# Patient Record
Sex: FEMALE | Race: OTHER | HISPANIC OR LATINO | Employment: UNEMPLOYED | URBAN - METROPOLITAN AREA
[De-identification: names, ages, dates, MRNs, and addresses within clinical notes are randomized per-mention and may not be internally consistent; named-entity substitution may affect disease eponyms.]

---

## 2017-10-10 ENCOUNTER — ALLSCRIPTS OFFICE VISIT (OUTPATIENT)
Dept: OTHER | Facility: OTHER | Age: 57
End: 2017-10-10

## 2017-10-10 ENCOUNTER — APPOINTMENT (OUTPATIENT)
Dept: RADIOLOGY | Facility: CLINIC | Age: 57
End: 2017-10-10
Payer: COMMERCIAL

## 2017-10-10 DIAGNOSIS — M25.561 PAIN IN RIGHT KNEE: ICD-10-CM

## 2017-10-10 DIAGNOSIS — M25.562 PAIN IN LEFT KNEE: ICD-10-CM

## 2017-10-10 PROCEDURE — 73562 X-RAY EXAM OF KNEE 3: CPT

## 2017-10-11 NOTE — PROGRESS NOTES
Assessment  1  Localized osteoarthritis of knees, bilateral (785 36) (M17 0)    Plan  Bilateral knee pain    · * XR KNEE 3 VW LEFT NON INJURY; Status:Active - Retrospective By Protocol  Authorization; Requested for:10Oct2017;    · * XR KNEE 3 VW RIGHT NON INJURY; Status:Active - Retrospective By Protocol  Authorization; Requested for:10Oct2017; She has failed conservative measures for this right knee which include injections and physical therapy and ice  She wishes to undergo a right total knee replacement  We did talk about that procedure and I have referred her to Dr Celia Tucker for further evaluation and consideration of knee replacement surgery  Discussion/Summary  The patient, patient's family was counseled regarding diagnostic results,-instructions for management,-prognosis,-patient and family education,-impressions,-risks and benefits of treatment options  Chief Complaint  1  Knee Pain    History of Present Illness  Luis F Chowdhury is a 16-year-old female referred to see me today by her primary care physician for right greater than left knee pain  She is known to have significant arthritis into both knees  She has done physical therapy and received injections for the knees but these interventions have failed to give her relief  She notices that the right knee is a sharp and moderate and constant discomfort that is worse with more walking and somewhat better with rest and tends to radiate medially and posteriorly  She does note decreased range of motion in that knee  Review of Systems    Constitutional: recent weight loss, but-No fever, no chills, feels well, no tiredness, no recent weight gain or loss  Eyes: No complaints of eyesight problems, no red eyes  ENT: no loss of hearing, no nosebleeds, no sore throat  Cardiovascular: No complaints of chest pain, no palpitations, no leg claudication or lower extremity edema  Respiratory: no compliants of shortness of breath, no wheezing, no cough  Gastrointestinal: no complaints of abdominal pain, no constipation, no nausea or diarrhea, no vomiting, no bloody stools  Genitourinary: no complaints of dysuria, no incontinence  Musculoskeletal: joint swelling-and-joint stiffness, but-as noted in HPI  Integumentary: no complaints of skin rash or lesion, no itching or dry skin, no skin wounds  Neurological: no complaints of headache, no confusion, no numbness or tingling, no dizziness  Endocrine: No complaints of muscle weakness, no feelings of weakness, no frequent urination, no excessive thirst    Psychiatric: No suicidal thoughts, no anxiety, no feelings of depression  ROS reviewed  Active Problems  1  Bilateral knee pain (719 46) (M25 561,M25 562)    Past Medical History    The active problems and past medical history were reviewed and updated today  Surgical History   · Denied: History Of Prior Surgery    The surgical history was reviewed and updated today  Family History  Mother    · Family history of arthritis (V17 7) (Z82 61)  Father    · Family history of arthritis (V17 7) (Z82 61)    The family history was reviewed and updated today  Social History   · Smoker (305 1) (F17 200)  The social history was reviewed and updated today  Current Meds   1  Aspirin TABS; Therapy: (Recorded:10Oct2017) to Recorded   2  MetFORMIN HCl TABS; Therapy: (Recorded:10Oct2017) to Recorded   3  Pravastatin Sodium TABS; Therapy: (Recorded:10Oct2017) to Recorded    The medication list was reviewed and updated today  Allergies  1  No Known Drug Allergies    Vitals  Signs   Heart Rate: 62  Systolic: 497  Diastolic: 70  Height: 5 ft 2 in  Weight: 155 lb   BMI Calculated: 28 35  BSA Calculated: 1 72    Physical Exam    Right Knee: Appearance: Normal except an effusion-and-genu varum  Tenderness: None except the medial joint line  ROM: Full except as noted: Flexion: painful restricted AROM 110 degrees   Extension: restricted AROM -5 degrees  Flexion was 5/5  Extension was 5/5  Special Test: no laxity on Valgus Stress-and-no laxity on Varus Stress  Left Knee: Appearance: genu varum  Tenderness: None  ROM: Full  Motor: Normal    Constitutional - General appearance: Abnormal  obese  Musculoskeletal - Gait and station: Normal -Digits and nails: Normal -Muscle strength/tone: Normal -Lower extremity compartments: Normal    Cardiovascular - Pulses: Normal -Examination of extremities for edema and/or varicosities: Normal    Skin - Skin and subcutaneous tissue: Normal    Neurologic - Sensation: Normal -Lower extremity peripheral neuro exam: Normal    Psychiatric - Orientation to person, place, and time: Normal -Mood and affect: Normal    Eyes   Conjunctiva and lids: Normal     Pupils and irises: Normal        Results/Data  I personally reviewed the films/images/results in the office today  My interpretation follows  X-ray Review Bilateral knee x-rays demonstrate moderate to severe arthritis in the right knee particularly in the medial compartment  The left knee has mild arthritic change        Future Appointments    Date/Time Provider Specialty Site   10/20/2017 03:00 PM Allyson Kelley DO Orthopedic Surgery [facility]     Signatures   Electronically signed by : SAIDA Leo ; Oct 10 2017  4:41PM EST                       (Author)

## 2017-10-20 ENCOUNTER — ALLSCRIPTS OFFICE VISIT (OUTPATIENT)
Dept: OTHER | Facility: OTHER | Age: 57
End: 2017-10-20

## 2017-10-21 NOTE — PROGRESS NOTES
Assessment  1  Bilateral knee pain (719 46) (M25 561,M25 562)   2  Localized osteoarthritis of knees, bilateral (715 36) (M17 0)   3  Encounter for preventive health examination (V70 0) (Z00 00)    Discussion/Summary    The patient is a very pleasant 69-year-old female today that presents today with her daughter for evaluation of activity-related bilateral knee pain with the right worse than the left secondary to moderate to severe osteoarthritis on the right and mild to moderate osteoarthritis on the left  She presents as a consultation request from my partner Dr Skye Sheriff for my subspecialty of joint replacement surgery  After thorough history, clinical examination, and x-ray evaluation she is suffering activity-related bilateral knee pain secondary to her underlying osteoarthritic disease that is in a valgus deformity pattern  She has attempted multiple forms of conservative therapy thus far and is not finding long-lasting relief however she did undergo a series of viscosupplementation injections in the bilateral knees previously and this provided approximately 6 months worth of relief  I feel that it is reasonable that we should try this again due to her history of positive pain relief from this in the past  She will need a knee replacement sooner rather than later and I did relate this to her today and she understands this  It is important to maximize the time until knee replacement as the younger she is when she undergoes this to sooner she may need another 1 due to failure from wear  She demonstrates understanding of this  We will attempt another round of viscosupplementation injections with Euflexxa for her bilateral knees  If this fails to provide relief we will have to pursue total knee replacement surgery sooner rather than later  This would likely entail the right knee being done before the left knee  The patient demonstrates understanding this   We will start the approval process and call her when her injections are improve her bilateral knees  The patient may call the office at anytime if any questions or concerns should arise  Chief Complaint  1  Knee Pain  Bilateral knee pain      History of Present Illness  HPI: The patient is a 67-year-old female here with her daughter today for evaluation of her bilateral knee pain  The patient's daughter is here to help translate as the patient is Austrian-speaking only  Patient referred to me by my partner Dr Pepito Phan for my subspecialty of joint replacement surgery  She has had many years of bilateral knee activity-related pain with the right being currently worse than left  The pain is sharp and achy and dull in nature and is exacerbated with activity, walking, weight-bearing, going up and down steps, and getting off of low seated chair such as a toilet  The pain is localized diffusely throughout the knee but is focal over the lateral compartment in the bilateral knees  She also states that her left knee is starting to feel stiff  She does report intermittent swelling in the bilateral knees that is self-limiting  The patient does report crack in the bilateral knees but denies mechanical symptoms such as catching or locking the patient has attempted conservative measures of treatment with anti-inflammatory medications such as Naprosyn, analgesics such as Tylenol, physical therapy, and even Euflexxa injections in the past  Her previous round of Euflexxa injections provided 6 months worth of relief  Review of Systems    Constitutional: feeling tired, but-- no fever,-- not feeling poorly,-- no recent weight gain,-- no chills-- and-- no recent weight loss  Eyes: dry eyes, but-- No complaints of eyesight problems, no red eyes  ENT: no loss of hearing, no nosebleeds, no sore throat  Cardiovascular: No complaints of chest pain, no palpitations, no leg claudication or lower extremity edema     Respiratory: no compliants of shortness of breath, no wheezing, no cough  Gastrointestinal: no complaints of abdominal pain, no constipation, no nausea or diarrhea, no vomiting, no bloody stools  Genitourinary: no complaints of dysuria, no incontinence  Musculoskeletal: arthralgias,-- joint swelling,-- limb pain,-- joint stiffness-- and-- limb swelling, but-- no myalgias  Integumentary: no complaints of skin rash or lesion, no itching or dry skin, no skin wounds  Neurological: no complaints of headache, no confusion, no numbness or tingling, no dizziness  Endocrine: No complaints of muscle weakness, no feelings of weakness, no frequent urination, no excessive thirst    Psychiatric: No suicidal thoughts, no anxiety, no feelings of depression  ROS reviewed  Active Problems  1  Bilateral knee pain (719 46) (M25 561,M25 562)   2  Localized osteoarthritis of knees, bilateral (715 36) (M17 0)    Past Medical History    The active problems and past medical history were reviewed and updated today  Surgical History   · Denied: History Of Prior Surgery    The surgical history was reviewed and updated today  Family History  Mother    · Family history of arthritis (V17 7) (Z82 61)  Father    · Family history of arthritis (V17 7) (Z82 61)    The family history was reviewed and updated today  Social History   · Smoker (305 1) (F17 200)  The social history was reviewed and updated today  Current Meds   1  Aspirin TABS; Therapy: (Recorded:10Oct2017) to Recorded   2  MetFORMIN HCl TABS; Therapy: (Recorded:10Oct2017) to Recorded   3  Pravastatin Sodium TABS; Therapy: (Recorded:10Oct2017) to Recorded    The medication list was reviewed and updated today  Allergies  1  No Known Drug Allergies    Vitals  Signs   Heart Rate: 59  Systolic: 826  Diastolic: 75  Height: 5 ft 2 in  Weight: 158 lb   BMI Calculated: 28 9  BSA Calculated: 1 73    Physical Exam  General:  Awake alert orient x3, no acute distress, BMI of 28 90    Bilateral knee exam :  There is a passively correctable genu valgum deformity bilaterally of 5Â°-7Â° range of motion is from 0-130 degrees there is 1+ laxity to varus stress at 0 and 30Â° of flexion and bilateral knee, there is no erythema or effusion or warmth, there is parapatellar crepitance, the patella stable through active and passive range of motion, there is tenderness palpation over medial lateral patellar facet, the ipsilateral hip range of motion is without referred pain to the bilateral knees  Constitutional - General appearance: Normal    Musculoskeletal - Gait and station: Normal -- Muscle strength/tone: Normal -- Lower extremity compartments: Normal    Cardiovascular - Pulses: Normal -- Examination of extremities for edema and/or varicosities: Normal    Skin - Skin and subcutaneous tissue: Normal    Neurologic - Sensation: Normal -- Lower extremity peripheral neuro exam: Normal    Psychiatric - Orientation to person, place, and time: Normal -- Mood and affect: Normal    Eyes   Conjunctiva and lids: Normal     Pupils and irises: Normal        Results/Data  I personally reviewed the films/images/results in the office today  My interpretation follows  X-ray Review X-rays obtained here in the office today demonstrate moderate to severe valgus osteoarthritis of the right knee with joint space narrowing osteophyte formation and sclerosis with near bone-on-bone contact and mild to moderate valgus osteoarthritis of the left knee with joint space narrowing sclerosis and early osteophyte formation  Signatures   Electronically signed by :  Lelia Mccracken DO; Oct 20 2017  3:51PM EST                       (Author)

## 2017-11-14 ENCOUNTER — GENERIC CONVERSION - ENCOUNTER (OUTPATIENT)
Dept: OTHER | Facility: OTHER | Age: 57
End: 2017-11-14

## 2017-11-21 ENCOUNTER — GENERIC CONVERSION - ENCOUNTER (OUTPATIENT)
Dept: OTHER | Facility: OTHER | Age: 57
End: 2017-11-21

## 2017-11-28 ENCOUNTER — GENERIC CONVERSION - ENCOUNTER (OUTPATIENT)
Dept: OTHER | Facility: OTHER | Age: 57
End: 2017-11-28

## 2018-01-14 VITALS
HEIGHT: 62 IN | HEART RATE: 59 BPM | WEIGHT: 158 LBS | DIASTOLIC BLOOD PRESSURE: 75 MMHG | BODY MASS INDEX: 29.08 KG/M2 | SYSTOLIC BLOOD PRESSURE: 117 MMHG

## 2018-01-14 VITALS
HEIGHT: 62 IN | BODY MASS INDEX: 28.52 KG/M2 | HEART RATE: 62 BPM | DIASTOLIC BLOOD PRESSURE: 70 MMHG | SYSTOLIC BLOOD PRESSURE: 115 MMHG | WEIGHT: 155 LBS

## 2018-01-22 VITALS
SYSTOLIC BLOOD PRESSURE: 134 MMHG | WEIGHT: 158 LBS | BODY MASS INDEX: 29.08 KG/M2 | HEART RATE: 75 BPM | HEIGHT: 62 IN | DIASTOLIC BLOOD PRESSURE: 87 MMHG

## 2018-01-22 VITALS
SYSTOLIC BLOOD PRESSURE: 101 MMHG | BODY MASS INDEX: 29.08 KG/M2 | DIASTOLIC BLOOD PRESSURE: 65 MMHG | HEIGHT: 62 IN | WEIGHT: 158 LBS | HEART RATE: 69 BPM

## 2018-01-22 VITALS
WEIGHT: 158 LBS | SYSTOLIC BLOOD PRESSURE: 120 MMHG | BODY MASS INDEX: 29.08 KG/M2 | HEART RATE: 73 BPM | DIASTOLIC BLOOD PRESSURE: 77 MMHG | HEIGHT: 62 IN

## 2018-02-01 ENCOUNTER — HOSPITAL ENCOUNTER (EMERGENCY)
Facility: HOSPITAL | Age: 58
Discharge: HOME | End: 2018-02-01
Attending: EMERGENCY MEDICINE | Admitting: EMERGENCY MEDICINE
Payer: COMMERCIAL

## 2018-02-01 VITALS
OXYGEN SATURATION: 98 % | RESPIRATION RATE: 18 BRPM | WEIGHT: 155 LBS | TEMPERATURE: 96.4 F | HEART RATE: 60 BPM | DIASTOLIC BLOOD PRESSURE: 75 MMHG | SYSTOLIC BLOOD PRESSURE: 128 MMHG

## 2018-02-01 DIAGNOSIS — L50.9 URTICARIA: Primary | ICD-10-CM

## 2018-02-01 PROCEDURE — 99282 EMERGENCY DEPT VISIT SF MDM: CPT

## 2018-02-01 RX ORDER — ATENOLOL 25 MG/1
25 TABLET ORAL DAILY
COMMUNITY
End: 2020-03-11

## 2018-02-01 RX ORDER — PREDNISONE 20 MG/1
40 TABLET ORAL ONCE
Status: COMPLETED | OUTPATIENT
Start: 2018-02-01 | End: 2018-02-01

## 2018-02-01 RX ORDER — PRAVASTATIN SODIUM 20 MG
20 TABLET ORAL DAILY
COMMUNITY
End: 2020-03-11

## 2018-02-01 RX ORDER — PREDNISONE 10 MG/1
20 TABLET ORAL DAILY
Qty: 10 TABLET | Refills: 0 | Status: SHIPPED | OUTPATIENT
Start: 2018-02-01 | End: 2018-02-06

## 2018-02-01 RX ADMIN — PREDNISONE 40 MG: 20 TABLET ORAL at 16:07

## 2018-02-01 NOTE — ED PROVIDER NOTES
History  Chief Complaint   Patient presents with    Rash     pt c/o of reddned itchy skin across the torso, arms and neck  pt presents to the ed with reddned skin, no broken derma noted      55-year-old female presents with intermittent rash on various areas of her body  Patient states that sometimes she gets on arm sometimes on her back sometimes her chest seems to come and go  When she gets this she states it is itchy and painful  She has had no fevers no chills no other complaints  Patient is awake and alert no neuro deficits no other complaints  History provided by:  Patient and relative   used: No        Prior to Admission Medications   Prescriptions Last Dose Informant Patient Reported? Taking? METFORMIN HCL PO   Yes Yes   Sig: Take by mouth   atenolol (TENORMIN) 25 mg tablet   Yes Yes   Sig: Take 25 mg by mouth daily   pravastatin (PRAVACHOL) 20 mg tablet   Yes Yes   Sig: Take 20 mg by mouth daily      Facility-Administered Medications: None       Past Medical History:   Diagnosis Date    Diabetes mellitus ([facility] [address] )     Hyperlipidemia     Hypertension        History reviewed  No pertinent surgical history  History reviewed  No pertinent family history  I have reviewed and agree with the history as documented  Social History   Substance Use Topics    Smoking status: Current Every Day Smoker     Packs/day: 0 20    Smokeless tobacco: Not on file    Alcohol use No        Review of Systems   Constitutional: Negative for activity change, chills, diaphoresis and fever  HENT: Negative for congestion, ear pain, nosebleeds, sore throat, trouble swallowing and voice change  Eyes: Negative for pain, discharge and redness  Respiratory: Negative for apnea, cough, choking, shortness of breath, wheezing and stridor  Cardiovascular: Negative for chest pain and palpitations     Gastrointestinal: Negative for abdominal distention, abdominal pain, constipation, diarrhea, nausea and vomiting  Endocrine: Negative for polydipsia  Genitourinary: Negative for difficulty urinating, dysuria, flank pain, frequency, hematuria and urgency  Musculoskeletal: Negative for back pain, gait problem, joint swelling, myalgias, neck pain and neck stiffness  Skin: Positive for rash  Negative for pallor  Neurological: Negative for dizziness, tremors, syncope, speech difficulty, weakness, numbness and headaches  Hematological: Negative for adenopathy  Psychiatric/Behavioral: Negative for confusion, hallucinations, self-injury and suicidal ideas  The patient is not nervous/anxious  Physical Exam  ED Triage Vitals [02/01/18 1546]   Temperature Pulse Respirations Blood Pressure SpO2   (!) 96 4 °F (35 8 °C) 60 18 128/75 98 %      Temp Source Heart Rate Source Patient Position - Orthostatic VS BP Location FiO2 (%)   Tympanic -- -- -- --      Pain Score       --           Orthostatic Vital Signs  Vitals:    02/01/18 1546   BP: 128/75   Pulse: 60       Physical Exam   Constitutional: She is oriented to person, place, and time  Vital signs are normal  She appears well-developed and well-nourished  HENT:   Head: Normocephalic and atraumatic  Eyes: Conjunctivae and EOM are normal  Pupils are equal, round, and reactive to light  Neck: Normal range of motion  Neck supple  Cardiovascular: Normal rate, regular rhythm, normal heart sounds and intact distal pulses  Pulmonary/Chest: Effort normal and breath sounds normal    Abdominal: Soft  Bowel sounds are normal    Musculoskeletal: Normal range of motion  Neurological: She is alert and oriented to person, place, and time  Skin: Skin is warm  Rash noted  Psychiatric: She has a normal mood and affect  Nursing note and vitals reviewed        ED Medications  Medications   predniSONE tablet 40 mg (not administered)       Diagnostic Studies  Results Reviewed     None                 No orders to display Procedures  Procedures       Phone Contacts  ED Phone Contact    ED Course  ED Course                                MDM  CritCare Time    Disposition  Final diagnoses:   Urticaria     Time reflects when diagnosis was documented in both MDM as applicable and the Disposition within this note     Time User Action Codes Description Comment    2/1/2018  4:01 PM Bobby Estrella Add [L50 9] Urticaria       ED Disposition     ED Disposition Condition Comment    Discharge  [address] discharge to home/self care  Condition at discharge: Stable        Follow-up Information     Follow up With Specialties Details Why Contact Info Additional Information    [facility] Emergency Department Emergency Medicine  As needed [address] [facility] [address]  [phone] [address], [address]        Patient's Medications   Discharge Prescriptions    PREDNISONE 10 MG TABLET    Take 2 tablets (20 mg total) by mouth daily for 5 days       Start Date: 2/1/2018  End Date: 2/6/2018       Order Dose: 20 mg       Quantity: 10 tablet    Refills: 0     No discharge procedures on file      ED Provider  Electronically Signed by           Pam Izaguirre DO  02/01/18 2367

## 2018-02-01 NOTE — DISCHARGE INSTRUCTIONS
Acute Rash   WHAT YOU NEED TO KNOW:   A rash is irritation, redness, or itchiness in the skin or mucus membranes  Mucus membranes are areas such as the lining of your nose or throat  Acute means the rash starts suddenly, worsens quickly, and lasts a short time  An acute rash may be caused by a disease, such as hepatitis or vasculitis  The rash may be a reaction to something you are allergic to, such as certain foods, or latex  Certain medicines, including antibiotics, NSAIDs, prescription pain medicine, and aspirin can also cause a rash  DISCHARGE INSTRUCTIONS:   Return to the emergency department if:   · You have sudden trouble breathing or chest pain  · You are vomiting, have a headache or muscle aches, and your throat hurts  Contact your healthcare provider if:   · You have a fever  · You get open wounds from scratching your skin, or you have a wound that is red, swollen, or painful  · Your rash lasts longer than 3 months  · You have swelling or pain in your joints  · You have questions or concerns about your condition or care  Medicines:  If your rash does not go away on its own, you may need the following medicines:  · Antihistamines  may be given to help decrease itching  · Steroids  may be given to decrease inflammation  · Antibiotics  help fight or prevent a bacterial infection  · Take your medicine as directed  Contact your healthcare provider if you think your medicine is not helping or if you have side effects  Tell him of her if you are allergic to any medicine  Keep a list of the medicines, vitamins, and herbs you take  Include the amounts, and when and why you take them  Bring the list or the pill bottles to follow-up visits  Carry your medicine list with you in case of an emergency  Prevent a rash or care for your skin when you have a rash:  Dry skin can lead to more problems  Do not scratch your skin if it itches  You may cause a skin infection by scratching   The following may prevent dry skin, and help your skin look better:  · Use thick cream lotions or petroleum jelly to help soothe your rash  These products work well on areas with thick skin, such as your feet  Cool compresses may also be used to soothe your skin  Apply a cool compress or a cool, wet towel, and then cover it with a dry towel  · Use lukewarm water when you bathe  Hot water may damage your skin more  Pat your skin dry  Do not rub your skin with a towel  · Use detergents, soaps, shampoos, and bubble baths made for sensitive skin  Wear clothes that are made of cotton instead of nylon or wool  Cotton is softer, so it will not hurt your skin as much  Follow up with your healthcare provider as directed: You may need to see a dermatologist if healthcare providers do not know what is causing your rash  You may also need to see a dermatologist if your rash does not get better even with treatment  You may need to see a dietitian if you have allergies to foods  Write down your questions so you remember to ask them during your visits  © 2017 [facility] Information is for End User's use only and may not be sold, redistributed or otherwise used for commercial purposes  All illustrations and images included in CareNotes® are the copyrighted property of [employer]  or [employer]  The above information is an  only  It is not intended as medical advice for individual conditions or treatments  Talk to your doctor, nurse or pharmacist before following any medical regimen to see if it is safe and effective for you

## 2018-02-21 ENCOUNTER — OFFICE VISIT (OUTPATIENT)
Dept: OBGYN CLINIC | Facility: CLINIC | Age: 58
End: 2018-02-21
Payer: COMMERCIAL

## 2018-02-21 ENCOUNTER — APPOINTMENT (OUTPATIENT)
Dept: RADIOLOGY | Facility: CLINIC | Age: 58
End: 2018-02-21
Payer: COMMERCIAL

## 2018-02-21 VITALS
HEIGHT: 62 IN | SYSTOLIC BLOOD PRESSURE: 97 MMHG | WEIGHT: 160 LBS | DIASTOLIC BLOOD PRESSURE: 64 MMHG | BODY MASS INDEX: 29.44 KG/M2 | HEART RATE: 80 BPM

## 2018-02-21 DIAGNOSIS — M17.0 LOCALIZED OSTEOARTHRITIS OF KNEES, BILATERAL: ICD-10-CM

## 2018-02-21 DIAGNOSIS — M54.5 ACUTE LEFT-SIDED LOW BACK PAIN, WITH SCIATICA PRESENCE UNSPECIFIED: Primary | ICD-10-CM

## 2018-02-21 DIAGNOSIS — M47.816 LUMBAR SPONDYLOSIS: ICD-10-CM

## 2018-02-21 PROBLEM — M25.561 BILATERAL KNEE PAIN: Status: ACTIVE | Noted: 2017-10-10

## 2018-02-21 PROBLEM — M25.562 BILATERAL KNEE PAIN: Status: ACTIVE | Noted: 2017-10-10

## 2018-02-21 PROCEDURE — 99214 OFFICE O/P EST MOD 30 MIN: CPT | Performed by: ORTHOPAEDIC SURGERY

## 2018-02-21 PROCEDURE — 72110 X-RAY EXAM L-2 SPINE 4/>VWS: CPT

## 2018-02-21 RX ORDER — ATENOLOL 100 MG/1
50 TABLET ORAL DAILY
COMMUNITY
End: 2020-03-11

## 2018-02-21 RX ORDER — PRAVASTATIN SODIUM 80 MG/1
TABLET ORAL
COMMUNITY
End: 2020-03-11

## 2018-02-21 NOTE — PROGRESS NOTES
Assessment/Plan:  1  Acute left-sided low back pain, with sciatica presence unspecified  XR spine lumbar minimum 4 views non injury    Ambulatory referral to Physical Therapy   2  Lumbar spondylosis  Ambulatory referral to Physical Therapy   3  Localized osteoarthritis of knees, bilateral  Ambulatory referral to Physical Therapy     Donnell Mancera is presenting for re-evaluation, but with a new complaint of left lower extremity weakness  We do not feel that this is in any way related to her knee osteoarthritis that was treated with Euflexxa, and that the Euflexxa has most likely has helped with her knee pain  More probable is that her symptoms are originating from her back  Her lumbar spine did show mild spondylosis, but acute pathology or significant degeneration  Therefore, we have provided her with a referral to physical therapy for core and lower extremity strengthening  She can also continue with her NSAIDs as needed  She should return in 2 months for re-evaluation  If she fails conservative measures, we can consider an MRI  Subjective: Bilateral knee pain  Patient ID: Gilda Harris is a 65 y o  female  Donnell Mancera is a pleasant 67-year-old English-speaking female seen today with her daughter who is serving as a   She is following up 3 months after completing the series of Euflexxa injections for her bilateral knee osteoarthritis  She reports that she is unsure if they actually helped, as she has developed weakness and radicular symptoms up and down the left leg  Her right knee has been sore because she has been favoring it due to weakness in her left leg  She denies any history of back injuries or sciatic type symptoms  Her symptoms are worse 1st thing in the morning and with activity  She denies any paresthesias or rest           Review of Systems   Constitutional: Negative  HENT: Negative  Eyes: Negative  Respiratory: Negative  Cardiovascular: Negative  Gastrointestinal: Negative  Endocrine: Negative  Genitourinary: Negative  Musculoskeletal: Negative  Skin: Negative  Allergic/Immunologic: Negative  Neurological: Negative  Hematological: Negative  Psychiatric/Behavioral: Negative  No past medical history on file  No past surgical history on file  No family history on file  Social History     Occupational History    Not on file  Social History Main Topics    Smoking status: Current Every Day Smoker    Smokeless tobacco: Never Used    Alcohol use No    Drug use: No    Sexual activity: Not on file         Current Outpatient Prescriptions:     atenolol (TENORMIN) 100 mg tablet, Take 50 mg by mouth daily, Disp: , Rfl:     metFORMIN (GLUCOPHAGE) 1000 MG tablet, Take by mouth, Disp: , Rfl:     pravastatin (PRAVACHOL) 80 mg tablet, Take by mouth, Disp: , Rfl:     No Known Allergies    Objective:  Vitals:    02/21/18 1405   BP: 97/64   Pulse: 80       Body mass index is 29 26 kg/m²  Right Knee Exam   Right knee exam is normal     Range of Motion   The patient has normal right knee ROM  Muscle Strength     The patient has normal right knee strength  Tests   Mark:  Medial - negative Lateral - negative  Varus: negative  Valgus: negative    Other   Erythema: absent  Scars: absent  Sensation: normal  Pulse: present  Swelling: none  Other tests: no effusion present      Left Knee Exam   Left knee exam is normal     Range of Motion   The patient has normal left knee ROM  Tests   Mark:  Medial - negative Lateral - negative  Varus: negative  Valgus: negative    Other   Erythema: absent  Scars: absent  Sensation: normal  Pulse: present  Swelling: none  Effusion: no effusion present      Back Exam     Comments:  She slightly tender to palpation over the lumbosacral spinous processes, but more significantly of left-sided paraspinal muscles  There is no tenderness on the right paraspinal muscles    She has a negative seated and supine straight leg raise bilaterally  She reports normal sensation to light touch in the L2 through S1 nerve distributions  She ambulates with a slightly antalgic gait on the left  Her bilateral hip exam is benign with full range of motion, no tenderness, and no pain  Observations   Left Knee   Negative for effusion  Right Knee   Negative for effusion  Physical Exam   Constitutional: She is oriented to person, place, and time  She appears well-developed and well-nourished  Body mass index is 29 26 kg/m²  HENT:   Head: Normocephalic and atraumatic  Eyes: EOM are normal    Neck: Normal range of motion  Cardiovascular: Intact distal pulses  Pulmonary/Chest: Effort normal    Musculoskeletal:        Right knee: She exhibits no effusion  Left knee: She exhibits no effusion  See ortho exam   Neurological: She is alert and oriented to person, place, and time  Skin: Skin is warm and dry  Psychiatric: She has a normal mood and affect  Her behavior is normal  Judgment and thought content normal        I have personally reviewed pertinent films in PACS of her lumbar spine which show mild spondylosis but no evidence of fracture, dislocation, or foraminal narrowing  The disc spaces appear to be well maintained    The curvature of the lumbar thoracic spine appear to be normal

## 2018-03-14 ENCOUNTER — APPOINTMENT (EMERGENCY)
Dept: RADIOLOGY | Facility: HOSPITAL | Age: 58
End: 2018-03-14
Payer: COMMERCIAL

## 2018-03-14 ENCOUNTER — HOSPITAL ENCOUNTER (EMERGENCY)
Facility: HOSPITAL | Age: 58
Discharge: HOME | End: 2018-03-15
Attending: EMERGENCY MEDICINE
Payer: COMMERCIAL

## 2018-03-14 DIAGNOSIS — R20.2 PARESTHESIAS: Primary | ICD-10-CM

## 2018-03-14 PROCEDURE — 93005 ELECTROCARDIOGRAM TRACING: CPT

## 2018-03-14 PROCEDURE — 74176 CT ABD & PELVIS W/O CONTRAST: CPT

## 2018-03-14 RX ORDER — NAPROXEN 375 MG/1
250 TABLET ORAL DAILY
COMMUNITY
End: 2021-03-31 | Stop reason: HOSPADM

## 2018-03-14 NOTE — ED NOTES
EKG done per nurse order at 19:45, placed on chart after doctor sign off          Judy Gilman  03/14/18 1953

## 2018-03-15 VITALS
DIASTOLIC BLOOD PRESSURE: 72 MMHG | TEMPERATURE: 97 F | HEART RATE: 72 BPM | SYSTOLIC BLOOD PRESSURE: 137 MMHG | RESPIRATION RATE: 18 BRPM | OXYGEN SATURATION: 99 % | WEIGHT: 156 LBS

## 2018-03-15 PROCEDURE — 99284 EMERGENCY DEPT VISIT MOD MDM: CPT

## 2018-03-15 RX ORDER — PREDNISONE 10 MG/1
TABLET ORAL
Qty: 30 TABLET | Refills: 0 | Status: SHIPPED | OUTPATIENT
Start: 2018-03-15 | End: 2021-03-15

## 2018-03-15 RX ADMIN — PREDNISONE 50 MG: 20 TABLET ORAL at 00:37

## 2018-03-15 NOTE — DISCHARGE INSTRUCTIONS
Paresthesia   WHAT YOU NEED TO KNOW:   Paresthesia is numbness and tingling  It can happen in any part of your body, but usually occurs in your legs, feet, arms, or hands  There are a large number of conditions that can cause paresthesia  It affects the nerves that provide sensation and happens when there are changes in nerves or nerve pathways  These changes can be temporary, such as if you take certain medicines or you are not getting enough vitamin B  Conditions that may cause nerve damage include diabetes, carpel tunnel syndrome, stroke, and multiple sclerosis  The exact cause of your paresthesia may be unknown  DISCHARGE INSTRUCTIONS:   Medicines:  Ask for more information about medicines you may need to treat the condition causing your paresthesias  · NSAIDs  help decrease swelling and pain or fever  This medicine is available with or without a doctor's order  NSAIDs can cause stomach bleeding or kidney problems in certain people  If you take blood thinner medicine, always ask your healthcare provider if NSAIDs are safe for you  Always read the medicine label and follow directions  · Take your medicine as directed  Contact your healthcare provider if you think your medicine is not helping or if you have side effects  Tell him or her if you are allergic to any medicine  Keep a list of the medicines, vitamins, and herbs you take  Include the amounts, and when and why you take them  Bring the list or the pill bottles to follow-up visits  Carry your medicine list with you in case of an emergency  Follow up with your healthcare provider or neurologist as directed:  Write down your questions so you remember to ask them during your visits  Contact your healthcare provider or neurologist if:   · Your symptoms do not improve  · You have symptoms in more than one part of your body  · You have questions about your condition or care    Return to the emergency department if:   · You have any of the following signs of a stroke:      ¨ Numbness or drooping on one side of your face     ¨ Weakness in an arm or leg    ¨ Confusion or difficulty speaking    ¨ Dizziness, a severe headache, or vision loss    · You are not able to control your urine or bowel movements  · You have severe pain along with numbness and tingling  · Your legs suddenly become cold  You have trouble moving your legs, and they ache  · You have increased weakness in a part of your body  · You have uncontrolled movements, or you have a seizure  © 2017 [address] [employer] Information is for End User's use only and may not be sold, redistributed or otherwise used for commercial purposes  All illustrations and images included in CareNotes® are the copyrighted property of [employer] , [employer]  or [employer]  The above information is an  only  It is not intended as medical advice for individual conditions or treatments  Talk to your doctor, nurse or pharmacist before following any medical regimen to see if it is safe and effective for you

## 2018-03-15 NOTE — ED NOTES
Pt reports tingling from L flank through L side of abdomen  Denies pain  No flank pain noted on percussion  No other c/o       Marisela Carroll RN  03/14/18 2002

## 2018-03-15 NOTE — ED PROVIDER NOTES
History  Chief Complaint   Patient presents with    Numbness     states two week history of numbness in back radiating around to abdomen     66 yo female with left back pain and numbness radiating around to left side of abdomen x 2 weeks  No urinary problems  No diarrhea or constipation  No fever  No chest pain  No trauma  Pt  Does not have a doctor  Daughter thought her abdomen looked swollen today  History provided by:  Patient and relative   used: Yes        Prior to Admission Medications   Prescriptions Last Dose Informant Patient Reported? Taking? METFORMIN HCL PO   Yes No   Sig: Take by mouth   atenolol (TENORMIN) 25 mg tablet   Yes No   Sig: Take 25 mg by mouth daily   naproxen (NAPROSYN) 375 mg tablet   Yes Yes   Sig: Take 250 mg by mouth daily   pravastatin (PRAVACHOL) 20 mg tablet   Yes No   Sig: Take 20 mg by mouth daily      Facility-Administered Medications: None       Past Medical History:   Diagnosis Date    Diabetes mellitus ([facility] [address] )     Hyperlipidemia     Hypertension        History reviewed  No pertinent surgical history  History reviewed  No pertinent family history  I have reviewed and agree with the history as documented  Social History   Substance Use Topics    Smoking status: Current Every Day Smoker     Packs/day: 0 20    Smokeless tobacco: Never Used    Alcohol use No        Review of Systems   Constitutional: Negative  Negative for fever  HENT: Negative  Eyes: Negative  Respiratory: Negative  Negative for cough and shortness of breath  Cardiovascular: Negative  Negative for chest pain  Gastrointestinal: Negative  Negative for abdominal pain, diarrhea, nausea and vomiting  Genitourinary: Negative  Negative for dysuria and flank pain  Musculoskeletal: Negative  Negative for back pain and myalgias  Skin: Negative  Negative for rash  Neurological: Positive for numbness  Negative for dizziness and headaches  Hematological: Does not bruise/bleed easily  Psychiatric/Behavioral: Negative  All other systems reviewed and are negative  Physical Exam  ED Triage Vitals [03/14/18 1931]   Temperature Pulse Respirations Blood Pressure SpO2   (!) 97 °F (36 1 °C) 61 20 139/68 97 %      Temp Source Heart Rate Source Patient Position - Orthostatic VS BP Location FiO2 (%)   Tympanic Monitor Sitting Right arm --      Pain Score       No Pain           Orthostatic Vital Signs  Vitals:    03/14/18 1931 03/14/18 2000 03/15/18 0025   BP: 139/68 139/85 137/72   Pulse: 61 60 72   Patient Position - Orthostatic VS: Sitting  Sitting       Physical Exam   Constitutional: She appears well-developed and well-nourished  No distress  HENT:   Head: Normocephalic and atraumatic  Eyes: Conjunctivae are normal  Pupils are equal, round, and reactive to light  Neck: Normal range of motion  Neck supple  Cardiovascular: Normal rate, regular rhythm and normal heart sounds  No murmur heard  Pulmonary/Chest: Effort normal and breath sounds normal  No respiratory distress  Abdominal: Soft  Bowel sounds are normal  She exhibits no distension  There is no tenderness  Musculoskeletal: Normal range of motion  She exhibits no edema or deformity  Back + ttp left parathoracic muscles   Neurological: She is alert  She displays normal reflexes  No cranial nerve deficit  Patellar reflex intact; pt  Subjectively have decreased sensation on left side of abdomen compared to right  Sensation to light touch/scratch intact and subjectively the same on both arms, both sides of chest, both legs  Skin: Skin is warm and dry  No rash noted  She is not diaphoretic  No pallor  Psychiatric: She has a normal mood and affect  Her behavior is normal    Nursing note and vitals reviewed        ED Medications  Medications   predniSONE tablet 50 mg (not administered)       Diagnostic Studies  Results Reviewed     None                 CT renal stone study abdomen pelvis without contrast    (Results Pending)              Procedures  Procedures       Phone Contacts  ED Phone Contact    ED Course  ED Course                                MDM  Number of Diagnoses or Management Options  Paresthesias:   Diagnosis management comments: 0030 - CT scan still not read  Radiology was called multiple times by nurse and then by me  Apparently [facility] is having trouble getting the images  Pt  And her daughter advised  They will call tomorrow to get results and I will try course of prednisoen for now  Advised follow up outpt  CritCare Time    Disposition  Final diagnoses:   Paresthesias     Time reflects when diagnosis was documented in both MDM as applicable and the Disposition within this note     Time User Action Codes Description Comment    0/40/7391 59:29 AM Tamika DURAN Add [H70 3] Paresthesias       ED Disposition     ED Disposition Condition Comment    Discharge  Ranjan Blanca discharge to home/self care  Condition at discharge: Stable        Follow-up Information     Follow up With Specialties Details Why Contact Info    Rod Nunez DO Family Medicine Schedule an appointment as soon as possible for a visit  [address] [place], [place] [id number]          Patient's Medications   Discharge Prescriptions    PREDNISONE 10 MG TABLET    5 po daily x2 days, then 4 po daily x2 days, then 3 po daily x2 days, then 2 po daily x2 days,then 1 po daily x2days       Start Date: 3/15/2018 End Date: --       Order Dose: --       Quantity: 30 tablet    Refills: 0     No discharge procedures on file      ED Provider  Electronically Signed by           Mark eHin MD  58/16/73 8565

## 2018-03-16 LAB
ATRIAL RATE: 61 BPM
P AXIS: 39 DEGREES
PR INTERVAL: 200 MS
QRS AXIS: 38 DEGREES
QRSD INTERVAL: 72 MS
QT INTERVAL: 366 MS
QTC INTERVAL: 368 MS
T WAVE AXIS: 31 DEGREES
VENTRICULAR RATE: 61 BPM

## 2018-03-16 PROCEDURE — 93010 ELECTROCARDIOGRAM REPORT: CPT | Performed by: INTERNAL MEDICINE

## 2018-04-16 ENCOUNTER — TRANSCRIBE ORDERS (OUTPATIENT)
Dept: ADMINISTRATIVE | Facility: HOSPITAL | Age: 58
End: 2018-04-16

## 2018-04-16 DIAGNOSIS — D35.02 BENIGN NEOPLASM OF LEFT ADRENAL GLAND: Primary | ICD-10-CM

## 2018-04-24 ENCOUNTER — OFFICE VISIT (OUTPATIENT)
Dept: OBGYN CLINIC | Facility: CLINIC | Age: 58
End: 2018-04-24
Payer: COMMERCIAL

## 2018-04-24 VITALS
HEIGHT: 62 IN | BODY MASS INDEX: 29.08 KG/M2 | SYSTOLIC BLOOD PRESSURE: 102 MMHG | DIASTOLIC BLOOD PRESSURE: 60 MMHG | WEIGHT: 158 LBS | HEART RATE: 64 BPM

## 2018-04-24 DIAGNOSIS — G89.29 CHRONIC PAIN OF BOTH KNEES: ICD-10-CM

## 2018-04-24 DIAGNOSIS — M54.16 LUMBAR RADICULOPATHY: ICD-10-CM

## 2018-04-24 DIAGNOSIS — M25.561 CHRONIC PAIN OF BOTH KNEES: ICD-10-CM

## 2018-04-24 DIAGNOSIS — M54.16 LUMBAR BACK PAIN WITH RADICULOPATHY AFFECTING LEFT LOWER EXTREMITY: Primary | ICD-10-CM

## 2018-04-24 DIAGNOSIS — M25.562 CHRONIC PAIN OF BOTH KNEES: ICD-10-CM

## 2018-04-24 DIAGNOSIS — M17.0 LOCALIZED OSTEOARTHRITIS OF KNEES, BILATERAL: ICD-10-CM

## 2018-04-24 PROCEDURE — 99213 OFFICE O/P EST LOW 20 MIN: CPT | Performed by: ORTHOPAEDIC SURGERY

## 2018-04-24 NOTE — PROGRESS NOTES
Assessment/Plan:  1  Lumbar back pain with radiculopathy affecting left lower extremity  Ambulatory referral to Physical Therapy   2  Lumbar radiculopathy  Ambulatory referral to Physical Therapy   3  Localized osteoarthritis of knees, bilateral  Ambulatory referral to Physical Therapy   4  Chronic pain of both knees  Ambulatory referral to Physical Therapy     Yvette Aguilar is a pleasant 50-year-old Chinese-speaking female with lumbar spine spondylosis in left lower extremity radiculopathy  She also has bilateral knee osteoarthritis  Her symptoms have remained consistent despite her use of Naprosyn  She was unable to attend physical therapy due to extenuating medical circumstances, but stated that she may receive clearance after seeing her gastroenterologist undergoing a colonoscopy  Therefore, she may start therapy after colonoscopy 5/15 if cleared by GI doctor  We have recommended that she continue physical therapy and anti-inflammatories if appropriate for at least 6 weeks before returning to our office  Therefore we can see her in approximately mid July  If she fails to see significant relief from these conservative modalities, we can refer her for an MRI of her lumbar spine  She would also be eligible to repeat the series of Euflexxa injections for her bilateral knee osteoarthritis that time if needed  All of her questions were answered today  Subjective: Left leg weakness, bilateral knee pain follow up    Patient ID: Lelia Farmer is a 63 y o  female  Yvette Aguilar is a pleasant 50-year-old Chinese-speaking female seen today with the of the telephone   We last saw her 2 months ago for low back pain with radiculopathy into the left lower extremity  We have also seen her previously for bilateral knee osteoarthritis that was treated with Euflexxa in the end of November  She reports that she was unable to attend physical therapy, as she developed a more pressing medical issue    She reports that it is not yet fully resolved and she is scheduled to undergo colonoscopy in the middle of May  She was recommended by her primary care physician also attend physical therapy as her symptoms have remained largely unchanged  She feels that her right knee has been more troublesome because she has been favoring it from her left-sided back pain and radiculopathy into the lower extremity  She denies any paresthesias of either lower extremity  Review of Systems   Constitutional: Negative  HENT: Negative  Eyes: Negative  Respiratory: Negative  Cardiovascular: Negative  Gastrointestinal: Negative  Endocrine: Negative  Genitourinary: Negative  Musculoskeletal: Positive for joint swelling  Skin: Negative  Allergic/Immunologic: Negative  Neurological: Negative  Hematological: Negative  Psychiatric/Behavioral: Negative  Past Medical History:   Diagnosis Date    Diabetes mellitus ([facility] [address] )     Hyperlipidemia     Hypertension        No past surgical history on file  Family History   Problem Relation Age of Onset    Arthritis Mother     Arthritis Father        Social History     Occupational History    Not on file       Social History Main Topics    Smoking status: Current Every Day Smoker     Packs/day: 0 20    Smokeless tobacco: Never Used    Alcohol use No    Drug use: No    Sexual activity: Not on file         Current Outpatient Prescriptions:     atenolol (TENORMIN) 100 mg tablet, Take 50 mg by mouth daily, Disp: , Rfl:     atenolol (TENORMIN) 25 mg tablet, Take 25 mg by mouth daily, Disp: , Rfl:     metFORMIN (GLUCOPHAGE) 1000 MG tablet, Take by mouth, Disp: , Rfl:     METFORMIN HCL PO, Take by mouth, Disp: , Rfl:     naproxen (NAPROSYN) 375 mg tablet, Take 250 mg by mouth daily, Disp: , Rfl:     pravastatin (PRAVACHOL) 20 mg tablet, Take 20 mg by mouth daily, Disp: , Rfl:     pravastatin (PRAVACHOL) 80 mg tablet, Take by mouth, Disp: , Rfl:    predniSONE 10 mg tablet, 5 po daily x2 days, then 4 po daily x2 days, then 3 po daily x2 days, then 2 po daily x2 days,then 1 po daily x2days, Disp: 30 tablet, Rfl: 0    No Known Allergies    Objective:  Vitals:    04/24/18 1454   BP: 102/60   Pulse: 64       Body mass index is 28 9 kg/m²  Ortho Exam   Right Knee Exam   Right knee exam is normal      Range of Motion   The patient has normal right knee ROM     Muscle Strength      The patient has normal right knee strength      Tests   Mark:  Medial - negative Lateral - negative  Varus: negative  Valgus: negative     Other   Erythema: absent  Scars: absent  Sensation: normal  Pulse: present  Swelling: none  Other tests: no effusion present        Left Knee Exam   Left knee exam is normal      Range of Motion   The patient has normal left knee ROM     Tests   Mark:  Medial - negative Lateral - negative  Varus: negative  Valgus: negative     Other   Erythema: absent  Scars: absent  Sensation: normal  Pulse: present  Swelling: none  Effusion: no effusion present        Back Exam      Comments:  She slightly tender to palpation over the lumbosacral spinous processes, but more significantly of left-sided paraspinal muscles  There is no tenderness on the right paraspinal muscles  She has a negative seated and supine straight leg raise bilaterally  She reports normal sensation to light touch in the L2 through S1 nerve distributions  She ambulates with a slightly antalgic gait on the left  Her bilateral hip exam is benign with full range of motion, no tenderness, and no pain  Physical Exam   Constitutional: She is oriented to person, place, and time  She appears well-developed and well-nourished  Body mass index is 28 9 kg/m²  HENT:   Head: Normocephalic and atraumatic  Eyes: EOM are normal    Neck: Normal range of motion  Cardiovascular: Intact distal pulses      Pulmonary/Chest: Effort normal    Musculoskeletal:   See ortho exam   Neurological: She is alert and oriented to person, place, and time  Skin: Skin is warm and dry  Psychiatric: She has a normal mood and affect   Her behavior is normal  Judgment and thought content normal

## 2018-05-16 ENCOUNTER — EVALUATION (OUTPATIENT)
Dept: PHYSICAL THERAPY | Facility: CLINIC | Age: 58
End: 2018-05-16
Payer: COMMERCIAL

## 2018-05-16 DIAGNOSIS — M17.0 PRIMARY OSTEOARTHRITIS OF BOTH KNEES: ICD-10-CM

## 2018-05-16 DIAGNOSIS — M17.0 LOCALIZED OSTEOARTHRITIS OF KNEES, BILATERAL: ICD-10-CM

## 2018-05-16 DIAGNOSIS — M54.17 LUMBOSACRAL RADICULITIS: Primary | ICD-10-CM

## 2018-05-16 PROCEDURE — G8981 BODY POS CURRENT STATUS: HCPCS | Performed by: PHYSICAL THERAPIST

## 2018-05-16 PROCEDURE — G8982 BODY POS GOAL STATUS: HCPCS | Performed by: PHYSICAL THERAPIST

## 2018-05-16 PROCEDURE — 97162 PT EVAL MOD COMPLEX 30 MIN: CPT | Performed by: PHYSICAL THERAPIST

## 2018-05-16 NOTE — PROGRESS NOTES
PT Evaluation     Today's date: 2018  Patient name: Moise Hermosillo  : 1960  MRN: [id number]  Referring provider: Sandra Holloway PA-C  Dx:   Encounter Diagnosis     ICD-10-CM    1  Lumbosacral radiculitis M54 17    2  Primary osteoarthritis of both knees M17 0    3  Localized osteoarthritis of knees, bilateral M17 0        Start Time: 0  Stop Time: 315  Total time in clinic (min): 45 minutes    Assessment  Impairments: abnormal gait, abnormal muscle tone, abnormal or restricted ROM, activity intolerance, impaired physical strength, lacks appropriate home exercise program and pain with function    Assessment details: Kristy Hines is a 63 y o  female who presents to physical therapy with pain, decreased LE range of motion, decreased LE strength, impaired function and poor posture  Patient's clinical presentation is consistent with their referring diagnosis of Lumbosacral radiculitis and Primary osteoarthritis of both knees  The pt presents with functional limitations of ADLs, ambulation and performing household chores  Pt would benefit from physical therapy services to address these limitations and maximize function  Pt was instructed and educated on good sitting posture today and demonstrates understanding  Understanding of Dx/Px/POC: good   Prognosis: good  Prognosis details: Short term goals:  (3 weeks)  1  Patient will have pain level 2/10 or less in lumbar spine with ADL's   2  Patient will report a 50% improvement in symptoms with ADL's  3  Patient will have normal lower extremity ROM  4  Patient will demonstrate correct sitting posture      Long term goals:  (6 weeks)  1  Patient will demonstrate a reduction in tenderness and tension in hypertonic musculature  2  Patient will report 85 % improvement improvement in symptoms with ADL's  3  Patient will have no complaints of pain with walking  4   Patient will be independent in a comprehensive home exercise program Plan  Patient would benefit from: PT eval and skilled PT  Planned modality interventions: thermotherapy: hydrocollator packs  Planned therapy interventions: abdominal trunk stabilization, joint mobilization, massage, patient education, postural training, strengthening, stretching, therapeutic exercise, home exercise program, functional ROM exercises, gait training and neuromuscular re-education  Frequency: 2x week  Duration in visits: 12  Duration in weeks: 6  Treatment plan discussed with: patient        Subjective Evaluation    History of Present Illness  Mechanism of injury: She reports LBP especially Left side  The pain began insidiously in 2018  She also reports stiffness bilateral knees     She saw Dr Philippe Christensen  She received an x-ray and was referred to PT  She reports that her left sided LBP and flank pain increases when she eats  Pain  Current pain ratin  At worst pain rating: 10    Patient Goals  Patient goals for therapy: decreased pain          Objective     Postural Observations  Seated posture: poor        Palpation   Left   Hypertonic in the lumbar paraspinals and quadratus lumborum  Tenderness of the lumbar paraspinals and quadratus lumborum  Right   Hypertonic in the lumbar paraspinals and quadratus lumborum  Tenderness of the lumbar paraspinals and quadratus lumborum  Active Range of Motion     Lumbar   Flexion: Active lumbar flexion: Minimal ROM loss  with pain  Extension: Active lumbar extension: Moderate ROM loss  with pain  Left lateral flexion: Active left lumbar lateral flexion: Minimal ROM loss  with pain  Right lateral flexion: Active right lumbar lateral flexion: Minimal ROM loss  with pain  Left rotation: Active left lumbar rotation: Minimal ROM loss  Right rotation: Active right lumbar rotation: Minimal ROM loss     Left Knee   Flexion: 116 degrees   Extension: 0 degrees     Right Knee   Flexion: 119 degrees   Extension: 0 degrees     Strength/Myotome Testing Left Hip   Planes of Motion   Flexion: 4+    Right Hip   Planes of Motion   Flexion: 4+    Left Knee   Flexion: 4  Extension: 4    Right Knee   Flexion: 4  Extension: 4    Left Ankle/Foot   Dorsiflexion: 4+  Plantar flexion: 4+    Right Ankle/Foot   Dorsiflexion: 4+  Plantar flexion: 4+    Ambulation     Observational Gait   Gait: antalgic       Flowsheet Rows      Most Recent Value   PT/OT G-Codes   Current Score  27   Projected Score  47   FOTO information reviewed  Yes   Assessment Type  Evaluation   G code set  Changing & Maintaining Body Position   Changing and Maintaining Body Position Current Status ()  CL   Changing and Maintaining Body Position Goal Status ()  CK          Precautions: - Diabetes, Hypertension    Specialty Daily Treatment Diary     Manual  5/16/18       STM to Paraspinals        Stretch HS, psoas , quad        PROM bilateral knees                            Exercise Diary         NuStep        Side step        TG squat        Knee ext with df        Heel slides        LTR        Bridges        Clamshells        SLR                                                                                                    Modalities         to L-S                Visit # 1    FOTO

## 2018-05-22 ENCOUNTER — OFFICE VISIT (OUTPATIENT)
Dept: PHYSICAL THERAPY | Facility: CLINIC | Age: 58
End: 2018-05-22
Payer: COMMERCIAL

## 2018-05-22 DIAGNOSIS — M54.17 LUMBOSACRAL RADICULITIS: Primary | ICD-10-CM

## 2018-05-22 DIAGNOSIS — M17.0 PRIMARY OSTEOARTHRITIS OF BOTH KNEES: ICD-10-CM

## 2018-05-22 PROCEDURE — 97140 MANUAL THERAPY 1/> REGIONS: CPT

## 2018-05-22 PROCEDURE — 97110 THERAPEUTIC EXERCISES: CPT

## 2018-05-22 NOTE — PROGRESS NOTES
Daily Note     Today's date: 2018  Patient name: Mariel Hermosillo  : 1960  MRN: [id number]  Referring provider: Jayna Caceres PA-C  Dx:   Encounter Diagnosis     ICD-10-CM    1  Lumbosacral radiculitis M54 17    2  Primary osteoarthritis of both knees M17 0                   Subjective:  Reports 5/10 LBP and weakness in L LE,  Denies LE pain      Objective: See treatment diary below        Specialty Daily Treatment Diary     Manual  18      STM to Paraspinals  8 min      Stretch HS, psoas , quad  4 min      PROM bilateral knees  3 min                          Exercise Diary         NuStep  10      Side step  12 Ft  X      TG squat  Lev 14 x 20      Knee ext with df  20      Heel slides  20      LTR  20      Bridges  10      Clamshells  20 X Red      SLR  10                                                                                                  Modalities        MH to L-S  -------              Visit # 1 2   FOTO             Assessment: Tolerated treatment well  Patient would benefit from continued PT  Shows some LE tightness with R > L  Some TTP  In L TS paraspinals at about T9- 10 level       Plan: Continue per plan of care

## 2018-05-24 ENCOUNTER — OFFICE VISIT (OUTPATIENT)
Dept: PHYSICAL THERAPY | Facility: CLINIC | Age: 58
End: 2018-05-24
Payer: COMMERCIAL

## 2018-05-24 DIAGNOSIS — M54.17 LUMBOSACRAL RADICULITIS: Primary | ICD-10-CM

## 2018-05-24 DIAGNOSIS — M17.0 PRIMARY OSTEOARTHRITIS OF BOTH KNEES: ICD-10-CM

## 2018-05-24 PROCEDURE — 97110 THERAPEUTIC EXERCISES: CPT | Performed by: PHYSICAL THERAPIST

## 2018-05-24 PROCEDURE — 97140 MANUAL THERAPY 1/> REGIONS: CPT | Performed by: PHYSICAL THERAPIST

## 2018-05-24 NOTE — PROGRESS NOTES
Daily Note     Today's date: 2018  Patient name: Sandra Hermosillo  : 1960  MRN: [id number]  Referring provider: Coby Vale PA-C  Dx:   Encounter Diagnosis     ICD-10-CM    1  Lumbosacral radiculitis M54 17    2  Primary osteoarthritis of both knees M17 0                   Subjective:  Reports 5/10 LBP and weakness in L LE,  Denies LE pain      Objective: See treatment diary below        Specialty Daily Treatment Diary     Manual  18     STM to Paraspinals  8 min 8 min     Stretch HS, psoas , quad  4 min 4 min     PROM bilateral knees  3 min 3 min                         Exercise Diary        NuStep  10 10 min     Side step  12 Ft  X 8 12 x 8     TG squat  Lev 14 x 20 Lev 14 X 20     Knee ext with df  20 20     Heel slides  20 20     LTR  20 20     Bridges  10 2 x 10 with ABD ISO     Clamshells  20 X Red 2 X 10     SLR  10 2 x 10                                                                                                 Modalities        to L-S  -------              Visit # 1 2 3  FOTO             Assessment: Tolerated treatment well  Patient would benefit from continued PT  Some TTP  In L TS paraspinals at about T9- 10 level with restriction noted  Valgus of R knee      Plan: Continue per plan of care

## 2018-05-29 ENCOUNTER — APPOINTMENT (OUTPATIENT)
Dept: PHYSICAL THERAPY | Facility: CLINIC | Age: 58
End: 2018-05-29
Payer: COMMERCIAL

## 2018-05-30 ENCOUNTER — OFFICE VISIT (OUTPATIENT)
Dept: PHYSICAL THERAPY | Facility: CLINIC | Age: 58
End: 2018-05-30
Payer: COMMERCIAL

## 2018-05-30 DIAGNOSIS — M17.0 PRIMARY OSTEOARTHRITIS OF BOTH KNEES: ICD-10-CM

## 2018-05-30 DIAGNOSIS — M54.17 LUMBOSACRAL RADICULITIS: Primary | ICD-10-CM

## 2018-05-30 DIAGNOSIS — M17.0 LOCALIZED OSTEOARTHRITIS OF KNEES, BILATERAL: ICD-10-CM

## 2018-05-30 PROCEDURE — 97140 MANUAL THERAPY 1/> REGIONS: CPT

## 2018-05-30 PROCEDURE — 97110 THERAPEUTIC EXERCISES: CPT

## 2018-05-30 NOTE — PROGRESS NOTES
Daily Note     Today's date: 2018  Patient name: Royal Hermosillo  : 1960  MRN: [id number]  Referring provider: Landon Ramos PA-C  Dx:   Encounter Diagnosis     ICD-10-CM    1  Lumbosacral radiculitis M54 17    2  Primary osteoarthritis of both knees M17 0    3  Localized osteoarthritis of knees, bilateral M17 0                   Subjective:  Reports 2-3/10 LBP  Indicating L LB      Objective: See treatment diary below        Specialty Daily Treatment Diary     Manual  18    STM to Paraspinals  8 min 8 min 8 min    Stretch HS, psoas , quad  4 min 4 min 4 min    PROM bilateral knees  3 min 3 min 3 min                        Exercise Diary       NuStep  10 10 min 10 min    Side step  12 Ft  X 8 12 x 8 12 ft x 8    TG squat  Lev 14 x 20 Lev 14 X 20 Lev 16 X 20    Knee ext with df  20 20 20    Heel slides  20 20 30    LTR  20 20 20    Bridges  10 2 x 10 with ABD ISO 2X 10 ABD ISO    Clamshells  20 X Red 2 X 10 20 Gr    SLR  10 2 x 10 2 x 10                                                                                                Modalities      MH to L-S  -------  10 min in S/L            Visit # 1 2 3 4 FOTO             Assessment: Tolerated treatment well  Patient would benefit from continued PT  Decreased TTP  In L TS paraspinals  Valgus of R knee  Some HS tightness otherwise LE flexibility appears WNL      Plan: Continue per plan of care

## 2018-05-31 ENCOUNTER — OFFICE VISIT (OUTPATIENT)
Dept: PHYSICAL THERAPY | Facility: CLINIC | Age: 58
End: 2018-05-31
Payer: COMMERCIAL

## 2018-05-31 DIAGNOSIS — M17.0 PRIMARY OSTEOARTHRITIS OF BOTH KNEES: ICD-10-CM

## 2018-05-31 DIAGNOSIS — M54.17 LUMBOSACRAL RADICULITIS: Primary | ICD-10-CM

## 2018-05-31 DIAGNOSIS — M17.0 LOCALIZED OSTEOARTHRITIS OF KNEES, BILATERAL: ICD-10-CM

## 2018-05-31 PROCEDURE — 97140 MANUAL THERAPY 1/> REGIONS: CPT | Performed by: PHYSICAL THERAPIST

## 2018-05-31 PROCEDURE — 97110 THERAPEUTIC EXERCISES: CPT | Performed by: PHYSICAL THERAPIST

## 2018-05-31 NOTE — PROGRESS NOTES
Daily Note     Today's date: 2018  Patient name: Janelle Hermosillo  : 1960  MRN: [id number]  Referring provider: Henrry Conway PA-C  Dx:   Encounter Diagnosis     ICD-10-CM    1  Lumbosacral radiculitis M54 17    2  Primary osteoarthritis of both knees M17 0    3  Localized osteoarthritis of knees, bilateral M17 0                   Subjective:  Reports 2-3/10 LBP  Indicating L LB      Objective: See treatment diary below        Specialty Daily Treatment Diary     Manual  18   STM to Paraspinals  8 min 8 min 8 min 6 min   Stretch HS, psoas , quad  4 min 4 min 4 min 4 min   PROM bilateral knees  3 min 3 min 3 min 2 min                       Exercise Diary   18   NuStep  10 10 min 10 min 10 min   Side step  12 Ft  X 8 12 x 8 12 ft x 8 12 ft  8x   TG squat  Lev 14 x 20 Lev 14 X 20 Lev 16 X 20 20x    Lev 18   Knee ext with df  20 20 20 20x   Heel slides  20 20 30 20x   LTR  20 20 20 20x   Bridges  10 2 x 10 with ABD ISO 2X 10 ABD ISO 20x   Clamshells  20 X Red 2 X 10 20 Gr 20x   green   SLR  10 2 x 10 2 x 10 20x                                                                                               Modalities      MH to L-S  -------  10 min in S/L 10 min           Visit # 1 2 3 4 5             Assessment: Completed full ther ex  Plan: Continue per plan of care

## 2018-06-05 ENCOUNTER — OFFICE VISIT (OUTPATIENT)
Dept: PHYSICAL THERAPY | Facility: CLINIC | Age: 58
End: 2018-06-05
Payer: COMMERCIAL

## 2018-06-05 DIAGNOSIS — M54.17 LUMBOSACRAL RADICULITIS: Primary | ICD-10-CM

## 2018-06-05 DIAGNOSIS — M17.0 PRIMARY OSTEOARTHRITIS OF BOTH KNEES: ICD-10-CM

## 2018-06-05 DIAGNOSIS — M17.0 LOCALIZED OSTEOARTHRITIS OF KNEES, BILATERAL: ICD-10-CM

## 2018-06-05 PROCEDURE — 97140 MANUAL THERAPY 1/> REGIONS: CPT

## 2018-06-05 PROCEDURE — 97110 THERAPEUTIC EXERCISES: CPT

## 2018-06-05 NOTE — PROGRESS NOTES
Daily Note     Today's date: 2018  Patient name: Ramez Hermosillo  : 1960  MRN: [id number]  Referring provider: Jada Elizalde PA-C  Dx:   Encounter Diagnosis     ICD-10-CM    1  Lumbosacral radiculitis M54 17    2  Primary osteoarthritis of both knees M17 0    3  Localized osteoarthritis of knees, bilateral M17 0                   Subjective:  Reports 4/10 LBP        Objective: See treatment diary below        Specialty Daily Treatment Diary     Manual  18       STM to Paraspinals 8 min       Stretch HS, psoas , quad 4 min       PROM bilateral knees 3 min                           Exercise Diary         NuStep 10 min       Side step 12 Ft x 8       TG squat Lev 20 x 20       Knee ext with df 20       Heel slides 20       LTR 20       Bridges 20       Clamshells 20 x gr       SLR 20                                                                                                   Modalities        MH to L-S 10 min               Visit # 6                 Assessment: Completed full ther ex  Displays Valgus of Bilat knees with R>L which appears to create a leg length discrepancy on R restrictions along TS  Paraspinals at T10 level        Plan: Continue per plan of care

## 2018-06-07 ENCOUNTER — OFFICE VISIT (OUTPATIENT)
Dept: PHYSICAL THERAPY | Facility: CLINIC | Age: 58
End: 2018-06-07
Payer: COMMERCIAL

## 2018-06-07 DIAGNOSIS — M54.17 LUMBOSACRAL RADICULITIS: Primary | ICD-10-CM

## 2018-06-07 DIAGNOSIS — M17.0 LOCALIZED OSTEOARTHRITIS OF KNEES, BILATERAL: ICD-10-CM

## 2018-06-07 DIAGNOSIS — M17.0 PRIMARY OSTEOARTHRITIS OF BOTH KNEES: ICD-10-CM

## 2018-06-07 PROCEDURE — 97140 MANUAL THERAPY 1/> REGIONS: CPT | Performed by: PHYSICAL THERAPIST

## 2018-06-07 PROCEDURE — 97110 THERAPEUTIC EXERCISES: CPT | Performed by: PHYSICAL THERAPIST

## 2018-06-07 NOTE — PROGRESS NOTES
Daily Note     Today's date: 2018  Patient name: Mike Hermosillo  : 1960  MRN: [id number]  Referring provider: Claudette Glad, PA-C  Dx:   Encounter Diagnosis     ICD-10-CM    1  Lumbosacral radiculitis M54 17    2  Primary osteoarthritis of both knees M17 0    3  Localized osteoarthritis of knees, bilateral M17 0                   Subjective:  She has 4/10 pain today  Objective: See treatment diary below        Specialty Daily Treatment Diary     Manual  18      STM to Paraspinals 8 min 6 min      Stretch HS, psoas , quad 4 min 4 min      PROM bilateral knees 3 min 2 min                          Exercise Diary         NuStep 10 min 10 min      Side step 12 Ft x 8 12 ft x 8      TG squat Lev 20 x 20 Lev 20  X  20      Knee ext with df 20 20x      Heel slides 20 20x      LTR 20 20x      Bridges 20 20x      Clamshells 20 x gr 20x   green      SLR 20 20x                                                                                                  Modalities         to L-S 10 min 10 min              Visit # 6 7                Assessment:   Knee ROM normalizing well bilaterally  Plan: Continue per plan of care

## 2018-06-12 ENCOUNTER — OFFICE VISIT (OUTPATIENT)
Dept: PHYSICAL THERAPY | Facility: CLINIC | Age: 58
End: 2018-06-12
Payer: COMMERCIAL

## 2018-06-12 DIAGNOSIS — M54.17 LUMBOSACRAL RADICULITIS: Primary | ICD-10-CM

## 2018-06-12 DIAGNOSIS — M17.0 PRIMARY OSTEOARTHRITIS OF BOTH KNEES: ICD-10-CM

## 2018-06-12 DIAGNOSIS — M17.0 LOCALIZED OSTEOARTHRITIS OF KNEES, BILATERAL: ICD-10-CM

## 2018-06-12 PROCEDURE — 97140 MANUAL THERAPY 1/> REGIONS: CPT

## 2018-06-12 PROCEDURE — 97110 THERAPEUTIC EXERCISES: CPT

## 2018-06-12 NOTE — PROGRESS NOTES
Daily Note     Today's date: 2018  Patient name: Neto Hermosillo  : 1960  MRN: [id number]  Referring provider: Flakita Zambrano PA-C  Dx:   Encounter Diagnosis     ICD-10-CM    1  Lumbosacral radiculitis M54 17    2  Primary osteoarthritis of both knees M17 0    3  Localized osteoarthritis of knees, bilateral M17 0                   Subjective:  She has 4/10 pain today  Objective: See treatment diary below        Specialty Daily Treatment Diary     Manual  18     STM to Paraspinals 8 min 6 min 8 min     Stretch HS, psoas , quad 4 min 4 min 4 min     PROM bilateral knees 3 min 2 min 2 min                         Exercise Diary       NuStep 10 min 10 min 10 min     Side step 12 Ft x 8 12 ft x 8 15 Ft x 8     TG squat Lev 20 x 20 Lev 20  X  20 Lev 20 X 20     Knee ext with df 20 20x 20     Heel slides 20 20x 20     LTR 20 20x 20     Bridges 20 20x 20     Clamshells 20 x gr 20x   green 20 X gr     SLR 20 20x 20      L LE stair taps   20 x 3 inserts     BW Mini squats    10                                                                                 Modalities      MH to L-S 10 min 10 min 10 min              Visit # 6 7 8               Assessment:   Pt stated and demonstrated limited active L hip flexion and knee flexion although PROM is WNL  Displays limited hip flexion with ambulation      Plan: Continue per plan of care

## 2018-06-14 ENCOUNTER — OFFICE VISIT (OUTPATIENT)
Dept: PHYSICAL THERAPY | Facility: CLINIC | Age: 58
End: 2018-06-14
Payer: COMMERCIAL

## 2018-06-14 DIAGNOSIS — M17.0 PRIMARY OSTEOARTHRITIS OF BOTH KNEES: ICD-10-CM

## 2018-06-14 DIAGNOSIS — M17.0 LOCALIZED OSTEOARTHRITIS OF KNEES, BILATERAL: ICD-10-CM

## 2018-06-14 DIAGNOSIS — M54.17 LUMBOSACRAL RADICULITIS: Primary | ICD-10-CM

## 2018-06-14 PROCEDURE — 97140 MANUAL THERAPY 1/> REGIONS: CPT

## 2018-06-14 PROCEDURE — 97110 THERAPEUTIC EXERCISES: CPT

## 2018-06-14 NOTE — PROGRESS NOTES
Daily Note     Today's date: 2018  Patient name: Josefina Hermosillo  : 1960  MRN: [id number]  Referring provider: Joshua Conroy PA-C  Dx:   Encounter Diagnosis     ICD-10-CM    1  Lumbosacral radiculitis M54 17    2  Primary osteoarthritis of both knees M17 0    3  Localized osteoarthritis of knees, bilateral M17 0                   Subjective:  She has 4/10 pain today  Objective: See treatment diary below        Specialty Daily Treatment Diary     Manual  18    STM to Paraspinals 8 min 6 min 8 min 8 min    Stretch HS, psoas , quad 4 min 4 min 4 min 4 min    PROM bilateral knees 3 min 2 min 2 min 2 min                        Exercise Diary      NuStep 10 min 10 min 10 min 10 min    Side step 12 Ft x 8 12 ft x 8 15 Ft x 8 15 ft x 8    TG squat Lev 20 x 20 Lev 20  X  20 Lev 20 X 20 Lev 20 x 30    Knee ext with df 20 20x 20 20    Heel slides 20 20x 20 20    LTR 20 20x 20 20    Bridges 20 20x 20 20    Clamshells 20 x gr 20x   green 20 X gr 20 x blue    SLR 20 20x 20  20    L LE stair taps   20 x 3 inserts 20 X 3 inserts    BW Mini squats    10 20                                                                                Modalities     MH to L-S 10 min 10 min 10 min  ----------------            Visit # 6 7 8 9              Assessment:   Pt stated and demonstrated limited active L hip flexion and knee flexion although PROM is WNL  Displays limited hip flexion with ambulation  Tightness in piriformis with L > R      Plan: Continue per plan of care

## 2018-06-19 ENCOUNTER — OFFICE VISIT (OUTPATIENT)
Dept: PHYSICAL THERAPY | Facility: CLINIC | Age: 58
End: 2018-06-19
Payer: COMMERCIAL

## 2018-06-19 DIAGNOSIS — M17.0 LOCALIZED OSTEOARTHRITIS OF KNEES, BILATERAL: ICD-10-CM

## 2018-06-19 DIAGNOSIS — M54.17 LUMBOSACRAL RADICULITIS: Primary | ICD-10-CM

## 2018-06-19 DIAGNOSIS — M17.0 PRIMARY OSTEOARTHRITIS OF BOTH KNEES: ICD-10-CM

## 2018-06-19 PROCEDURE — 97140 MANUAL THERAPY 1/> REGIONS: CPT | Performed by: PHYSICAL THERAPIST

## 2018-06-19 PROCEDURE — 97110 THERAPEUTIC EXERCISES: CPT | Performed by: PHYSICAL THERAPIST

## 2018-06-19 NOTE — PROGRESS NOTES
Daily Note     Today's date: 2018  Patient name: Fred Hermosillo  : 1960  MRN: [id number]  Referring provider: Nahun Carvajal PA-C  Dx:   Encounter Diagnosis     ICD-10-CM    1  Lumbosacral radiculitis M54 17    2  Primary osteoarthritis of both knees M17 0    3  Localized osteoarthritis of knees, bilateral M17 0                   Subjective:  She has 3/10 pain today  Objective: See treatment diary below        Specialty Daily Treatment Diary     Manual  18   STM to Paraspinals 8 min 6 min 8 min 8 min 5 min   Stretch HS, psoas , quad 4 min 4 min 4 min 4 min 5 min   PROM bilateral knees 3 min 2 min 2 min 2 min 4 min                       Exercise Diary      NuStep 10 min 10 min 10 min 10 min 10 min   Side step 12 Ft x 8 12 ft x 8 15 Ft x 8 15 ft x 8 15 ft  8x   TG squat Lev 20 x 20 Lev 20  X  20 Lev 20 X 20 Lev 20 x 30 Lev  20   30x   Knee ext with df 20 20x 20 20 20x   Heel slides 20 20x 20 20 20x   LTR 20 20x 20 20 20   Bridges 20 20x 20 20 20   Clamshells 20 x gr 20x   green 20 X gr 20 x blue 20 X gr   SLR 20 20x 20  20 20   L LE stair taps   20 x 3 inserts 20 X 3 inserts 20x   BW Mini squats    10 20 20x                                                                               Modalities     MH to L-S 10 min 10 min 10 min  ---------------- deferred           Visit # 6 7 8 9 10             Assessment:   Knee ext ROM normalizing well  L-S paraspinals especially on Left are tight      Plan: Continue per plan of care

## 2018-06-26 ENCOUNTER — OFFICE VISIT (OUTPATIENT)
Dept: PHYSICAL THERAPY | Facility: CLINIC | Age: 58
End: 2018-06-26
Payer: COMMERCIAL

## 2018-06-26 DIAGNOSIS — M17.0 PRIMARY OSTEOARTHRITIS OF BOTH KNEES: ICD-10-CM

## 2018-06-26 DIAGNOSIS — M54.17 LUMBOSACRAL RADICULITIS: Primary | ICD-10-CM

## 2018-06-26 DIAGNOSIS — M17.0 LOCALIZED OSTEOARTHRITIS OF KNEES, BILATERAL: ICD-10-CM

## 2018-06-26 PROCEDURE — 97140 MANUAL THERAPY 1/> REGIONS: CPT

## 2018-06-26 PROCEDURE — 97110 THERAPEUTIC EXERCISES: CPT

## 2018-06-26 NOTE — PROGRESS NOTES
Daily Note     Today's date: 2018  Patient name: Jack Hermosillo  : 1960  MRN: [id number]  Referring provider: Chriss Helton PA-C  Dx:   Encounter Diagnosis     ICD-10-CM    1  Lumbosacral radiculitis M54 17    2  Primary osteoarthritis of both knees M17 0    3  Localized osteoarthritis of knees, bilateral M17 0                   Subjective:  She has 3/10 pain today  Objective: See treatment diary below        Specialty Daily Treatment Diary     Manual  18       STM to Paraspinals 8 min       Stretch HS, psoas , quad 4 min       PROM bilateral knees 3 min                           Exercise Diary         NuStep 10 min       Side step 15 ft X 8       TG squat Lev 20 X 20       Knee ext with df 20       Heel slides 20       LTR 20       Bridges 20       Clamshells 20 x blue       SLR 20       L LE stair taps 20       BW Mini squats  20                                                                                   Modalities        MH to L-S 10 min               Visit # 11                 Assessment:   Knee ext ROM normalizing well  L-S paraspinals especially on Left are tight as is lower TS      Plan: Continue per plan of care

## 2018-07-09 ENCOUNTER — OFFICE VISIT (OUTPATIENT)
Dept: PHYSICAL THERAPY | Facility: CLINIC | Age: 58
End: 2018-07-09
Payer: COMMERCIAL

## 2018-07-09 DIAGNOSIS — M54.17 LUMBOSACRAL RADICULITIS: Primary | ICD-10-CM

## 2018-07-09 DIAGNOSIS — M17.0 PRIMARY OSTEOARTHRITIS OF BOTH KNEES: ICD-10-CM

## 2018-07-09 PROCEDURE — G8982 BODY POS GOAL STATUS: HCPCS | Performed by: PHYSICAL THERAPIST

## 2018-07-09 PROCEDURE — 97140 MANUAL THERAPY 1/> REGIONS: CPT | Performed by: PHYSICAL THERAPIST

## 2018-07-09 PROCEDURE — G8983 BODY POS D/C STATUS: HCPCS | Performed by: PHYSICAL THERAPIST

## 2018-07-09 PROCEDURE — 97110 THERAPEUTIC EXERCISES: CPT | Performed by: PHYSICAL THERAPIST

## 2018-07-09 NOTE — PROGRESS NOTES
Daily Note     Today's date: 2018  Patient name: Jonn Hermosillo  : 1960  MRN: [id number]  Referring provider: Cuco Cardenas PA-C  Dx:   Encounter Diagnosis     ICD-10-CM    1  Lumbosacral radiculitis M54 17    2  Primary osteoarthritis of both knees M17 0                   Subjective:  She has no pain today        Objective: See treatment diary below        Specialty Daily Treatment Diary     Manual  18      STM to Paraspinals 8 min 5 min      Stretch HS, psoas , quad 4 min 5 min      PROM bilateral knees 3 min 2 min                          Exercise Diary         NuStep 10 min 10 min      Side step 15 ft X 8 15 ft   8x      TG squat Lev 20 X 20 Lev 20   30x      Knee ext with df 20 20      Heel slides 20 20      LTR 20 20      Bridges 20 20      Clamshells 20 x blue Blue  20      SLR 20 20      L LE stair taps 20 20      BW Mini squats  20 20                                                                                  Modalities        MH to L-S 10 min deferred              Visit # 11 12                Assessment:   Goals achieved      Plan: D/C at this time

## 2018-07-09 NOTE — PROGRESS NOTES
PT Discharge    Today's date: 2018  Patient name: Noemy Hermosillo  : 1960  MRN: [id number]  Referring provider: Rose Marie Vasquez PA-C  Dx:   Encounter Diagnosis     ICD-10-CM    1  Lumbosacral radiculitis M54 17    2  Primary osteoarthritis of both knees M17 0        Start Time: 0305  Stop Time: 0400  Total time in clinic (min): 55 minutes    Assessment    Assessment details:     Prognosis details: Short term goals:  (3 weeks)  1  Patient will have pain level 2/10 or less in lumbar spine with ADL's - met  2  Patient will report a 50% improvement in symptoms with ADL's - met  3  Patient will have normal lower extremity ROM - met  4  Patient will demonstrate correct sitting posture - met      Long term goals:  (6 weeks)  1  Patient will demonstrate a reduction in tenderness and tension in hypertonic musculature  - met  2  Patient will report 85 % improvement improvement in symptoms with ADL's - met  3  Patient will have no complaints of pain with walking - met  4  Patient will be independent in a comprehensive home exercise program  - met      Plan  Plan details: D/C at this time        Subjective Evaluation    History of Present Illness  Mechanism of injury: She reports improvement overall  Pain  Current pain ratin  At best pain ratin  At worst pain rating: 3          Objective     Postural Observations  Seated posture: fair        Active Range of Motion     Lumbar   Flexion: Active lumbar flexion: Minimal ROM loss  Extension: Active lumbar extension: Minimal ROM loss  Left lateral flexion: WFL  Right lateral flexion: WFL  Left rotation: Active left lumbar rotation: Minimal ROM loss  Right rotation: Active right lumbar rotation: Minimal ROM loss     Left Knee   Flexion: 116 degrees   Extension: 0 degrees     Right Knee   Flexion: 119 degrees   Extension: 0 degrees     Strength/Myotome Testing     Left Hip   Planes of Motion   Flexion: 4+    Right Hip   Planes of Motion   Flexion: 4+    Left Knee   Flexion: 4+  Extension: 4+    Right Knee   Flexion: 4+  Extension: 4+    Left Ankle/Foot   Dorsiflexion: 4+  Plantar flexion: 4+    Right Ankle/Foot   Dorsiflexion: 4+  Plantar flexion: 4+    Ambulation     Observational Gait   Gait: antalgic       Flowsheet Rows      Most Recent Value   PT/OT G-Codes   Current Score  56   Projected Score  47          Precautions: - Diabetes, Hypertension

## 2018-08-31 ENCOUNTER — TRANSCRIBE ORDERS (OUTPATIENT)
Dept: ADMINISTRATIVE | Facility: HOSPITAL | Age: 58
End: 2018-08-31

## 2018-08-31 DIAGNOSIS — M54.5 LEFT LOW BACK PAIN, UNSPECIFIED CHRONICITY, WITH SCIATICA PRESENCE UNSPECIFIED: ICD-10-CM

## 2018-08-31 DIAGNOSIS — R10.9 ABDOMINAL PAIN, UNSPECIFIED ABDOMINAL LOCATION: ICD-10-CM

## 2018-08-31 DIAGNOSIS — Z12.39 SCREENING BREAST EXAMINATION: Primary | ICD-10-CM

## 2018-09-07 ENCOUNTER — TRANSCRIBE ORDERS (OUTPATIENT)
Dept: ADMINISTRATIVE | Facility: HOSPITAL | Age: 58
End: 2018-09-07

## 2018-09-07 ENCOUNTER — HOSPITAL ENCOUNTER (OUTPATIENT)
Dept: RADIOLOGY | Facility: HOSPITAL | Age: 58
Discharge: HOME | End: 2018-09-07
Payer: COMMERCIAL

## 2018-09-07 DIAGNOSIS — E78.5 HYPERLIPIDEMIA, UNSPECIFIED HYPERLIPIDEMIA TYPE: Primary | ICD-10-CM

## 2018-09-07 DIAGNOSIS — E11.9 SEVERE DIABETES MELLITUS (HCC): ICD-10-CM

## 2018-09-07 DIAGNOSIS — I10 ESSENTIAL HYPERTENSION, MALIGNANT: ICD-10-CM

## 2018-09-07 PROCEDURE — 71046 X-RAY EXAM CHEST 2 VIEWS: CPT

## 2018-09-11 ENCOUNTER — HOSPITAL ENCOUNTER (OUTPATIENT)
Dept: RADIOLOGY | Facility: HOSPITAL | Age: 58
Discharge: HOME | End: 2018-09-11
Payer: COMMERCIAL

## 2018-09-11 DIAGNOSIS — Z12.39 SCREENING BREAST EXAMINATION: ICD-10-CM

## 2018-09-11 PROCEDURE — 77063 BREAST TOMOSYNTHESIS BI: CPT

## 2018-09-11 PROCEDURE — 77067 SCR MAMMO BI INCL CAD: CPT

## 2019-03-27 ENCOUNTER — APPOINTMENT (OUTPATIENT)
Dept: RADIOLOGY | Facility: CLINIC | Age: 59
End: 2019-03-27

## 2019-03-27 ENCOUNTER — TRANSCRIBE ORDERS (OUTPATIENT)
Dept: URGENT CARE | Facility: CLINIC | Age: 59
End: 2019-03-27

## 2019-03-27 DIAGNOSIS — R76.11 PPD POSITIVE: ICD-10-CM

## 2019-03-27 DIAGNOSIS — R76.11 PPD POSITIVE: Primary | ICD-10-CM

## 2019-03-27 PROCEDURE — 71045 X-RAY EXAM CHEST 1 VIEW: CPT

## 2020-01-17 ENCOUNTER — OFFICE VISIT (OUTPATIENT)
Dept: FAMILY MEDICINE CLINIC | Facility: CLINIC | Age: 60
End: 2020-01-17
Payer: COMMERCIAL

## 2020-01-17 VITALS
TEMPERATURE: 97.5 F | BODY MASS INDEX: 29.13 KG/M2 | HEIGHT: 63 IN | WEIGHT: 164.4 LBS | HEART RATE: 100 BPM | SYSTOLIC BLOOD PRESSURE: 130 MMHG | OXYGEN SATURATION: 97 % | DIASTOLIC BLOOD PRESSURE: 82 MMHG | RESPIRATION RATE: 16 BRPM

## 2020-01-17 DIAGNOSIS — M17.0 PRIMARY OSTEOARTHRITIS OF BOTH KNEES: ICD-10-CM

## 2020-01-17 DIAGNOSIS — I10 ESSENTIAL HYPERTENSION: ICD-10-CM

## 2020-01-17 DIAGNOSIS — G56.03 CARPAL TUNNEL SYNDROME ON BOTH SIDES: ICD-10-CM

## 2020-01-17 DIAGNOSIS — M79.601 RIGHT ARM PAIN: ICD-10-CM

## 2020-01-17 DIAGNOSIS — E11.9 TYPE 2 DIABETES MELLITUS WITHOUT COMPLICATION, WITHOUT LONG-TERM CURRENT USE OF INSULIN (HCC): ICD-10-CM

## 2020-01-17 DIAGNOSIS — E78.5 HYPERLIPIDEMIA, UNSPECIFIED HYPERLIPIDEMIA TYPE: ICD-10-CM

## 2020-01-17 DIAGNOSIS — M17.10 ARTHRITIS OF KNEE: Primary | ICD-10-CM

## 2020-01-17 LAB — SL AMB POCT HEMOGLOBIN AIC: 7.2 (ref ?–6.5)

## 2020-01-17 PROCEDURE — 3051F HG A1C>EQUAL 7.0%<8.0%: CPT | Performed by: ORTHOPAEDIC SURGERY

## 2020-01-17 PROCEDURE — 3075F SYST BP GE 130 - 139MM HG: CPT | Performed by: FAMILY MEDICINE

## 2020-01-17 PROCEDURE — 3725F SCREEN DEPRESSION PERFORMED: CPT | Performed by: FAMILY MEDICINE

## 2020-01-17 PROCEDURE — 36416 COLLJ CAPILLARY BLOOD SPEC: CPT | Performed by: FAMILY MEDICINE

## 2020-01-17 PROCEDURE — 99214 OFFICE O/P EST MOD 30 MIN: CPT | Performed by: FAMILY MEDICINE

## 2020-01-17 PROCEDURE — 3079F DIAST BP 80-89 MM HG: CPT | Performed by: FAMILY MEDICINE

## 2020-01-17 PROCEDURE — 83036 HEMOGLOBIN GLYCOSYLATED A1C: CPT | Performed by: FAMILY MEDICINE

## 2020-01-17 RX ORDER — ACETAMINOPHEN 500 MG
TABLET ORAL
Qty: 120 TABLET | Refills: 0 | Status: SHIPPED | OUTPATIENT
Start: 2020-01-17 | End: 2020-02-14 | Stop reason: SDUPTHER

## 2020-01-17 RX ORDER — PRAVASTATIN SODIUM 40 MG
TABLET ORAL
COMMUNITY
Start: 2019-12-19 | End: 2020-01-17 | Stop reason: SDUPTHER

## 2020-01-17 RX ORDER — ATENOLOL AND CHLORTHALIDONE TABLET 50; 25 MG/1; MG/1
1 TABLET ORAL DAILY
Qty: 30 TABLET | Refills: 0 | Status: SHIPPED | OUTPATIENT
Start: 2020-01-17 | End: 2020-02-14 | Stop reason: SDUPTHER

## 2020-01-17 RX ORDER — ATENOLOL AND CHLORTHALIDONE TABLET 50; 25 MG/1; MG/1
TABLET ORAL
COMMUNITY
Start: 2019-12-17 | End: 2020-01-17 | Stop reason: SDUPTHER

## 2020-01-17 RX ORDER — PRAVASTATIN SODIUM 40 MG
40 TABLET ORAL DAILY
Qty: 30 TABLET | Refills: 0 | Status: SHIPPED | OUTPATIENT
Start: 2020-01-17 | End: 2020-02-14 | Stop reason: SDUPTHER

## 2020-01-17 NOTE — ASSESSMENT & PLAN NOTE
Patient has long history of bilateral knee osteoarthritis   Failed joint injection and PT     - Referred to orthopedic surgery at [id number] for Tylenol and Voltaren Gel today  - Recommended increased physical ectivity

## 2020-01-17 NOTE — ASSESSMENT & PLAN NOTE
Lab Results   Component Value Date    HGBA1C 7 2 (A) 01/17/2020     POCT hgba1c 7 2 today  States she has been on metformin for many years   Home fingerstick glucose ranges from 120s-140s    - Discussed lifestyle modifications to help lower blood glucose levels (increased exercise, healthy food choices, lowering carbohydrates in diet)  - Discussed medication compliance   - Check CMP to assess renal function  - Check microalbumin/creatinine urine ratio  - Refill Metformin 500mg qd

## 2020-01-17 NOTE — PROGRESS NOTES
Family Medicine Office Visit  Adamaris Smart 63 y o  female   MRN: [id number] : 1960  ENCOUNTER: 2020 5:40 PM    Assessment and Plan   Degenerative arthritis of knee, bilateral  Patient has long history of bilateral knee osteoarthritis  Failed joint injection and PT     - Referred to orthopedic surgery at [address] for Tylenol and Voltaren Gel today  - Recommended increased physical ectivity    Type 2 diabetes mellitus without complication, without long-term current use of insulin ([facility])    Lab Results   Component Value Date    HGBA1C 7 2 (A) 2020     POCT hgba1c 7 2 today  States she has been on metformin for many years  Home fingerstick glucose ranges from 120s-140s    - Discussed lifestyle modifications to help lower blood glucose levels (increased exercise, healthy food choices, lowering carbohydrates in diet)  - Discussed medication compliance   - Check CMP to assess renal function  - Check microalbumin/creatinine urine ratio  - Refill Metformin 500mg qd    Essential hypertension  BP today 130/82    - Refill Tenoretic 50-25mg qd  - Check cmp today    Hyperlipidemia  History of abnormal lipid panel    - Refill Pravastatin 40mg qd   - Check lipid panel    Right arm pain  Upper arm pain on the right  States it feels like "ants crawling"  Neuropathic vs  Arthritic pain  - Discussed stretching maneuvers to help alleviate pain and tingling sensation  - Acetaminophen for pain  - Will continue to assess at next visit    RTC in 1 month      Chief Complaint     Chief Complaint   Patient presents with   [address]       History of Present Illness   Adamaris Smart is a 63y o -year-old female who presents today to establish care  She is Frisian speaking only and required her daughter to translate for her  States [place] just moved to [place] and needed a new PCP  She has a long history of osteoarthritis of both knees and bilateral carpal tunnel syndrome   She was seen by orthopedic surgery at [address] [place] [address] who recommended knee replacement, but the patient declined  She did not have relief from PT or joint injections  She has had minimal relief with naproxen  She has a history of hypertension, diabetes, and hyperlipidemia  Review of Systems   Review of Systems   Constitutional: Negative for activity change, appetite change, fatigue and fever  HENT: Negative for congestion, rhinorrhea, sinus pain, sneezing and sore throat  Eyes: Negative for pain, discharge, redness and itching  Respiratory: Negative for cough, chest tightness, shortness of breath and wheezing  Cardiovascular: Negative for chest pain and palpitations  Gastrointestinal: Negative for abdominal pain, constipation, diarrhea, nausea and vomiting  Genitourinary: Negative for difficulty urinating, dysuria and flank pain  Musculoskeletal: Positive for arthralgias (bilateral knees) and myalgias (right arm)  Skin: Negative for rash  Neurological: Positive for numbness (bilateral hands secondary to carpel tunnel syndrome)  Negative for dizziness, weakness, light-headedness and headaches  Hematological: Negative for adenopathy  Psychiatric/Behavioral: Negative for confusion  Active Problem List     Patient Active Problem List   Diagnosis    Bilateral knee pain    Localized osteoarthritis of knees, bilateral    Degenerative arthritis of knee, bilateral    Type 2 diabetes mellitus without complication, without long-term current use of insulin (HCC)    Essential hypertension    Hyperlipidemia    Carpal tunnel syndrome on both sides    Right arm pain       Past Medical History, Past Surgical History, Family History, and Social History were reviewed and updated today as appropriate      Objective   /82 (BP Location: Right arm, Patient Position: Sitting, Cuff Size: Adult)   Pulse 100   Temp 97 5 °F (36 4 °C) (Tympanic)   Resp 16   Ht 5' 2 5" (1 588 m)   Wt 74 6 kg (164 lb 6 4 oz)   SpO2 97%   BMI 29 59 kg/m²     Physical Exam   Constitutional: She appears well-developed and well-nourished  No distress  HENT:   Head: Normocephalic and atraumatic  Nose: Nose normal    Mouth/Throat: Oropharynx is clear and moist  No oropharyngeal exudate  Eyes: Conjunctivae are normal  Right eye exhibits no discharge  Left eye exhibits no discharge  No scleral icterus  Neck: Normal range of motion  Neck supple  No tracheal deviation present  Cardiovascular: Normal rate, regular rhythm and normal heart sounds  No murmur heard  Pulmonary/Chest: Effort normal and breath sounds normal  No respiratory distress  She has no wheezes  Abdominal: Soft  There is no tenderness  Musculoskeletal: She exhibits tenderness (right shoulder  bilateral knees)  She exhibits no edema  Tinels negative  Phalens positive bilaterally     Lymphadenopathy:     She has no cervical adenopathy  Neurological: She is alert  Skin: Skin is warm and dry  No rash noted  She is not diaphoretic  No erythema     Psychiatric: Her behavior is normal      Diabetic Foot Exam    Pertinent Laboratory/Diagnostic Studies:  No results found for: GLUCOSE, BUN, CREATININE, CALCIUM, NA, K, CO2, CL  No results found for: ALT, AST, GGT, ALKPHOS, BILITOT    No results found for: WBC, HGB, HCT, MCV, PLT    No results found for: TSH    No results found for: CHOL  No results found for: TRIG  No results found for: HDL  No results found for: [facility]  Lab Results   Component Value Date    HGBA1C 7 2 (A) 01/17/2020       Results for orders placed or performed in visit on 01/17/20   POCT hemoglobin A1c   Result Value Ref Range    Hemoglobin A1C 7 2 (A) 6 5       Orders Placed This Encounter   Procedures    Comprehensive metabolic panel    Lipid panel    CBC and differential    Microalbumin / creatinine urine ratio    Magnesium    Ambulatory referral to Orthopedic Surgery    POCT hemoglobin A1c         Current Medications     Current Outpatient Medications   Medication Sig Dispense Refill    atenolol-chlorthalidone (TENORETIC) 50-25 mg per tablet Take 1 tablet by mouth daily 30 tablet 0    metFORMIN (GLUCOPHAGE) 500 mg tablet Take 1 tablet (500 mg total) by mouth daily with breakfast 30 tablet 0    naproxen (NAPROSYN) 375 mg tablet Take 250 mg by mouth daily      pravastatin (PRAVACHOL) 40 mg tablet Take 1 tablet (40 mg total) by mouth daily 30 tablet 0    acetaminophen (TYLENOL) 500 mg tablet Three times a day with meals and at bedtime 120 tablet 0    atenolol (TENORMIN) 100 mg tablet Take 50 mg by mouth daily      atenolol (TENORMIN) 25 mg tablet Take 25 mg by mouth daily      diclofenac sodium (VOLTAREN) 1 % Apply 2 g topically 2 (two) times a day as needed (As needed) 1 Tube 0    metFORMIN (GLUCOPHAGE) 1000 MG tablet Take by mouth      pravastatin (PRAVACHOL) 20 mg tablet Take 20 mg by mouth daily      pravastatin (PRAVACHOL) 80 mg tablet Take by mouth      predniSONE 10 mg tablet 5 po daily x2 days, then 4 po daily x2 days, then 3 po daily x2 days, then 2 po daily x2 days,then 1 po daily x2days (Patient not taking: Reported on 1/17/2020) 30 tablet 0     No current facility-administered medications for this visit          ALLERGIES:  No Known Allergies    Health Maintenance     Health Maintenance   Topic Date Due    Hepatitis C Screening  1960    CRC Screening: Colonoscopy  1960    Pneumococcal Vaccine: Pediatrics (0 to 5 Years) and At-Risk Patients (6 to 59 Years) (1 of 1 - PPSV23) 03/27/1967    Diabetic Foot Exam  03/27/1971    DM Eye Exam  03/27/1971    URINE MICROALBUMIN  03/27/1971    DTaP,Tdap,and Td Vaccines (1 - Tdap) 03/27/1972    HIV Screening  03/27/1976    BMI: Followup Plan  03/27/1979    Annual Physical  03/27/1979    Hepatitis B Vaccine (1 of 3 - Risk 3-dose series) 03/27/1980    Cervical Cancer Screening  03/27/1982    MAMMOGRAM  09/11/2019    Influenza Vaccine  01/17/2021 (Originally 7/1/2019)    HEMOGLOBIN A1C  07/17/2020    Depression Screening PHQ  01/17/2021    BMI: Adult  01/17/2021    Pneumococcal Vaccine: 65+ Years (1 of 2 - PCV13) 03/27/2026    HIB Vaccine  Aged Out    IPV Vaccine  Aged Out    Hepatitis A Vaccine  Aged Out    Meningococcal ACWY Vaccine  Aged Out    HPV Vaccine  Aged Out       There is no immunization history on file for this patient  Enid Burleson MD   [address]  1/17/2020  5:40 PM    Parts of this note were dictated using M*Modal dictation software and may have sounds-like errors due to variation in pronunciation

## 2020-01-17 NOTE — ASSESSMENT & PLAN NOTE
Upper arm pain on the right  States it feels like "ants crawling"  Neuropathic vs  Arthritic pain      - Discussed stretching maneuvers to help alleviate pain and tingling sensation  - Acetaminophen for pain  - Will continue to assess at next visit    RTC in 1 month

## 2020-01-28 ENCOUNTER — APPOINTMENT (OUTPATIENT)
Dept: RADIOLOGY | Facility: AMBULARY SURGERY CENTER | Age: 60
End: 2020-01-28
Payer: COMMERCIAL

## 2020-01-28 ENCOUNTER — OFFICE VISIT (OUTPATIENT)
Dept: OBGYN CLINIC | Facility: CLINIC | Age: 60
End: 2020-01-28
Payer: COMMERCIAL

## 2020-01-28 VITALS
SYSTOLIC BLOOD PRESSURE: 120 MMHG | BODY MASS INDEX: 30.18 KG/M2 | HEIGHT: 62 IN | WEIGHT: 164 LBS | HEART RATE: 77 BPM | DIASTOLIC BLOOD PRESSURE: 80 MMHG

## 2020-01-28 DIAGNOSIS — M25.561 ACUTE PAIN OF BOTH KNEES: ICD-10-CM

## 2020-01-28 DIAGNOSIS — M17.0 LOCALIZED OSTEOARTHRITIS OF KNEES, BILATERAL: ICD-10-CM

## 2020-01-28 DIAGNOSIS — M25.562 ACUTE PAIN OF BOTH KNEES: ICD-10-CM

## 2020-01-28 DIAGNOSIS — M17.0 PRIMARY OSTEOARTHRITIS OF BOTH KNEES: Primary | ICD-10-CM

## 2020-01-28 PROCEDURE — 73562 X-RAY EXAM OF KNEE 3: CPT

## 2020-01-28 PROCEDURE — 99213 OFFICE O/P EST LOW 20 MIN: CPT | Performed by: PHYSICAL MEDICINE & REHABILITATION

## 2020-01-28 NOTE — PROGRESS NOTES
1  Primary osteoarthritis of both knees  MRI knee left  wo contrast   2  Localized osteoarthritis of knees, bilateral  MRI knee left  wo contrast   3  Acute pain of both knees  XR knee 3 vw right non injury    XR knee 3 vw left non injury    MRI knee left  wo contrast     Orders Placed This Encounter   Procedures    XR knee 3 vw right non injury    XR knee 3 vw left non injury    MRI knee left  wo contrast        Imaging Studies (I personally reviewed images in PACS and report):  Bilateral knee x-rays dated 1/28/2020  These images show moderate degenerative changes in the right knee with more collapse of the lateral joint space than the medial   There is osteophytosis as well  The left knee has mild osteoarthritic changes with medial joint space narrowing more than lateral   There is minimal osteophyte formation in this knee  No acute osseous abnormalities  Impression:  Chronic bilateral knee pain likely secondary to bilateral patellofemoral arthralgia and severe osteoarthritis of right knee and mild of the left knee  Her left knee she has instability and at times her knee locks up and she is unable to move it  I am worried about internal derangement of this knee  Of note, she has had the symptoms for many years and they have not improved  For both of her knees, at this point she has tried many different anti-inflammatory medications including Tylenol, ibuprofen and naproxen  She has also had multiple steroid injections into both knees  She has also had a series of hyaluronic acid injections  She has had many weeks of physical therapy as well  She continues to have severe pain in both of her knees that is affecting her activities of daily living  We will obtain an MRI of her left knee  I will see her back after the imaging is obtained  Return for Follow-up after MRI      HPI:  Brett Mitchell is a 63 y o  female  who presents for evaluation of   Chief Complaint   Patient presents with   [facility] Right Knee - Pain    Left Knee - Pain       Onset/Mechanism:  Chronic pain for many years  She had injections few years ago which only helped for a few months  They were repeated and did not help at all  The right knee pain she has most of the time in the left knee pain she only has with bending and twisting activities  Location:  Around the kneecap on the right and inside the knee itself on the left she says  Radiation:  Denies  Quality:  Stabbing  Provocative:  Bending and twisting  Severity:  Severe  Associated Symptoms:  Denies  Treatment so far:  As above  Review of Systems   Constitutional: Positive for activity change  Negative for fever  HENT: Negative for sore throat  Eyes: Negative for visual disturbance  Respiratory: Negative for shortness of breath  Cardiovascular: Negative for chest pain  Gastrointestinal: Negative for abdominal pain  Endocrine: Negative for polydipsia  Genitourinary: Negative for difficulty urinating  Musculoskeletal: Positive for arthralgias  Skin: Negative for rash  Allergic/Immunologic: Negative for immunocompromised state  Neurological: Negative for weakness and numbness  Hematological: Does not bruise/bleed easily  Psychiatric/Behavioral: Negative for confusion  Following history reviewed and updated:  Past Medical History:   Diagnosis Date    Diabetes mellitus ([facility] [address] )     Hyperlipidemia     Hypertension      History reviewed  No pertinent surgical history    Social History   Social History     Substance and Sexual Activity   Alcohol Use Yes    Frequency: Monthly or less    Drinks per session: 1 or 2    Binge frequency: Never     Social History     Substance and Sexual Activity   Drug Use No     Social History     Tobacco Use   Smoking Status Current Every Day Smoker    Packs/day: 0 50    Years: 45 00    Pack years: 22 50    Types: Cigarettes   Smokeless Tobacco Never Used     Family History   Problem Relation Age of Onset    Arthritis Mother     Arthritis Father      No Known Allergies     Constitutional:  /80 (BP Location: Right arm, Patient Position: Sitting, Cuff Size: Standard)   Pulse 77   Ht 5' 2" (1 575 m)   Wt 74 4 kg (164 lb)   BMI 30 00 kg/m²    General: NAD  Eyes: Anicteric sclerae  Neck: Supple  Lungs: Unlabored breathing  Cardiovascular: No lower extremity edema  Skin: Intact without erythema  Neurologic: Sensation intact to light touch  Psychiatric: Mood and affect are appropriate  Right Knee Exam     Tenderness   The patient is experiencing tenderness in the medial retinaculum and medial joint line  Range of Motion   Extension: normal   Flexion: abnormal     Tests   Mark:  Medial - positive Lateral - negative  Varus: negative Valgus: negative  Patellar apprehension: positive    Other   Erythema: absent  Scars: absent  Sensation: normal  Pulse: present  Swelling: none  Effusion: no effusion present      Left Knee Exam     Tenderness   The patient is experiencing no tenderness  Range of Motion   Extension: normal   Flexion: abnormal     Tests   Mark:  Medial - positive Lateral - negative  Varus: negative Valgus: negative  Patellar apprehension: trace    Other   Erythema: absent  Scars: absent  Sensation: normal  Pulse: present  Swelling: none  Effusion: no effusion present             Procedures - none for this visit  This document was recorded using voice recognition software and errors may be noted

## 2020-02-04 ENCOUNTER — APPOINTMENT (OUTPATIENT)
Dept: LAB | Facility: CLINIC | Age: 60
End: 2020-02-04
Payer: COMMERCIAL

## 2020-02-04 ENCOUNTER — TRANSCRIBE ORDERS (OUTPATIENT)
Dept: LAB | Facility: CLINIC | Age: 60
End: 2020-02-04

## 2020-02-04 DIAGNOSIS — E11.9 TYPE 2 DIABETES MELLITUS WITHOUT COMPLICATION, WITHOUT LONG-TERM CURRENT USE OF INSULIN (HCC): ICD-10-CM

## 2020-02-04 DIAGNOSIS — E78.5 HYPERLIPIDEMIA, UNSPECIFIED HYPERLIPIDEMIA TYPE: ICD-10-CM

## 2020-02-04 DIAGNOSIS — I10 ESSENTIAL HYPERTENSION: ICD-10-CM

## 2020-02-04 LAB
ALBUMIN SERPL BCP-MCNC: 4.1 G/DL (ref 3.5–5)
ALP SERPL-CCNC: 68 U/L (ref 46–116)
ALT SERPL W P-5'-P-CCNC: 28 U/L (ref 12–78)
ANION GAP SERPL CALCULATED.3IONS-SCNC: 2 MMOL/L (ref 4–13)
AST SERPL W P-5'-P-CCNC: 14 U/L (ref 5–45)
BASOPHILS # BLD AUTO: 0.03 THOUSANDS/ΜL (ref 0–0.1)
BASOPHILS NFR BLD AUTO: 1 % (ref 0–1)
BILIRUB SERPL-MCNC: 0.42 MG/DL (ref 0.2–1)
BUN SERPL-MCNC: 20 MG/DL (ref 5–25)
CALCIUM SERPL-MCNC: 10 MG/DL (ref 8.3–10.1)
CHLORIDE SERPL-SCNC: 105 MMOL/L (ref 100–108)
CHOLEST SERPL-MCNC: 136 MG/DL (ref 50–200)
CO2 SERPL-SCNC: 32 MMOL/L (ref 21–32)
CREAT SERPL-MCNC: 0.85 MG/DL (ref 0.6–1.3)
CREAT UR-MCNC: 221 MG/DL
EOSINOPHIL # BLD AUTO: 0.08 THOUSAND/ΜL (ref 0–0.61)
EOSINOPHIL NFR BLD AUTO: 2 % (ref 0–6)
ERYTHROCYTE [DISTWIDTH] IN BLOOD BY AUTOMATED COUNT: 12.6 % (ref 11.6–15.1)
GFR SERPL CREATININE-BSD FRML MDRD: 87 ML/MIN/1.73SQ M
GLUCOSE P FAST SERPL-MCNC: 163 MG/DL (ref 65–99)
HCT VFR BLD AUTO: 44.9 % (ref 34.8–46.1)
HDLC SERPL-MCNC: 36 MG/DL
HGB BLD-MCNC: 14.7 G/DL (ref 11.5–15.4)
IMM GRANULOCYTES # BLD AUTO: 0.01 THOUSAND/UL (ref 0–0.2)
IMM GRANULOCYTES NFR BLD AUTO: 0 % (ref 0–2)
LDLC SERPL CALC-MCNC: 71 MG/DL (ref 0–100)
LYMPHOCYTES # BLD AUTO: 2.48 THOUSANDS/ΜL (ref 0.6–4.47)
LYMPHOCYTES NFR BLD AUTO: 58 % (ref 14–44)
MAGNESIUM SERPL-MCNC: 2.3 MG/DL (ref 1.6–2.6)
MCH RBC QN AUTO: 31.3 PG (ref 26.8–34.3)
MCHC RBC AUTO-ENTMCNC: 32.7 G/DL (ref 31.4–37.4)
MCV RBC AUTO: 96 FL (ref 82–98)
MICROALBUMIN UR-MCNC: 13.8 MG/L (ref 0–20)
MICROALBUMIN/CREAT 24H UR: 6 MG/G CREATININE (ref 0–30)
MONOCYTES # BLD AUTO: 0.27 THOUSAND/ΜL (ref 0.17–1.22)
MONOCYTES NFR BLD AUTO: 6 % (ref 4–12)
NEUTROPHILS # BLD AUTO: 1.42 THOUSANDS/ΜL (ref 1.85–7.62)
NEUTS SEG NFR BLD AUTO: 33 % (ref 43–75)
NONHDLC SERPL-MCNC: 100 MG/DL
NRBC BLD AUTO-RTO: 0 /100 WBCS
PLATELET # BLD AUTO: 254 THOUSANDS/UL (ref 149–390)
PMV BLD AUTO: 9.6 FL (ref 8.9–12.7)
POTASSIUM SERPL-SCNC: 3.8 MMOL/L (ref 3.5–5.3)
PROT SERPL-MCNC: 7.6 G/DL (ref 6.4–8.2)
RBC # BLD AUTO: 4.69 MILLION/UL (ref 3.81–5.12)
SODIUM SERPL-SCNC: 139 MMOL/L (ref 136–145)
TRIGL SERPL-MCNC: 147 MG/DL
WBC # BLD AUTO: 4.29 THOUSAND/UL (ref 4.31–10.16)

## 2020-02-04 PROCEDURE — 82043 UR ALBUMIN QUANTITATIVE: CPT | Performed by: FAMILY MEDICINE

## 2020-02-04 PROCEDURE — 36415 COLL VENOUS BLD VENIPUNCTURE: CPT

## 2020-02-04 PROCEDURE — 82570 ASSAY OF URINE CREATININE: CPT | Performed by: FAMILY MEDICINE

## 2020-02-04 PROCEDURE — 80053 COMPREHEN METABOLIC PANEL: CPT

## 2020-02-04 PROCEDURE — 80061 LIPID PANEL: CPT

## 2020-02-04 PROCEDURE — 85025 COMPLETE CBC W/AUTO DIFF WBC: CPT

## 2020-02-04 PROCEDURE — 83735 ASSAY OF MAGNESIUM: CPT

## 2020-02-04 PROCEDURE — 3061F NEG MICROALBUMINURIA REV: CPT | Performed by: ORTHOPAEDIC SURGERY

## 2020-02-10 ENCOUNTER — HOSPITAL ENCOUNTER (OUTPATIENT)
Dept: MRI IMAGING | Facility: HOSPITAL | Age: 60
Discharge: HOME | End: 2020-02-10
Attending: PHYSICAL MEDICINE & REHABILITATION
Payer: COMMERCIAL

## 2020-02-10 DIAGNOSIS — M25.561 ACUTE PAIN OF BOTH KNEES: ICD-10-CM

## 2020-02-10 DIAGNOSIS — M17.0 PRIMARY OSTEOARTHRITIS OF BOTH KNEES: ICD-10-CM

## 2020-02-10 DIAGNOSIS — M25.562 ACUTE PAIN OF BOTH KNEES: ICD-10-CM

## 2020-02-10 DIAGNOSIS — M17.0 LOCALIZED OSTEOARTHRITIS OF KNEES, BILATERAL: ICD-10-CM

## 2020-02-10 PROCEDURE — 73721 MRI JNT OF LWR EXTRE W/O DYE: CPT

## 2020-02-14 DIAGNOSIS — M17.10 ARTHRITIS OF KNEE: ICD-10-CM

## 2020-02-14 DIAGNOSIS — E11.9 TYPE 2 DIABETES MELLITUS WITHOUT COMPLICATION, WITHOUT LONG-TERM CURRENT USE OF INSULIN (HCC): ICD-10-CM

## 2020-02-14 DIAGNOSIS — E78.5 HYPERLIPIDEMIA, UNSPECIFIED HYPERLIPIDEMIA TYPE: ICD-10-CM

## 2020-02-14 DIAGNOSIS — I10 ESSENTIAL HYPERTENSION: ICD-10-CM

## 2020-02-14 RX ORDER — ATENOLOL AND CHLORTHALIDONE TABLET 50; 25 MG/1; MG/1
1 TABLET ORAL DAILY
Qty: 30 TABLET | Refills: 0 | Status: SHIPPED | OUTPATIENT
Start: 2020-02-14 | End: 2020-03-11 | Stop reason: SDUPTHER

## 2020-02-14 RX ORDER — ACETAMINOPHEN 500 MG
TABLET ORAL
Qty: 120 TABLET | Refills: 0 | Status: SHIPPED | OUTPATIENT
Start: 2020-02-14 | End: 2020-04-17 | Stop reason: SDUPTHER

## 2020-02-14 RX ORDER — PRAVASTATIN SODIUM 40 MG
40 TABLET ORAL DAILY
Qty: 30 TABLET | Refills: 0 | Status: SHIPPED | OUTPATIENT
Start: 2020-02-14 | End: 2020-03-11 | Stop reason: SDUPTHER

## 2020-02-14 NOTE — TELEPHONE ENCOUNTER
Patient's daughter called requesting refills on attached medications  Patient will be out of medications on 2/16/2020 and has a follow up on 2/19/2020  Please advise

## 2020-02-17 ENCOUNTER — OFFICE VISIT (OUTPATIENT)
Dept: OBGYN CLINIC | Facility: CLINIC | Age: 60
End: 2020-02-17
Payer: COMMERCIAL

## 2020-02-17 VITALS
BODY MASS INDEX: 30.18 KG/M2 | HEIGHT: 62 IN | HEART RATE: 83 BPM | SYSTOLIC BLOOD PRESSURE: 119 MMHG | DIASTOLIC BLOOD PRESSURE: 74 MMHG | WEIGHT: 164 LBS

## 2020-02-17 DIAGNOSIS — G89.29 CHRONIC PAIN OF BOTH KNEES: ICD-10-CM

## 2020-02-17 DIAGNOSIS — M76.892 TENDINITIS OF LEFT QUADRICEPS TENDON: ICD-10-CM

## 2020-02-17 DIAGNOSIS — M17.0 LOCALIZED OSTEOARTHRITIS OF KNEES, BILATERAL: ICD-10-CM

## 2020-02-17 DIAGNOSIS — M25.561 CHRONIC PAIN OF BOTH KNEES: ICD-10-CM

## 2020-02-17 DIAGNOSIS — M17.0 PRIMARY OSTEOARTHRITIS OF BOTH KNEES: Primary | ICD-10-CM

## 2020-02-17 DIAGNOSIS — M25.562 CHRONIC PAIN OF BOTH KNEES: ICD-10-CM

## 2020-02-17 PROCEDURE — 99213 OFFICE O/P EST LOW 20 MIN: CPT | Performed by: PHYSICAL MEDICINE & REHABILITATION

## 2020-02-17 PROCEDURE — 20610 DRAIN/INJ JOINT/BURSA W/O US: CPT | Performed by: PHYSICAL MEDICINE & REHABILITATION

## 2020-02-17 PROCEDURE — 3051F HG A1C>EQUAL 7.0%<8.0%: CPT | Performed by: PHYSICAL MEDICINE & REHABILITATION

## 2020-02-17 PROCEDURE — 3074F SYST BP LT 130 MM HG: CPT | Performed by: PHYSICAL MEDICINE & REHABILITATION

## 2020-02-17 PROCEDURE — 3078F DIAST BP <80 MM HG: CPT | Performed by: PHYSICAL MEDICINE & REHABILITATION

## 2020-02-17 PROCEDURE — 3008F BODY MASS INDEX DOCD: CPT | Performed by: PHYSICAL MEDICINE & REHABILITATION

## 2020-02-17 PROCEDURE — 4004F PT TOBACCO SCREEN RCVD TLK: CPT | Performed by: PHYSICAL MEDICINE & REHABILITATION

## 2020-02-17 RX ORDER — LIDOCAINE HYDROCHLORIDE 10 MG/ML
5 INJECTION, SOLUTION INFILTRATION; PERINEURAL
Status: COMPLETED | OUTPATIENT
Start: 2020-02-17 | End: 2020-02-17

## 2020-02-17 RX ORDER — TRIAMCINOLONE ACETONIDE 40 MG/ML
40 INJECTION, SUSPENSION INTRA-ARTICULAR; INTRAMUSCULAR
Status: COMPLETED | OUTPATIENT
Start: 2020-02-17 | End: 2020-02-17

## 2020-02-17 RX ADMIN — TRIAMCINOLONE ACETONIDE 40 MG: 40 INJECTION, SUSPENSION INTRA-ARTICULAR; INTRAMUSCULAR at 15:05

## 2020-02-17 RX ADMIN — LIDOCAINE HYDROCHLORIDE 5 ML: 10 INJECTION, SOLUTION INFILTRATION; PERINEURAL at 15:05

## 2020-02-17 NOTE — PROGRESS NOTES
1  Primary osteoarthritis of both knees  Ambulatory referral to Orthopedic Surgery    MRI knee right  wo contrast    Ambulatory referral to Physical Therapy   2  Localized osteoarthritis of knees, bilateral  Ambulatory referral to Orthopedic Surgery    MRI knee right  wo contrast    Ambulatory referral to Physical Therapy   3  Chronic pain of both knees  Ambulatory referral to Orthopedic Surgery    MRI knee right  wo contrast    Ambulatory referral to Physical Therapy   4  Tendinitis of left quadriceps tendon  Ambulatory referral to Physical Therapy     Orders Placed This Encounter   Procedures    Large joint arthrocentesis    MRI knee right  wo contrast    Ambulatory referral to Orthopedic Surgery    Ambulatory referral to Physical Therapy        Imaging Studies (I personally reviewed images in PACS and report):  Bilateral knee x-rays dated 1/28/2020  These images show moderate-severe degenerative changes in the right knee with more collapse of the lateral joint space than the medial   There is osteophytosis as well  The left knee has mild-moderate osteoarthritic changes with medial joint space narrowing more than lateral   There is minimal osteophyte formation in this knee  No acute osseous abnormalities  Left knee MRI dated 2/10/2029  These images show  1  Mild osteoarthritis in the patellofemoral and medial tibiofemoral compartments  2   Small Baker's cyst "     Impression:  Patient is here in follow-up of chronic bilateral knee pain secondary to bilateral patellofemoral arthralgia and osteoarthritis of both knees  At this point, for both knees, she has tried many different anti-inflammatory medications including Tylenol, ibuprofen and naproxen  She has also has had multiple steroid injections into both knees along with viscosupplementation  Previously, she was seeing Dr Braden Eli in [place]    Since she was having severe pain in the right knee, we proceeded with a steroid injection into the knee joint  Please see procedure note below  Since she has tried many different things for her knee pains, we will have her see Dr Nenita Castillo surgery  In the interim, she will restart physical therapy  I will see her back as needed  No follow-ups on file  HPI:  Kane Rubio is a 63 y o  female  who presents in follow up  Here for   Chief Complaint   Patient presents with    Follow-up       Date of injury: Chronic pain for many years  She had injections few years ago which only helped for a few months  They were repeated and did not help at all  The right knee pain she has most of the time in the left knee pain she only has with bending and twisting activities  Trajectory of symptoms:  She has some good days and bad days-see above  Review of Systems   Constitutional: Positive for activity change  Negative for fever  HENT: Negative for sore throat  Eyes: Negative for visual disturbance  Respiratory: Negative for shortness of breath  Cardiovascular: Negative for chest pain  Gastrointestinal: Negative for abdominal pain  Endocrine: Negative for polydipsia  Genitourinary: Negative for difficulty urinating  Musculoskeletal: Positive for arthralgias  Skin: Negative for rash  Allergic/Immunologic: Negative for immunocompromised state  Neurological: Negative for numbness  Hematological: Does not bruise/bleed easily  Psychiatric/Behavioral: Negative for confusion  Following history reviewed and updated:  Past Medical History:   Diagnosis Date    [facility] mellitus ([facility] [address] )     Hyperlipidemia     Hypertension      History reviewed  No pertinent surgical history    Social History   Social History     Substance and Sexual Activity   Alcohol Use Yes    Frequency: Monthly or less    Drinks per session: 1 or 2    Binge frequency: Never     Social History     Substance and Sexual Activity   Drug Use No     Social History     Tobacco Use   Smoking Status Current Every Day Smoker    Packs/day: 0 50    Years: 45 00    Pack years: 22 50    Types: Cigarettes   Smokeless Tobacco Never Used     Family History   Problem Relation Age of Onset    Arthritis Mother     Arthritis Father      No Known Allergies     Constitutional:  /74 (BP Location: Right arm, Patient Position: Sitting, Cuff Size: Standard)   Pulse 83   Ht 5' 2" (1 575 m)   Wt 74 4 kg (164 lb)   BMI 30 00 kg/m²    General: NAD  Eyes: Clear sclerae  ENT: No inflammation, lesion, or mass of lips  No tracheal deviation  Musculoskeletal: As mentioned below  Integumentary: No visible rashes or skin lesions  Pulmonary/Chest: Effort normal  No respiratory distress  Neuro: CN's grossly intact, CARLSON  Psych: Normal affect and judgement  Vascular: WWP  Right Knee Exam     Tenderness   Right knee tenderness location: Distal quadriceps  Range of Motion   Extension: normal   Flexion: abnormal     Tests   Patellar apprehension: positive    Other   Erythema: absent  Scars: absent  Sensation: normal  Pulse: present  Swelling: mild  Effusion: effusion present    Comments:  Injection site is clean, dry and intact  Left Knee Exam     Tenderness   Left knee tenderness location: Distal quadriceps      Range of Motion   Extension: normal   Flexion: abnormal     Tests   Patellar apprehension: positive    Other   Erythema: absent  Scars: absent  Sensation: normal  Pulse: present  Swelling: none  Effusion: no effusion present             Large joint arthrocentesis: R knee  Date/Time: 2/17/2020 3:05 PM  Consent given by: patient  Site marked: site marked  Supporting Documentation  Indications: pain   Procedure Details  Location: knee - R knee  Needle size: 20 G  Ultrasound guidance: no  Approach: anterolateral  Medications administered: 5 mL lidocaine 1 %; 40 mg triamcinolone acetonide 40 mg/mL    Patient tolerance: patient tolerated the procedure well with no immediate complications

## 2020-02-18 ENCOUNTER — EVALUATION (OUTPATIENT)
Dept: PHYSICAL THERAPY | Facility: REHABILITATION | Age: 60
End: 2020-02-18
Payer: COMMERCIAL

## 2020-02-18 DIAGNOSIS — R29.898 LEFT LEG WEAKNESS: ICD-10-CM

## 2020-02-18 DIAGNOSIS — M25.562 CHRONIC PAIN OF BOTH KNEES: ICD-10-CM

## 2020-02-18 DIAGNOSIS — M17.0 PRIMARY OSTEOARTHRITIS OF BOTH KNEES: Primary | ICD-10-CM

## 2020-02-18 DIAGNOSIS — G89.29 CHRONIC PAIN OF BOTH KNEES: ICD-10-CM

## 2020-02-18 DIAGNOSIS — M17.0 LOCALIZED OSTEOARTHRITIS OF KNEES, BILATERAL: ICD-10-CM

## 2020-02-18 DIAGNOSIS — M25.561 CHRONIC PAIN OF BOTH KNEES: ICD-10-CM

## 2020-02-18 PROCEDURE — 97110 THERAPEUTIC EXERCISES: CPT | Performed by: PHYSICAL THERAPIST

## 2020-02-18 PROCEDURE — 97162 PT EVAL MOD COMPLEX 30 MIN: CPT | Performed by: PHYSICAL THERAPIST

## 2020-02-18 NOTE — PROGRESS NOTES
PT Evaluation     Today's date: 2020  Patient name: Cecilia Parada  : 1960  MRN: [id number]  Referring provider: Morris Chu DO  Dx:   Encounter Diagnosis     ICD-10-CM    1  Primary osteoarthritis of both knees M17 0    2  Chronic pain of both knees M25 561     M25 562     G89 29    3  Localized osteoarthritis of knees, bilateral M17 0    4  Left leg weakness R29 898                   Assessment  Assessment details: Patient is a 62 yo female presenting to PT with chronic bilateral knee OE and c/o left leg weakness gradually worsening over the last 2-2 5 years  Evaluation findings reveal significant left leg weakness, poor BLE posture, abnormal gait pattern and LLE fatigue with movement  Discussed with patient following up with family doctor or specialist in regards to progressive idiopathic LLE weakness; patient and daughter verbalized understanding  Educated patient on plan to focus on strengthening BLE  Will plan to further assess balance and gait deviations next visit  Impairments: abnormal gait, impaired balance, impaired physical strength and lacks appropriate home exercise program    Symptom irritability: low  Goals  ST  Patient will improve LLE strength by 1/2 grades in 5 visits  2  Patient will be able to ambulate longer distances with no episodes of left leg instability/buckling in 5 visits  LT  Patient will be independent with HEP upon discharge  2  Patient will be able to ascend/descend stairs with no episodes of knee buckling in 10 visits  Plan  Plan details: Plan to initiate skilled PT to address LLE weakness and gait abnormalities     Patient would benefit from: skilled physical therapy  Planned modality interventions: cryotherapy  Planned therapy interventions: gait training, home exercise program, therapeutic exercise, therapeutic activities, strengthening, patient education, neuromuscular re-education and postural training  Duration in visits: 10  Plan of Care beginning date: 2020  Treatment plan discussed with: patient and family        Subjective Evaluation    History of Present Illness  Mechanism of injury: Patient presents to PT with complaints of left leg weakness of gradual worsening progression over the last two years  Patient's daughter present throughout session providing Persian translation  Patient referred to PT for chronic bilateral knee pain with future plan for TKA of right knee  Patient denies presence of pain in left knee, primary complaints of "a heavy feeling in the left leg and the leg gives out "  Patient reports overall complaints of left leg weakness such as lifting leg when walking and knee buckling on stairs  Patient denies presence of numbness and tingling, occasional shooting pain down anterior thigh with knee flexion  Patient describes unsteadiness as "it feels like my leg is dragging behind me and sometimes gets caught causing me to trip " Denies use of AD  Patient reports ice and heat are painful on the knee and she is unable to feel varying temperatures in bilateral LE  Patient     Prior/Current Treatment: patient received PT last year for LBP and b/l knee pain (however patient denies history of LBP stating it was GI related)  Occupation: none  Recreational Activities: Gardening  Patient goals: return to walking normally, stair climbing (3 JEFF, 13 stairs inside single HR and wall)        Pain  Current pain ratin  At best pain ratin  At worst pain ratin  Relieving factors: ice  Aggravating factors: stair climbing (lifting leg)  Progression: worsening    Social Support  Steps to enter house: yes  Stairs in house: yes       Diagnostic Tests  X-ray: abnormal  Treatments  Current treatment: medication  Current treatment comments: Ibuprofen 2x/day for knee pain       Patient Goals  Patient goals for therapy: increased strength, decreased pain, improved balance and return to sport/leisure activities          Objective Strength/Myotome Testing     Left Knee   Flexion: 3  Extension: 4  Quadriceps contraction: good    Right Knee   Flexion: 5  Extension: 5  Quadriceps contraction: good    Additional Strength Details  Ankle DF 4/5 L 5/5 R  Hip flexion: 2+/5 L, 4/5 R  Hip extension: 3/5 L, 4/5 R  Hip abduction: 3/+5 L, 4/5 R    General Comments:      Knee Comments  Bilateral squat form: significant static and dynamic valgus, support from BUE on legs to stand from squat  SLS: + trendelenburg b/l (<1 sec each side no UE support)     Gait observation: patient with significant bilateral valgus, bilateral trunk lean    Standing observation: bilateral knee hyperextension L>R    LQS:  Dermatomes intact b/l    SLR negative bilaterally           Plan to evaluate reflexes, lumbar screen, balance testing nv       Precautions: DM, HTN, hyperlipidemia, bilateral knee OA       Manual                                                                                   Exercise Diary  2/18            Standing hip abd LLE x10            Standing hip ext LLE x10            SAQ :05x10 BLE            Balance screen             Functional outcome: TUG             Lumbar screen                                                                                                                                                                                                       Modalities

## 2020-02-19 ENCOUNTER — OFFICE VISIT (OUTPATIENT)
Dept: FAMILY MEDICINE CLINIC | Facility: CLINIC | Age: 60
End: 2020-02-19
Payer: COMMERCIAL

## 2020-02-19 VITALS
DIASTOLIC BLOOD PRESSURE: 66 MMHG | BODY MASS INDEX: 30.33 KG/M2 | SYSTOLIC BLOOD PRESSURE: 108 MMHG | HEART RATE: 70 BPM | HEIGHT: 62 IN | WEIGHT: 164.8 LBS | TEMPERATURE: 77.5 F | OXYGEN SATURATION: 99 %

## 2020-02-19 DIAGNOSIS — Z12.31 ENCOUNTER FOR SCREENING MAMMOGRAM FOR BREAST CANCER: ICD-10-CM

## 2020-02-19 DIAGNOSIS — E11.9 TYPE 2 DIABETES MELLITUS WITHOUT COMPLICATION, WITHOUT LONG-TERM CURRENT USE OF INSULIN (HCC): Primary | ICD-10-CM

## 2020-02-19 DIAGNOSIS — M17.0 PRIMARY OSTEOARTHRITIS OF BOTH KNEES: ICD-10-CM

## 2020-02-19 DIAGNOSIS — I10 ESSENTIAL HYPERTENSION: ICD-10-CM

## 2020-02-19 PROCEDURE — 3078F DIAST BP <80 MM HG: CPT | Performed by: FAMILY MEDICINE

## 2020-02-19 PROCEDURE — 4004F PT TOBACCO SCREEN RCVD TLK: CPT | Performed by: FAMILY MEDICINE

## 2020-02-19 PROCEDURE — 3008F BODY MASS INDEX DOCD: CPT | Performed by: FAMILY MEDICINE

## 2020-02-19 PROCEDURE — 3051F HG A1C>EQUAL 7.0%<8.0%: CPT | Performed by: FAMILY MEDICINE

## 2020-02-19 PROCEDURE — 99213 OFFICE O/P EST LOW 20 MIN: CPT | Performed by: FAMILY MEDICINE

## 2020-02-19 PROCEDURE — 3074F SYST BP LT 130 MM HG: CPT | Performed by: FAMILY MEDICINE

## 2020-02-19 NOTE — PROGRESS NOTES
Family Medicine Follow-Up Office Visit  Dorman Heimlich 63 y o  female   MRN: [id number] : 1960  ENCOUNTER: 2020 5:25 PM    Assessment and Plan   Type 2 diabetes mellitus without complication, without long-term current use of insulin (Former[facility])    Lab Results   Component Value Date    HGBA1C 7 2 (A) 2020     Patient has no been compliant with medications  Refills were ordered almost 1 week ago and patient is yet to pick them up     -To  ordered medications  - glucose meter with strips ordered today  - discussed importance of medication compliance    Essential hypertension  BP currently at goal 108/66     -Medications refilled  -discussed medication compliance    Degenerative arthritis of knee, bilateral  Follows with orthopedics  Steroid injection given in right knee by ortho, also recommended left knee PT  May be candidate for right knee replacement    - Continue to follow with ortho  - orthopedic surgery consult in 1 week  - voltaren gel for pain control with acetaminophen  - continue PT for left knee, on session     Encounter for screening mammogram for breast cancer  Patient is due for mammogram    - Referral for mammogram given today    RTC after orthopedic surgery consult      Chief Complaint     Chief Complaint   Patient presents with    Follow-up       History of Present Illness   Dorman Heimlich is a 63y o -year-old female who presents today for a follow up on her diabetes and osteoarthritis  Patient was seen by orthopedics and was given a steroid injection to the right knee and was recommended to for PT of the left knee  She may also be a candidate for right knee replacement surgery and is to follow up with orthopedic surgery in 1 week  No new complaints today    Review of Systems   Review of Systems   Constitutional: Negative for activity change, appetite change, fatigue and fever  HENT: Negative for congestion, rhinorrhea, sneezing and sore throat      Eyes: Negative for pain, discharge, redness and itching  Respiratory: Negative for cough, chest tightness, shortness of breath and wheezing  Cardiovascular: Negative for chest pain and palpitations  Gastrointestinal: Negative for abdominal distention, abdominal pain, constipation, diarrhea, nausea and vomiting  Genitourinary: Negative for difficulty urinating  Musculoskeletal: Positive for arthralgias (bilateral knees)  Negative for myalgias  Skin: Negative for rash  Neurological: Negative for dizziness, weakness, numbness and headaches  Hematological: Negative for adenopathy  Psychiatric/Behavioral: Negative for agitation  Active Problem List     Patient Active Problem List   Diagnosis    Bilateral knee pain    Localized osteoarthritis of knees, bilateral    Degenerative arthritis of knee, bilateral    Type 2 diabetes mellitus without complication, without long-term current use of insulin (HCC)    Essential hypertension    Hyperlipidemia    Carpal tunnel syndrome on both sides    Right arm pain    Encounter for screening mammogram for breast cancer       Past Medical History, Past Surgical History, Family History, and Social History were reviewed and updated today as appropriate  Objective   /66 (BP Location: Right arm, Patient Position: Sitting, Cuff Size: Large)   Pulse 70   Temp (!) 77 5 °F (25 3 °C)   Ht 5' 2" (1 575 m)   Wt 74 8 kg (164 lb 12 8 oz)   SpO2 99%   BMI 30 14 kg/m²     Physical Exam   Constitutional: She appears well-developed and well-nourished  No distress  HENT:   Head: Normocephalic and atraumatic  Nose: Nose normal    Mouth/Throat: No oropharyngeal exudate  Eyes: Conjunctivae are normal  Right eye exhibits no discharge  Left eye exhibits no discharge  No scleral icterus  Cardiovascular: Normal rate, regular rhythm and normal heart sounds  No murmur heard  Pulmonary/Chest: Effort normal and breath sounds normal  No respiratory distress   She has no wheezes  Abdominal: Soft  She exhibits no distension  There is no tenderness  Musculoskeletal: She exhibits tenderness (lateral bilateral knees)  She exhibits no edema or deformity  Lymphadenopathy:     She has no cervical adenopathy  Neurological: She is alert  Skin: Skin is warm and dry  No rash noted  She is not diaphoretic  No erythema     Psychiatric: Her behavior is normal      Diabetic Foot Exam    Pertinent Laboratory/Diagnostic Studies:  Lab Results   Component Value Date    BUN 20 02/04/2020    CREATININE 0 85 02/04/2020    CALCIUM 10 0 02/04/2020    K 3 8 02/04/2020    CO2 32 02/04/2020     02/04/2020     Lab Results   Component Value Date    ALT 28 02/04/2020    AST 14 02/04/2020    ALKPHOS 68 02/04/2020       Lab Results   Component Value Date    WBC 4 29 (L) 02/04/2020    HGB 14 7 02/04/2020    HCT 44 9 02/04/2020    MCV 96 02/04/2020     02/04/2020       No results found for: TSH    No results found for: CHOL  Lab Results   Component Value Date    TRIG 147 02/04/2020     Lab Results   Component Value Date    HDL 36 (L) 02/04/2020     Lab Results   Component Value Date    LDLCALC 71 02/04/2020     Lab Results   Component Value Date    HGBA1C 7 2 (A) 01/17/2020       Results for orders placed or performed in visit on 02/04/20   Comprehensive metabolic panel   Result Value Ref Range    Sodium 139 136 - 145 mmol/L    Potassium 3 8 3 5 - 5 3 mmol/L    Chloride 105 100 - 108 mmol/L    CO2 32 21 - 32 mmol/L    ANION GAP 2 (L) 4 - 13 mmol/L    BUN 20 5 - 25 mg/dL    Creatinine 0 85 0 60 - 1 30 mg/dL    Glucose, Fasting 163 (H) 65 - 99 mg/dL    Calcium 10 0 8 3 - 10 1 mg/dL    AST 14 5 - 45 U/L    ALT 28 12 - 78 U/L    Alkaline Phosphatase 68 46 - 116 U/L    Total Protein 7 6 6 4 - 8 2 g/dL    Albumin 4 1 3 5 - 5 0 g/dL    Total Bilirubin 0 42 0 20 - 1 00 mg/dL    eGFR 87 ml/min/1 73sq m   Lipid panel   Result Value Ref Range    Cholesterol 136 50 - 200 mg/dL    Triglycerides 147 <=150 mg/dL    HDL, Direct 36 (L) >=40 mg/dL    LDL Calculated 71 0 - 100 mg/dL    Non-HDL-Chol (CHOL-HDL) 100 mg/dl   CBC and differential   Result Value Ref Range    WBC 4 29 (L) 4 31 - 10 16 Thousand/uL    RBC 4 69 3 81 - 5 12 Million/uL    Hemoglobin 14 7 11 5 - 15 4 g/dL    Hematocrit 44 9 34 8 - 46 1 %    MCV 96 82 - 98 fL    MCH 31 3 26 8 - 34 3 pg    MCHC 32 7 31 4 - 37 4 g/dL    RDW 12 6 11 6 - 15 1 %    MPV 9 6 8 9 - 12 7 fL    Platelets 520 511 - 450 Thousands/uL    nRBC 0 /100 WBCs    Neutrophils Relative 33 (L) 43 - 75 %    Immat GRANS % 0 0 - 2 %    Lymphocytes Relative 58 (H) 14 - 44 %    Monocytes Relative 6 4 - 12 %    Eosinophils Relative 2 0 - 6 %    Basophils Relative 1 0 - 1 %    Neutrophils Absolute 1 42 (L) 1 85 - 7 62 Thousands/µL    Immature Grans Absolute 0 01 0 00 - 0 20 Thousand/uL    Lymphocytes Absolute 2 48 0 60 - 4 47 Thousands/µL    Monocytes Absolute 0 27 0 17 - 1 22 Thousand/µL    Eosinophils Absolute 0 08 0 00 - 0 61 Thousand/µL    Basophils Absolute 0 03 0 00 - 0 10 Thousands/µL   Magnesium   Result Value Ref Range    Magnesium 2 3 1 6 - 2 6 mg/dL       Orders Placed This Encounter   Procedures    Glucometer    Glucometer test strips    Mammo screening bilateral w cad         Current Medications     Current Outpatient Medications   Medication Sig Dispense Refill    acetaminophen (TYLENOL) 500 mg tablet Three times a day with meals and at bedtime (Patient not taking: Reported on 2/19/2020) 120 tablet 0    atenolol (TENORMIN) 100 mg tablet Take 50 mg by mouth daily      atenolol (TENORMIN) 25 mg tablet Take 25 mg by mouth daily      atenolol-chlorthalidone (TENORETIC) 50-25 mg per tablet Take 1 tablet by mouth daily (Patient not taking: Reported on 2/19/2020) 30 tablet 0    diclofenac sodium (VOLTAREN) 1 % Apply 2 g topically 2 (two) times a day as needed (As needed) (Patient not taking: Reported on 2/19/2020) 1 Tube 0    metFORMIN (GLUCOPHAGE) 1000 MG tablet Take by mouth  metFORMIN (GLUCOPHAGE) 500 mg tablet Take 1 tablet (500 mg total) by mouth daily with breakfast (Patient not taking: Reported on 2/19/2020) 30 tablet 0    naproxen (NAPROSYN) 375 mg tablet Take 250 mg by mouth daily      pravastatin (PRAVACHOL) 20 mg tablet Take 20 mg by mouth daily      pravastatin (PRAVACHOL) 40 mg tablet Take 1 tablet (40 mg total) by mouth daily (Patient not taking: Reported on 2/19/2020) 30 tablet 0    pravastatin (PRAVACHOL) 80 mg tablet Take by mouth      predniSONE 10 mg tablet 5 po daily x2 days, then 4 po daily x2 days, then 3 po daily x2 days, then 2 po daily x2 days,then 1 po daily x2days (Patient not taking: Reported on 1/17/2020) 30 tablet 0     No current facility-administered medications for this visit  ALLERGIES:  No Known Allergies    Health Maintenance     Health Maintenance   Topic Date Due    Hepatitis C Screening  1960    CRC Screening: Colonoscopy  1960    Pneumococcal Vaccine: Pediatrics (0 to 5 Years) and At-Risk Patients (6 to 59 Years) (1 of 1 - PPSV23) 03/27/1967    Diabetic Foot Exam  03/27/1971    DM Eye Exam  03/27/1971    DTaP,Tdap,and Td Vaccines (1 - Tdap) 03/27/1972    HIV Screening  03/27/1976    BMI: Followup Plan  03/27/1979    Annual Physical  03/27/1979    Cervical Cancer Screening  03/27/1982    MAMMOGRAM  09/11/2019    Influenza Vaccine  01/17/2021 (Originally 7/1/2019)    PT PLAN OF CARE  03/19/2020    HEMOGLOBIN A1C  07/17/2020    Depression Screening PHQ  01/17/2021    URINE MICROALBUMIN  02/04/2021    BMI: Adult  02/17/2021    Pneumococcal Vaccine: 65+ Years (1 of 2 - PCV13) 03/27/2026    HIB Vaccine  Aged Out    Hepatitis B Vaccine  Aged Out    IPV Vaccine  Aged Out    Hepatitis A Vaccine  Aged Out    Meningococcal ACWY Vaccine  Aged Out    HPV Vaccine  Aged Out       There is no immunization history on file for this patient        Maurilio Marcelino MD   [address]  2/19/2020 5:25 PM    Parts of this note were dictated using M*Modal dictation software and may have sounds-like errors due to variation in pronunciation

## 2020-02-19 NOTE — ASSESSMENT & PLAN NOTE
BP currently at goal 108/66     -Medications refilled  -discussed medication compliance
Follows with orthopedics  Steroid injection given in right knee by ortho, also recommended left knee PT  May be candidate for right knee replacement    - Continue to follow with ortho  - orthopedic surgery consult in 1 week  - voltaren gel for pain control with acetaminophen     - continue PT for left knee, on session 2/24
Lab Results   Component Value Date    HGBA1C 7 2 (A) 01/17/2020     Patient has no been compliant with medications   Refills were ordered almost 1 week ago and patient is yet to pick them up     -To  ordered medications  - glucose meter with strips ordered today  - discussed importance of medication compliance
Patient is due for mammogram    - Referral for mammogram given today    RTC after orthopedic surgery consult
Adequate: hears normal conversation without difficulty

## 2020-02-20 ENCOUNTER — TELEPHONE (OUTPATIENT)
Dept: FAMILY MEDICINE CLINIC | Facility: CLINIC | Age: 60
End: 2020-02-20

## 2020-02-20 ENCOUNTER — OFFICE VISIT (OUTPATIENT)
Dept: PHYSICAL THERAPY | Facility: REHABILITATION | Age: 60
End: 2020-02-20
Payer: COMMERCIAL

## 2020-02-20 DIAGNOSIS — M25.562 CHRONIC PAIN OF BOTH KNEES: ICD-10-CM

## 2020-02-20 DIAGNOSIS — M17.0 LOCALIZED OSTEOARTHRITIS OF KNEES, BILATERAL: ICD-10-CM

## 2020-02-20 DIAGNOSIS — G89.29 CHRONIC PAIN OF BOTH KNEES: ICD-10-CM

## 2020-02-20 DIAGNOSIS — M25.561 CHRONIC PAIN OF BOTH KNEES: ICD-10-CM

## 2020-02-20 DIAGNOSIS — R29.898 LEFT LEG WEAKNESS: ICD-10-CM

## 2020-02-20 DIAGNOSIS — M17.0 PRIMARY OSTEOARTHRITIS OF BOTH KNEES: Primary | ICD-10-CM

## 2020-02-20 PROCEDURE — 97112 NEUROMUSCULAR REEDUCATION: CPT | Performed by: PHYSICAL THERAPIST

## 2020-02-20 PROCEDURE — 97110 THERAPEUTIC EXERCISES: CPT | Performed by: PHYSICAL THERAPIST

## 2020-02-20 NOTE — TELEPHONE ENCOUNTER
Pharmacy left message stating that the prescriptions for patient's diabetic supplies did not have frequency  Please resend diabetic supplies with frequency  Thank you

## 2020-02-20 NOTE — PROGRESS NOTES
Daily Note     Today's date: 2020  Patient name: Gloria Lai  : 1960  MRN: [id number]  Referring provider: [place], DO  Dx:   Encounter Diagnosis     ICD-10-CM    1  Primary osteoarthritis of both knees M17 0    2  Chronic pain of both knees M25 561     M25 562     G89 29    3  Localized osteoarthritis of knees, bilateral M17 0    4  Left leg weakness R29 898                   Subjective:  Patient with no new complaints  Patient states no left knee pain but continues to feel like the left knee william when walking  In regards to initial onset of symptoms patient states "two years ago I felt like my leg lost strength when I would put pressure on it " Daughter able to assist in translation  Objective: See treatment diary below  Slump test: negative L  Femoral nerve tension: negative bilaterally  Lumbar AROM screen: WNL denies radiating sx  L4/S1 reflexes: 2+   Prone knee flexion strength: 3+/5 L, 4/5 R  Bilateral trendelenburg and intermittent left knee buckling observable throughout gait    Assessment: Tolerated treatment fair  Patient will benefit from continued PT  Patient demonstrates improved tolerance to strengthening exercise today and able to maintain proper form with minimal verbal and tactile cues, however requires frequent rest breaks due to increased muscle fatigue  Patient continues to demonstrate significant decrease in LLE strength compared to RLE  Lumbar screen performed today reveals no significant findings  Plan: Continue per plan of care        Precautions: DM, HTN, hyperlipidemia, bilateral knee OA       Manual                                                                                   Exercise Diary             Standing hip abd LLE x10 x10 b/l            Standing hip ext LLE x10 x10 b/l           SAQ :05x10 BLE np           Supine Clamshell  OTB  3x10           Bridges  2x10           SLR  2x10           Side-stepping             SLS Bike warm up  3' no resistance           LAQ  2x10           Glute sets 4" box  2x10           FTEO  :30x3           Semi-tandem  :20x3                                                                                Modalities

## 2020-02-24 ENCOUNTER — OFFICE VISIT (OUTPATIENT)
Dept: OBGYN CLINIC | Facility: CLINIC | Age: 60
End: 2020-02-24
Payer: COMMERCIAL

## 2020-02-24 VITALS
SYSTOLIC BLOOD PRESSURE: 112 MMHG | HEIGHT: 62 IN | HEART RATE: 58 BPM | DIASTOLIC BLOOD PRESSURE: 76 MMHG | WEIGHT: 159 LBS | BODY MASS INDEX: 29.26 KG/M2

## 2020-02-24 DIAGNOSIS — M22.2X1 PATELLOFEMORAL SYNDROME, BILATERAL: ICD-10-CM

## 2020-02-24 DIAGNOSIS — M25.562 CHRONIC PAIN OF BOTH KNEES: ICD-10-CM

## 2020-02-24 DIAGNOSIS — M25.561 CHRONIC PAIN OF BOTH KNEES: ICD-10-CM

## 2020-02-24 DIAGNOSIS — G89.29 CHRONIC PAIN OF BOTH KNEES: ICD-10-CM

## 2020-02-24 DIAGNOSIS — M17.0 PRIMARY OSTEOARTHRITIS OF BOTH KNEES: ICD-10-CM

## 2020-02-24 DIAGNOSIS — M22.2X2 PATELLOFEMORAL SYNDROME, BILATERAL: ICD-10-CM

## 2020-02-24 PROCEDURE — 4004F PT TOBACCO SCREEN RCVD TLK: CPT | Performed by: ORTHOPAEDIC SURGERY

## 2020-02-24 PROCEDURE — 99213 OFFICE O/P EST LOW 20 MIN: CPT | Performed by: ORTHOPAEDIC SURGERY

## 2020-02-24 PROCEDURE — 3074F SYST BP LT 130 MM HG: CPT | Performed by: ORTHOPAEDIC SURGERY

## 2020-02-24 PROCEDURE — 3008F BODY MASS INDEX DOCD: CPT | Performed by: ORTHOPAEDIC SURGERY

## 2020-02-24 PROCEDURE — 3078F DIAST BP <80 MM HG: CPT | Performed by: ORTHOPAEDIC SURGERY

## 2020-02-24 PROCEDURE — 3051F HG A1C>EQUAL 7.0%<8.0%: CPT | Performed by: ORTHOPAEDIC SURGERY

## 2020-02-24 NOTE — PROGRESS NOTES
Assessment:  1  Primary osteoarthritis of both knees  Ambulatory referral to Orthopedic Surgery   2  Patellofemoral syndrome, bilateral  Ambulatory referral to Orthopedic Surgery   3  Chronic pain of both knees  Ambulatory referral to Orthopedic Surgery     Patient Active Problem List   Diagnosis    Bilateral knee pain    Localized osteoarthritis of knees, bilateral    Degenerative arthritis of knee, bilateral    Type 2 diabetes mellitus without complication, without long-term current use of insulin (HCC)    Essential hypertension    Hyperlipidemia    Carpal tunnel syndrome on both sides    Right arm pain    Encounter for screening mammogram for breast cancer           Plan      Findings consistent with patellofemoral syndrome bilateral knee's worse right than left, degenerative changes in medial and lateral compartments which seem asymptomatic at this time  She will continue with physical therapy, giving instructions for mcconnel taping to do over the next 6 weeks  She can continue to get MRI of right knee  OTC NsAIDS as needed for pain  She should notice decrease in pain, symptoms at next appt  Subjective:     Patient ID:    Chief Complaint:Roxana Edwards 63 y o  female      HPI    Patient comes in today with regards to bilateral knee's, osteoarthritis  Right worse than left  Referred by Dr Desiree Moran   CSI 2/17/20 which offered no relief, she has had prior CSI all with short term relief  She has tried visco by Dr Valerie Muhammad which helped a few months pain primarily anterior both knee's  No injury or trauma  She will wear knee brace on right knee  She has tried various topical creams, oral medications with short term relief  She notes crepitation, cracking in knee  Pain worse right knee with squatting, kneeling, stairs, getting up from low seated positions  Sense of knee giving out at times  She has restarted physical therapy as well, more for left leg weakness     She feels nothing takes the pain away  Denies locking, catching in either knee  The following portions of the patient's history were reviewed and updated as appropriate: allergies, current medications, past family history, past social history, past surgical history and problem list         Social History     Socioeconomic History    Marital status:      Spouse name: Not on file    Number of children: Not on file    Years of education: Not on file    Highest education level: Not on file   Occupational History    Occupation:      Employer: [employer]   Social Needs    Financial resource strain: Not hard at all    Food insecurity:     Worry: Never true     Inability: Never true    Transportation needs:     Medical: No     Non-medical: No   Tobacco Use    Smoking status: Current Every Day Smoker     Packs/day: 0 50     Years: 45 00     Pack years: 22 50     Types: Cigarettes    Smokeless tobacco: Never Used   Substance and Sexual Activity    Alcohol use: Yes     Frequency: Monthly or less     Drinks per session: 1 or 2     Binge frequency: Never    Drug use: No    Sexual activity: Not on file   Lifestyle    Physical activity:     Days per week: 0 days     Minutes per session: 0 min    Stress: Not on file   Relationships    Social connections:     Talks on phone: More than three times a week     Gets together: More than three times a week     Attends [employer] service: Never     Active member of club or organization: No     Attends meetings of clubs or organizations: Never     Relationship status:      Intimate partner violence:     Fear of current or ex partner: No     Emotionally abused: No     Physically abused: No     Forced sexual activity: No   Other Topics Concern    Not on file   Social History Narrative    ** Merged History Encounter **          Past Medical History:   Diagnosis Date    Diabetes mellitus ([facility] [address] )     Hyperlipidemia     Hypertension      No past surgical history on file  No Known Allergies  Current Outpatient Medications on File Prior to Visit   Medication Sig Dispense Refill    acetaminophen (TYLENOL) 500 mg tablet Three times a day with meals and at bedtime (Patient not taking: Reported on 2/19/2020) 120 tablet 0    atenolol (TENORMIN) 100 mg tablet Take 50 mg by mouth daily      atenolol (TENORMIN) 25 mg tablet Take 25 mg by mouth daily      atenolol-chlorthalidone (TENORETIC) 50-25 mg per tablet Take 1 tablet by mouth daily (Patient not taking: Reported on 2/19/2020) 30 tablet 0    diclofenac sodium (VOLTAREN) 1 % Apply 2 g topically 2 (two) times a day as needed (As needed) (Patient not taking: Reported on 2/19/2020) 1 Tube 0    metFORMIN (GLUCOPHAGE) 1000 MG tablet Take by mouth      metFORMIN (GLUCOPHAGE) 500 mg tablet Take 1 tablet (500 mg total) by mouth daily with breakfast (Patient not taking: Reported on 2/19/2020) 30 tablet 0    naproxen (NAPROSYN) 375 mg tablet Take 250 mg by mouth daily      pravastatin (PRAVACHOL) 20 mg tablet Take 20 mg by mouth daily      pravastatin (PRAVACHOL) 40 mg tablet Take 1 tablet (40 mg total) by mouth daily (Patient not taking: Reported on 2/19/2020) 30 tablet 0    pravastatin (PRAVACHOL) 80 mg tablet Take by mouth      predniSONE 10 mg tablet 5 po daily x2 days, then 4 po daily x2 days, then 3 po daily x2 days, then 2 po daily x2 days,then 1 po daily x2days (Patient not taking: Reported on 1/17/2020) 30 tablet 0     No current facility-administered medications on file prior to visit  Objective:    Review of Systems    Right Knee Exam     Muscle Strength   The patient has normal right knee strength      Range of Motion   Extension: 0   Flexion: 130     Tests   Mark:  Medial - negative Lateral - negative  Varus: positive Valgus: negative  Lachman:  Anterior - negative      Drawer:  Anterior - negative        Other   Sensation: normal  Pulse: present    Comments:  No erythema, no ecchymosis, no effusion  Positive patellar grind test   Tender to palpation medial, lateral facet, patellar tendon  Well tracking patella with crepitation         Left Knee Exam     Muscle Strength   The patient has normal left knee strength  Tenderness   The patient is experiencing no tenderness  Range of Motion   Extension: 0   Flexion: 130     Tests   Mark:  Medial - negative Lateral - negative  Varus: positive   Lachman:  Anterior - negative      Drawer:  Anterior - negative         Other   Erythema: absent  Scars: absent  Sensation: normal  Pulse: present  Swelling: none  Effusion: no effusion present    Comments: Well tracking patella with crepitation             Physical Exam   Musculoskeletal:        Left knee: She exhibits no effusion  Procedures  No Procedures performed today    I have personally reviewed pertinent films in PACS  XR bilateral knee's, left no significant degenerative changes, right moderate narrowing with degenerative changes, osteophytes, subchondral sclerosis, patella baja, lateral tilt  MRI left knee mild osteoarthritis, meniscus intact  Scribe Attestation    I,:   Ryan Prajapati am acting as a scribe while in the presence of the attending physician :        I,:   Fish Solitario DO personally performed the services described in this documentation    as scribed in my presence :              Portions of the record may have been created with voice recognition software   Occasional wrong word or "sound a like" substitutions may have occurred due to the inherent limitations of voice recognition software   Read the chart carefully and recognize, using context, where substitutions have occurred

## 2020-02-24 NOTE — PATIENT INSTRUCTIONS
PATELLOFEMORAL SYNDROME-Jacob TAPING TECHNIQUE    Search Leukotape P tape and Cover roll stretch tape on  [employer]  Leukotape P is typically 1 5in x 15 yards, Cover roll stretch tape is typically 2in x 10 yards        How to apply:  1  Place cover roll tape over knee cap  This protects the skin  2  Apply Leukotape over the cover roll tape  Use the Leukotape to pull the knee cap to the middle of the body (medial side of knee) to prevent lateral (outside) tracking of the knee cap  3  Wear the tape for 3 days (72 hrs) straight, then take off one day (24 hrs) off, then repeat  4  Visit youtube  com and search Jacob tape for the knee to watch a video on how to apply tape  a  Video titled Jacob Taping of the knee created by [employer] recommended  What does Jacob taping technique do?  ? Patellofemoral syndrome is when the inside quadriceps muscle, called the VMO muscle, becomes weak due to a number of factors  This causes the Patellofemoral ligament, which is the only ligament holding the patella (knee cap) in place, to become weak as well  When the ligament becomes weak, the knee cap drifts or tracks too far to the lateral side of the knee (outside knee) which causes tension on this ligament  The knee cap hits the lateral area of the femur, resulting in pain or discomfort around the front of the knee  ? Physical Therapy is sometimes used to strengthen the weak muscles, such as the VMO, to correct this problem  When the tape is applied correctly, it helps to realign the knee cap to the center of the knee  This helps correct for the lateral tracking of the knee cap and relieve discomfort  ? You can search online for exercises that can help strengthen the VMO quadriceps muscle or attend physical therapy

## 2020-02-25 ENCOUNTER — OFFICE VISIT (OUTPATIENT)
Dept: PHYSICAL THERAPY | Facility: REHABILITATION | Age: 60
End: 2020-02-25
Payer: COMMERCIAL

## 2020-02-25 ENCOUNTER — TELEPHONE (OUTPATIENT)
Dept: FAMILY MEDICINE CLINIC | Facility: CLINIC | Age: 60
End: 2020-02-25

## 2020-02-25 DIAGNOSIS — M17.0 PRIMARY OSTEOARTHRITIS OF BOTH KNEES: Primary | ICD-10-CM

## 2020-02-25 DIAGNOSIS — R29.898 LEFT LEG WEAKNESS: ICD-10-CM

## 2020-02-25 DIAGNOSIS — M25.562 CHRONIC PAIN OF BOTH KNEES: ICD-10-CM

## 2020-02-25 DIAGNOSIS — M17.0 LOCALIZED OSTEOARTHRITIS OF KNEES, BILATERAL: ICD-10-CM

## 2020-02-25 DIAGNOSIS — G89.29 CHRONIC PAIN OF BOTH KNEES: ICD-10-CM

## 2020-02-25 DIAGNOSIS — M25.561 CHRONIC PAIN OF BOTH KNEES: ICD-10-CM

## 2020-02-25 PROCEDURE — 97530 THERAPEUTIC ACTIVITIES: CPT | Performed by: PHYSICAL THERAPIST

## 2020-02-25 PROCEDURE — 97110 THERAPEUTIC EXERCISES: CPT | Performed by: PHYSICAL THERAPIST

## 2020-02-25 PROCEDURE — 97140 MANUAL THERAPY 1/> REGIONS: CPT | Performed by: PHYSICAL THERAPIST

## 2020-02-25 NOTE — PROGRESS NOTES
Daily Note     Today's date: 2020  Patient name: Danica Wood  : 1960  MRN: [id number]  Referring provider: Kentrell Swan DO  Dx:   Encounter Diagnosis     ICD-10-CM    1  Primary osteoarthritis of both knees M17 0    2  Chronic pain of both knees M25 561     M25 562     G89 29    3  Localized osteoarthritis of knees, bilateral M17 0    4  Left leg weakness R29 898                   Subjective: Patient's daughter present throughout session to assist in translation  Patient reports no new complaints  Patient clarified that she has decreased sensitivity to hot/cold on the right knee but is able to feel temperature normally on the left knee  Objective: See treatment diary below  Strength re-assessment:   Hip flexion 3-/5 L, 5/5 R  Knee flexion 4/5 L, 5/5 R  Knee extension 4/5 L, 5/5 R  DF 4/5 L, 5/5 R  Hip abd 3+/5 L, 5/5 R    Heel raises:   3 consecutive reps, full height  2 slow reps, half height, knee buckling     Clonus - b/l   Left knee hyperextension   Visible atrophy L>R leg       Assessment: Tolerated treatment fair  Patient would benefit from continued PT  Re-assessed overall BLE strength today with patient continuing to demonstrate greater left leg weakness compared to right  Educated patient and daughter on Oneil taping technique for bilateral knees, patient verbalized understanding  Modified hip strengthening therex to patient tolerance as patient demonstrates greater left leg fatigue with increased repetitions  Plan: Continue per plan of care        Precautions: DM, HTN, hyperlipidemia, bilateral knee OA       Manual              Oneil taping b/l   8'                                                                  Exercise Diary            Standing hip abd LLE x10 x10 b/l  nv          Standing hip ext LLE x10 x10 b/l           Supine Clamshell  OTB  3x10 S/L   2x10   Ea (OTB RLE)          Bridges  2x10 3x10          SLR  2x10 3x10 RLE unable to perform >5 LLE          Side-stepping             SLS             Bike warm up  3' no resistance np today          LAQ  2x10 home          Glute sets 4" box  2x10 nv          FTEO  :30x3 nv          Semi-tandem  :20x3 nv          Hip iso flexion   3"x10 LLE          STS   2x10          Anterior step ups   2x10 ea leg          Lateral step ups   x10 ea                           Modalities

## 2020-02-25 NOTE — TELEPHONE ENCOUNTER
This patient received her glucometer but did not get lancets and test strips  I see they were not ordered under the medications section of the chart  Please order test strips and lancets for the One touch verio  They should be sent to the pharmacy and should appear to you as a medication is being order and sent electronically to the pharmacy  When searching you will look under [facility] as one word and use the facility list then database tab  Pt can use the onetouch verio VI strp and the ultrasoft lancets     Thank you

## 2020-02-26 DIAGNOSIS — E11.9 TYPE 2 DIABETES MELLITUS WITHOUT COMPLICATION, WITHOUT LONG-TERM CURRENT USE OF INSULIN (HCC): Primary | ICD-10-CM

## 2020-02-26 RX ORDER — LANCETS
EACH MISCELLANEOUS
Qty: 100 EACH | Refills: 3 | Status: SHIPPED | OUTPATIENT
Start: 2020-02-26 | End: 2020-07-31 | Stop reason: SDUPTHER

## 2020-02-27 ENCOUNTER — OFFICE VISIT (OUTPATIENT)
Dept: PHYSICAL THERAPY | Facility: REHABILITATION | Age: 60
End: 2020-02-27
Payer: COMMERCIAL

## 2020-02-27 DIAGNOSIS — G89.29 CHRONIC PAIN OF BOTH KNEES: ICD-10-CM

## 2020-02-27 DIAGNOSIS — R29.898 LEFT LEG WEAKNESS: ICD-10-CM

## 2020-02-27 DIAGNOSIS — M17.0 PRIMARY OSTEOARTHRITIS OF BOTH KNEES: Primary | ICD-10-CM

## 2020-02-27 DIAGNOSIS — M25.561 CHRONIC PAIN OF BOTH KNEES: ICD-10-CM

## 2020-02-27 DIAGNOSIS — M17.0 LOCALIZED OSTEOARTHRITIS OF KNEES, BILATERAL: ICD-10-CM

## 2020-02-27 DIAGNOSIS — M25.562 CHRONIC PAIN OF BOTH KNEES: ICD-10-CM

## 2020-02-27 PROCEDURE — 97530 THERAPEUTIC ACTIVITIES: CPT | Performed by: PHYSICAL THERAPIST

## 2020-02-27 PROCEDURE — 97110 THERAPEUTIC EXERCISES: CPT | Performed by: PHYSICAL THERAPIST

## 2020-02-27 NOTE — PROGRESS NOTES
Daily Note     Today's date: 2020  Patient name: Drake Berg  : 1960  MRN: [id number]  Referring provider: Yamileth Edward DO  Dx:   Encounter Diagnosis     ICD-10-CM    1  Primary osteoarthritis of both knees M17 0    2  Chronic pain of both knees M25 561     M25 562     G89 29    3  Localized osteoarthritis of knees, bilateral M17 0    4  Left leg weakness R29 898                   Subjective: Patient with no new complaints  Patient has been applying Oneil tape at home  Objective: See treatment diary below      Assessment: Tolerated treatment fair  Progressed all exercise in terms of increased resistance today with patient demonstrating fatigue indicating appropriate dosage  Patient with significant bilateral knee valgus (R>L) with therapeutic activity  Will continue to benefit from maximal BLE strengthening for knee stabilization and decreased pain  Patient would benefit from continued PT  Plan: Continue per plan of care        Precautions: DM, HTN, hyperlipidemia, bilateral knee OA       Manual             Oneil taping b/l   8' np                                                                 Exercise Diary           Standing hip abd LLE x10 x10 b/l  nv np         Standing hip ext LLE x10 x10 b/l  np         Clamshell  OTB  3x10 S/L   2x10   Ea (OTB RLE) OTB   3x10  ea         Bridges  2x10 3x10   2x10 10#         SLR  2x10 3x10 RLE unable to perform >5 LLE 3x10 RLE 2#         Side-stepping             SLS             Bike warm up  3' no resistance np today 5' lvl 1         LAQ  2x10 home 3# RLE  1 5# LLE 3x10 ea         FTEO  :30x3 nv Foam :30x2         Semi-tandem  :20x3 nv :30x3         Hip iso flexion   3"x10 LLE 3x10 3" LLE         STS   2x10 3x10 add ball         Anterior step ups   2x10 ea leg 2x10   ea         Lateral step ups   x10 ea 2x10 ea         TKE    GTB   3x10 LLE             Modalities

## 2020-03-03 ENCOUNTER — OFFICE VISIT (OUTPATIENT)
Dept: PHYSICAL THERAPY | Facility: REHABILITATION | Age: 60
End: 2020-03-03
Payer: COMMERCIAL

## 2020-03-03 DIAGNOSIS — M17.0 LOCALIZED OSTEOARTHRITIS OF KNEES, BILATERAL: ICD-10-CM

## 2020-03-03 DIAGNOSIS — M25.562 CHRONIC PAIN OF BOTH KNEES: ICD-10-CM

## 2020-03-03 DIAGNOSIS — M25.561 CHRONIC PAIN OF BOTH KNEES: ICD-10-CM

## 2020-03-03 DIAGNOSIS — M17.0 PRIMARY OSTEOARTHRITIS OF BOTH KNEES: Primary | ICD-10-CM

## 2020-03-03 DIAGNOSIS — R29.898 LEFT LEG WEAKNESS: ICD-10-CM

## 2020-03-03 DIAGNOSIS — G89.29 CHRONIC PAIN OF BOTH KNEES: ICD-10-CM

## 2020-03-03 PROCEDURE — 97530 THERAPEUTIC ACTIVITIES: CPT | Performed by: PHYSICAL THERAPIST

## 2020-03-03 PROCEDURE — 97110 THERAPEUTIC EXERCISES: CPT | Performed by: PHYSICAL THERAPIST

## 2020-03-03 NOTE — PROGRESS NOTES
Daily Note     Today's date: 3/3/2020  Patient name: Ju James  : 1960  MRN: [id number]  Referring provider: Gideon Mejia DO  Dx:   Encounter Diagnosis     ICD-10-CM    1  Primary osteoarthritis of both knees M17 0    2  Chronic pain of both knees M25 561     M25 562     G89 29    3  Localized osteoarthritis of knees, bilateral M17 0    4  Left leg weakness R29 898                   Subjective: Patient's daughter present to assist in translation  Patient reports some right knee pain and minimal left knee pain  Patient reports overall improvement in left leg weakness  Objective: See treatment diary below      Assessment: Tolerated treatment well  Progressed standing balance activity today with patient demonstrating moderate postural sway and left knee hyperextension compensation  Patient demonstrates improved tolerance to all left leg strengthening today in terms of improved form and increased repetitions prior to fatigue  Patient would benefit from continued PT and progression of bilateral knee and hip strengthening to address remaining impairments  Plan: Continue per plan of care        Precautions: DM, HTN, hyperlipidemia, bilateral knee OA       Manual    2/25 2/27 3/3        Clarice taping b/l   8' np                                                                 Exercise Diary   3/3        Clamshell  OTB  3x10 S/L   2x10   Ea (OTB RLE) OTB   3x10  ea OTB  3x10 ea        Bridges  2x10 3x10   2x10 10# 3x10 10#        SLR  2x10 3x10 RLE unable to perform >5 LLE 3x10 RLE 2# 3x10 BLE        Side-stepping             SLS     :10x4 ea        Bike warm up  3' no resistance np today 5' lvl 1 5' lvl 1        LAQ  2x10 home 3# RLE  1 5# LLE 3x10 ea home        FTEO  :30x3 nv Foam :30x2 Foam :30x2        Semi-tandem  :20x3 nv :30x3 :30x4 foam        Hip iso flexion   3"x10 LLE 3x10 3" LLE np today        STS   2x10 3x10 add ball 3x10 10# add ball        Anterior step ups   2x10 ea leg 2x10   ea 3x10         Lateral step ups   x10 ea 2x10 ea 3x10 L         Standing hip abd     OTB 3x5 LLE        TKE    GTB   3x10 LLE GTB 3x10 BLE            Modalities

## 2020-03-04 NOTE — TELEPHONE ENCOUNTER
Patient called stating the lancets sent to the pharmacy do not fit her machine  Please send in new script  Thank you

## 2020-03-05 ENCOUNTER — OFFICE VISIT (OUTPATIENT)
Dept: PHYSICAL THERAPY | Facility: REHABILITATION | Age: 60
End: 2020-03-05
Payer: COMMERCIAL

## 2020-03-05 DIAGNOSIS — M25.561 CHRONIC PAIN OF BOTH KNEES: ICD-10-CM

## 2020-03-05 DIAGNOSIS — M17.0 LOCALIZED OSTEOARTHRITIS OF KNEES, BILATERAL: ICD-10-CM

## 2020-03-05 DIAGNOSIS — G89.29 CHRONIC PAIN OF BOTH KNEES: ICD-10-CM

## 2020-03-05 DIAGNOSIS — R29.898 LEFT LEG WEAKNESS: ICD-10-CM

## 2020-03-05 DIAGNOSIS — M17.0 PRIMARY OSTEOARTHRITIS OF BOTH KNEES: Primary | ICD-10-CM

## 2020-03-05 DIAGNOSIS — M25.562 CHRONIC PAIN OF BOTH KNEES: ICD-10-CM

## 2020-03-05 PROCEDURE — 97110 THERAPEUTIC EXERCISES: CPT | Performed by: PHYSICAL THERAPIST

## 2020-03-05 PROCEDURE — 97112 NEUROMUSCULAR REEDUCATION: CPT | Performed by: PHYSICAL THERAPIST

## 2020-03-05 PROCEDURE — 97530 THERAPEUTIC ACTIVITIES: CPT | Performed by: PHYSICAL THERAPIST

## 2020-03-05 NOTE — PROGRESS NOTES
Daily Note     Today's date: 3/5/2020  Patient name: Yamileth Leiws  : 1960  MRN: [id number]  Referring provider: Jacki Joy DO  Dx:   Encounter Diagnosis     ICD-10-CM    1  Primary osteoarthritis of both knees M17 0    2  Chronic pain of both knees M25 561     M25 562     G89 29    3  Localized osteoarthritis of knees, bilateral M17 0    4  Left leg weakness R29 898                   Subjective: Patient with no new complaints  Patient reports she does not have bilateral knee tape donned today  Objective: See treatment diary below      Assessment: Tolerated treatment well  Progressed therapeutic exercise today in terms of increased resistance and repetitions with patient tolerating well  Deferred standing hip abduction today as patient is unable to complete without lateral trunk lean and left knee buckling  Will continue to progress toward functional strengthening for improved bilateral knee stability  Patient would benefit from continued PT  Plan: Continue per plan of care        Precautions: DM, HTN, hyperlipidemia, bilateral knee OA       Manual    2/25 2/27 3/3 3/5       Clarice taping b/l   8' np                                                                 Exercise Diary  2/18 2/20 2/25 2/27 3/3 3/5       Clamshell  OTB  3x10 S/L   2x10   Ea (OTB RLE) OTB   3x10  ea OTB  3x10 ea GTB   3x12 ea       Bridges  2x10 3x10   2x10 10# 3x10 10# 3x12   10#       SLR  2x10 3x10 RLE unable to perform >5 LLE 3x10 RLE 2# 3x10 BLE 3x10 2# RLE  3x10 LLE bw       Side-stepping             SLS     :10x4 ea :10x4 ea       Bike warm up  3' no resistance np today 5' lvl 1 5' lvl 1 5' lvl 1       LAQ  2x10 home 3# RLE  1 5# LLE 3x10 ea home home       FTEO  :30x3 nv Foam :30x2 Foam :30x2 nv       Semi-tandem  :20x3 nv :30x3 :30x4 foam nv       STS   2x10 3x10 add ball 3x10 10# add ball 3x10 10# GTB        Anterior step ups   2x10 ea leg 2x10   ea 3x10  2x15 4" ea       Lateral step ups   x10 ea 2x10 ea 3x10 L  2x15 4" ea       Standing hip abd     OTB 3x5 LLE x10  deferred poor form       TKE    GTB   3x10 LLE GTB 3x10 BLE GTB 3X10 LLE            Modalities

## 2020-03-10 ENCOUNTER — OFFICE VISIT (OUTPATIENT)
Dept: PHYSICAL THERAPY | Facility: REHABILITATION | Age: 60
End: 2020-03-10
Payer: COMMERCIAL

## 2020-03-10 DIAGNOSIS — G89.29 CHRONIC PAIN OF BOTH KNEES: ICD-10-CM

## 2020-03-10 DIAGNOSIS — R29.898 LEFT LEG WEAKNESS: ICD-10-CM

## 2020-03-10 DIAGNOSIS — M25.561 CHRONIC PAIN OF BOTH KNEES: ICD-10-CM

## 2020-03-10 DIAGNOSIS — M25.562 CHRONIC PAIN OF BOTH KNEES: ICD-10-CM

## 2020-03-10 DIAGNOSIS — M17.0 LOCALIZED OSTEOARTHRITIS OF KNEES, BILATERAL: ICD-10-CM

## 2020-03-10 DIAGNOSIS — M17.0 PRIMARY OSTEOARTHRITIS OF BOTH KNEES: Primary | ICD-10-CM

## 2020-03-10 PROCEDURE — 97110 THERAPEUTIC EXERCISES: CPT | Performed by: PHYSICAL THERAPIST

## 2020-03-10 PROCEDURE — 97530 THERAPEUTIC ACTIVITIES: CPT | Performed by: PHYSICAL THERAPIST

## 2020-03-10 PROCEDURE — 97112 NEUROMUSCULAR REEDUCATION: CPT | Performed by: PHYSICAL THERAPIST

## 2020-03-10 NOTE — PROGRESS NOTES
Daily Note     Today's date: 3/10/2020  Patient name: Mitzy Knott  : 1960  MRN: [id number]  Referring provider: Tre Clancy DO  Dx:   Encounter Diagnosis     ICD-10-CM    1  Primary osteoarthritis of both knees M17 0    2  Chronic pain of both knees M25 561     M25 562     G89 29    3  Localized osteoarthritis of knees, bilateral M17 0    4  Left leg weakness R29 898                   Subjective: Patient with no new complaints  Patient states "the knees feel better and the left leg is stronger " Patient has been compliant to applying Oneil tape at home  Objective: See treatment diary below      Assessment: Tolerated treatment well  Progressed all therex in terms of increased repetitions with patient demonstrating increased fatigue post-tx  Continues to require minimal visual and tactile cues for correct form  Patient demonstrates improved form with functional strengthening and minimal episodes of left knee buckling  Patient demonstrated fatigue post treatment and would benefit from continued PT  Plan: Continue per plan of care        Precautions: DM, HTN, hyperlipidemia, bilateral knee OA       Manual    2/25 2/27 3/3 3/5 3/10      Oneil taping b/l   8' np                                                                 Exercise Diary  2/18 2/20 2/25 2/27 3/3 3/5 3/10      Clamshell  OTB  3x10 S/L   2x10   Ea (OTB RLE) OTB   3x10  ea OTB  3x10 ea GTB   3x12 ea GTB  3x10 ea      Bridges  2x10 3x10   2x10 10# 3x10 10# 3x12   10# 3x12 12#      SLR  2x10 3x10 RLE unable to perform >5 LLE 3x10 RLE 2# 3x10 BLE 3x10 2# RLE  3x10 LLE bw 3x12 2#R  3x12 L bw      Side-stepping             SLS     :10x4 ea :10x4 ea       Bike warm up  3' no resistance np today 5' lvl 1 5' lvl 1 5' lvl 1 5' lvl 1      LAQ  2x10 home 3# RLE  1 5# LLE 3x10 ea home home home      FTEO  :30x3 nv Foam :30x2 Foam :30x2 nv FTEC foam   :30x2      Semi-tandem  :20x3 nv :30x3 :30x4 foam nv :30x3 ea foam      STS 2x10 3x10 add ball 3x10 10# add ball 3x10 10# GTB  3x12 12# BTB      Anterior step ups   2x10 ea leg 2x10   ea 3x10  2x15 4" ea 2x15 4" ea      Lateral step ups   x10 ea 2x10 ea 3x10 L  2x15 4" ea 2x15 4" ea      TKE    GTB   3x10 LLE GTB 3x10 BLE GTB 3X10 LLE  np          Modalities

## 2020-03-11 ENCOUNTER — OFFICE VISIT (OUTPATIENT)
Dept: FAMILY MEDICINE CLINIC | Facility: CLINIC | Age: 60
End: 2020-03-11
Payer: COMMERCIAL

## 2020-03-11 VITALS
WEIGHT: 162.6 LBS | HEART RATE: 72 BPM | HEIGHT: 62 IN | OXYGEN SATURATION: 99 % | DIASTOLIC BLOOD PRESSURE: 68 MMHG | BODY MASS INDEX: 29.92 KG/M2 | TEMPERATURE: 97.7 F | RESPIRATION RATE: 18 BRPM | SYSTOLIC BLOOD PRESSURE: 110 MMHG

## 2020-03-11 DIAGNOSIS — K59.00 CONSTIPATION, UNSPECIFIED CONSTIPATION TYPE: Primary | ICD-10-CM

## 2020-03-11 DIAGNOSIS — E11.9 TYPE 2 DIABETES MELLITUS WITHOUT COMPLICATION, WITHOUT LONG-TERM CURRENT USE OF INSULIN (HCC): ICD-10-CM

## 2020-03-11 DIAGNOSIS — M25.562 CHRONIC PAIN OF BOTH KNEES: ICD-10-CM

## 2020-03-11 DIAGNOSIS — G89.29 CHRONIC PAIN OF BOTH KNEES: ICD-10-CM

## 2020-03-11 DIAGNOSIS — I10 ESSENTIAL HYPERTENSION: ICD-10-CM

## 2020-03-11 DIAGNOSIS — E78.5 HYPERLIPIDEMIA, UNSPECIFIED HYPERLIPIDEMIA TYPE: ICD-10-CM

## 2020-03-11 DIAGNOSIS — M25.561 CHRONIC PAIN OF BOTH KNEES: ICD-10-CM

## 2020-03-11 PROCEDURE — 99213 OFFICE O/P EST LOW 20 MIN: CPT | Performed by: FAMILY MEDICINE

## 2020-03-11 PROCEDURE — 3078F DIAST BP <80 MM HG: CPT | Performed by: FAMILY MEDICINE

## 2020-03-11 PROCEDURE — 3074F SYST BP LT 130 MM HG: CPT | Performed by: FAMILY MEDICINE

## 2020-03-11 PROCEDURE — 3008F BODY MASS INDEX DOCD: CPT | Performed by: FAMILY MEDICINE

## 2020-03-11 PROCEDURE — 3051F HG A1C>EQUAL 7.0%<8.0%: CPT | Performed by: FAMILY MEDICINE

## 2020-03-11 PROCEDURE — 3008F BODY MASS INDEX DOCD: CPT | Performed by: ORTHOPAEDIC SURGERY

## 2020-03-11 PROCEDURE — 4004F PT TOBACCO SCREEN RCVD TLK: CPT | Performed by: FAMILY MEDICINE

## 2020-03-11 RX ORDER — ATENOLOL AND CHLORTHALIDONE TABLET 50; 25 MG/1; MG/1
1 TABLET ORAL DAILY
Qty: 90 TABLET | Refills: 3 | Status: SHIPPED | OUTPATIENT
Start: 2020-03-11 | End: 2020-10-06 | Stop reason: SDUPTHER

## 2020-03-11 RX ORDER — PRAVASTATIN SODIUM 40 MG
40 TABLET ORAL DAILY
Qty: 90 TABLET | Refills: 3 | Status: SHIPPED | OUTPATIENT
Start: 2020-03-11 | End: 2020-10-06 | Stop reason: SDUPTHER

## 2020-03-11 NOTE — ASSESSMENT & PLAN NOTE
Patient states she has been eating a low fat diet   Her daughter has been doing all of the cooking at home and has been making healthier choices    - Refilled pravastatin 40mg 90 tablets 3 refills

## 2020-03-11 NOTE — ASSESSMENT & PLAN NOTE
Follows with orthopedics  Steroid injection given in right knee by ortho, also recommended left knee PT  May be candidate for right knee replacement    - Continue to follow with ortho  - voltaren gel for pain control with acetaminophen     - continue PT for left knee

## 2020-03-11 NOTE — ASSESSMENT & PLAN NOTE
Chronically constipated with worsening in the past 3 years  Colonoscopy negative and POCT ultrasounds have been negative in the ED     - Discussed increasing water intake  - Discussed using metamucil daily  - Discussed miralax, 2 cap fulls daily   Taper dosage if bowels become liquid

## 2020-03-11 NOTE — ASSESSMENT & PLAN NOTE
Lab Results   Component Value Date    HGBA1C 7 2 (A) 01/17/2020     Patients daughter has been cooking low fat, low carbohydrate meals for patient   She has been working on decreasing sugar intake     - Refill metformin 500mg   - Continue to monitor blood sugar at home with fingerstick

## 2020-03-11 NOTE — PROGRESS NOTES
Family Medicine Follow-Up Office Visit  Bo Cabrera 63 y o  female   MRN: [id number] : 1960  ENCOUNTER: 3/11/2020 3:55 PM    Assessment and Plan   Constipation  Chronically constipated with worsening in the past 3 years  Colonoscopy negative and POCT ultrasounds have been negative in the ED     - Discussed increasing water intake  - Discussed using metamucil daily  - Discussed miralax, 2 cap fulls daily  Taper dosage if bowels become liquid     Essential hypertension  BP at goal (110/68)    - Continue tenoretic 50-25      Bilateral knee pain  Follows with orthopedics  Steroid injection given in right knee by ortho, also recommended left knee PT  May be candidate for right knee replacement    - Continue to follow with ortho  - voltaren gel for pain control with acetaminophen  - continue PT for left knee    Hyperlipidemia  Patient states she has been eating a low fat diet  Her daughter has been doing all of the cooking at home and has been making healthier choices    - Refilled pravastatin 40mg 90 tablets 3 refills    Type 2 diabetes mellitus without complication, without long-term current use of insulin ([facility])    Lab Results   Component Value Date    HGBA1C 7 2 (A) 2020     Patients daughter has been cooking low fat, low carbohydrate meals for patient  She has been working on decreasing sugar intake     - Refill metformin 500mg   - Continue to monitor blood sugar at home with fingerstick      Chief Complaint     Chief Complaint   Patient presents with    Follow-up    Medication Refill     needs refill on one touch verio lancets       History of Present Illness   Bo Cabrera is a 63y o -year-old female who presents today to discuss abdominal pain she has had for the past 3 years  States the pain moves around her abdomen  She has been seen in the ED and by gastroenterology who conducted a colonoscopy which was unremarkable   She has been chronically constipated all her life but worse in the last 3 months  She does have some relief with defecation and passing flatus  Review of Systems   Review of Systems   Constitutional: Negative for activity change, appetite change, chills, fatigue and fever  HENT: Negative for congestion, rhinorrhea, sneezing and sore throat  Eyes: Negative for pain, discharge, redness and itching  Respiratory: Negative for cough, chest tightness, shortness of breath and wheezing  Cardiovascular: Negative for chest pain and palpitations  Gastrointestinal: Positive for abdominal pain and constipation  Negative for abdominal distention, diarrhea, nausea and vomiting  Musculoskeletal: Negative for arthralgias, back pain, joint swelling and myalgias  Skin: Negative for rash  Neurological: Negative for dizziness, weakness, numbness and headaches  Hematological: Negative for adenopathy  Psychiatric/Behavioral: Negative for confusion  Active Problem List     Patient Active Problem List   Diagnosis    Bilateral knee pain    Localized osteoarthritis of knees, bilateral    Degenerative arthritis of knee, bilateral    Type 2 diabetes mellitus without complication, without long-term current use of insulin (HCC)    Essential hypertension    Hyperlipidemia    Carpal tunnel syndrome on both sides    Right arm pain    Encounter for screening mammogram for breast cancer    Constipation       Past Medical History, Past Surgical History, Family History, and Social History were reviewed and updated today as appropriate  Objective   /68 (BP Location: Right arm, Patient Position: Sitting, Cuff Size: Adult)   Pulse 72   Temp 97 7 °F (36 5 °C) (Tympanic)   Resp 18   Ht 5' 2" (1 575 m)   Wt 73 8 kg (162 lb 9 6 oz)   SpO2 99%   BMI 29 74 kg/m²     Physical Exam   Constitutional: She appears well-developed and well-nourished  No distress  HENT:   Head: Normocephalic and atraumatic     Nose: Nose normal    Mouth/Throat: Oropharynx is clear and moist  No oropharyngeal exudate  Eyes: Conjunctivae are normal  Right eye exhibits no discharge  Left eye exhibits no discharge  No scleral icterus  Cardiovascular: Normal rate, regular rhythm and normal heart sounds  No murmur heard  Pulmonary/Chest: Effort normal and breath sounds normal  No respiratory distress  She has no wheezes  Abdominal: Soft  She exhibits no distension  There is tenderness (left upper quadrant)  Musculoskeletal: Normal range of motion  She exhibits no edema or tenderness  Lymphadenopathy:     She has no cervical adenopathy  Neurological: She is alert  Skin: Skin is warm and dry  She is not diaphoretic  No erythema  No pallor  Psychiatric: Her behavior is normal      Patient's shoes and socks removed  Assign Risk Category:  ; ;        Risk: 0      Pertinent Laboratory/Diagnostic Studies:  Lab Results   Component Value Date    BUN 20 02/04/2020    CREATININE 0 85 02/04/2020    CALCIUM 10 0 02/04/2020    K 3 8 02/04/2020    CO2 32 02/04/2020     02/04/2020     Lab Results   Component Value Date    ALT 28 02/04/2020    AST 14 02/04/2020    ALKPHOS 68 02/04/2020       Lab Results   Component Value Date    WBC 4 29 (L) 02/04/2020    HGB 14 7 02/04/2020    HCT 44 9 02/04/2020    MCV 96 02/04/2020     02/04/2020       No results found for: TSH    No results found for: CHOL  Lab Results   Component Value Date    TRIG 147 02/04/2020     Lab Results   Component Value Date    HDL 36 (L) 02/04/2020     Lab Results   Component Value Date    LDLCALC 71 02/04/2020     Lab Results   Component Value Date    HGBA1C 7 2 (A) 01/17/2020       Results for orders placed or performed in visit on 02/04/20   Comprehensive metabolic panel   Result Value Ref Range    Sodium 139 136 - 145 mmol/L    Potassium 3 8 3 5 - 5 3 mmol/L    Chloride 105 100 - 108 mmol/L    CO2 32 21 - 32 mmol/L    ANION GAP 2 (L) 4 - 13 mmol/L    BUN 20 5 - 25 mg/dL    Creatinine 0 85 0 60 - 1 30 mg/dL Glucose, Fasting 163 (H) 65 - 99 mg/dL    Calcium 10 0 8 3 - 10 1 mg/dL    AST 14 5 - 45 U/L    ALT 28 12 - 78 U/L    Alkaline Phosphatase 68 46 - 116 U/L    Total Protein 7 6 6 4 - 8 2 g/dL    Albumin 4 1 3 5 - 5 0 g/dL    Total Bilirubin 0 42 0 20 - 1 00 mg/dL    eGFR 87 ml/min/1 73sq m   Lipid panel   Result Value Ref Range    Cholesterol 136 50 - 200 mg/dL    Triglycerides 147 <=150 mg/dL    HDL, Direct 36 (L) >=40 mg/dL    LDL Calculated 71 0 - 100 mg/dL    Non-HDL-Chol (CHOL-HDL) 100 mg/dl   CBC and differential   Result Value Ref Range    WBC 4 29 (L) 4 31 - 10 16 Thousand/uL    RBC 4 69 3 81 - 5 12 Million/uL    Hemoglobin 14 7 11 5 - 15 4 g/dL    Hematocrit 44 9 34 8 - 46 1 %    MCV 96 82 - 98 fL    MCH 31 3 26 8 - 34 3 pg    MCHC 32 7 31 4 - 37 4 g/dL    RDW 12 6 11 6 - 15 1 %    MPV 9 6 8 9 - 12 7 fL    Platelets 684 790 - 073 Thousands/uL    nRBC 0 /100 WBCs    Neutrophils Relative 33 (L) 43 - 75 %    Immat GRANS % 0 0 - 2 %    Lymphocytes Relative 58 (H) 14 - 44 %    Monocytes Relative 6 4 - 12 %    Eosinophils Relative 2 0 - 6 %    Basophils Relative 1 0 - 1 %    Neutrophils Absolute 1 42 (L) 1 85 - 7 62 Thousands/µL    Immature Grans Absolute 0 01 0 00 - 0 20 Thousand/uL    Lymphocytes Absolute 2 48 0 60 - 4 47 Thousands/µL    Monocytes Absolute 0 27 0 17 - 1 22 Thousand/µL    Eosinophils Absolute 0 08 0 00 - 0 61 Thousand/µL    Basophils Absolute 0 03 0 00 - 0 10 Thousands/µL   Magnesium   Result Value Ref Range    Magnesium 2 3 1 6 - 2 6 mg/dL       No orders of the defined types were placed in this encounter          Current Medications     Current Outpatient Medications   Medication Sig Dispense Refill    glucose blood (OneTouch Verio) test strip Use as instructed 50 each 3    pravastatin (PRAVACHOL) 40 mg tablet Take 1 tablet (40 mg total) by mouth daily 90 tablet 3    acetaminophen (TYLENOL) 500 mg tablet Three times a day with meals and at bedtime (Patient not taking: Reported on 2/19/2020) 120 tablet 0    atenolol-chlorthalidone (TENORETIC) 50-25 mg per tablet Take 1 tablet by mouth daily 90 tablet 3    diclofenac sodium (VOLTAREN) 1 % Apply 2 g topically 2 (two) times a day as needed (As needed) (Patient not taking: Reported on 2/19/2020) 1 Tube 0    Lancets (ONETOUCH ULTRASOFT) lancets Use as instructed (Patient not taking: Reported on 3/11/2020) 100 each 3    metFORMIN (GLUCOPHAGE) 500 mg tablet Take 1 tablet (500 mg total) by mouth daily with breakfast 90 tablet 0    naproxen (NAPROSYN) 375 mg tablet Take 250 mg by mouth daily      predniSONE 10 mg tablet 5 po daily x2 days, then 4 po daily x2 days, then 3 po daily x2 days, then 2 po daily x2 days,then 1 po daily x2days (Patient not taking: Reported on 1/17/2020) 30 tablet 0     No current facility-administered medications for this visit          ALLERGIES:  No Known Allergies    Health Maintenance     Health Maintenance   Topic Date Due    Hepatitis C Screening  1960    CRC Screening: Colonoscopy  1960    Pneumococcal Vaccine: Pediatrics (0 to 5 Years) and At-Risk Patients (6 to 59 Years) (1 of 1 - PPSV23) 03/27/1967    Diabetic Foot Exam  03/27/1971    DM Eye Exam  03/27/1971    DTaP,Tdap,and Td Vaccines (1 - Tdap) 03/27/1972    HIV Screening  03/27/1976    BMI: Followup Plan  03/27/1979    Annual Physical  03/27/1979    Cervical Cancer Screening  03/27/1982    MAMMOGRAM  09/11/2019    PT PLAN OF CARE  03/19/2020    Influenza Vaccine  01/17/2021 (Originally 7/1/2019)    HEMOGLOBIN A1C  07/17/2020    Depression Screening PHQ  01/17/2021    URINE MICROALBUMIN  02/04/2021    BMI: Adult  02/24/2021    Pneumococcal Vaccine: 65+ Years (1 of 2 - PCV13) 03/27/2026    HIB Vaccine  Aged Out    Hepatitis B Vaccine  Aged Out    IPV Vaccine  Aged Out    Hepatitis A Vaccine  Aged Out    Meningococcal ACWY Vaccine  Aged Out    HPV Vaccine  Aged Out       There is no immunization history on file for this patient  Ganesh Roper MD   [address]  3/11/2020  3:55 PM    Parts of this note were dictated using M*Modal dictation software and may have sounds-like errors due to variation in pronunciation

## 2020-03-12 ENCOUNTER — OFFICE VISIT (OUTPATIENT)
Dept: PHYSICAL THERAPY | Facility: REHABILITATION | Age: 60
End: 2020-03-12
Payer: COMMERCIAL

## 2020-03-12 DIAGNOSIS — M25.561 CHRONIC PAIN OF BOTH KNEES: ICD-10-CM

## 2020-03-12 DIAGNOSIS — G89.29 CHRONIC PAIN OF BOTH KNEES: ICD-10-CM

## 2020-03-12 DIAGNOSIS — M17.0 PRIMARY OSTEOARTHRITIS OF BOTH KNEES: Primary | ICD-10-CM

## 2020-03-12 DIAGNOSIS — M17.0 LOCALIZED OSTEOARTHRITIS OF KNEES, BILATERAL: ICD-10-CM

## 2020-03-12 DIAGNOSIS — M25.562 CHRONIC PAIN OF BOTH KNEES: ICD-10-CM

## 2020-03-12 DIAGNOSIS — R29.898 LEFT LEG WEAKNESS: ICD-10-CM

## 2020-03-12 PROCEDURE — 97110 THERAPEUTIC EXERCISES: CPT | Performed by: PHYSICAL THERAPIST

## 2020-03-12 PROCEDURE — 97112 NEUROMUSCULAR REEDUCATION: CPT | Performed by: PHYSICAL THERAPIST

## 2020-03-12 NOTE — PROGRESS NOTES
Daily Note     Today's date: 3/12/2020  Patient name: Greg Weaver  : 1960  MRN: [id number]  Referring provider: John Hudson DO  Dx:   Encounter Diagnosis     ICD-10-CM    1  Primary osteoarthritis of both knees M17 0    2  Chronic pain of both knees M25 561     M25 562     G89 29    3  Localized osteoarthritis of knees, bilateral M17 0    4  Left leg weakness R29 898                   Subjective: Patient states current pain in right knee today but left knee feels ok  Objective: See treatment diary below      Assessment: Tolerated treatment well  Patient with improved tolerance to all therapeutic exercise today demonstrating improved carryover of form with minimal cues  Provided patient with theraband cue during step ups to decreased left knee dynamic valgus with positive response  Patient states overall improved knee pain at end of session and demonstrates moderate muscle fatigue indicating appropriate dosage  Patient demonstrated fatigue post treatment and would benefit from continued PT  Plan: Continue per plan of care        Precautions: DM, HTN, hyperlipidemia, bilateral knee OA       Manual    2/25 2/27 3/3 3/5 3/10 3/12     Clarice taping b/l   8' np                                                                 Exercise Diary  2/18 2/20 2/25 2/27 3/3 3/5 3/10 3/12     Clamshell  OTB  3x10 S/L   2x10   Ea (OTB RLE) OTB   3x10  ea OTB  3x10 ea GTB   3x12 ea GTB  3x10 ea  ea     Bridges  2x10 3x10   2x10 10# 3x10 10# 3x12   10# 3x12 12# 3x12 12#     SLR  2x10 3x10 RLE unable to perform >5 LLE 3x10 RLE 2# 3x10 BLE 3x10 2# RLE  3x10 LLE bw 3x12 2#R  3x12 L bw 3x12 2#R  3x12 L bw     Side-stepping             SLS     :10x4 ea :10x4 ea  :15x4 ea cues to engaged quad     Bike warm up  3' no resistance np today 5' lvl 1 5' lvl 1 5' lvl 1 5' lvl 1 5' lvl 1     LAQ  2x10 home 3# RLE  1 5# LLE 3x10 ea home home home      FTEO  :30x3 nv Foam :30x2 Foam :30x2 nv FTEC foam   :30x2 FTEC foam   :30x4     Semi-tandem  :20x3 nv :30x3 :30x4 foam nv :30x3 ea foam :30x4 ea foam     STS   2x10 3x10 add ball 3x10 10# add ball 3x10 10# GTB  3x12 12# BTB 3x12 12# BTB     Anterior step ups   2x10 ea leg 2x10   ea 3x10  2x15 4" ea 2x15 4" ea 2x15 4" ea     Lateral step ups   x10 ea 2x10 ea 3x10 L  2x15 4" ea 2x15 4" ea 2x15 4" ea     TKE    GTB   3x10 LLE GTB 3x10 BLE GTB 3X10 LLE  np np     Heel raises        3x12         Modalities

## 2020-03-17 ENCOUNTER — APPOINTMENT (OUTPATIENT)
Dept: PHYSICAL THERAPY | Facility: REHABILITATION | Age: 60
End: 2020-03-17
Payer: COMMERCIAL

## 2020-03-19 ENCOUNTER — APPOINTMENT (OUTPATIENT)
Dept: PHYSICAL THERAPY | Facility: REHABILITATION | Age: 60
End: 2020-03-19
Payer: COMMERCIAL

## 2020-03-24 ENCOUNTER — APPOINTMENT (OUTPATIENT)
Dept: PHYSICAL THERAPY | Facility: REHABILITATION | Age: 60
End: 2020-03-24
Payer: COMMERCIAL

## 2020-03-26 ENCOUNTER — APPOINTMENT (OUTPATIENT)
Dept: PHYSICAL THERAPY | Facility: REHABILITATION | Age: 60
End: 2020-03-26
Payer: COMMERCIAL

## 2020-03-31 ENCOUNTER — APPOINTMENT (OUTPATIENT)
Dept: PHYSICAL THERAPY | Facility: REHABILITATION | Age: 60
End: 2020-03-31
Payer: COMMERCIAL

## 2020-04-02 ENCOUNTER — TELEPHONE (OUTPATIENT)
Dept: OBGYN CLINIC | Facility: HOSPITAL | Age: 60
End: 2020-04-02

## 2020-04-06 ENCOUNTER — TELEMEDICINE (OUTPATIENT)
Dept: OBGYN CLINIC | Facility: CLINIC | Age: 60
End: 2020-04-06
Payer: COMMERCIAL

## 2020-04-06 DIAGNOSIS — M22.2X1 PATELLOFEMORAL SYNDROME, BILATERAL: Primary | ICD-10-CM

## 2020-04-06 DIAGNOSIS — M22.2X2 PATELLOFEMORAL SYNDROME, BILATERAL: Primary | ICD-10-CM

## 2020-04-06 DIAGNOSIS — M17.0 PRIMARY OSTEOARTHRITIS OF BOTH KNEES: ICD-10-CM

## 2020-04-06 PROCEDURE — 99213 OFFICE O/P EST LOW 20 MIN: CPT | Performed by: ORTHOPAEDIC SURGERY

## 2020-04-17 DIAGNOSIS — M17.10 ARTHRITIS OF KNEE: ICD-10-CM

## 2020-04-21 RX ORDER — ACETAMINOPHEN 500 MG
TABLET ORAL
Qty: 120 TABLET | Refills: 0 | Status: SHIPPED | OUTPATIENT
Start: 2020-04-21 | End: 2020-10-06 | Stop reason: SDUPTHER

## 2020-04-30 ENCOUNTER — TELEPHONE (OUTPATIENT)
Dept: OBGYN CLINIC | Facility: HOSPITAL | Age: 60
End: 2020-04-30

## 2020-06-04 NOTE — PROGRESS NOTES
PT Discharge    Today's date: 2020  Patient name: Nathanael Noe  : 1960  MRN: [id number]  Referring provider: Thelma Parham DO  Dx:   Encounter Diagnosis     ICD-10-CM    1  Primary osteoarthritis of both knees M17 0    2  Chronic pain of both knees M25 561     M25 562     G89 29    3  Localized osteoarthritis of knees, bilateral M17 0    4  Left leg weakness R29 898        Start Time: 0405  Stop Time: 3428  Total time in clinic (min): 40 minutes    Assessment  Assessment details: Nathanael Noe seen for OP PT for bilateral knee pain for 8 visits with most recent visit on 3/12/2020  Nathanael Noe has not returned since last appointment due to [phone], unable to perform objective measures since then  It is assumed they have returned to prior level of function and do not desire additional physical therapy  At this time, Nathanael Noe will be discharged from physical therapy services  Patient given HEP throughout OP PT and is encouraged to contact PT with any questions or concerns in the future                 Subjective    Objective

## 2020-07-15 ENCOUNTER — PATIENT OUTREACH (OUTPATIENT)
Dept: FAMILY MEDICINE CLINIC | Facility: CLINIC | Age: 60
End: 2020-07-15

## 2020-07-16 ENCOUNTER — TELEPHONE (OUTPATIENT)
Dept: OBGYN CLINIC | Facility: CLINIC | Age: 60
End: 2020-07-16

## 2020-07-16 DIAGNOSIS — M17.10 ARTHRITIS OF KNEE: ICD-10-CM

## 2020-07-16 RX ORDER — ACETAMINOPHEN 500 MG
TABLET ORAL
Qty: 120 TABLET | Refills: 0 | Status: CANCELLED | OUTPATIENT
Start: 2020-07-16

## 2020-07-16 NOTE — TELEPHONE ENCOUNTER
Called patient to set up apt for visco injections and patient declined  She decided that she didn't want them anymore  She stated that she had injections before and they had no affect  I did inform her that if she changed her mind she can call us and set up the apt      [facility]

## 2020-07-20 DIAGNOSIS — E11.9 TYPE 2 DIABETES MELLITUS WITHOUT COMPLICATION, WITHOUT LONG-TERM CURRENT USE OF INSULIN (HCC): ICD-10-CM

## 2020-07-20 NOTE — TELEPHONE ENCOUNTER
Patients daughter called requesting refill on OneTouch Verio lancets and test strips  Please advise  Thank you

## 2020-07-31 RX ORDER — LANCETS
EACH MISCELLANEOUS
Qty: 100 EACH | Refills: 3 | Status: SHIPPED | OUTPATIENT
Start: 2020-07-31 | End: 2020-10-06 | Stop reason: SDUPTHER

## 2020-08-06 DIAGNOSIS — E11.9 TYPE 2 DIABETES MELLITUS WITHOUT COMPLICATION, WITHOUT LONG-TERM CURRENT USE OF INSULIN (HCC): ICD-10-CM

## 2020-09-21 ENCOUNTER — OFFICE VISIT (OUTPATIENT)
Dept: OBGYN CLINIC | Facility: CLINIC | Age: 60
End: 2020-09-21
Payer: COMMERCIAL

## 2020-09-21 ENCOUNTER — TELEPHONE (OUTPATIENT)
Dept: OBGYN CLINIC | Facility: HOSPITAL | Age: 60
End: 2020-09-21

## 2020-09-21 VITALS
HEART RATE: 61 BPM | BODY MASS INDEX: 29.81 KG/M2 | HEIGHT: 62 IN | WEIGHT: 162 LBS | DIASTOLIC BLOOD PRESSURE: 70 MMHG | SYSTOLIC BLOOD PRESSURE: 104 MMHG

## 2020-09-21 DIAGNOSIS — M17.0 PRIMARY OSTEOARTHRITIS OF BOTH KNEES: Primary | ICD-10-CM

## 2020-09-21 PROCEDURE — 99212 OFFICE O/P EST SF 10 MIN: CPT | Performed by: ORTHOPAEDIC SURGERY

## 2020-09-21 NOTE — TELEPHONE ENCOUNTER
Rogena Morning    [phone]    Dr Cecilio Juarez    Patient is calling   She has questions in regards to gel injections and medication that were prescribed for her knee pain

## 2020-09-21 NOTE — PROGRESS NOTES
Assessment:  1  Primary osteoarthritis of both knees       Patient Active Problem List   Diagnosis    Bilateral knee pain    Localized osteoarthritis of knees, bilateral    Degenerative arthritis of knee, bilateral    Type 2 diabetes mellitus without complication, without long-term current use of insulin ([facility] [address] )    Essential hypertension    Hyperlipidemia    Carpal tunnel syndrome on both sides    Right arm pain    Encounter for screening mammogram for breast cancer    Constipation           Plan      · The patient is instructed to attain records from previous physician who supplied past visco-supplement injection  · Patient to stop using tobacco products prior to consideration of surgery  · Patient to discuss with PCP  · Right knee lateral  brace provided   · Follow up once past records are attained            Subjective:     Patient ID:    Chief Complaint:Roxana Edwards 62 y o  female      HPI    Patient presents for follow up of bilateral knees  Today she complains of constant right > left generalized knee pain  She rates her pain at 7/10  Weight bearing aggravates while rest alleviates  She does use ibuprofen with some benefit  She has had steroid injections 2/2020 with no benefit  She has had visco-supplement injections with prior overseeing physician in [address] [place] [address]  She has participated in physical therapy  She does use McConnel tape with benefit  She denies past knee surgery  She does use tobacco products  The following portions of the patient's history were reviewed and updated as appropriate: allergies, current medications, past family history, past social history, past surgical history and problem list         Social History     Socioeconomic History    Marital status:       Spouse name: Not on file    Number of children: Not on file    Years of education: Not on file    Highest education level: Not on file   Occupational History    Occupation:      Employer: [employer]   Social Needs    Financial resource strain: Not hard at all   Gary-Demetrius insecurity     Worry: Never true     Inability: Never true   MetaCDN needs     Medical: No     Non-medical: No   Tobacco Use    Smoking status: Current Every Day Smoker     Packs/day: 0 50     Years: 45 00     Pack years: 22 50     Types: Cigarettes    Smokeless tobacco: Never Used   Substance and Sexual Activity    Alcohol use: Yes     Frequency: Monthly or less     Drinks per session: 1 or 2     Binge frequency: Never    Drug use: No    Sexual activity: Not on file   Lifestyle    Physical activity     Days per week: 0 days     Minutes per session: 0 min    Stress: Not on file   Relationships    Social connections     Talks on phone: More than three times a week     Gets together: More than three times a week     Attends Bahai service: Never     Active member of club or organization: No     Attends meetings of clubs or organizations: Never     Relationship status:     Intimate partner violence     Fear of current or ex partner: No     Emotionally abused: No     Physically abused: No     Forced sexual activity: No   Other Topics Concern    Not on file   Social History Narrative    ** Merged History Encounter **          Past Medical History:   Diagnosis Date    Diabetes mellitus ([facility] [address] )     Hyperlipidemia     Hypertension      No past surgical history on file    No Known Allergies  Current Outpatient Medications on File Prior to Visit   Medication Sig Dispense Refill    acetaminophen (TYLENOL) 500 mg tablet Three times a day with meals and at bedtime 120 tablet 0    atenolol-chlorthalidone (TENORETIC) 50-25 mg per tablet Take 1 tablet by mouth daily 90 tablet 3    diclofenac sodium (VOLTAREN) 1 % Apply 2 g topically 2 (two) times a day as needed (As needed) (Patient not taking: Reported on 2/19/2020) 1 Tube 0    glucose blood (OneTouch Verio) test strip Use as instructed 50 each 0    Lancets (ONETOUCH ULTRASOFT) lancets Use as instructed 100 each 3    metFORMIN (GLUCOPHAGE) 500 mg tablet TAKE 1 TABLET BY MOUTH DAILY WITH BREAKFAST 90 tablet 0    naproxen (NAPROSYN) 375 mg tablet Take 250 mg by mouth daily      pravastatin (PRAVACHOL) 40 mg tablet Take 1 tablet (40 mg total) by mouth daily 90 tablet 3    predniSONE 10 mg tablet 5 po daily x2 days, then 4 po daily x2 days, then 3 po daily x2 days, then 2 po daily x2 days,then 1 po daily x2days (Patient not taking: Reported on 1/17/2020) 30 tablet 0     No current facility-administered medications on file prior to visit  Objective:    Review of Systems   Constitutional: Negative for chills, fever and unexpected weight change  HENT: Negative for hearing loss, nosebleeds and sore throat  Eyes: Negative for pain, redness and visual disturbance  Respiratory: Negative for cough, shortness of breath and wheezing  Cardiovascular: Negative for chest pain, palpitations and leg swelling  Gastrointestinal: Negative for abdominal pain, nausea and vomiting  Genitourinary: Negative for dyspareunia, dysuria and frequency  Skin: Negative for rash and wound  Neurological: Negative for dizziness, numbness and headaches  Psychiatric/Behavioral: Negative for decreased concentration and suicidal ideas  The patient is not nervous/anxious  Right Knee Exam     Tenderness   The patient is experiencing tenderness in the lateral joint line  Range of Motion   The patient has normal right knee ROM  Other   Erythema: absent  Scars: absent  Sensation: normal  Swelling: none  Effusion: no effusion present            Physical Exam  Constitutional:       Appearance: She is well-developed  HENT:      Head: Normocephalic  Eyes:      Conjunctiva/sclera: Conjunctivae normal    Neck:      Musculoskeletal: Normal range of motion  Cardiovascular:      Rate and Rhythm: Normal rate     Pulmonary:      Effort: Pulmonary effort is normal    Musculoskeletal:      Right knee: She exhibits no effusion  Skin:     General: Skin is warm and dry  Neurological:      Mental Status: She is alert and oriented to person, place, and time  Procedures    None performed today    I have personally reviewed pertinent films in PACS  Scribe Attestation    I,:   Kendal Santamaria am acting as a scribe while in the presence of the attending physician :        I,:   Chio Matta, DO personally performed the services described in this documentation    as scribed in my presence :              Portions of the record may have been created with voice recognition software   Occasional wrong word or "sound a like" substitutions may have occurred due to the inherent limitations of voice recognition software   Read the chart carefully and recognize, using context, where substitutions have occurred

## 2020-09-21 NOTE — TELEPHONE ENCOUNTER
Spoke to patient via  services  Patient repeatedly stated she did not know why our office is reaching out to her because she did not call with questions  Advised her to call back if she does have questions  Verbalized understanding

## 2020-10-06 ENCOUNTER — OFFICE VISIT (OUTPATIENT)
Dept: FAMILY MEDICINE CLINIC | Facility: CLINIC | Age: 60
End: 2020-10-06
Payer: COMMERCIAL

## 2020-10-06 VITALS
SYSTOLIC BLOOD PRESSURE: 110 MMHG | TEMPERATURE: 97.3 F | DIASTOLIC BLOOD PRESSURE: 80 MMHG | BODY MASS INDEX: 30.62 KG/M2 | WEIGHT: 166.4 LBS | HEIGHT: 62 IN | OXYGEN SATURATION: 98 % | RESPIRATION RATE: 18 BRPM | HEART RATE: 77 BPM

## 2020-10-06 DIAGNOSIS — M17.10 ARTHRITIS OF KNEE: ICD-10-CM

## 2020-10-06 DIAGNOSIS — R00.2 PALPITATIONS: ICD-10-CM

## 2020-10-06 DIAGNOSIS — E11.9 TYPE 2 DIABETES MELLITUS WITHOUT COMPLICATION, WITHOUT LONG-TERM CURRENT USE OF INSULIN (HCC): Primary | ICD-10-CM

## 2020-10-06 DIAGNOSIS — Z72.0 TOBACCO ABUSE: ICD-10-CM

## 2020-10-06 DIAGNOSIS — I10 ESSENTIAL HYPERTENSION: ICD-10-CM

## 2020-10-06 DIAGNOSIS — E78.5 HYPERLIPIDEMIA, UNSPECIFIED HYPERLIPIDEMIA TYPE: ICD-10-CM

## 2020-10-06 LAB — SL AMB POCT HEMOGLOBIN AIC: 7.1 (ref ?–6.5)

## 2020-10-06 PROCEDURE — 99214 OFFICE O/P EST MOD 30 MIN: CPT | Performed by: FAMILY MEDICINE

## 2020-10-06 PROCEDURE — 3051F HG A1C>EQUAL 7.0%<8.0%: CPT | Performed by: FAMILY MEDICINE

## 2020-10-06 PROCEDURE — 36415 COLL VENOUS BLD VENIPUNCTURE: CPT | Performed by: FAMILY MEDICINE

## 2020-10-06 PROCEDURE — 93000 ELECTROCARDIOGRAM COMPLETE: CPT | Performed by: FAMILY MEDICINE

## 2020-10-06 PROCEDURE — 3725F SCREEN DEPRESSION PERFORMED: CPT | Performed by: FAMILY MEDICINE

## 2020-10-06 PROCEDURE — 84443 ASSAY THYROID STIM HORMONE: CPT | Performed by: FAMILY MEDICINE

## 2020-10-06 PROCEDURE — 83036 HEMOGLOBIN GLYCOSYLATED A1C: CPT | Performed by: FAMILY MEDICINE

## 2020-10-06 RX ORDER — ATENOLOL AND CHLORTHALIDONE TABLET 50; 25 MG/1; MG/1
1 TABLET ORAL DAILY
Qty: 90 TABLET | Refills: 3 | Status: SHIPPED | OUTPATIENT
Start: 2020-10-06 | End: 2021-01-26 | Stop reason: SDUPTHER

## 2020-10-06 RX ORDER — NICOTINE 21 MG/24HR
1 PATCH, TRANSDERMAL 24 HOURS TRANSDERMAL EVERY 24 HOURS
Qty: 28 PATCH | Refills: 0 | Status: SHIPPED | OUTPATIENT
Start: 2020-10-06 | End: 2021-03-15

## 2020-10-06 RX ORDER — PRAVASTATIN SODIUM 40 MG
40 TABLET ORAL DAILY
Qty: 90 TABLET | Refills: 3 | Status: SHIPPED | OUTPATIENT
Start: 2020-10-06 | End: 2021-01-26 | Stop reason: SDUPTHER

## 2020-10-06 RX ORDER — BLOOD SUGAR DIAGNOSTIC
STRIP MISCELLANEOUS
Qty: 50 EACH | Refills: 0 | Status: SHIPPED | OUTPATIENT
Start: 2020-10-06 | End: 2020-12-08

## 2020-10-06 RX ORDER — LANCETS
EACH MISCELLANEOUS
Qty: 100 EACH | Refills: 3 | Status: SHIPPED | OUTPATIENT
Start: 2020-10-06 | End: 2021-01-26 | Stop reason: SDUPTHER

## 2020-10-06 RX ORDER — ACETAMINOPHEN 500 MG
TABLET ORAL
Qty: 120 TABLET | Refills: 0 | Status: SHIPPED | OUTPATIENT
Start: 2020-10-06 | End: 2021-01-26 | Stop reason: SDUPTHER

## 2020-10-07 LAB — TSH SERPL DL<=0.05 MIU/L-ACNC: 0.89 UIU/ML (ref 0.36–3.74)

## 2020-10-23 ENCOUNTER — OFFICE VISIT (OUTPATIENT)
Dept: FAMILY MEDICINE CLINIC | Facility: CLINIC | Age: 60
End: 2020-10-23
Payer: COMMERCIAL

## 2020-10-23 VITALS
DIASTOLIC BLOOD PRESSURE: 80 MMHG | BODY MASS INDEX: 30.66 KG/M2 | OXYGEN SATURATION: 96 % | HEART RATE: 74 BPM | WEIGHT: 166.6 LBS | HEIGHT: 62 IN | SYSTOLIC BLOOD PRESSURE: 120 MMHG

## 2020-10-23 DIAGNOSIS — R00.2 PALPITATIONS: ICD-10-CM

## 2020-10-23 DIAGNOSIS — Z72.0 TOBACCO ABUSE: Primary | ICD-10-CM

## 2020-10-23 PROCEDURE — 3079F DIAST BP 80-89 MM HG: CPT | Performed by: FAMILY MEDICINE

## 2020-10-23 PROCEDURE — 99213 OFFICE O/P EST LOW 20 MIN: CPT | Performed by: FAMILY MEDICINE

## 2020-10-23 PROCEDURE — 4004F PT TOBACCO SCREEN RCVD TLK: CPT | Performed by: FAMILY MEDICINE

## 2020-11-02 ENCOUNTER — OFFICE VISIT (OUTPATIENT)
Dept: OBGYN CLINIC | Facility: CLINIC | Age: 60
End: 2020-11-02
Payer: COMMERCIAL

## 2020-11-02 VITALS
SYSTOLIC BLOOD PRESSURE: 120 MMHG | DIASTOLIC BLOOD PRESSURE: 82 MMHG | HEART RATE: 65 BPM | WEIGHT: 165 LBS | HEIGHT: 62 IN | BODY MASS INDEX: 30.36 KG/M2

## 2020-11-02 DIAGNOSIS — M17.0 PRIMARY OSTEOARTHRITIS OF BOTH KNEES: Primary | ICD-10-CM

## 2020-11-02 DIAGNOSIS — Z72.0 TOBACCO ABUSE: ICD-10-CM

## 2020-11-02 DIAGNOSIS — E11.9 TYPE 2 DIABETES MELLITUS WITHOUT COMPLICATION, WITHOUT LONG-TERM CURRENT USE OF INSULIN (HCC): ICD-10-CM

## 2020-11-02 PROCEDURE — 4004F PT TOBACCO SCREEN RCVD TLK: CPT | Performed by: ORTHOPAEDIC SURGERY

## 2020-11-02 PROCEDURE — 99213 OFFICE O/P EST LOW 20 MIN: CPT | Performed by: ORTHOPAEDIC SURGERY

## 2020-11-02 PROCEDURE — 3079F DIAST BP 80-89 MM HG: CPT | Performed by: ORTHOPAEDIC SURGERY

## 2020-11-02 PROCEDURE — 3074F SYST BP LT 130 MM HG: CPT | Performed by: ORTHOPAEDIC SURGERY

## 2020-11-02 PROCEDURE — 3008F BODY MASS INDEX DOCD: CPT | Performed by: ORTHOPAEDIC SURGERY

## 2020-12-05 DIAGNOSIS — M17.10 ARTHRITIS OF KNEE: ICD-10-CM

## 2020-12-05 DIAGNOSIS — E11.9 TYPE 2 DIABETES MELLITUS WITHOUT COMPLICATION, WITHOUT LONG-TERM CURRENT USE OF INSULIN (HCC): ICD-10-CM

## 2020-12-08 RX ORDER — BLOOD SUGAR DIAGNOSTIC
STRIP MISCELLANEOUS
Qty: 100 EACH | Refills: 1 | Status: SHIPPED | OUTPATIENT
Start: 2020-12-08 | End: 2021-01-26 | Stop reason: SDUPTHER

## 2021-01-26 ENCOUNTER — OFFICE VISIT (OUTPATIENT)
Dept: FAMILY MEDICINE CLINIC | Facility: CLINIC | Age: 61
End: 2021-01-26
Payer: COMMERCIAL

## 2021-01-26 VITALS
DIASTOLIC BLOOD PRESSURE: 78 MMHG | HEIGHT: 62 IN | BODY MASS INDEX: 30.36 KG/M2 | SYSTOLIC BLOOD PRESSURE: 118 MMHG | HEART RATE: 60 BPM | OXYGEN SATURATION: 98 % | WEIGHT: 165 LBS | TEMPERATURE: 97.1 F

## 2021-01-26 DIAGNOSIS — M17.10 ARTHRITIS OF KNEE: ICD-10-CM

## 2021-01-26 DIAGNOSIS — E11.9 TYPE 2 DIABETES MELLITUS WITHOUT COMPLICATION, WITHOUT LONG-TERM CURRENT USE OF INSULIN (HCC): Primary | ICD-10-CM

## 2021-01-26 DIAGNOSIS — E78.5 HYPERLIPIDEMIA, UNSPECIFIED HYPERLIPIDEMIA TYPE: ICD-10-CM

## 2021-01-26 DIAGNOSIS — R10.30 LOWER ABDOMINAL PAIN: ICD-10-CM

## 2021-01-26 DIAGNOSIS — I10 ESSENTIAL HYPERTENSION: ICD-10-CM

## 2021-01-26 DIAGNOSIS — Z00.00 HEALTHCARE MAINTENANCE: ICD-10-CM

## 2021-01-26 LAB — SL AMB POCT HEMOGLOBIN AIC: 7 (ref ?–6.5)

## 2021-01-26 PROCEDURE — 3051F HG A1C>EQUAL 7.0%<8.0%: CPT | Performed by: FAMILY MEDICINE

## 2021-01-26 PROCEDURE — 3074F SYST BP LT 130 MM HG: CPT | Performed by: FAMILY MEDICINE

## 2021-01-26 PROCEDURE — 99213 OFFICE O/P EST LOW 20 MIN: CPT | Performed by: FAMILY MEDICINE

## 2021-01-26 PROCEDURE — 36416 COLLJ CAPILLARY BLOOD SPEC: CPT | Performed by: FAMILY MEDICINE

## 2021-01-26 PROCEDURE — 3008F BODY MASS INDEX DOCD: CPT | Performed by: FAMILY MEDICINE

## 2021-01-26 PROCEDURE — 3078F DIAST BP <80 MM HG: CPT | Performed by: FAMILY MEDICINE

## 2021-01-26 PROCEDURE — 4004F PT TOBACCO SCREEN RCVD TLK: CPT | Performed by: FAMILY MEDICINE

## 2021-01-26 PROCEDURE — 83036 HEMOGLOBIN GLYCOSYLATED A1C: CPT | Performed by: FAMILY MEDICINE

## 2021-01-26 RX ORDER — PRAVASTATIN SODIUM 40 MG
40 TABLET ORAL DAILY
Qty: 90 TABLET | Refills: 3 | Status: SHIPPED | OUTPATIENT
Start: 2021-01-26 | End: 2021-09-03 | Stop reason: SDUPTHER

## 2021-01-26 RX ORDER — BLOOD SUGAR DIAGNOSTIC
STRIP MISCELLANEOUS
Qty: 100 EACH | Refills: 1 | Status: SHIPPED | OUTPATIENT
Start: 2021-01-26 | End: 2021-01-26

## 2021-01-26 RX ORDER — BLOOD SUGAR DIAGNOSTIC
STRIP MISCELLANEOUS
Qty: 100 EACH | Refills: 0 | Status: SHIPPED | OUTPATIENT
Start: 2021-01-26 | End: 2021-07-19 | Stop reason: SDUPTHER

## 2021-01-26 RX ORDER — ACETAMINOPHEN 500 MG
TABLET ORAL
Qty: 120 TABLET | Refills: 0 | Status: SHIPPED | OUTPATIENT
Start: 2021-01-26 | End: 2021-03-16 | Stop reason: SDUPTHER

## 2021-01-26 RX ORDER — ATENOLOL AND CHLORTHALIDONE TABLET 50; 25 MG/1; MG/1
1 TABLET ORAL DAILY
Qty: 90 TABLET | Refills: 3 | Status: SHIPPED | OUTPATIENT
Start: 2021-01-26 | End: 2021-03-16 | Stop reason: SDUPTHER

## 2021-01-26 RX ORDER — LANCETS
EACH MISCELLANEOUS
Qty: 100 EACH | Refills: 3 | Status: SHIPPED | OUTPATIENT
Start: 2021-01-26 | End: 2021-03-24 | Stop reason: SDUPTHER

## 2021-01-26 NOTE — ASSESSMENT & PLAN NOTE
Lab Results   Component Value Date    HGBA1C 7 0 (A) 01/26/2021     POCT hba1c trending down  Currently 7 0 from 7 1   Patient is compliant with metformin     - Continue metformin at current dosing  - Check Blood sugars daily  - Refill metformin and diabetic testing supplies

## 2021-01-26 NOTE — ASSESSMENT & PLAN NOTE
Well controlled with Tenoretic (118/78)    - Refill tenoretic  - Continue med compliance  - Low salt diet

## 2021-01-26 NOTE — ASSESSMENT & PLAN NOTE
Long history of pain with no aggravating or alleviating factors  Incidental adrenal adenoma found on CT 2018  Reports strong family history of ovarian cancer in mother and sisters      - Check US Retroperitoneal and pelvis with transvaginal

## 2021-01-26 NOTE — ASSESSMENT & PLAN NOTE
Continues to have right sided knee pain  Follows with ortho  May undergo knee replacement surgery but may need surgical clearance      - Follow up with ortho 2/3/2021  - Discuss needs for surgical clearance with ortho  - Refill tylenol

## 2021-01-26 NOTE — ASSESSMENT & PLAN NOTE
Due for mammogram  Originally ordered 2/2020 but was never completed  Last pap smear over 5 years ago      - Recommend getting mammogram completed (already ordered)  - Patient to have PAP in 1 month at follow up    RTC in 1 months

## 2021-01-26 NOTE — PROGRESS NOTES
Family Medicine Follow-Up Office Visit  Olena Lennox 62 y o  female   MRN: [id number] : 1960  ENCOUNTER: 2021 3:05 PM    Assessment and Plan   Type 2 diabetes mellitus without complication, without long-term current use of insulin (HCC)    Lab Results   Component Value Date    HGBA1C 7 0 (A) 2021     POCT hba1c trending down  Currently 7 0 from 7 1  Patient is compliant with metformin     - Continue metformin at current dosing  - Check Blood sugars daily  - Refill metformin and diabetic testing supplies    Essential hypertension  Well controlled with Tenoretic (118/78)    - Refill tenoretic  - Continue med compliance  - Low salt diet    Arthritis of knee  Continues to have right sided knee pain  Follows with ortho  May undergo knee replacement surgery but may need surgical clearance  - Follow up with ortho 2/3/2021  - Discuss needs for surgical clearance with ortho  - Refill tylenol    Hyperlipidemia  Compliant with Pravachol 40mg    - Refill pravachol  - Due for next lipid panel 2021    Lower abdominal pain  Long history of pain with no aggravating or alleviating factors  Incidental adrenal adenoma found on CT   Reports strong family history of ovarian cancer in mother and sisters  - Check US Retroperitoneal and pelvis with transvaginal      Healthcare maintenance  Due for mammogram  Originally ordered 2020 but was never completed  Last pap smear over 5 years ago  - Recommend getting mammogram completed (already ordered)  - Patient to have PAP in 1 month at follow up    RTC in 1 months      Chief Complaint     Chief Complaint   Patient presents with    Follow-up       History of Present Illness   Olena Lennox is a 62y o -year-old female who presents today for a follow up  States she is doing well and she may have knee replacement surgery for her right knee  She complains today of some left sided abdominal pain that extends from her left flank   Has had this pain on and off for 4 years  No true aggravating or alleviating factors  Denies any urinary complaints  In 2018 she had a CT scan done that revealed a 2 2cm adrenal adenoma that was most likely benign  Review of Systems   Review of Systems   Constitutional: Negative for activity change, appetite change, chills, fatigue and fever  HENT: Negative for congestion, rhinorrhea, sneezing and sore throat  Eyes: Negative for pain, discharge, redness and itching  Respiratory: Negative for cough, chest tightness, shortness of breath and wheezing  Cardiovascular: Negative for chest pain and palpitations  Gastrointestinal: Positive for abdominal pain (left sided)  Negative for abdominal distention, constipation, diarrhea, nausea and vomiting  Genitourinary: Positive for flank pain (left sided)  Musculoskeletal: Positive for arthralgias (knees) and myalgias  Negative for back pain and joint swelling  Skin: Negative for rash  Neurological: Negative for dizziness, weakness, numbness and headaches  Hematological: Negative for adenopathy  Active Problem List     Patient Active Problem List   Diagnosis    Bilateral knee pain    Localized osteoarthritis of knees, bilateral    Degenerative arthritis of knee, bilateral    Type 2 diabetes mellitus without complication, without long-term current use of insulin (HCC)    Essential hypertension    Hyperlipidemia    Carpal tunnel syndrome on both sides    Right arm pain    Encounter for screening mammogram for breast cancer    Constipation    Palpitations    Arthritis of knee    Tobacco abuse    Lower abdominal pain    Healthcare maintenance       Past Medical History, Past Surgical History, Family History, and Social History were reviewed and updated today as appropriate      Objective   /78   Pulse 60   Temp (!) 97 1 °F (36 2 °C)   Ht 5' 2" (1 575 m)   Wt 74 8 kg (165 lb)   SpO2 98%   BMI 30 18 kg/m²     Physical Exam  Constitutional: General: She is not in acute distress  Appearance: She is well-developed  She is not diaphoretic  HENT:      Head: Normocephalic and atraumatic  Nose: Nose normal       Mouth/Throat:      Pharynx: No oropharyngeal exudate  Eyes:      General: No scleral icterus  Right eye: No discharge  Left eye: No discharge  Conjunctiva/sclera: Conjunctivae normal    Cardiovascular:      Rate and Rhythm: Normal rate and regular rhythm  Heart sounds: Normal heart sounds  No murmur  Pulmonary:      Effort: Pulmonary effort is normal  No respiratory distress  Breath sounds: Normal breath sounds  No wheezing  Abdominal:      General: There is no distension  Palpations: Abdomen is soft  Tenderness: There is no abdominal tenderness  Musculoskeletal: Normal range of motion  General: Tenderness (right knee) present  Lymphadenopathy:      Cervical: No cervical adenopathy  Skin:     General: Skin is warm and dry  Coloration: Skin is not pale  Findings: No erythema  Neurological:      Mental Status: She is alert     Psychiatric:         Behavior: Behavior normal            Pertinent Laboratory/Diagnostic Studies:  Lab Results   Component Value Date    BUN 20 02/04/2020    CREATININE 0 85 02/04/2020    CALCIUM 10 0 02/04/2020    K 3 8 02/04/2020    CO2 32 02/04/2020     02/04/2020     Lab Results   Component Value Date    ALT 28 02/04/2020    AST 14 02/04/2020    ALKPHOS 68 02/04/2020       Lab Results   Component Value Date    WBC 4 29 (L) 02/04/2020    HGB 14 7 02/04/2020    HCT 44 9 02/04/2020    MCV 96 02/04/2020     02/04/2020       No results found for: TSH    No results found for: CHOL  Lab Results   Component Value Date    TRIG 147 02/04/2020     Lab Results   Component Value Date    HDL 36 (L) 02/04/2020     Lab Results   Component Value Date    LDLCALC 71 02/04/2020     Lab Results   Component Value Date    HGBA1C 7 0 (A) 01/26/2021 Results for orders placed or performed in visit on 01/26/21   POCT hemoglobin A1c   Result Value Ref Range    Hemoglobin A1C 7 0 (A) 6 5       Orders Placed This Encounter   Procedures    US retroperitoneal complete    US abdomen and pelvis with transvaginal    POCT hemoglobin A1c         Current Medications     Current Outpatient Medications   Medication Sig Dispense Refill    acetaminophen (TYLENOL) 500 mg tablet Three times a day with meals and at bedtime 120 tablet 0    atenolol-chlorthalidone (TENORETIC) 50-25 mg per tablet Take 1 tablet by mouth daily 90 tablet 3    Diclofenac Sodium (VOLTAREN) 1 % APPLY 2GM TOPICALLY TWICE DAILY AS NEEDED 100 g 0    glucose blood (OneTouch Verio) test strip Check Blood Sugar 2 times daily 100 each 0    Lancets (onetouch ultrasoft) lancets Use as instructed 100 each 3    metFORMIN (GLUCOPHAGE) 500 mg tablet Take 1 tablet (500 mg total) by mouth daily with breakfast 90 tablet 0    naproxen (NAPROSYN) 375 mg tablet Take 250 mg by mouth daily      nicotine (NICODERM CQ) 14 mg/24hr TD 24 hr patch Place 1 patch on the skin every 24 hours 28 patch 0    pravastatin (PRAVACHOL) 40 mg tablet Take 1 tablet (40 mg total) by mouth daily 90 tablet 3    predniSONE 10 mg tablet 5 po daily x2 days, then 4 po daily x2 days, then 3 po daily x2 days, then 2 po daily x2 days,then 1 po daily x2days 30 tablet 0     No current facility-administered medications for this visit          ALLERGIES:  No Known Allergies    Health Maintenance     Health Maintenance   Topic Date Due    Hepatitis C Screening  1960    Pneumococcal Vaccine: Pediatrics (0 to 5 Years) and At-Risk Patients (6 to 59 Years) (1 of 1 - PPSV23) 03/27/1967    DM Eye Exam  03/27/1971    HIV Screening  03/27/1976    BMI: Followup Plan  03/27/1979    Annual Physical  03/27/1979    Cervical Cancer Screening  03/27/1982    Lung Cancer Screening  03/27/2016    MAMMOGRAM  09/11/2019    PT PLAN OF CARE 03/19/2020    Influenza Vaccine (1) 09/01/2020    URINE MICROALBUMIN  02/04/2021    Diabetic Foot Exam  03/11/2021    HEMOGLOBIN A1C  04/06/2021    DTaP,Tdap,and Td Vaccines (1 - Tdap) 10/06/2021 (Originally 3/27/1982)    Depression Screening PHQ  10/06/2021    BMI: Adult  01/26/2022    Colonoscopy Surveillance  05/29/2023    Colorectal Cancer Screening  05/29/2028    HIB Vaccine  Aged Out    Hepatitis B Vaccine  Aged Out    IPV Vaccine  Aged Out    Hepatitis A Vaccine  Aged Out    Meningococcal ACWY Vaccine  Aged Out    HPV Vaccine  Aged Out       There is no immunization history on file for this patient  Robyn Roper MD   [address]  1/26/2021  3:05 PM    Parts of this note were dictated using M*Modal dictation software and may have sounds-like errors due to variation in pronunciation

## 2021-02-03 ENCOUNTER — TELEPHONE (OUTPATIENT)
Dept: OBGYN CLINIC | Facility: CLINIC | Age: 61
End: 2021-02-03

## 2021-02-03 ENCOUNTER — APPOINTMENT (OUTPATIENT)
Dept: RADIOLOGY | Facility: AMBULARY SURGERY CENTER | Age: 61
End: 2021-02-03
Attending: ORTHOPAEDIC SURGERY
Payer: COMMERCIAL

## 2021-02-03 ENCOUNTER — OFFICE VISIT (OUTPATIENT)
Dept: OBGYN CLINIC | Facility: CLINIC | Age: 61
End: 2021-02-03
Payer: COMMERCIAL

## 2021-02-03 VITALS
SYSTOLIC BLOOD PRESSURE: 117 MMHG | WEIGHT: 165 LBS | BODY MASS INDEX: 30.36 KG/M2 | DIASTOLIC BLOOD PRESSURE: 74 MMHG | HEIGHT: 62 IN | HEART RATE: 52 BPM

## 2021-02-03 DIAGNOSIS — M17.11 PRIMARY OSTEOARTHRITIS OF RIGHT KNEE: Primary | ICD-10-CM

## 2021-02-03 DIAGNOSIS — M17.11 PRIMARY OSTEOARTHRITIS OF RIGHT KNEE: ICD-10-CM

## 2021-02-03 DIAGNOSIS — R26.2 AMBULATORY DYSFUNCTION: ICD-10-CM

## 2021-02-03 DIAGNOSIS — Z72.0 TOBACCO ABUSE: ICD-10-CM

## 2021-02-03 PROCEDURE — 73562 X-RAY EXAM OF KNEE 3: CPT

## 2021-02-03 PROCEDURE — 99213 OFFICE O/P EST LOW 20 MIN: CPT | Performed by: ORTHOPAEDIC SURGERY

## 2021-02-03 RX ORDER — LANOLIN ALCOHOL/MO/W.PET/CERES
400 CREAM (GRAM) TOPICAL DAILY
Qty: 30 TABLET | Refills: 0 | Status: SHIPPED | OUTPATIENT
Start: 2021-02-03 | End: 2021-06-15

## 2021-02-03 RX ORDER — CHLORHEXIDINE GLUCONATE 4 G/100ML
SOLUTION TOPICAL DAILY PRN
Status: CANCELLED | OUTPATIENT
Start: 2021-02-03

## 2021-02-03 RX ORDER — ASCORBIC ACID 500 MG
500 TABLET ORAL DAILY
Qty: 30 TABLET | Refills: 0 | Status: SHIPPED | OUTPATIENT
Start: 2021-02-03 | End: 2022-01-12 | Stop reason: ALTCHOICE

## 2021-02-03 RX ORDER — MULTIVIT-MIN/IRON FUM/FOLIC AC 7.5 MG-4
1 TABLET ORAL DAILY
Qty: 30 TABLET | Refills: 1 | Status: SHIPPED | OUTPATIENT
Start: 2021-02-03

## 2021-02-03 RX ORDER — FERROUS SULFATE TAB EC 324 MG (65 MG FE EQUIVALENT) 324 (65 FE) MG
324 TABLET DELAYED RESPONSE ORAL
Qty: 60 TABLET | Refills: 0 | Status: SHIPPED | OUTPATIENT
Start: 2021-02-03 | End: 2021-06-15

## 2021-02-03 RX ORDER — CHLORHEXIDINE GLUCONATE 0.12 MG/ML
15 RINSE ORAL ONCE
Status: CANCELLED | OUTPATIENT
Start: 2021-02-03 | End: 2021-02-03

## 2021-02-03 NOTE — PROGRESS NOTES
Assessment/Plan:  1  Primary osteoarthritis of right knee  Walker    Commode chair    XR knee 3 vw right non injury    Case request operating room: ARTHROPLASTY KNEE TOTAL    Comprehensive metabolic panel    Hemoglobin A1C W/EAG Estimation    CBC and differential    C-reactive protein    Protime-INR    APTT    Type and screen    Ambulatory referral to Family Practice    Ambulatory referral to Physical Therapy    EKG 12 lead    Iron Panel (Includes Iron Saturation, Iron, and TIBC)    PAT Covid Screening    Case request operating room: ARTHROPLASTY KNEE TOTAL    enoxaparin (LOVENOX) 40 mg/0 4 mL    ascorbic acid (VITAMIN C) 514 mg tablet    folic acid (FOLVITE) 507 mcg tablet    ferrous sulfate 324 (65 Fe) mg    Multiple Vitamins-Minerals (multivitamin with minerals) tablet   2  Tobacco abuse  NICOTINE AND METABOLITE,UR QN   3  Ambulatory dysfunction  Walker    Commode chair     Patient Active Problem List   Diagnosis    Bilateral knee pain    Localized osteoarthritis of knees, bilateral    Degenerative arthritis of knee, bilateral    Type 2 diabetes mellitus without complication, without long-term current use of insulin (HCC)    Essential hypertension    Hyperlipidemia    Carpal tunnel syndrome on both sides    Right arm pain    Encounter for screening mammogram for breast cancer    Constipation    Palpitations    Arthritis of knee    Tobacco abuse    Lower abdominal pain    Healthcare maintenance    Primary osteoarthritis of right knee       Discussion/Summary:    62 y o  female with right knee osteoarthritis causing severe pain and symptoms recalcitrant to the below mentioned measures  Risks and benefits of total knee arthroplasty were explained in detail, questions were answered to her and her daughter's satisfaction and she gave her informed consent to proceed with surgery  She will require family medicine clearance, routine labs and screening tests   She tells us she has quit smoking and all nicotine but we will test her cotinine levels as well as nicotine before surgery  Her Hgb A1C is at 7 0 this has improved from 7 1 last year and will proceed with surgery at this level  Conservative treatments attempted:  Conservative treatments: NSAIDs, Acetaminophen, Diet and lifestyle modifications, Bracing, Physical Therapy, Intra-articular hyaluronic acid injections and Intra-articular corticosteroid injections      Subjective:   Patient ID: Walt Krause is a 62 y o  female   HPI    Patient presents to the office for follow up of  Right knee osteoarthritis  Since the last visit, the patient reports  Continues to have severe right knee pain  Pain is in the lateral knee anterior knee stiff in aching pain worse with weight-bearing activities better  Patient has tried  bracing viscosupplementation cortisone injections therapy  She has improved her hemoglobin A1c to 7 0  She has also quit smoking and nicotine patches  She would like to proceed with total knee arthroplasty  The following portions of the patient's history were reviewed and updated as appropriate: allergies, current medications, past family history, past social history, past surgical history and problem list     Social History     Socioeconomic History    Marital status:      Spouse name: Not on file    Number of children: Not on file    Years of education: Not on file    Highest education level: Not on file   Occupational History    Occupation:      Employer: [employer]   Social Needs    Financial resource strain: Not hard at all    Food insecurity     Worry: Never true     Inability: Never true    Transportation needs     Medical: No     Non-medical: No   Tobacco Use    Smoking status: Current Every Day Smoker     Packs/day: 0 50     Years: 45 00     Pack years: 22 50     Types: Cigarettes    Smokeless tobacco: Never Used   Substance and Sexual Activity    Alcohol use:  Yes Frequency: Monthly or less     Drinks per session: 1 or 2     Binge frequency: Never    Drug use: No    Sexual activity: Not on file   Lifestyle    Physical activity     Days per week: 0 days     Minutes per session: 0 min    Stress: Not on file   Relationships    Social connections     Talks on phone: More than three times a week     Gets together: More than three times a week     Attends Restoration service: Never     Active member of club or organization: No     Attends meetings of clubs or organizations: Never     Relationship status:     Intimate partner violence     Fear of current or ex partner: No     Emotionally abused: No     Physically abused: No     Forced sexual activity: No   Other Topics Concern    Not on file   Social History Narrative    ** Merged History Encounter **          Past Medical History:   Diagnosis Date    Diabetes mellitus ([facility] [address] )     Hyperlipidemia     Hypertension      History reviewed  No pertinent surgical history    No Known Allergies  Current Outpatient Medications on File Prior to Visit   Medication Sig Dispense Refill    acetaminophen (TYLENOL) 500 mg tablet Three times a day with meals and at bedtime 120 tablet 0    atenolol-chlorthalidone (TENORETIC) 50-25 mg per tablet Take 1 tablet by mouth daily 90 tablet 3    Diclofenac Sodium (VOLTAREN) 1 % APPLY 2GM TOPICALLY TWICE DAILY AS NEEDED 100 g 0    glucose blood (OneTouch Verio) test strip Check Blood Sugar 2 times daily 100 each 0    Lancets (onetouch ultrasoft) lancets Use as instructed 100 each 3    metFORMIN (GLUCOPHAGE) 500 mg tablet Take 1 tablet (500 mg total) by mouth daily with breakfast 90 tablet 0    naproxen (NAPROSYN) 375 mg tablet Take 250 mg by mouth daily      nicotine (NICODERM CQ) 14 mg/24hr TD 24 hr patch Place 1 patch on the skin every 24 hours 28 patch 0    pravastatin (PRAVACHOL) 40 mg tablet Take 1 tablet (40 mg total) by mouth daily 90 tablet 3    predniSONE 10 mg tablet 5 po daily x2 days, then 4 po daily x2 days, then 3 po daily x2 days, then 2 po daily x2 days,then 1 po daily x2days 30 tablet 0     No current facility-administered medications on file prior to visit  Review of Systems  Negative except as noted in hpi    Objective:    Vitals:    02/03/21 1512   BP: 117/74   Pulse: (!) 52       Physical Exam  Constitutional:       Appearance: She is well-developed  HENT:      Head: Normocephalic and atraumatic  Eyes:      General: No scleral icterus  Conjunctiva/sclera: Conjunctivae normal    Neck:      Musculoskeletal: Neck supple  Cardiovascular:      Comments: No discernible arrhthymias  Pulmonary:      Effort: Pulmonary effort is normal  No respiratory distress  Breath sounds: No stridor  Abdominal:      General: There is no distension  Palpations: Abdomen is soft  Musculoskeletal:      Right knee: She exhibits no effusion  Skin:     General: Skin is warm and dry  Findings: No erythema  Neurological:      Mental Status: She is alert and oriented to person, place, and time  Psychiatric:         Behavior: Behavior normal          Right Knee Exam     Muscle Strength   The patient has normal right knee strength  Tenderness   The patient is experiencing tenderness in the patella and lateral joint line  Range of Motion   Extension: normal   Flexion: abnormal     Tests   Varus: positive Valgus: negative  Patellar apprehension: positive    Other   Erythema: absent  Scars: absent  Sensation: normal  Right knee pulse absent: skin warm and well perfused  Swelling: none  Effusion: no effusion present    Comments:  No ecchymosis,  valgus deformity approximately 10-15 degrees  Patient has full extension              I have personally reviewed pertinent films in PACS and my interpretation is Valgus aligned  knee arthritis right knee      Procedures  No Procedures performed today    Portions of the record may have been created with voice recognition software  Occasional wrong word or "sound a like" substitutions may have occurred due to the inherent limitations of voice recognition software  Read the chart carefully and recognize, using context, where substitutions have occurred

## 2021-02-04 ENCOUNTER — TRANSCRIBE ORDERS (OUTPATIENT)
Dept: ADMINISTRATIVE | Facility: HOSPITAL | Age: 61
End: 2021-02-04

## 2021-02-04 DIAGNOSIS — Z12.31 ENCOUNTER FOR SCREENING MAMMOGRAM FOR MALIGNANT NEOPLASM OF BREAST: ICD-10-CM

## 2021-02-04 DIAGNOSIS — M17.11 OSTEOARTHRITIS OF RIGHT KNEE, UNSPECIFIED OSTEOARTHRITIS TYPE: Primary | ICD-10-CM

## 2021-02-06 ENCOUNTER — HOSPITAL ENCOUNTER (OUTPATIENT)
Dept: RADIOLOGY | Age: 61
Discharge: HOME | End: 2021-02-06
Payer: COMMERCIAL

## 2021-02-06 VITALS — BODY MASS INDEX: 30.36 KG/M2 | WEIGHT: 165 LBS | HEIGHT: 62 IN

## 2021-02-06 DIAGNOSIS — Z12.31 ENCOUNTER FOR SCREENING MAMMOGRAM FOR MALIGNANT NEOPLASM OF BREAST: ICD-10-CM

## 2021-02-06 PROCEDURE — 77067 SCR MAMMO BI INCL CAD: CPT

## 2021-02-06 PROCEDURE — 77063 BREAST TOMOSYNTHESIS BI: CPT

## 2021-02-08 ENCOUNTER — TELEPHONE (OUTPATIENT)
Dept: OBGYN CLINIC | Facility: HOSPITAL | Age: 61
End: 2021-02-08

## 2021-02-08 DIAGNOSIS — M17.11 PRIMARY OSTEOARTHRITIS OF RIGHT KNEE: Primary | ICD-10-CM

## 2021-02-08 NOTE — TELEPHONE ENCOUNTER
Patient sees Dr Mona De Oliveira from [facility] called in regards to patient's script for a commode chair  The script was signed by the PA, but due to the patient's medicaid insurance the script needs to be signed by an MD or a DO  Once the script is corrected it can be refaxed to;     Fax # [phone]  ATTN: Anita Guerra

## 2021-02-11 ENCOUNTER — HOSPITAL ENCOUNTER (OUTPATIENT)
Dept: ULTRASOUND IMAGING | Facility: HOSPITAL | Age: 61
Discharge: HOME | End: 2021-02-11
Payer: COMMERCIAL

## 2021-02-11 DIAGNOSIS — R10.30 LOWER ABDOMINAL PAIN: ICD-10-CM

## 2021-02-11 PROCEDURE — 76770 US EXAM ABDO BACK WALL COMP: CPT

## 2021-02-11 PROCEDURE — 76700 US EXAM ABDOM COMPLETE: CPT

## 2021-02-11 PROCEDURE — 76830 TRANSVAGINAL US NON-OB: CPT

## 2021-02-11 PROCEDURE — 76856 US EXAM PELVIC COMPLETE: CPT

## 2021-02-17 ENCOUNTER — TELEPHONE (OUTPATIENT)
Dept: FAMILY MEDICINE CLINIC | Facility: CLINIC | Age: 61
End: 2021-02-17

## 2021-02-17 NOTE — TELEPHONE ENCOUNTER
Braden Reyes from radiology called to inform you that there was significant findings in the patient's pelvic ultrasound  Please advise  Thank you

## 2021-02-20 ENCOUNTER — LAB (OUTPATIENT)
Dept: LAB | Facility: CLINIC | Age: 61
End: 2021-02-20
Payer: COMMERCIAL

## 2021-02-20 DIAGNOSIS — M17.11 OSTEOARTHRITIS OF RIGHT KNEE, UNSPECIFIED OSTEOARTHRITIS TYPE: ICD-10-CM

## 2021-02-20 DIAGNOSIS — M17.11 PRIMARY OSTEOARTHRITIS OF RIGHT KNEE: ICD-10-CM

## 2021-02-20 LAB
ABO GROUP BLD: NORMAL
ALBUMIN SERPL BCP-MCNC: 4.2 G/DL (ref 3.5–5)
ALP SERPL-CCNC: 55 U/L (ref 46–116)
ALT SERPL W P-5'-P-CCNC: 33 U/L (ref 12–78)
ANION GAP SERPL CALCULATED.3IONS-SCNC: 8 MMOL/L (ref 4–13)
APTT PPP: 33 SECONDS (ref 23–37)
AST SERPL W P-5'-P-CCNC: 25 U/L (ref 5–45)
BASOPHILS # BLD AUTO: 0.01 THOUSANDS/ΜL (ref 0–0.1)
BASOPHILS NFR BLD AUTO: 0 % (ref 0–1)
BILIRUB SERPL-MCNC: 0.49 MG/DL (ref 0.2–1)
BLD GP AB SCN SERPL QL: NEGATIVE
BUN SERPL-MCNC: 18 MG/DL (ref 5–25)
CALCIUM SERPL-MCNC: 9.6 MG/DL (ref 8.3–10.1)
CHLORIDE SERPL-SCNC: 104 MMOL/L (ref 100–108)
CO2 SERPL-SCNC: 30 MMOL/L (ref 21–32)
CREAT SERPL-MCNC: 0.84 MG/DL (ref 0.6–1.3)
CRP SERPL QL: <3 MG/L
EOSINOPHIL # BLD AUTO: 0.08 THOUSAND/ΜL (ref 0–0.61)
EOSINOPHIL NFR BLD AUTO: 2 % (ref 0–6)
ERYTHROCYTE [DISTWIDTH] IN BLOOD BY AUTOMATED COUNT: 12.2 % (ref 11.6–15.1)
FERRITIN SERPL-MCNC: 104 NG/ML (ref 8–388)
GFR SERPL CREATININE-BSD FRML MDRD: 88 ML/MIN/1.73SQ M
GLUCOSE P FAST SERPL-MCNC: 156 MG/DL (ref 65–99)
HCT VFR BLD AUTO: 46.1 % (ref 34.8–46.1)
HGB BLD-MCNC: 14.7 G/DL (ref 11.5–15.4)
IMM GRANULOCYTES # BLD AUTO: 0.01 THOUSAND/UL (ref 0–0.2)
IMM GRANULOCYTES NFR BLD AUTO: 0 % (ref 0–2)
INR PPP: 0.95 (ref 0.84–1.19)
IRON SATN MFR SERPL: 40 %
IRON SERPL-MCNC: 142 UG/DL (ref 50–170)
LYMPHOCYTES # BLD AUTO: 2.23 THOUSANDS/ΜL (ref 0.6–4.47)
LYMPHOCYTES NFR BLD AUTO: 55 % (ref 14–44)
MCH RBC QN AUTO: 30.3 PG (ref 26.8–34.3)
MCHC RBC AUTO-ENTMCNC: 31.9 G/DL (ref 31.4–37.4)
MCV RBC AUTO: 95 FL (ref 82–98)
MONOCYTES # BLD AUTO: 0.35 THOUSAND/ΜL (ref 0.17–1.22)
MONOCYTES NFR BLD AUTO: 9 % (ref 4–12)
NEUTROPHILS # BLD AUTO: 1.35 THOUSANDS/ΜL (ref 1.85–7.62)
NEUTS SEG NFR BLD AUTO: 34 % (ref 43–75)
NRBC BLD AUTO-RTO: 0 /100 WBCS
PLATELET # BLD AUTO: 123 THOUSANDS/UL (ref 149–390)
PMV BLD AUTO: 11.5 FL (ref 8.9–12.7)
POTASSIUM SERPL-SCNC: 3.9 MMOL/L (ref 3.5–5.3)
PROT SERPL-MCNC: 7.5 G/DL (ref 6.4–8.2)
PROTHROMBIN TIME: 12.8 SECONDS (ref 11.6–14.5)
RBC # BLD AUTO: 4.85 MILLION/UL (ref 3.81–5.12)
RH BLD: POSITIVE
SODIUM SERPL-SCNC: 142 MMOL/L (ref 136–145)
SPECIMEN EXPIRATION DATE: NORMAL
TIBC SERPL-MCNC: 358 UG/DL (ref 250–450)
WBC # BLD AUTO: 4.03 THOUSAND/UL (ref 4.31–10.16)

## 2021-02-20 PROCEDURE — 83540 ASSAY OF IRON: CPT

## 2021-02-20 PROCEDURE — 86901 BLOOD TYPING SEROLOGIC RH(D): CPT

## 2021-02-20 PROCEDURE — 80053 COMPREHEN METABOLIC PANEL: CPT

## 2021-02-20 PROCEDURE — 85610 PROTHROMBIN TIME: CPT

## 2021-02-20 PROCEDURE — 86900 BLOOD TYPING SEROLOGIC ABO: CPT

## 2021-02-20 PROCEDURE — 93005 ELECTROCARDIOGRAM TRACING: CPT

## 2021-02-20 PROCEDURE — 85730 THROMBOPLASTIN TIME PARTIAL: CPT

## 2021-02-20 PROCEDURE — 86140 C-REACTIVE PROTEIN: CPT

## 2021-02-20 PROCEDURE — 86850 RBC ANTIBODY SCREEN: CPT

## 2021-02-20 PROCEDURE — 83550 IRON BINDING TEST: CPT

## 2021-02-20 PROCEDURE — 80307 DRUG TEST PRSMV CHEM ANLYZR: CPT

## 2021-02-20 PROCEDURE — 36415 COLL VENOUS BLD VENIPUNCTURE: CPT

## 2021-02-20 PROCEDURE — 82728 ASSAY OF FERRITIN: CPT

## 2021-02-20 PROCEDURE — 85025 COMPLETE CBC W/AUTO DIFF WBC: CPT

## 2021-02-23 ENCOUNTER — OFFICE VISIT (OUTPATIENT)
Dept: FAMILY MEDICINE CLINIC | Facility: CLINIC | Age: 61
End: 2021-02-23
Payer: COMMERCIAL

## 2021-02-23 VITALS
WEIGHT: 166.13 LBS | SYSTOLIC BLOOD PRESSURE: 120 MMHG | RESPIRATION RATE: 18 BRPM | DIASTOLIC BLOOD PRESSURE: 76 MMHG | TEMPERATURE: 96 F | HEIGHT: 62 IN | HEART RATE: 66 BPM | OXYGEN SATURATION: 98 % | BODY MASS INDEX: 30.57 KG/M2

## 2021-02-23 DIAGNOSIS — M17.11 PRIMARY OSTEOARTHRITIS OF RIGHT KNEE: ICD-10-CM

## 2021-02-23 DIAGNOSIS — R93.5 ABNORMAL ULTRASOUND OF UTERUS: Primary | ICD-10-CM

## 2021-02-23 DIAGNOSIS — Z72.0 TOBACCO ABUSE: ICD-10-CM

## 2021-02-23 LAB
COTININE, URINE: NEGATIVE NG/ML
Lab: NORMAL

## 2021-02-23 PROCEDURE — 99213 OFFICE O/P EST LOW 20 MIN: CPT | Performed by: FAMILY MEDICINE

## 2021-02-23 RX ORDER — DESOXIMETASONE 2.5 MG/G
CREAM TOPICAL AS NEEDED
COMMUNITY
Start: 2020-12-07 | End: 2022-01-12 | Stop reason: ALTCHOICE

## 2021-02-23 NOTE — ASSESSMENT & PLAN NOTE
Patient has been following orthopedics  She is scheduled for right knee replacement 3/30/21   Requires surgical clearance    - Will complete surgical clearance within 30 days of surgery

## 2021-02-23 NOTE — ASSESSMENT & PLAN NOTE
Patient has been complaining of vague abdominal pain  Ultrasound revealed Retroverted uterus with a mildly thickened but markedly heterogeneous endometrium measuring 6 mm  Small fibroid, and 2 small right sided ovarian cysts      - Recommend follow up with OBGYN for workup (possible endometrial biopsy)

## 2021-02-23 NOTE — ASSESSMENT & PLAN NOTE
Long history of cigarette use   Patient has successfully abstained from smoking, recent negative cotinine testing     - Encourage continued nicotine abstinence   - Commended patient on achievement  - Continue to follow up    RTC in 2 weeks

## 2021-02-24 LAB
ATRIAL RATE: 50 BPM
P AXIS: 27 DEGREES
PR INTERVAL: 184 MS
QRS AXIS: 33 DEGREES
QRSD INTERVAL: 72 MS
QT INTERVAL: 460 MS
QTC INTERVAL: 419 MS
T WAVE AXIS: 46 DEGREES
VENTRICULAR RATE: 50 BPM

## 2021-02-25 ENCOUNTER — OFFICE VISIT (OUTPATIENT)
Dept: OBGYN CLINIC | Facility: CLINIC | Age: 61
End: 2021-02-25

## 2021-02-25 VITALS
HEIGHT: 62 IN | SYSTOLIC BLOOD PRESSURE: 123 MMHG | BODY MASS INDEX: 30.36 KG/M2 | HEART RATE: 57 BPM | WEIGHT: 165 LBS | DIASTOLIC BLOOD PRESSURE: 76 MMHG

## 2021-02-25 DIAGNOSIS — R93.89 THICKENED ENDOMETRIUM: Primary | ICD-10-CM

## 2021-02-25 PROCEDURE — 88305 TISSUE EXAM BY PATHOLOGIST: CPT | Performed by: PATHOLOGY

## 2021-02-25 PROCEDURE — 58100 BIOPSY OF UTERUS LINING: CPT | Performed by: OBSTETRICS & GYNECOLOGY

## 2021-02-25 PROCEDURE — 88175 CYTOPATH C/V AUTO FLUID REDO: CPT | Performed by: OBSTETRICS & GYNECOLOGY

## 2021-02-25 NOTE — PROGRESS NOTES
Subjective:     Martha Mcintosh is a 62 y o  [id number] female who presents for an endometrial biopsy  Patient was recently sent for a TVUS for abdominal pain  Thickened endometrium was identified (6mm)  The patient denies any postmenopausal bleeding  The patient underwent menarche at age 16 and underwent menopause about 10 years ago  She has no history of long term estrogen use  She does have a family history significant for breast, vaginal and prostate cancer  Objective:    Vitals: Blood pressure 123/76, pulse 57, height 5' 2" (1 575 m), weight 74 8 kg (165 lb)  Body mass index is 30 18 kg/m²  Physical Exam  Constitutional:       General: She is not in acute distress  Appearance: She is obese  She is not ill-appearing, toxic-appearing or diaphoretic  HENT:      Head: Normocephalic and atraumatic  Genitourinary:     Cervix: Dilated  No cervical motion tenderness, discharge, friability, lesion, erythema or eversion  Comments: Large multiparous cervix   Neurological:      Mental Status: She is alert  Assessment/Plan:    Martha Mcintosh is a 63 yo female who presents for endometrial biopsy           Mey Bustos MD  2/25/2021  6:15 PM

## 2021-02-25 NOTE — PROGRESS NOTES
Endometrial biopsy    Date/Time: 2/25/2021 6:22 PM  Performed by: La Bansal MD  Authorized by: La Bansal MD   Universal Protocol:  Procedure performed by:  Consent: Verbal consent obtained  Written consent obtained  Risks and benefits: risks, benefits and alternatives were discussed  Consent given by: patient  Patient understanding: patient states understanding of the procedure being performed  Patient consent: the patient's understanding of the procedure matches consent given  Procedure consent: procedure consent matches procedure scheduled  Relevant documents: relevant documents present and verified  Test results: test results not available  Site marked: the operative site was not marked  Radiology Images displayed and confirmed  If images not available, report reviewed: imaging studies not available  Patient identity confirmed: verbally with patient      Indication:     Indications: Other disorder of menstruation and other abnormal bleeding from female genital tract    Procedure:     Procedure: endometrial biopsy with Pipelle      A bivalve speculum was placed in the vagina: yes      Cervix cleaned and prepped: yes      A paracervical block was performed: no      An intracervical block was performed: no      The cervix was dilated: no      Uterus sounded: yes      Uterus sound depth (cm):  8    Specimen collected: specimen collected and sent to pathology      Patient tolerated procedure well with no complications: yes    Findings:     Uterus size:  Non-gravid  Comments:     Procedure comments: The patient was placed in dorsal lithotomy position, and a sterile speculum was inserted  The cervix was visualized and prepped with betadine  A tenaculum was applied to the anterior lip of the cervix  A sound was placed through the cervical os in sterile fashion, and the uterus sounded to 8 cm  The endometrial biopsy pipelle passed through the internal os easily   Tissue sample was collected in the usual fashion using two passes of the pipelle  The patient tolerated the procedure well with no complications  Dr Luis Weber was present and sueprvised the procedure

## 2021-03-02 LAB
LAB AP GYN PRIMARY INTERPRETATION: NORMAL
Lab: NORMAL

## 2021-03-09 ENCOUNTER — TELEPHONE (OUTPATIENT)
Dept: OBGYN CLINIC | Facility: CLINIC | Age: 61
End: 2021-03-09

## 2021-03-10 ENCOUNTER — TELEPHONE (OUTPATIENT)
Dept: OBGYN CLINIC | Facility: CLINIC | Age: 61
End: 2021-03-10

## 2021-03-10 NOTE — TELEPHONE ENCOUNTER
----- Message from Josselin Ramires MD sent at 3/8/2021  2:47 PM EST -----  Please let the patient know that her endometrial biopsy results were normal, without hyperplasia nor malignancy    Thanks   ----- Message -----  From: Lab, Background User  Sent: 3/1/2021   8:51 PM EST  To: Josselin Ramires MD

## 2021-03-12 ENCOUNTER — TELEPHONE (OUTPATIENT)
Dept: OBGYN CLINIC | Facility: HOSPITAL | Age: 61
End: 2021-03-12

## 2021-03-15 ENCOUNTER — EVALUATION (OUTPATIENT)
Dept: PHYSICAL THERAPY | Facility: REHABILITATION | Age: 61
End: 2021-03-15
Payer: COMMERCIAL

## 2021-03-15 DIAGNOSIS — M17.11 PRIMARY OSTEOARTHRITIS OF RIGHT KNEE: ICD-10-CM

## 2021-03-15 DIAGNOSIS — M25.561 CHRONIC PAIN OF RIGHT KNEE: Primary | ICD-10-CM

## 2021-03-15 DIAGNOSIS — G89.29 CHRONIC PAIN OF RIGHT KNEE: Primary | ICD-10-CM

## 2021-03-15 PROCEDURE — 97161 PT EVAL LOW COMPLEX 20 MIN: CPT | Performed by: PHYSICAL THERAPIST

## 2021-03-15 PROCEDURE — 97110 THERAPEUTIC EXERCISES: CPT | Performed by: PHYSICAL THERAPIST

## 2021-03-15 NOTE — PROGRESS NOTES
PT Evaluation     Today's date: 3/15/2021  Patient name: Alisa Mercado  : 1960  MRN: [id number]  Referring provider: Silvia Matos  Dx:   Encounter Diagnosis     ICD-10-CM    1  Chronic pain of right knee  M25 561     G89 29    2  Primary osteoarthritis of right knee  M17 11 Ambulatory referral to Physical Therapy       Start Time: 1410  Stop Time: 1445  Total time in clinic (min): 35 minutes    Assessment  Assessment details: Alisa Mercado is a 62y o  year old female who presents to [facility] with:   Chronic pain of right knee  (primary encounter diagnosis)  Primary osteoarthritis of right knee  Evaluation findings today reveal impairments in right lower extremity strength, right knee stability and gait and balance deficits limiting functional activities and quality of life  Initiated comprehensive HEP for patient to complete pre-operatively to maintain range of motion and strength for improved post-operative outcomes  Discussed plan for post-operative evaluation and treatment with verbalized understanding from patient and daughter  Jaiden Guaman presents with the impairments as listed above and would benefit from Physical Therapy to address these impairments and to maximize function  Impairments: abnormal gait, abnormal or restricted ROM, impaired physical strength, lacks appropriate home exercise program and pain with function  Barriers to therapy: Language barrier  Understanding of Dx/Px/POC: good   Prognosis: good    Goals  ST  Patient will be independent with HEP upon discharge  2  Additional functional goals will be set post-operatively  Plan  Plan details: Thank you for opportunity to participate in the care of Alisa Mercado      Patient would benefit from: skilled physical therapy  Planned modality interventions: cryotherapy, unattended electrical stimulation and TENS  Planned therapy interventions: home exercise program, therapeutic exercise, therapeutic activities, stretching, strengthening, patient education, neuromuscular re-education and manual therapy  Frequency: 2x week  Duration in visits: 16  Plan of Care beginning date: 3/15/2021  Treatment plan discussed with: patient and family        Subjective Evaluation    History of Present Illness  Mechanism of injury: Drake Berg is a 62 y o  female  presenting with primary osteoarthritis right knee and pre TKA to be performed by Dr Lissette Blanc on 3/30/2021  Currently having difficulty with right knee pain, right knee buckling multiple times per day, walking, stairs and driving  Patient's daughter Mike Mtz present throughout evaluation for translation  Home set-up and functional level:  Steps to enter home 4 steps  RHR  First floor set up No  Second floor set up Full bathroom and bedroom  Number of steps to second floor Greater than 10 steps BHR   Previous falls: None   Rolling Walker  Commode  Assist at home Daughter  Grandvictor hugo  PT goals: "get back to normal" Get back to work as a , negotiate stairs, walk normally and independently    Quality of life: good    Pain  Current pain ratin  At best pain ratin  At worst pain rating: 10  Quality: dull ache  Relieving factors: rest, ice, relaxation and support  Aggravating factors: sitting, stair climbing, standing, walking and lifting  Progression: worsening    Social Support  Steps to enter house: yes  Stairs in house: yes   Lives in: multiple-level home  Lives with: adult children    Employment status: not working  Treatments  Previous treatment: physical therapy, medication and injection treatment  Current treatment: physical therapy  Patient Goals  Patient goals for therapy: decreased pain, decreased edema, increased strength, return to work, increased motion, improved balance and independence with ADLs/IADLs          Objective     Tenderness     Right Knee   No tenderness in the lateral joint line and medial joint line       Active Range of Motion   Left Knee   Flexion: 130 degrees   Extension: 0 degrees     Right Knee   Flexion: 125 degrees   Extension: 0 degrees     Passive Range of Motion   Left Knee   Extension: 5 degrees     Right Knee   Flexion: 140 degrees   Extension: 5 degrees     Mobility   Patellar Mobility:     Right Knee   WFL: superior and inferior  Hypermobile: medial   Hypomobile: lateral     Strength/Myotome Testing     Left Hip   Planes of Motion   Flexion: 4  Extension: 4  Abduction: 4    Right Hip   Planes of Motion   Flexion: 4  Extension: 4  Abduction: 3+    Left Knee   Flexion: 5  Extension: 5  Quadriceps contraction: fair    Right Knee   Flexion: 4  Extension: 5  Quadriceps contraction: fair    General Comments:      Knee Comments  Gait observation: ambulating reciprocally with no AD or loss of balance, decreased right stance time                 Precautions: see protocol   * Indicates part of HEP     Daily Treatment Diary     Manuals 3/15            PROM right knee                          Neuro Re-ed             Quad sets* :05x20                         Heel slides w strap* :05x10            Seated heel slides w overpressure             Gastroc stretch strap             Hamstring stretch                          Tandem stance (progress to foam)             SLS (progress to foam)             SLS abduction                          Therapeutic Exercise             Bike                          SLR flexion w/ quad set* 10            SLR hip abduction              Bridging*             Clamshells             Heel raises                          LAQ* 10                         Therapeutic Activity             Leg press              Step ups             Lateral step ups             STS                                                    Modalities             CP PRN

## 2021-03-15 NOTE — PRE-PROCEDURE INSTRUCTIONS
Pre-Surgery Instructions:   Medication Instructions    acetaminophen (TYLENOL) 500 mg tablet Instructed patient per Anesthesia Guidelines   ascorbic acid (VITAMIN C) 500 mg tablet Patient was instructed by Physician and understands   atenolol-chlorthalidone (TENORETIC) 50-25 mg per tablet Instructed to take per normal schedule except DOS    desoximetasone (TOPICORT) 0 25 % cream Instructed to take per normal schedule except DOS    ferrous sulfate 324 (65 Fe) mg Patient was instructed by Physician and understands   folic acid (FOLVITE) 961 mcg tablet Patient was instructed by Physician and understands   metFORMIN (GLUCOPHAGE) 500 mg tablet Instructed to take per normal schedule except DOS    Multiple Vitamins-Minerals (multivitamin with minerals) tablet Patient was instructed by Physician and understands   naproxen (NAPROSYN) 375 mg tablet Patient was instructed by Physician and understands   pravastatin (PRAVACHOL) 40 mg tablet Instructed to take per normal schedule including DOS with sips water   [facility] Patient Education reviewed with all questions answered  Pre op instructions per [facility] protocol,medications per surgeon/anesthesia guidelines and showering instructions per [facility] protocol reviewed-Patient has hibiclens soap and wipes  As of  2/28/21 patient taking Folic Acid,Vitamin C,Iron and a Multivitamin and will continue until 3/29  Pt  Verbalized understanding of current visitor restrictions due to Covid and will clarify with nurse DOS  Instructed to avoid all ASA and OTC Vit/Supp 1 week prior to surgery and to avoid NSAIDs 3 days prior to surgery  Tylenol ok to take prn  No Alcohol 24 hours prior to surgery  Patient aware Lovenox or other Blood Thinner prescribed is for POST OP ONLY  Pt  Verbalized an understanding of all instructions reviewed and offers no concerns at this time

## 2021-03-16 ENCOUNTER — OFFICE VISIT (OUTPATIENT)
Dept: FAMILY MEDICINE CLINIC | Facility: CLINIC | Age: 61
End: 2021-03-16
Payer: COMMERCIAL

## 2021-03-16 VITALS
HEIGHT: 62 IN | TEMPERATURE: 98.4 F | BODY MASS INDEX: 30.77 KG/M2 | SYSTOLIC BLOOD PRESSURE: 92 MMHG | OXYGEN SATURATION: 96 % | WEIGHT: 167.2 LBS | RESPIRATION RATE: 18 BRPM | HEART RATE: 64 BPM | DIASTOLIC BLOOD PRESSURE: 64 MMHG

## 2021-03-16 DIAGNOSIS — I10 ESSENTIAL HYPERTENSION: ICD-10-CM

## 2021-03-16 DIAGNOSIS — M17.10 ARTHRITIS OF KNEE: ICD-10-CM

## 2021-03-16 DIAGNOSIS — Z72.0 TOBACCO ABUSE: ICD-10-CM

## 2021-03-16 DIAGNOSIS — E11.9 TYPE 2 DIABETES MELLITUS WITHOUT COMPLICATION, WITHOUT LONG-TERM CURRENT USE OF INSULIN (HCC): Primary | ICD-10-CM

## 2021-03-16 PROBLEM — Z01.818 PRE-OPERATIVE CLEARANCE: Status: ACTIVE | Noted: 2021-03-16

## 2021-03-16 PROCEDURE — 99213 OFFICE O/P EST LOW 20 MIN: CPT | Performed by: FAMILY MEDICINE

## 2021-03-16 RX ORDER — ATENOLOL AND CHLORTHALIDONE TABLET 50; 25 MG/1; MG/1
1 TABLET ORAL DAILY
Qty: 90 TABLET | Refills: 3 | Status: SHIPPED | OUTPATIENT
Start: 2021-03-16 | End: 2021-09-03 | Stop reason: SDUPTHER

## 2021-03-16 RX ORDER — ACETAMINOPHEN 500 MG
TABLET ORAL
Qty: 120 TABLET | Refills: 0 | Status: SHIPPED | OUTPATIENT
Start: 2021-03-16 | End: 2021-04-12 | Stop reason: SDUPTHER

## 2021-03-16 NOTE — H&P (VIEW-ONLY)
Surgical Clearance Office Visit  Marie Royal 62 y o  female   MRN: [id number] : 1960  ENCOUNTER: 3/16/2021 4:51 PM    Assessment and Plan   Pre-operative clearance  Patient with history of right knee arthritis with recommendations from orthopedics for Total knee arthroplasty  Patient has consented  HBA1C 7 0 with eGFR 88  No history of Kidney disease  Compliant with medications  At this moment, benefits outweigh risks for surgical intervention and patients diabetes and kidney function are stable  - Surgical clearance forms filled out and faxed to orthopedics office    RTC 1 month post procedure    Essential hypertension  BP well controlled  (92/64) Currently on Tenoretic  Most recent eGFR 88 and Creatinine 0 84    Type 2 diabetes mellitus without complication, without long-term current use of insulin ([facility])    Lab Results   Component Value Date    HGBA1C 7 0 (A) 2021     Hba1c 7 0 2021  Well controlled with current regimen of Metformin 500mg daily  Patient is compliant with medication and is currently asymptomatic    Tobacco abuse  Patient has successfully quit cigarettes for the procedure  Urine cotinine testing 2021 negative  Patient plans on abstaining from nicotine products post procedure      Chief Complaint     Chief Complaint   Patient presents with    Pre-op Exam    Medication Refill       History of Present Illness   Marie Royal is a 62y o -year-old female who presents today for surgical clearance  She states that she continues to have pain in her right knee  She has a right knee arthroplasty scheduled for 3/30/2021 with orthopedics  Overall she is doing well and is somewhat anxious about the procedure  She has forms that need to be filled out today  Review of Systems   Review of Systems   Constitutional: Negative for activity change, appetite change, chills, fatigue and fever  HENT: Negative for congestion, rhinorrhea, sneezing and sore throat  Respiratory: Negative for cough, chest tightness, shortness of breath and wheezing  Cardiovascular: Negative for chest pain and palpitations  Gastrointestinal: Negative for abdominal distention, abdominal pain, constipation, diarrhea, nausea and vomiting  Musculoskeletal: Positive for arthralgias (right knee) and myalgias  Negative for back pain and joint swelling  Skin: Negative for rash  Neurological: Negative for dizziness, weakness, numbness and headaches  All other systems reviewed and are negative  Active Problem List     Patient Active Problem List   Diagnosis    Bilateral knee pain    Localized osteoarthritis of knees, bilateral    Degenerative arthritis of knee, bilateral    Type 2 diabetes mellitus without complication, without long-term current use of insulin (HCC)    Essential hypertension    Hyperlipidemia    Carpal tunnel syndrome on both sides    Right arm pain    Encounter for screening mammogram for breast cancer    Constipation    Palpitations    Arthritis of knee    Tobacco abuse    Lower abdominal pain    Healthcare maintenance    Primary osteoarthritis of right knee    Abnormal ultrasound of uterus    Pre-operative clearance       Past Medical History, Past Surgical History, Family History, and Social History were reviewed and updated today as appropriate  Objective   BP 92/64 (BP Location: Right arm, Patient Position: Sitting, Cuff Size: Adult)   Pulse 64   Temp 98 4 °F (36 9 °C) (Tympanic)   Resp 18   Ht 5' 2" (1 575 m)   Wt 75 8 kg (167 lb 3 2 oz)   SpO2 96%   BMI 30 58 kg/m²     Physical Exam  Vitals signs reviewed  Constitutional:       General: She is not in acute distress  Appearance: She is well-developed  She is not diaphoretic  HENT:      Head: Normocephalic and atraumatic  Nose: Nose normal       Mouth/Throat:      Pharynx: No oropharyngeal exudate  Eyes:      General: No scleral icterus  Right eye: No discharge  Left eye: No discharge  Conjunctiva/sclera: Conjunctivae normal    Cardiovascular:      Rate and Rhythm: Normal rate and regular rhythm  Heart sounds: Normal heart sounds  No murmur  Pulmonary:      Effort: Pulmonary effort is normal  No respiratory distress  Breath sounds: Normal breath sounds  No wheezing  Abdominal:      General: There is no distension  Palpations: Abdomen is soft  Tenderness: There is no abdominal tenderness  Musculoskeletal: Normal range of motion  General: Tenderness (right knee) present  Skin:     General: Skin is warm and dry  Coloration: Skin is not pale  Findings: No erythema  Neurological:      Mental Status: She is alert  Psychiatric:         Behavior: Behavior normal          Patient's shoes and socks removed  Right Foot/Ankle   Right Foot Inspection  Skin Exam: skin normal and skin intact no dry skin, no warmth, no callus, no erythema, no maceration, no abnormal color, no pre-ulcer, no ulcer and no callus     Left Foot/Ankle  Left Foot Inspection  Skin Exam: skin normal and skin intactno dry skin, no warmth, no erythema, no maceration, normal color, no pre-ulcer, no ulcer and no callus    Assign Risk Category:  No deformity present; ;        Risk: 0        Pertinent Laboratory/Diagnostic Studies:  Lab Results   Component Value Date    BUN 18 02/20/2021    CREATININE 0 84 02/20/2021    CALCIUM 9 6 02/20/2021    K 3 9 02/20/2021    CO2 30 02/20/2021     02/20/2021     Lab Results   Component Value Date    ALT 33 02/20/2021    AST 25 02/20/2021    ALKPHOS 55 02/20/2021       Lab Results   Component Value Date    WBC 4 03 (L) 02/20/2021    HGB 14 7 02/20/2021    HCT 46 1 02/20/2021    MCV 95 02/20/2021     (L) 02/20/2021       No results found for: TSH    No results found for: CHOL  Lab Results   Component Value Date    TRIG 147 02/04/2020     Lab Results   Component Value Date    HDL 36 (L) 02/04/2020     Lab Results Component Value Date    LDLCALC 71 02/04/2020     Lab Results   Component Value Date    HGBA1C 7 0 (A) 01/26/2021       Results for orders placed or performed in visit on 02/25/21   Tissue Exam   Result Value Ref Range    Case Report       Surgical Pathology Report                         Case: [id number]                                   Authorizing Provider:  Robbin Sanchez MD         Collected:           02/25/2021 1710              Ordering Location:     [place] [address]      Received:            02/25/2021 [address] [facility]                                                         Pathologist:           Jayashree Rubio MD                                                          Specimen:    Endometrium, EMB                                                                           Final Diagnosis       A  Endometrium, EMB:       - Scant inactive endometrial glands  - No hyperplasia or malignancy is identified  Note:  The specimen is extremely scanty and may not adequately represent the process or lesion of interest       Additional Information       All reported additional testing was performed with appropriately reactive controls  These tests were developed and their performance characteristics determined by [facility] or appropriate performing facility, though some tests may be performed on tissues which have not been validated for performance characteristics (such as staining performed on alcohol exposed cell blocks and decalcified tissues)  Results should be interpreted with caution and in the context of the patients clinical condition  These tests may not be cleared or approved by the [place]  Food and Drug Administration, though the FDA has determined that such clearance or approval is not necessary  These tests are used for clinical purposes and they should not be regarded as investigational or for research   This laboratory has been approved by [place] [address], designated as a high-complexity laboratory and is qualified to perform these tests  Twinmyriam Mckeonin Description          A  The specimen is received in formalin, labeled with the patient's name and hospital number, and is designated "endometrial biopsy  The specimen consists of an aggregate of tan-pink, focally hemorrhagic, friable soft tissue fragments measuring 1 5 x 0 7 x 0 1 cm  The specimen is entirely submitted in an embedding bag in 1 cassette and may not entirely survive processing due to the size and consistency  Note: The estimated total formalin fixation time based upon information provided by the submitting clinician and the standard processing schedule is under 72 hours  Skyler         Liquid-based pap, diagnostic   Result Value Ref Range    Case Report       Gynecologic Cytology Report                       Case: [id number]                                  Authorizing Provider:  Tyshawn Walton MD         Collected:           02/25/2021 1710              Ordering Location:     [place] [address]      Received:            02/25/2021 1711                                     [facility]                                                        First Screen:          Lurdes Sinha, CT                                                    Specimen:    LIQUID-BASED PAP, DIAGNOSTIC, Cervix                                                       Primary Interpretation       An accurate cytologic evaluation cannot be rendered    Specimen Adequacy       Unsatisfactory for evaluation due to insufficent cellular material     Additional Information       [employer] FDA approved ,  and ThinPrep Imaging Duo System are utilized with strict adherence to the 's instruction manual to prepare gynecologic and non-gynecologic cytology specimens for the production of ThinPrep slides as well as for gynecologic ThinPrep imaging   These processes have been validated by our laboratory and/or by the   The Pap test is not a diagnostic procedure and should not be used as the sole means to detect cervical cancer  It is only a screening procedure to aid in the detection of cervical cancer and its precursors  Both false-negative and false-positive results have been experienced  Your patient's test result should be interpreted in this context together with the history and clinical findings  Clinical Information       Patient had small EMB sample, so please assess cytology for possible endometrial hyperplasia or malignancy  No orders of the defined types were placed in this encounter          Current Medications     Current Outpatient Medications   Medication Sig Dispense Refill    acetaminophen (TYLENOL) 500 mg tablet Three times a day with meals and at bedtime 120 tablet 0    ascorbic acid (VITAMIN C) 500 mg tablet Take 1 tablet (500 mg total) by mouth daily 30 tablet 0    atenolol-chlorthalidone (TENORETIC) 50-25 mg per tablet Take 1 tablet by mouth daily 90 tablet 3    desoximetasone (TOPICORT) 0 25 % cream as needed       enoxaparin (LOVENOX) 40 mg/0 4 mL Inject 0 4 mL (40 mg total) under the skin daily for 28 days Starting after surgery 28 Syringe 0    ferrous sulfate 324 (65 Fe) mg Take 1 tablet (324 mg total) by mouth 2 (two) times a day before meals 60 tablet 0    folic acid (FOLVITE) 602 mcg tablet Take 1 tablet (400 mcg total) by mouth daily 30 tablet 0    glucose blood (OneTouch Verio) test strip Check Blood Sugar 2 times daily 100 each 0    Lancets (onetouch ultrasoft) lancets Use as instructed 100 each 3    metFORMIN (GLUCOPHAGE) 500 mg tablet Take 1 tablet (500 mg total) by mouth daily with breakfast 90 tablet 0    Multiple Vitamins-Minerals (multivitamin with minerals) tablet Take 1 tablet by mouth daily 30 tablet 1    naproxen (NAPROSYN) 375 mg tablet Take 250 mg by mouth daily      pravastatin (PRAVACHOL) 40 mg tablet Take 1 tablet (40 mg total) by mouth daily (Patient taking differently: Take 40 mg by mouth daily in the early morning ) 90 tablet 3     No current facility-administered medications for this visit  ALLERGIES:  No Known Allergies    Health Maintenance     Health Maintenance   Topic Date Due    Hepatitis C Screening  1960    Pneumococcal Vaccine: Pediatrics (0 to 5 Years) and At-Risk Patients (6 to 59 Years) (1 of 1 - PPSV23) 03/27/1967    DM Eye Exam  03/27/1971    HIV Screening  03/27/1976    BMI: Followup Plan  03/27/1979    Annual Physical  03/27/1979    Lung Cancer Screening  03/27/2016    URINE MICROALBUMIN  02/04/2021    Diabetic Foot Exam  03/11/2021    Influenza Vaccine (1) 06/30/2021 (Originally 9/1/2020)    DTaP,Tdap,and Td Vaccines (1 - Tdap) 10/06/2021 (Originally 3/27/1982)    PT PLAN OF CARE  04/14/2021    HEMOGLOBIN A1C  07/26/2021    Depression Screening PHQ  10/06/2021    MAMMOGRAM  02/06/2022    BMI: Adult  03/16/2022    Colonoscopy Surveillance  05/29/2023    Cervical Cancer Screening  02/25/2026    Colorectal Cancer Screening  05/29/2028    HIB Vaccine  Aged Out    Hepatitis B Vaccine  Aged Out    IPV Vaccine  Aged Out    Hepatitis A Vaccine  Aged Out    Meningococcal ACWY Vaccine  Aged Out    HPV Vaccine  Aged Out       There is no immunization history on file for this patient  Deirdre Thompson MD   [address]  3/16/2021  4:51 PM    Parts of this note were dictated using [employer] dictation software and may have sounds-like errors due to variation in pronunciation

## 2021-03-16 NOTE — ASSESSMENT & PLAN NOTE
Lab Results   Component Value Date    HGBA1C 7 0 (A) 01/26/2021     Hba1c 7 0 1/2021  Well controlled with current regimen of Metformin 500mg daily   Patient is compliant with medication and is currently asymptomatic

## 2021-03-16 NOTE — ASSESSMENT & PLAN NOTE
Patient with history of right knee arthritis with recommendations from orthopedics for Total knee arthroplasty  Patient has consented  HBA1C 7 0 with eGFR 88  No history of Kidney disease  Compliant with medications  At this moment, benefits outweigh risks for surgical intervention and patients diabetes and kidney function are stable       - Surgical clearance forms filled out and faxed to orthopedics office    RTC 1 month post procedure

## 2021-03-16 NOTE — ASSESSMENT & PLAN NOTE
Patient has successfully quit cigarettes for the procedure  Urine cotinine testing 2/2021 negative   Patient plans on abstaining from nicotine products post procedure

## 2021-03-16 NOTE — PROGRESS NOTES
Surgical Clearance Office Visit  Greg Weaver 62 y o  female   MRN: [id number] : 1960  ENCOUNTER: 3/16/2021 4:51 PM    Assessment and Plan   Pre-operative clearance  Patient with history of right knee arthritis with recommendations from orthopedics for Total knee arthroplasty  Patient has consented  HBA1C 7 0 with eGFR 88  No history of Kidney disease  Compliant with medications  At this moment, benefits outweigh risks for surgical intervention and patients diabetes and kidney function are stable  - Surgical clearance forms filled out and faxed to orthopedics office    RTC 1 month post procedure    Essential hypertension  BP well controlled  (92/64) Currently on Tenoretic  Most recent eGFR 88 and Creatinine 0 84    Type 2 diabetes mellitus without complication, without long-term current use of insulin (formerly [facility])    Lab Results   Component Value Date    HGBA1C 7 0 (A) 2021     Hba1c 7 0 2021  Well controlled with current regimen of Metformin 500mg daily  Patient is compliant with medication and is currently asymptomatic    Tobacco abuse  Patient has successfully quit cigarettes for the procedure  Urine cotinine testing 2021 negative  Patient plans on abstaining from nicotine products post procedure      Chief Complaint     Chief Complaint   Patient presents with    Pre-op Exam    Medication Refill       History of Present Illness   Greg Weaver is a 62y o -year-old female who presents today for surgical clearance  She states that she continues to have pain in her right knee  She has a right knee arthroplasty scheduled for 3/30/2021 with orthopedics  Overall she is doing well and is somewhat anxious about the procedure  She has forms that need to be filled out today  Review of Systems   Review of Systems   Constitutional: Negative for activity change, appetite change, chills, fatigue and fever  HENT: Negative for congestion, rhinorrhea, sneezing and sore throat  Respiratory: Negative for cough, chest tightness, shortness of breath and wheezing  Cardiovascular: Negative for chest pain and palpitations  Gastrointestinal: Negative for abdominal distention, abdominal pain, constipation, diarrhea, nausea and vomiting  Musculoskeletal: Positive for arthralgias (right knee) and myalgias  Negative for back pain and joint swelling  Skin: Negative for rash  Neurological: Negative for dizziness, weakness, numbness and headaches  All other systems reviewed and are negative  Active Problem List     Patient Active Problem List   Diagnosis    Bilateral knee pain    Localized osteoarthritis of knees, bilateral    Degenerative arthritis of knee, bilateral    Type 2 diabetes mellitus without complication, without long-term current use of insulin (HCC)    Essential hypertension    Hyperlipidemia    Carpal tunnel syndrome on both sides    Right arm pain    Encounter for screening mammogram for breast cancer    Constipation    Palpitations    Arthritis of knee    Tobacco abuse    Lower abdominal pain    Healthcare maintenance    Primary osteoarthritis of right knee    Abnormal ultrasound of uterus    Pre-operative clearance       Past Medical History, Past Surgical History, Family History, and Social History were reviewed and updated today as appropriate  Objective   BP 92/64 (BP Location: Right arm, Patient Position: Sitting, Cuff Size: Adult)   Pulse 64   Temp 98 4 °F (36 9 °C) (Tympanic)   Resp 18   Ht 5' 2" (1 575 m)   Wt 75 8 kg (167 lb 3 2 oz)   SpO2 96%   BMI 30 58 kg/m²     Physical Exam  Vitals signs reviewed  Constitutional:       General: She is not in acute distress  Appearance: She is well-developed  She is not diaphoretic  HENT:      Head: Normocephalic and atraumatic  Nose: Nose normal       Mouth/Throat:      Pharynx: No oropharyngeal exudate  Eyes:      General: No scleral icterus  Right eye: No discharge  Left eye: No discharge  Conjunctiva/sclera: Conjunctivae normal    Cardiovascular:      Rate and Rhythm: Normal rate and regular rhythm  Heart sounds: Normal heart sounds  No murmur  Pulmonary:      Effort: Pulmonary effort is normal  No respiratory distress  Breath sounds: Normal breath sounds  No wheezing  Abdominal:      General: There is no distension  Palpations: Abdomen is soft  Tenderness: There is no abdominal tenderness  Musculoskeletal: Normal range of motion  General: Tenderness (right knee) present  Skin:     General: Skin is warm and dry  Coloration: Skin is not pale  Findings: No erythema  Neurological:      Mental Status: She is alert  Psychiatric:         Behavior: Behavior normal          Patient's shoes and socks removed  Right Foot/Ankle   Right Foot Inspection  Skin Exam: skin normal and skin intact no dry skin, no warmth, no callus, no erythema, no maceration, no abnormal color, no pre-ulcer, no ulcer and no callus     Left Foot/Ankle  Left Foot Inspection  Skin Exam: skin normal and skin intactno dry skin, no warmth, no erythema, no maceration, normal color, no pre-ulcer, no ulcer and no callus    Assign Risk Category:  No deformity present; ;        Risk: 0        Pertinent Laboratory/Diagnostic Studies:  Lab Results   Component Value Date    BUN 18 02/20/2021    CREATININE 0 84 02/20/2021    CALCIUM 9 6 02/20/2021    K 3 9 02/20/2021    CO2 30 02/20/2021     02/20/2021     Lab Results   Component Value Date    ALT 33 02/20/2021    AST 25 02/20/2021    ALKPHOS 55 02/20/2021       Lab Results   Component Value Date    WBC 4 03 (L) 02/20/2021    HGB 14 7 02/20/2021    HCT 46 1 02/20/2021    MCV 95 02/20/2021     (L) 02/20/2021       No results found for: TSH    No results found for: CHOL  Lab Results   Component Value Date    TRIG 147 02/04/2020     Lab Results   Component Value Date    HDL 36 (L) 02/04/2020     Lab Results Component Value Date    LDLCALC 71 02/04/2020     Lab Results   Component Value Date    HGBA1C 7 0 (A) 01/26/2021       Results for orders placed or performed in visit on 02/25/21   Tissue Exam   Result Value Ref Range    Case Report       Surgical Pathology Report                         Case: [id number]                                   Authorizing Provider:  Daniel Weber MD         Collected:           02/25/2021 1710              Ordering Location:     [place] [address]      Received:            02/25/2021 [address] [facility]                                                         Pathologist:           Holly Esteves MD                                                          Specimen:    Endometrium, EMB                                                                           Final Diagnosis       A  Endometrium, EMB:       - Scant inactive endometrial glands  - No hyperplasia or malignancy is identified  Note:  The specimen is extremely scanty and may not adequately represent the process or lesion of interest       Additional Information       All reported additional testing was performed with appropriately reactive controls  These tests were developed and their performance characteristics determined by [address] [place] [address] [facility]ty Laboratory or appropriate performing facility, though some tests may be performed on tissues which have not been validated for performance characteristics (such as staining performed on alcohol exposed cell blocks and decalcified tissues)  Results should be interpreted with caution and in the context of the patients clinical condition  These tests may not be cleared or approved by the [place]  Food and Drug Administration, though the FDA has determined that such clearance or approval is not necessary  These tests are used for clinical purposes and they should not be regarded as investigational or for research   This laboratory has been approved by [place] [address], designated as a high-complexity laboratory and is qualified to perform these tests  Rya Tanner Description          A  The specimen is received in formalin, labeled with the patient's name and hospital number, and is designated "endometrial biopsy  The specimen consists of an aggregate of tan-pink, focally hemorrhagic, friable soft tissue fragments measuring 1 5 x 0 7 x 0 1 cm  The specimen is entirely submitted in an embedding bag in 1 cassette and may not entirely survive processing due to the size and consistency  Note: The estimated total formalin fixation time based upon information provided by the submitting clinician and the standard processing schedule is under 72 hours  Skyler         Liquid-based pap, diagnostic   Result Value Ref Range    Case Report       Gynecologic Cytology Report                       Case: [id number]                                  Authorizing Provider:  Moreno Lanier MD         Collected:           02/25/2021 1710              Ordering Location:     [place] [address]      Received:            02/25/2021 1711                                     [facility]                                                        First Screen:          FREDERIC Russo                                                    Specimen:    LIQUID-BASED PAP, DIAGNOSTIC, Cervix                                                       Primary Interpretation       An accurate cytologic evaluation cannot be rendered    Specimen Adequacy       Unsatisfactory for evaluation due to insufficent cellular material     Additional Information       [employer] FDA approved ,  and ThinPrep Imaging Duo System are utilized with strict adherence to the 's instruction manual to prepare gynecologic and non-gynecologic cytology specimens for the production of ThinPrep slides as well as for gynecologic ThinPrep imaging   These processes have been validated by our laboratory and/or by the   The Pap test is not a diagnostic procedure and should not be used as the sole means to detect cervical cancer  It is only a screening procedure to aid in the detection of cervical cancer and its precursors  Both false-negative and false-positive results have been experienced  Your patient's test result should be interpreted in this context together with the history and clinical findings  Clinical Information       Patient had small EMB sample, so please assess cytology for possible endometrial hyperplasia or malignancy  No orders of the defined types were placed in this encounter          Current Medications     Current Outpatient Medications   Medication Sig Dispense Refill    acetaminophen (TYLENOL) 500 mg tablet Three times a day with meals and at bedtime 120 tablet 0    ascorbic acid (VITAMIN C) 500 mg tablet Take 1 tablet (500 mg total) by mouth daily 30 tablet 0    atenolol-chlorthalidone (TENORETIC) 50-25 mg per tablet Take 1 tablet by mouth daily 90 tablet 3    desoximetasone (TOPICORT) 0 25 % cream as needed       enoxaparin (LOVENOX) 40 mg/0 4 mL Inject 0 4 mL (40 mg total) under the skin daily for 28 days Starting after surgery 28 Syringe 0    ferrous sulfate 324 (65 Fe) mg Take 1 tablet (324 mg total) by mouth 2 (two) times a day before meals 60 tablet 0    folic acid (FOLVITE) 997 mcg tablet Take 1 tablet (400 mcg total) by mouth daily 30 tablet 0    glucose blood (OneTouch Verio) test strip Check Blood Sugar 2 times daily 100 each 0    Lancets (onetouch ultrasoft) lancets Use as instructed 100 each 3    metFORMIN (GLUCOPHAGE) 500 mg tablet Take 1 tablet (500 mg total) by mouth daily with breakfast 90 tablet 0    Multiple Vitamins-Minerals (multivitamin with minerals) tablet Take 1 tablet by mouth daily 30 tablet 1    naproxen (NAPROSYN) 375 mg tablet Take 250 mg by mouth daily      pravastatin (PRAVACHOL) 40 mg tablet Take 1 tablet (40 mg total) by mouth daily (Patient taking differently: Take 40 mg by mouth daily in the early morning ) 90 tablet 3     No current facility-administered medications for this visit  ALLERGIES:  No Known Allergies    Health Maintenance     Health Maintenance   Topic Date Due    Hepatitis C Screening  1960    Pneumococcal Vaccine: Pediatrics (0 to 5 Years) and At-Risk Patients (6 to 59 Years) (1 of 1 - PPSV23) 03/27/1967    DM Eye Exam  03/27/1971    HIV Screening  03/27/1976    BMI: Followup Plan  03/27/1979    Annual Physical  03/27/1979    Lung Cancer Screening  03/27/2016    URINE MICROALBUMIN  02/04/2021    Diabetic Foot Exam  03/11/2021    Influenza Vaccine (1) 06/30/2021 (Originally 9/1/2020)    DTaP,Tdap,and Td Vaccines (1 - Tdap) 10/06/2021 (Originally 3/27/1982)    PT PLAN OF CARE  04/14/2021    HEMOGLOBIN A1C  07/26/2021    Depression Screening PHQ  10/06/2021    MAMMOGRAM  02/06/2022    BMI: Adult  03/16/2022    Colonoscopy Surveillance  05/29/2023    Cervical Cancer Screening  02/25/2026    Colorectal Cancer Screening  05/29/2028    HIB Vaccine  Aged Out    Hepatitis B Vaccine  Aged Out    IPV Vaccine  Aged Out    Hepatitis A Vaccine  Aged Out    Meningococcal ACWY Vaccine  Aged Out    HPV Vaccine  Aged Out       There is no immunization history on file for this patient  John Farah MD   [address]  3/16/2021  4:51 PM    Parts of this note were dictated using M[employer]Wow[employer] dictation software and may have sounds-like errors due to variation in pronunciation

## 2021-03-22 NOTE — TELEPHONE ENCOUNTER
Patient was notified of injections  Patient did not want to get them done  Injections  3/21/2021   Discarded

## 2021-03-22 NOTE — TELEPHONE ENCOUNTER
Preoperative Elective Admission Assessment- Assigned to care team  Spoke with pt and daughter, pt is Persian speaking but [facility] was kind enough to translate and they decline  services  Living Situation: Pt lives with daughter, Corina Cao, in multiple level home  Antwan, other daughter, is a few blocks away  Multiple level home with Bedrooms on 2nd floor  Steps: to enter, #4 steps  To bedroom level, #14 steps to 2nd level  First Floor Setup: Yes for 1st few weeks  They bought pt lift recliner chair that 1st floor and BSC to accommodate 1st floor setup  DME: Pt has crutches, 2 wheeled RW, BSC  No canes  Patient's Current Level of Function: independent with ambulation and ADLs    Post-op Caregiver:  Daughter, Corina Cao, will be care person in evening and at night time  Laurie plans to be present in pt's home during daytime  Daughters are caregivers and transport  Post-op Transport:   Daughters  Outpatient Physical Therapy Site: [facility]    Medication Management: self                    Preferred Pharmacy for Post-op Medications: [facility], for surgery meds  After DC, send to [facility]  Blood Management Vitamin Regimen: pt confirms  Post-op anticoagulant: lovenox syringes at home ready for post-op use only  Discharge Plan: Pt plans for DC to home and plans to attend OP PT    Barriers to DC identified preoperatively: None    BMI: 30 18    [employer]: text [phone] any time  Sarah@[employer]  pt enrolled  Patient Education:  Pt educated on post-op pain, early mobilization (POD0), indication for/use of incentive spirometer (10x/hour while awake) and indication for/use of foot/leg pumps (18 hours/day)  educated that our goal, if at all possible, is to appropriately discharge patient based off their post-op function while striving to maintain maximal independence   If possible, the goal is to discharge patient to home and for them to attend outpatient physical therapy

## 2021-03-23 DIAGNOSIS — M17.11 PRIMARY OSTEOARTHRITIS OF RIGHT KNEE: ICD-10-CM

## 2021-03-23 DIAGNOSIS — H04.123 DRY EYES: Primary | ICD-10-CM

## 2021-03-23 DIAGNOSIS — E11.9 TYPE 2 DIABETES MELLITUS WITHOUT COMPLICATION, WITHOUT LONG-TERM CURRENT USE OF INSULIN (HCC): ICD-10-CM

## 2021-03-23 PROCEDURE — U0003 INFECTIOUS AGENT DETECTION BY NUCLEIC ACID (DNA OR RNA); SEVERE ACUTE RESPIRATORY SYNDROME CORONAVIRUS 2 (SARS-COV-2) (CORONAVIRUS DISEASE [COVID-19]), AMPLIFIED PROBE TECHNIQUE, MAKING USE OF HIGH THROUGHPUT TECHNOLOGIES AS DESCRIBED BY CMS-2020-01-R: HCPCS

## 2021-03-23 PROCEDURE — U0005 INFEC AGEN DETEC AMPLI PROBE: HCPCS

## 2021-03-23 RX ORDER — LANCETS 30 GAUGE
1 EACH MISCELLANEOUS 2 TIMES DAILY
Qty: 200 EACH | Refills: 3 | Status: SHIPPED | OUTPATIENT
Start: 2021-03-23 | End: 2021-07-19 | Stop reason: SDUPTHER

## 2021-03-23 RX ORDER — LANCETS 30 GAUGE
1 EACH MISCELLANEOUS 2 TIMES DAILY
COMMUNITY
End: 2021-03-23 | Stop reason: SDUPTHER

## 2021-03-23 RX ORDER — PROPYLENE GLYCOL 0.06 MG/ML
SOLUTION/ DROPS OPHTHALMIC
Qty: 15 ML | Refills: 2 | Status: SHIPPED | OUTPATIENT
Start: 2021-03-23 | End: 2021-09-03 | Stop reason: SDUPTHER

## 2021-03-23 NOTE — TELEPHONE ENCOUNTER
Patient is out of her lancets for 2 weeks  Please refill attached script  Thank you  Patient went to eye doctor and they informed patient that she has dry eye  Patients daughter stated she is unable to locate the medication over the counter   Pharmacist informed patient  to get a script for Systane complete optimal dry eye relief

## 2021-03-24 LAB — SARS-COV-2 RNA RESP QL NAA+PROBE: NEGATIVE

## 2021-03-24 RX ORDER — LANCETS
EACH MISCELLANEOUS
Qty: 100 EACH | Refills: 3 | Status: SHIPPED | OUTPATIENT
Start: 2021-03-24 | End: 2021-09-03 | Stop reason: SDUPTHER

## 2021-03-29 ENCOUNTER — ANESTHESIA EVENT (OUTPATIENT)
Dept: PERIOP | Facility: HOSPITAL | Age: 61
End: 2021-03-29
Payer: COMMERCIAL

## 2021-03-29 NOTE — ANESTHESIA PREPROCEDURE EVALUATION
Procedure:  KNEE TOTAL ARTHROPLASTY (Right Knee)    Relevant Problems   ANESTHESIA (within normal limits)   (-) History of anesthesia complications      CARDIO   (+) Essential hypertension   (+) Hyperlipidemia   (-) Chest pain   (-) MELVIN (dyspnea on exertion)      ENDO   (+) Type 2 diabetes mellitus without complication, without long-term current use of insulin (HCC)      GI/HEPATIC   (-) Gastroesophageal reflux disease      /RENAL (within normal limits)      HEMATOLOGY (within normal limits)   (-) Coagulation disorder (HCC)   (-) Thrombocytopenia (HCC)      MUSCULOSKELETAL   (+) Localized osteoarthritis of knees, bilateral      NEURO/PSYCH   (-) CVA (cerebral vascular accident) ([facility] [address] )   (-) Seizures ([facility] [address] )      PULMONARY   (-) Shortness of breath   (-) Smoking   (-) URI (upper respiratory infection)        Physical Exam    Airway    Mallampati score: II  TM Distance: >3 FB  Neck ROM: full     Dental   upper dentures,     Cardiovascular      Pulmonary      Other Findings        Anesthesia Plan  ASA Score- 2     Anesthesia Type- spinal with ASA Monitors  There is patient reason for not using neuraxial anesthesia or peripheral nerve block    Additional Monitors:   Airway Plan:           Plan Factors-Exercise tolerance (METS): >4 METS  Chart reviewed  EKG reviewed  Existing labs reviewed  Patient is not a current smoker  Induction- intravenous  Postoperative Plan-     Informed Consent- Anesthetic plan and risks discussed with patient  I personally reviewed this patient with the CRNA  Discussed and agreed on the Anesthesia Plan with the CRNA  Zachariah Haynes

## 2021-03-30 ENCOUNTER — ANESTHESIA (OUTPATIENT)
Dept: PERIOP | Facility: HOSPITAL | Age: 61
End: 2021-03-30
Payer: COMMERCIAL

## 2021-03-30 ENCOUNTER — HOSPITAL ENCOUNTER (OUTPATIENT)
Facility: HOSPITAL | Age: 61
Setting detail: OUTPATIENT SURGERY
Discharge: HOME | End: 2021-03-31
Attending: ORTHOPAEDIC SURGERY | Admitting: ORTHOPAEDIC SURGERY
Payer: COMMERCIAL

## 2021-03-30 DIAGNOSIS — E78.5 HYPERLIPIDEMIA, UNSPECIFIED HYPERLIPIDEMIA TYPE: ICD-10-CM

## 2021-03-30 DIAGNOSIS — I10 ESSENTIAL HYPERTENSION: ICD-10-CM

## 2021-03-30 DIAGNOSIS — M17.11 PRIMARY OSTEOARTHRITIS OF RIGHT KNEE: Primary | ICD-10-CM

## 2021-03-30 DIAGNOSIS — R00.2 PALPITATIONS: ICD-10-CM

## 2021-03-30 DIAGNOSIS — E11.9 TYPE 2 DIABETES MELLITUS WITHOUT COMPLICATION, WITHOUT LONG-TERM CURRENT USE OF INSULIN (HCC): ICD-10-CM

## 2021-03-30 LAB
ABO GROUP BLD: NORMAL
BLD GP AB SCN SERPL QL: NEGATIVE
ERYTHROCYTE [DISTWIDTH] IN BLOOD BY AUTOMATED COUNT: 12.7 % (ref 11.6–15.1)
GLUCOSE SERPL-MCNC: 178 MG/DL (ref 65–140)
GLUCOSE SERPL-MCNC: 261 MG/DL (ref 65–140)
GLUCOSE SERPL-MCNC: 326 MG/DL (ref 65–140)
HCT VFR BLD AUTO: 45.3 % (ref 34.8–46.1)
HGB BLD-MCNC: 14.5 G/DL (ref 11.5–15.4)
HIV 1+2 AB+HIV1 P24 AG SERPL QL IA: NORMAL
HIV1 P24 AG SER QL: NORMAL
MCH RBC QN AUTO: 30.3 PG (ref 26.8–34.3)
MCHC RBC AUTO-ENTMCNC: 32 G/DL (ref 31.4–37.4)
MCV RBC AUTO: 95 FL (ref 82–98)
PLATELET # BLD AUTO: 237 THOUSANDS/UL (ref 149–390)
PMV BLD AUTO: 9.6 FL (ref 8.9–12.7)
RBC # BLD AUTO: 4.79 MILLION/UL (ref 3.81–5.12)
RH BLD: POSITIVE
SPECIMEN EXPIRATION DATE: NORMAL
WBC # BLD AUTO: 5.18 THOUSAND/UL (ref 4.31–10.16)

## 2021-03-30 PROCEDURE — 97116 GAIT TRAINING THERAPY: CPT

## 2021-03-30 PROCEDURE — 87340 HEPATITIS B SURFACE AG IA: CPT | Performed by: ORTHOPAEDIC SURGERY

## 2021-03-30 PROCEDURE — 99242 OFF/OP CONSLTJ NEW/EST SF 20: CPT | Performed by: FAMILY MEDICINE

## 2021-03-30 PROCEDURE — 86900 BLOOD TYPING SEROLOGIC ABO: CPT | Performed by: ORTHOPAEDIC SURGERY

## 2021-03-30 PROCEDURE — 82948 REAGENT STRIP/BLOOD GLUCOSE: CPT

## 2021-03-30 PROCEDURE — C1776 JOINT DEVICE (IMPLANTABLE): HCPCS | Performed by: ORTHOPAEDIC SURGERY

## 2021-03-30 PROCEDURE — 86850 RBC ANTIBODY SCREEN: CPT | Performed by: ORTHOPAEDIC SURGERY

## 2021-03-30 PROCEDURE — 86901 BLOOD TYPING SEROLOGIC RH(D): CPT | Performed by: ORTHOPAEDIC SURGERY

## 2021-03-30 PROCEDURE — C1713 ANCHOR/SCREW BN/BN,TIS/BN: HCPCS | Performed by: ORTHOPAEDIC SURGERY

## 2021-03-30 PROCEDURE — 86803 HEPATITIS C AB TEST: CPT | Performed by: ORTHOPAEDIC SURGERY

## 2021-03-30 PROCEDURE — NC001 PR NO CHARGE: Performed by: PHYSICIAN ASSISTANT

## 2021-03-30 PROCEDURE — 97163 PT EVAL HIGH COMPLEX 45 MIN: CPT

## 2021-03-30 PROCEDURE — 87806 HIV AG W/HIV1&2 ANTB W/OPTIC: CPT | Performed by: ORTHOPAEDIC SURGERY

## 2021-03-30 PROCEDURE — 27447 TOTAL KNEE ARTHROPLASTY: CPT | Performed by: ORTHOPAEDIC SURGERY

## 2021-03-30 PROCEDURE — 85027 COMPLETE CBC AUTOMATED: CPT | Performed by: ANESTHESIOLOGY

## 2021-03-30 PROCEDURE — 97110 THERAPEUTIC EXERCISES: CPT

## 2021-03-30 DEVICE — ATTUNE KNEE SYSTEM TIBIAL INSERT ROTATING PLATFORM POSTERIOR STABILIZED 4 5MM AOX
Type: IMPLANTABLE DEVICE | Site: KNEE | Status: FUNCTIONAL
Brand: ATTUNE

## 2021-03-30 DEVICE — SMARTSET HIGH PERFORMANCE MV MEDIUM VISCOSITY BONE CEMENT 40G
Type: IMPLANTABLE DEVICE | Site: KNEE | Status: FUNCTIONAL
Brand: SMARTSET

## 2021-03-30 DEVICE — ATTUNE KNEE SYSTEM TIBIAL BASE ROTATING PLATFORM SIZE 6 CEMENTED
Type: IMPLANTABLE DEVICE | Site: KNEE | Status: FUNCTIONAL
Brand: ATTUNE

## 2021-03-30 DEVICE — ATTUNE PATELLA MEDIALIZED DOME 32MM CEMENTED AOX
Type: IMPLANTABLE DEVICE | Site: KNEE | Status: FUNCTIONAL
Brand: ATTUNE

## 2021-03-30 DEVICE — ATTUNE KNEE SYSTEM FEMORAL POSTERIOR STABILIZED NARROW SIZE 4N RIGHT CEMENTED
Type: IMPLANTABLE DEVICE | Site: KNEE | Status: FUNCTIONAL
Brand: ATTUNE

## 2021-03-30 RX ORDER — ONDANSETRON 2 MG/ML
4 INJECTION INTRAMUSCULAR; INTRAVENOUS ONCE AS NEEDED
Status: DISCONTINUED | OUTPATIENT
Start: 2021-03-30 | End: 2021-03-30 | Stop reason: HOSPADM

## 2021-03-30 RX ORDER — HYDROMORPHONE HCL/PF 1 MG/ML
0.5 SYRINGE (ML) INJECTION EVERY 2 HOUR PRN
Status: DISCONTINUED | OUTPATIENT
Start: 2021-03-30 | End: 2021-03-31 | Stop reason: HOSPADM

## 2021-03-30 RX ORDER — MAGNESIUM HYDROXIDE 1200 MG/15ML
LIQUID ORAL AS NEEDED
Status: DISCONTINUED | OUTPATIENT
Start: 2021-03-30 | End: 2021-03-30 | Stop reason: HOSPADM

## 2021-03-30 RX ORDER — METHOCARBAMOL 500 MG/1
500 TABLET, FILM COATED ORAL EVERY 6 HOURS SCHEDULED
Status: DISCONTINUED | OUTPATIENT
Start: 2021-03-30 | End: 2021-03-31 | Stop reason: HOSPADM

## 2021-03-30 RX ORDER — OXYCODONE HYDROCHLORIDE 10 MG/1
10 TABLET ORAL
Status: DISCONTINUED | OUTPATIENT
Start: 2021-03-30 | End: 2021-03-31 | Stop reason: HOSPADM

## 2021-03-30 RX ORDER — FENTANYL CITRATE/PF 50 MCG/ML
50 SYRINGE (ML) INJECTION
Status: DISCONTINUED | OUTPATIENT
Start: 2021-03-30 | End: 2021-03-30 | Stop reason: HOSPADM

## 2021-03-30 RX ORDER — CHLORHEXIDINE GLUCONATE 0.12 MG/ML
15 RINSE ORAL ONCE
Status: COMPLETED | OUTPATIENT
Start: 2021-03-30 | End: 2021-03-30

## 2021-03-30 RX ORDER — LIDOCAINE HYDROCHLORIDE 10 MG/ML
0.5 INJECTION, SOLUTION EPIDURAL; INFILTRATION; INTRACAUDAL; PERINEURAL ONCE AS NEEDED
Status: DISCONTINUED | OUTPATIENT
Start: 2021-03-30 | End: 2021-03-30

## 2021-03-30 RX ORDER — ACETAMINOPHEN 325 MG/1
975 TABLET ORAL ONCE
Status: DISCONTINUED | OUTPATIENT
Start: 2021-03-30 | End: 2021-03-30

## 2021-03-30 RX ORDER — MULTIVIT WITH MINERALS/LUTEIN
500 TABLET ORAL DAILY
Status: DISCONTINUED | OUTPATIENT
Start: 2021-03-30 | End: 2021-03-31 | Stop reason: HOSPADM

## 2021-03-30 RX ORDER — DEXAMETHASONE SODIUM PHOSPHATE 10 MG/ML
INJECTION, SOLUTION INTRAMUSCULAR; INTRAVENOUS AS NEEDED
Status: DISCONTINUED | OUTPATIENT
Start: 2021-03-30 | End: 2021-03-30

## 2021-03-30 RX ORDER — OXYCODONE HYDROCHLORIDE 5 MG/1
5 TABLET ORAL
Status: DISCONTINUED | OUTPATIENT
Start: 2021-03-30 | End: 2021-03-31 | Stop reason: HOSPADM

## 2021-03-30 RX ORDER — ACETAMINOPHEN 325 MG/1
975 TABLET ORAL EVERY 8 HOURS
Status: DISCONTINUED | OUTPATIENT
Start: 2021-03-30 | End: 2021-03-31 | Stop reason: HOSPADM

## 2021-03-30 RX ORDER — HYDROMORPHONE HCL/PF 1 MG/ML
0.5 SYRINGE (ML) INJECTION
Status: DISCONTINUED | OUTPATIENT
Start: 2021-03-30 | End: 2021-03-30 | Stop reason: HOSPADM

## 2021-03-30 RX ORDER — SENNOSIDES 8.6 MG
1 TABLET ORAL DAILY
Status: DISCONTINUED | OUTPATIENT
Start: 2021-03-30 | End: 2021-03-31 | Stop reason: HOSPADM

## 2021-03-30 RX ORDER — METOCLOPRAMIDE HYDROCHLORIDE 5 MG/ML
INJECTION INTRAMUSCULAR; INTRAVENOUS AS NEEDED
Status: DISCONTINUED | OUTPATIENT
Start: 2021-03-30 | End: 2021-03-30

## 2021-03-30 RX ORDER — SODIUM CHLORIDE, SODIUM LACTATE, POTASSIUM CHLORIDE, CALCIUM CHLORIDE 600; 310; 30; 20 MG/100ML; MG/100ML; MG/100ML; MG/100ML
1.5 INJECTION, SOLUTION INTRAVENOUS CONTINUOUS
Status: DISCONTINUED | OUTPATIENT
Start: 2021-03-30 | End: 2021-03-31 | Stop reason: HOSPADM

## 2021-03-30 RX ORDER — MIDAZOLAM HYDROCHLORIDE 2 MG/2ML
INJECTION, SOLUTION INTRAMUSCULAR; INTRAVENOUS AS NEEDED
Status: DISCONTINUED | OUTPATIENT
Start: 2021-03-30 | End: 2021-03-30

## 2021-03-30 RX ORDER — PRAVASTATIN SODIUM 40 MG
40 TABLET ORAL EVERY EVENING
Status: DISCONTINUED | OUTPATIENT
Start: 2021-03-31 | End: 2021-03-31 | Stop reason: HOSPADM

## 2021-03-30 RX ORDER — CEFAZOLIN SODIUM 1 G/50ML
1000 SOLUTION INTRAVENOUS EVERY 8 HOURS
Status: COMPLETED | OUTPATIENT
Start: 2021-03-30 | End: 2021-03-31

## 2021-03-30 RX ORDER — SODIUM CHLORIDE, SODIUM LACTATE, POTASSIUM CHLORIDE, CALCIUM CHLORIDE 600; 310; 30; 20 MG/100ML; MG/100ML; MG/100ML; MG/100ML
125 INJECTION, SOLUTION INTRAVENOUS CONTINUOUS
Status: DISCONTINUED | OUTPATIENT
Start: 2021-03-30 | End: 2021-03-30

## 2021-03-30 RX ORDER — DOCUSATE SODIUM 100 MG/1
100 CAPSULE, LIQUID FILLED ORAL 2 TIMES DAILY
Status: DISCONTINUED | OUTPATIENT
Start: 2021-03-30 | End: 2021-03-31 | Stop reason: HOSPADM

## 2021-03-30 RX ORDER — CHLORHEXIDINE GLUCONATE 4 G/100ML
SOLUTION TOPICAL DAILY PRN
Status: DISCONTINUED | OUTPATIENT
Start: 2021-03-30 | End: 2021-03-30

## 2021-03-30 RX ORDER — ACETAMINOPHEN 325 MG/1
650 TABLET ORAL EVERY 6 HOURS PRN
Status: DISCONTINUED | OUTPATIENT
Start: 2021-03-30 | End: 2021-03-30

## 2021-03-30 RX ORDER — OXYCODONE HYDROCHLORIDE 5 MG/1
5 TABLET ORAL EVERY 4 HOURS PRN
Qty: 20 TABLET | Refills: 0 | Status: SHIPPED | OUTPATIENT
Start: 2021-03-30 | End: 2021-04-07 | Stop reason: SDUPTHER

## 2021-03-30 RX ORDER — CEFAZOLIN SODIUM 2 G/50ML
2000 SOLUTION INTRAVENOUS ONCE
Status: COMPLETED | OUTPATIENT
Start: 2021-03-30 | End: 2021-03-30

## 2021-03-30 RX ORDER — SODIUM CHLORIDE, SODIUM LACTATE, POTASSIUM CHLORIDE, CALCIUM CHLORIDE 600; 310; 30; 20 MG/100ML; MG/100ML; MG/100ML; MG/100ML
100 INJECTION, SOLUTION INTRAVENOUS CONTINUOUS
Status: DISCONTINUED | OUTPATIENT
Start: 2021-03-30 | End: 2021-03-30

## 2021-03-30 RX ORDER — CALCIUM CARBONATE 200(500)MG
1000 TABLET,CHEWABLE ORAL DAILY PRN
Status: DISCONTINUED | OUTPATIENT
Start: 2021-03-30 | End: 2021-03-31 | Stop reason: HOSPADM

## 2021-03-30 RX ORDER — GLYCOPYRROLATE 0.2 MG/ML
INJECTION INTRAMUSCULAR; INTRAVENOUS AS NEEDED
Status: DISCONTINUED | OUTPATIENT
Start: 2021-03-30 | End: 2021-03-30

## 2021-03-30 RX ORDER — GABAPENTIN 300 MG/1
300 CAPSULE ORAL ONCE
Status: DISCONTINUED | OUTPATIENT
Start: 2021-03-30 | End: 2021-03-30

## 2021-03-30 RX ORDER — BUPIVACAINE HYDROCHLORIDE 7.5 MG/ML
INJECTION, SOLUTION INTRASPINAL AS NEEDED
Status: DISCONTINUED | OUTPATIENT
Start: 2021-03-30 | End: 2021-03-30

## 2021-03-30 RX ORDER — MINERAL OIL AND PETROLATUM 150; 830 MG/G; MG/G
OINTMENT OPHTHALMIC 4 TIMES DAILY PRN
Status: DISCONTINUED | OUTPATIENT
Start: 2021-03-30 | End: 2021-03-31 | Stop reason: HOSPADM

## 2021-03-30 RX ORDER — ONDANSETRON 2 MG/ML
INJECTION INTRAMUSCULAR; INTRAVENOUS AS NEEDED
Status: DISCONTINUED | OUTPATIENT
Start: 2021-03-30 | End: 2021-03-30

## 2021-03-30 RX ORDER — LANOLIN ALCOHOL/MO/W.PET/CERES
400 CREAM (GRAM) TOPICAL DAILY
Status: DISCONTINUED | OUTPATIENT
Start: 2021-03-30 | End: 2021-03-31 | Stop reason: HOSPADM

## 2021-03-30 RX ORDER — PROPOFOL 10 MG/ML
INJECTION, EMULSION INTRAVENOUS CONTINUOUS PRN
Status: DISCONTINUED | OUTPATIENT
Start: 2021-03-30 | End: 2021-03-30

## 2021-03-30 RX ADMIN — CEFAZOLIN SODIUM 1000 MG: 2 SOLUTION INTRAVENOUS at 07:37

## 2021-03-30 RX ADMIN — METHOCARBAMOL TABLETS 500 MG: 750 TABLET, COATED ORAL at 23:39

## 2021-03-30 RX ADMIN — PHENYLEPHRINE HYDROCHLORIDE 30 MCG/MIN: 10 INJECTION INTRAVENOUS at 07:59

## 2021-03-30 RX ADMIN — METOCLOPRAMIDE HYDROCHLORIDE 10 MG: 5 INJECTION INTRAMUSCULAR; INTRAVENOUS at 08:58

## 2021-03-30 RX ADMIN — CEFAZOLIN SODIUM 1000 MG: 1 SOLUTION INTRAVENOUS at 23:40

## 2021-03-30 RX ADMIN — METHOCARBAMOL TABLETS 500 MG: 750 TABLET, COATED ORAL at 18:24

## 2021-03-30 RX ADMIN — PHENYLEPHRINE HYDROCHLORIDE 100 MCG: 10 INJECTION INTRAVENOUS at 08:06

## 2021-03-30 RX ADMIN — DEXAMETHASONE SODIUM PHOSPHATE 6 MG: 10 INJECTION, SOLUTION INTRAMUSCULAR; INTRAVENOUS at 08:45

## 2021-03-30 RX ADMIN — OXYCODONE HYDROCHLORIDE 10 MG: 10 TABLET ORAL at 21:38

## 2021-03-30 RX ADMIN — TRANEXAMIC ACID 1000 MG: 1 INJECTION, SOLUTION INTRAVENOUS at 07:55

## 2021-03-30 RX ADMIN — ACETAMINOPHEN 975 MG: 325 TABLET, FILM COATED ORAL at 21:37

## 2021-03-30 RX ADMIN — PROPOFOL 50 MCG/KG/MIN: 10 INJECTION, EMULSION INTRAVENOUS at 07:55

## 2021-03-30 RX ADMIN — ENOXAPARIN SODIUM 40 MG: 40 INJECTION SUBCUTANEOUS at 21:38

## 2021-03-30 RX ADMIN — SODIUM CHLORIDE, SODIUM LACTATE, POTASSIUM CHLORIDE, AND CALCIUM CHLORIDE 1.5 ML/KG/HR: .6; .31; .03; .02 INJECTION, SOLUTION INTRAVENOUS at 14:11

## 2021-03-30 RX ADMIN — SODIUM CHLORIDE, SODIUM LACTATE, POTASSIUM CHLORIDE, AND CALCIUM CHLORIDE: .6; .31; .03; .02 INJECTION, SOLUTION INTRAVENOUS at 07:39

## 2021-03-30 RX ADMIN — FAMOTIDINE 20 MG: 10 INJECTION INTRAVENOUS at 09:01

## 2021-03-30 RX ADMIN — ACETAMINOPHEN 975 MG: 325 TABLET, FILM COATED ORAL at 16:39

## 2021-03-30 RX ADMIN — ROPIVACAINE HYDROCHLORIDE: 5 INJECTION, SOLUTION EPIDURAL; INFILTRATION; PERINEURAL at 08:12

## 2021-03-30 RX ADMIN — CEFAZOLIN SODIUM 1000 MG: 1 SOLUTION INTRAVENOUS at 16:40

## 2021-03-30 RX ADMIN — CHLORHEXIDINE GLUCONATE 0.12% ORAL RINSE 15 ML: 1.2 LIQUID ORAL at 07:13

## 2021-03-30 RX ADMIN — ONDANSETRON 4 MG: 2 INJECTION INTRAMUSCULAR; INTRAVENOUS at 08:01

## 2021-03-30 RX ADMIN — BUPIVACAINE HYDROCHLORIDE IN DEXTROSE 1.7 ML: 7.5 INJECTION, SOLUTION SUBARACHNOID at 07:53

## 2021-03-30 RX ADMIN — GLYCOPYRROLATE 0.1 MG: 0.2 INJECTION, SOLUTION INTRAMUSCULAR; INTRAVENOUS at 08:03

## 2021-03-30 RX ADMIN — DOCUSATE SODIUM 100 MG: 100 CAPSULE, LIQUID FILLED ORAL at 18:24

## 2021-03-30 RX ADMIN — MIDAZOLAM HYDROCHLORIDE 2 MG: 1 INJECTION, SOLUTION INTRAMUSCULAR; INTRAVENOUS at 07:41

## 2021-03-30 RX ADMIN — PHENYLEPHRINE HYDROCHLORIDE 100 MCG: 10 INJECTION INTRAVENOUS at 08:03

## 2021-03-30 RX ADMIN — OXYCODONE HYDROCHLORIDE 10 MG: 10 TABLET ORAL at 14:24

## 2021-03-30 NOTE — PHYSICAL THERAPY NOTE
PHYSICAL THERAPY EVALUATION  NAME:  Demond Siddiqi  DATE: 03/30/21    AGE:   62 y o  Mrn:   [id number]  ADMIT DX:  Primary osteoarthritis of right knee [M17 11]  Problem List:   Patient Active Problem List   Diagnosis    Bilateral knee pain    Localized osteoarthritis of knees, bilateral    Degenerative arthritis of knee, bilateral    Type 2 diabetes mellitus without complication, without long-term current use of insulin ([address] )    Essential hypertension    Hyperlipidemia    Carpal tunnel syndrome on both sides    Right arm pain    Encounter for screening mammogram for breast cancer    Constipation    Palpitations    Arthritis of knee    Tobacco abuse    Lower abdominal pain    Healthcare maintenance    Primary osteoarthritis of right knee    Abnormal ultrasound of uterus    Pre-operative clearance         Length Of Stay: 0  Performed at least 2 patient identifiers during session: Name and Birthday  PHYSICAL THERAPY EVALUATION :    03/30/21 1415   PT Last Visit   PT Visit Date 03/30/21   Note Type   Note type Evaluation   Pain Assessment   Pain Assessment Tool 0-10   Pain Score 6   Pain Location/Orientation Orientation: Right;Location: Knee   Effect of Pain on Daily Activities limits speed and indep of mobility, limits rom   Patient's Stated Pain Goal No pain   Hospital Pain Intervention(s) Repositioned; Ambulation/increased activity; Emotional support;Cold applied;Medication (See MAR)  (electrocool, RN medicated pt at start of session)   Home Living   Type of [address] [place] [address] Two level; Able to live on main level with bedroom/bathroom  (4STE w/ R handrail, >10 steps to second floor )   [address] [facility]  (none prior to admit, has rolling walker )   Additional Comments nurse navigator also reports pt has crutches   Prior Function   Level of [place] Independent with ADLs and functional mobility   Lives With Family  (adult daughter)   Tigre Help From Family  (daughter Antwan here and translating)   ADL Assistance Independent   IADLs Independent   Falls in the last 6 months 0   Vocational   (was working in past as )   Comments Per nurse navigator charting:DaughterStevan, will be care person in evening and at night time  Laurie plans to be present in pt's home during daytime  Daughters are caregivers and transport   Restrictions/Precautions   [place] Bearing Precautions Per Order Yes   RLE Weight Bearing Per Order WBAT   Other Precautions Fall Risk;Pain;Multiple lines  (guerrero)   General   Additional Pertinent History Pt declined translation services and opted for victor m Prince to translate during session  R TKA 3/30 by Dr Michelle Baig   Family/Caregiver Present   (victor m Prince)   Cognition   Overall Cognitive Status WFL   Arousal/Participation Alert   Memory Within functional limits   Following Commands Follows one step commands with increased time or repetition  (w/ translation)   RUE Strength   RUE Overall Strength Within Functional Limits - strength 5/5  (except limite @ R wrist atteributes to IV placement)   LUE Strength   LUE Overall Strength Within Functional Limits - strength 5/5   RLE Overall AROM   R Knee Flexion limited<45   R Knee Extension limited from neutral   RLE Overall PROM   R Knee Extension </=  -5   Strength RLE   R Hip Flexion   (able to SLR)   R Knee Extension 3-/5   R Ankle Dorsiflexion 4/5   Strength LLE   L Hip Flexion 4+/5   L Knee Extension 4+/5   L Ankle Dorsiflexion 4+/5   Coordination   Movements are Fluid and Coordinated 1   Sensation   (vision and hearing)   Light Touch   RLE Light Touch Grossly intact   LLE Light Touch Grossly intact   Bed Mobility   Supine to Sit 4  Minimal assistance   Additional items Assist x 1;LE management;Verbal cues; Increased time required; Bedrails   Transfers   Sit to Stand 4  Minimal assistance   Additional items Assist x 1; Increased time required;Verbal cues  (including for hand placement)   Stand to Sit 4  Minimal assistance   Additional items Assist x 1; Increased time required;Verbal cues;Armrests  (including for hand placement/LE management)   Ambulation/Elevation   Gait pattern Antalgic;Decreased R stance; Excessively slow; Step to;Short stride;Poor UE support  (limited WB into RUE attriobutes to IV placement)   Gait Assistance 4  Minimal assist   Additional items Assist x 1;Verbal cues   Assistive Device Rolling walker   Distance 3'   Stair Management Assistance Not tested   Balance   Static Sitting Good   Static Standing Fair -   Ambulatory Fair -   Endurance Deficit   Endurance Deficit Yes   Endurance Deficit Description limited amb distance, needs therapeutic rests between mobility trials   Activity Tolerance   Activity Tolerance Patient limited by pain; Patient limited by fatigue   Medical Staff Made Aware care coordination w/ case management, ortho, and OT   Nurse Made Aware spoke to RNs Tati Wheatley and Edson Fry   Assessment   Prognosis Good   Problem List Decreased strength;Decreased range of motion;Decreased endurance; Impaired balance;Decreased mobility;Obesity;Orthopedic restrictions;Pain  (gait deviations)   Barriers to Discharge Inaccessible home environment   Goals   Patient Goals to get home   STG Expiration Date 04/04/21   PT Treatment Day 1   Plan   Treatment/Interventions Functional transfer training;LE strengthening/ROM; Elevations; Therapeutic exercise; Endurance training;Patient/family training;Equipment eval/education; Bed mobility;Gait training;Spoke to nursing;Spoke to case management;Spoke to advanced practitioner;OT   PT Frequency Weekend; Twice a day   Recommendation   PT Discharge Recommendation Home with skilled therapy  (OPPT)   Equipment Recommended [address] [facility] Recommended Wheeled walker  (has one at home)   Zia 8 in Bed Without Bedrails 3   Lying on Back to Sitting on Edge of Flat Bed 3   Moving Bed to Chair 3   Standing Up From Chair 3   Walk in Room 3   Climb 3-5 Stairs 1   Basic Mobility Inpatient Raw Score 16   Basic Mobility Standardized Score 38 32   (Please find full objective findings from PT assessment regarding body systems outlined above)  Assessment: Pt is a 62 y o  female seen for PT evaluation s/p admit to [facility] on 3/30/2021 w/ Primary osteoarthritis of right knee  Had above procedure, is WBAT on RLE  Order placed for PT  Upon evaluation: Pt requires min assistance for bed mobility and transfers and min assistance for ambulation with rolling walker but with difficulty WB into RUE with IV placement  Pt's clinical presentation is currently unstable/unpredictable given the functional mobility deficits above, especially (but not limited to) weakness, decreased ROM, gait deviations and pain, coupled with fall risks including impaired balance, and combined with medical complications of abnormal blood sugars  Pt to benefit from continued skilled PT tx while in hospital and upon DC to address deficits as defined above and maximize level of functional mobility  Recommend trial with walker with platform for RUE to minimize R UE pain w/ WB next 1-2 sessions, ther ex next 1-2 sessions and case management consult  Goals: Pt will: Perform bed mobility tasks with modified I to prepare for transfers and reposition in bed  Perform transfers with modified I to improve ease of transfers  Perform ambulation with LRAD (preferably walker w/o platform)  for >50' with  Supervision  to increase Indep in home environment and promote proper use of assistive device  Perform at least 4 stairs w/ +/- crutches and railing and w/Supervision to return to home with JEFF  Perform at least 2 HEP w/o physical A to demonstrate compliance w/therex and improve ease of transfers and improve ease of bed mobility  Perform TUG w/ DME within 30 seconds to promote DC to home as opposed to post acute rehab       The patient's AM-PAC Basic Mobility Inpatient Short Form Raw Score is 16, Standardized Score is 38 32  A standardized score less than 42 9 suggests the patient may benefit from discharge to post-acute rehabilitation services, however please refer to therapist recommendation for discharge planning to home w/family given other factors that may influence destination including substantial family support, probable one floor set up upon DC     Comorbidities affecting pt's physical performance at time of assessment include: obesity, Diabetes mellitus, and Hypertension  Personal factors affecting pt at time of IE include: steps to enter environment, multi-level environment, inability to perform current job functions, inability to perform IADLs and preferred language not [place] (language barrier)  PHYSICAL THERAPY TREATMENT NOTE  Time In: 1837   Time Out:1504  Total Time: 30 min  S:  Pt agreeable to additional mobility training  O:  Transfers S with verbal instruction for hand and leg placement  R Platform attachment fitted to pt's [employer] walker here--Pt then amb 18' with walker and intermittent min A for steadying support and walker management only  Exercises    Side/Reps/sets   Heelslides RLE 10 reps AAROM in supine   Knee AROM Short Arc Quad RLE 10 reps AAROM in supine   Ankle Pumps RLE 10 reps AAROM in supine   Education Education to promote knee extension>flexion; ROM > strength   A:  Pt requires 10% physical assistance to perform exercises and intermittent A for transfers/gait  Pt requires intermittent verbal instruction or tactile feedback to perform exercises with proper form and technique     P:  Recommend addition of therapeutic exercises to current functional mobility program, continued trials w/platform rolling walker --> rolling walker once IV changed    Destinee Whitman, PT, DPT       Destinee Whitman, PT, DPT

## 2021-03-30 NOTE — ANESTHESIA POSTPROCEDURE EVALUATION
Post-Op Assessment Note    CV Status:  Stable  Pain Score: 0    Pain management: adequate     Mental Status:  Alert and awake   Hydration Status:  Euvolemic   PONV Controlled:  Controlled   Airway Patency:  Patent      Post Op Vitals Reviewed: Yes      Staff: CRNA   Comments: vss, sv        No complications documented      BP   117/57   Temp  97 0   Pulse  57   Resp   16   SpO2   100%

## 2021-03-30 NOTE — INTERVAL H&P NOTE
H&P reviewed  After examining the patient I find no changes in the patients condition since the H&P had been written      Vitals:    03/30/21 0704   BP: 147/70   Pulse: 82   Resp: 18   Temp: (!) 97 2 °F (36 2 °C)   SpO2: 100%

## 2021-03-30 NOTE — DISCHARGE INSTRUCTIONS
Discharge Instructions:    Weight Bearing Status:                                           Weight bearing as tolerated lower extremity    DVT prophylaxis:  Complete course of Lovenox as directed  One injection into abdomen daily for 30 days postoperatively  Wear thigh high compression GILBERTO stockings on both legs for 1 months  On right lower extremity for 6 months from date of surgery  Start one tablet of 81mg of Aspirin twice daily up to 6 months postoperatively  Start the Aspirin after you have finished the 30 days of Lovenox  Pain:  You have been prescribed a narcotic  Take this sparingly and only as needed for pain  May take one tab every 4-6hrs as needed for pain  Dressing Instructions:   Daily dressing changes until dry  May shower 5 days after surgery as long as there is no drainage from the incision, but please clean incision with alcohol after showers until postoperative appointment  NO SOAKING the incision  If there is drainage from the incision do not shower until drainage stops then may shower  Do not remove the clear mesh bandage on the skin we will remove this for you    PT/OT:  Continue PT/OT on outpatient basis as directed    Appt Instructions: If you do not have your appointment, please call the clinic at [phone] to follow up with Dr Elizabeth Darling in 7-10 days from surgery date  If you develop significant pain in your calf, calf swelling/discoloration, these may be signs of a blood clot  Call the office or go to the emergency department

## 2021-03-30 NOTE — H&P
H&P Exam - Orthopedics   Moses Pitts 62 y o  female MRN: [id number]  Unit/Bed#: APU 1    Assessment/Plan   Assessment:  Right knee osteoarthritis   Plan: To OR today for right total knee arthroplasty    History of Present Illness   HPI:  Moses Pitts is a 62 y o  female who presents with right knee pain  She was seen in the office diagnosed with right knee osteoarthritis  She had tried and failed conservative treatments and gave her consent in the office after being explained the risks and benefits of the procedure for total knee arthroplasty  Since last being seen in the office she reports no changes in her symptoms  She has anterior knee pain stiff and aching worse with weight-bearing activities slightly better with rest   The pain has been limiting her quality of life and ability to perform daily functions      Historical Information  Review Of Systems:   · Skin: Normal  · Neuro: See HPI  · Musculoskeletal: See HPI  · 14 point review of systems negative except as stated above     Past Medical History:   Past Medical History:   Diagnosis Date    Diabetes mellitus ([address] )     Hyperlipidemia     Hypertension        Past Surgical History:   Past Surgical History:   Procedure Laterality Date    NO PAST SURGERIES         Family History:  Family history reviewed and non-contributory  Family History   Problem Relation Age of Onset    Arthritis Mother     Arthritis Father     No Known Problems Sister 43    No Known Problems Daughter     No Known Problems Sister     No Known Problems Sister     No Known Problems Sister     No Known Problems Sister     No Known Problems Sister     No Known Problems Daughter     No Known Problems Daughter     No Known Problems Maternal Aunt     No Known Problems Maternal Aunt     No Known Problems Maternal Aunt     No Known Problems Maternal Aunt     No Known Problems Maternal Aunt     Uterine cancer Maternal Aunt     No Known Problems Paternal Aunt     No Known Problems Paternal Aunt     No Known Problems Paternal Aunt     No Known Problems Paternal Aunt        Social History:  Social History     Socioeconomic History    Marital status:      Spouse name: None    Number of children: None    Years of education: None    Highest education level: None   Occupational History    Occupation:      Employer: [employer]   Social Needs    Financial resource strain: Not hard at all    Food insecurity     Worry: Never true     Inability: Never true    Transportation needs     Medical: No     Non-medical: No   Tobacco Use    Smoking status: Former Smoker     Packs/day: 0 50     Years: 45 00     Pack years: 22 50     Types: Cigarettes     Quit date: 2020     Years since quittin 3    Smokeless tobacco: Never Used   Substance and Sexual Activity    Alcohol use: Yes     Frequency: 2-4 times a month     Drinks per session: 1 or 2     Binge frequency: Never     Comment: Socially    Drug use: No    Sexual activity: None   Lifestyle    Physical activity     Days per week: 0 days     Minutes per session: 0 min    Stress: None   Relationships    Social connections     Talks on phone: More than three times a week     Gets together: More than three times a week     Attends Nondenominational service: Never     Active member of club or organization: No     Attends meetings of clubs or organizations: Never     Relationship status:      Intimate partner violence     Fear of current or ex partner: No     Emotionally abused: No     Physically abused: No     Forced sexual activity: No   Other Topics Concern    None   Social History Narrative    ** Merged History Encounter **            Allergies:   No Known Allergies        Labs:  0   Lab Value Date/Time    HCT 45 3 2021 0711    HCT 46 1 2021 0954    HCT 44 9 2020 0932    HGB 14 5 2021 0711    HGB 14 7 2021 0954    HGB 14 7 2020 0932    INR 0 95 02/20/2021 0954    WBC 5 18 03/30/2021 0711    WBC 4 03 (L) 02/20/2021 0954    WBC 4 29 (L) 02/04/2020 0932    CRP <3 0 02/20/2021 0954       Meds:    Current Facility-Administered Medications:     ceFAZolin (ANCEF) IVPB (premix in dextrose) 2,000 mg 50 mL, 2,000 mg, Intravenous, Once, Syed Call PA-C    chlorhexidine (HIBICLENS) 4 % topical liquid, , Topical, Daily PRN, Syed Call PA-C    lactated ringers infusion, 125 mL/hr, Intravenous, Continuous, Brittnee Curiel MD    lidocaine (PF) (XYLOCAINE-MPF) 1 % injection 0 5 mL, 0 5 mL, Infiltration, Once PRN, Brittnee Curiel MD    tranexamic Acid 1,000 mg in sodium chloride 0 9 % 100 mL IVPB, 1,000 mg, Intravenous, Once, Syed Call PA-C    Blood Culture:   No results found for: BLOODCX    Wound Culture:   No results found for: WOUNDCULT    Ins and Outs:  No intake/output data recorded  Physical Exam  /70   Pulse 82   Temp (!) 97 2 °F (36 2 °C) (Temporal)   Resp 18   Ht 5' 2" (1 575 m)   Wt 74 8 kg (165 lb)   SpO2 100%   BMI 30 18 kg/m²   /70   Pulse 82   Temp (!) 97 2 °F (36 2 °C) (Temporal)   Resp 18   Ht 5' 2" (1 575 m)   Wt 74 8 kg (165 lb)   SpO2 100%   BMI 30 18 kg/m²   Gen: Alert and oriented to person, place, time  HEENT: EOMI, eyes clear, moist mucus membranes, hearing intact  Respiratory: Bilateral chest rise  No audible wheezing found  Cardiovascular: Regular Rate and Rhythm  Abdomen: soft nontender/nondistended  Ortho Exam:  Tenderness palpation on the anterior lateral joint line  This valgus deformity about 10-15 degrees  Full extension of the knee  No effusion  Neuro Exam:  Sensation is light touch L3 through S1    Extremities warm well perfused motor is intact throughout the right lower extremity    Lab Results: Reviewed  Imaging: Reviewed

## 2021-03-30 NOTE — OP NOTE
OPERATIVE REPORT  PATIENT NAME: John Anglin    :  1960  MRN: [id number]  Pt Location: AN OR ROOM 01    SURGERY DATE: 3/30/2021    Surgeon(s) and Role:     * Bill Car, DO - Primary  [facility] utilized during the case for assistance with positioning draping retraction closure and dressing application  Alda Love  PGY 4 utilized in the case for assistance with retraction and closure and dressing application    Preop Diagnosis:  Primary osteoarthritis of right knee [M17 11]    Post-Op Diagnosis Codes:     * Primary osteoarthritis of right knee [M17 11]    Procedure(s) (LRB):  KNEE TOTAL ARTHROPLASTY (Right)    Specimen(s):  * No specimens in log *    Estimated Blood Loss:   Minimal    Drains:  * No LDAs found *    Anesthesia Type:   General    Operative Indications:  Primary osteoarthritis of right knee [M17 11]      Operative Findings:  Hypotrophic femoral condyle and sclerotic lateral tibial plateau      Complications:   None    Procedure and Technique:  Patient was seen and examined in the preoperative area  The right lower extremity was marked, the consent an H&P had been reviewed  The patient was brought back to the operative suite  The patient was intubated sedated  Tourniquet was applied to right thigh  The right lower extremity was prepped and draped in a sterile fashion  After proper timeout commenced and identified the right lower extremity as the operative site  Esmarch was utilized to exsanguinate the extremity, the tourniquet was turned up to 275mmhg  We started with injection of a mixture into the subdermal tissue  An incision was made midline over the patella  We dissected down to the fascia  We identified the patella made a parapatellar approach using 10 blade  We made sure to have 5 mm of remnant tissue on the patella  We then moved on the prepatellar fat pad resected most of the prepatellar fat pad    We moved medially freeing the capsule from the proximal tibia making sure not to extend distally more than 9 mm  We then moved onto meniscus and ACL PCL resected as much as possible  We then hyperflexed the knee and used our opening canal Reamer 1 cm anterior to the PCL insertion opened the canal   Using our interna medullary jig we placed our distal femoral cutting jig  We performed our distal femoral cut  We then moved onto our tibia using our external jig we excised 2 mm off the deficient deficient side or 8-10 off the good side or less deficient side  We then used the extension block found good balance in medial out stress  We moved back to the femur performed our anterior posterior and chamfer cuts after sizing the femur  We found the size to be f4  We then moved back to the tibia  We finished the tibia and sized this to be 6 we used the Sizer guide and placed 2 screws to hold in position then placed the tower using the opening Reamer and then the broach  We then moved to our patella we excised 9 and half mm of bone  We sized the patella be 23 mm thick before excising  We then used our lollipop sizing guide and measured the patella to be a size 32 we then used the lollipop guide to drill 3 holes into the patella  Implanted our size 32 patella trial   Implanted our size 4 femur trial and our size 6 narrow tibia trial   We took the knee through range of motion found good balance in flexion and extension  We explanted all implants irrigate the wound copiously injected our mixture in the posterior capsule  We mixed the cement we placed a piece of bone into the femoral canal to block excessive bleeding  We placed cement into the tibia impacted our tibial tray excised any excess cement  Placed cement on the posterior flange of the implant of the femur  We placed cement on the femoral condyles implanted the femoral implant  We impacted an removed excess cement  We then placed cement on the patella and the back of the patellar implant   Placed the implant on the patella held that in place with clamp removed excess cement  We waited for the cement to dry  Once the cement had hardened we then irrigated the wound again ran the knee through range of motion and found good patellar tracking  We closed the deep fascia with 3,  0 Vicryl at the proximal middle and distal end of the capsular incision as well as using strata fix to close the rest of the capsule  We then closed the subcutaneous fascia with 2 O strata fix and Prineo on skin  4x4s ABDs Webril Hydrocool and Ace wrap were applied to the operative leg  A stocking net Derrick stocking was applied to the right lower extremity  Patient left the OR with the guerrero intact  Anesthesia was reversed and patient was taken back to PACU in stable condition  I was present for the entire procedure, I was present for all critical portions of the procedure      Patient Disposition:  PACU     SIGNATURE: Herminia Moore DO  DATE: March 30, 2021  TIME: 7:33 AM

## 2021-03-30 NOTE — CONSULTS
[address]A [address] 1960, 62 y o  female MRN: [id number]  Unit/Bed#: S -01 Encounter: [id number]  Primary Care Provider: Naty Gallegos MD   Date and time admitted to hospital: 3/30/2021  6:48 AM    Inpatient consult to Internal Medicine  Consult performed by: Debra Ortiz PA-C  Consult ordered by: Glenny Maxwell PA-C          * Primary osteoarthritis of right knee  Assessment & Plan  · POD #0 S/P R knee total arthroplasty  · PT/OT-- home vs  OOPT  · Pain control   · DVT ppx     Palpitations  Assessment & Plan  · No complaints at this time  · Consider telemetry of patient has complaints of palpitations    Hyperlipidemia  Assessment & Plan  · Continue statin    Essential hypertension  Assessment & Plan  · BP acceptable  · Continue home atenolol/chlorthalidone     Type 2 diabetes mellitus without complication, without long-term current use of insulin ([facility] [address] )  Assessment & Plan    Lab Results   Component Value Date    HGBA1C 7 0 (A) 01/26/2021   · known hx; hgb a1c nearly at goal; metformin at home for medications  · Transition to [facility] for correction in hospital  · QID accuchecks        VTE Prophylaxis: Sequential compression device (Venodyne)  and Enoxaparin (Lovenox)    Recommendations for Discharge:  · Resume Metformin    Counseling / Coordination of Care Time: 30 minutes  Greater than 50% of total time spent on patient counseling and coordination of care  Collaboration of Care: Were Recommendations Directly Discussed with Primary Treatment Team? - Yes     History of Present Illness:    Marychuy Polanco is a 62 y o  female who is originally admitted to the orthopedics service due to R knee OA requiring total knee arthroplasty  We are consulted for medical management  Patient overall w/ no complaints   Per daughter at bedside, she was concerned about her mother's BG because it was very high this afternoon-- this is abnormal for the patient  She does check her BG at home and it is usually well controlled  Patient otherwise has some R knee pain post-operatively, but is otherwise doing well  Review of Systems:    Review of Systems   Constitutional: Negative  HENT: Negative  Eyes: Negative  Respiratory: Negative  Cardiovascular: Negative  Gastrointestinal: Negative  Genitourinary: Negative  Musculoskeletal: Positive for arthralgias  Skin: Negative  Neurological: Negative  Hematological: Negative  Psychiatric/Behavioral: Negative  Past Medical and Surgical History:     Past Medical History:   Diagnosis Date    Diabetes mellitus ([facility] [address] )     Hyperlipidemia     Hypertension        Past Surgical History:   Procedure Laterality Date    NO PAST SURGERIES         Meds/Allergies:    all medications and allergies reviewed    Allergies: No Known Allergies    Social History:     Marital Status:     Substance Use History:   Social History     Substance and Sexual Activity   Alcohol Use Yes    Frequency: 2-4 times a month    Drinks per session: 1 or 2    Binge frequency: Never    Comment: Socially     Social History     Tobacco Use   Smoking Status Former Smoker    Packs/day: 0 50    Years: 45 00    Pack years: 22 50    Types: Cigarettes    Quit date: 2020    Years since quittin 3   Smokeless Tobacco Never Used     Social History     Substance and Sexual Activity   Drug Use No       Family History:    non-contributory    Physical Exam:     Vitals:   Blood Pressure: 127/66 (21 1500)  Pulse: 77 (21 1500)  Temperature: 97 9 °F (36 6 °C) (21 1500)  Temp Source: Oral (21 1500)  Respirations: 18 (21 1500)  Height: 5' 2" (157 5 cm) (21 1355)  Weight - Scale: 78 3 kg (172 lb 9 9 oz) (21 1355)  SpO2: 96 % (21 1500)    Physical Exam  Constitutional:       Appearance: Normal appearance  HENT:      Head: Normocephalic and atraumatic        Mouth/Throat: Mouth: Mucous membranes are moist    Eyes:      Pupils: Pupils are equal, round, and reactive to light  Cardiovascular:      Rate and Rhythm: Normal rate and regular rhythm  Heart sounds: No murmur  No friction rub  No gallop  Pulmonary:      Effort: Pulmonary effort is normal       Breath sounds: Normal breath sounds  No wheezing  Abdominal:      General: Abdomen is flat  Palpations: Abdomen is soft  Tenderness: There is no abdominal tenderness  Musculoskeletal:         General: Swelling (R knee, post operative) present  Skin:     General: Skin is warm and dry  Neurological:      General: No focal deficit present  Mental Status: She is alert and oriented to person, place, and time  Mental status is at baseline  Psychiatric:         Mood and Affect: Mood normal          Additional Data:     Lab Results: I have personally reviewed pertinent reports  Results from last 7 days   Lab Units 03/30/21  0711   WBC Thousand/uL 5 18   HEMOGLOBIN g/dL 14 5   HEMATOCRIT % 45 3   PLATELETS Thousands/uL 237                 Lab Results   Component Value Date/Time    HGBA1C 7 0 (A) 01/26/2021 01:00 PM    HGBA1C 7 1 (A) 10/06/2020 03:46 PM    HGBA1C 7 2 (A) 01/17/2020 04:13 PM     Results from last 7 days   Lab Units 03/30/21  1402 03/30/21  0713   POC GLUCOSE mg/dl 326* 178*           Imaging: I have personally reviewed pertinent reports  No orders to display       EKG, Pathology, and Other Studies Reviewed on Admission:   · EKG: not available for review    ** Please Note: This note has been constructed using a voice recognition system   **

## 2021-03-30 NOTE — ANESTHESIA PROCEDURE NOTES
Spinal Block    Patient location during procedure: OR  Start time: 3/30/2021 7:53 AM  Reason for block: procedure for pain and at surgeon's request  Staffing  Anesthesiologist: Gael Felix MD  Resident/CRNA: Denver Romance, CRNA  Performed: resident/CRNA and anesthesiologist   Preanesthetic Checklist  Completed: patient identified, site marked, surgical consent, pre-op evaluation, timeout performed, IV checked, risks and benefits discussed and monitors and equipment checked  Spinal Block  Patient position: sitting  Prep: ChloraPrep  Patient monitoring: cardiac monitor and frequent blood pressure checks  Approach: midline  Location: L3-4  Injection technique: single-shot  Needle  Needle type: pencil-tip   Needle gauge: 25 G  Needle length: 10 cm  Assessment  Sensory level: T4  Injection Assessment:  negative aspiration for heme, no paresthesia on injection and positive aspiration for clear CSF    Post-procedure:  adhesive bandage applied, pressure dressing applied, secured with tape, site cleaned and sterile dressing applied

## 2021-03-30 NOTE — ASSESSMENT & PLAN NOTE
Lab Results   Component Value Date    HGBA1C 7 0 (A) 01/26/2021   · known hx; hgb a1c nearly at goal; metformin at home for medications  · Transition to [facility] for correction in hospital  · QID accuchecks

## 2021-03-30 NOTE — PLAN OF CARE
Problem: PHYSICAL THERAPY ADULT  Goal: Performs mobility at highest level of function for planned discharge setting  See evaluation for individualized goals  Description: Treatment/Interventions: Functional transfer training, LE strengthening/ROM, Elevations, Therapeutic exercise, Endurance training, Patient/family training, Equipment eval/education, Bed mobility, Gait training, Spoke to nursing, Spoke to case management, Spoke to advanced practitioner, OT  Equipment Recommended: Patricia Kendrick       See flowsheet documentation for full assessment, interventions and recommendations  Note: Prognosis: Good  Problem List: Decreased strength, Decreased range of motion, Decreased endurance, Impaired balance, Decreased mobility, Obesity, Orthopedic restrictions, Pain(gait deviations)  Assessment: Pt is a 62 y o  female seen for PT evaluation s/p admit to [facility] on 3/30/2021 w/ Primary osteoarthritis of right knee  Had above procedure, is WBAT on RLE  Order placed for PT  Upon evaluation: Pt requires min assistance for bed mobility and transfers and min assistance for ambulation with rolling walker but with difficulty WB into RUE with IV placement  Pt's clinical presentation is currently unstable/unpredictable given the functional mobility deficits above, especially (but not limited to) weakness, decreased ROM, gait deviations and pain, coupled with fall risks including impaired balance, and combined with medical complications of abnormal blood sugars  Pt to benefit from continued skilled PT tx while in hospital and upon DC to address deficits as defined above and maximize level of functional mobility  Recommend trial with walker with platform for RUE to minimize R UE pain w/ WB next 1-2 sessions, ther ex next 1-2 sessions and case management consult     Barriers to Discharge: Inaccessible home environment     PT Discharge Recommendation: Home with skilled therapy(OPPT)          See flowsheet documentation for full assessment

## 2021-03-31 ENCOUNTER — TELEPHONE (OUTPATIENT)
Dept: OTHER | Facility: OTHER | Age: 61
End: 2021-03-31

## 2021-03-31 VITALS
WEIGHT: 172.62 LBS | TEMPERATURE: 98.2 F | OXYGEN SATURATION: 97 % | DIASTOLIC BLOOD PRESSURE: 72 MMHG | HEART RATE: 72 BPM | RESPIRATION RATE: 18 BRPM | HEIGHT: 62 IN | BODY MASS INDEX: 31.77 KG/M2 | SYSTOLIC BLOOD PRESSURE: 139 MMHG

## 2021-03-31 PROBLEM — D62 ACUTE POSTOPERATIVE ANEMIA DUE TO EXPECTED BLOOD LOSS: Status: ACTIVE | Noted: 2021-03-31

## 2021-03-31 LAB
ANION GAP SERPL CALCULATED.3IONS-SCNC: 8 MMOL/L (ref 4–13)
BUN SERPL-MCNC: 14 MG/DL (ref 5–25)
CALCIUM SERPL-MCNC: 9.4 MG/DL (ref 8.3–10.1)
CHLORIDE SERPL-SCNC: 106 MMOL/L (ref 100–108)
CO2 SERPL-SCNC: 28 MMOL/L (ref 21–32)
CREAT SERPL-MCNC: 0.96 MG/DL (ref 0.6–1.3)
ERYTHROCYTE [DISTWIDTH] IN BLOOD BY AUTOMATED COUNT: 12.8 % (ref 11.6–15.1)
GFR SERPL CREATININE-BSD FRML MDRD: 64 ML/MIN/1.73SQ M
GLUCOSE P FAST SERPL-MCNC: 153 MG/DL (ref 65–99)
GLUCOSE SERPL-MCNC: 138 MG/DL (ref 65–140)
GLUCOSE SERPL-MCNC: 153 MG/DL (ref 65–140)
GLUCOSE SERPL-MCNC: 153 MG/DL (ref 65–140)
HBV SURFACE AG SER QL: NORMAL
HCT VFR BLD AUTO: 38.6 % (ref 34.8–46.1)
HCV AB SER QL: NORMAL
HGB BLD-MCNC: 12.3 G/DL (ref 11.5–15.4)
MCH RBC QN AUTO: 30.3 PG (ref 26.8–34.3)
MCHC RBC AUTO-ENTMCNC: 31.9 G/DL (ref 31.4–37.4)
MCV RBC AUTO: 95 FL (ref 82–98)
PLATELET # BLD AUTO: 237 THOUSANDS/UL (ref 149–390)
PMV BLD AUTO: 10 FL (ref 8.9–12.7)
POTASSIUM SERPL-SCNC: 3.9 MMOL/L (ref 3.5–5.3)
RBC # BLD AUTO: 4.06 MILLION/UL (ref 3.81–5.12)
SODIUM SERPL-SCNC: 142 MMOL/L (ref 136–145)
WBC # BLD AUTO: 11.27 THOUSAND/UL (ref 4.31–10.16)

## 2021-03-31 PROCEDURE — 97110 THERAPEUTIC EXERCISES: CPT

## 2021-03-31 PROCEDURE — 99024 POSTOP FOLLOW-UP VISIT: CPT | Performed by: ORTHOPAEDIC SURGERY

## 2021-03-31 PROCEDURE — 82948 REAGENT STRIP/BLOOD GLUCOSE: CPT

## 2021-03-31 PROCEDURE — 99232 SBSQ HOSP IP/OBS MODERATE 35: CPT | Performed by: FAMILY MEDICINE

## 2021-03-31 PROCEDURE — 80048 BASIC METABOLIC PNL TOTAL CA: CPT | Performed by: PHYSICIAN ASSISTANT

## 2021-03-31 PROCEDURE — 97530 THERAPEUTIC ACTIVITIES: CPT

## 2021-03-31 PROCEDURE — 97116 GAIT TRAINING THERAPY: CPT

## 2021-03-31 PROCEDURE — 97166 OT EVAL MOD COMPLEX 45 MIN: CPT

## 2021-03-31 PROCEDURE — 85027 COMPLETE CBC AUTOMATED: CPT | Performed by: PHYSICIAN ASSISTANT

## 2021-03-31 RX ADMIN — INSULIN LISPRO 1 UNITS: 100 INJECTION, SOLUTION INTRAVENOUS; SUBCUTANEOUS at 08:17

## 2021-03-31 RX ADMIN — ENOXAPARIN SODIUM 40 MG: 40 INJECTION SUBCUTANEOUS at 08:15

## 2021-03-31 RX ADMIN — ACETAMINOPHEN 975 MG: 325 TABLET, FILM COATED ORAL at 08:16

## 2021-03-31 RX ADMIN — DOCUSATE SODIUM 100 MG: 100 CAPSULE, LIQUID FILLED ORAL at 08:16

## 2021-03-31 RX ADMIN — Medication 500 MG: at 08:16

## 2021-03-31 RX ADMIN — SENNOSIDES 8.6 MG: 8.6 TABLET, FILM COATED ORAL at 08:16

## 2021-03-31 RX ADMIN — METHOCARBAMOL TABLETS 500 MG: 750 TABLET, COATED ORAL at 11:52

## 2021-03-31 RX ADMIN — METHOCARBAMOL TABLETS 500 MG: 750 TABLET, COATED ORAL at 05:56

## 2021-03-31 RX ADMIN — Medication 1 TABLET: at 08:16

## 2021-03-31 RX ADMIN — IRON SUCROSE 300 MG: 20 INJECTION, SOLUTION INTRAVENOUS at 08:18

## 2021-03-31 NOTE — PLAN OF CARE
Problem: PHYSICAL THERAPY ADULT  Goal: Performs mobility at highest level of function for planned discharge setting  See evaluation for individualized goals  Description: Treatment/Interventions: Functional transfer training, LE strengthening/ROM, Elevations, Therapeutic exercise, Endurance training, Patient/family training, Equipment eval/education, Bed mobility, Gait training, Spoke to nursing, Spoke to case management, Spoke to advanced practitioner, OT  Equipment Recommended: Gianni Chong       See flowsheet documentation for full assessment, interventions and recommendations  Outcome: Progressing  Note: Prognosis: Good  Problem List: Decreased strength, Decreased range of motion, Decreased endurance, Impaired balance, Decreased mobility, Obesity, Orthopedic restrictions, Pain  Assessment: Patient agreeable and motivated to participate in therapy session  Patient demonstrates progression eith functional mobility trials  Multiple sit<>stand transfers with supervision and occasional verbal instruction for technique and R LE positioning  Patient able to ambulate increased gait distance with roller walker (removed platform roller walker with decreased discomfort through IV site)  Improvement throughout trials for step length and heel strike of R LE  Provided instruction for walker management and stepping sequence for directional changes and turns with good understanding and follow through  Pt able to ascend and descend 4 steps with unilateral handrail with inital CVA progressing to close supervision and instruction for sequencing and foot placement on step  Participated in and reviewed provided HEP with AAROM/AROM and good understanding  Continue to focus on OOB mobility with progression of ambulation and stair training as appropriate  Barriers to Discharge: Inaccessible home environment     PT Discharge Recommendation: Home with skilled therapy(OPPT)          See flowsheet documentation for full assessment

## 2021-03-31 NOTE — PLAN OF CARE
Problem: PAIN - ADULT  Goal: Verbalizes/displays adequate comfort level or baseline comfort level  Description: Interventions:  - Encourage patient to monitor pain and request assistance  - Assess pain using appropriate pain scale  - Administer analgesics based on type and severity of pain and evaluate response  - Implement non-pharmacological measures as appropriate and evaluate response  - Consider cultural and social influences on pain and pain management  - Notify physician/advanced practitioner if interventions unsuccessful or patient reports new pain  Outcome: Progressing     Problem: INFECTION - ADULT  Goal: Absence or prevention of progression during hospitalization  Description: INTERVENTIONS:  - Assess and monitor for signs and symptoms of infection  - Monitor lab/diagnostic results  - Monitor all insertion sites, i e  indwelling lines, tubes, and drains  - Monitor endotracheal if appropriate and nasal secretions for changes in amount and color  - [place] appropriate cooling/warming therapies per order  - Administer medications as ordered  - Instruct and encourage patient and family to use good hand hygiene technique  - Identify and instruct in appropriate isolation precautions for identified infection/condition  Outcome: Progressing  Goal: Absence of fever/infection during neutropenic period  Description: INTERVENTIONS:  - Monitor WBC    Outcome: Progressing     Problem: SAFETY ADULT  Goal: Patient will remain free of falls  Description: INTERVENTIONS:  - Assess patient frequently for physical needs  -  Identify cognitive and physical deficits and behaviors that affect risk of falls    -  [place] fall precautions as indicated by assessment   - Educate patient/family on patient safety including physical limitations  - Instruct patient to call for assistance with activity based on assessment  - Modify environment to reduce risk of injury  - Consider OT/PT consult to assist with strengthening/mobility  Outcome: Progressing  Goal: Maintain or return to baseline ADL function  Description: INTERVENTIONS:  -  Assess patient's ability to carry out ADLs; assess patient's baseline for ADL function and identify physical deficits which impact ability to perform ADLs (bathing, care of mouth/teeth, toileting, grooming, dressing, etc )  - Assess/evaluate cause of self-care deficits   - Assess range of motion  - Assess patient's mobility; develop plan if impaired  - Assess patient's need for assistive devices and provide as appropriate  - Encourage maximum independence but intervene and supervise when necessary  - Involve family in performance of ADLs  - Assess for home care needs following discharge   - Consider OT consult to assist with ADL evaluation and planning for discharge  - Provide patient education as appropriate  Outcome: Progressing  Goal: Maintain or return mobility status to optimal level  Description: INTERVENTIONS:  - Assess patient's baseline mobility status (ambulation, transfers, stairs, etc )    - Identify cognitive and physical deficits and behaviors that affect mobility  - Identify mobility aids required to assist with transfers and/or ambulation (gait belt, sit-to-stand, lift, walker, cane, etc )  - [place] fall precautions as indicated by assessment  - Record patient progress and toleration of activity level on Mobility SBAR; progress patient to next Phase/Stage  - Instruct patient to call for assistance with activity based on assessment  - Consider rehabilitation consult to assist with strengthening/weightbearing, etc   Outcome: Progressing     Problem: DISCHARGE PLANNING  Goal: Discharge to home or other facility with appropriate resources  Description: INTERVENTIONS:  - Identify barriers to discharge w/patient and caregiver  - Arrange for needed discharge resources and transportation as appropriate  - Identify discharge learning needs (meds, wound care, etc )  - Arrange for interpretive services to assist at discharge as needed  - Refer to Case Management Department for coordinating discharge planning if the patient needs post-hospital services based on physician/advanced practitioner order or complex needs related to functional status, cognitive ability, or social support system  Outcome: Progressing     Problem: Knowledge Deficit  Goal: Patient/family/caregiver demonstrates understanding of disease process, treatment plan, medications, and discharge instructions  Description: Complete learning assessment and assess knowledge base    Interventions:  - Provide teaching at level of understanding  - Provide teaching via preferred learning methods  Outcome: Progressing

## 2021-03-31 NOTE — ASSESSMENT & PLAN NOTE
Patient had about a 2 g blood loss  Patient is getting IV Venofer  Can check a CBC as an outpatient    Continue with iron supplements

## 2021-03-31 NOTE — PLAN OF CARE
Problem: OCCUPATIONAL THERAPY ADULT  Goal: Performs self-care activities at highest level of function for planned discharge setting  See evaluation for individualized goals  Description: Treatment Interventions: ADL retraining, Functional transfer training, Patient/family training, Equipment evaluation/education, Activityengagement, Energy conservation  Equipment Recommended: Bedside commode, Shower/Tub chair with back ($)       See flowsheet documentation for full assessment, interventions and recommendations  Note: Limitation: Decreased ADL status, Decreased endurance, Decreased high-level ADLs, Decreased self-care trans     Assessment: Pt is a 63 yo female [facility] 3/30[facility]  Pt presents w/ primary osteoarthritis of R knee and significant PMH impacting her occupational performance including DM, tobacco use, B carpal tunnel syndrome, B knee pain  Pt presents s/p R TKA on 3/30/2021 and is WBAT R LE  Per nurse navigator, pt lives w/ daughter in multiple level home  Bedroom is on 2nd floor and 4 JEFF  Pt has access to recliner lift chair, commode, crutches, RW  Upon eval, pt alert and able to communicate wants / needs and follow gestures  Primarily Yoruba speaking but able to participate w/ out   Pt required mod I to complete UBD and S to complete LBD w/ + time after set up and cues for tech  Pt required mod I sit to stand and S stand to sit  Pt engaged in functional mobility w/ S using RW  Pt required min A to complete bed mobility sit to supine  Pt presents w/ decreased R LE ROM / strength, decreased standing tolerance, increased pain, orthopedic restrictions impacting her I w/ dressing, bathing, oral hygiene, functional mobility, functional transfers, activity engagement, clothing mgmt  Pt completing ADL below baseline level of I and would benefit from OT while in acute care to address deficits   Recommend discharge home to PLOF w/ social support when medically stable for discharge from acute care  Will continue to follow pt in acute care 2-3 X / week       OT Discharge Recommendation: Return to previous environment with social support

## 2021-03-31 NOTE — PLAN OF CARE
Problem: PAIN - ADULT  Goal: Verbalizes/displays adequate comfort level or baseline comfort level  Description: Interventions:  - Encourage patient to monitor pain and request assistance  - Assess pain using appropriate pain scale  - Administer analgesics based on type and severity of pain and evaluate response  - Implement non-pharmacological measures as appropriate and evaluate response  - Consider cultural and social influences on pain and pain management  - Notify physician/advanced practitioner if interventions unsuccessful or patient reports new pain  Outcome: Progressing     Problem: INFECTION - ADULT  Goal: Absence or prevention of progression during hospitalization  Description: INTERVENTIONS:  - Assess and monitor for signs and symptoms of infection  - Monitor lab/diagnostic results  - Monitor all insertion sites, i e  indwelling lines, tubes, and drains  - Monitor endotracheal if appropriate and nasal secretions for changes in amount and color  - [place] appropriate cooling/warming therapies per order  - Administer medications as ordered  - Instruct and encourage patient and family to use good hand hygiene technique  - Identify and instruct in appropriate isolation precautions for identified infection/condition  Outcome: Progressing  Goal: Absence of fever/infection during neutropenic period  Description: INTERVENTIONS:  - Monitor WBC    Outcome: Progressing     Problem: SAFETY ADULT  Goal: Patient will remain free of falls  Description: INTERVENTIONS:  - Assess patient frequently for physical needs  -  Identify cognitive and physical deficits and behaviors that affect risk of falls    -  [place] fall precautions as indicated by assessment   - Educate patient/family on patient safety including physical limitations  - Instruct patient to call for assistance with activity based on assessment  - Modify environment to reduce risk of injury  - Consider OT/PT consult to assist with strengthening/mobility  Outcome: Progressing  Goal: Maintain or return to baseline ADL function  Description: INTERVENTIONS:  -  Assess patient's ability to carry out ADLs; assess patient's baseline for ADL function and identify physical deficits which impact ability to perform ADLs (bathing, care of mouth/teeth, toileting, grooming, dressing, etc )  - Assess/evaluate cause of self-care deficits   - Assess range of motion  - Assess patient's mobility; develop plan if impaired  - Assess patient's need for assistive devices and provide as appropriate  - Encourage maximum independence but intervene and supervise when necessary  - Involve family in performance of ADLs  - Assess for home care needs following discharge   - Consider OT consult to assist with ADL evaluation and planning for discharge  - Provide patient education as appropriate  Outcome: Progressing  Goal: Maintain or return mobility status to optimal level  Description: INTERVENTIONS:  - Assess patient's baseline mobility status (ambulation, transfers, stairs, etc )    - Identify cognitive and physical deficits and behaviors that affect mobility  - Identify mobility aids required to assist with transfers and/or ambulation (gait belt, sit-to-stand, lift, walker, cane, etc )  - [place] fall precautions as indicated by assessment  - Record patient progress and toleration of activity level on Mobility SBAR; progress patient to next Phase/Stage  - Instruct patient to call for assistance with activity based on assessment  - Consider rehabilitation consult to assist with strengthening/weightbearing, etc   Outcome: Progressing

## 2021-03-31 NOTE — PROGRESS NOTES
Orthopedics   [place] 62 y o  female MRN: [id number]  Unit/Bed#: S -01      Subjective:  62 y  o female post operative day 1 right total knee arthroplasty  Pt doing well  No pain currently  Had some pain at IV site last night the IV was switched  Denies any numbness or tingling  No fevers or chills      Labs:  0   Lab Value Date/Time    HCT 38 6 03/31/2021 0642    HCT 45 3 03/30/2021 0711    HCT 46 1 02/20/2021 0954    HGB 12 3 03/31/2021 0642    HGB 14 5 03/30/2021 0711    HGB 14 7 02/20/2021 0954    INR 0 95 02/20/2021 0954    WBC 11 27 (H) 03/31/2021 0642    WBC 5 18 03/30/2021 0711    WBC 4 03 (L) 02/20/2021 0954    CRP <3 0 02/20/2021 0954       Meds:    Current Facility-Administered Medications:     acetaminophen (TYLENOL) tablet 975 mg, 975 mg, Oral, Q8H, PADMINI Munson-C, 975 mg at 03/30/21 2137    artificial tear (LUBRIFRESH P M ) ophthalmic ointment, , Both Eyes, 4x Daily PRN, José Miguel Cormier PA-C    ascorbic acid (VITAMIN C) tablet 500 mg, 500 mg, Oral, Daily, PADMINI Munson-C    atenolol-chlorthalidone (TENORETIC 50/25) combination, , Oral, Daily, PADMINI Munson-C    calcium carbonate (TUMS) chewable tablet 1,000 mg, 1,000 mg, Oral, Daily PRN, José Miguel Cormier PA-C    docusate sodium (COLACE) capsule 100 mg, 100 mg, Oral, BID, PADMINI Munson-C, 100 mg at 03/30/21 1824    enoxaparin (LOVENOX) subcutaneous injection 40 mg, 40 mg, Subcutaneous, Daily, PADMINI Munson-C, 40 mg at 03/30/21 2138    fluocinonide (LIDEX) 0 05 % cream, , Topical, Daily PRN, PADMINI Munson-C    folic acid (FOLVITE) tablet 400 mcg, 400 mcg, Oral, Daily, José Miguel Cormier PA-C    HYDROmorphone (DILAUDID) injection 0 5 mg, 0 5 mg, Intravenous, Q2H PRN, José Miguel Cormier PA-C    insulin lispro (HumaLOG) 100 units/mL subcutaneous injection 1-6 Units, 1-6 Units, Subcutaneous, TID AC **AND** Fingerstick Glucose (POCT), , , TID AC, Annemarie Gaspar PA-C   insulin lispro (HumaLOG) 100 units/mL subcutaneous injection 2-12 Units, 2-12 Units, Subcutaneous, HS, Annemarie Gaspar PA-C    iron sucrose (VENOFER) 300 mg in sodium chloride 0 9 % 250 mL IVPB, 300 mg, Intravenous, Daily, Chela Desai PA-C    lactated ringers infusion, 1 5 mL/kg/hr, Intravenous, Continuous, Chela Desai PA-C, Stopped at 03/31/21 6818    methocarbamol (ROBAXIN) tablet 500 mg, 500 mg, Oral, Q6H [facility], Chela Desai PA-C, 500 mg at 03/31/21 0556    multivitamin-minerals (CENTRUM) tablet 1 tablet, 1 tablet, Oral, Daily, Chela Desai PA-C    oxyCODONE (ROXICODONE) IR tablet 10 mg, 10 mg, Oral, Q3H PRN, Chela Desai PA-C, 10 mg at 03/30/21 2138    oxyCODONE (ROXICODONE) IR tablet 5 mg, 5 mg, Oral, Q3H PRN, Chela Desai PA-C    pravastatin (PRAVACHOL) tablet 40 mg, 40 mg, Oral, QPM, Chela Desai PA-C    ropivacaine (NAROPIN) 0 5 % 400 mg, morphine (PF) 4 mg/mL 8 mg, ketorolac (TORADOL) 30 mg, EPINEPHrine PF (ADRENALIN) 0 5 mL, sodium chloride (PF) 0 9 % 16 5 mL injection, , Injection, Once, Chela Desai PA-C    senna (SENOKOT) tablet 8 6 mg, 1 tablet, Oral, Daily, Chela Desai PA-C    Blood Culture:   No results found for: BLOODCX    Wound Culture:   No results found for: WOUNDCULT    Ins and Outs:  I/O last 24 hours: In: 3319 [P O :240; I V :800]  Out: 1100 [Urine:1000; Blood:100]          Physical:  Vitals:    03/31/21 0700   BP: 139/72   Pulse: 72   Resp: 18   Temp: 98 2 °F (36 8 °C)   SpO2: 97%     right lower extremity:  · Dressings C/D/I  · Leg compartments soft and compressible  · SILT L3-S1  · Motor intact knee flexion and extension, 5/5 strength ankle DF/PF and EHL  · Palpable DP pulse    _*_*_*_*_*_*_*_*_*_*_*_*_*_*_*_*_*_*_*_*_*_*_*_*_*_*_*_*_*_*_*_*_*_*_*_*_*_*_*_*_*    Assessment: 62 y  o female post operative day 1 right total knee arthroplasty   Doing well    Plan:  · Weight Bearing as tolerated RLE  · Up and out of bed  · DVT prophylaxis - SCDs and lovenox  · Analgesics  · PT/OT  · Will continue to assess for acute blood loss anemia, Hb 12 3 this AM  Mild leukocytosis of 11 27 likely stress response from surgery, no concern for active infection  · Discharge planning likely home today pending therapy progress    Chela Desai PA-C

## 2021-03-31 NOTE — CASE MANAGEMENT
Cm received consult for discharge planning  Per PT, pt is doing well has been able to manage steps  At this time there are no CM needs  Pt reports she will be doing OP PT upon DC

## 2021-03-31 NOTE — ASSESSMENT & PLAN NOTE
Lab Results   Component Value Date    HGBA1C 7 0 (A) 01/26/2021   · known hx; hgb a1c nearly at goal; metformin at home for medications  · Can transition back to her metformin as an outpatient

## 2021-03-31 NOTE — ASSESSMENT & PLAN NOTE
· POD #1 S/P R knee total arthroplasty  · PT/OT-- PT OT recommendations home with skilled PT    Disposition planning per primary team   [place] from the medical standpoint  · Pain control   · DVT ppx

## 2021-03-31 NOTE — OCCUPATIONAL THERAPY NOTE
Occupational Therapy Evaluation     Patient Name: Claudia Ann  Today's Date: 3/31/2021  Problem List  Principal Problem:    Primary osteoarthritis of right knee  Active Problems:    Type 2 diabetes mellitus without complication, without long-term current use of insulin ([facility] [address] )    Essential hypertension    Hyperlipidemia    Palpitations    Past Medical History  Past Medical History:   Diagnosis Date    Diabetes mellitus ([facility] [address] )     Hyperlipidemia     Hypertension      Past Surgical History  Past Surgical History:   Procedure Laterality Date    NO PAST SURGERIES      CO TOTAL KNEE ARTHROPLASTY Right 3/30/2021    Procedure: KNEE TOTAL ARTHROPLASTY;  Surgeon: Danitza Osorio DO;  Location: [facility];  Service: Orthopedics         03/31/21 1058   OT Last Visit   OT Visit Date 03/31/21  (Wednesday)   Note Type   Note type Evaluation   Restrictions/Precautions   Weight Bearing Precautions Per Order Yes   RLE Weight Bearing Per Order WBAT   Braces or Orthoses   (ace bandage R LE)   Other Precautions WBS; Fall Risk;Pain   Pain Assessment   Pain Assessment Tool FLACC   Pain Location/Orientation Orientation: Right;Location: Knee   Effect of Pain on Daily Activities limits pace and I w/ ADL performance   Patient's Stated Pain Goal No pain   Hospital Pain Intervention(s) Repositioned;Cold applied; Ambulation/increased activity; Emotional support   Pain Rating: FLACC (Rest) - Face 0   Pain Rating: FLACC (Rest) - Legs 0   Pain Rating: FLACC (Rest) - Activity 0   Pain Rating: FLACC (Rest) - Cry 0   Pain Rating: FLACC (Rest) - Consolability 0   Score: FLACC (Rest) 0   Pain Rating: FLACC (Activity) - Face 0   Pain Rating: FLACC (Activity) - Legs 0   Pain Rating: FLACC (Activity) - Activity 0   Pain Rating: FLACC (Activity) - Cry 1   Pain Rating: FLACC (Activity) - Consolability 1   Score: FLACC (Activity) 2   Home Living   Type of Home House   Home Layout Two level; Able to live on main level with bedroom/bathroom  (4 JEFF w/ R hand rail)   [address]; Other (Comment);Crutches  (not using PTA)   Additional Comments Pt lives w/ adult daughter   Prior Function   Level of [place] Independent with ADLs and functional mobility   Lives With [employer] Help From Family   ADL Assistance Independent   IADLs Independent   Falls in the last 6 months 0   Vocational   (worked as )   Comments Pt completed ADL w/ out assistance PTA  Per nurse navigator pt's daughters will assist as needed upon DC   Lifestyle   Autonomy Pt completed ADL w/ out assistance at baseline   Reciprocal Relationships Supportive daughters   Service to Others Pt worked as [employer]    Intrinsic Gratification Will continue to assess   Subjective   Subjective per PCA, Pt would like to get back to bed after OT    ADL   Where Assessed Edge of bed  (vs seated OOB in chair upon arrival)   Eating Assistance 7  Independent   Grooming Assistance 6  Modified Independent   Grooming Deficit Setup; Increased time to complete;Verbal cueing;Supervision/safety;Standing with assistive device   UB Bathing Assistance Unable to assess   LB Bathing Assistance Unable to assess   UB Dressing Assistance 6  Modified independent   UB Dressing Deficit Setup   LB Dressing Assistance 5  Supervision/Setup  (don pants seated at EOB)   LB Dressing Deficit Setup;Verbal cueing; Requires assistive device for steadying;Supervision/safety; Increased time to complete   Toileting Assistance  Unable to assess  (per PTA pt required S and cues for tech)   Bed Mobility   Supine to Sit Unable to assess   Sit to Supine 4  Minimal assistance   Additional items Assist x 1;LE management; Increased time required; Bedrails;HOB elevated   Additional Comments Pt seated OOB in chair upon arrival and supine HOB elevated post eval w/ needs met, call bell in reach and bed alarm activated   Transfers   Sit to Stand 6  Modified independent   Additional items Verbal cues   Stand to Sit 5 Supervision   Additional items Verbal cues   Functional Mobility   Functional Mobility 5  Supervision   Additional items Rolling walker   Balance   Static Sitting Good   Dynamic Sitting Fair   Static Standing Fair   Ambulatory Fair   Activity Tolerance   Activity Tolerance Patient limited by fatigue;Patient limited by pain   Medical Staff Made Aware spoke to Fernando WADSWORTH   Nurse Made Aware per Sierra AGUILLON / Maisha Bah appropriate to see pt   RUE Assessment   RUE Assessment WFL   RUE Strength   RUE Overall Strength Within Functional Limits - able to perform ADL tasks with strength   LUE Assessment   LUE Assessment WFL   LUE Strength   LUE Overall Strength Within Functional Limits - able to perform ADL tasks with strength   Hand Function   Gross Motor Coordination Functional   Fine Motor Coordination Functional   Sensation   Light Touch No apparent deficits   Sharp/Dull Not tested   Cognition   Overall Cognitive Status [facility]   Arousal/Participation Alert; Cooperative   Attention Attends with cues to redirect  (due to language barrier)   Orientation Level Oriented X4   Memory Within functional limits   Following Commands Follows one step commands with increased time or repetition  (language barrier)   Comments Identified pt by full name and wristband  Fernando WADSWORTH assisted w/ translation / education on OT prior to therapist arrival  Pt primarily [address] speaking but able communicate basic wants / needs in [facility] to follow simple commands and gestures  Assessment   Limitation Decreased ADL status; Decreased endurance;Decreased high-level ADLs; Decreased self-care trans   Assessment Pt is a 61 yo female [facility]  Pt presents w/ primary osteoarthritis of R knee and significant PMH impacting her occupational performance including DM, tobacco use, B carpal tunnel syndrome, B knee pain  Pt presents s/p R TKA on 3/30/2021 and is WBAT R LE  Per nurse navigator, pt lives w/ daughter in multiple level home  Bedroom is on 2nd floor and 4 JEFF  Pt has access to recliner lift chair, commode, crutches, RW  Upon eval, pt alert and able to communicate wants / needs and follow gestures  Primarily Green[place]ic speaking but able to participate w/ out   Pt required mod I to complete UBD and S to complete LBD w/ + time after set up and cues for tech  Pt required mod I sit to stand and S stand to sit  Pt engaged in functional mobility w/ S using RW  Pt required min A to complete bed mobility sit to supine  Pt presents w/ decreased R LE ROM / strength, decreased standing tolerance, increased pain, orthopedic restrictions impacting her I w/ dressing, bathing, oral hygiene, functional mobility, functional transfers, activity engagement, clothing mgmt  Pt completing ADL below baseline level of I and would benefit from OT while in acute care to address deficits  Recommend discharge home to OF w/ social support when medically stable for discharge from acute care  Will continue to follow pt in acute care 2-3 X / week  Goals   Patient Goals Pt would like to go home   Plan   Treatment Interventions ADL retraining;Functional transfer training;Patient/family training;Equipment evaluation/education; Activityengagement; Energy conservation   Goal Expiration Date 04/07/21   OT Frequency 2-3x/wk   Recommendation   OT Discharge Recommendation Return to previous environment with social support   Equipment Recommended Bedside commode; Shower/Tub chair with back ($)   Commode Type Standard   AM-PAC Daily Activity Inpatient   Lower Body Dressing 3   Bathing 3   Toileting 3   Upper Body Dressing 4   Grooming 4   Eating 4   Daily Activity Raw Score 21   Daily Activity Standardized Score (Calc for Raw Score >=11) 44 27   AM-PAC Applied Cognition Inpatient   Following a Speech/Presentation 4   Understanding Ordinary Conversation 4   Taking Medications 4   Remembering Where Things Are Placed or Put Away 4   Remembering List of 4-5 Errands 4   Taking Care of Complicated Tasks 4   Applied Cognition Raw Score 24   Applied Cognition Standardized Score 62 21   Barthel Index   Feeding 10   Bathing 0   Grooming Score 5   Dressing Score 5   Bladder Score 10   Bowels Score 10   Toilet Use Score 5   Transfers (Bed/Chair) Score 10   Mobility (Level Surface) Score 10   Stairs Score 5   Barthel Index Score 70     The patient's raw score on the AM-PAC Daily Activity inpatient short form is 21, standardized score is 44 27, greater than 39 4  Patients at this level are likely to benefit from discharge to home  Please refer to the recommendation of the Occupational Therapist for safe discharge planning    Pt goals to be met by 4/7/2021:  -Pt will complete functional transfers to all surfaces w/ mod I using AD, DME as needed to max I w/ ADL performance to return home    -Pt will consistently engage in functional mobility using AD, DME as needed w/ mod I household distances to max I w/ ADL performance    -Pt will complete LBD w/ mod I after set - up using LHAE as needed to max I w/ ADL performance    -Pt will demonstrate increased functional standing tolerance for at least 15 minutes using AD as needed w/at least fair + balance while engaged in ADL tasks standing at sink to max I and improve activity engagement to return home    -Pt will complete bed mobility supine <> sit w/ S to max I     [facility], OTR/L

## 2021-03-31 NOTE — PROGRESS NOTES
[facility]  Progress Note - Marychuy Polanco 1960, 62 y o  female MRN: [id number]  Unit/Bed#: S -23 Encounter: [id number]  Primary Care Provider: Naty Gallegos MD   Date and time admitted to hospital: 3/30/2021  6:48 AM    Acute postoperative anemia due to expected blood loss  Assessment & Plan  Patient had about a 2 g blood loss  Patient is getting IV Venofer  Can check a CBC as an outpatient  Continue with iron supplements    Palpitations  Assessment & Plan  · No complaints at this time  · Consider telemetry of patient has complaints of palpitations    Hyperlipidemia  Assessment & Plan  · Continue statin    Essential hypertension  Assessment & Plan  · BP acceptable  · Continue home atenolol/chlorthalidone     Type 2 diabetes mellitus without complication, without long-term current use of insulin (HCC)  Assessment & Plan    Lab Results   Component Value Date    HGBA1C 7 0 (A) 01/26/2021   · known hx; hgb a1c nearly at goal; metformin at home for medications  · Can transition back to her metformin as an outpatient    * Primary osteoarthritis of right knee  Assessment & Plan  · POD #1 S/P R knee total arthroplasty  · PT/OT-- PT OT recommendations home with skilled PT  Disposition planning per primary team   Vas[place] Hardy from the medical standpoint  · Pain control   · DVT ppx       VTE Pharmacologic Prophylaxis:   Pharmacologic: Enoxaparin (Lovenox)  Mechanical VTE Prophylaxis in Place: No    Patient Centered Rounds: I have performed bedside rounds with nursing staff today  Time Spent for Care: 20 minutes  More than 50% of total time spent on counseling and coordination of care as described above      Current Length of Stay: 0 day(s)    Current Patient Status: Outpatient Surgery       Discharge Plan:  Disposition per primary team but from the medical standpoint patient appears stable    Code Status: No Order      Subjective:   General Appearance:    Alert, cooperative, no distress, appears stated age                               Lungs:     Clear to auscultation bilaterally, respirations unlabored       Heart:    Regular rate and rhythm, S1 and S2 normal, no murmur, rub    or gallop   Abdomen:     Soft, non-tender, bowel sounds active all four quadrants,     no masses, no organomegaly           Extremities:   Extremities normal, atraumatic, no cyanosis or edema                         Objective:     Vitals:   Temp (24hrs), Av 8 °F (36 6 °C), Min:97 4 °F (36 3 °C), Max:98 2 °F (36 8 °C)    Temp:  [97 4 °F (36 3 °C)-98 2 °F (36 8 °C)] 98 2 °F (36 8 °C)  HR:  [65-78] 72  Resp:  [18] 18  BP: (103-139)/(58-86) 139/72  SpO2:  [95 %-100 %] 97 %  Body mass index is 31 57 kg/m²  Input and Output Summary (last 24 hours):        Intake/Output Summary (Last 24 hours) at 3/31/2021 1141  Last data filed at 3/31/2021 9241  Gross per 24 hour   Intake 420 ml   Output 1200 ml   Net -780 ml       Physical Exam:     Physical Exam  (   General Appearance:    Alert, cooperative, no distress, appears stated age                               Lungs:     Clear to auscultation bilaterally, respirations unlabored       Heart:    Regular rate and rhythm, S1 and S2 normal, no murmur, rub    or gallop   Abdomen:     Soft, non-tender, bowel sounds active all four quadrants,     no masses, no organomegaly           Extremities:   Extremities normal, atraumatic, no cyanosis or edema       Additional Data:     Labs:    Results from last 7 days   Lab Units 21  0642   WBC Thousand/uL 11 27*   HEMOGLOBIN g/dL 12 3   HEMATOCRIT % 38 6   PLATELETS Thousands/uL 237     Results from last 7 days   Lab Units 21  0642   SODIUM mmol/L 142   POTASSIUM mmol/L 3 9   CHLORIDE mmol/L 106   CO2 mmol/L 28   BUN mg/dL 14   CREATININE mg/dL 0 96   ANION GAP mmol/L 8   CALCIUM mg/dL 9 4   GLUCOSE RANDOM mg/dL 153*         Results from last 7 days   Lab Units 21  1125 21  0709 21  2059 21  1402 03/30/21  0713   POC GLUCOSE mg/dl 138 153* 261* 326* 178*                   * I Have Reviewed All Lab Data Listed Above  * Additional Pertinent Lab Tests Reviewed:  Joe [address] Admission Reviewed        Recent Cultures (last 7 days):           Last 24 Hours Medication List:   Current Facility-Administered Medications   Medication Dose Route Frequency Provider Last Rate    acetaminophen  975 mg Oral Q8H Bea Goode PA-C      artificial tear   Both Eyes 4x Daily PRN Bea Goode PA-C      ascorbic acid  500 mg Oral Daily Bea Goode PA-C      atenolol-chlorthalidone (TENORETIC 50/25) combination   Oral Daily Bea Goode PA-C      calcium carbonate  1,000 mg Oral Daily PRN Bea Goode PA-C      docusate sodium  100 mg Oral BID Bea Goode PA-C      enoxaparin  40 mg Subcutaneous Daily Bea Goode PA-C      fluocinonide   Topical Daily PRN Bea Goode PA-C      folic acid  338 mcg Oral Daily Bea Goode PA-C      HYDROmorphone  0 5 mg Intravenous Q2H PRN Bea Goode PA-C      insulin lispro  1-6 Units Subcutaneous TID AC Annemarie Gaspar PA-C      insulin lispro  2-12 Units Subcutaneous HS Annemarie Gaspar PA-C      iron sucrose  300 mg Intravenous Daily Bea Goode PA-C      lactated ringers  1 5 mL/kg/hr Intravenous Continuous Bea Goode PA-C Stopped (03/31/21 0734)    methocarbamol  500 mg Oral Q6H [facility] Bea Goode PA-C      multivitamin-minerals  1 tablet Oral Daily Bea Goode PA-C      oxyCODONE  10 mg Oral Q3H PRN Bea Goode PA-C      oxyCODONE  5 mg Oral Q3H PRN Bea Goode PA-C      pravastatin  40 mg Oral QPM [address]BI[address] Injection   Injection Once Bea Goode PA-C      senna  1 tablet Oral Daily Bea Goode PA-C          Today, Patient Was Seen By: Derrick Reyes MD    ** Please Note: Dictation voice to text software may have been used in the creation of this document   **

## 2021-03-31 NOTE — UTILIZATION REVIEW
Initial Clinical Review    Elective outpatient  surgical procedure    Age/Sex: 62 y o  female     Surgery Date: 3/30/2021     Procedure: KNEE TOTAL ARTHROPLASTY (Right)    Anesthesia:  General     Operative Findings: Hypotrophic femoral condyle and sclerotic lateral tibial plateau    POD#1 Progress Note: 3/31/2021 Patient is post operative day one right total knee arthroplasty   Pain is controlled  On exam RLE:  Dressing C/D/I  Motor and sensation intact    To continue to assess ABLA and pain control     Admission Orders: Date/Time/Statement:    Start   Ordered   03/30/21 0740  Outpatient No Charge Bed (Outpatient No Charge Bed/Extended Recovery) Once      03/30/21 0742         Vital Signs: /72 (BP Location: Left arm)   Pulse 72   Temp 98 2 °F (36 8 °C) (Oral)   Resp 18   Ht 5' 2" (1 575 m)   Wt 78 3 kg (172 lb 9 9 oz)   SpO2 97%   BMI 31 57 kg/m²      03/31/21 0700  98 2 °F (36 8 °C)  72  18  139/72  --  97 %  None (Room air)  --  Lying   03/31/21 0257  98 °F (36 7 °C)  70  18  122/86  99  96 %  None (Room air)  --  Lying   03/30/21 2238  97 5 °F (36 4 °C)  78  18  115/61  --  95 %  None (Room air)  --  Lying   03/30/21 1900  97 5 °F (36 4 °C)  65  18  103/58  --  97 %  None (Room air)  --  Lying   03/30/21 1500  97 9 °F (36 6 °C)  77  18  127/66  --  96 %  None (Room air)             Results from last 7 days   Lab Units 03/31/21  0642 03/30/21  0711   WBC Thousand/uL 11 27* 5 18   HEMOGLOBIN g/dL 12 3 14 5   HEMATOCRIT % 38 6 45 3   PLATELETS Thousands/uL 237 237         Results from last 7 days   Lab Units 03/31/21  0642   SODIUM mmol/L 142   POTASSIUM mmol/L 3 9   CHLORIDE mmol/L 106   CO2 mmol/L 28   ANION GAP mmol/L 8   BUN mg/dL 14   CREATININE mg/dL 0 96   EGFR ml/min/1 73sq m 64   CALCIUM mg/dL 9 4     Results from last 7 days   Lab Units 03/31/21  0709 03/30/21  2059 03/30/21  1402 03/30/21  0713   POC GLUCOSE mg/dl 153* 261* 326* 178*     Results from last 7 days   Lab Units 03/31/21  6996 GLUCOSE RANDOM mg/dL 153*       Diet: Regular     Mobility:  Ambulate with walker     DVT Prophylaxis: Lovenox  Bilateral SCDs  Medications/Pain Control:   Scheduled Medications:  acetaminophen, 975 mg, Oral, Q8H  ascorbic acid, 500 mg, Oral, Daily  atenolol-chlorthalidone (TENORETIC 50/25) combination, , Oral, Daily  docusate sodium, 100 mg, Oral, BID  enoxaparin, 40 mg, Subcutaneous, Daily  folic acid, 665 mcg, Oral, Daily  insulin lispro, 1-6 Units, Subcutaneous, TID AC  insulin lispro, 2-12 Units, Subcutaneous, HS  iron sucrose, 300 mg, Intravenous, Daily  methocarbamol, 500 mg, Oral, Q6H [facility]  multivitamin-minerals, 1 tablet, Oral, Daily  pravastatin, 40 mg, Oral, QPM  [facility] Injection, , Injection, Once  senna, 1 tablet, Oral, Daily      Continuous IV Infusions:  lactated ringers, 1 5 mL/kg/hr, Intravenous, Continuous       PRN Meds:  artificial tear, , Both Eyes, 4x Daily PRN  calcium carbonate, 1,000 mg, Oral, Daily PRN  fluocinonide, , Topical, Daily PRN  HYDROmorphone, 0 5 mg, Intravenous, Q2H PRN  oxyCODONE, 10 mg, Oral, Q3H PRN - used x 2   oxyCODONE, 5 mg, Oral, Q3H PRN    urinary cathter - dc post op day one     Network Utilization Review Department  ATTENTION: Please call with any questions or concerns to [phone] and carefully listen to the prompts so that you are directed to the right person  All voicemails are confidential   Kelly Hunter all requests for admission clinical reviews, approved or denied determinations and any other requests to dedicated fax number below belonging to the campus where the patient is receiving treatment   List of dedicated fax numbers for the Facilities:  [address] DENIALS (Administrative/Medical Necessity) [phone]   [address] (Maternity/NICU/Pediatrics) 261 [facility],[address]  [address] [place] [address] [address]) [address] [place] [address] [address] [phone]   [place] [address] [facility] [phone]

## 2021-03-31 NOTE — PHYSICAL THERAPY NOTE
PHYSICAL THERAPY NOTE      Patient Name: Marychuy Polanco  Today's Date: 3/31/2021        03/31/21 1883   PT Last Visit   PT Visit Date 21   Note Type   Note Type BID visit/treatment   Pain Assessment   Pain Assessment Tool 0-10   Pain Score 5   Pain Location/Orientation Orientation: Right;Location: Knee   Restrictions/Precautions   Weight Bearing Precautions Per Order Yes   RLE Weight Bearing Per Order WBAT   Other Precautions Fall Risk;Pain   General   Family/Caregiver Present No   Subjective   Subjective Patient supine in bed and is agreeable to participate in therapy session  Patient identifers obtained from name &   Bed Mobility   Supine to Sit 4  Minimal assistance   Additional items Assist x 1;HOB elevated; Bedrails; Increased time required;Verbal cues;LE management   Sit to Supine Unable to assess   Additional Comments Patient seated OOB in recliner post session with call bell and belongings in reach  Transfers   Sit to Stand 5  Supervision   Additional items Assist x 1; Armrests; Increased time required;Verbal cues   Stand to Sit 5  Supervision   Additional items Assist x 1; Armrests; Increased time required;Verbal cues   Toilet transfer 5  Supervision   Additional items Assist x 1; Increased time required;Verbal cues; Commode;Standard toilet  (commode over toilet)   Ambulation/Elevation   Gait pattern Antalgic;Decreased R stance; Excessively slow; Step to;Short stride   Gait Assistance 5  Supervision   Additional items Assist x 1;Verbal cues   Assistive Device Rolling walker   Distance 220' x1   Stair Management Assistance 5  Supervision  (CGA initally progressing to close supervision)   Additional items Assist x 1;Verbal cues; Increased time required   Stair Management Technique One rail L;Step to pattern; Foreward   Number of Stairs 4   Balance   Static Sitting Good   Static Standing Fair   Ambulatory Fair   Activity Tolerance   Activity Tolerance Patient limited by fatigue;Patient limited by pain   Medical Staff Made Aware Spoke to [facility], PT   Nurse Made Aware Spoke to Samantha Zazueta RN    Exercises   Quad Sets Sitting;15 reps;AROM; Right  (in recliner)   Heelslides Sitting;5 reps;AAROM; Right  (in recliner)   Ankle Pumps Sitting;20 reps;AROM; Bilateral  (in recliner)   Assessment   Prognosis Good   Problem List Decreased strength;Decreased range of motion;Decreased endurance; Impaired balance;Decreased mobility;Obesity;Orthopedic restrictions;Pain   Assessment Patient agreeable and motivated to participate in therapy session  Patient demonstrates progression eith functional mobility trials  Multiple sit<>stand transfers with supervision and occasional verbal instruction for technique and R LE positioning  Patient able to ambulate increased gait distance with roller walker (removed platform roller walker with decreased discomfort through IV site)  Improvement throughout trials for step length and heel strike of R LE  Provided instruction for walker management and stepping sequence for directional changes and turns with good understanding and follow through  Pt able to ascend and descend 4 steps with unilateral handrail with inital CVA progressing to close supervision and instruction for sequencing and foot placement on step  Participated in and reviewed provided HEP with AAROM/AROM and good understanding  Continue to focus on OOB mobility with progression of ambulation and stair training as appropriate  Goals   Patient Goals to go home   STG Expiration Date 04/04/21   PT Treatment Day 2   Plan   Treatment/Interventions Functional transfer training;LE strengthening/ROM; Elevations; Therapeutic exercise; Endurance training;Patient/family training;Equipment eval/education; Bed mobility;Gait training;Spoke to nursing;Spoke to case management;OT   PT Frequency Weekend; Twice a day   Recommendation   PT Discharge Recommendation Home with skilled therapy  (OPPT)   Equipment Recommended [facility] Recommended Wheeled walker  (has one at home)   Zia 8 in Bed Without Bedrails 3   Lying on Back to Sitting on Edge of Flat Bed 3   Moving Bed to Chair 3   Standing Up From Chair 4   Walk in Room 3   Climb 3-5 Stairs 3   Basic Mobility Inpatient Raw Score 19   Basic Mobility Standardized Score 42 48       Sapphire Rushing, PTA

## 2021-04-01 ENCOUNTER — TELEPHONE (OUTPATIENT)
Dept: OBGYN CLINIC | Facility: HOSPITAL | Age: 61
End: 2021-04-01

## 2021-04-01 NOTE — TELEPHONE ENCOUNTER
[phone] Yumi Pt  Aurelia Osler : 1960 The incision from the surgery performed on the right knee is bleeding

## 2021-04-01 NOTE — TELEPHONE ENCOUNTER
Please let them know nothing else needs to be done at this time, keep a dry clean dressing over it and change daily or as needed until we see her next week   She can call us with any changes before then

## 2021-04-01 NOTE — TELEPHONE ENCOUNTER
Dr Lino Victoria    Patient daughter called, she said the patient was bleeding from the incision yesterday  She changed the dressing and the bleeding has stopped  She wanted Dr Lino Victoria to know  Is there anything else she should be doing?      # [phone]

## 2021-04-01 NOTE — TELEPHONE ENCOUNTER
Spoke to patient's daughter Evelyne Spurling  Advised above  Verbalized understanding  Will call if any other concerns arise

## 2021-04-02 NOTE — TELEPHONE ENCOUNTER
Spoke to Daughter, Antwan, at request of Barby Stubbs  She is changing dressing twice daily due small amount of serosanguineous drainage  Denies redness along incision  Antwan reports a "pinpoint"  Size area from bottom of incision that is draining  No pus, yellow drainage, fevers  They will continue to monitor and call back with increasing drainage, fever or additional concerns  They plan to keep currently scheduled Surgeon F/u for 4/7  They are Icing, not elevating frequently  Pt complains of pain in her "surgery knee and heel"  Daughter reports heel pain is "probably where it has been resting when she has been laying", denies foot swelling or discoloration  Denies calf redness or pain  Swelling moderate  Advised to elevate multiple times a day above heart level to decrease swelling  Goal for PT next week, encouraged them, educated them on importance of OP PT  Educated on importance of ambulation frequently with RW, they deny any falls  AVS, AVS med list and F/Us reviewed with pt   they report pt is taking Oxycodone 1 tablet every 4 hours  After an hour or two, two tylenol used, (1000mg) if pt still uncomfortable  Educated on MDD 3G   Lovenox injection, daughter is administering each day, no questions  LBM day before surgery  Pt drank senna tea this AM, awaiting BM  Passing gas, no nausea or vomiting  Pt was advised on a BM plan: While taking oxycodone, take Colace 100mg BID  Increase oral fluids to 6-8 glasses of water/day  Increase ambulation frequency  Eat small, frequent, meals high in protein and fiber  Eat Prunes or Drink prune juice  Call back for further assistance or consult PCP        Daughter is pleased with call  She denies any questions or concerns at this time  Given [phone] pt service center ph# to call night/weekends  They denies additional questions or concerns at this time, they will continue to monitor and call back with concerns

## 2021-04-06 ENCOUNTER — OFFICE VISIT (OUTPATIENT)
Dept: PHYSICAL THERAPY | Facility: REHABILITATION | Age: 61
End: 2021-04-06
Payer: COMMERCIAL

## 2021-04-06 DIAGNOSIS — G89.29 CHRONIC PAIN OF RIGHT KNEE: ICD-10-CM

## 2021-04-06 DIAGNOSIS — M25.561 CHRONIC PAIN OF RIGHT KNEE: ICD-10-CM

## 2021-04-06 DIAGNOSIS — M17.11 PRIMARY OSTEOARTHRITIS OF RIGHT KNEE: Primary | ICD-10-CM

## 2021-04-06 DIAGNOSIS — Z96.651 STATUS POST TOTAL RIGHT KNEE REPLACEMENT: ICD-10-CM

## 2021-04-06 PROCEDURE — 97110 THERAPEUTIC EXERCISES: CPT | Performed by: PHYSICAL THERAPIST

## 2021-04-06 PROCEDURE — 97112 NEUROMUSCULAR REEDUCATION: CPT | Performed by: PHYSICAL THERAPIST

## 2021-04-06 PROCEDURE — 97164 PT RE-EVAL EST PLAN CARE: CPT | Performed by: PHYSICAL THERAPIST

## 2021-04-06 NOTE — PROGRESS NOTES
PT Evaluation     Today's date: 2021  Patient name: Demond Siddiqi  : 1960  MRN: [id number]  Referring provider: Joshua Raphael  Dx:   Encounter Diagnosis     ICD-10-CM    1  Primary osteoarthritis of right knee  M17 11    2  Chronic pain of right knee  M25 561     G89 29    3  Status post total right knee replacement  Z96 651        Start Time: 0805  Stop Time: 0850  Total time in clinic (min): 45 minutes    Assessment  Assessment details: Demond Siddiqi is a 62 y o  female patient presenting with right knee pain s/p right TKA performed by Dr Jacquie Grier on 3/30/2021  Patient is currently having difficulty with pain, ambulation, stair negotiation, transfers, strength, range of motion and swelling  Pain is relieved or reduced with pain medication, ice and rest   Pt would like to return to being able to walk, climb stairs, and return to work as a   Next follow up with the physician is 2021  Maximal education provided this session regarding importance of continued mobility, ambulation and right knee range of motion for optimal recovery  Educated patient and her daughter on bending her knee with ambulation and sitting, avoiding supporting leg with pillow under the knee and compliance to HEP/PT  Reviewed symptoms of DVT and infection with patient with verbalized understanding and compliance  Impairments: abnormal gait, abnormal or restricted ROM, impaired physical strength, lacks appropriate home exercise program and pain with function  Barriers to therapy: Language barrier  Understanding of Dx/Px/POC: good   Prognosis: good    Goals  ST  Patient will have decreased pain by about 50% for improved mobility and compliance  2  Patient will improve right knee range of motion by at least 25% for improved ambulation  LT  Patient will be independent with HEP at discharge    2  Patient will demonstrate at least 120* of right knee range of motion for reciprocal stair negotiation without limitation from right knee  3  Patient will demonstrate improved in RLE strength by at least 2-3 grades for improved ambulation and return to work  4  Patient will ambulate with normal reciprocal pattern and no AD for return to prior level of function  Plan  Plan details: Thank you for opportunity to participate in the care of Ellen Jeter  Patient would benefit from: skilled physical therapy  Planned modality interventions: cryotherapy, unattended electrical stimulation and TENS  Planned therapy interventions: home exercise program, therapeutic exercise, therapeutic activities, stretching, strengthening, patient education, neuromuscular re-education and manual therapy  Frequency: 3x week  Duration in visits: 16  Plan of Care beginning date: 4/6/2021  Treatment plan discussed with: patient and family        Subjective Evaluation    History of Present Illness  Date of surgery: 3/30/2021  Mechanism of injury: Post-op eval 4/6/2021:  Patient reports to PT today post-operative Right TKA performed on 3/30/2021 by Dr Mitchell Wong  Patient's daughter David Hurley present throughout evaluation to assist in translation and answering subjective questions  Patient denies any infections or complications following surgery  Mild bleeding to distal incision over the last few days that has subsided and MD was notified  Patient's daughter changed dressing with dry, clean dressing  Patient reports swelling the first couple of days but has decreased with icing and elevation  Next follow up with MD on 4/7/2021  Patient reports she put weight on it POD 1 and since has not been able to put weight on it without pain  She is having difficulty ambulating even short distances from living room to kitchen  Was able to complete 4 stairs out of house today with difficulty and pain  She points to medial and lateral joint lines and feels that it is tight   She has been regularly taking pain medication  Pre-op eval 3/15/2021:  Dolly Garcia is a 62 y o  female  presenting with primary osteoarthritis right knee and pre TKA to be performed by Dr Jessica Rinaldi on 3/30/2021  Currently having difficulty with right knee pain, right knee buckling multiple times per day, walking, stairs and driving  Patient's daughter [facility] present throughout evaluation for translation  Home set-up and functional level:  Steps to enter home 4 steps  RHR  First floor set up No  Second floor set up Full bathroom and bedroom  Number of steps to second floor Greater than 10 steps BHR   Previous falls: None   Rolling Walker  Commode  Assist at home Daughter  Grandson  PT goals: "get back to normal" Get back to work as a , negotiate stairs, walk normally and independently     Quality of life: good    Pain  Current pain ratin  At best pain ratin  At worst pain rating: 10  Quality: dull ache  Relieving factors: rest, ice, relaxation and support  Aggravating factors: sitting, stair climbing, standing, walking and lifting  Progression: worsening    Social Support  Steps to enter house: yes  Stairs in house: yes   Lives in: multiple-level home  Lives with: adult children    Employment status: not working  Treatments  Previous treatment: physical therapy, medication and injection treatment  Current treatment: physical therapy  Patient Goals  Patient goals for therapy: decreased pain, decreased edema, increased strength, return to work, increased motion, improved balance and independence with ADLs/IADLs          Objective     Observations     Right Knee   Positive for edema and incision  Additional Observation Details  Normal lazaro-incisional warmth and redness   Incision intact and dry with dried blood to inferior aspect   Covered with clean, dry dressing    Tenderness     Right Knee   Tenderness in the lateral joint line and medial joint line       Active Range of Motion     Right Knee   Flexion: 25 degrees with pain  Extension: -15 degrees with pain    Passive Range of Motion     Right Knee   Flexion: 25 degrees with pain  Extension: -15 degrees with pain    Strength/Myotome Testing     Left Knee   Quadriceps contraction: fair    Right Knee   Flexion: 2  Extension: 2  Quadriceps contraction: poor    General Comments:      Knee Comments  No active quad contraction noted with compensation from glutes and calf    Post-op TUG: with RW CS 1'    Gait observation with RW: Very slowed speed lacking active right knee flexion with patient locking knee into extension, no heel strike or toe off and mild hip circumduction noted, significant reliance on BUE on RW    Sit <> stand: BUE support with right knee extended and weight shift to LLE  Supine <> sit: slowed speed with max A to support and move RLE with minimal active assist from patient                Precautions: see protocol   * Indicates part of HEP     Daily Treatment Diary     Manuals 3/15 3/30           PROM right knee                          Neuro Re-ed             Quad sets* :05x20 8 poor activation                        Heel slides w strap* :05x10 5 PT assist           Seated heel slides w overpressure             Gastroc stretch strap  :10x10           Hamstring stretch                          Tandem stance (progress to foam)             SLS (progress to foam)             SLS abduction                          Therapeutic Exercise             Bike             Patient education  10'           SLR flexion w/ quad set* 10            SLR hip abduction              Bridging*             Clamshells             Heel raises                          LAQ* 10                         Therapeutic Activity             Leg press              Step ups             Lateral step ups             STS                                                    Modalities             CP PRN

## 2021-04-07 ENCOUNTER — OFFICE VISIT (OUTPATIENT)
Dept: OBGYN CLINIC | Facility: CLINIC | Age: 61
End: 2021-04-07

## 2021-04-07 VITALS — BODY MASS INDEX: 31.65 KG/M2 | HEIGHT: 62 IN | WEIGHT: 172 LBS

## 2021-04-07 DIAGNOSIS — M17.11 PRIMARY OSTEOARTHRITIS OF RIGHT KNEE: ICD-10-CM

## 2021-04-07 DIAGNOSIS — Z96.651 STATUS POST TOTAL KNEE REPLACEMENT, RIGHT: Primary | ICD-10-CM

## 2021-04-07 PROCEDURE — 99024 POSTOP FOLLOW-UP VISIT: CPT | Performed by: ORTHOPAEDIC SURGERY

## 2021-04-07 RX ORDER — METHOCARBAMOL 750 MG/1
750 TABLET, FILM COATED ORAL EVERY 6 HOURS PRN
Qty: 60 TABLET | Refills: 0 | Status: SHIPPED | OUTPATIENT
Start: 2021-04-07 | End: 2021-06-15

## 2021-04-07 RX ORDER — GABAPENTIN 100 MG/1
100 CAPSULE ORAL 3 TIMES DAILY
Qty: 42 CAPSULE | Refills: 0 | Status: SHIPPED | OUTPATIENT
Start: 2021-04-07 | End: 2021-06-15

## 2021-04-07 RX ORDER — OXYCODONE HYDROCHLORIDE 5 MG/1
5 TABLET ORAL EVERY 4 HOURS PRN
Qty: 20 TABLET | Refills: 0 | Status: SHIPPED | OUTPATIENT
Start: 2021-04-07 | End: 2021-04-14

## 2021-04-07 NOTE — PROGRESS NOTES
Assessment:    status post right total knee arthroplasty on 03/30/2021    Plan:    weight-bearing as tolerated   activities as tolerated   PT   prescription for thigh-high Derrick stockings and handout was given, was encouraged that she wear the thigh-high Derrick stockings and this was reinforced with her as she reports that she has not been wearing them secondary to the pair that she had being too tight   oxycodone prescription refilled, gabapentin and Robaxin were provided as well   patient may take occasional naproxen as needed for pain   continue Lovenox    Follow Up:   10-14 days    To Do Next Visit:   x-ray of the right knee two view and removal of Prineo dressing as long as appropriate      CHIEF COMPLAINT:  No chief complaint on file  SUBJECTIVE:  Allison Pendleton is a 62y o  year old female who presents for follow up after   Right total knee arthroplasty  Today patient has  Pain that is moderate to severe in the anterior knee  She is here with her daughter who wait and transition  she denied numbness or tingling      She denies fevers or chills  She has run out of oxycodone yesterday  She has been having some pain of physical therapy  Had some drainage has stopped on Sunday night from the inferior aspect the dressing  PHYSICAL EXAMINATION:    MUSCULOSKELETAL EXAMINATION:  General: Alert and oriented x 3, WNWD, NAD  Incision:  incision is clean dry and intact overlying dressing is dry without any bloody drainage  There is some evidence of mild oozing at the inferior aspect of the incision overall no active drainage currently  Prineo   Dressing is intact  There is mild swelling and trace effusion of the knee  No erythema    Range of Motion: Limited due to pain  Neurovascular status: Neuro intact and   Extremities warm and well perfused        STUDIES REVIEWED:   none      PROCEDURES PERFORMED:  Procedures  No Procedures performed today

## 2021-04-08 ENCOUNTER — OFFICE VISIT (OUTPATIENT)
Dept: PHYSICAL THERAPY | Facility: REHABILITATION | Age: 61
End: 2021-04-08
Payer: COMMERCIAL

## 2021-04-08 DIAGNOSIS — G89.29 CHRONIC PAIN OF RIGHT KNEE: ICD-10-CM

## 2021-04-08 DIAGNOSIS — M25.561 CHRONIC PAIN OF RIGHT KNEE: ICD-10-CM

## 2021-04-08 DIAGNOSIS — Z96.651 STATUS POST TOTAL RIGHT KNEE REPLACEMENT: ICD-10-CM

## 2021-04-08 DIAGNOSIS — M17.11 PRIMARY OSTEOARTHRITIS OF RIGHT KNEE: Primary | ICD-10-CM

## 2021-04-08 DIAGNOSIS — Z01.818 PRE-OPERATIVE CLEARANCE: ICD-10-CM

## 2021-04-08 PROCEDURE — 97112 NEUROMUSCULAR REEDUCATION: CPT | Performed by: PHYSICAL THERAPIST

## 2021-04-08 PROCEDURE — 97140 MANUAL THERAPY 1/> REGIONS: CPT | Performed by: PHYSICAL THERAPIST

## 2021-04-08 PROCEDURE — 97110 THERAPEUTIC EXERCISES: CPT | Performed by: PHYSICAL THERAPIST

## 2021-04-08 NOTE — PROGRESS NOTES
Daily Note     Today's date: 2021  Patient name: Precious Meigs  : 1960  MRN: [id number]  Referring provider: Rosalia Houser  Dx:   Encounter Diagnosis     ICD-10-CM    1  Primary osteoarthritis of right knee  M17 11    2  Chronic pain of right knee  M25 561     G89 29    3  Status post total right knee replacement  Z96 651        Start Time: 1015  Stop Time: 1100  Total time in clinic (min): 45 minutes    Subjective: Patient's daughter reports       Objective: See treatment diary below      Assessment: Tolerated treatment well  Limited with completion of TE and manual therapy this visit due to very poor pain tolerance and frequent rest breaks required  Very poor tolerance to manual therapy EOB and supine, however able to achieve ~30* knee flexion  Maximal education provided to patient and daughter regarding importance of bending and straightening the knee at home, frequent ambulation and gait training  Verbal and tactile cues provided to facilitate knee flexion/extension with ambulation with patient taking very slow steps, significant BUE weightbearing, right hip circumduction to advance leg and lack of TKE/heel strike  Minimal quad activation noted with quad sets, significant glute compensation  Some improvement with tactile cues including tapping and simultaneous contralateral contraction for neuromuscular re-education  Patient demonstrated fatigue post treatment, exhibited good technique with therapeutic exercises and would benefit from continued PT  Plan: Continue per plan of care        Precautions: see protocol   * Indicates part of HEP     Daily Treatment Diary     Manuals 3/15 3/30 4/8          PROM right knee   15'                       Neuro Re-ed             Quad sets* :05x20 8 poor activation 2x10 TCs                       Heel slides w strap* :05x10 5 PT assist           Seated heel slides w overpressure             Gastroc stretch strap  :10x10 :10x10 Hamstring stretch                          Tandem stance (progress to foam)             SLS (progress to foam)             SLS abduction                          Therapeutic Exercise             Bike             Patient education  10' 10'          SLR flexion w/ quad set* 10            SLR hip abduction              Bridging*             Clamshells             Heel raises                          LAQ* 10                         Therapeutic Activity             Leg press              Step ups             Lateral step ups             STS                          Gait training w RW   5'                        Modalities             CP PRN

## 2021-04-12 ENCOUNTER — OFFICE VISIT (OUTPATIENT)
Dept: PHYSICAL THERAPY | Facility: REHABILITATION | Age: 61
End: 2021-04-12
Payer: COMMERCIAL

## 2021-04-12 DIAGNOSIS — Z96.651 STATUS POST TOTAL RIGHT KNEE REPLACEMENT: ICD-10-CM

## 2021-04-12 DIAGNOSIS — M17.10 ARTHRITIS OF KNEE: ICD-10-CM

## 2021-04-12 DIAGNOSIS — M25.561 CHRONIC PAIN OF RIGHT KNEE: ICD-10-CM

## 2021-04-12 DIAGNOSIS — Z01.818 PRE-OPERATIVE CLEARANCE: ICD-10-CM

## 2021-04-12 DIAGNOSIS — G89.29 CHRONIC PAIN OF RIGHT KNEE: ICD-10-CM

## 2021-04-12 DIAGNOSIS — M17.11 PRIMARY OSTEOARTHRITIS OF RIGHT KNEE: Primary | ICD-10-CM

## 2021-04-12 PROCEDURE — 97140 MANUAL THERAPY 1/> REGIONS: CPT | Performed by: PHYSICAL THERAPIST

## 2021-04-12 PROCEDURE — 97110 THERAPEUTIC EXERCISES: CPT | Performed by: PHYSICAL THERAPIST

## 2021-04-12 PROCEDURE — 97112 NEUROMUSCULAR REEDUCATION: CPT | Performed by: PHYSICAL THERAPIST

## 2021-04-12 PROCEDURE — 97530 THERAPEUTIC ACTIVITIES: CPT | Performed by: PHYSICAL THERAPIST

## 2021-04-12 NOTE — PROGRESS NOTES
Daily Note     Today's date: 2021  Patient name: Magdalene Olivier  : 1960  MRN: [id number]  Referring provider: Tierra Booth  Dx:   Encounter Diagnosis     ICD-10-CM    1  Primary osteoarthritis of right knee  M17 11    2  Chronic pain of right knee  M25 561     G89 29    3  Status post total right knee replacement  Z96 651    4  Pre-operative clearance  Z01 818        Start Time:   Stop Time: 184  Total time in clinic (min): 52 minutes    Subjective: Patient's daughter reports she is doing her stretches at home and walking around  Objective: See treatment diary below  Right knee flexion A/PROM 30*/45*    Assessment: Tolerated treatment well  Maximal time spent educating patient and patient's daughter regarding importance of re-gaining flexion/extension motion for return to functional activities and improved quality of life, minimal verbalized understanding  Patient unable to tolerate >45* knee flexion with maximal education and cues for relaxation reporting 8/10 pain  Very minimal active quadriceps contraction noted, however improved from previous visits  Increased focus on pre-gait and gait training with good carryover but significantly lacking functional flexion/TKE  Patient demonstrated fatigue post treatment and would benefit from continued PT  Plan: Continue per plan of care        Precautions: see protocol   * Indicates part of HEP     Daily Treatment Diary     Manuals 3/15 3/30 4/8 4/12         PROM right knee   15' 15'         Supine extension overpressure    Done         Neuro Re-ed             Quad sets* :05x20 8 poor activation 2x10 TCs 2x10                      Heel slides w strap* :05x10 5 PT assist  7 PT assist         Seated heel slides w overpressure             Gastroc stretch strap  :10x10 :10x10  :10x10         Hamstring stretch                          Tandem stance (progress to foam)             SLS (progress to foam)             SLS abduction Therapeutic Exercise             Bike             Patient education  8' 10' 8'         SLR flexion w/ quad set* 10            SLR hip abduction              Bridging*             Clamshells             Heel raises                          LAQ* 10                         Therapeutic Activity             Leg press              Step ups             Lateral step ups             STS                          Gait training w RW   5'  5' TCs         Standing march to TKE    5' TCs         Modalities             CP PRN

## 2021-04-13 RX ORDER — ACETAMINOPHEN 500 MG
TABLET ORAL
Qty: 120 TABLET | Refills: 0 | Status: SHIPPED | OUTPATIENT
Start: 2021-04-13 | End: 2021-11-22 | Stop reason: SDUPTHER

## 2021-04-14 ENCOUNTER — OFFICE VISIT (OUTPATIENT)
Dept: PHYSICAL THERAPY | Facility: REHABILITATION | Age: 61
End: 2021-04-14
Payer: COMMERCIAL

## 2021-04-14 DIAGNOSIS — M17.11 PRIMARY OSTEOARTHRITIS OF RIGHT KNEE: Primary | ICD-10-CM

## 2021-04-14 DIAGNOSIS — G89.29 CHRONIC PAIN OF RIGHT KNEE: ICD-10-CM

## 2021-04-14 DIAGNOSIS — Z96.651 STATUS POST TOTAL RIGHT KNEE REPLACEMENT: ICD-10-CM

## 2021-04-14 DIAGNOSIS — Z01.818 PRE-OPERATIVE CLEARANCE: ICD-10-CM

## 2021-04-14 DIAGNOSIS — M25.561 CHRONIC PAIN OF RIGHT KNEE: ICD-10-CM

## 2021-04-14 PROCEDURE — 97110 THERAPEUTIC EXERCISES: CPT | Performed by: PHYSICAL THERAPIST

## 2021-04-14 PROCEDURE — 97140 MANUAL THERAPY 1/> REGIONS: CPT | Performed by: PHYSICAL THERAPIST

## 2021-04-14 PROCEDURE — 97112 NEUROMUSCULAR REEDUCATION: CPT | Performed by: PHYSICAL THERAPIST

## 2021-04-14 NOTE — PROGRESS NOTES
Daily Note     Today's date: 2021  Patient name: Flores Blankenship  : 1960  MRN: [id number]  Referring provider: Dipika Gtz  Dx:   Encounter Diagnosis     ICD-10-CM    1  Primary osteoarthritis of right knee  M17 11    2  Pre-operative clearance  Z01 818    3  Chronic pain of right knee  M25 561     G89 29    4  Status post total right knee replacement  Z96 651        Start Time: 845  Stop Time: 945  Total time in clinic (min): 60 minutes    Subjective: Patient's daughter reports they worked on bending the knee twice yesterday getting to about 30*  She reports not much pain at start of session after taking Oxycodone  Objective: See treatment diary below  Pre-tx PROM: 20* flex, -8* ext  Post-tx PROM: 50* flex assisted, -3* ext      Assessment: Tolerated treatment fair  Somewhat improved tolerance to passive extension stretching with improved ROM measures pre and post treatment  Patient very resistant to passive flexion stretching, however improved motion and tolerance to heel slides with some overpressure  Continue to emphasize importance of compliance to HEP and allow functional knee bending with ambulation, sitting, standing, etc  Patient would benefit from continued PT  Plan: Continue per plan of care        Precautions: see protocol   * Indicates part of HEP     Daily Treatment Diary     Manuals 3/15 3/30 4/8 4/12 4/14        PROM right knee   15' 15' Seated  &  Supine        Supine extension overpressure    Done Done 25' total        Neuro Re-ed             Quad sets* :05x20 8 poor activation 2x10 TCs 2x10                      Heel slides w strap* :05x10 5 PT assist  7 PT assist 10        Seated heel slides w overpressure             Gastroc stretch strap  :10x10 :10x10  :10x10         Hamstring stretch     :10x10                     Tandem stance (progress to foam)             SLS (progress to foam)             SLS abduction                          Therapeutic Exercise             Bike             Patient education  8' 10' 8' 8'        SLR flexion w/ quad set* 10            SLR hip abduction              Bridging*             Clamshells             Heel raises                          LAQ* 10                         Therapeutic Activity             Leg press              Step ups             Lateral step ups             STS                          Gait training w RW   5'  5' TCs         Standing march to TKE    5' TCs         Modalities             CP PRN

## 2021-04-16 ENCOUNTER — OFFICE VISIT (OUTPATIENT)
Dept: PHYSICAL THERAPY | Facility: REHABILITATION | Age: 61
End: 2021-04-16
Payer: COMMERCIAL

## 2021-04-16 DIAGNOSIS — M17.11 PRIMARY OSTEOARTHRITIS OF RIGHT KNEE: Primary | ICD-10-CM

## 2021-04-16 DIAGNOSIS — Z96.651 STATUS POST TOTAL RIGHT KNEE REPLACEMENT: ICD-10-CM

## 2021-04-16 DIAGNOSIS — M25.561 CHRONIC PAIN OF RIGHT KNEE: ICD-10-CM

## 2021-04-16 DIAGNOSIS — G89.29 CHRONIC PAIN OF RIGHT KNEE: ICD-10-CM

## 2021-04-16 PROCEDURE — 97110 THERAPEUTIC EXERCISES: CPT

## 2021-04-16 PROCEDURE — 97112 NEUROMUSCULAR REEDUCATION: CPT

## 2021-04-16 PROCEDURE — 97140 MANUAL THERAPY 1/> REGIONS: CPT

## 2021-04-16 NOTE — PROGRESS NOTES
Daily Note     Today's date: 2021  Patient name: Khalida Baker  : 1960  MRN: [id number]  Referring provider: Zaina Puente  Dx:   Encounter Diagnosis     ICD-10-CM    1  Primary osteoarthritis of right knee  M17 11    2  Chronic pain of right knee  M25 561     G89 29    3  Status post total right knee replacement  Z96 651        Start Time: 1020  Stop Time: 1108  Total time in clinic (min): 48 minutes    Subjective: Patient's daughter reports patient able to achieve approx 50-55 degrees knee flexion at home  She reports pain has been "manageable " No complaints after previous session  Objective: See treatment diary below      Assessment: Tolerated treatment fair  Patient demonstrated fatigue post treatment and would benefit from continued PT Patient continues to demonstrate significant guarding with manuals, especially flexion  Improved tolerance to to AAROM heel sides first and then added over pressure from therapist   Patient able to achieve approx 60 degreees knee flexion with overpressure from therapist  Consistent tactile and verbal cues for patient to avoid hip hiking during stretching, little carry over  Both patient and patien'ts daughter educated again on importance of HEP and focus on increased knee flexion, bother reporting understanding  Plan: Continue per plan of care  Progress treatment as tolerated         Precautions: see protocol   * Indicates part of HEP     Daily Treatment Diary     Manuals 3/15 3/30 4/8 4/12 4/14 4/16       PROM right knee   15' 15' Seated  &  Supine Seated & supine       Supine extension overpressure    Done Done 25' total Done 25' total       Neuro Re-ed             Quad sets* :05x20 8 poor activation 2x10 TCs 2x10  2x10                    Heel slides w strap* :05x10 5 PT assist  7 PT assist 10 seated 20   Supine 10        Seated heel slides w overpressure             Gastroc stretch strap  :10x10 :10x10  :10x10  :10x10 Hamstring stretch     :10x10 :10x10                    Tandem stance (progress to foam)             SLS (progress to foam)             SLS abduction                          Therapeutic Exercise             Bike             Patient education  8' 10' 8' 8' 8'       SLR flexion w/ quad set* 10            SLR hip abduction              Bridging*             Clamshells             Heel raises                          LAQ* 10            Standing marching      2x10        Therapeutic Activity             Leg press              Step ups             Lateral step ups             STS                          Gait training w RW   5'  5' TCs         Standing march to TKE    5' TCs         Modalities             CP PRN

## 2021-04-19 ENCOUNTER — OFFICE VISIT (OUTPATIENT)
Dept: PHYSICAL THERAPY | Facility: REHABILITATION | Age: 61
End: 2021-04-19
Payer: COMMERCIAL

## 2021-04-19 DIAGNOSIS — Z96.651 STATUS POST TOTAL RIGHT KNEE REPLACEMENT: ICD-10-CM

## 2021-04-19 DIAGNOSIS — M25.561 CHRONIC PAIN OF RIGHT KNEE: ICD-10-CM

## 2021-04-19 DIAGNOSIS — G89.29 CHRONIC PAIN OF RIGHT KNEE: ICD-10-CM

## 2021-04-19 DIAGNOSIS — M17.11 PRIMARY OSTEOARTHRITIS OF RIGHT KNEE: Primary | ICD-10-CM

## 2021-04-19 PROCEDURE — 97112 NEUROMUSCULAR REEDUCATION: CPT | Performed by: PHYSICAL THERAPIST

## 2021-04-19 PROCEDURE — 97110 THERAPEUTIC EXERCISES: CPT | Performed by: PHYSICAL THERAPIST

## 2021-04-19 PROCEDURE — 97140 MANUAL THERAPY 1/> REGIONS: CPT | Performed by: PHYSICAL THERAPIST

## 2021-04-21 ENCOUNTER — OFFICE VISIT (OUTPATIENT)
Dept: PHYSICAL THERAPY | Facility: REHABILITATION | Age: 61
End: 2021-04-21
Payer: COMMERCIAL

## 2021-04-21 ENCOUNTER — APPOINTMENT (OUTPATIENT)
Dept: RADIOLOGY | Facility: AMBULARY SURGERY CENTER | Age: 61
End: 2021-04-21
Attending: ORTHOPAEDIC SURGERY
Payer: COMMERCIAL

## 2021-04-21 ENCOUNTER — OFFICE VISIT (OUTPATIENT)
Dept: OBGYN CLINIC | Facility: CLINIC | Age: 61
End: 2021-04-21

## 2021-04-21 VITALS — HEIGHT: 62 IN | BODY MASS INDEX: 31.65 KG/M2 | WEIGHT: 172 LBS

## 2021-04-21 DIAGNOSIS — Z96.651 STATUS POST TOTAL KNEE REPLACEMENT, RIGHT: ICD-10-CM

## 2021-04-21 DIAGNOSIS — Z96.651 STATUS POST TOTAL RIGHT KNEE REPLACEMENT: ICD-10-CM

## 2021-04-21 DIAGNOSIS — M17.11 PRIMARY OSTEOARTHRITIS OF RIGHT KNEE: Primary | ICD-10-CM

## 2021-04-21 DIAGNOSIS — Z96.651 STATUS POST TOTAL KNEE REPLACEMENT, RIGHT: Primary | ICD-10-CM

## 2021-04-21 DIAGNOSIS — M25.561 CHRONIC PAIN OF RIGHT KNEE: ICD-10-CM

## 2021-04-21 DIAGNOSIS — G89.29 CHRONIC PAIN OF RIGHT KNEE: ICD-10-CM

## 2021-04-21 PROCEDURE — 73560 X-RAY EXAM OF KNEE 1 OR 2: CPT

## 2021-04-21 PROCEDURE — 99024 POSTOP FOLLOW-UP VISIT: CPT | Performed by: ORTHOPAEDIC SURGERY

## 2021-04-21 PROCEDURE — 97140 MANUAL THERAPY 1/> REGIONS: CPT | Performed by: PHYSICAL THERAPIST

## 2021-04-21 PROCEDURE — 97112 NEUROMUSCULAR REEDUCATION: CPT | Performed by: PHYSICAL THERAPIST

## 2021-04-21 PROCEDURE — 97110 THERAPEUTIC EXERCISES: CPT | Performed by: PHYSICAL THERAPIST

## 2021-04-21 NOTE — PROGRESS NOTES
Assessment:  1  Status post total knee replacement, right  XR knee 1 or 2 vw right     Patient Active Problem List   Diagnosis    Bilateral knee pain    Localized osteoarthritis of knees, bilateral    Degenerative arthritis of knee, bilateral    Type 2 diabetes mellitus without complication, without long-term current use of insulin (HCC)    Essential hypertension    Hyperlipidemia    Carpal tunnel syndrome on both sides    Right arm pain    Encounter for screening mammogram for breast cancer    Constipation    Palpitations    Arthritis of knee    Tobacco abuse    Lower abdominal pain    Healthcare maintenance    Primary osteoarthritis of right knee    Abnormal ultrasound of uterus    Pre-operative clearance    Acute postoperative anemia due to expected blood loss    Status post total knee replacement, right           Plan       continue with physical therapy follow-up in 6-8 weeks which will be 3 months from the surgery            Subjective:     Patient ID:    Chief Complaint:Roxana Edwards 62 y o  female      HPI     patient comes in today with regards to her right knee she is now little over 3 weeks from her total knee replacement  She is doing rather well she was with physical therapy she is able to get her knee fully extended and to about 70° of flexion pain is controlled her edema is controlled she is not currently wearing her stockings  Social History     Socioeconomic History    Marital status:       Spouse name: Not on file    Number of children: Not on file    Years of education: Not on file    Highest education level: Not on file   Occupational History    Occupation:      Employer: [employer]   Social Needs    Financial resource strain: Not hard at all    Food insecurity     Worry: Never true     Inability: Never true    Transportation needs     Medical: No     Non-medical: No   Tobacco Use    Smoking status: Former Smoker Packs/day: 0 50     Years: 45 00     Pack years: 22 50     Types: Cigarettes     Quit date: 2020     Years since quittin 3    Smokeless tobacco: Never Used   Substance and Sexual Activity    Alcohol use: Yes     Frequency: 2-4 times a month     Drinks per session: 1 or 2     Binge frequency: Never     Comment: Socially    Drug use: No    Sexual activity: Not on file   Lifestyle    Physical activity     Days per week: 0 days     Minutes per session: 0 min    Stress: Not on file   Relationships    Social connections     Talks on phone: More than three times a week     Gets together: More than three times a week     Attends Latter-day service: Never     Active member of club or organization: No     Attends meetings of clubs or organizations: Never     Relationship status:      Intimate partner violence     Fear of current or ex partner: No     Emotionally abused: No     Physically abused: No     Forced sexual activity: No   Other Topics Concern    Not on file   Social History Narrative    ** Merged History Encounter **          Past Medical History:   Diagnosis Date    Diabetes mellitus ([address] )     Hyperlipidemia     Hypertension      Past Surgical History:   Procedure Laterality Date    NO PAST SURGERIES      TX TOTAL KNEE ARTHROPLASTY Right 3/30/2021    Procedure: KNEE TOTAL ARTHROPLASTY;  Surgeon: Michael Fields DO;  Location: [facility];  Service: Orthopedics     No Known Allergies  Current Outpatient Medications on File Prior to Visit   Medication Sig Dispense Refill    acetaminophen (TYLENOL) 500 mg tablet Three times a day with meals and at bedtime 120 tablet 0    ascorbic acid (VITAMIN C) 500 mg tablet Take 1 tablet (500 mg total) by mouth daily 30 tablet 0    atenolol-chlorthalidone (TENORETIC) 50-25 mg per tablet Take 1 tablet by mouth daily 90 tablet 3    desoximetasone (TOPICORT) 0 25 % cream as needed       enoxaparin (LOVENOX) 40 mg/0 4 mL Inject 0 4 mL (40 mg total) under the skin daily for 28 days Starting after surgery 28 Syringe 0    ferrous sulfate 324 (65 Fe) mg Take 1 tablet (324 mg total) by mouth 2 (two) times a day before meals 60 tablet 0    folic acid (FOLVITE) 303 mcg tablet Take 1 tablet (400 mcg total) by mouth daily 30 tablet 0    gabapentin (NEURONTIN) 100 mg capsule Take 1 capsule (100 mg total) by mouth 3 (three) times a day 42 capsule 0    glucose blood (OneTouch Verio) test strip Check Blood Sugar 2 times daily 100 each 0    Lancets (onetouch ultrasoft) lancets Use as instructed 100 each 3    metFORMIN (GLUCOPHAGE) 500 mg tablet Take 1 tablet (500 mg total) by mouth daily with breakfast 90 tablet 0    methocarbamol (ROBAXIN) 750 mg tablet Take 1 tablet (750 mg total) by mouth every 6 (six) hours as needed for muscle spasms 60 tablet 0    Multiple Vitamins-Minerals (multivitamin with minerals) tablet Take 1 tablet by mouth daily 30 tablet 1    OneTouch Delica Lancets 55P MISC Use 1 each 2 (two) times a day 200 each 3    pravastatin (PRAVACHOL) 40 mg tablet Take 1 tablet (40 mg total) by mouth daily (Patient taking differently: Take 40 mg by mouth daily in the early morning ) 90 tablet 3    Propylene Glycol (Systane Complete) 0 6 % SOLN Place 1 drop in both eyes up to 4 times a day for dry eyes 15 mL 2     No current facility-administered medications on file prior to visit  Objective:        Ortho Exam    Incisions healing nicely we removed the proneo  Today  We cleanse the incision with little rubbing alcohol placed a Band-Aid on the midportion of the incision where there was very small amount of bleeding  We recommended that she clean at daily until dries up    Range of motion shows full extension and approximately 70 80° of flexion

## 2021-04-21 NOTE — PROGRESS NOTES
Daily Note     Today's date: 2021  Patient name: Dolly Garcia  : 1960  MRN: [id number]  Referring provider: Raghavendra Guo  Dx:   Encounter Diagnosis     ICD-10-CM    1  Primary osteoarthritis of right knee  M17 11    2  Chronic pain of right knee  M25 561     G89 29    3  Status post total right knee replacement  Z96 651        Start Time: 08  Stop Time: 263  Total time in clinic (min): 60 minutes    Subjective: Benedict Helm reports that her knee is "okay" today  Daughter reports she has been stretching her twice a day for an hour or so at a time  They had been able to get to about 70* bending at home  Objective: See treatment diary below      Assessment: Tolerated treatment fair  Patient demonstrated fatigue post treatment, exhibited good technique with therapeutic exercises and would benefit from continued PT  Flexion overpressure EOB 70* 21 extension 0* with overpressure  Significant guarding and compensation to manual flexion stretching  Explained to daughter importance of us continuing to make progress       Plan: Continue per plan of care           Precautions: see protocol   * Indicates part of HEP     Daily Treatment Diary     Manuals 3/15 3/30 4/8 4/12 4/14 4/16 4/19 4/21     PROM right knee   15' 15' Seated  &  Supine Seated & supine Seatedand supine seated EOB     Supine extension overpressure    Done Done 25' total Done 25' total Done 20' 25' total     Neuro Re-ed             Quad sets* :05x20 8 poor activation 2x10 TCs 2x10  2x10       SAQ       Assist 1/2 range 10x3" 2x10     Heel slides w strap* :05x10 5 PT assist  7 PT assist 10 seated 20   Supine 10  Seated 10 20x     Seated heel slides w overpressure             Gastroc stretch strap  :10x10 :10x10  :10x10  :10x10 :10x10 5x30"     Hamstring stretch     :10x10 :10x10 :10x10 np                  Tandem stance (progress to foam)             SLS (progress to foam)             SLS abduction Therapeutic Exercise             Bike             Patient education  8' 10' 8' 8' 8'       SLR flexion w/ quad set* 10            SLR hip abduction              Bridging*             Clamshells             Heel raises       nv nv                  LAQ* 10            Standing marching      2x10  3x10 form np     Therapeutic Activity             Leg press              Step ups             Lateral step ups             STS                          Gait training w RW   5'  5' TCs         Standing march to TKE    5' TCs         Modalities             NMES        lvl 26 15'     CP PRN         8'

## 2021-04-23 ENCOUNTER — OFFICE VISIT (OUTPATIENT)
Dept: PHYSICAL THERAPY | Facility: REHABILITATION | Age: 61
End: 2021-04-23
Payer: COMMERCIAL

## 2021-04-23 DIAGNOSIS — M17.11 PRIMARY OSTEOARTHRITIS OF RIGHT KNEE: Primary | ICD-10-CM

## 2021-04-23 DIAGNOSIS — M25.561 CHRONIC PAIN OF RIGHT KNEE: ICD-10-CM

## 2021-04-23 DIAGNOSIS — G89.29 CHRONIC PAIN OF RIGHT KNEE: ICD-10-CM

## 2021-04-23 DIAGNOSIS — Z96.651 STATUS POST TOTAL RIGHT KNEE REPLACEMENT: ICD-10-CM

## 2021-04-23 PROCEDURE — 97140 MANUAL THERAPY 1/> REGIONS: CPT | Performed by: PHYSICAL THERAPIST

## 2021-04-23 PROCEDURE — 97110 THERAPEUTIC EXERCISES: CPT | Performed by: PHYSICAL THERAPIST

## 2021-04-23 PROCEDURE — 97112 NEUROMUSCULAR REEDUCATION: CPT | Performed by: PHYSICAL THERAPIST

## 2021-04-23 NOTE — PROGRESS NOTES
Daily Note     Today's date: 2021  Patient name: Alexandra Solis  : 1960  MRN: [id number]  Referring provider: Lidia Prado  Dx:   Encounter Diagnosis     ICD-10-CM    1  Primary osteoarthritis of right knee  M17 11    2  Chronic pain of right knee  M25 561     G89 29    3  Status post total right knee replacement  Z96 651        Start Time: 8004  Stop Time: 0900  Total time in clinic (min): 63 minutes    Subjective: Patient's daughter states the had a follow up appointment with Dr Elías Sneed and had the tape removed from the incision  She also states "she has been having muscle spasms so she is in more achy pain "      Objective: See treatment diary below      Assessment: Tolerated treatment fair  Pre-treatment: 50* AAROM knee flexion, -10* knee extension; Post-treatment: 68* knee flexion, -3* knee extension  No active quadriceps contraction initially with SAQ, improved with PT assist and max cues to "kick" and "squeeze " With cues patient only achieving minimal quad contraction for 2-3" holds  Continues to demonstrate significant muscle guarding with all passive stretching, especially flexion with overpressure, however improved with tactile cues to decrease right hip hiking  Patient demonstrated fatigue post treatment and would benefit from continued PT  Plan: Continue per plan of care        Precautions: see protocol   * Indicates part of HEP     Daily Treatment Diary     Manuals 3/15 3/30 4/8 4/12 4/14 4/16 4/19 4/21 4/23    PROM right knee   15' 15' Seated  &  Supine Seated & supine Seatedand supine seated EOB Seated EOB    Supine extension overpressure    Done Done 25' total Done 25' total Done 20' 25' total 20' total    Neuro Re-ed             Quad sets* :05x20 8 poor activation 2x10 TCs 2x10  2x10       SAQ       Assist 1/2 range 10x3" 2x10 2x10 PT assist    Heel slides w strap* :05x10 5 PT assist  7 PT assist 10 seated 20   Supine 10  Seated 10 20x Seated 10  10 PT assist Gastroc stretch strap  :10x10 :10x10  :10x10  :10x10 :10x10 5x30" 5x30"    Hamstring stretch     :10x10 :10x10 :10x10 np                               Therapeutic Exercise             Bike             Patient education  8' 10' 8' 8' 8'       SLR flexion w/ quad set* 10            SLR hip abduction              Bridging*             Clamshells             Heel raises       nv nv nv    TKE         nv    LAQ* 10            Standing marching      2x10  3x10 form np     Therapeutic Activity             Leg press              Step ups             Lateral step ups             STS                          Gait training w RW   5'  5' TCs         Standing march to TKE    5' TCs         Modalities             NMES        lvl 26 15' lv 26-28  15'    CP PRN         8' 13'

## 2021-04-26 ENCOUNTER — OFFICE VISIT (OUTPATIENT)
Dept: PHYSICAL THERAPY | Facility: REHABILITATION | Age: 61
End: 2021-04-26
Payer: COMMERCIAL

## 2021-04-26 DIAGNOSIS — Z96.651 STATUS POST TOTAL RIGHT KNEE REPLACEMENT: ICD-10-CM

## 2021-04-26 DIAGNOSIS — M25.561 CHRONIC PAIN OF RIGHT KNEE: ICD-10-CM

## 2021-04-26 DIAGNOSIS — G89.29 CHRONIC PAIN OF RIGHT KNEE: ICD-10-CM

## 2021-04-26 DIAGNOSIS — M17.11 PRIMARY OSTEOARTHRITIS OF RIGHT KNEE: Primary | ICD-10-CM

## 2021-04-26 PROCEDURE — 97112 NEUROMUSCULAR REEDUCATION: CPT | Performed by: PHYSICAL THERAPIST

## 2021-04-26 PROCEDURE — 97110 THERAPEUTIC EXERCISES: CPT | Performed by: PHYSICAL THERAPIST

## 2021-04-26 PROCEDURE — 97140 MANUAL THERAPY 1/> REGIONS: CPT | Performed by: PHYSICAL THERAPIST

## 2021-04-26 NOTE — PROGRESS NOTES
Daily Note     Today's date: 2021  Patient name: Austin Obrien  : 1960  MRN: [id number]  Referring provider: Adolph Solano  Dx:   Encounter Diagnosis     ICD-10-CM    1  Primary osteoarthritis of right knee  M17 11    2  Chronic pain of right knee  M25 561     G89 29    3  Status post total right knee replacement  Z96 651        Start Time: 1710  Stop Time: 1810   Total time in clinic (min): 60 minutes    Subjective: Bo Torrez reports that her knee is sore today  Her daughter reports working with her at home and MD was happy with her extension however wants her to improve flexion  Objective: See treatment diary below      Assessment: Tolerated treatment well  Patient demonstrated fatigue post treatment, exhibited good technique with therapeutic exercises and would benefit from continued PT  Significant guarding throughout manuals this session, flexion bent only till 60* vs prior 70-75*  Plan: Continue per plan of care           Precautions: see protocol   * Indicates part of HEP     Daily Treatment Diary     Manuals 3/15 3/30 4/8 4/12 4/14 4/16 4/19 4/21 4/23 4/26   PROM right knee   15' 15' Seated  &  Supine Seated & supine Seatedand supine seated EOB Seated EOB done   Supine extension overpressure    Done Done 25' total Done 25' total Done 20' 25' total 20' total 20' total   Neuro Re-ed             Quad sets* :05x20 8 poor activation 2x10 TCs 2x10  2x10       SAQ       Assist 1/2 range 10x3" 2x10 2x10 PT assist np   Heel slides w strap* :05x10 5 PT assist  7 PT assist 10 seated 20   Supine 10  Seated 10 20x Seated 10  10 PT assist 10x10"   Gastroc stretch strap  :10x10 :10x10  :10x10  :10x10 :10x10 5x30" 5x30" 5x30"   Hamstring stretch     :10x10 :10x10 :10x10 np                               Therapeutic Exercise             Bike             Patient education  8' 10' 8' 8' 8'       SLR flexion w/ quad set* 10            SLR hip abduction              Bridging* Clamshells             Heel raises       nv nv nv 3x8   TKE         nv 2x10x5 OTB   LAQ* 10            Standing marching      2x10  3x10 form np     Therapeutic Activity             Leg press              Step ups             Lateral step ups             STS                          Gait training w RW   5'  5' TCs      5'   Standing march to TKE    5' TCs         Modalities             NMES        lvl 26 15' lv 26-28  15' Lvl 33 15'   CP PRN         8' 13'

## 2021-04-28 ENCOUNTER — OFFICE VISIT (OUTPATIENT)
Dept: PHYSICAL THERAPY | Facility: REHABILITATION | Age: 61
End: 2021-04-28
Payer: COMMERCIAL

## 2021-04-28 DIAGNOSIS — G89.29 CHRONIC PAIN OF RIGHT KNEE: ICD-10-CM

## 2021-04-28 DIAGNOSIS — M25.561 CHRONIC PAIN OF RIGHT KNEE: ICD-10-CM

## 2021-04-28 DIAGNOSIS — Z96.651 STATUS POST TOTAL RIGHT KNEE REPLACEMENT: ICD-10-CM

## 2021-04-28 DIAGNOSIS — M17.11 PRIMARY OSTEOARTHRITIS OF RIGHT KNEE: Primary | ICD-10-CM

## 2021-04-28 PROCEDURE — 97140 MANUAL THERAPY 1/> REGIONS: CPT | Performed by: PHYSICAL THERAPIST

## 2021-04-28 PROCEDURE — 97110 THERAPEUTIC EXERCISES: CPT | Performed by: PHYSICAL THERAPIST

## 2021-04-28 PROCEDURE — 97112 NEUROMUSCULAR REEDUCATION: CPT | Performed by: PHYSICAL THERAPIST

## 2021-04-28 NOTE — PROGRESS NOTES
Daily Note     Today's date: 2021  Patient name: Arely Sanders  : 1960  MRN: [id number]  Referring provider: Carole Diaz  Dx:   Encounter Diagnosis     ICD-10-CM    1  Primary osteoarthritis of right knee  M17 11    2  Chronic pain of right knee  M25 561     G89 29    3  Status post total right knee replacement  Z96 651        Start Time: 0800  Stop Time: 913  Total time in clinic (min): 73 minutes    Subjective: Patient's daughter states the mother feels like the incision being tight and dry is limiting her motion  Objective: See treatment diary below      Assessment: Tolerated treatment fair  Patient achieving approximately 50* pre-treatment achieiving 60* AAROM post-treatment  Patient is able to achieve approximately 72* with overpressure from daughter and therapist stabilizing right hip to prevent hip hiking  Very poor tolerance to maximal overpressure provided today  Patient does demonstrate improved active quadriceps engagement with quad sets compared to previous sessions  Patient demonstrated fatigue post treatment and would benefit from continued PT  Plan: Continue per plan of care        Precautions: see protocol   * Indicates part of HEP     Daily Treatment Diary     Manuals    PROM right knee Done 20' total  15' 15' Seated  &  Supine Seated & supine Seatedand supine seated EOB Seated EOB done   Supine extension overpressure Done   Done Done 25' total Done 25' total Done 20' 25' total 20' total 20' total   Neuro Re-ed             Quad sets* 2x10x5"  2x10 TCs 2x10  2x10       SAQ       Assist 1/2 range 10x3" 2x10 2x10 PT assist np   Heel slides w strap* 10x10" es (seated/supine)   7 PT assist 10 seated 20   Supine 10  Seated 10 20x Seated 10  10 PT assist 10x10"   Gastroc stretch strap   :10x10  :10x10  :10x10 :10x10 5x30" 5x30" 5x30"   Hamstring stretch     :10x10 :10x10 :10x10 np Therapeutic Exercise             Bike nv            Patient education   10' 8' 8' 8'       SLR flexion w/ quad set*             SLR hip abduction              Bridging*             Clamshells             Heel raises 3x8      nv nv nv 3x8   TKE OTB 2x10x5"        nv 2x10x5 OTB   LAQ*             Standing marching      2x10  3x10 form np     Therapeutic Activity             Leg press              Step ups             Lateral step ups             STS                          Gait training w RW   5'  5' TCs      5'   Standing march to TKE    5' TCs         Modalities             NMES lv 33 15'        lvl 26 15' lv 26-28  15' Lvl 33 15'   CP PRN  15'       8' 13'

## 2021-04-30 ENCOUNTER — TELEPHONE (OUTPATIENT)
Dept: OBGYN CLINIC | Facility: HOSPITAL | Age: 61
End: 2021-04-30

## 2021-04-30 ENCOUNTER — OFFICE VISIT (OUTPATIENT)
Dept: PHYSICAL THERAPY | Facility: REHABILITATION | Age: 61
End: 2021-04-30
Payer: COMMERCIAL

## 2021-04-30 ENCOUNTER — NURSE TRIAGE (OUTPATIENT)
Dept: OTHER | Facility: OTHER | Age: 61
End: 2021-04-30

## 2021-04-30 DIAGNOSIS — Z96.651 STATUS POST TOTAL RIGHT KNEE REPLACEMENT: ICD-10-CM

## 2021-04-30 DIAGNOSIS — M17.10 ARTHRITIS OF KNEE: ICD-10-CM

## 2021-04-30 DIAGNOSIS — Z96.651 STATUS POST TOTAL KNEE REPLACEMENT, RIGHT: Primary | ICD-10-CM

## 2021-04-30 DIAGNOSIS — M17.10 ARTHRITIS OF KNEE: Primary | ICD-10-CM

## 2021-04-30 DIAGNOSIS — M25.561 CHRONIC PAIN OF RIGHT KNEE: ICD-10-CM

## 2021-04-30 DIAGNOSIS — G89.29 CHRONIC PAIN OF RIGHT KNEE: ICD-10-CM

## 2021-04-30 DIAGNOSIS — M17.11 PRIMARY OSTEOARTHRITIS OF RIGHT KNEE: Primary | ICD-10-CM

## 2021-04-30 PROCEDURE — 97140 MANUAL THERAPY 1/> REGIONS: CPT | Performed by: PHYSICAL THERAPIST

## 2021-04-30 PROCEDURE — 97112 NEUROMUSCULAR REEDUCATION: CPT | Performed by: PHYSICAL THERAPIST

## 2021-04-30 PROCEDURE — 97110 THERAPEUTIC EXERCISES: CPT | Performed by: PHYSICAL THERAPIST

## 2021-04-30 RX ORDER — ACETAMINOPHEN 500 MG
500 TABLET ORAL
Status: SHIPPED | OUTPATIENT
Start: 2021-04-30 | End: 2021-06-09

## 2021-04-30 RX ORDER — ACETAMINOPHEN 500 MG
TABLET ORAL
Qty: 120 TABLET | Refills: 0 | Status: CANCELLED | OUTPATIENT
Start: 2021-04-30

## 2021-04-30 RX ORDER — OXYCODONE HYDROCHLORIDE 5 MG/1
5 TABLET ORAL 3 TIMES DAILY PRN
Qty: 15 TABLET | Refills: 0 | Status: SHIPPED | OUTPATIENT
Start: 2021-04-30 | End: 2021-06-15

## 2021-04-30 NOTE — TELEPHONE ENCOUNTER
Daughter calling because they requested this morning for her Tylenol to be refilled and sent over to [facility] in [facility]  Order is still pending and it is set up to go to the wrong pharmacy  Office staff has it set up to go to [facility] at [facility]  That is where they got her meds upon discharge but not now

## 2021-04-30 NOTE — TELEPHONE ENCOUNTER
TC to Dr Yodit Prescott:  [phone] RN -Olvin Burns MDNVP,-FESVA Knee replacement done one month ago  Daughter called office this morning for refill of Tylenol 500mgs  TID with meals and HS  It is still pending to Dr Chace Salinas  It is set up to go to the wrong pharmacy too  I was wondering if I could put it through to the correct pharmacy now? There also was a call to the Ortho office to get her Oxycodone refilled which that was done by the surgeon  Daughter states though that they use that for before her PT and don't want her to take to much of that  They prefer tpo give her the Tylenol while at home   6:22PM      TC to Dr Yodit Prescott:  [phone]-[facility] HENNA -Olvin Edwards,1960-Total Knee replacement done one month ago  Daughter called office this morning for refill of Tylenol 500mgs  TID with meals and HS  It is still pending to Dr Chace Salinas  It is set up to go to the wrong pharmacy too  I was wondering if I could put it through to the correct pharmacy now? There also was a call to the Ortho office to get her Oxycodone refilled which that was done by the surgeon  Daughter states though that they use that for before her PT and don't want her to take to much of that  They prefer tpo give her the Tylenol while at home    6:09 PM

## 2021-04-30 NOTE — TELEPHONE ENCOUNTER
Regarding: medication request   ----- Message from Janet David sent at 4/30/2021  5:27 PM EDT -----  " I asked the office if they can send in ibuprofen for my mother and nothing was sent in "

## 2021-04-30 NOTE — TELEPHONE ENCOUNTER
TC from Dr Rajni Beauchamp:  Yes  We can refill the Tylenol  Do I need to fix anything? 6:30 PM    TC to Dr Gutierrez Night:  No I will do it for you if that is OK with you?  6:30 PM    TC from Dr Gutierrez Night: That will be fine  Tylenol 500 mg TID and HS  Thank you very much   6:31 PM    TC to Dr Gutierrez Night:  Kevin Nunez a good evening!   6:32 PM

## 2021-04-30 NOTE — PROGRESS NOTES
Daily Note     Today's date: 2021  Patient name: Kelton Cedillo  : 1960  MRN: [id number]  Referring provider: Wendy Sullivan  Dx:   Encounter Diagnosis     ICD-10-CM    1  Primary osteoarthritis of right knee  M17 11    2  Chronic pain of right knee  M25 561     G89 29    3  Status post total right knee replacement  Z96 651        Start Time: 0800  Stop Time: 0910  Total time in clinic (min): 70 minutes    Subjective: Patient reports feeling "fine" at start of session  Her daughter reports a near fall last night when the patient was standing up from the couch to use the bathroom  She states the muscles started twitching and the knee bent almost all the way back causing her to almost fall  Objective: See treatment diary below      Assessment: Tolerated treatment fair  Patient achieving approximately 60* knee flexion seated EOB following AAROM with passive overpressure  Patient resists flexion past 60* seated with therapist stabilizing hip to prevent hip hiking  Trialed bike rocks this visit with fair tolerance and patient reporting the knee feels looser at end of session  Continue to educate patient on importance of compliance to HEP for knee bending  Patient demonstrated fatigue post treatment and would benefit from continued PT  Plan: Continue per plan of care        Precautions: see protocol   * Indicates part of HEP     Daily Treatment Diary     Manuals    PROM right knee Done 20' total Done 12'    Seated  &  Supine Seated & supine Seatedand supine seated EOB Seated EOB done   Supine extension overpressure Done    Done 25' total Done 25' total Done 20' 25' total 20' total 20' total   Neuro Re-ed             Quad sets* 2x10x5"     2x10       SAQ       Assist 1/2 range 10x3" 2x10 2x10 PT assist np   Heel slides w strap* 10x10" es (seated/supine) 10x10"   10 seated 20   Supine 10  Seated 10 20x Seated 10  10 PT assist 10x10"   Gastroc stretch strap      :10x10 :10x10 5x30" 5x30" 5x30"   Hamstring stretch  10x10"   :10x10 :10x10 :10x10 np                               Therapeutic Exercise             Bike nv [place] 5'           Patient education     8' 8'       SLR flexion w/ quad set*             SLR hip abduction              Bridging*             Clamshells             Heel raises 3x8 2x10     nv nv nv 3x8   TKE OTB 2x10x5" OTB 2x10x5"       nv 2x10x5 OTB   LAQ*             Standing marching      2x10  3x10 form np     Therapeutic Activity             Leg press              Step ups             Lateral step ups             STS                          Gait training w RW          5'   Standing march to [facility]             Modalities             NMES lv 33 15'  lv 33 15'      lvl 26 15' lv 26-28  15' Lvl 33 15'   CP PRN  15' 10'      8' 13'

## 2021-04-30 NOTE — TELEPHONE ENCOUNTER
I called the daughter back and left her a VM that her mother's Tylenol 500 mgs  [place] TID with meals and HS was sent to the [facility]  Told her to call back with any questions  I then verbally called in Acetaminophen 500mgs  1 tab three times per day with meals and HS, 120 tablets, no refills  To [facility]/[phone]

## 2021-04-30 NOTE — TELEPHONE ENCOUNTER
Mey Davis, patient's daughter is asking for a refill of oxycodone 5mg  Patient has 2 left at this time  Patient is taking as directed  Please send to [facility]  Call with any questions at [phone]

## 2021-04-30 NOTE — TELEPHONE ENCOUNTER
Patient's daughter requesting a new prescription for tylenol  Stated she is out since having her surgery  Please advise  Thank you

## 2021-04-30 NOTE — TELEPHONE ENCOUNTER
Reason for Disposition   [1] Prescription not at pharmacy AND [2] was prescribed by PCP recently    Answer Assessment - Initial Assessment Questions  1  NAME of MEDICATION: "What medicine are you calling about?"      Tylenol 500 mgs  TID with meals and HS  2    QUESTION: "What is your question?"      It was not sent over to the pharmacy after she called the office this morning  3  PRESCRIBING HCP: "Who prescribed it?" Reason: if prescribed by specialist, call should be referred to that group  Dr Dixon Given  4  SYMPTOMS: "Do you have any symptoms?"      Arthritis in her knees- Total Knee replacement one month ago  5  SEVERITY: If symptoms are present, ask "Are they mild, moderate or severe?"      Moderate  6    PREGNANCY:  "Is there any chance that you are pregnant?" "When was your last menstrual period?"      NA    Protocols used: MEDICATION QUESTION CALL-ADULT-

## 2021-05-03 ENCOUNTER — EVALUATION (OUTPATIENT)
Dept: PHYSICAL THERAPY | Facility: REHABILITATION | Age: 61
End: 2021-05-03
Payer: COMMERCIAL

## 2021-05-03 DIAGNOSIS — M17.11 PRIMARY OSTEOARTHRITIS OF RIGHT KNEE: Primary | ICD-10-CM

## 2021-05-03 DIAGNOSIS — G89.29 CHRONIC PAIN OF RIGHT KNEE: ICD-10-CM

## 2021-05-03 DIAGNOSIS — Z96.651 STATUS POST TOTAL RIGHT KNEE REPLACEMENT: ICD-10-CM

## 2021-05-03 DIAGNOSIS — M25.561 CHRONIC PAIN OF RIGHT KNEE: ICD-10-CM

## 2021-05-03 PROCEDURE — 97140 MANUAL THERAPY 1/> REGIONS: CPT | Performed by: PHYSICAL THERAPIST

## 2021-05-03 PROCEDURE — 97110 THERAPEUTIC EXERCISES: CPT | Performed by: PHYSICAL THERAPIST

## 2021-05-03 PROCEDURE — 97112 NEUROMUSCULAR REEDUCATION: CPT | Performed by: PHYSICAL THERAPIST

## 2021-05-03 NOTE — PROGRESS NOTES
PT Re-Evaluation     Today's date: 5/3/2021  Patient name: Maryln Runner  : 1960  MRN: [id number]  Referring provider: Tammy Jara  Dx:   Encounter Diagnosis     ICD-10-CM    1  Primary osteoarthritis of right knee  M17 11    2  Chronic pain of right knee  M25 561     G89 29    3  Status post total right knee replacement  Z96 651        Start Time: 1530  Stop Time: 1630  Total time in clinic (min): 60 minutes    Assessment  Assessment details:  Ryan Borrego has made the following improvements since beginning PT:  decreased pain, decreased swelling, increased ROM and increased tolerance to activities  Ryan Borrego has made mild improvement in knee extension range of motion, quadriceps engagement and decreased swelling  She continues to present with significantly limited knee flexion, RLE weakness, abnormal gait pattern and pain  Ryan Borrego continues to present with the impairments as listed above  Patient would continue to benefit from skilled PT to address these deficits and to maximize function  Impairments: abnormal gait, abnormal or restricted ROM, impaired physical strength, lacks appropriate home exercise program and pain with function  Barriers to therapy: Language barrier  Understanding of Dx/Px/POC: good   Prognosis: good    Goals  ST  Patient will have decreased pain by about 50% for improved mobility and compliance  (progressing)  2  Patient will improve right knee range of motion by at least 25% for improved ambulation  (progressing)    LT  Patient will be independent with HEP at discharge  (progressing)  2  Patient will demonstrate at least 120* of right knee range of motion for reciprocal stair negotiation without limitation from right knee  (progressing)  3  Patient will demonstrate improved in RLE strength by at least 2-3 grades for improved ambulation and return to work  (progressing)  4   Patient will ambulate with normal reciprocal pattern and no AD for return to prior level of function  (progressing)      Plan  Plan details: Thank you for opportunity to participate in the care of Ellen Jeter  Patient would benefit from: skilled physical therapy  Planned modality interventions: cryotherapy, unattended electrical stimulation and TENS  Planned therapy interventions: home exercise program, therapeutic exercise, therapeutic activities, stretching, strengthening, patient education, neuromuscular re-education and manual therapy  Frequency: 3x week  Duration in visits: 16  Plan of Care beginning date: 5/3/2021  Treatment plan discussed with: patient and family        Subjective Evaluation    History of Present Illness  Date of surgery: 3/30/2021  Mechanism of injury: Re-evaluation 5/3/2021:  Mary Rocha has been seen for a total of 13 visits for OP PT for right knee pain s/p right TKA performed by Dr Ngoc Hernandez on 3/30/2021  Patient rates overall improvement since beginning PT 50%  Patient's global rating of change is " Somewhat better (3) " Patient reports some improvements with pain  Patient reports most difficulty with bending it, swelling, walking, doing stairs and muscle twitching in the quad and hamstring causing discomfort  Patient's daughter states "she is afraid to bend the knee because she thinks the leg is going to break " She needs assistance with negotiating stairs into/out of the house  Home set-up and functional level:  Steps to enter home 4 steps  RHR  First floor set up No  Second floor set up Full bathroom and bedroom  Number of steps to second floor Greater than 10 steps BHR   Previous falls: None   Rolling Walker  Commode  Assist at home Daughter  Grandson      PT goals: "get back to normal" Get back to work as a , negotiate stairs, walk normally and independently     Quality of life: good    Pain  Current pain ratin  At best pain ratin  At worst pain ratin (muscle twitching)  Quality: dull ache  Relieving factors: rest, ice, relaxation and support  Aggravating factors: sitting, stair climbing, standing, walking and lifting  Progression: worsening    Social Support  Steps to enter house: yes  Stairs in house: yes   Lives in: multiple-level home  Lives with: adult children    Employment status: not working  Treatments  Previous treatment: physical therapy, medication and injection treatment  Current treatment: physical therapy  Patient Goals  Patient goals for therapy: decreased pain, decreased edema, increased strength, return to work, increased motion, improved balance and independence with ADLs/IADLs          Objective     Observations     Right Knee   Positive for edema and incision  Additional Observation Details  Incision intact and dry       Tenderness     Right Knee   Tenderness in the lateral joint line and medial joint line       Active Range of Motion     Right Knee   Flexion: 55 degrees with pain  Extension: -15 degrees with pain    Passive Range of Motion     Right Knee   Flexion: 60 degrees with pain  Extension: -15 degrees with pain    Strength/Myotome Testing     Right Hip   Planes of Motion   Flexion: 2+    Left Knee   Quadriceps contraction: fair    Right Knee   Flexion: 4  Extension: 2  Quadriceps contraction: poor    General Comments:      Knee Comments  RE 5/3/2021:  Minimal right quad activation noted  LAQ: trace contraction    Gait observation with RW: Very slowed speed lacking active right knee flexion with patient locking knee into extension, no heel strike or toe off, continued significant reliance on BUE     Sit <> stand: BUE support with right knee extended and weight shift to LLE  Supine <> sit: slowed speed with min A to support and move RLE with patient passively moving it with LLE hooking        IE 4/6/2021:  Post-op TUG: with RW CS 1'    Gait observation with RW: Very slowed speed lacking active right knee flexion with patient locking knee into extension, no heel strike or toe off and mild hip circumduction noted, significant reliance on BUE on RW    Sit <> stand: BUE support with right knee extended and weight shift to LLE  Supine <> sit: slowed speed with max A to support and move RLE with minimal active assist from patient                Precautions: see protocol   * Indicates part of HEP     Daily Treatment Diary     Manuals 4/28 4/30 5/3  4/14 4/16 4/19 4/21 4/23 4/26   PROM right knee Done 20' total Done 12'    Seated  &  Supine Seated & supine Seatedand supine seated EOB Seated EOB done   Supine extension overpressure Done  Done 8' total  Done 25' total Done 25' total Done 20' 25' total 20' total 20' total   Trial prone PROM    nv          Neuro Re-ed             Quad sets* 2x10x5"     2x10       SAQ   nv    Assist 1/2 range 10x3" 2x10 2x10 PT assist np   Heel slides w strap* 10x10" es (seated/supine) 10x10" 10x10" seated  10 seated 20   Supine 10  Seated 10 20x Seated 10  10 PT assist 10x10"   Gastroc stretch strap      :10x10 :10x10 5x30" 5x30" 5x30"   Hamstring stretch  10x10"   :10x10 :10x10 :10x10 np                               Therapeutic Exercise             Bike nv Rocks 5' Rocks 8'          Patient education     8' 8'       SLR flexion w/ quad set*             SLR hip abduction    nv          Bridging*             Clamshells             Heel raises 3x8 2x10 nv    nv nv nv 3x8   TKE OTB 2x10x5" OTB 2x10x5" nv      nv 2x10x5 OTB   LAQ*             Standing marching      2x10  3x10 form np     Therapeutic Activity             Leg press              Step ups             Lateral step ups             STS                          Gait training w RW          5'   Standing march to [facility]             Modalities             NMES lv 33 15'  lv 33 15' lv 33 10'     lvl 26 15' lv 26-28  15' Lvl 33 15'   CP PRN  15' 10' 8'     8' 13'

## 2021-05-05 ENCOUNTER — OFFICE VISIT (OUTPATIENT)
Dept: PHYSICAL THERAPY | Facility: REHABILITATION | Age: 61
End: 2021-05-05
Payer: COMMERCIAL

## 2021-05-05 DIAGNOSIS — G89.29 CHRONIC PAIN OF RIGHT KNEE: ICD-10-CM

## 2021-05-05 DIAGNOSIS — Z96.651 STATUS POST TOTAL RIGHT KNEE REPLACEMENT: ICD-10-CM

## 2021-05-05 DIAGNOSIS — M25.561 CHRONIC PAIN OF RIGHT KNEE: ICD-10-CM

## 2021-05-05 DIAGNOSIS — M17.11 PRIMARY OSTEOARTHRITIS OF RIGHT KNEE: Primary | ICD-10-CM

## 2021-05-05 PROCEDURE — 97112 NEUROMUSCULAR REEDUCATION: CPT

## 2021-05-05 PROCEDURE — 97140 MANUAL THERAPY 1/> REGIONS: CPT

## 2021-05-05 PROCEDURE — 97110 THERAPEUTIC EXERCISES: CPT

## 2021-05-05 NOTE — PROGRESS NOTES
Daily Note     Today's date: 2021  Patient name: Arely Sanders  : 1960  MRN: [id number]  Referring provider: Carole Diaz  Dx:   Encounter Diagnosis     ICD-10-CM    1  Primary osteoarthritis of right knee  M17 11    2  Chronic pain of right knee  M25 561     G89 29    3  Status post total right knee replacement  Z96 651        Start Time: 1100  Stop Time: 1210  Total time in clinic (min): 70 minutes    Subjective: Patient reporting knee as "fine" at start of session  Patient's daughter states "she says she gets to a point with her exercises where she just has so much pain and can't go any farther "       Objective: See treatment diary below      Assessment: Tolerated treatment fair  Patient demonstrated fatigue post treatment and would benefit from continued PT  Patient continues to be able to achieve approx 60* knee flexion seated EOB with overpressure from therapist with significant tactile cues continued to be required for excessive hip hiking, no carry over  Trialed prone PROM with fair tolerance, however significant guarding noted with all manuals  Patient educated again on importance of flexion HEP with patient reporting understanding  Plan: Continue per plan of care  Progress treatment as tolerated         Precautions: see protocol   * Indicates part of HEP     Daily Treatment Diary     Manuals 4/28 4/30 5/3 5/5   4/19 4/21 4/23 4/26   PROM right knee Done 20' total Done 12'   15' total   Seatedand supine seated EOB Seated EOB done   Supine extension overpressure Done  Done 8' total done   Done 20' 25' total 20' total 20' total   Trial prone PROM    nv done         Neuro Re-ed             Quad sets* 2x10x5"   10x5'' BLE         SAQ   nv 10x5'' PT assist   Assist 1/2 range 10x3" 2x10 2x10 PT assist np   Heel slides w strap* 10x10" es (seated/supine) 10x10" 10x10" seated 10x10'' seated   Seated 10 20x Seated 10  10 PT assist 10x10"   Gastroc stretch strap       :10x10 5x30" 5x30" 5x30"   Hamstring stretch  10x10"     :10x10 np                               Therapeutic Exercise             Bike nv Rocks 5' Rocks 8' Rocks 5'         Patient education             SLR flexion w/ quad set*             SLR hip abduction    nv standing 2x10         Bridging*             Clamshells             Heel raises 3x8 2x10 nv 2x10   nv nv nv 3x8   TKE OTB 2x10x5" OTB 2x10x5" nv OTB 2x10x5''     nv 2x10x5 OTB   LAQ*             Standing marching       3x10 form np     Therapeutic Activity             Leg press              Step ups             Lateral step ups             STS                          Gait training w RW          5'   Standing march to [facility]             Modalities             NMES lv 33 15'  lv 33 15' lv 33 10' L33 10'    lvl 26 15' lv 26-28  15' Lvl 33 15'   CP PRN  15' 10' 8' 10'    8' 13'

## 2021-05-07 ENCOUNTER — OFFICE VISIT (OUTPATIENT)
Dept: PHYSICAL THERAPY | Facility: REHABILITATION | Age: 61
End: 2021-05-07
Payer: COMMERCIAL

## 2021-05-07 DIAGNOSIS — G89.29 CHRONIC PAIN OF RIGHT KNEE: ICD-10-CM

## 2021-05-07 DIAGNOSIS — M17.11 PRIMARY OSTEOARTHRITIS OF RIGHT KNEE: Primary | ICD-10-CM

## 2021-05-07 DIAGNOSIS — Z96.651 STATUS POST TOTAL RIGHT KNEE REPLACEMENT: ICD-10-CM

## 2021-05-07 DIAGNOSIS — M25.561 CHRONIC PAIN OF RIGHT KNEE: ICD-10-CM

## 2021-05-07 PROCEDURE — 97140 MANUAL THERAPY 1/> REGIONS: CPT

## 2021-05-07 PROCEDURE — 97112 NEUROMUSCULAR REEDUCATION: CPT

## 2021-05-07 PROCEDURE — 97110 THERAPEUTIC EXERCISES: CPT

## 2021-05-07 NOTE — PROGRESS NOTES
Daily Note     Today's date: 2021  Patient name: Maryln Runner  : 1960  MRN: [id number]  Referring provider: Tammy Jara  Dx:   Encounter Diagnosis     ICD-10-CM    1  Primary osteoarthritis of right knee  M17 11    2  Chronic pain of right knee  M25 561     G89 29    3  Status post total right knee replacement  Z96 651        Start Time: 935  Stop Time: 1030  Total time in clinic (min): 55 minutes    Subjective: Patient continues to report increased muscle spasms and twitching  Objective: See treatment diary below      Assessment: Tolerated treatment fair  Patient demonstrated fatigue post treatment, exhibited good technique with therapeutic exercises and would benefit from continued PT Trialed prone manuals again this session with less tolerance noted this session compared to previous, significant guarding noted with patient unable to let therapist apply even slightest pressure  Trialed having patient supine with wedge under BLE for increased knee flexion, tolerating well, trial addition of pillows next session for increased knee flexion if possible  Patient again educated on importance of completion of HEP to improve knee flexion  Plan: Continue per plan of care  Progress treatment as tolerated         Precautions: see protocol   * Indicates part of HEP     Daily Treatment Diary     Manuals 4/28 4/30 5/3 5/5 5/7  4/19 4/21 4/23 4/26   PROM right knee Done 20' total Done 12'   15' total 15' total  Seatedand supine seated EOB Seated EOB done   Supine extension overpressure Done  Done 8' total done done  Done 20' 25' total 20' total 20' total   Trial prone PROM    nv done done        Neuro Re-ed             Quad sets* 2x10x5"   10x5'' BLE 10x5'' BLE        SAQ   nv 10x5'' PT assist   Assist 1/2 range 10x3" 2x10 2x10 PT assist np   Heel slides w strap* 10x10" es (seated/supine) 10x10" 10x10" seated 10x10'' seated 10x10''  Seated 10 20x Seated 10  10 PT assist 10x10" Gastroc stretch strap       :10x10 5x30" 5x30" 5x30"   Hamstring stretch  10x10"     :10x10 np     Wedge stretch    3 min legs elevated on wedge 3 min legs elevated on wedge                     Therapeutic Exercise             Bike nv Rocks 5' Rocks 8' Rocks 5' Rocks 5'        Patient education             SLR flexion w/ quad set*             SLR hip abduction    nv standing 2x10 Standing 2x10        Bridging*             Clamshells             Heel raises 3x8 2x10 nv 2x10 2x10   nv nv nv 3x8   TKE OTB 2x10x5" OTB 2x10x5" nv OTB 2x10x5'' nv    nv 2x10x5 OTB   LAQ*             Standing marching       3x10 form np     Therapeutic Activity             Leg press              Step ups             Lateral step ups             STS                          Gait training w RW          5'   Standing march to [facility]             Modalities             NMES lv 33 15'  lv 33 15' lv 33 10' L33 10' 10' L34   lvl 26 15' lv 26-28  15' Lvl 33 15'   CP PRN  15' 10' 8' 10' 10'   8' 13'

## 2021-05-10 ENCOUNTER — OFFICE VISIT (OUTPATIENT)
Dept: PHYSICAL THERAPY | Facility: REHABILITATION | Age: 61
End: 2021-05-10
Payer: COMMERCIAL

## 2021-05-10 DIAGNOSIS — M25.561 CHRONIC PAIN OF RIGHT KNEE: ICD-10-CM

## 2021-05-10 DIAGNOSIS — M17.11 PRIMARY OSTEOARTHRITIS OF RIGHT KNEE: Primary | ICD-10-CM

## 2021-05-10 DIAGNOSIS — G89.29 CHRONIC PAIN OF RIGHT KNEE: ICD-10-CM

## 2021-05-10 DIAGNOSIS — Z96.651 STATUS POST TOTAL RIGHT KNEE REPLACEMENT: ICD-10-CM

## 2021-05-10 PROCEDURE — 97110 THERAPEUTIC EXERCISES: CPT | Performed by: PHYSICAL THERAPIST

## 2021-05-10 PROCEDURE — 97112 NEUROMUSCULAR REEDUCATION: CPT

## 2021-05-10 PROCEDURE — 97140 MANUAL THERAPY 1/> REGIONS: CPT | Performed by: PHYSICAL THERAPIST

## 2021-05-10 NOTE — PROGRESS NOTES
Daily Note     Today's date: 5/10/2021  Patient name: Monica Machado  : 1960  MRN: [id number]  Referring provider: Jose Juan Vasquez  Dx:   Encounter Diagnosis     ICD-10-CM    1  Primary osteoarthritis of right knee  M17 11    2  Chronic pain of right knee  M25 561     G89 29    3  Status post total right knee replacement  Z96 651        Start Time: 0800  Stop Time: 0855  Total time in clinic (min): 55 minutes    Subjective: Patient states "it's fine" at start of session  Patient's daughter states "she was in a lot of pain yesterday "       Objective: See treatment diary below      Assessment: Tolerated treatment fair  Continued poor tolerance to passive knee flexion stretching with significant guarding and compensation from hip hiking despite maximal verbal and tactile cues from PT and daughter  Practiced lateral weight shifting with fair tolerance and re-emphasized equal weight bearing during heel raises and mini squats with some carryover  Patient compensates with trunk flexion to avoid knee flexion past 45* with mini squats  Patient demonstrated fatigue post treatment and would benefit from continued PT  Plan: Continue per plan of care        Precautions: see protocol   * Indicates part of HEP     Daily Treatment Diary     Manuals 4/28 4/30 5/3 5/5 5/7 5/10   4/23 4/26   PROM right knee Done 20' total Done 12'   15' total 15' total Done EOT   Seated EOB done   Supine extension overpressure Done  Done 8' total done done    20' total 20' total   Trial prone PROM    nv done done        Neuro Re-ed             Quad sets* 2x10x5"   10x5'' BLE 10x5'' BLE        SAQ   nv 10x5'' PT assist     2x10 PT assist np   Heel slides w strap* 10x10" es (seated/supine) 10x10" 10x10" seated 10x10'' seated 10x10'' 5 slow speed   Seated 10  10 PT assist 10x10"   Gastroc stretch strap         5x30" 5x30"   Hamstring stretch  10x10"           Wedge stretch     3 min legs elevated on wedge        Lateral weight shifting      2x10       Therapeutic Exercise             Bike nv Rocks 5' Rocks 8' Rocks 5' Rocks 5' Rocks 5'        Patient education             SLR flexion w/ quad set*             SLR hip abduction    nv standing 2x10 Standing 2x10 Standing 2x10       Bridging*             Clamshells             Heel raises 3x8 2x10 nv 2x10 2x10  2x10   nv 3x8   TKE OTB 2x10x5" OTB 2x10x5" nv OTB 2x10x5'' nv OTB 2x10   nv 2x10x5 OTB   Mini squats      BUE 10       LAQ             Standing marching             Therapeutic Activity             Leg press              Step ups             Lateral step ups             STS                          Gait training w RW          5'   Standing march to [facility]             Modalities             NMES lv 33 15'  lv 33 15' lv 33 10' L33 10' 10' L34 10' L34   lv 26-28  15' Lvl 33 15'   CP PRN  15' 10' 8' 10' 10' 10'   13'        Patient instructed in TE by ERMELINDA Gallegos from [phone]

## 2021-05-12 ENCOUNTER — OFFICE VISIT (OUTPATIENT)
Dept: PHYSICAL THERAPY | Facility: REHABILITATION | Age: 61
End: 2021-05-12
Payer: COMMERCIAL

## 2021-05-12 DIAGNOSIS — G89.29 CHRONIC PAIN OF RIGHT KNEE: ICD-10-CM

## 2021-05-12 DIAGNOSIS — M17.11 PRIMARY OSTEOARTHRITIS OF RIGHT KNEE: Primary | ICD-10-CM

## 2021-05-12 DIAGNOSIS — M25.561 CHRONIC PAIN OF RIGHT KNEE: ICD-10-CM

## 2021-05-12 DIAGNOSIS — Z96.651 STATUS POST TOTAL RIGHT KNEE REPLACEMENT: ICD-10-CM

## 2021-05-12 PROCEDURE — 97530 THERAPEUTIC ACTIVITIES: CPT | Performed by: PHYSICAL THERAPIST

## 2021-05-12 PROCEDURE — 97112 NEUROMUSCULAR REEDUCATION: CPT | Performed by: PHYSICAL THERAPIST

## 2021-05-12 PROCEDURE — 97110 THERAPEUTIC EXERCISES: CPT | Performed by: PHYSICAL THERAPIST

## 2021-05-12 NOTE — PROGRESS NOTES
Daily Note     Today's date: 2021  Patient name: Monica Machado  : 1960  MRN: [id number]  Referring provider: Jose Juan Vasquez  Dx:   Encounter Diagnosis     ICD-10-CM    1  Primary osteoarthritis of right knee  M17 11    2  Status post total right knee replacement  Z96 651    3  Chronic pain of right knee  M25 561     G89 29        Start Time: 1100  Stop Time: 1203  Total time in clinic (min): 63 minutes    Subjective: Patient states "it's fine" at start of session  Objective: See treatment diary below      Assessment: Tolerated treatment well  Emphasis on weight shifts and equal weightbearing with all standing TE this visit with some carryover  Cues to "sit back" with mini squats and tactile cues to facilitate knee flexion with patient achieving approximately 50*  Initiated sit to stands to high-low table with the purpose of facilitating functional knee flexion with patient achieving approximately 65*  Educated patient on focusing on this form to promote functional knee flexion with all sit to stand transfers and avoid locking into knee extension  Patient able to recall this education at end of session  Patient demonstrated fatigue post treatment, exhibited good technique with therapeutic exercises and would benefit from continued PT  Plan: Continue per plan of care        Precautions: see protocol   * Indicates part of HEP     Daily Treatment Diary     Manuals 4/28 4/30 5/3 5/5 5/7 5/10 5/12  4/23 4/26   PROM right knee Done 20' total Done 12'   15' total 15' total Done EOT   Seated EOB done   Supine extension overpressure Done  Done 8' total done done    20' total 20' total   Trial prone PROM    nv done done        Neuro Re-ed             Quad sets* 2x10x5"   10x5'' BLE 10x5'' BLE        SAQ   nv 10x5'' PT assist     2x10 PT assist np   Heel slides w strap* 10x10" es (seated/supine) 10x10" 10x10" seated 10x10'' seated 10x10'' 5 slow speed   Seated 10  10 PT assist 10x10" Gastroc stretch strap         5x30" 5x30"   Hamstring stretch  10x10"           Wedge stretch     3 min legs elevated on wedge        Lateral weight shifting      2x10 2x10      Therapeutic Exercise             Bike nv Rocks 5' Rocks 8' Rocks 5' Rocks 5' Rocks 5'  Rocks 5'      Patient education             SLR flexion w/ quad set*             SLR hip abduction    nv standing 2x10 Standing 2x10 Standing 2x10 Stand 3x10      Bridging*             Clamshells             Heel raises 3x8 2x10 nv 2x10 2x10  2x10 2x10  nv 3x8   TKE OTB 2x10x5" OTB 2x10x5" nv OTB 2x10x5'' nv OTB 2x10 OTB 2x10  nv 2x10x5 OTB   Mini squats      BUE 10 2x10 PT assist      LAQ             Standing marching             Therapeutic Activity             Leg press              Step ups             Lateral step ups             Sit to stand to table (knee flexion)       20" x5                   Gait training w RW          5'   Standing march to [facility]             Modalities             NMES lv 33 15'  lv 33 15' lv 33 10' L33 10' 10' L34 10' L34 10' lv 35  lv 26-28  15' Lvl 33 15'   CP PRN  15' 10' 8' 10' 10' 10' 10'  13'

## 2021-05-14 ENCOUNTER — OFFICE VISIT (OUTPATIENT)
Dept: PHYSICAL THERAPY | Facility: REHABILITATION | Age: 61
End: 2021-05-14
Payer: COMMERCIAL

## 2021-05-14 DIAGNOSIS — G89.29 CHRONIC PAIN OF RIGHT KNEE: ICD-10-CM

## 2021-05-14 DIAGNOSIS — Z96.651 STATUS POST TOTAL RIGHT KNEE REPLACEMENT: ICD-10-CM

## 2021-05-14 DIAGNOSIS — M25.561 CHRONIC PAIN OF RIGHT KNEE: ICD-10-CM

## 2021-05-14 DIAGNOSIS — M17.11 PRIMARY OSTEOARTHRITIS OF RIGHT KNEE: Primary | ICD-10-CM

## 2021-05-14 PROCEDURE — 97112 NEUROMUSCULAR REEDUCATION: CPT | Performed by: PHYSICAL THERAPIST

## 2021-05-14 PROCEDURE — 97530 THERAPEUTIC ACTIVITIES: CPT | Performed by: PHYSICAL THERAPIST

## 2021-05-14 PROCEDURE — 97110 THERAPEUTIC EXERCISES: CPT | Performed by: PHYSICAL THERAPIST

## 2021-05-14 NOTE — PROGRESS NOTES
Daily Note     Today's date: 2021  Patient name: Allison Pendleton  : 1960  MRN: [id number]  Referring provider: Patrick Victor  Dx:   Encounter Diagnosis     ICD-10-CM    1  Primary osteoarthritis of right knee  M17 11    2  Status post total right knee replacement  Z96 651    3  Chronic pain of right knee  M25 561     G89 29                   Subjective: Patient noted that the right knee was doing "fine" this morning  Objective: See treatment diary below      Assessment: Patient is still very hesitant on her ability to flex her knee past 65 degrees  Additional cueing and moderate assistance with exercises was required for proper form and technique  Patient continues to compensate by hip hiking and weight shifting towards the uninvolved side likely due to hesitancy to flex her knee  Tolerated treatment fair  Patient demonstrated fatigue post treatment and would benefit from continued PT      Plan: Continue per plan of care  Progress treatment as tolerated         Precautions: see protocol   * Indicates part of HEP     Daily Treatment Diary     Manuals 4/28 4/30 5/3 5/5 5/7 5/10 5/12 5/14 4/23 4/26   PROM right knee Done 20' total Done 12'   15' total 15' total Done EOT   Seated EOB done   Supine extension overpressure Done  Done 8' total done done    20' total 20' total   Trial prone PROM    nv done done        Neuro Re-ed             Quad sets* 2x10x5"   10x5'' BLE 10x5'' BLE        SAQ   nv 10x5'' PT assist     2x10 PT assist np   Heel slides w strap* 10x10" es (seated/supine) 10x10" 10x10" seated 10x10'' seated 10x10'' 5 slow speed  Seated 5x10" Seated 10  10 PT assist 10x10"   Gastroc stretch strap         5x30" 5x30"   Hamstring stretch  10x10"           Wedge stretch     3 min legs elevated on wedge        Lateral weight shifting      2x10 2x10      Therapeutic Exercise             [place]ike [place] 8' [place] 5' [place] 5' Rocks 5'  [place]ocks 5[place]ocks 8'     Patient education SLR flexion w/ quad set*             SLR hip abduction    nv standing 2x10 Standing 2x10 Standing 2x10 Stand 3x10 Standing x10     Bridging*             Clamshells             Heel raises 3x8 2x10 nv 2x10 2x10  2x10 2x10 2x10 nv 3x8   TKE OTB 2x10x5" OTB 2x10x5" nv OTB 2x10x5'' nv OTB 2x10 OTB 2x10  nv 2x10x5 OTB   Mini squats      BUE 10 2x10 PT assist x10 PT assist     LAQ             Standing marching             Therapeutic Activity             Leg press              Step ups             Lateral step ups             Sit to stand to table (knee flexion)       20" x5 x5                  Gait training w RW          5'   Standing march to [facility]             Modalities             NMES lv 33 15'  lv 33 15' lv 33 10' L33 10' 10' L34 10' L34 10' lv 28 10' lvl 50 lv 26-28  15' Lvl 33 15'   CP PRN  15' 10' 8' 10' 10' 10' 10' 10' 13'

## 2021-05-17 ENCOUNTER — OFFICE VISIT (OUTPATIENT)
Dept: PHYSICAL THERAPY | Facility: REHABILITATION | Age: 61
End: 2021-05-17
Payer: COMMERCIAL

## 2021-05-17 DIAGNOSIS — Z96.651 STATUS POST TOTAL RIGHT KNEE REPLACEMENT: ICD-10-CM

## 2021-05-17 DIAGNOSIS — M25.561 CHRONIC PAIN OF RIGHT KNEE: ICD-10-CM

## 2021-05-17 DIAGNOSIS — G89.29 CHRONIC PAIN OF RIGHT KNEE: ICD-10-CM

## 2021-05-17 DIAGNOSIS — M17.11 PRIMARY OSTEOARTHRITIS OF RIGHT KNEE: Primary | ICD-10-CM

## 2021-05-17 PROCEDURE — 97530 THERAPEUTIC ACTIVITIES: CPT | Performed by: PHYSICAL THERAPIST

## 2021-05-17 PROCEDURE — 97112 NEUROMUSCULAR REEDUCATION: CPT | Performed by: PHYSICAL THERAPIST

## 2021-05-17 PROCEDURE — 97140 MANUAL THERAPY 1/> REGIONS: CPT | Performed by: PHYSICAL THERAPIST

## 2021-05-17 PROCEDURE — 97110 THERAPEUTIC EXERCISES: CPT | Performed by: PHYSICAL THERAPIST

## 2021-05-17 NOTE — PROGRESS NOTES
Daily Note     Today's date: 2021  Patient name: Moses Pitts  : 1960  MRN: [id number]  Referring provider: Vianey Jeong  Dx:   Encounter Diagnosis     ICD-10-CM    1  Primary osteoarthritis of right knee  M17 11    2  Status post total right knee replacement  Z96 651    3  Chronic pain of right knee  M25 561     G89 29                   Subjective: Patient states that she is doing "fine" this morning  Patients daughter reported that her knee was more swollen yesterday  Objective: See treatment diary below      Assessment: Patient is 7 weeks out of surgery and achieving approximately 60 degrees of knee flexion  Noted improved contraction and ROM with SAQs  She was able to get 60 degrees of knee flexion with mini squats but is still compensating with hip hiking and weight shifting towards the uninvolved side  Patient education was provided on how to properly perform exercises without the use compensatory movement patterns but the patient was not able to reduce the use of compensations possibly due to pain and discomfort  Tolerated treatment fair  Patient demonstrated fatigue post treatment, exhibited good technique with therapeutic exercises and would benefit from continued PT      Plan: Continue per plan of care  Progress treatment as tolerated         Precautions: see protocol   * Indicates part of HEP     Daily Treatment Diary     Manuals 4/28 4/30 5/3 5/5 5/7 5/10 5/12 5/14 5/17    PROM right knee Done 20' total Done 12'   15' total 15' total Done EOT       Supine extension overpressure Done  Done 8' total done done        Trial prone PROM    nv done done        Patella mobs         Done 8' PT AD, SPT VZ    Neuro Re-ed             Quad sets* 2x10x5"   10x5'' BLE 10x5'' BLE        SAQ   nv 10x5'' PT assist     2x10    Heel slides w strap* 10x10" es (seated/supine) 10x10" 10x10" seated 10x10'' seated 10x10'' 5 slow speed  Seated 5x10"     Gastroc stretch strap Hamstring stretch  10x10"           Wedge stretch     3 min legs elevated on wedge        Lateral weight shifting      2x10 2x10  2x10    Therapeutic Exercise             Bike nv [place] 8' [place] 5' [place] 5' Rocks 5'  Rocks 5' Rocks 8' Rocks 10'    Patient education             SLR flexion w/ quad set*             SLR hip abduction    nv standing 2x10 Standing 2x10 Standing 2x10 Stand 3x10 Standing x10     Bridging*             Clamshells             Heel raises 3x8 2x10 nv 2x10 2x10  2x10 2x10 2x10     TKE OTB 2x10x5" OTB 2x10x5" nv OTB 2x10x5'' nv OTB 2x10 OTB 2x10  OTB 2x10    Mini squats      BUE 10 2x10 PT assist x10 PT assist x15    LAQ             Standing marching             Therapeutic Activity             Leg press              Step ups             Lateral step ups             Sit to stand to table (knee flexion)       20" x5 x5 x10                 Gait training w RW             Standing march to [facility]             Modalities             NMES lv 33 15'  lv 33 15' lv 33 10' L33 10' 10' L34 10' L34 10' lv 28 10' lvl 50 10' lvl 52    CP PRN  15' 10' 8' 10' 10' 10' 10' 10' 10'

## 2021-05-19 ENCOUNTER — OFFICE VISIT (OUTPATIENT)
Dept: PHYSICAL THERAPY | Facility: REHABILITATION | Age: 61
End: 2021-05-19
Payer: COMMERCIAL

## 2021-05-19 DIAGNOSIS — M25.561 CHRONIC PAIN OF RIGHT KNEE: ICD-10-CM

## 2021-05-19 DIAGNOSIS — M17.11 PRIMARY OSTEOARTHRITIS OF RIGHT KNEE: Primary | ICD-10-CM

## 2021-05-19 DIAGNOSIS — Z96.651 STATUS POST TOTAL RIGHT KNEE REPLACEMENT: ICD-10-CM

## 2021-05-19 DIAGNOSIS — G89.29 CHRONIC PAIN OF RIGHT KNEE: ICD-10-CM

## 2021-05-19 PROCEDURE — 97530 THERAPEUTIC ACTIVITIES: CPT

## 2021-05-19 PROCEDURE — 97110 THERAPEUTIC EXERCISES: CPT

## 2021-05-19 PROCEDURE — 97112 NEUROMUSCULAR REEDUCATION: CPT

## 2021-05-19 NOTE — PROGRESS NOTES
Daily Note     Today's date: 2021  Patient name: Aryan Mcclain  : 1960  MRN: [id number]  Referring provider: Petra Diaz  Dx:   Encounter Diagnosis     ICD-10-CM    1  Primary osteoarthritis of right knee  M17 11    2  Status post total right knee replacement  Z96 651    3  Chronic pain of right knee  M25 561     G89 29        Start Time: 1100  Stop Time: 1152  Total time in clinic (min): 52 minutes    Subjective: Patient reporting knee as "fine" at start of session  Patient arrives to session with use of RW, with patient continuing to demonstrate near full near extension with ambulation  Objective: See treatment diary below      Assessment: Tolerated treatment fair  Patient demonstrated fatigue post treatment and would benefit from continued PT Significant hip compensations continued to be noted with almost all TE even upon consistent verbal and tactile cues, no carry over  Patient continues to be able to achieve approx 60* knee flexion throughout session today  Patient educated again on the importance of attempting to achieve knee flexion with not only ambulation but all activities of daily living (getting in and out of a chair, in and out of bed), patient reporting understanding  Plan: Continue per plan of care  Progress treatment as tolerated         Precautions: see protocol   * Indicates part of HEP     Daily Treatment Diary     Manuals 4/30 5/3 5/5 5/7 5/10 5/12 5/14 5/17 5/19   PROM right knee Done 12'   15' total 15' total Done EOT       Supine extension overpressure  Done 8' total done done        Trial prone PROM   nv done done        Patella mobs        Done 8' PT AD, SPT VZ    Neuro Re-ed            Quad sets*   10x5'' BLE 10x5'' BLE        SAQ  nv 10x5'' PT assist     2x10 2x10    Heel slides w strap* 10x10" 10x10" seated 10x10'' seated 10x10'' 5 slow speed  Seated 5x10"     Gastroc stretch strap            Hamstring stretch 10x10"           Wedge stretch    3 min legs elevated on wedge        Lateral weight shifting     2x10 2x10  2x10 2x10    Therapeutic Exercise            [place]ike [place] 5' [place] 8' Rocks 5' Rocks 5' Rocks 5'  [place]ocks 5' Rocks 8' Rocks 10' Rocks 10' overpressure from therapist   Patient education            SLR flexion w/ quad set*            SLR hip abduction   nv standing 2x10 Standing 2x10 Standing 2x10 Stand 3x10 Standing x10     Bridging*            Clamshells            Heel raises 2x10 nv 2x10 2x10  2x10 2x10 2x10     TKE OTB 2x10x5" nv OTB 2x10x5'' nv OTB 2x10 OTB 2x10  OTB 2x10 OTB 2x10   Mini squats     BUE 10 2x10 PT assist x10 PT assist x15 2x10    LAQ            Standing marching            Therapeutic Activity            Leg press             Step ups            Lateral step ups            Sit to stand to table (knee flexion)      20" x5 x5 x10 2x10                Gait training w RW            Standing march to [facility]            Modalities            NMES lv 33 15' lv 33 10' L33 10' 10' L34 10' L34 10' lv 28 10' lvl 50 10' lvl 52 10' lvl 50 SAQ   CP PRN  10' 8' 10' 10' 10' 10' 10' 10' 10'

## 2021-05-21 ENCOUNTER — OFFICE VISIT (OUTPATIENT)
Dept: PHYSICAL THERAPY | Facility: REHABILITATION | Age: 61
End: 2021-05-21
Payer: COMMERCIAL

## 2021-05-21 ENCOUNTER — TELEPHONE (OUTPATIENT)
Dept: OBGYN CLINIC | Facility: CLINIC | Age: 61
End: 2021-05-21

## 2021-05-21 DIAGNOSIS — M17.11 PRIMARY OSTEOARTHRITIS OF RIGHT KNEE: Primary | ICD-10-CM

## 2021-05-21 DIAGNOSIS — M25.561 CHRONIC PAIN OF RIGHT KNEE: ICD-10-CM

## 2021-05-21 DIAGNOSIS — G89.29 CHRONIC PAIN OF RIGHT KNEE: ICD-10-CM

## 2021-05-21 DIAGNOSIS — Z96.651 STATUS POST TOTAL RIGHT KNEE REPLACEMENT: ICD-10-CM

## 2021-05-21 PROBLEM — M24.662 ARTHROFIBROSIS OF KNEE JOINT, LEFT: Status: ACTIVE | Noted: 2021-05-21

## 2021-05-21 PROCEDURE — 97110 THERAPEUTIC EXERCISES: CPT

## 2021-05-21 PROCEDURE — 97530 THERAPEUTIC ACTIVITIES: CPT

## 2021-05-21 PROCEDURE — 97112 NEUROMUSCULAR REEDUCATION: CPT

## 2021-05-21 NOTE — PROGRESS NOTES
Daily Note     Today's date: 2021  Patient name: Nishi Hollis  : 1960  MRN: [id number]  Referring provider: David Hollis  Dx:   Encounter Diagnosis     ICD-10-CM    1  Primary osteoarthritis of right knee  M17 11    2  Status post total right knee replacement  Z96 651    3  Chronic pain of right knee  M25 561     G89 29        Start Time: 0805  Stop Time: 0900  Total time in clinic (min): 55 minutes    Subjective: Patient reporting muscle tightness and soreness in RLE  Objective: See treatment diary below      Assessment: Tolerated treatment fair  Patient demonstrated fatigue post treatment and would benefit from continued PT Trialed forward lunges onto step this session with hip compensation continued to be noted  Significant overpressure from therapist required to avoid and for increased knee flexion, however significant guarding noted  Cues continued to be required for increased knee flexion during ambulation and with sit to stands, patient continuing to demonstrate near full extension  Patient educated again on importance of knee flexion based activities to improve ROM  Plan: Continue per plan of care  Progress treatment as tolerated         Precautions: see protocol   * Indicates part of HEP     Daily Treatment Diary     Manuals 5/21   5/7 5/10 5/12 5/14 5/17 5/19   PROM right knee    15' total Done EOT       Supine extension overpressure    done        Trial prone PROM     done        Patella mobs        Done 8' PT AD, SPT VZ    Neuro Re-ed            Quad sets*    10x5'' BLE        SAQ W/ stim       2x10 2x10    Heel slides w strap*    10x10'' 5 slow speed  Seated 5x10"     Gastroc stretch strap            Hamstring stretch            Wedge stretch    3 min legs elevated on wedge        Lateral weight shifting 2x10     2x10 2x10  2x10 2x10    Therapeutic Exercise            [place] 10' overpressure from therapist   [place] 5[place]ocks 5'  [place]ocks 5' Rocks 8' [place] 10' Rocks 10' overpressure from therapist   Patient education            SLR flexion w/ quad set*            SLR hip abduction     Standing 2x10 Standing 2x10 Stand 3x10 Standing x10     Bridging*            Clamshells            Heel raises    2x10  2x10 2x10 2x10     TKE    nv OTB 2x10 OTB 2x10  OTB 2x10 OTB 2x10   Mini squats 2x10    BUE 10 2x10 PT assist x10 PT assist x15 2x10    LAQ            Standing marching            Therapeutic Activity            Leg press             Step ups            Lateral step ups            Sit to stand to table (knee flexion) 2x10      20" x5 x5 x10 2x10    Lunges onto step w/ overpressure from therapist 2x10            Gait training w RW            Standing march to [facility]            Modalities            NMES 10'  SAQ   10' L34 10' L34 10' lv 35 10' lvl 50 10' lvl 52 10' lvl 50 SAQ   CP PRN     10' 10' 10' 10' 10' 10'

## 2021-05-21 NOTE — TELEPHONE ENCOUNTER
----- Message from Abdirahman Tse DO sent at 5/21/2021  1:45 PM EDT -----  Regarding: FW: Post-op PT progress update    ----- Message -----  From: Abdirahman Tse DO  Sent: 5/21/2021   1:40 PM EDT  To: Eric Caballero PT  Subject: RE: Post-op PT progress update                   I can order a ERMI static stretching device if you this it will help and if she does inmprove in the next month I can take her and manipulate her  You can also tell her that that will be necessary  if she does work on this which hurts  PLEEASE also show her the pictures in the media file in epic that shows her how far she should be bending (knee flexion march 30th)  ----- Message -----  From: Eric Caballero PT  Sent: 5/21/2021  10:44 AM EDT  To: Abdirahman Tse DO, Adrienne Gallego PA-C  Subject: Post-op PT progress update                       Hi Dr Helder Brown and team,     I have been treating our mutual post-op TKA patient Ellen Jeter 3x/week  She has been compliant with PT sessions approaching 8 weeks post-op, however we have been unable to achieve greater than 65# knee flexion at this point  I have tried multiple different things to attempt to gain more flexion but she is very self-limiting even with maximal education that is being reinforced by her daughters at home  She continues to have full knee extension and somewhat improved quadriceps strength but we are not making any progress in terms of her flexion  I wanted to give you an update on her progress thus far if there is anything you can advise or recommend       Thank you,   Eric Caballero PT, DPT

## 2021-05-24 ENCOUNTER — APPOINTMENT (OUTPATIENT)
Dept: PHYSICAL THERAPY | Facility: REHABILITATION | Age: 61
End: 2021-05-24
Payer: COMMERCIAL

## 2021-05-24 ENCOUNTER — OFFICE VISIT (OUTPATIENT)
Dept: PHYSICAL THERAPY | Facility: REHABILITATION | Age: 61
End: 2021-05-24
Payer: COMMERCIAL

## 2021-05-24 DIAGNOSIS — M25.561 CHRONIC PAIN OF RIGHT KNEE: ICD-10-CM

## 2021-05-24 DIAGNOSIS — M17.11 PRIMARY OSTEOARTHRITIS OF RIGHT KNEE: Primary | ICD-10-CM

## 2021-05-24 DIAGNOSIS — Z96.651 STATUS POST TOTAL RIGHT KNEE REPLACEMENT: ICD-10-CM

## 2021-05-24 DIAGNOSIS — G89.29 CHRONIC PAIN OF RIGHT KNEE: ICD-10-CM

## 2021-05-24 PROCEDURE — 97530 THERAPEUTIC ACTIVITIES: CPT

## 2021-05-24 PROCEDURE — 97110 THERAPEUTIC EXERCISES: CPT

## 2021-05-24 PROCEDURE — 97112 NEUROMUSCULAR REEDUCATION: CPT

## 2021-05-24 NOTE — PROGRESS NOTES
Daily Note     Today's date: 2021  Patient name: Claudia Ann  : 1960  MRN: [id number]  Referring provider: Chalo Doran  Dx:   Encounter Diagnosis     ICD-10-CM    1  Primary osteoarthritis of right knee  M17 11    2  Status post total right knee replacement  Z96 651    3  Chronic pain of right knee  M25 561     G89 29        Start Time: 1530  Stop Time: 1630  Total time in clinic (min): 60 minutes    Subjective: Patient reporting knee and hip soreness at start of session  Objective: See treatment diary below      Assessment: Tolerated treatment fair  Patient demonstrated fatigue post treatment and would benefit from continued PT  Patient continues to compensate with almost all TE, even with significant tactile cues, significant guarding noted  Continues to be able to achieve approx 65 degrees knee flexion with mini squats/sit to stands  Patient and patient's daughter educated again on increased knee flexion with ambulation to use the ROM that the patient has achieved and to continue to focus on flexion based exercises at home without compensation, reporting understanding  Plan: Continue per plan of care  Progress treatment as tolerated         Precautions: see protocol   * Indicates part of HEP     Daily Treatment Diary     Manuals 5/21 5/24  5/7 5/10 5/12 5/14 5/17 5/19   PROM right knee    15' total Done EOT       Supine extension overpressure    done        Trial prone PROM     done        Patella mobs        Done 8' PT AD, SPT VZ    Neuro Re-ed            Quad sets*    10x5'' BLE        SAQ W/ stim W/ stim      2x10 2x10    Heel slides w strap*    10x10'' 5 slow speed  Seated 5x10"     Gastroc stretch strap            Hamstring stretch            Wedge stretch    3 min legs elevated on wedge        Lateral weight shifting 2x10  2x10   2x10 2x10  2x10 2x10    Therapeutic Exercise            [place] 10' overpressure from therapist [place] 10' overpressure from therapist  Rocks 5' Rocks 5'  Rocks 5' Rocks 8' Rocks 10' Rocks 10' overpressure from therapist   Patient education            SLR flexion w/ quad set*            SLR hip abduction     Standing 2x10 Standing 2x10 Stand 3x10 Standing x10     Bridging*            Clamshells            Heel raises    2x10  2x10 2x10 2x10     TKE  OTB 2x10  nv OTB 2x10 OTB 2x10  OTB 2x10 OTB 2x10   Mini squats 2x10 2x10   BUE 10 2x10 PT assist x10 PT assist x15 2x10    LAQ            Standing marching            Therapeutic Activity            Leg press             Step ups  4'' 5x           Lateral step ups            Sit to stand to table (knee flexion) 2x10  2x10     20" x5 x5 x10 2x10    Lunges onto step w/ overpressure from therapist 2x10  2x19          Gait training w RW            Standing march to [facility]            Modalities            NMES 10'  SAQ 10' SAQ  10' L34 10' L34 10' lv 35 10' lvl 50 10' lvl 52 10' lvl 50 SAQ   CP PRN     10' 10' 10' 10' 10' 10'

## 2021-05-26 ENCOUNTER — OFFICE VISIT (OUTPATIENT)
Dept: PHYSICAL THERAPY | Facility: REHABILITATION | Age: 61
End: 2021-05-26
Payer: COMMERCIAL

## 2021-05-26 DIAGNOSIS — Z96.651 STATUS POST TOTAL RIGHT KNEE REPLACEMENT: ICD-10-CM

## 2021-05-26 DIAGNOSIS — M25.561 CHRONIC PAIN OF RIGHT KNEE: ICD-10-CM

## 2021-05-26 DIAGNOSIS — G89.29 CHRONIC PAIN OF RIGHT KNEE: ICD-10-CM

## 2021-05-26 DIAGNOSIS — M17.11 PRIMARY OSTEOARTHRITIS OF RIGHT KNEE: Primary | ICD-10-CM

## 2021-05-26 PROCEDURE — 97110 THERAPEUTIC EXERCISES: CPT | Performed by: PHYSICAL THERAPIST

## 2021-05-26 PROCEDURE — 97530 THERAPEUTIC ACTIVITIES: CPT | Performed by: PHYSICAL THERAPIST

## 2021-05-26 PROCEDURE — 97140 MANUAL THERAPY 1/> REGIONS: CPT | Performed by: PHYSICAL THERAPIST

## 2021-05-26 NOTE — PROGRESS NOTES
Daily Note     Today's date: 2021  Patient name: Demond Siddiqi  : 1960  MRN: [id number]  Referring provider: Joshua Raphael  Dx:   Encounter Diagnosis     ICD-10-CM    1  Primary osteoarthritis of right knee  M17 11    2  Status post total right knee replacement  Z96 651    3  Chronic pain of right knee  M25 561     G89 29        Start Time: 1105  Stop Time: 1225  Total time in clinic (min): 80 minutes    Subjective: Reports pain in the       Objective: See treatment diary below      Assessment: Tolerated treatment fair  Demonstrates significant muscle guarding with PROM and muscle spasms with supine <> prone transitions  Reported improvement in anterior thigh pain with side lying hip flexor stretch however, pain returned to baseline with muscle spasm when transitioning back to supine  Introduced bilateral hip abduction and adduction for improved proximal muscle engagement with fair tolerance  Patient demonstrated fatigue post treatment, exhibited good technique with therapeutic exercises and would benefit from continued PT  Plan: Continue per plan of care  Progress treatment as tolerated         Precautions: see protocol   * Indicates part of HEP     Daily Treatment Diary     Manuals 5/21 5/24 5/26  5/10 5/12 5/14 5/17 5/19   PROM right knee   Flexion in sitting  Done EOT       Supine extension overpressure   done         Trial prone PROM             Patella mobs        Done 8' PT AD, SPT VZ    Scar massage   done         Hip flexor stretch   Done in sidelying         Hamstring stretch   done         Hip adductor stretch   done         Neuro Re-ed            Quad sets*            SAQ W/ stim W/ stim      2x10 2x10    Heel slides w strap*     5 slow speed  Seated 5x10"     Gastroc stretch strap            Hamstring stretch            Wedge stretch            Lateral weight shifting 2x10  2x10   2x10 2x10  2x10 2x10    Therapeutic Exercise            [place] 10' overpressure from therapist [place] 10' overpressure from therapist ricky 10' w/ overpressure from PT  [facility] 5'  Rocks 5' [place]ocks 8' [place]ocks 10' [place] 10' overpressure from therapist   Patient education            SLR flexion w/ quad set*            SLR hip abduction      Standing 2x10 Stand 3x10 Standing x10     Bridging*            Clamshells            Heel raises     2x10 2x10 2x10     TKE  OTB 2x10 OTB 2x10  OTB 2x10 OTB 2x10  OTB 2x10 OTB 2x10   Mini squats 2x10 2x10   BUE 10 2x10 PT assist x10 PT assist x15 2x10    Bilateral hip abuction   OTB 2x10         Bilateral hip adduction   Ball squeeze 2x10         LAQ            Standing marching            Therapeutic Activity            Leg press             Step ups  4'' 5x           Lateral step ups            Sit to stand to table (knee flexion) 2x10  2x10  2x10   20" x5 x5 x10 2x10    Lunges onto step w/ overpressure from therapist 2x10  2x19          Gait training w RW            Standing march to [facility]            Modalities            NMES 10'  SAQ 10' SAQ   10' L34 10' lv 35 10' lvl 50 10' lvl 52 10' lvl 50 SAQ   CP PRN      10' 10' 10' 10' 10'

## 2021-05-28 ENCOUNTER — OFFICE VISIT (OUTPATIENT)
Dept: PHYSICAL THERAPY | Facility: REHABILITATION | Age: 61
End: 2021-05-28
Payer: COMMERCIAL

## 2021-05-28 DIAGNOSIS — M25.561 CHRONIC PAIN OF RIGHT KNEE: ICD-10-CM

## 2021-05-28 DIAGNOSIS — M17.11 PRIMARY OSTEOARTHRITIS OF RIGHT KNEE: Primary | ICD-10-CM

## 2021-05-28 DIAGNOSIS — Z96.651 STATUS POST TOTAL RIGHT KNEE REPLACEMENT: ICD-10-CM

## 2021-05-28 DIAGNOSIS — G89.29 CHRONIC PAIN OF RIGHT KNEE: ICD-10-CM

## 2021-05-28 PROCEDURE — 97110 THERAPEUTIC EXERCISES: CPT | Performed by: PHYSICAL THERAPIST

## 2021-05-28 PROCEDURE — 97140 MANUAL THERAPY 1/> REGIONS: CPT | Performed by: PHYSICAL THERAPIST

## 2021-05-28 PROCEDURE — 97530 THERAPEUTIC ACTIVITIES: CPT | Performed by: PHYSICAL THERAPIST

## 2021-05-28 NOTE — PROGRESS NOTES
PT Re-Evaluation     Today's date: 2021  Patient name: Dolly Garcia  : 1960  MRN: [id number]  Referring provider: Raghavendra Guo  Dx:   Encounter Diagnosis     ICD-10-CM    1  Primary osteoarthritis of right knee  M17 11    2  Status post total right knee replacement  Z96 651    3  Chronic pain of right knee  M25 561     G89 29        Start Time: 0810  Stop Time: 0900  Total time in clinic (min): 50 minutes    Assessment  Assessment details:  Benedict Helm has made the following improvements since beginning PT: decreased pain, decreased swelling, increased ROM, increased strength and increased tolerance to activities  Benedict Helm demonstrates mild improvements in right quadriceps strength/activation compared to previous visits  In terms of motion, Benedict Helm continues to demonstrate near normal knee extension but has made minimal improvements in knee flexion over the last 8 weeks  She is consistently achieving 65-70* knee flexion which is not where I would expect her to be 8 weeks post-op and is limiting functional mobility including transfers, ambulation and stair negotiation  Patient has also been significantly limited by right lower extremity muscle spasms likely due to atrophy and limitations in flexibility  Benedict Helm continues to present with the impairments as listed above  Patient would continue to benefit from skilled PT to address these deficits and to maximize function  Impairments: abnormal gait, abnormal or restricted ROM, impaired physical strength, lacks appropriate home exercise program and pain with function  Barriers to therapy: Language barrier  Understanding of Dx/Px/POC: good   Prognosis: good    Goals  ST  Patient will have decreased pain by about 50% for improved mobility and compliance  (progressing)  2  Patient will improve right knee range of motion by at least 25% for improved ambulation  (met)    LT  Patient will be independent with HEP at discharge  (progressing)  2  Patient will demonstrate at least 120* of right knee range of motion for reciprocal stair negotiation without limitation from right knee  (progressing)  3  Patient will demonstrate improved in RLE strength by at least 2-3 grades for improved ambulation and return to work  (progressing)  4  Patient will ambulate with normal reciprocal pattern and no AD for return to prior level of function  (progressing)      Plan  Plan details: Thank you for opportunity to participate in the care of Demond Siddiqi  Patient would benefit from: skilled physical therapy  Planned modality interventions: cryotherapy, unattended electrical stimulation and TENS  Planned therapy interventions: home exercise program, therapeutic exercise, therapeutic activities, stretching, strengthening, patient education, neuromuscular re-education and manual therapy  Frequency: 3x week  Duration in visits: 16  Plan of Care beginning date: 5/28/2021  Treatment plan discussed with: patient and family        Subjective Evaluation    History of Present Illness  Date of surgery: 3/30/2021  Mechanism of injury: Re-evaluation 5/28/2021:  No Yanes has been seen for total of 24 visits for OP PT s/p right TKA performed by Dr Jacquie Grier on 3/30/2021  Patient reports "a lot of improvements" since beginning PT  She notes improved movement, strength and has been practicing going up and down stairs  She reports continued difficulty with frequency muscle spasms, pain with bending the knee and difficulty "tightening the muscles " Average pain level 7/10  Next follow up with Dr Jacquie Grier on 6/2/2021  Re-evaluation 5/3/2021:  No Yanes has been seen for a total of 13 visits for OP PT for right knee pain s/p right TKA performed by Dr Jacquie Grier on 3/30/2021  Patient rates overall improvement since beginning PT 50%  Patient's global rating of change is " Somewhat better (3) " Patient reports some improvements with pain   Patient reports most difficulty with bending it, swelling, walking, doing stairs and muscle twitching in the quad and hamstring causing discomfort  Patient's daughter states "she is afraid to bend the knee because she thinks the leg is going to break " She needs assistance with negotiating stairs into/out of the house  Home set-up and functional level:  Steps to enter home 4 steps  RHR  First floor set up No  Second floor set up Full bathroom and bedroom  Number of steps to second floor Greater than 10 steps BHR   Previous falls: None   Rolling Walker  Commode  Assist at home Daughter  Grandson  PT goals: "get back to normal" Get back to work as a , negotiate stairs, walk normally and independently     Quality of life: good    Pain  Current pain ratin  At best pain ratin  At worst pain ratin (muscle twitching)  Quality: dull ache  Relieving factors: rest, ice, relaxation and support  Aggravating factors: sitting, stair climbing, standing, walking and lifting  Progression: worsening    Social Support  Steps to enter house: yes  Stairs in house: yes   Lives in: multiple-level home  Lives with: adult children    Employment status: not working  Treatments  Previous treatment: physical therapy, medication and injection treatment  Current treatment: physical therapy  Patient Goals  Patient goals for therapy: decreased pain, decreased edema, increased strength, return to work, increased motion, improved balance and independence with ADLs/IADLs          Objective     Observations     Right Knee   Positive for edema and incision  Additional Observation Details  Incision intact and dry       Tenderness     Right Knee   Tenderness in the lateral joint line and medial joint line       Active Range of Motion     Right Knee   Flexion: 65 degrees with pain  Extension: -3 degrees     Passive Range of Motion     Right Knee   Flexion: 70 degrees with pain  Extension: 0 degrees     Strength/Myotome Testing     Right Hip   Planes of Motion   Flexion: 2+    Left Knee   Quadriceps contraction: fair    Right Knee   Flexion: 4  Extension: 3+  Quadriceps contraction: fair    General Comments:      Knee Comments  RE 5/28/2021:   Patient able to achieve close to full motion with LAQ, significant improvement in left quadriceps contraction  Gait observation: Still ambulating with RW with minimal active knee flexion, evident muscle spasms noted with transitions    RE 5/3/2021:  Minimal right quad activation noted  LAQ: trace contraction     Gait observation with RW: Very slowed speed lacking active right knee flexion with patient locking knee into extension, no heel strike or toe off, continued significant reliance on BUE     Sit <> stand: BUE support with right knee extended and weight shift to LLE  Supine <> sit: slowed speed with min A to support and move RLE with patient passively moving it with LLE hooking        IE 4/6/2021:  Post-op TUG: with RW CS 1'    Gait observation with RW: Very slowed speed lacking active right knee flexion with patient locking knee into extension, no heel strike or toe off and mild hip circumduction noted, significant reliance on BUE on RW    Sit <> stand: BUE support with right knee extended and weight shift to LLE  Supine <> sit: slowed speed with max A to support and move RLE with minimal active assist from patient                Precautions: see protocol   * Indicates part of HEP     Daily Treatment Diary     Manuals 5/21 5/24 5/26 5/28 5/12 5/14 5/17 5/19   PROM right knee   Flexion in sitting         Supine extension overpressure   done         Trial prone PROM             Patella mobs        Done 8' PT AD, SPT VZ    Scar massage   done         Hip flexor stretch   Done in sidelying Done        Hamstring stretch   done Done        Hip adductor stretch   done Done 10' total        Neuro Re-ed            Quad sets*            SAQ W/ stim W/ stim      2x10 2x10    Heel slides w strap*       Seated 5x10"     Gastroc stretch strap            Hamstring stretch            Wedge stretch            Lateral weight shifting 2x10  2x10    2x10  2x10 2x10    Therapeutic Exercise            Bike Rocks 10' overpressure from therapist Rocks 10' overpressure from therapist rocks 10' w/ overpressure from PT Rocks 8' w overpressure  Rocks 5' Rocks 8' Rocks 10' Rocks 10' overpressure from therapist   Patient education            SLR flexion w/ quad set*            SLR hip abduction       Stand 3x10 Standing x10     Bridging*            Clamshells            Heel raises      2x10 2x10     TKE  OTB 2x10 OTB 2x10 nv  OTB 2x10  OTB 2x10 OTB 2x10   Mini squats 2x10 2x10    2x10 PT assist x10 PT assist x15 2x10    Bilateral hip abduction   OTB 2x10 OTB 2x10        Bilateral hip adduction   Ball squeeze 2x10 2x10        LAQ            Standing marching            Tests and measures    8'        Therapeutic Activity            Leg press             Step ups  4'' 5x           Lateral step ups            Sit to stand to table (knee flexion) 2x10  2x10  2x10 2x10  20" x5 x5 x10 2x10    Lunges onto step w/ overpressure from therapist 2x10  2x19          Gait training w RW            Standing march to [facility]            Modalities            NMES 10'  SAQ 10' SAQ    10' lv 35 10' lvl 50 10' lvl 52 10' lvl 50 SAQ   CP PRN       10' 10' 10' 10'

## 2021-06-01 ENCOUNTER — OFFICE VISIT (OUTPATIENT)
Dept: PHYSICAL THERAPY | Facility: REHABILITATION | Age: 61
End: 2021-06-01
Payer: COMMERCIAL

## 2021-06-01 DIAGNOSIS — M17.11 PRIMARY OSTEOARTHRITIS OF RIGHT KNEE: Primary | ICD-10-CM

## 2021-06-01 DIAGNOSIS — G89.29 CHRONIC PAIN OF RIGHT KNEE: ICD-10-CM

## 2021-06-01 DIAGNOSIS — Z96.651 STATUS POST TOTAL RIGHT KNEE REPLACEMENT: ICD-10-CM

## 2021-06-01 DIAGNOSIS — M25.561 CHRONIC PAIN OF RIGHT KNEE: ICD-10-CM

## 2021-06-01 DIAGNOSIS — Z96.651 STATUS POST TOTAL KNEE REPLACEMENT, RIGHT: Primary | ICD-10-CM

## 2021-06-01 DIAGNOSIS — M17.11 PRIMARY OSTEOARTHRITIS OF RIGHT KNEE: ICD-10-CM

## 2021-06-01 PROCEDURE — 97140 MANUAL THERAPY 1/> REGIONS: CPT

## 2021-06-01 PROCEDURE — 97110 THERAPEUTIC EXERCISES: CPT

## 2021-06-01 PROCEDURE — 97530 THERAPEUTIC ACTIVITIES: CPT

## 2021-06-01 RX ORDER — TRAMADOL HYDROCHLORIDE 50 MG/1
TABLET ORAL
Qty: 12 TABLET | Refills: 0 | Status: SHIPPED | OUTPATIENT
Start: 2021-06-01 | End: 2021-06-30 | Stop reason: SDUPTHER

## 2021-06-01 NOTE — TELEPHONE ENCOUNTER
Please call the patient and let them know that since it has been over 2 months since her surgery we will transition her to tramadol rather than oxycodone    A refill was provided of this medication

## 2021-06-01 NOTE — TELEPHONE ENCOUNTER
Patients daughter called in requesting a refill    Patient has therapy today and an appointment with you tomorrow     Medication Name:    OxyCodone    Dosage of Med:    5mg    Frequency of Med:    Every time she has Therapy    Remaining Medication:    Zero    Pharmacy and Location:    [address]  Preferred Callback Phone Number:    [phone] , Can leave a message    Thank you

## 2021-06-01 NOTE — PROGRESS NOTES
Daily Note     Today's date: 2021  Patient name: Gabriella John  : 1960  MRN: [id number]  Referring provider: Lulu Hernandez  Dx:   Encounter Diagnosis     ICD-10-CM    1  Primary osteoarthritis of right knee  M17 11    2  Status post total right knee replacement  Z96 651    3  Chronic pain of right knee  M25 561     G89 29        Start Time: 1700  Stop Time: 1750  Total time in clinic (min): 50 minutes    Subjective: Patient reporting knee as "okay" at start of session  Patient continues to ambulate into clinic with near full knee extension with RW  Patient is to follow up with MD tomorrow  Objective: See treatment diary below      Assessment: Tolerated treatment fair  Patient demonstrated fatigue post treatment and would benefit from continued PT Significant hip compensation continued to be noted throughout entire session today even with consistent tactile cues from therapist  Patient educated again on importance of initiating and using knee flexion achieved for daily activities such as walking, getting in and out of bed, and standing  Plan: Continue per plan of care  Progress treatment as tolerated         Precautions: see protocol   * Indicates part of HEP     Daily Treatment Diary     Manuals    PROM right knee   Flexion in sitting         Supine extension overpressure   done         Trial prone PROM             Patella mobs        Done 8' PT AD, SPT VZ    Scar massage   done         Hip flexor stretch   Done in sidelying Done Done        Hamstring stretch   done Done done       Hip adductor stretch   done Done 10' total Done 10' total       Neuro Re-ed            Quad sets*            SAQ W/ stim W/ stim   W/ stim   2x10 2x10    Heel slides w strap*       Seated 5x10"     Gastroc stretch strap            Hamstring stretch            Wedge stretch            Lateral weight shifting 2x10  2x10    2x10  2x10 2x10    Therapeutic Exercise            Bike Rocks 10' overpressure from therapist Rocks 10' overpressure from therapist rocks 10' w/ overpressure from PT Rocks 8' w overpressure Rocks 8' w/ overpressure Rocks 5' Rocks 8' Rocks 10' Rocks 10' overpressure from therapist   Patient education            SLR flexion w/ quad set*            SLR hip abduction       Stand 3x10 Standing x10     Bridging*            Clamshells            Heel raises      2x10 2x10     TKE  OTB 2x10 OTB 2x10 nv OTB 2x10 OTB 2x10  OTB 2x10 OTB 2x10   Mini squats 2x10 2x10    2x10 PT assist x10 PT assist x15 2x10    Bilateral hip abduction   OTB 2x10 OTB 2x10 OTB 3x10       Bilateral hip adduction   Ball squeeze 2x10 2x10 2x10        LAQ            Standing marching            Tests and measures    8'        Therapeutic Activity            Leg press             Step ups  4'' 5x           Lateral step ups            Sit to stand to table (knee flexion) 2x10  2x10  2x10 2x10 2x10 20" x5 x5 x10 2x10    Lunges onto step w/ overpressure from therapist 2x10  2x19          Gait training w RW            Standing march to [facility]            Modalities            NMES 10'  SAQ 10' SAQ   10' SAQ 10' lv 35 10' lvl 50 10' lvl 52 10' lvl 50 SAQ   CP PRN       10' 10' 10' 10'

## 2021-06-02 ENCOUNTER — APPOINTMENT (OUTPATIENT)
Dept: LAB | Facility: CLINIC | Age: 61
End: 2021-06-02
Payer: COMMERCIAL

## 2021-06-02 ENCOUNTER — TRANSCRIBE ORDERS (OUTPATIENT)
Dept: LAB | Facility: CLINIC | Age: 61
End: 2021-06-02

## 2021-06-02 ENCOUNTER — OFFICE VISIT (OUTPATIENT)
Dept: OBGYN CLINIC | Facility: CLINIC | Age: 61
End: 2021-06-02

## 2021-06-02 VITALS — HEIGHT: 62 IN | BODY MASS INDEX: 31.65 KG/M2 | WEIGHT: 172 LBS

## 2021-06-02 DIAGNOSIS — M24.661 ARTHROFIBROSIS OF KNEE JOINT, RIGHT: Primary | ICD-10-CM

## 2021-06-02 DIAGNOSIS — M24.661 ARTHROFIBROSIS OF KNEE JOINT, RIGHT: ICD-10-CM

## 2021-06-02 LAB
ANION GAP SERPL CALCULATED.3IONS-SCNC: 9 MMOL/L (ref 4–13)
BUN SERPL-MCNC: 17 MG/DL (ref 5–25)
CALCIUM SERPL-MCNC: 10 MG/DL (ref 8.3–10.1)
CHLORIDE SERPL-SCNC: 104 MMOL/L (ref 100–108)
CO2 SERPL-SCNC: 27 MMOL/L (ref 21–32)
CREAT SERPL-MCNC: 0.88 MG/DL (ref 0.6–1.3)
ERYTHROCYTE [DISTWIDTH] IN BLOOD BY AUTOMATED COUNT: 13.2 % (ref 11.6–15.1)
GFR SERPL CREATININE-BSD FRML MDRD: 72 ML/MIN/1.73SQ M
GLUCOSE SERPL-MCNC: 190 MG/DL (ref 65–140)
HCT VFR BLD AUTO: 45 % (ref 34.8–46.1)
HGB BLD-MCNC: 14.5 G/DL (ref 11.5–15.4)
MCH RBC QN AUTO: 30.6 PG (ref 26.8–34.3)
MCHC RBC AUTO-ENTMCNC: 32.2 G/DL (ref 31.4–37.4)
MCV RBC AUTO: 95 FL (ref 82–98)
PLATELET # BLD AUTO: 255 THOUSANDS/UL (ref 149–390)
PMV BLD AUTO: 9.9 FL (ref 8.9–12.7)
POTASSIUM SERPL-SCNC: 4.3 MMOL/L (ref 3.5–5.3)
RBC # BLD AUTO: 4.74 MILLION/UL (ref 3.81–5.12)
SODIUM SERPL-SCNC: 140 MMOL/L (ref 136–145)
WBC # BLD AUTO: 3.79 THOUSAND/UL (ref 4.31–10.16)

## 2021-06-02 PROCEDURE — 85027 COMPLETE CBC AUTOMATED: CPT

## 2021-06-02 PROCEDURE — 80048 BASIC METABOLIC PNL TOTAL CA: CPT

## 2021-06-02 PROCEDURE — 99024 POSTOP FOLLOW-UP VISIT: CPT | Performed by: ORTHOPAEDIC SURGERY

## 2021-06-02 PROCEDURE — 36415 COLL VENOUS BLD VENIPUNCTURE: CPT

## 2021-06-02 RX ORDER — CHLORHEXIDINE GLUCONATE 0.12 MG/ML
15 RINSE ORAL ONCE
Status: CANCELLED | OUTPATIENT
Start: 2021-06-02 | End: 2021-06-02

## 2021-06-02 NOTE — PROGRESS NOTES
Assessment:  1  Arthrofibrosis of knee joint, right       Patient Active Problem List   Diagnosis    Bilateral knee pain    Localized osteoarthritis of knees, bilateral    Degenerative arthritis of knee, bilateral    Type 2 diabetes mellitus without complication, without long-term current use of insulin (HCC)    Essential hypertension    Hyperlipidemia    Carpal tunnel syndrome on both sides    Right arm pain    Encounter for screening mammogram for breast cancer    Constipation    Palpitations    Arthritis of knee    Tobacco abuse    Lower abdominal pain    Healthcare maintenance    Primary osteoarthritis of right knee    Abnormal ultrasound of uterus    Pre-operative clearance    Acute postoperative anemia due to expected blood loss    Status post total knee replacement, right    Arthrofibrosis of knee joint, left           Plan       manipulation under anesthesia  Patient has been properly consented daughter was present during the visit as well  Patient is agreeable to proceed with manipulation under anesthesia understanding the risks and benefits  Subjective:     Patient ID:    Chief Complaint:Roxana Bachsegundo Yi Edwards 62 y o  female      HPI      Patient comes in today now 3 months out after having a total knee arthroplasty  Patient was not tolerating therapy as much as was necessary  She has done very well with her extension but still is limited to about 60-70 degrees of flexion  At this point I feel that she is not going to improved much without a manipulation under anesthesia  There are Media pictures showing what range of motion she had postoperatively  This was easily a 110 or more degrees        The following portions of the patient's history were reviewed and updated as appropriate: allergies, current medications, past family history, past social history, past surgical history and problem list     All organ systems normal    Social History     Socioeconomic History    Marital status:      Spouse name: Not on file    Number of children: Not on file    Years of education: Not on file    Highest education level: Not on file   Occupational History    Occupation:      Employer: [address] Needs    Financial resource strain: Not hard at all    Food insecurity     Worry: Never true     Inability: Never true    Transportation needs     Medical: No     Non-medical: No   Tobacco Use    Smoking status: Former Smoker     Packs/day: 0 50     Years: 45 00     Pack years: 22 50     Types: Cigarettes     Quit date: 2020     Years since quittin 5    Smokeless tobacco: Never Used   Substance and Sexual Activity    Alcohol use: Yes     Frequency: 2-4 times a month     Drinks per session: 1 or 2     Binge frequency: Never     Comment: Socially    Drug use: No    Sexual activity: Not on file   Lifestyle    Physical activity     Days per week: 0 days     Minutes per session: 0 min    Stress: Not on file   Relationships    Social connections     Talks on phone: More than three times a week     Gets together: More than three times a week     Attends [employer] service: Never     Active member of club or organization: No     Attends meetings of clubs or organizations: Never     Relationship status:      Intimate partner violence     Fear of current or ex partner: No     Emotionally abused: No     Physically abused: No     Forced sexual activity: No   Other Topics Concern    Not on file   Social History Narrative    ** Merged History Encounter **          Past Medical History:   Diagnosis Date    Diabetes mellitus ([facility] [address] )     Hyperlipidemia     Hypertension      Past Surgical History:   Procedure Laterality Date    NO PAST SURGERIES      ME TOTAL KNEE ARTHROPLASTY Right 3/30/2021    Procedure: KNEE TOTAL ARTHROPLASTY;  Surgeon: Michael Fields DO;  Location: [facility];  Service: Orthopedics     No Known Allergies  Current Outpatient Medications on File Prior to Visit   Medication Sig Dispense Refill    acetaminophen (TYLENOL) 500 mg tablet Three times a day with meals and at bedtime 120 tablet 0    ascorbic acid (VITAMIN C) 500 mg tablet Take 1 tablet (500 mg total) by mouth daily 30 tablet 0    atenolol-chlorthalidone (TENORETIC) 50-25 mg per tablet Take 1 tablet by mouth daily 90 tablet 3    desoximetasone (TOPICORT) 0 25 % cream as needed       enoxaparin (LOVENOX) 40 mg/0 4 mL Inject 0 4 mL (40 mg total) under the skin daily for 28 days Starting after surgery 28 Syringe 0    ferrous sulfate 324 (65 Fe) mg Take 1 tablet (324 mg total) by mouth 2 (two) times a day before meals 60 tablet 0    folic acid (FOLVITE) 022 mcg tablet Take 1 tablet (400 mcg total) by mouth daily 30 tablet 0    gabapentin (NEURONTIN) 100 mg capsule Take 1 capsule (100 mg total) by mouth 3 (three) times a day 42 capsule 0    glucose blood (OneTouch Verio) test strip Check Blood Sugar 2 times daily 100 each 0    Lancets (onetouch ultrasoft) lancets Use as instructed 100 each 3    metFORMIN (GLUCOPHAGE) 500 mg tablet Take 1 tablet (500 mg total) by mouth daily with breakfast 90 tablet 0    methocarbamol (ROBAXIN) 750 mg tablet Take 1 tablet (750 mg total) by mouth every 6 (six) hours as needed for muscle spasms 60 tablet 0    Multiple Vitamins-Minerals (multivitamin with minerals) tablet Take 1 tablet by mouth daily 30 tablet 1    OneTouch Delica Lancets 24J MISC Use 1 each 2 (two) times a day 200 each 3    oxyCODONE (ROXICODONE) 5 mg immediate release tablet Take 1 tablet (5 mg total) by mouth 3 (three) times a day as needed for moderate painMax Daily Amount: 15 mg 15 tablet 0    pravastatin (PRAVACHOL) 40 mg tablet Take 1 tablet (40 mg total) by mouth daily (Patient taking differently: Take 40 mg by mouth daily in the early morning ) 90 tablet 3    Propylene Glycol (Systane Complete) 0 6 % SOLN Place 1 drop in both eyes up to 4 times a day for dry eyes 15 mL 2    traMADol (ULTRAM) 50 mg tablet Take 1 tablet by mouth 1/2 hour prior to physical therapy up to 3 times per week 12 tablet 0     Current Facility-Administered Medications on File Prior to Visit   Medication Dose Route Frequency Provider Last Rate Last Admin    acetaminophen (TYLENOL) tablet 500 mg  500 mg Oral TID With Meals Kamari Amin DO                  Objective:        Ortho Exam    Extension is normal incision is healed there is not a lot of mobility of the kneecap this may indicate the scarring that has occurred  Portions of the record may have been created with voice recognition software   Occasional wrong word or "sound a like" substitutions may have occurred due to the inherent limitations of voice recognition software   Read the chart carefully and recognize, using context, where substitutions have occurred

## 2021-06-02 NOTE — H&P (VIEW-ONLY)
Assessment:  1  Arthrofibrosis of knee joint, right       Patient Active Problem List   Diagnosis    Bilateral knee pain    Localized osteoarthritis of knees, bilateral    Degenerative arthritis of knee, bilateral    Type 2 diabetes mellitus without complication, without long-term current use of insulin (HCC)    Essential hypertension    Hyperlipidemia    Carpal tunnel syndrome on both sides    Right arm pain    Encounter for screening mammogram for breast cancer    Constipation    Palpitations    Arthritis of knee    Tobacco abuse    Lower abdominal pain    Healthcare maintenance    Primary osteoarthritis of right knee    Abnormal ultrasound of uterus    Pre-operative clearance    Acute postoperative anemia due to expected blood loss    Status post total knee replacement, right    Arthrofibrosis of knee joint, left           Plan       manipulation under anesthesia  Patient has been properly consented daughter was present during the visit as well  Patient is agreeable to proceed with manipulation under anesthesia understanding the risks and benefits  Subjective:     Patient ID:    Chief Complaint:Roxana Edwards 62 y o  female      HPI      Patient comes in today now 3 months out after having a total knee arthroplasty  Patient was not tolerating therapy as much as was necessary  She has done very well with her extension but still is limited to about 60-70 degrees of flexion  At this point I feel that she is not going to improved much without a manipulation under anesthesia  There are Media pictures showing what range of motion she had postoperatively  This was easily a 110 or more degrees        The following portions of the patient's history were reviewed and updated as appropriate: allergies, current medications, past family history, past social history, past surgical history and problem list     All organ systems normal    Social History     Socioeconomic History    Marital status:      Spouse name: Not on file    Number of children: Not on file    Years of education: Not on file    Highest education level: Not on file   Occupational History    Occupation:      Employer: [address] Needs    Financial resource strain: Not hard at all    Food insecurity     Worry: Never true     Inability: Never true    Transportation needs     Medical: No     Non-medical: No   Tobacco Use    Smoking status: Former Smoker     Packs/day: 0 50     Years: 45 00     Pack years: 22 50     Types: Cigarettes     Quit date: 2020     Years since quittin 5    Smokeless tobacco: Never Used   Substance and Sexual Activity    Alcohol use: Yes     Frequency: 2-4 times a month     Drinks per session: 1 or 2     Binge frequency: Never     Comment: Socially    Drug use: No    Sexual activity: Not on file   Lifestyle    Physical activity     Days per week: 0 days     Minutes per session: 0 min    Stress: Not on file   Relationships    Social connections     Talks on phone: More than three times a week     Gets together: More than three times a week     Attends Bahai service: Never     Active member of club or organization: No     Attends meetings of clubs or organizations: Never     Relationship status:      Intimate partner violence     Fear of current or ex partner: No     Emotionally abused: No     Physically abused: No     Forced sexual activity: No   Other Topics Concern    Not on file   Social History Narrative    ** Merged History Encounter **          Past Medical History:   Diagnosis Date    Diabetes mellitus ([facility] [address] )     Hyperlipidemia     Hypertension      Past Surgical History:   Procedure Laterality Date    NO PAST SURGERIES      [place] TOTAL KNEE ARTHROPLASTY Right 3/30/2021    Procedure: KNEE TOTAL ARTHROPLASTY;  Surgeon: Anita Mcneill DO;  Location: [facility];  Service: Orthopedics     No Known Allergies  Current Outpatient Medications on File Prior to Visit   Medication Sig Dispense Refill    acetaminophen (TYLENOL) 500 mg tablet Three times a day with meals and at bedtime 120 tablet 0    ascorbic acid (VITAMIN C) 500 mg tablet Take 1 tablet (500 mg total) by mouth daily 30 tablet 0    atenolol-chlorthalidone (TENORETIC) 50-25 mg per tablet Take 1 tablet by mouth daily 90 tablet 3    desoximetasone (TOPICORT) 0 25 % cream as needed       enoxaparin (LOVENOX) 40 mg/0 4 mL Inject 0 4 mL (40 mg total) under the skin daily for 28 days Starting after surgery 28 Syringe 0    ferrous sulfate 324 (65 Fe) mg Take 1 tablet (324 mg total) by mouth 2 (two) times a day before meals 60 tablet 0    folic acid (FOLVITE) 523 mcg tablet Take 1 tablet (400 mcg total) by mouth daily 30 tablet 0    gabapentin (NEURONTIN) 100 mg capsule Take 1 capsule (100 mg total) by mouth 3 (three) times a day 42 capsule 0    glucose blood (OneTouch Verio) test strip Check Blood Sugar 2 times daily 100 each 0    Lancets (onetouch ultrasoft) lancets Use as instructed 100 each 3    metFORMIN (GLUCOPHAGE) 500 mg tablet Take 1 tablet (500 mg total) by mouth daily with breakfast 90 tablet 0    methocarbamol (ROBAXIN) 750 mg tablet Take 1 tablet (750 mg total) by mouth every 6 (six) hours as needed for muscle spasms 60 tablet 0    Multiple Vitamins-Minerals (multivitamin with minerals) tablet Take 1 tablet by mouth daily 30 tablet 1    OneTouch Delica Lancets 83N MISC Use 1 each 2 (two) times a day 200 each 3    oxyCODONE (ROXICODONE) 5 mg immediate release tablet Take 1 tablet (5 mg total) by mouth 3 (three) times a day as needed for moderate painMax Daily Amount: 15 mg 15 tablet 0    pravastatin (PRAVACHOL) 40 mg tablet Take 1 tablet (40 mg total) by mouth daily (Patient taking differently: Take 40 mg by mouth daily in the early morning ) 90 tablet 3    Propylene Glycol (Systane Complete) 0 6 % SOLN Place 1 drop in both eyes up to 4 times a day for dry eyes 15 mL 2    traMADol (ULTRAM) 50 mg tablet Take 1 tablet by mouth 1/2 hour prior to physical therapy up to 3 times per week 12 tablet 0     Current Facility-Administered Medications on File Prior to Visit   Medication Dose Route Frequency Provider Last Rate Last Admin    acetaminophen (TYLENOL) tablet 500 mg  500 mg Oral TID With Meals Kyra Sames, DO                  Objective:        Ortho Exam    Extension is normal incision is healed there is not a lot of mobility of the kneecap this may indicate the scarring that has occurred  Portions of the record may have been created with voice recognition software   Occasional wrong word or "sound a like" substitutions may have occurred due to the inherent limitations of voice recognition software   Read the chart carefully and recognize, using context, where substitutions have occurred

## 2021-06-03 ENCOUNTER — OFFICE VISIT (OUTPATIENT)
Dept: PHYSICAL THERAPY | Facility: REHABILITATION | Age: 61
End: 2021-06-03
Payer: COMMERCIAL

## 2021-06-03 DIAGNOSIS — M25.561 CHRONIC PAIN OF RIGHT KNEE: ICD-10-CM

## 2021-06-03 DIAGNOSIS — G89.29 CHRONIC PAIN OF RIGHT KNEE: ICD-10-CM

## 2021-06-03 DIAGNOSIS — Z96.651 STATUS POST TOTAL RIGHT KNEE REPLACEMENT: ICD-10-CM

## 2021-06-03 DIAGNOSIS — M17.11 PRIMARY OSTEOARTHRITIS OF RIGHT KNEE: Primary | ICD-10-CM

## 2021-06-03 PROCEDURE — 97140 MANUAL THERAPY 1/> REGIONS: CPT | Performed by: PHYSICAL THERAPIST

## 2021-06-03 PROCEDURE — 97112 NEUROMUSCULAR REEDUCATION: CPT | Performed by: PHYSICAL THERAPIST

## 2021-06-03 PROCEDURE — 97110 THERAPEUTIC EXERCISES: CPT | Performed by: PHYSICAL THERAPIST

## 2021-06-03 NOTE — PROGRESS NOTES
Daily Note     Today's date: 6/3/2021  Patient name: Khalida Baker  : 1960  MRN: [id number]  Referring provider: Zaina Puente  Dx:   Encounter Diagnosis     ICD-10-CM    1  Primary osteoarthritis of right knee  M17 11    2  Status post total right knee replacement  Z96 651    3  Chronic pain of right knee  M25 561     G89 29        Start Time: 08  Stop Time: 09  Total time in clinic (min): 55 minutes    Subjective: Patient had follow up appointment with Dr Martine Daniel yesterday  She is scheduled for a MELANIE on   Objective: See treatment diary below      Assessment: Tolerated treatment well  Will continue to focus on hip and quadriceps strength as able at this time  Patient is consistently achieving ~70* knee flexion with bike rocks, however continues to have limited knee flexion functionally locking into extension with ambulation, transfers and transitions  Improved flexibility and tolerance noted with manual hip stretching this visit  Provided patient with updated HEP for optimal compliance prior to MELANIE  Patient demonstrated fatigue post treatment, exhibited good technique with therapeutic exercises and would benefit from continued PT  Plan: Continue per plan of care        Precautions: see protocol   * Indicates part of HEP     Daily Treatment Diary     Manuals 5/21 5/24 5/26 5/28 6/1 6/3  5/17 5/19   PROM right knee   Flexion in sitting         Supine extension overpressure   done         Trial prone PROM             Patella mobs        Done 8' PT AD, SPT VZ    Scar massage   done         Hip flexor stretch   Done in sidelying Done Done  Done 8'      Hamstring stretch   done Done done Done      Hip adductor stretch   done Done 10' total Done 10' total       Neuro Re-ed            Quad sets*            SAQ W/ stim W/ stim   W/ stim W/ stim  2x10 2x10    Heel slides w strap*            Gastroc stretch strap            Hamstring stretch            Wedge stretch Lateral weight shifting 2x10  2x10      2x10 2x10    Therapeutic Exercise            Bike Rocks 10' overpressure from therapist Rocks 10' overpressure from therapist rocks 10' w/ overpressure from PT Rocks 8' w overpressure Rocks 8' w/ overpressure Rocks 8'  Rocks 10' Rocks 10' overpressure from therapist   Patient education            SLR flexion w/ quad set*            SLR hip abduction             Bridging*            Clamshells            Heel raises            TKE  OTB 2x10 OTB 2x10 nv OTB 2x10 OTB 2x10  OTB 2x10 OTB 2x10   Mini squats 2x10 2x10      x15 2x10    Bilateral hip abduction   OTB 2x10 OTB 2x10 OTB 3x10 OTB 3x10      Bilateral hip adduction   Ball squeeze 2x10 2x10 2x10  3x10      LAQ            Standing marching            Tests and measures    8'        Therapeutic Activity            Leg press             Step ups  4'' 5x           Lateral step ups            Sit to stand to table (knee flexion) 2x10  2x10  2x10 2x10 2x10   x10 2x10    Lunges onto step w/ overpressure from therapist 2x10  2x19          Gait training w RW            Standing march to [facility]            Modalities            NMES 10'  SAQ 10' SAQ   10' SAQ 10' SAQ lv 40  10' lvl 52 10' lvl 50 SAQ   CP PRN         10' 10'

## 2021-06-07 ENCOUNTER — APPOINTMENT (OUTPATIENT)
Dept: PHYSICAL THERAPY | Facility: REHABILITATION | Age: 61
End: 2021-06-07
Payer: COMMERCIAL

## 2021-06-09 ENCOUNTER — APPOINTMENT (OUTPATIENT)
Dept: PHYSICAL THERAPY | Facility: REHABILITATION | Age: 61
End: 2021-06-09
Payer: COMMERCIAL

## 2021-06-09 DIAGNOSIS — E11.9 TYPE 2 DIABETES MELLITUS WITHOUT COMPLICATION, WITHOUT LONG-TERM CURRENT USE OF INSULIN (HCC): ICD-10-CM

## 2021-06-11 ENCOUNTER — OFFICE VISIT (OUTPATIENT)
Dept: PHYSICAL THERAPY | Facility: REHABILITATION | Age: 61
End: 2021-06-11
Payer: COMMERCIAL

## 2021-06-11 DIAGNOSIS — M17.11 PRIMARY OSTEOARTHRITIS OF RIGHT KNEE: Primary | ICD-10-CM

## 2021-06-11 DIAGNOSIS — M25.561 CHRONIC PAIN OF RIGHT KNEE: ICD-10-CM

## 2021-06-11 DIAGNOSIS — G89.29 CHRONIC PAIN OF RIGHT KNEE: ICD-10-CM

## 2021-06-11 DIAGNOSIS — Z96.651 STATUS POST TOTAL RIGHT KNEE REPLACEMENT: ICD-10-CM

## 2021-06-11 PROCEDURE — 97112 NEUROMUSCULAR REEDUCATION: CPT

## 2021-06-11 PROCEDURE — 97140 MANUAL THERAPY 1/> REGIONS: CPT

## 2021-06-11 PROCEDURE — 97110 THERAPEUTIC EXERCISES: CPT

## 2021-06-11 NOTE — PROGRESS NOTES
Daily Note     Today's date: 2021  Patient name: Maryln Runner  : 1960  MRN: [id number]  Referring provider: Tammy Jara  Dx:   Encounter Diagnosis     ICD-10-CM    1  Primary osteoarthritis of right knee  M17 11    2  Status post total right knee replacement  Z96 651    3  Chronic pain of right knee  M25 561     G89 29        Start Time: 0800  Stop Time: 0850  Total time in clinic (min): 50 minutes    Subjective: Patient's daughter reporting patient has been compliant with HEP the last few days  She reports she is doing stairs and walking more  Objective: See treatment diary below      Assessment: Tolerated treatment well  Patient demonstrated fatigue post treatment and would benefit from continued PT Patient continues to demonstrate near full extension with all ambulation and transfer, even with cues  Less guarding noted noted with manuals today and slight decrease in hip compensation noted with sit to stands  Patient educated again on importance of completion of HEP and use of knee flexion achieved thus far, patient reporting understanding  Plan: Continue per plan of care  Progress treatment as tolerated         Precautions: see protocol   * Indicates part of HEP     Daily Treatment Diary     Manuals 5/21 5/24 5/26 5/28 6/1 6/3 6/11 5/17 5/19   PROM right knee   Flexion in sitting         Supine extension overpressure   done         Trial prone PROM             Patella mobs        Done 8' PT AD, SPT VZ    Scar massage   done         Hip flexor stretch   Done in sidelying Done Done  Done 8' Done 8'     Hamstring stretch   done Done done Done done     Hip adductor stretch   done Done 10' total Done 10' total       Neuro Re-ed            Quad sets*            SAQ W/ stim W/ stim   W/ stim W/ stim W/ stim 2x10 2x10    Heel slides w strap*            Gastroc stretch strap            Hamstring stretch            Wedge stretch            Lateral weight shifting 2x10  2x10 2x10 2x10    Therapeutic Exercise            Bike Rocks 10' overpressure from therapist Rocks 10' overpressure from therapist rocks 10' w/ overpressure from PT Rocks 8' w overpressure Rocks 8' w/ overpressure Rocks 8' Rocks 8' Rocks 10' Rocks 10' overpressure from therapist   Patient education            SLR flexion w/ quad set*            SLR hip abduction             Bridging*            Clamshells            Heel raises            TKE  OTB 2x10 OTB 2x10 nv OTB 2x10 OTB 2x10 OTB 3x10 OTB 2x10 OTB 2x10   Mini squats 2x10 2x10      x15 2x10    Bilateral hip abduction   OTB 2x10 OTB 2x10 OTB 3x10 OTB 3x10 OTB 3x10     Bilateral hip adduction   Ball squeeze 2x10 2x10 2x10  3x10 3x10     LAQ            Standing marching            Tests and measures    8'        Therapeutic Activity            Leg press             Step ups  4'' 5x           Lateral step ups            Sit to stand to table (knee flexion) 2x10  2x10  2x10 2x10 2x10  2x10  x10 2x10    Lunges onto step w/ overpressure from therapist 2x10  2x19          Gait training w RW            Standing march to [facility]            Modalities            NMES 10'  SAQ 10' SAQ   10' SAQ 10' SAQ lv 40 10' SAQ L40 10' lvl 52 10' lvl 50 SAQ   CP PRN         10' 10'

## 2021-06-15 ENCOUNTER — OFFICE VISIT (OUTPATIENT)
Dept: PHYSICAL THERAPY | Facility: REHABILITATION | Age: 61
End: 2021-06-15
Payer: COMMERCIAL

## 2021-06-15 DIAGNOSIS — G89.29 CHRONIC PAIN OF RIGHT KNEE: ICD-10-CM

## 2021-06-15 DIAGNOSIS — M25.561 CHRONIC PAIN OF RIGHT KNEE: ICD-10-CM

## 2021-06-15 DIAGNOSIS — Z96.651 STATUS POST TOTAL RIGHT KNEE REPLACEMENT: ICD-10-CM

## 2021-06-15 DIAGNOSIS — M17.11 PRIMARY OSTEOARTHRITIS OF RIGHT KNEE: Primary | ICD-10-CM

## 2021-06-15 PROCEDURE — 97110 THERAPEUTIC EXERCISES: CPT | Performed by: PHYSICAL THERAPIST

## 2021-06-15 PROCEDURE — 97140 MANUAL THERAPY 1/> REGIONS: CPT | Performed by: PHYSICAL THERAPIST

## 2021-06-15 NOTE — PRE-PROCEDURE INSTRUCTIONS
Pre-Surgery Instructions:   Medication Instructions    acetaminophen (TYLENOL) 500 mg tablet Instructed patient per Anesthesia Guidelines   ascorbic acid (VITAMIN C) 500 mg tablet Instructed patient per Anesthesia Guidelines   atenolol-chlorthalidone (TENORETIC) 50-25 mg per tablet Instructed patient per Anesthesia Guidelines   desoximetasone (TOPICORT) 0 25 % cream Instructed patient per Anesthesia Guidelines   metFORMIN (GLUCOPHAGE) 500 mg tablet Instructed patient per Anesthesia Guidelines   Multiple Vitamins-Minerals (multivitamin with minerals) tablet Instructed patient per Anesthesia Guidelines   pravastatin (PRAVACHOL) 40 mg tablet Instructed patient per Anesthesia Guidelines   Propylene Glycol (Systane Complete) 0 6 % SOLN Instructed patient per Anesthesia Guidelines   traMADol (ULTRAM) 50 mg tablet Instructed patient per Anesthesia Guidelines  Spoke with patients daughter Antwan  Medication list reviewed  Pt till stop all nsaids and vitamins now  Npo after midnight  Pt will not take metformin or any medications DOS  Shower instructions understood  1 adult visitor policy and the need for a ride home after surgery  Denies covid symptoms  [facility] [place] serafin call 6/16 with arrival time and directions   Daughter understood all instructions and will be present to translate for patient

## 2021-06-17 ENCOUNTER — HOSPITAL ENCOUNTER (OUTPATIENT)
Facility: HOSPITAL | Age: 61
Setting detail: OUTPATIENT SURGERY
Discharge: HOME | End: 2021-06-17
Attending: ORTHOPAEDIC SURGERY | Admitting: ORTHOPAEDIC SURGERY
Payer: COMMERCIAL

## 2021-06-17 ENCOUNTER — ANESTHESIA (OUTPATIENT)
Dept: PERIOP | Facility: HOSPITAL | Age: 61
End: 2021-06-17
Payer: COMMERCIAL

## 2021-06-17 ENCOUNTER — EVALUATION (OUTPATIENT)
Dept: PHYSICAL THERAPY | Facility: REHABILITATION | Age: 61
End: 2021-06-17
Payer: COMMERCIAL

## 2021-06-17 ENCOUNTER — ANESTHESIA EVENT (OUTPATIENT)
Dept: PERIOP | Facility: HOSPITAL | Age: 61
End: 2021-06-17
Payer: COMMERCIAL

## 2021-06-17 VITALS
RESPIRATION RATE: 20 BRPM | TEMPERATURE: 97.2 F | DIASTOLIC BLOOD PRESSURE: 81 MMHG | HEIGHT: 62 IN | OXYGEN SATURATION: 100 % | SYSTOLIC BLOOD PRESSURE: 164 MMHG | BODY MASS INDEX: 31.65 KG/M2 | HEART RATE: 68 BPM | WEIGHT: 172 LBS

## 2021-06-17 DIAGNOSIS — M24.661 ARTHROFIBROSIS OF KNEE JOINT, RIGHT: Primary | ICD-10-CM

## 2021-06-17 DIAGNOSIS — Z96.651 STATUS POST TOTAL RIGHT KNEE REPLACEMENT: ICD-10-CM

## 2021-06-17 DIAGNOSIS — M25.561 CHRONIC PAIN OF RIGHT KNEE: ICD-10-CM

## 2021-06-17 DIAGNOSIS — G89.29 CHRONIC PAIN OF RIGHT KNEE: ICD-10-CM

## 2021-06-17 DIAGNOSIS — M17.11 PRIMARY OSTEOARTHRITIS OF RIGHT KNEE: Primary | ICD-10-CM

## 2021-06-17 LAB — GLUCOSE SERPL-MCNC: 185 MG/DL (ref 65–140)

## 2021-06-17 PROCEDURE — 97140 MANUAL THERAPY 1/> REGIONS: CPT | Performed by: PHYSICAL THERAPIST

## 2021-06-17 PROCEDURE — 97110 THERAPEUTIC EXERCISES: CPT | Performed by: PHYSICAL THERAPIST

## 2021-06-17 PROCEDURE — 27570 FIXATION OF KNEE JOINT: CPT | Performed by: ORTHOPAEDIC SURGERY

## 2021-06-17 PROCEDURE — 97164 PT RE-EVAL EST PLAN CARE: CPT | Performed by: PHYSICAL THERAPIST

## 2021-06-17 PROCEDURE — 82948 REAGENT STRIP/BLOOD GLUCOSE: CPT

## 2021-06-17 RX ORDER — LIDOCAINE HYDROCHLORIDE 10 MG/ML
INJECTION, SOLUTION EPIDURAL; INFILTRATION; INTRACAUDAL; PERINEURAL AS NEEDED
Status: DISCONTINUED | OUTPATIENT
Start: 2021-06-17 | End: 2021-06-17

## 2021-06-17 RX ORDER — FENTANYL CITRATE 50 UG/ML
INJECTION, SOLUTION INTRAMUSCULAR; INTRAVENOUS AS NEEDED
Status: DISCONTINUED | OUTPATIENT
Start: 2021-06-17 | End: 2021-06-17

## 2021-06-17 RX ORDER — ONDANSETRON 2 MG/ML
4 INJECTION INTRAMUSCULAR; INTRAVENOUS EVERY 6 HOURS PRN
Status: DISCONTINUED | OUTPATIENT
Start: 2021-06-17 | End: 2021-06-17 | Stop reason: HOSPADM

## 2021-06-17 RX ORDER — GABAPENTIN 100 MG/1
100 CAPSULE ORAL 3 TIMES DAILY
Qty: 42 CAPSULE | Refills: 0 | Status: SHIPPED | OUTPATIENT
Start: 2021-06-17 | End: 2021-06-30 | Stop reason: SDUPTHER

## 2021-06-17 RX ORDER — MIDAZOLAM HYDROCHLORIDE 2 MG/2ML
INJECTION, SOLUTION INTRAMUSCULAR; INTRAVENOUS AS NEEDED
Status: DISCONTINUED | OUTPATIENT
Start: 2021-06-17 | End: 2021-06-17

## 2021-06-17 RX ORDER — ACETAMINOPHEN 325 MG/1
650 TABLET ORAL EVERY 6 HOURS PRN
Status: DISCONTINUED | OUTPATIENT
Start: 2021-06-17 | End: 2021-06-17 | Stop reason: HOSPADM

## 2021-06-17 RX ORDER — OXYCODONE HYDROCHLORIDE 5 MG/1
5 TABLET ORAL EVERY 4 HOURS PRN
Qty: 20 TABLET | Refills: 0 | Status: SHIPPED | OUTPATIENT
Start: 2021-06-17 | End: 2021-06-24

## 2021-06-17 RX ORDER — PROPOFOL 10 MG/ML
INJECTION, EMULSION INTRAVENOUS AS NEEDED
Status: DISCONTINUED | OUTPATIENT
Start: 2021-06-17 | End: 2021-06-17

## 2021-06-17 RX ORDER — CHLORHEXIDINE GLUCONATE 0.12 MG/ML
15 RINSE ORAL ONCE
Status: DISCONTINUED | OUTPATIENT
Start: 2021-06-17 | End: 2021-06-17 | Stop reason: HOSPADM

## 2021-06-17 RX ORDER — METHOCARBAMOL 750 MG/1
750 TABLET, FILM COATED ORAL EVERY 6 HOURS SCHEDULED
Qty: 56 TABLET | Refills: 0 | Status: SHIPPED | OUTPATIENT
Start: 2021-06-17 | End: 2021-06-30 | Stop reason: SDUPTHER

## 2021-06-17 RX ORDER — FENTANYL CITRATE/PF 50 MCG/ML
25 SYRINGE (ML) INJECTION
Status: DISCONTINUED | OUTPATIENT
Start: 2021-06-17 | End: 2021-06-17 | Stop reason: HOSPADM

## 2021-06-17 RX ORDER — TRAMADOL HYDROCHLORIDE 50 MG/1
50 TABLET ORAL EVERY 6 HOURS SCHEDULED
Status: DISCONTINUED | OUTPATIENT
Start: 2021-06-17 | End: 2021-06-17 | Stop reason: HOSPADM

## 2021-06-17 RX ORDER — CEFAZOLIN SODIUM 1 G/50ML
1000 SOLUTION INTRAVENOUS ONCE
Status: DISCONTINUED | OUTPATIENT
Start: 2021-06-17 | End: 2021-06-17 | Stop reason: HOSPADM

## 2021-06-17 RX ORDER — ONDANSETRON 2 MG/ML
4 INJECTION INTRAMUSCULAR; INTRAVENOUS ONCE AS NEEDED
Status: DISCONTINUED | OUTPATIENT
Start: 2021-06-17 | End: 2021-06-17 | Stop reason: HOSPADM

## 2021-06-17 RX ORDER — SODIUM CHLORIDE, SODIUM LACTATE, POTASSIUM CHLORIDE, CALCIUM CHLORIDE 600; 310; 30; 20 MG/100ML; MG/100ML; MG/100ML; MG/100ML
INJECTION, SOLUTION INTRAVENOUS CONTINUOUS PRN
Status: DISCONTINUED | OUTPATIENT
Start: 2021-06-17 | End: 2021-06-17

## 2021-06-17 RX ORDER — PROPOFOL 10 MG/ML
INJECTION, EMULSION INTRAVENOUS CONTINUOUS PRN
Status: DISCONTINUED | OUTPATIENT
Start: 2021-06-17 | End: 2021-06-17

## 2021-06-17 RX ADMIN — FENTANYL CITRATE 25 MCG: 50 INJECTION, SOLUTION INTRAMUSCULAR; INTRAVENOUS at 08:49

## 2021-06-17 RX ADMIN — PROPOFOL 50 MG: 10 INJECTION, EMULSION INTRAVENOUS at 08:10

## 2021-06-17 RX ADMIN — PROPOFOL 80 MCG/KG/MIN: 10 INJECTION, EMULSION INTRAVENOUS at 08:10

## 2021-06-17 RX ADMIN — LIDOCAINE HYDROCHLORIDE 50 MG: 10 INJECTION, SOLUTION EPIDURAL; INFILTRATION; INTRACAUDAL at 08:10

## 2021-06-17 RX ADMIN — SODIUM CHLORIDE, SODIUM LACTATE, POTASSIUM CHLORIDE, AND CALCIUM CHLORIDE: .6; .31; .03; .02 INJECTION, SOLUTION INTRAVENOUS at 08:05

## 2021-06-17 RX ADMIN — FENTANYL CITRATE 25 MCG: 50 INJECTION, SOLUTION INTRAMUSCULAR; INTRAVENOUS at 08:43

## 2021-06-17 RX ADMIN — FENTANYL CITRATE 25 MCG: 50 INJECTION, SOLUTION INTRAMUSCULAR; INTRAVENOUS at 08:38

## 2021-06-17 RX ADMIN — MIDAZOLAM HYDROCHLORIDE 2 MG: 1 INJECTION, SOLUTION INTRAMUSCULAR; INTRAVENOUS at 08:05

## 2021-06-17 RX ADMIN — FENTANYL CITRATE 25 MCG: 50 INJECTION, SOLUTION INTRAMUSCULAR; INTRAVENOUS at 08:16

## 2021-06-17 RX ADMIN — TRAMADOL HYDROCHLORIDE 50 MG: 50 TABLET, FILM COATED ORAL at 09:36

## 2021-06-17 NOTE — INTERVAL H&P NOTE
H&P reviewed  After examining the patient I find no changes in the patients condition since the H&P had been written  Vitals:    06/17/21 0658   BP: (!) 178/85   Pulse: 73   Resp: 18   SpO2: 100%   No audible wheeze no shortness of breath patient alert oriented x3 patient reluctant for range of motion as soon as I try to bend her knee am only able to get her to about 40° yet in the office we easily seen her to about 60 or 70°  Extension is full  Incision is clean dry intact no erythema    Pulses are palpable regular rate rhythm

## 2021-06-17 NOTE — ANESTHESIA POSTPROCEDURE EVALUATION
Post-Op Assessment Note    CV Status:  Stable  Pain Score: 0    Pain management: adequate     Mental Status:  Alert and awake   Hydration Status:  Euvolemic   PONV Controlled:  Controlled   Airway Patency:  Patent      Post Op Vitals Reviewed: Yes      Staff: CRNA         No complications documented      /61   Temp   98 2   Pulse  87   Resp   16   SpO2   99%

## 2021-06-17 NOTE — ANESTHESIA PREPROCEDURE EVALUATION
Procedure:  MANIPULATION JOINT KNEE (Right Knee)    Relevant Problems   CARDIO   (+) Essential hypertension   (+) Hyperlipidemia      ENDO   (+) Type 2 diabetes mellitus without complication, without long-term current use of insulin (HCC)      HEMATOLOGY   (+) Acute postoperative anemia due to expected blood loss      MUSCULOSKELETAL   (+) Arthritis of knee   (+) Degenerative arthritis of knee, bilateral   (+) Localized osteoarthritis of knees, bilateral   (+) Primary osteoarthritis of right knee        Physical Exam    Airway    Mallampati score: II  TM Distance: >3 FB  Neck ROM: full     Dental   upper dentures,     Cardiovascular  Cardiovascular exam normal    Pulmonary  Pulmonary exam normal     Other Findings        Anesthesia Plan  ASA Score- 2     Anesthesia Type- IV sedation with anesthesia with ASA Monitors  Additional Monitors:   Airway Plan:           Plan Factors-Exercise tolerance (METS): <4 METS  Chart reviewed  Patient summary reviewed  Patient is not a current smoker  Patient did not smoke on day of surgery  Induction- intravenous  Postoperative Plan- Plan for postoperative opioid use  Informed Consent- Anesthetic plan and risks discussed with patient and daughter  I personally reviewed this patient with the CRNA  Discussed and agreed on the Anesthesia Plan with the DAVID Young

## 2021-06-17 NOTE — PROGRESS NOTES
PT Re-Evaluation     Today's date: 2021  Patient name: Marychuy Polanco  : 1960  MRN: [id number]  Referring provider: Ana Paula Julian  Dx:   Encounter Diagnosis     ICD-10-CM    1  Primary osteoarthritis of right knee  M17 11    2  Status post total right knee replacement  Z96 651    3  Chronic pain of right knee  M25 561     G89 29        Start Time: 1145  Stop Time: 3028  Total time in clinic (min): 60 minutes    Assessment  Assessment details: Raúl Ledbetter is a 62year old female presenting to PT immediately following MELANIE performed by Dr Angi Goel this morning  Maximal time spent this session on manual therapy for passive/AAROM knee flexion seated and supine  Patient able to achieve approximately 85* knee flexion seated with passive overpressure and approximately 75* knee flexion supine with passive overpressure and hip stabilization from two therapists  Patient continues to compensate with hip hiking and lifting herself off the table despite maximal verbal and tactile cues from PT and patient's daughter  Maximal education provided regarding importance of consistent and frequent knee flexion stretching at home with demonstration of proper set up and overpressure provided by patient's daughter  Also discussed importance of utilizing available knee flexion for all transfers, transitions and ambulation to prevent knee extension contracture with some verbalized understanding  Impairments: abnormal gait, abnormal or restricted ROM, impaired physical strength, lacks appropriate home exercise program and pain with function  Barriers to therapy: Language barrier  Understanding of Dx/Px/POC: good   Prognosis: good    Goals  ST  Patient will have decreased pain by about 50% for improved mobility and compliance  (progressing)  2  Patient will improve right knee range of motion by at least 25% for improved ambulation  (met)    LT  Patient will be independent with HEP at discharge  (progressing)  2  Patient will demonstrate at least 120* of right knee range of motion for reciprocal stair negotiation without limitation from right knee  (progressing)  3  Patient will demonstrate improved in RLE strength by at least 2-3 grades for improved ambulation and return to work  (progressing)  4  Patient will ambulate with normal reciprocal pattern and no AD for return to prior level of function  (progressing)      Plan  Plan details: Thank you for opportunity to participate in the care of Ellen Jeter  Patient would benefit from: skilled physical therapy  Planned modality interventions: cryotherapy, unattended electrical stimulation and TENS  Planned therapy interventions: home exercise program, therapeutic exercise, therapeutic activities, stretching, strengthening, patient education, neuromuscular re-education and manual therapy  Frequency: 3x week  Duration in visits: 16  Plan of Care beginning date: 2021  Treatment plan discussed with: patient and family        Subjective Evaluation    History of Present Illness  Date of surgery: 3/30/2021  Mechanism of injury: Re-evaluation 2021:  Corine Berry presents to PT following MELANIE performed this morning 2021 by Dr Clarisa Rea  She is currently ambulating with rolling walker and demonstrates right hip circumduction and continued lack of functional right knee flexion during ambulation into clinic  Patient reports minimal pain but notes tightness in the knee  Home set-up and functional level:  Steps to enter home 4 steps  RHR  First floor set up No  Second floor set up Full bathroom and bedroom  Number of steps to second floor Greater than 10 steps BHR   Previous falls: None   Rolling Walker  Commode  Assist at home Daughter  Grandson      PT goals: "get back to normal" Get back to work as a , negotiate stairs, walk normally and independently     Quality of life: good    Pain  Current pain ratin  At best pain ratin  At worst pain ratin (muscle twitching)  Quality: dull ache  Relieving factors: rest, ice, relaxation and support  Aggravating factors: sitting, stair climbing, standing, walking and lifting  Progression: worsening    Social Support  Steps to enter house: yes  Stairs in house: yes   Lives in: multiple-level home  Lives with: adult children    Employment status: not working  Treatments  Previous treatment: physical therapy, medication and injection treatment  Current treatment: physical therapy  Patient Goals  Patient goals for therapy: decreased pain, decreased edema, increased strength, return to work, increased motion, improved balance and independence with ADLs/IADLs          Objective     Observations     Right Knee   Positive for edema       Active Range of Motion     Right Knee   Flexion: 70 degrees with pain  Extension: -5 degrees with pain    Additional Active Range of Motion Details  Actively achieving ~70* supine and 80* seated EOT with slide board         Passive Range of Motion     Right Knee   Flexion: 80 degrees with pain    Additional Passive Range of Motion Details  Passively achieving approximately 80* with one PT providing overpressure and additional therapist stabilizing right hip in supine, approximately 85* seated EOT with slide board and PT overpressure    Mobility   Patellar Mobility:     Right Knee   Hypomobile: medial, lateral, superior and inferior     General Comments:      Knee Comments  Gait observation: Ambulating w RW with moderate BUE support to advance RLE and continues to demonstrate compensation from hip circumduction despite maximal cues for active knee flexion             Precautions: see protocol   * Indicates part of HEP     Daily Treatment Diary     Manuals 5/21 5/24 5/26 5/28 6/1 6/3 6/11 6/15 6/17   PROM right knee   Flexion in sitting      EOT & supine 45'   Supine extension overpressure   done         Trial prone PROM             Patella mobs        Done Done inferior Scar massage   done         Hip flexor stretch   Done in sidelying Done Done  Done 8' Done 8' Done 15' total    Hamstring stretch   done Done done Done done Done    Hip adductor stretch   done Done 10' total Done 10' total       Neuro Re-ed            Quad sets*            SAQ W/ stim W/ stim   W/ stim W/ stim W/ stim No stim 3x10                Heel slides w strap*         Seated and supine 5 ea PT assist   Gastroc stretch strap            Hamstring stretch            Wedge stretch            Lateral weight shifting 2x10  2x10          Therapeutic Exercise            Bike Rocks 10' overpressure from therapist Rocks 10' overpressure from therapist rocks 10' w/ overpressure from PT Rocks 8' w overpressure Rocks 8' w/ overpressure Rocks 8' Rocks 8' Rocks 10' assisted    Patient education            SLR flexion w/ quad set*            SLR hip abduction             Bridging*            Clamshells            Heel raises            TKE  OTB 2x10 OTB 2x10 nv OTB 2x10 OTB 2x10 OTB 3x10     Mini squats 2x10 2x10          Bilateral hip abduction   OTB 2x10 OTB 2x10 OTB 3x10 OTB 3x10 OTB 3x10 OTB 3x10    Bilateral hip adduction   Ball squeeze 2x10 2x10 2x10  3x10 3x10 3x10    LAQ        2x10    Standing marching            Tests and measures    8'        Therapeutic Activity            Leg press             Step ups  4'' 5x           Lateral step ups            Sit to stand to table (knee flexion) 2x10  2x10  2x10 2x10 2x10  2x10      Lunges onto step w/ overpressure from therapist 2x10  2x19          Gait training w RW            Standing march to [facility]            Modalities            NMES 10'  SAQ 10' SAQ   10' SAQ 10' SAQ lv 40 10' SAQ L40     CP PRN

## 2021-06-17 NOTE — DISCHARGE INSTRUCTIONS
You must participate in therapy please start taking her medications as directed  Use ice 20 minutes 3 times a day as needed for the 1st 2-3 days the knee may switch to heat as needed

## 2021-06-17 NOTE — OP NOTE
OPERATIVE REPORT  PATIENT NAME: Latha Sanders    :  1960  MRN: [id number]  Pt Location: [facility] OR ROOM 01    SURGERY DATE: 2021    Surgeon(s) and Role:     Ellis Chandler DO - Primary    Preop Diagnosis:  Arthrofibrosis of knee joint, right [M24 661]    Post-Op Diagnosis Codes:     * Arthrofibrosis of knee joint, right [M24 661]    Procedure(s) (LRB):  MANIPULATION JOINT KNEE (Right)    Specimen(s):  * No specimens in log *    Estimated Blood Loss:   Minimal    Drains:  Urethral Catheter Latex; Double-lumen 16 Fr  (Active)   Number of days: 79       Anesthesia Type:   IV Sedation with Anesthesia    Operative Indications:  Arthrofibrosis of knee joint, right [M24 661]      Operative Findings:  Arthrofibrosis    Complications:   None    Procedure and Technique:  Patient was identified in the preoperative area the right lower extremities marked  The consent H and P had been reviewed  I spoke with the daughter about getting her into physical therapy today  They both agreed to get therapy today  Patient was then brought back to the operative suite where she was sedated by Anesthesia  A proper time-out commenced identified the right lower extremity as the operative site  The knee was taken through its available range of motion pre manipulation pictures were taken of the knee in these positions  She was near full extension and approximately 30-45 degrees of flexion  After ranging her and doing soft tissue mobilization as well as stretching of the soft tissue manipulation was complete and patient was able to get approximately 105-110 degrees of flexion and full extension  Photos were taken of this as well  Patient returned to PACU postanesthesia  Patient is to report to physical therapy today     I was present for the entire procedure and A qualified resident physician was not available    Patient Disposition:  PACU     SIGNATURE: Anne Carlton DO  DATE: 2021  TIME: 7:07 AM

## 2021-06-18 ENCOUNTER — OFFICE VISIT (OUTPATIENT)
Dept: PHYSICAL THERAPY | Facility: REHABILITATION | Age: 61
End: 2021-06-18
Payer: COMMERCIAL

## 2021-06-18 DIAGNOSIS — M17.11 PRIMARY OSTEOARTHRITIS OF RIGHT KNEE: Primary | ICD-10-CM

## 2021-06-18 DIAGNOSIS — M25.561 CHRONIC PAIN OF RIGHT KNEE: ICD-10-CM

## 2021-06-18 DIAGNOSIS — Z96.651 STATUS POST TOTAL RIGHT KNEE REPLACEMENT: ICD-10-CM

## 2021-06-18 DIAGNOSIS — G89.29 CHRONIC PAIN OF RIGHT KNEE: ICD-10-CM

## 2021-06-18 PROCEDURE — 97140 MANUAL THERAPY 1/> REGIONS: CPT | Performed by: PHYSICAL THERAPIST

## 2021-06-18 PROCEDURE — 97110 THERAPEUTIC EXERCISES: CPT | Performed by: PHYSICAL THERAPIST

## 2021-06-18 NOTE — PROGRESS NOTES
Daily Note     Today's date: 2021  Patient name: Flores Blankenship  : 1960  MRN: [id number]  Referring provider: Dipika Gtz  Dx:   Encounter Diagnosis     ICD-10-CM    1  Primary osteoarthritis of right knee  M17 11    2  Status post total right knee replacement  Z96 651    3  Chronic pain of right knee  M25 561     G89 29        Start Time: 0800  Stop Time: 0910  Total time in clinic (min): 70 minutes    Subjective: Patient reports pain following yesterday's session and increased pain at start of today's session  Patient's daughter reported she worked on [employer] yesterday but no bending exercises despite education provided yesterday and reinforced today to perform heel slides with assistance from family member 4-5x/day  Objective: See treatment diary below      Assessment: Tolerated treatment well  Trialed passive range of motion in all positions this visit with extremely poor tolerance to prone and sidelying with patient resisting any movement of the knee  Most motion achieved in supine with overpressure achieving approximately 70* at most but consistently achieving about 60-70* throughout  Maximal cues for muscle relaxation throughout and trialed STM to distal quadriceps with palpable release of muscle tension but was not maintained throughout remainder of session  Trialed bike with patient barely achieving 70* motion and is unable to move further through motion despite maximal verbal/tactile cues, when attempting to move further patient immediately compensates with lifting hips  Re-emphasized the importance of practicing heel slides with overpressure at least 4-5x/day this weekend with verbalized understanding from patient and daughter  Patient demonstrated fatigue post treatment, exhibited good technique with therapeutic exercises and would benefit from continued PT  Plan: Continue per plan of care        Precautions: see protocol   * Indicates part of HEP     Daily Treatment Diary     Manuals 6/18 5/26 5/28 6/1 6/3 6/11 6/15 6/17   PROM right knee Supine 25'  Flexion in sitting      EOT & supine 45'   Supine extension overpressure Done  done         Prone PROM Trialed           Distal quadriceps release STM 10'           Patella mobs        Done Done inferior   Scar massage   done         Hip flexor stretch   Done in sidelying Done Done  Done 8' Done 8' Done 15' total    Hamstring stretch   done Done done Done done Done    Hip adductor stretch   done Done 10' total Done 10' total       Neuro Re-ed            Quad sets*            SAQ     W/ stim W/ stim W/ stim No stim 3x10                Heel slides w strap*         Seated and supine 5 ea PT assist   Gastroc stretch strap            Hamstring stretch            Wedge stretch            Lateral weight shifting            Therapeutic Exercise            Bike 10' PT assist/daughter assist  rocks 10' w/ overpressure from PT Rocks 8' w overpressure Rocks 8' w/ overpressure Rocks 8' Rocks 8' Rocks 10' assisted    Patient education 10'           SLR flexion w/ quad set*            SLR hip abduction             Bridging*            Clamshells            Heel raises            TKE   OTB 2x10 nv OTB 2x10 OTB 2x10 OTB 3x10     Mini squats            Bilateral hip abduction   OTB 2x10 OTB 2x10 OTB 3x10 OTB 3x10 OTB 3x10 OTB 3x10    Bilateral hip adduction   Ball squeeze 2x10 2x10 2x10  3x10 3x10 3x10    LAQ        2x10    Standing marching            Tests and measures    8'        Therapeutic Activity            Leg press             Step ups            Lateral step ups            Sit to stand to table (knee flexion)   2x10 2x10 2x10  2x10      Lunges onto step w/ overpressure from therapist            Gait training w RW            Standing march to [facility]            Modalities            NMES     10' SAQ 10' SAQ lv 40 10' SAQ L40     CP PRN

## 2021-06-21 ENCOUNTER — OFFICE VISIT (OUTPATIENT)
Dept: PHYSICAL THERAPY | Facility: REHABILITATION | Age: 61
End: 2021-06-21
Payer: COMMERCIAL

## 2021-06-21 DIAGNOSIS — M25.561 CHRONIC PAIN OF RIGHT KNEE: ICD-10-CM

## 2021-06-21 DIAGNOSIS — Z96.651 STATUS POST TOTAL RIGHT KNEE REPLACEMENT: ICD-10-CM

## 2021-06-21 DIAGNOSIS — G89.29 CHRONIC PAIN OF RIGHT KNEE: ICD-10-CM

## 2021-06-21 DIAGNOSIS — M17.11 PRIMARY OSTEOARTHRITIS OF RIGHT KNEE: Primary | ICD-10-CM

## 2021-06-21 PROCEDURE — 97140 MANUAL THERAPY 1/> REGIONS: CPT | Performed by: PHYSICAL THERAPIST

## 2021-06-21 PROCEDURE — 97112 NEUROMUSCULAR REEDUCATION: CPT | Performed by: PHYSICAL THERAPIST

## 2021-06-21 PROCEDURE — 97110 THERAPEUTIC EXERCISES: CPT | Performed by: PHYSICAL THERAPIST

## 2021-06-21 NOTE — PROGRESS NOTES
Daily Note     Today's date: 2021  Patient name: Tina Khan  : 1960  MRN: [id number]  Referring provider: Shania Hernandez  Dx:   Encounter Diagnosis     ICD-10-CM    1  Primary osteoarthritis of right knee  M17 11    2  Status post total right knee replacement  Z96 651    3  Chronic pain of right knee  M25 561     G89 29        Start Time: 0800  Stop Time: 0855  Total time in clinic (min): 55 minutes    Subjective: Patient reports a lot of pain yesterday  She reports using a band to bend her knee over the weekend  However, patient's granddaughter states "nobody was home to make sure she did her exercises "       Objective: See treatment diary below      Assessment: Tolerated treatment poor  Patient continues to resist all attempts at bending her knee reporting significant anterior knee pain and "jumping" off the bike when trying to bend past 20* despite telling patient that she has been able to bend the knee to at least 70* consistently  Poor tolerance to passive knee flexion EOT, however able to passively achieve 70* with significant pain and second person stabilizing the hip  Patient resists/guards mostly with knee extension and attempts to remove PT's hand with all passive stretching due to poor tolerance even with cues for breathing and relaxation  Achieved ~75* with supine heel slides but continued hip compensation noted  Continue to educate patient and her family on the importance of heel slides and knee flexion range of motion at home with some verbalized understanding  Patient demonstrated fatigue post treatment and would benefit from continued PT  Plan: Continue per plan of care        Precautions: see protocol   * Indicates part of HEP     Daily Treatment Diary     Manuals 6/18 6/21    6/3 6/11 6/15 6/17   PROM right knee Supine 25' EOT 20'        EOT & supine 45'   Supine extension overpressure Done           Prone PROM Trialed           Distal quadriceps release STM 10' STM/IASTM 10'          Patella mobs        Done Done inferior   Scar massage            Hip flexor stretch      Done 8' Done 8' Done 15' total    Hamstring stretch      Done done Done    Hip adductor stretch            Neuro Re-ed            Quad sets*            SAQ      W/ stim W/ stim No stim 3x10                Heel slides w strap*  Supine 10x5"       Seated and supine 5 ea PT assist   Gastroc stretch strap            Hamstring stretch            Wedge stretch            Lateral weight shifting            Therapeutic Exercise            Bike 10' PT assist/daughter assist 15' 2 person assist     Rocks 8' Rocks 8' Rocks 10' assisted    Patient education 10'           SLR flexion w/ quad set*            SLR hip abduction             Bridging*            Clamshells            Heel raises            TKE      OTB 2x10 OTB 3x10     Mini squats            Bilateral hip abduction      OTB 3x10 OTB 3x10 OTB 3x10    Bilateral hip adduction      3x10 3x10 3x10    LAQ        2x10    Standing marching            Tests and measures            Therapeutic Activity            Leg press             Step ups            Lateral step ups            Sit to stand to table (knee flexion)       2x10      Lunges onto step w/ overpressure from therapist            Gait training w RW            Standing march to TKE            Modalities            NMES      10' SAQ lv 40 10' SAQ L40     CP PRN

## 2021-06-23 ENCOUNTER — OFFICE VISIT (OUTPATIENT)
Dept: PHYSICAL THERAPY | Facility: REHABILITATION | Age: 61
End: 2021-06-23
Payer: COMMERCIAL

## 2021-06-23 DIAGNOSIS — Z96.651 STATUS POST TOTAL RIGHT KNEE REPLACEMENT: ICD-10-CM

## 2021-06-23 DIAGNOSIS — G89.29 CHRONIC PAIN OF RIGHT KNEE: ICD-10-CM

## 2021-06-23 DIAGNOSIS — M25.561 CHRONIC PAIN OF RIGHT KNEE: ICD-10-CM

## 2021-06-23 DIAGNOSIS — M17.11 PRIMARY OSTEOARTHRITIS OF RIGHT KNEE: Primary | ICD-10-CM

## 2021-06-23 PROCEDURE — 97140 MANUAL THERAPY 1/> REGIONS: CPT | Performed by: PHYSICAL THERAPIST

## 2021-06-23 PROCEDURE — 97112 NEUROMUSCULAR REEDUCATION: CPT | Performed by: PHYSICAL THERAPIST

## 2021-06-23 PROCEDURE — 97110 THERAPEUTIC EXERCISES: CPT | Performed by: PHYSICAL THERAPIST

## 2021-06-23 NOTE — PROGRESS NOTES
Daily Note     Today's date: 2021  Patient name: Alex Andrade  : 1960  MRN: [id number]  Referring provider: Aj Morrow  Dx:   Encounter Diagnosis     ICD-10-CM    1  Primary osteoarthritis of right knee  M17 11    2  Status post total right knee replacement  Z96 651    3  Chronic pain of right knee  M25 561     G89 29        Start Time: 1108  Stop Time: 1155  Total time in clinic (min): 47 minutes    Subjective: Patient reports right knee swelling upon waking up this morning  She reports completing knee bending 3x yesterday  5/10 pain at start of session  Objective: See treatment diary below  Unable to complete SLR with immediate loss of quadriceps engagement at attempt    Assessment: Tolerated treatment fair  Held manual stretching/passive overpressure this visit due to significant guarding and patient more resistant to bending the knee last session  Patient achieving approximately 72* with bike rocks and 70-75* with supine heel slides  Instructed patient in self patellar mobilizations to add to home program for continued mobility with verbalized understanding  Educated patient on icing/elevating the leg 2-3x today to address increased swelling in addition to compliance to HEP  Significantly slowed speed noted with ambulation, transitions and completion of all exercises  Patient demonstrated fatigue post treatment and would benefit from continued PT  Plan: Continue per plan of care        Precautions: see protocol   * Indicates part of HEP     Daily Treatment Diary     Manuals 6/18 6/21 6/23   6/3 6/11 6/15 6/17   PROM right knee Supine 25' EOT 20'        EOT & supine 45'   Supine extension overpressure Done           Prone PROM Trialed           Distal quadriceps release STM 10' STM/IASTM 10'          Patella mobs   Done     Done Done inferior   Scar massage            Hip flexor stretch      Done 8' Done 8' Done 15' total    Hamstring stretch      Done done Done Hip adductor stretch            Neuro Re-ed            Quad sets*   2x10x5"         SAQ      W/ stim W/ stim No stim 3x10                Heel slides w strap*  Supine 10x5" Supine 10x5"      Seated and supine 5 ea PT assist   Gastroc stretch strap            Hamstring stretch            Wedge stretch            Lateral weight shifting            Therapeutic Exercise            Bike 10' PT assist/daughter assist 15' 2 person assist  10' rocks 60-65*   Rocks 8' Rocks 8' Rocks 10' assisted    Patient education 10'  10'         SLR flexion w/ quad set*   unable         SLR hip abduction             Bridging*            Clamshells            Heel raises            TKE      OTB 2x10 OTB 3x10     Mini squats            Bilateral hip abduction      OTB 3x10 OTB 3x10 OTB 3x10    Bilateral hip adduction      3x10 3x10 3x10    LAQ        2x10    Standing marching            Tests and measures            Therapeutic Activity            Leg press             Step ups            Lateral step ups            Sit to stand to table (knee flexion)       2x10      Lunges onto step w/ overpressure from therapist            Gait training w RW            Standing march to TKE            Modalities            NMES      10' SAQ lv 40 10' SAQ L40     CP PRN

## 2021-06-25 ENCOUNTER — OFFICE VISIT (OUTPATIENT)
Dept: PHYSICAL THERAPY | Facility: REHABILITATION | Age: 61
End: 2021-06-25
Payer: COMMERCIAL

## 2021-06-25 DIAGNOSIS — M25.561 CHRONIC PAIN OF RIGHT KNEE: ICD-10-CM

## 2021-06-25 DIAGNOSIS — G89.29 CHRONIC PAIN OF RIGHT KNEE: ICD-10-CM

## 2021-06-25 DIAGNOSIS — Z96.651 STATUS POST TOTAL RIGHT KNEE REPLACEMENT: ICD-10-CM

## 2021-06-25 DIAGNOSIS — M17.11 PRIMARY OSTEOARTHRITIS OF RIGHT KNEE: Primary | ICD-10-CM

## 2021-06-25 PROCEDURE — 97140 MANUAL THERAPY 1/> REGIONS: CPT | Performed by: PHYSICAL THERAPIST

## 2021-06-25 PROCEDURE — 97110 THERAPEUTIC EXERCISES: CPT | Performed by: PHYSICAL THERAPIST

## 2021-06-25 NOTE — PROGRESS NOTES
Daily Note     Today's date: 2021  Patient name: Austin Obrien  : 1960  MRN: [id number]  Referring provider: Adolph Solano  Dx:   Encounter Diagnosis     ICD-10-CM    1  Primary osteoarthritis of right knee  M17 11    2  Status post total right knee replacement  Z96 651    3  Chronic pain of right knee  M25 561     G89 29        Start Time: 0800  Stop Time: 0850  Total time in clinic (min): 50 minutes    Subjective: Patient with no new reports regarding right knee  Decreased right knee swelling noted  Objective: See treatment diary below      Assessment: Tolerated treatment poor  Today's session focused solely on knee flexion range of motion with patient unable to achieve greater than 70* knee flexion with maximal assistance/cues  Trialed passive stretching seated EOT with patient immediately resisting and removing PT's hand with attempt at slight bending  Trialed supine positioning AAROM with overpressure from therapist with patient lifting herself off the table and hiking her hip at any attempt to bend the knee past 65*  Patient had same reaction to seated heel slides with overpressure as well  She demonstrates significant fear and pain with all knee flexion limiting further progress at this time  May benefit from increased focus on gait training and strengthening as able to improved functional mobility despite lack of range of motion  Patient would benefit from continued PT  Plan: Continue per plan of care        Precautions: see protocol   * Indicates part of HEP     Daily Treatment Diary     Manuals 6/18 6/21 6/23 6/25  6/3 6/11 6/15 6/17   PROM right knee Supine 25' EOT 20'   EOT, supine 20'     EOT & supine 45'   Supine extension overpressure Done           Prone PROM Trialed           Distal quadriceps release STM 10' STM/IASTM 10'          Patella mobs   Done     Done Done inferior   Scar massage            Hip flexor stretch      Done 8' Done 8' Done 15' total Hamstring stretch      Done done Done    Hip adductor stretch            Neuro Re-ed            Quad sets*   2x10x5"         SAQ      W/ stim W/ stim No stim 3x10                Heel slides w strap*  Supine 10x5" Supine 10x5" Supine/seated w overpressure 40'     Seated and supine 5 ea PT assist   Gastroc stretch strap            Hamstring stretch    :10x5        Wedge stretch            Lateral weight shifting            Therapeutic Exercise            Bike 10' PT assist/daughter assist 15' 2 person assist  10' rocks 60-65*   Rocks 8' Rocks 8' Rocks 10' assisted    Patient education 10'  10'         SLR flexion w/ quad set*   unable         SLR hip abduction             Bridging*            Clamshells            Heel raises            TKE      OTB 2x10 OTB 3x10     Mini squats            Bilateral hip abduction      OTB 3x10 OTB 3x10 OTB 3x10    Bilateral hip adduction      3x10 3x10 3x10    LAQ        2x10    Standing marching            Tests and measures            Therapeutic Activity            Leg press             Step ups            Lateral step ups            Sit to stand to table (knee flexion)       2x10      Lunges onto step w/ overpressure from therapist            Gait training w RW            Standing march to TKE            Modalities            NMES      10' SAQ lv 40 10' SAQ L40     CP PRN

## 2021-06-28 ENCOUNTER — EVALUATION (OUTPATIENT)
Dept: PHYSICAL THERAPY | Facility: REHABILITATION | Age: 61
End: 2021-06-28
Payer: COMMERCIAL

## 2021-06-28 DIAGNOSIS — M17.11 PRIMARY OSTEOARTHRITIS OF RIGHT KNEE: Primary | ICD-10-CM

## 2021-06-28 DIAGNOSIS — Z96.651 STATUS POST TOTAL RIGHT KNEE REPLACEMENT: ICD-10-CM

## 2021-06-28 DIAGNOSIS — G89.29 CHRONIC PAIN OF RIGHT KNEE: ICD-10-CM

## 2021-06-28 DIAGNOSIS — M25.561 CHRONIC PAIN OF RIGHT KNEE: ICD-10-CM

## 2021-06-28 PROCEDURE — 97140 MANUAL THERAPY 1/> REGIONS: CPT | Performed by: PHYSICAL THERAPIST

## 2021-06-28 PROCEDURE — 97112 NEUROMUSCULAR REEDUCATION: CPT | Performed by: PHYSICAL THERAPIST

## 2021-06-28 PROCEDURE — 97110 THERAPEUTIC EXERCISES: CPT | Performed by: PHYSICAL THERAPIST

## 2021-06-28 NOTE — PROGRESS NOTES
PT Re-Evaluation     Today's date: 2021  Patient name: Carlyn Acosta  : 1960  MRN: [id number]  Referring provider: Shona Alves  Dx:   Encounter Diagnosis     ICD-10-CM    1  Primary osteoarthritis of right knee  M17 11    2  Chronic pain of right knee  M25 561     G89 29    3  Status post total right knee replacement  Z96 651        Start Time: 804  Stop Time: 848  Total time in clinic (min): 44 minutes    Assessment  Assessment details: 2021:  Nicolas Victoria continues to present with the impairments as listed above  She is currently 11 days s/p MELANIE performed on 2021  Patient was initially able to tolerate about 75-80* knee flexion immediately following MELANIE  However, at each subsequent visit patient has only tolerated about 60-70* knee flexion in all positions with assistance  Patient continues to compensate with hip hiking and physically resisting therapist despite maximal cues for relaxation and proper form from PT and daughter  Educated patient on importance of maintaining current knee flexion range of motion to prevent loss of motion in the future  Patient is to follow up with MD on 2021 and may benefit from additional pain management strategies to obtain greater knee flexion as this is significantly limiting function and mobility  Patient is to continue using RW for safety due to lack of motion, poor balance and poor knee stability due to strength deficits  Patient will continue to benefit from skilled PT to address these deficits and maximize function/quality of life  2021:  Nicolas Victoria is a 62year old female presenting to PT immediately following MELANIE performed by Dr Mitchell Wong this morning  Maximal time spent this session on manual therapy for passive/AAROM knee flexion seated and supine   Patient able to achieve approximately 85* knee flexion seated with passive overpressure and approximately 75* knee flexion supine with passive overpressure and hip stabilization from two therapists  Patient continues to compensate with hip hiking and lifting herself off the table despite maximal verbal and tactile cues from PT and patient's daughter  Maximal education provided regarding importance of consistent and frequent knee flexion stretching at home with demonstration of proper set up and overpressure provided by patient's daughter  Also discussed importance of utilizing available knee flexion for all transfers, transitions and ambulation to prevent knee extension contracture with some verbalized understanding  Impairments: abnormal gait, abnormal or restricted ROM, impaired physical strength, lacks appropriate home exercise program and pain with function  Barriers to therapy: Language barrier  Understanding of Dx/Px/POC: good   Prognosis: good    Goals  ST  Patient will have decreased pain by about 50% for improved mobility and compliance  (progressing)  2  Patient will improve right knee range of motion by at least 25% for improved ambulation  (met)    LT  Patient will be independent with HEP at discharge  (progressing)  2  Patient will demonstrate at least 120* of right knee range of motion for reciprocal stair negotiation without limitation from right knee  (progressing)  3  Patient will demonstrate improved in RLE strength by at least 2-3 grades for improved ambulation and return to work  (progressing)  4  Patient will ambulate with normal reciprocal pattern and no AD for return to prior level of function  (progressing)      Plan  Plan details: Thank you for opportunity to participate in the care of Ellen Quintana4      Patient would benefit from: skilled physical therapy  Planned modality interventions: cryotherapy, unattended electrical stimulation and TENS  Planned therapy interventions: home exercise program, therapeutic exercise, therapeutic activities, stretching, strengthening, patient education, neuromuscular re-education and manual therapy  Frequency: 3x week  Duration in visits: 16  Plan of Care beginning date: 2021  Treatment plan discussed with: patient and family        Subjective Evaluation    History of Present Illness  Date of surgery: 3/30/2021  Mechanism of injury: RE 2021:  Lisa Parker has been seen for a total of 33 visits for OP PT s/p R TKA and MELANIE  Patient reports no improvement in the knee since her manipulation performed on   Patient reports most difficulty with "my nerves" limiting her from bending the knee  Patient goals include "I just want to bend my knee "       Re-evaluation 2021:  Lisa Parker presents to PT following MELANIE performed this morning 2021 by Dr Cecilio Juarez  She is currently ambulating with rolling walker and demonstrates right hip circumduction and continued lack of functional right knee flexion during ambulation into clinic  Patient reports minimal pain but notes tightness in the knee  Home set-up and functional level:  Steps to enter home 4 steps  RHR  First floor set up No  Second floor set up Full bathroom and bedroom  Number of steps to second floor Greater than 10 steps BHR   Previous falls: None   Rolling Walker  Commode  Assist at home Daughter  Grandson      PT goals: "get back to normal" Get back to work as a , negotiate stairs, walk normally and independently     Quality of life: good    Pain  Current pain ratin  At best pain ratin  At worst pain ratin (muscle twitching)  Quality: dull ache  Relieving factors: rest, ice, relaxation and support  Aggravating factors: sitting, stair climbing, standing, walking and lifting  Progression: worsening    Social Support  Steps to enter house: yes  Stairs in house: yes   Lives in: multiple-level home  Lives with: adult children    Employment status: not working  Treatments  Previous treatment: physical therapy, medication and injection treatment  Current treatment: physical therapy  Patient Goals  Patient goals for therapy: decreased pain, decreased edema, increased strength, return to work, increased motion, improved balance and independence with ADLs/IADLs          Objective     Observations     Right Knee   Positive for edema  Active Range of Motion     Right Knee   Flexion: 60 degrees with pain  Extension: -5 degrees with pain    Additional Active Range of Motion Details  6/28/2021: Actively achieving ~60* seated w slideboard and 65* with passive overpressure    6/17/2021:   Actively achieving ~70* supine and 80* seated EOT with slide board         Passive Range of Motion     Right Knee   Flexion: 65 degrees with pain    Additional Passive Range of Motion Details  6/28/2021:  Very poor tolerance to passive overpressure, achieving 65* this session with seated overpressure and slideboard    6/17/2021:  Passively achieving approximately 80* with one PT providing overpressure and additional therapist stabilizing right hip in supine, approximately 85* seated EOT with slide board and PT overpressure    Mobility   Patellar Mobility:     Right Knee   Hypomobile: medial, lateral, superior and inferior     General Comments:      Knee Comments  Gait observation: Ambulating w RW with moderate BUE support to advance RLE and continues to demonstrate compensation from hip circumduction despite maximal cues for active knee flexion             Precautions: see protocol   * Indicates part of HEP     Daily Treatment Diary     Manuals 6/18 6/21 6/23 6/25 6/28   6/15 6/17   PROM right knee Supine 25' EOT 20'   EOT, supine 20' Done seated slideboard 8'    EOT & supine 45'   Supine extension overpressure Done           Prone PROM Trialed           Distal quadriceps release STM 10' STM/IASTM 10'          Patella mobs   Done     Done Done inferior   Scar massage            Hip flexor stretch        Done 15' total    Hamstring stretch        Done    Hip adductor stretch            Neuro Re-ed            Quad sets*   2x10x5"         SAQ        No stim 3x10                Heel slides w strap*  Supine 10x5" Supine 10x5" Supine/seated w overpressure 40' Seated 10x10"    Seated and supine 5 ea PT assist   Gastroc stretch strap            Hamstring stretch    :10x5 :10x10       Wedge stretch            Lateral weight shifting            Therapeutic Exercise            Bike 10' PT assist/daughter assist 15' 2 person assist  10' rocks 60-65*  10 rocks    Rocks 10' assisted    Patient education 10'  10'         SLR flexion w/ quad set*   unable         SLR hip abduction             Bridging*            Clamshells     Seated GTB 2x10       Heel raises            TKE            Mini squats            Bilateral hip abduction        OTB 3x10    Bilateral hip adduction     2x10x5"   3x10    LAQ        2x10    Standing marching            Tests and measures            Therapeutic Activity            Leg press             Step ups            Lateral step ups            Sit to stand to table (knee flexion)            Lunges onto step w/ overpressure from therapist            Gait training w RW            Standing march to TKE            Modalities            NMES            CP PRN

## 2021-06-29 ENCOUNTER — OFFICE VISIT (OUTPATIENT)
Dept: PHYSICAL THERAPY | Facility: REHABILITATION | Age: 61
End: 2021-06-29
Payer: COMMERCIAL

## 2021-06-29 DIAGNOSIS — M25.561 CHRONIC PAIN OF RIGHT KNEE: ICD-10-CM

## 2021-06-29 DIAGNOSIS — Z96.651 STATUS POST TOTAL RIGHT KNEE REPLACEMENT: ICD-10-CM

## 2021-06-29 DIAGNOSIS — M17.11 PRIMARY OSTEOARTHRITIS OF RIGHT KNEE: Primary | ICD-10-CM

## 2021-06-29 DIAGNOSIS — G89.29 CHRONIC PAIN OF RIGHT KNEE: ICD-10-CM

## 2021-06-29 PROCEDURE — 97140 MANUAL THERAPY 1/> REGIONS: CPT | Performed by: PHYSICAL THERAPIST

## 2021-06-29 PROCEDURE — 97110 THERAPEUTIC EXERCISES: CPT | Performed by: PHYSICAL THERAPIST

## 2021-06-29 PROCEDURE — 97112 NEUROMUSCULAR REEDUCATION: CPT | Performed by: PHYSICAL THERAPIST

## 2021-06-29 NOTE — PROGRESS NOTES
Daily Note     Today's date: 2021  Patient name: Latha Sanders  : 1960  MRN: [id number]  Referring provider: Karyna Desai  Dx:   Encounter Diagnosis     ICD-10-CM    1  Primary osteoarthritis of right knee  M17 11    2  Status post total right knee replacement  Z96 651    3  Chronic pain of right knee  M25 561     G89 29        Start Time: 0805  Stop Time: 6580  Total time in clinic (min): 45 minutes    Subjective: Patient states "it's better" at start of session  Patient has follow up appointment with Dr Caden Jiménez tomorrow 2021  Objective: See treatment diary below      Assessment: Tolerated treatment well  Continued poor tolerance to passive/AAROM knee flexion achieving ~60-65* throughout session  Improved quadriceps activation noted with quad sets this visit  Re-educated patient on importance of compliance to HEP to maintain range of motion  Patient follows up with Dr Caden Jiménez tomorrow  Patient demonstrated fatigue post treatment, exhibited good technique with therapeutic exercises and would benefit from continued PT  Plan: Continue per plan of care        Precautions: see protocol   * Indicates part of HEP     Daily Treatment Diary     Manuals 6/18 6/21 6/23 6/25 6/28 6/29  6/15 6/17   PROM right knee Supine 25' EOT 20'   EOT, supine 20' Done seated slideboard 8' Done seated slideboard OP 8'   EOT & supine 45'   Supine extension overpressure Done           Prone PROM Trialed           Distal quadriceps release STM 10' STM/IASTM 10'          Patella mobs   Done     Done Done inferior   Scar massage            Hip flexor stretch        Done 15' total    Hamstring stretch        Done    Hip adductor stretch            Neuro Re-ed            Quad sets*   2x10x5"   Seated 2x10x5"      SAQ        No stim 3x10                Heel slides w strap*  Supine 10x5" Supine 10x5" Supine/seated w overpressure 40' Seated 10x10" Seated  w OP 10x10"   Seated and supine 5 ea PT assist Gastroc stretch strap            Hamstring stretch    :10x5 :10x10 :10x10      Wedge stretch            Lateral weight shifting            Therapeutic Exercise            Bike 10' PT assist/daughter assist 15' 2 person assist  10' rocks 60-65*  10 rocks  8' rocks Rocks 10' assisted    Patient education 10'  10'         SLR flexion w/ quad set*   unable         SLR hip abduction             Bridging*            Clamshells     Seated GTB 2x10 Seated OTB 2x10      Heel raises            TKE            Mini squats            Bilateral hip abduction        OTB 3x10    Bilateral hip adduction     2x10x5" 2x10x5"  3x10    LAQ        2x10    Standing marching            Tests and measures            Therapeutic Activity            Leg press             Step ups            Lateral step ups            Sit to stand to table (knee flexion)            Lunges onto step w/ overpressure from therapist            Gait training w RW            Standing march to TKE            Modalities            NMES            CP PRN

## 2021-06-30 ENCOUNTER — OFFICE VISIT (OUTPATIENT)
Dept: OBGYN CLINIC | Facility: CLINIC | Age: 61
End: 2021-06-30
Payer: COMMERCIAL

## 2021-06-30 ENCOUNTER — APPOINTMENT (OUTPATIENT)
Dept: RADIOLOGY | Facility: AMBULARY SURGERY CENTER | Age: 61
End: 2021-06-30
Attending: ORTHOPAEDIC SURGERY
Payer: COMMERCIAL

## 2021-06-30 ENCOUNTER — TELEPHONE (OUTPATIENT)
Dept: OBGYN CLINIC | Facility: CLINIC | Age: 61
End: 2021-06-30

## 2021-06-30 VITALS — BODY MASS INDEX: 31.65 KG/M2 | WEIGHT: 172 LBS | HEIGHT: 62 IN

## 2021-06-30 DIAGNOSIS — Z96.651 STATUS POST TOTAL KNEE REPLACEMENT, RIGHT: ICD-10-CM

## 2021-06-30 DIAGNOSIS — M24.661 ARTHROFIBROSIS OF KNEE JOINT, RIGHT: ICD-10-CM

## 2021-06-30 DIAGNOSIS — M25.561 RIGHT KNEE PAIN, UNSPECIFIED CHRONICITY: ICD-10-CM

## 2021-06-30 DIAGNOSIS — M17.11 PRIMARY OSTEOARTHRITIS OF RIGHT KNEE: ICD-10-CM

## 2021-06-30 DIAGNOSIS — F41.9 ANXIETY: Primary | ICD-10-CM

## 2021-06-30 DIAGNOSIS — F41.9 ANXIETY: ICD-10-CM

## 2021-06-30 PROBLEM — M24.662 ARTHROFIBROSIS OF KNEE JOINT, LEFT: Status: RESOLVED | Noted: 2021-05-21 | Resolved: 2021-06-30

## 2021-06-30 PROCEDURE — 1036F TOBACCO NON-USER: CPT | Performed by: ORTHOPAEDIC SURGERY

## 2021-06-30 PROCEDURE — 3008F BODY MASS INDEX DOCD: CPT | Performed by: ORTHOPAEDIC SURGERY

## 2021-06-30 PROCEDURE — 73562 X-RAY EXAM OF KNEE 3: CPT

## 2021-06-30 PROCEDURE — 99213 OFFICE O/P EST LOW 20 MIN: CPT | Performed by: ORTHOPAEDIC SURGERY

## 2021-06-30 RX ORDER — GABAPENTIN 100 MG/1
100 CAPSULE ORAL 3 TIMES DAILY
Qty: 42 CAPSULE | Refills: 0 | Status: SHIPPED | OUTPATIENT
Start: 2021-06-30 | End: 2022-01-12 | Stop reason: ALTCHOICE

## 2021-06-30 RX ORDER — TRAMADOL HYDROCHLORIDE 50 MG/1
TABLET ORAL
Qty: 12 TABLET | Refills: 0 | Status: SHIPPED | OUTPATIENT
Start: 2021-06-30 | End: 2022-01-12 | Stop reason: ALTCHOICE

## 2021-06-30 RX ORDER — METHOCARBAMOL 750 MG/1
750 TABLET, FILM COATED ORAL EVERY 6 HOURS SCHEDULED
Qty: 56 TABLET | Refills: 0 | Status: SHIPPED | OUTPATIENT
Start: 2021-06-30 | End: 2021-07-14

## 2021-06-30 RX ORDER — HYDROXYZINE HYDROCHLORIDE 25 MG/1
25 TABLET, FILM COATED ORAL DAILY PRN
Qty: 30 TABLET | Refills: 0 | Status: SHIPPED | OUTPATIENT
Start: 2021-06-30 | End: 2021-06-30 | Stop reason: SDUPTHER

## 2021-06-30 RX ORDER — HYDROXYZINE HYDROCHLORIDE 25 MG/1
25 TABLET, FILM COATED ORAL DAILY PRN
Qty: 30 TABLET | Refills: 0 | Status: SHIPPED | OUTPATIENT
Start: 2021-06-30 | End: 2021-09-03 | Stop reason: SDUPTHER

## 2021-06-30 NOTE — TELEPHONE ENCOUNTER
Please let them know the refills were provided and all of the medications were sent to the pharmacy that they requested including the 1 from today

## 2021-06-30 NOTE — PROGRESS NOTES
Assessment:   Status Post  Manipulation Joint Knee - Right on 6/17/2021 , status post right total knee arthroplasty on 03/30/2021 with resulting arthrofibrosis    Plan:   WBAT right Lower extremity    Continue PT, will attempt to obtain knee flexion device from [facility], if we cannot obtain this will look into alternative devices such as [facility]    Will trial hydroxyzine for therapy sessions if there is a component of anxiety that is limiting the patient's participation in therapy will see if this helps on as-needed basis  She will continue other medications as prescribed for pain    Discussed with the patient and her daughter that if she fails to obtain adequate motion after this manipulation our only alternative would be arthroscopic lysis of adhesions to try to improve her knee flexion; reviewed the post MELANIE photos which showed 0 extension and 110 flexion, therefore demonstrating no mechanical block to her extension or flexion, this is purely from patient's pain and soft tissue and scar tissue    ASA 81 mg BID for 5 months post op    Follow Up:  4  week(s)      CHIEF COMPLAINT:  Chief Complaint   Patient presents with    Right Knee - Post-op         SUBJECTIVE:  Jaren Coles is a 62y o  year old female who presents for follow up after Manipulation Joint Knee - Right  Today patient has pain stiffness  She does have pain with physical therapy  Patient's daughter is here with her and helps her provide history as she speaks Telugu only  They report that she feels that the knee cannot physically flex any deeper than the therapist has been able to push it that there is something stopping her knee from flexing she can feel that in the distal thigh around the quadriceps  She takes oxycodone only before therapy  She continues take Neurontin and Robaxin  Melvi Pereira Pt denies any fevers or chills  Denies any numbness or tingling        PHYSICAL EXAMINATION:    MUSCULOSKELETAL EXAMINATION:  Right knee    General: Alert and oriented x 3, WNWD, NAD  Incision:  Well healed  There is no effusion there is some generalized soft tissue swelling in the anterior knee and distal thigh there is palpable thick bands of scar tissue along the quadriceps tendon along the incision area incision overall is well-healed  Range of Motion:   Neurovascular status: Neuro intact    Motor intact   Passive range of motion is limited to 35-40 degrees of flexion this is limited by patient guarding more so than stiffness, cannot passively extend the knee past-5 degrees today        STUDIES REVIEWED:  I have personally reviewed pertinent films in PACS and my interpretation is X-ray of the right knee performed today demonstrates stable intact alignment of hardware status post total knee arthroplasty no evidence of loosening or other complication        PROCEDURES PERFORMED:  Procedures  No Procedures performed today

## 2021-06-30 NOTE — TELEPHONE ENCOUNTER
Pt was here to see Dr Nichole Dietz today  Hydroxyzine was sent to [facility] but pt would like it to be send to [facility] on [address]  Also asked about a refill for Tramadol  50mg, gabapentin 100mg and methocarbamol 750mg  Says she has a week left of medication, next post op isn't until 8/4/21

## 2021-07-02 ENCOUNTER — OFFICE VISIT (OUTPATIENT)
Dept: PHYSICAL THERAPY | Facility: REHABILITATION | Age: 61
End: 2021-07-02
Payer: COMMERCIAL

## 2021-07-02 DIAGNOSIS — M25.561 CHRONIC PAIN OF RIGHT KNEE: ICD-10-CM

## 2021-07-02 DIAGNOSIS — M17.11 PRIMARY OSTEOARTHRITIS OF RIGHT KNEE: Primary | ICD-10-CM

## 2021-07-02 DIAGNOSIS — G89.29 CHRONIC PAIN OF RIGHT KNEE: ICD-10-CM

## 2021-07-02 DIAGNOSIS — Z96.651 STATUS POST TOTAL RIGHT KNEE REPLACEMENT: ICD-10-CM

## 2021-07-02 PROCEDURE — 97140 MANUAL THERAPY 1/> REGIONS: CPT | Performed by: PHYSICAL THERAPIST

## 2021-07-02 PROCEDURE — 97110 THERAPEUTIC EXERCISES: CPT | Performed by: PHYSICAL THERAPIST

## 2021-07-02 NOTE — PROGRESS NOTES
Daily Note     Today's date: 2021  Patient name: Louie Alicia  : 1960  MRN: [id number]  Referring provider: Marlyn Jack  Dx:   Encounter Diagnosis     ICD-10-CM    1  Primary osteoarthritis of right knee  M17 11    2  Status post total right knee replacement  Z96 651    3  Chronic pain of right knee  M25 561     G89 29        Start Time: 1018  Stop Time: 1110  Total time in clinic (min): 52 minutes     Subjective: Patient had follow up appointment with Dr Mee Duque 2021  Spoke with daughter Ryan Delgadillo on the phone yesterday reporting Dr Mee Duque prescribed anti-anxiety medication for patient to take prior to therapy  She reports x-rays were unremarkable stating "everything is how it should be " Patient reports when she bends her knee she feels like "two bones hit together" and she cannot go further, noting this as 10/10 pain  Objective: See treatment diary below      Assessment: Tolerated treatment well  Initially trialed seated heel slides with overpressure with patient achiving ~60* prior to 1010 pain with patient reporting tightness/discomfort on lateral thigh  Patient with significant ITB/quadriceps tightness, trialed STM to this area with good relief and palpable muscle relaxation  Also trialed additional passive muscular stretching but patient continues to experience intermittent muscle spasms causing discomfort  No improvement with tolerance to knee flexion after manual therapy  Patient demonstrated fatigue post treatment, exhibited good technique with therapeutic exercises and would benefit from continued PT  Plan: Continue per plan of care        Precautions: see protocol   * Indicates part of HEP     Daily Treatment Diary     Manuals  7     PROM right knee Supine 25' EOT 20'   EOT, supine 20' Done seated slideboard 8' Done seated slideboard OP 8' Seated slideboard         Supine extension overpressure Done      Done     Prone PROM Trialed           Distal quadriceps release STM 10' STM/IASTM 10'     Done lateral quad/ITB     Patella mobs   Done    Done     ITB stretch       Done     Hip flexor stretch            Hamstring stretch       Done     Hip adductor stretch            Neuro Re-ed            Quad sets*   2x10x5"   Seated 2x10x5" 2x10x5"     SAQ                        Heel slides w strap*  Supine 10x5" Supine 10x5" Supine/seated w overpressure 40' Seated 10x10" Seated  w OP 10x10"      Gastroc stretch strap            Hamstring stretch    :10x5 :10x10 :10x10      Wedge stretch            Lateral weight shifting            Therapeutic Exercise            Bike 10' PT assist/daughter assist 15' 2 person assist  10' rocks 60-65*  10 rocks  8' rocks 8' rocks     Patient education 10'  10'         SLR flexion w/ quad set*   unable         SLR hip abduction             Bridging*            Clamshells     Seated GTB 2x10 Seated OTB 2x10      Heel raises            TKE            Mini squats            Bilateral hip abduction            Bilateral hip adduction     2x10x5" 2x10x5"      LAQ            Standing marching            Tests and measures            Therapeutic Activity            Leg press             Step ups            Lateral step ups            Sit to stand to table (knee flexion)            Lunges onto step w/ overpressure from therapist            Gait training w RW            Standing march to TKE            Modalities            NMES            CP PRN

## 2021-07-07 ENCOUNTER — TELEPHONE (OUTPATIENT)
Dept: OBGYN CLINIC | Facility: HOSPITAL | Age: 61
End: 2021-07-07

## 2021-07-07 NOTE — TELEPHONE ENCOUNTER
Patient sees Toney Wilson @ [facility] called, she is looking to have the form filled out with the Dr's NPI number with address on the form faxed over to her today if possible so the patient can be fitted for her new brace      Phone: [phone]  Fax: [phone]

## 2021-07-08 ENCOUNTER — OFFICE VISIT (OUTPATIENT)
Dept: PHYSICAL THERAPY | Facility: REHABILITATION | Age: 61
End: 2021-07-08
Payer: COMMERCIAL

## 2021-07-08 DIAGNOSIS — Z96.651 STATUS POST TOTAL RIGHT KNEE REPLACEMENT: ICD-10-CM

## 2021-07-08 DIAGNOSIS — M25.561 CHRONIC PAIN OF RIGHT KNEE: ICD-10-CM

## 2021-07-08 DIAGNOSIS — G89.29 CHRONIC PAIN OF RIGHT KNEE: ICD-10-CM

## 2021-07-08 DIAGNOSIS — M17.11 PRIMARY OSTEOARTHRITIS OF RIGHT KNEE: Primary | ICD-10-CM

## 2021-07-08 PROCEDURE — 97112 NEUROMUSCULAR REEDUCATION: CPT | Performed by: PHYSICAL THERAPIST

## 2021-07-08 PROCEDURE — 97110 THERAPEUTIC EXERCISES: CPT | Performed by: PHYSICAL THERAPIST

## 2021-07-08 PROCEDURE — 97140 MANUAL THERAPY 1/> REGIONS: CPT | Performed by: PHYSICAL THERAPIST

## 2021-07-08 NOTE — PROGRESS NOTES
Daily Note     Today's date: 2021  Patient name: Glenroy Alvarez  : 1960  MRN: [id number]  Referring provider: Ana María Ceballos  Dx:   Encounter Diagnosis     ICD-10-CM    1  Primary osteoarthritis of right knee  M17 11    2  Status post total right knee replacement  Z96 651    3  Chronic pain of right knee  M25 561     G89 29        Start Time: 1637  Stop Time: 1728  Total time in clinic (min): 51 minutes    Subjective: Patient reports "it's a little better " She thinks that the knee feels a little looser and she has been moving it more  Objective: See treatment diary below      Assessment: Tolerated treatment well  Patient achieving approximately 75-80* knee flexion passively seated with overpressure  Patient achieving about 70* AROM, 80* AAROM and 75* PROM supine  Mild improvements noted with muscle guarding and compensation with passive stretching this visit  Continues to experience spontaneous muscle spasms throughout RLE causing significant pain  Trialed SLR with patient demonstrating immediate quad lag  Will continue to progress quadriceps and hip strengthening as tolerated  Patient demonstrated fatigue post treatment, exhibited good technique with therapeutic exercises and would benefit from continued PT  Plan: Continue per plan of care        Precautions: see protocol   * Indicates part of HEP     Daily Treatment Diary     Manuals     PROM right knee Supine 25' EOT 20'   EOT, supine 20' Done seated slideboard 8' Done seated slideboard OP 8' Seated slideboard     Slideboard 12'    Supine extension overpressure Done      Done Done    Prone PROM Trialed           Distal quadriceps release STM 10' STM/IASTM 10'     Done lateral quad/ITB     Patella mobs   Done    Done Done    ITB stretch       Done     Hip flexor stretch            Hamstring stretch       Done     Hip adductor stretch            Neuro Re-ed            Quad sets*   2x10x5" Seated 2x10x5" 2x10x5" 2x10x5"    SAQ        3x5                Heel slides w strap*  Supine 10x5" Supine 10x5" Supine/seated w overpressure 40' Seated 10x10" Seated  w OP 10x10"  Supine 10 to 80*    Gastroc stretch strap            Hamstring stretch    :10x5 :10x10 :10x10      Wedge stretch            Lateral weight shifting            Therapeutic Exercise            Bike 10' PT assist/daughter assist 15' 2 person assist  10' rocks 60-65*  10 rocks  8' rocks 8' rocks 5' rocks     Patient education 10'  10'         SLR flexion w/ quad set*   unable     10 lag    SLR hip abduction         2x10    Bridging*            Clamshells     Seated GTB 2x10 Seated OTB 2x10      Heel raises            TKE            Mini squats            Bilateral hip abduction            Bilateral hip adduction     2x10x5" 2x10x5"      LAQ            Standing marching            Tests and measures            Therapeutic Activity            Leg press         nv    Step ups            Lateral step ups            Sit to stand            Lunges onto step w/ overpressure from therapist            Gait training w RW            Modalities            NMES            CP PRN

## 2021-07-12 ENCOUNTER — OFFICE VISIT (OUTPATIENT)
Dept: PHYSICAL THERAPY | Facility: REHABILITATION | Age: 61
End: 2021-07-12
Payer: COMMERCIAL

## 2021-07-12 DIAGNOSIS — G89.29 CHRONIC PAIN OF RIGHT KNEE: ICD-10-CM

## 2021-07-12 DIAGNOSIS — M17.11 PRIMARY OSTEOARTHRITIS OF RIGHT KNEE: Primary | ICD-10-CM

## 2021-07-12 DIAGNOSIS — Z96.651 STATUS POST TOTAL RIGHT KNEE REPLACEMENT: ICD-10-CM

## 2021-07-12 DIAGNOSIS — M25.561 CHRONIC PAIN OF RIGHT KNEE: ICD-10-CM

## 2021-07-12 PROCEDURE — 97112 NEUROMUSCULAR REEDUCATION: CPT | Performed by: PHYSICAL THERAPIST

## 2021-07-12 PROCEDURE — 97110 THERAPEUTIC EXERCISES: CPT | Performed by: PHYSICAL THERAPIST

## 2021-07-12 PROCEDURE — 97140 MANUAL THERAPY 1/> REGIONS: CPT | Performed by: PHYSICAL THERAPIST

## 2021-07-12 NOTE — PROGRESS NOTES
Daily Note     Today's date: 2021  Patient name: Lincoln Holliday  : 1960  MRN: [id number]  Referring provider: Dmitri Baker  Dx:   Encounter Diagnosis     ICD-10-CM    1  Primary osteoarthritis of right knee  M17 11    2  Status post total right knee replacement  Z96 651    3  Chronic pain of right knee  M25 561     G89 29        Start Time: 0800  Stop Time: 09  Total time in clinic (min): 68 minutes    Subjective: Patient reports the knee is ok at start of session  Objective: See treatment diary below      Assessment: Tolerated treatment well  Patient achieved approximately 85-90* knee flexion seated with overpressure from therapist  Good quadriceps contraction noted with quad sets this visit, however difficulty initiating SLR flexion this visit  Patient demonstrated fatigue post treatment and would benefit from continued PT  Plan: Continue per plan of care        Precautions: see protocol   * Indicates part of HEP     Daily Treatment Diary     Manuals    PROM right knee Supine 25' EOT 20'   EOT, supine 20' Done seated slideboard 8' Done seated slideboard OP 8' Seated slideboard     Slideboard 12' Slideboard 10'   Supine extension overpressure Done      Done Done Done   Prone PROM Trialed           Distal quadriceps release STM 10' STM/IASTM 10'     Done lateral quad/ITB     Patella mobs   Done    Done Done Done   ITB stretch       Done     Hip flexor stretch            Hamstring stretch       Done     Hip adductor stretch            Neuro Re-ed            Quad sets*   2x10x5"   Seated 2x10x5" 2x10x5" 2x10x5" 2x10x5"   SAQ        3x5 3x10   Heel slides w strap*  Supine 10x5" Supine 10x5" Supine/seated w overpressure 40' Seated 10x10" Seated  w OP 10x10"  Supine 10 to 80* Supine 15 to 80*   Gastroc stretch strap            Hamstring stretch    :10x5 :10x10 :10x10      Lateral weight shifting            Therapeutic Exercise Bike 10' PT assist/daughter assist 15' 2 person assist  10' rocks 60-65*  10 rocks  8' rocks 8' rocks 5' rocks  5' rocks    Patient education 10'  10'         SLR flexion w/ quad set*   unable     10 lag    SLR hip abduction         2x10 3x10   Bridging*            Clamshells     Seated GTB 2x10 Seated OTB 2x10      Heel raises            TKE            Mini squats            Bilateral hip abduction            Bilateral hip adduction     2x10x5" 2x10x5"      LAQ            Standing marching            Tests and measures            Therapeutic Activity            Leg press         nv    Step ups            Lateral step ups            Sit to stand         2x10   Lunges onto step w/ overpressure from therapist            Gait training w RW            Modalities            NMES            CP PRN

## 2021-07-14 ENCOUNTER — OFFICE VISIT (OUTPATIENT)
Dept: PHYSICAL THERAPY | Facility: REHABILITATION | Age: 61
End: 2021-07-14
Payer: COMMERCIAL

## 2021-07-14 DIAGNOSIS — Z96.651 STATUS POST TOTAL RIGHT KNEE REPLACEMENT: ICD-10-CM

## 2021-07-14 DIAGNOSIS — M17.11 PRIMARY OSTEOARTHRITIS OF RIGHT KNEE: Primary | ICD-10-CM

## 2021-07-14 DIAGNOSIS — M25.561 CHRONIC PAIN OF RIGHT KNEE: ICD-10-CM

## 2021-07-14 DIAGNOSIS — G89.29 CHRONIC PAIN OF RIGHT KNEE: ICD-10-CM

## 2021-07-14 PROCEDURE — 97112 NEUROMUSCULAR REEDUCATION: CPT

## 2021-07-14 PROCEDURE — 97140 MANUAL THERAPY 1/> REGIONS: CPT

## 2021-07-14 PROCEDURE — 97110 THERAPEUTIC EXERCISES: CPT

## 2021-07-14 NOTE — PROGRESS NOTES
Daily Note     Today's date: 2021  Patient name: Parker Perry  : 1960  MRN: [id number]  Referring provider: Al Velasquez  Dx:   Encounter Diagnosis     ICD-10-CM    1  Primary osteoarthritis of right knee  M17 11    2  Status post total right knee replacement  Z96 651    3  Chronic pain of right knee  M25 561     G89 29        Start Time: 1405  Stop Time: 1453  Total time in clinic (min): 48 minutes    Subjective: Patient reporting knee as "okay" at start of session  Objective: See treatment diary below      Assessment: Tolerated treatment well  Patient demonstrated fatigue post treatment, exhibited good technique with therapeutic exercises and would benefit from continued PT Patient able to achieve approx 85* degrees knee flexion with overpressure from therapist  Initial PT assist required for first two reps of SLR flexion, however patient then able to complete with improved quad contraction however lag present  Plan: Continue per plan of care  Progress treatment as tolerated         Precautions: see protocol   * Indicates part of HEP     Daily Treatment Diary     Manuals    PROM right knee Slideboard 10' EOT 20'   EOT, supine 20' Done seated slideboard 8' Done seated slideboard OP 8' Seated slideboard     Slideboard 12' Slideboard 10'   Supine extension overpressure done      Done Done Done   Prone PROM            Distal quadriceps release  STM/IASTM 10'     Done lateral quad/ITB     Patella mobs Done ROM  Done    Done Done Done   ITB stretch       Done     Hip flexor stretch            Hamstring stretch       Done     Hip adductor stretch            Neuro Re-ed            Quad sets* 2x10x5''  2x10x5"   Seated 2x10x5" 2x10x5" 2x10x5" 2x10x5"   SAQ 3x10       3x5 3x10   Heel slides w strap* Supine 10 to 80* Supine 10x5" Supine 10x5" Supine/seated w overpressure 40' Seated 10x10" Seated  w OP 10x10"  Supine 10 to 80* Supine 15 to 80*   Gastroc stretch strap            Hamstring stretch    :10x5 :10x10 :10x10      Lateral weight shifting            Therapeutic Exercise            Bike 5' rocks 12' 2 person assist  10' rocks 60-65*  10 rocks  8' rocks 8' rocks 5' rocks  5' rocks    Patient education   10'         SLR flexion w/ quad set* 2x10 lag  unable     10 lag    SLR hip abduction  3x10       2x10 3x10   Bridging*            Clamshells     Seated GTB 2x10 Seated OTB 2x10      Heel raises            TKE            Mini squats            Bilateral hip abduction            Bilateral hip adduction     2x10x5" 2x10x5"      LAQ            Standing marching            Tests and measures            Therapeutic Activity            Leg press         nv    Step ups            Lateral step ups            Sit to stand 2x10         2x10   Lunges onto step w/ overpressure from therapist            Gait training w RW            Modalities            NMES            CP PRN

## 2021-07-19 ENCOUNTER — OFFICE VISIT (OUTPATIENT)
Dept: PHYSICAL THERAPY | Facility: REHABILITATION | Age: 61
End: 2021-07-19
Payer: COMMERCIAL

## 2021-07-19 DIAGNOSIS — E11.9 TYPE 2 DIABETES MELLITUS WITHOUT COMPLICATION, WITHOUT LONG-TERM CURRENT USE OF INSULIN (HCC): ICD-10-CM

## 2021-07-19 DIAGNOSIS — M17.11 PRIMARY OSTEOARTHRITIS OF RIGHT KNEE: Primary | ICD-10-CM

## 2021-07-19 DIAGNOSIS — G89.29 CHRONIC PAIN OF RIGHT KNEE: ICD-10-CM

## 2021-07-19 DIAGNOSIS — Z96.651 STATUS POST TOTAL RIGHT KNEE REPLACEMENT: ICD-10-CM

## 2021-07-19 DIAGNOSIS — M25.561 CHRONIC PAIN OF RIGHT KNEE: ICD-10-CM

## 2021-07-19 PROCEDURE — 97110 THERAPEUTIC EXERCISES: CPT | Performed by: PHYSICAL THERAPIST

## 2021-07-19 PROCEDURE — 97112 NEUROMUSCULAR REEDUCATION: CPT | Performed by: PHYSICAL THERAPIST

## 2021-07-19 PROCEDURE — 97140 MANUAL THERAPY 1/> REGIONS: CPT | Performed by: PHYSICAL THERAPIST

## 2021-07-19 RX ORDER — BLOOD SUGAR DIAGNOSTIC
STRIP MISCELLANEOUS
Qty: 100 EACH | Refills: 0 | Status: SHIPPED | OUTPATIENT
Start: 2021-07-19 | End: 2021-09-03 | Stop reason: SDUPTHER

## 2021-07-19 RX ORDER — LANCETS 30 GAUGE
1 EACH MISCELLANEOUS 2 TIMES DAILY
Qty: 200 EACH | Refills: 3 | Status: SHIPPED | OUTPATIENT
Start: 2021-07-19 | End: 2021-09-17 | Stop reason: SDUPTHER

## 2021-07-19 NOTE — TELEPHONE ENCOUNTER
Patient's daughter called requesting refills on the attached medications as soon as possible because the patient will be traveling out of the [place]

## 2021-07-19 NOTE — PROGRESS NOTES
Daily Note     Today's date: 2021  Patient name: Ariane Lipscomb  : 1960  MRN: [id number]  Referring provider: Elia Rodriguez  Dx:   Encounter Diagnosis     ICD-10-CM    1  Primary osteoarthritis of right knee  M17 11    2  Status post total right knee replacement  Z96 651    3  Chronic pain of right knee  M25 561     G89 29        Start Time: 0802          Subjective: Patient reports knee as "better " Her daughter reports they had trouble with one of the straps on the dynasplint so she did not wear it for two days but it is now fixed  Objective: See treatment diary below      Assessment: Tolerated treatment well  Patient achieving ~70-80* seated knee flexion with overpressure  Attempts to complete sit to stands with increased WB through RLE but able to correct with verbal cues, however some left trunk lean noted  Continues to demonstrate difficulty with initiating SLR flexion and increased quad lag noted with fatigue  Patient is taking 2 week break from PT as she is going on vacation  Patient demonstrated fatigue post treatment, exhibited good technique with therapeutic exercises and would benefit from continued PT  Plan: Continue per plan of care        Precautions: see protocol   * Indicates part of HEP     Daily Treatment Diary     Manuals    PROM right knee Slideboard 10' Slideboard 12' total  EOT, supine 20' Done seated slideboard 8' Done seated slideboard OP 8' Seated slideboard     Slideboard 12' Slideboard 10'   Supine extension overpressure done      Done Done Done   Prone PROM            Distal quadriceps release       Done lateral quad/ITB     Patella mobs Done ROM Done     Done Done Done   ITB stretch       Done     Hip flexor stretch            Hamstring stretch       Done     Hip adductor stretch            Neuro Re-ed            Quad sets* 2x10x5'' Seated 2x10x5"    Seated 2x10x5" 2x10x5" 2x10x5" 2x10x5"   SAQ 3x10 3x10 3x5 3x10   Heel slides w strap* Supine 10 to 80* Supine 10 to 80*  Supine/seated w overpressure 40' Seated 10x10" Seated  w OP 10x10"  Supine 10 to 80* Supine 15 to 80*   Gastroc stretch strap            Hamstring stretch    :10x5 :10x10 :10x10      Lateral weight shifting            Therapeutic Exercise            Bike 5' rocks 5' rocks   10 rocks  8' rocks 8' rocks 5' rocks  5' rocks    Patient education            SLR flexion w/ quad set* 2x10 lag 2x10 lag      10 lag    SLR hip abduction  3x10 2x10      2x10 3x10   Bridging*            Clamshells     Seated GTB 2x10 Seated OTB 2x10      Heel raises            TKE            Mini squats            Bilateral hip abduction            Bilateral hip adduction     2x10x5" 2x10x5"      LAQ            Standing marching            Tests and measures            Therapeutic Activity            Leg press         nv    Step ups            Lateral step ups            Sit to stand 2x10  2x10       2x10   Gait training w RW            Modalities            NMES            CP PRN

## 2021-07-21 ENCOUNTER — APPOINTMENT (OUTPATIENT)
Dept: PHYSICAL THERAPY | Facility: REHABILITATION | Age: 61
End: 2021-07-21
Payer: COMMERCIAL

## 2021-07-26 ENCOUNTER — APPOINTMENT (OUTPATIENT)
Dept: PHYSICAL THERAPY | Facility: REHABILITATION | Age: 61
End: 2021-07-26
Payer: COMMERCIAL

## 2021-07-28 ENCOUNTER — APPOINTMENT (OUTPATIENT)
Dept: PHYSICAL THERAPY | Facility: REHABILITATION | Age: 61
End: 2021-07-28
Payer: COMMERCIAL

## 2021-08-19 ENCOUNTER — OFFICE VISIT (OUTPATIENT)
Dept: OBGYN CLINIC | Facility: CLINIC | Age: 61
End: 2021-08-19
Payer: COMMERCIAL

## 2021-08-19 ENCOUNTER — APPOINTMENT (OUTPATIENT)
Dept: RADIOLOGY | Facility: AMBULARY SURGERY CENTER | Age: 61
End: 2021-08-19
Attending: ORTHOPAEDIC SURGERY
Payer: COMMERCIAL

## 2021-08-19 VITALS
SYSTOLIC BLOOD PRESSURE: 123 MMHG | HEART RATE: 60 BPM | HEIGHT: 62 IN | WEIGHT: 172 LBS | BODY MASS INDEX: 31.65 KG/M2 | DIASTOLIC BLOOD PRESSURE: 77 MMHG

## 2021-08-19 DIAGNOSIS — Z96.651 STATUS POST TOTAL KNEE REPLACEMENT, RIGHT: ICD-10-CM

## 2021-08-19 DIAGNOSIS — Z96.651 STATUS POST TOTAL KNEE REPLACEMENT, RIGHT: Primary | ICD-10-CM

## 2021-08-19 PROCEDURE — 73560 X-RAY EXAM OF KNEE 1 OR 2: CPT

## 2021-08-19 PROCEDURE — 99211 OFF/OP EST MAY X REQ PHY/QHP: CPT | Performed by: ORTHOPAEDIC SURGERY

## 2021-08-19 NOTE — PROGRESS NOTES
Assessment:  1  Status post total knee replacement, right  XR knee 1 or 2 vw right     Patient Active Problem List   Diagnosis    Bilateral knee pain    Localized osteoarthritis of knees, bilateral    Degenerative arthritis of knee, bilateral    Type 2 diabetes mellitus without complication, without long-term current use of insulin (HCC)    Essential hypertension    Hyperlipidemia    Carpal tunnel syndrome on both sides    Right arm pain    Encounter for screening mammogram for breast cancer    Constipation    Palpitations    Arthritis of knee    Tobacco abuse    Lower abdominal pain    Healthcare maintenance    Primary osteoarthritis of right knee    Abnormal ultrasound of uterus    Pre-operative clearance    Acute postoperative anemia due to expected blood loss    Status post total knee replacement, right    Arthrofibrosis of knee joint, right    Anxiety           Plan       patient is now 2 months after manipulation of her knee  She is doing better today than she had been in the past   She has 60° of flexion prior to manipulation and now she is at 90°  Subjective:     Patient ID:    Chief Complaint:Roxana Edwards 62 y o  female      HPI      Patient comes in today for routine follow-up for min status post manipulation done 8 weeks ago  She reports that she is doing better she feels happy are with her knee  Social History     Socioeconomic History    Marital status:       Spouse name: Not on file    Number of children: Not on file    Years of education: Not on file    Highest education level: Not on file   Occupational History    Occupation:      Employer: [employer]   Tobacco Use    Smoking status: Former Smoker     Packs/day: 0 50     Years: 45 00     Pack years: 22 50     Types: Cigarettes     Quit date: 2020     Years since quittin 7    Smokeless tobacco: Never Used   Vaping Use    Vaping Use: Never used Substance and Sexual Activity    Alcohol use: Not Currently     Comment: Socially    Drug use: No    Sexual activity: Not on file   Other Topics Concern    Not on file   Social History Narrative    ** Merged History Encounter **          Social Determinants of Health     Financial Resource Strain: Low Risk     Difficulty of Paying Living Expenses: Not hard at all   Food Insecurity: No Food Insecurity    Worried About Running Out of Food in the Last Year: Never true    [place] of Food in the Last Year: Never true   Transportation Needs: No Transportation Needs    Lack of Transportation (Medical): No    Lack of Transportation (Non-Medical):  No   Physical Activity:     Days of Exercise per Week:     Minutes of Exercise per Session:    Stress:     Feeling of Stress :    Social Connections:     Frequency of Communication with Friends and Family:     Frequency of Social Gatherings with Friends and Family:     Attends Religion Services:     Active Member of Clubs or Organizations:     Attends Club or Organization Meetings:     Marital Status:    Intimate Partner Violence:     Fear of Current or Ex-Partner:     Emotionally Abused:     Physically Abused:     Sexually Abused:      Past Medical History:   Diagnosis Date    Diabetes mellitus ([facility] [address] )     Hyperlipidemia     Hypertension      Past Surgical History:   Procedure Laterality Date    NO PAST SURGERIES      OK MANIPULATN KNEE JT+ANESTHESIA Right 6/17/2021    Procedure: MANIPULATION JOINT KNEE;  Surgeon: Pricila Chapman DO;  [facility];  Service: Orthopedics    OK TOTAL KNEE ARTHROPLASTY Right 3/30/2021    Procedure: KNEE TOTAL ARTHROPLASTY;  Surgeon: Pricila Chapman DO;  [facility];  Service: Orthopedics     No Known Allergies  Current Outpatient Medications on File Prior to Visit   Medication Sig Dispense Refill    acetaminophen (TYLENOL) 500 mg tablet Three times a day with meals and at bedtime 120 tablet 0    ascorbic acid (VITAMIN C) 500 mg tablet Take 1 tablet (500 mg total) by mouth daily 30 tablet 0    atenolol-chlorthalidone (TENORETIC) 50-25 mg per tablet Take 1 tablet by mouth daily 90 tablet 3    desoximetasone (TOPICORT) 0 25 % cream as needed       gabapentin (NEURONTIN) 100 mg capsule Take 1 capsule (100 mg total) by mouth 3 (three) times a day 42 capsule 0    glucose blood (OneTouch Verio) test strip Check Blood Sugar 2 times daily 100 each 0    hydrOXYzine HCL (ATARAX) 25 mg tablet Take 1 tablet (25 mg total) by mouth daily as needed for anxiety 30 tablet 0    Lancets (onetouch ultrasoft) lancets Use as instructed 100 each 3    metFORMIN (GLUCOPHAGE) 500 mg tablet Take 1 tablet (500 mg total) by mouth daily with breakfast 90 tablet 3    methocarbamol (ROBAXIN) 750 mg tablet Take 1 tablet (750 mg total) by mouth every 6 (six) hours for 14 days 56 tablet 0    Multiple Vitamins-Minerals (multivitamin with minerals) tablet Take 1 tablet by mouth daily 30 tablet 1    OneTouch Delica Lancets 04W MISC Use 1 each 2 (two) times a day 200 each 3    pravastatin (PRAVACHOL) 40 mg tablet Take 1 tablet (40 mg total) by mouth daily (Patient taking differently: Take 40 mg by mouth daily in the early morning ) 90 tablet 3    Propylene Glycol (Systane Complete) 0 6 % SOLN Place 1 drop in both eyes up to 4 times a day for dry eyes 15 mL 2    traMADol (ULTRAM) 50 mg tablet Take 1 tablet by mouth 1/2 hour prior to physical therapy up to 3 times per week 12 tablet 0     No current facility-administered medications on file prior to visit                Objective:        Ortho Exam      Extension is full range of motion for flexion is 90° she still has some edema I feel am able to get her more than 90°

## 2021-08-25 ENCOUNTER — EVALUATION (OUTPATIENT)
Dept: PHYSICAL THERAPY | Facility: REHABILITATION | Age: 61
End: 2021-08-25
Payer: COMMERCIAL

## 2021-08-25 DIAGNOSIS — G89.29 CHRONIC PAIN OF RIGHT KNEE: ICD-10-CM

## 2021-08-25 DIAGNOSIS — Z96.651 STATUS POST TOTAL KNEE REPLACEMENT, RIGHT: Primary | ICD-10-CM

## 2021-08-25 DIAGNOSIS — M25.561 CHRONIC PAIN OF RIGHT KNEE: ICD-10-CM

## 2021-08-25 PROCEDURE — 97110 THERAPEUTIC EXERCISES: CPT | Performed by: PHYSICAL THERAPIST

## 2021-08-25 PROCEDURE — 97164 PT RE-EVAL EST PLAN CARE: CPT | Performed by: PHYSICAL THERAPIST

## 2021-08-25 NOTE — PROGRESS NOTES
PT Re-Evaluation     Today's date: 2021  Patient name: Laura Orellana  : 1960  MRN: [id number]  Referring provider: Jordy Boogie DO  Dx:   Encounter Diagnosis     ICD-10-CM    1  Status post total knee replacement, right  Z96 651 Ambulatory referral to Physical Therapy   2  Chronic pain of right knee  M25 561     G89 29        Start Time: 1720  Stop Time: 1800  Total time in clinic (min): 40 minutes    Assessment  Assessment details: Patient presents to PT following one month break from PT due to personal reasons  Caroline Maria has made the following improvements since beginning PT: decreased pain, decreased swelling, increased ROM, increased strength, increased tolerance to activities and increased normalized gait  Based on evaluation findings this visit Caroline Maria demonstrates significant improvements in right knee active and passive range of motion but continues to lack flexion needed for functional activities such as stair climbing and squatting  She demonstrates improved quadriceps and lateral hip abduction engagement and will benefit from increased focus on strengthening these muscles now that she has the available range of motion to do so  Caroline Maria continues to present with the impairments as listed above  Patient would continue to benefit from skilled PT to address these deficits and to maximize function  Roxana's goal would be to return to working as a   2021:  Caroline Maria is a 62year old female presenting to PT immediately following MELANIE performed by Dr Theresa Sarmiento this morning  Maximal time spent this session on manual therapy for passive/AAROM knee flexion seated and supine  Patient able to achieve approximately 85* knee flexion seated with passive overpressure and approximately 75* knee flexion supine with passive overpressure and hip stabilization from two therapists   Patient continues to compensate with hip hiking and lifting herself off the table despite maximal verbal and tactile cues from PT and patient's daughter  Maximal education provided regarding importance of consistent and frequent knee flexion stretching at home with demonstration of proper set up and overpressure provided by patient's daughter  Also discussed importance of utilizing available knee flexion for all transfers, transitions and ambulation to prevent knee extension contracture with some verbalized understanding  Impairments: abnormal gait, abnormal or restricted ROM, impaired physical strength, lacks appropriate home exercise program and pain with function  Barriers to therapy: Language barrier  Understanding of Dx/Px/POC: good   Prognosis: good    Goals  ST  Patient will have decreased pain by about 50% for improved mobility and compliance  (met)  2  Patient will improve right knee range of motion by at least 25% for improved ambulation  (met)    LT  Patient will be independent with HEP at discharge  (progressing)  2  Patient will demonstrate at least 120* of right knee range of motion for reciprocal stair negotiation without limitation from right knee  (progressing)  3  Patient will demonstrate improved in RLE strength by at least 2-3 grades for improved ambulation and return to work  (progressing)  4  Patient will ambulate with normal reciprocal pattern and no AD for return to prior level of function  (progressing; SPC)      Plan  Plan details: Thank you for opportunity to participate in the care of Ellen Jeter      Patient would benefit from: skilled physical therapy  Planned modality interventions: cryotherapy, unattended electrical stimulation and TENS  Planned therapy interventions: home exercise program, therapeutic exercise, therapeutic activities, stretching, strengthening, patient education, neuromuscular re-education and manual therapy  Frequency: 2x week  Duration in visits: 16  Plan of Care beginning date: 2021  Treatment plan discussed with: patient and family        Subjective Evaluation    History of Present Illness  Date of surgery: 3/30/2021  Mechanism of injury: RE performed on 21  Binh Wilson has been seen for a total of 41 visits for OP PT following right knee TKA on 3/30/2021 and MELANIE on 2021 performed by Dr Adela White  Patient rates overall improvement since beginning PT 50%  Patient's global rating of change is " Moderately better (4) " Patient reports improvements with right knee bending and can ambulate faster  Patient transitioned from RW to cane about 2 weeks ago and felt the knee started to loosen up  She no longer has to take pain medication  Patient reports hesitancy with ascending stairs  She feels it would take time prior to returning to work due to slowed speed with activities  RE 2021:  Binh Wilson has been seen for a total of 33 visits for OP PT s/p R TKA and MELANIE  Patient reports no improvement in the knee since her manipulation performed on   Patient reports most difficulty with "my nerves" limiting her from bending the knee  Patient goals include "I just want to bend my knee "       Quality of life: good    Pain  Current pain ratin  At best pain ratin  At worst pain ratin (muscle twitching)  Quality: dull ache  Relieving factors: rest, ice, relaxation and support  Aggravating factors: sitting, stair climbing, standing, walking and lifting  Progression: worsening    Social Support  Steps to enter house: yes  Stairs in house: yes   Lives in: multiple-level home  Lives with: adult children    Employment status: not working  Treatments  Previous treatment: physical therapy, medication and injection treatment  Current treatment: physical therapy  Patient Goals  Patient goals for therapy: decreased pain, decreased edema, increased strength, return to work, increased motion, improved balance and independence with ADLs/IADLs          Objective     Observations     Right Knee   Negative for edema       Active Range of Motion Right Knee   Flexion: 105 degrees   Extension: 0 degrees     Additional Active Range of Motion Details  RE performed on 08/25/21  Actively achieving 105* supine and 100* seated; no overpressure required and pain free range    6/28/2021:   Actively achieving ~60* seated w slideboard and 65* with passive overpressure      Mobility   Patellar Mobility:     Right Knee   Hypomobile: medial, lateral, superior and inferior     Strength/Myotome Testing     Right Hip   Planes of Motion   Flexion: 4  Abduction: 3+    Right Knee   Flexion: 4+  Extension: 4+  Quadriceps contraction: good    General Comments:      Knee Comments  RE performed on 08/25/21  Gait observation: ambulating with SPC on right side with step through gait, however does demonstrate mild right trunk lean, shortened right stance time and decrease right knee flexion    SLR: improved quad engagement with no lag; able to perform 20    RE 6/28/2021  Gait observation: Ambulating w RW with moderate BUE support to advance RLE and continues to demonstrate compensation from hip circumduction despite maximal cues for active knee flexion             Precautions: see protocol   * Indicates part of HEP     Daily Treatment Diary     Manuals 7/14 7/19 8/25 6/29 7/2 7/8 7/12   PROM right knee Slideboard 10' Slideboard 12' total nv   Done seated slideboard OP 8' Seated slideboard     Slideboard 12' Slideboard 10'   Prone PROM            Distal quadriceps release       Done lateral quad/ITB     Patella mobs Done ROM Done     Done Done Done   ITB stretch       Done     Hip flexor stretch            Hamstring stretch       Done     Hip adductor stretch            Neuro Re-ed            Quad sets* 2x10x5'' Seated 2x10x5"    Seated 2x10x5" 2x10x5" 2x10x5" 2x10x5"   SAQ 3x10 3x10      3x5 3x10   Heel slides w strap* Supine 10 to 80* Supine 10 to 80* Supine 10x5"   Seated  w OP 10x10"  Supine 10 to 80* Supine 15 to 80*   Gastroc stretch strap            Hamstring stretch :10x10      Lateral weight shifting            Therapeutic Exercise            Bike 5' rocks 5' rocks nv   8' rocks 8' rocks 5' rocks  5' rocks    Patient education            SLR flexion w/ quad set* 2x10 lag 2x10 lag 2x10 no lag     10 lag    SLR hip abduction  3x10 2x10 nv     2x10 3x10   Bridging*   2x10         Clamshells   nv   Seated OTB 2x10      Heel raises   nv         TKE            Mini squats            LAQ   nv         Tests and measures            Therapeutic Activity            Leg press    nv     nv    Step ups   nv         Lateral step ups            Sit to stand 2x10  2x10 nv      2x10   Gait training w SPC   nv         Modalities            NMES            CP PRN

## 2021-08-26 ENCOUNTER — OFFICE VISIT (OUTPATIENT)
Dept: PHYSICAL THERAPY | Facility: REHABILITATION | Age: 61
End: 2021-08-26
Payer: COMMERCIAL

## 2021-08-26 DIAGNOSIS — M25.561 CHRONIC PAIN OF RIGHT KNEE: ICD-10-CM

## 2021-08-26 DIAGNOSIS — Z96.651 STATUS POST TOTAL KNEE REPLACEMENT, RIGHT: Primary | ICD-10-CM

## 2021-08-26 DIAGNOSIS — G89.29 CHRONIC PAIN OF RIGHT KNEE: ICD-10-CM

## 2021-08-26 PROCEDURE — 97112 NEUROMUSCULAR REEDUCATION: CPT

## 2021-08-26 PROCEDURE — 97110 THERAPEUTIC EXERCISES: CPT

## 2021-08-26 PROCEDURE — 97140 MANUAL THERAPY 1/> REGIONS: CPT

## 2021-08-26 PROCEDURE — 97530 THERAPEUTIC ACTIVITIES: CPT

## 2021-08-26 NOTE — PROGRESS NOTES
Daily Note     Today's date: 2021  Patient name: Maik Abarca  : 1960  MRN: [id number]  Referring provider: Tory Valencia DO  Dx:   Encounter Diagnosis     ICD-10-CM    1  Status post total knee replacement, right  Z96 651    2  Chronic pain of right knee  M25 561     G89 29        Start Time: 1445  Stop Time: 1540  Total time in clinic (min): 55 minutes    Subjective: Patient reports no pain at start of session, arrived to session with [facility]  She reports no complaints after previous session  Objective: See treatment diary below      Assessment: Tolerated treatment well  Patient demonstrated fatigue post treatment, exhibited good technique with therapeutic exercises and would benefit from continued PT Patient able to achieve 105 degrees knee flexion  Improved tolerance to all TE performed today, no knee pain reported  Initial hip compensation noted with completion of step ups, with cues required to avoid hip circumduction, improvements noted with increased reps  Cues required for increased knee flexion with ambulation with SPC to avoid locking knee  Plan: Continue per plan of care  Progress treatment as tolerated         Precautions: see protocol   * Indicates part of HEP     Daily Treatment Diary     Manuals    PROM right knee Slideboard 10' Slideboard 12' total nv Done   Done seated slideboard OP 8' Seated slideboard     Slideboard 12' Slideboard 10'   Prone PROM            Distal quadriceps release       Done lateral quad/ITB     Patella mobs Done ROM Done     Done Done Done   ITB stretch       Done     Hip flexor stretch            Hamstring stretch       Done     Hip adductor stretch            Neuro Re-ed            Quad sets* 2x10x5'' Seated 2x10x5"    Seated 2x10x5" 2x10x5" 2x10x5" 2x10x5"   SAQ 3x10 3x10      3x5 3x10   Heel slides w strap* Supine 10 to 80* Supine 10 to 80* Supine 10x5" supine 10x5''  Seated  w OP 10x10"  Supine 10 to 80* Supine 15 to 80*   Gastroc stretch strap            Hamstring stretch      :10x10      Lateral weight shifting            Therapeutic Exercise            Bike 5' rocks 5' rocks nv 5' rocks  8' rocks 8' rocks 5' rocks  5' rocks    Patient education            SLR flexion w/ quad set* 2x10 lag 2x10 lag 2x10 no lag 2x10 no lag    10 lag    SLR hip abduction  3x10 2x10 nv 2x10     2x10 3x10   Bridging*   2x10 2x10x5''        Clamshells   nv PTB 2x10  Seated OTB 2x10      Heel raises   nv 2x10         TKE            Mini squats            LAQ   nv 2x10         Tests and measures            Therapeutic Activity            Leg press    nv 55# 2x10    nv    Step ups   nv 4'' 2x10        Lateral step ups            Sit to stand 2x10  2x10 nv nv     2x10   Gait training w SPC   nv Done        Modalities            NMES            CP PRN

## 2021-08-30 ENCOUNTER — APPOINTMENT (OUTPATIENT)
Dept: PHYSICAL THERAPY | Facility: REHABILITATION | Age: 61
End: 2021-08-30
Payer: COMMERCIAL

## 2021-08-31 ENCOUNTER — OFFICE VISIT (OUTPATIENT)
Dept: PHYSICAL THERAPY | Facility: REHABILITATION | Age: 61
End: 2021-08-31
Payer: COMMERCIAL

## 2021-08-31 DIAGNOSIS — M25.561 CHRONIC PAIN OF RIGHT KNEE: ICD-10-CM

## 2021-08-31 DIAGNOSIS — Z96.651 STATUS POST TOTAL KNEE REPLACEMENT, RIGHT: Primary | ICD-10-CM

## 2021-08-31 DIAGNOSIS — G89.29 CHRONIC PAIN OF RIGHT KNEE: ICD-10-CM

## 2021-08-31 PROCEDURE — 97110 THERAPEUTIC EXERCISES: CPT | Performed by: PHYSICAL THERAPIST

## 2021-08-31 PROCEDURE — 97140 MANUAL THERAPY 1/> REGIONS: CPT | Performed by: PHYSICAL THERAPIST

## 2021-08-31 PROCEDURE — 97530 THERAPEUTIC ACTIVITIES: CPT | Performed by: PHYSICAL THERAPIST

## 2021-08-31 NOTE — PROGRESS NOTES
Daily Note     Today's date: 2021  Patient name: Joshua Mota  : 1960  MRN: [id number]  Referring provider: Minoo Aguirre DO  Dx:   Encounter Diagnosis     ICD-10-CM    1  Status post total knee replacement, right  Z96 651    2  Chronic pain of right knee  M25 561     G89 29        Start Time: 1400  Stop Time: 1500  Total time in clinic (min): 60 minutes    Subjective: No complaints at start of session  Arrived to session with [facility] but ambulated into clinic with no AD  Objective: See treatment diary below      Assessment: Tolerated treatment well  One episode of minor buckling during ambulation without SPC, patient able to self recover but used [facility] for remainder of session due to increased muscle fatigue  Patient with good tolerance to manual therapy with minimal guarding noted, some discomfort noted at end range of passive flexion due to patient being stretched into new range of motion  Educated patient that is how far she needs to be bending her knee when doing stretches at home  Progressed repetitions this visit with good tolerance  Patient demonstrated fatigue post treatment, exhibited good technique with therapeutic exercises and would benefit from continued PT  Plan: Continue per plan of care        Precautions: see protocol   * Indicates part of HEP     Daily Treatment Diary     Manuals    PROM right knee Slideboard 10' Slideboard 12' total nv Done  Done - supine flex   Slideboard 12' Slideboard 10'   Prone PROM            Distal quadriceps release            Patella mobs Done ROM Done   Done   Done Done   ITB stretch            Hip flexor stretch            Hamstring stretch            Hip adductor stretch            Neuro Re-ed            Heel slides w strap* Supine 10 to 80* Supine 10 to 80* Supine 10x5" supine 10x5'' supine 10x5"   Supine 10 to 80* Supine 15 to 80*   Gastroc stretch strap            Hamstring stretch            Lateral weight shifting            Therapeutic Exercise            Bike 5' rocks 5' rocks nv 5' rocks 5' rocks    5' rocks  5' rocks    Patient education            SLR flexion w/ quad set* 2x10 lag 2x10 lag 2x10 no lag 2x10 no lag 3x8   10 lag    SLR hip abduction  3x10 2x10 nv 2x10  3x8   2x10 3x10   Bridging*   2x10 2x10x5'' 2x10       Clamshells   nv PTB 2x10 PTB 3x10       Heel raises   nv 2x10  3x10       TKE            Mini squats            LAQ   nv 2x10  1 5# 3x10       Tests and measures            Therapeutic Activity            Leg press    nv 55# 2x10 55# 3x10   nv    Step ups   nv 4'' 2x10 4" 2x10       Lateral step ups            Sit to stand 2x10  2x10 nv nv 2x10 foam     2x10   Gait training w SPC   nv Done        Modalities            NMES            CP PRN

## 2021-09-02 ENCOUNTER — OFFICE VISIT (OUTPATIENT)
Dept: PHYSICAL THERAPY | Facility: REHABILITATION | Age: 61
End: 2021-09-02
Payer: COMMERCIAL

## 2021-09-02 ENCOUNTER — TELEPHONE (OUTPATIENT)
Dept: OBGYN CLINIC | Facility: HOSPITAL | Age: 61
End: 2021-09-02

## 2021-09-02 DIAGNOSIS — Z96.651 STATUS POST TOTAL KNEE REPLACEMENT, RIGHT: Primary | ICD-10-CM

## 2021-09-02 DIAGNOSIS — G89.29 CHRONIC PAIN OF RIGHT KNEE: ICD-10-CM

## 2021-09-02 DIAGNOSIS — M25.561 CHRONIC PAIN OF RIGHT KNEE: ICD-10-CM

## 2021-09-02 PROCEDURE — 97140 MANUAL THERAPY 1/> REGIONS: CPT

## 2021-09-02 PROCEDURE — 97530 THERAPEUTIC ACTIVITIES: CPT

## 2021-09-02 PROCEDURE — 97110 THERAPEUTIC EXERCISES: CPT

## 2021-09-02 NOTE — PROGRESS NOTES
Daily Note     Today's date: 2021  Patient name: Josh Calhoun  : 1960  MRN: [id number]  Referring provider: Jose Francisco Saenz DO  Dx:   Encounter Diagnosis     ICD-10-CM    1  Status post total knee replacement, right  Z96 651    2  Chronic pain of right knee  M25 561     G89 29        Start Time: 1530  Stop Time: 1630  Total time in clinic (min): 60 minutes    Subjective: Patient reporting knee as "fine" at start of session  Patient's daughter present stating she only uses [facility] to come to PT session  Objective: See treatment diary below      Assessment: Tolerated treatment well  Patient demonstrated fatigue post treatment, exhibited good technique with therapeutic exercises and would benefit from continued PT Patient able to achieve 110* knee flexion with over pressure from therapist        Plan: Continue per plan of care  Progress treatment as tolerated         Precautions: see protocol   * Indicates part of HEP     Daily Treatment Diary     Manuals    PROM right knee Slideboard 10' Slideboard 12' total nv Done  Done - supine flex Done- supine flex  Slideboard 12' Slideboard 10'   Prone PROM            Distal quadriceps release            Patella mobs Done ROM Done   Done Done ROM  Done Done   ITB stretch            Hip flexor stretch            Hamstring stretch            Hip adductor stretch            Neuro Re-ed            Heel slides w strap* Supine 10 to 80* Supine 10 to 80* Supine 10x5" supine 10x5'' supine 10x5" Supine 20x5''  Supine 10 to 80* Supine 15 to 80*   Gastroc stretch strap            Hamstring stretch            Lateral weight shifting            Therapeutic Exercise            Bike 5' rocks 5' rocks nv 5' rocks 5' rocks  5' rocks to full  5' rocks  5' rocks    Patient education            SLR flexion w/ quad set* 2x10 lag 2x10 lag 2x10 no lag 2x10 no lag 3x8 3x10   10 lag    SLR hip abduction  3x10 2x10 nv 2x10  3x8 3x10   2x10 3x10 Bridging*   2x10 2x10x5'' 2x10 2x10      Clamshells   nv PTB 2x10 PTB 3x10 OTB 3x10      Heel raises   nv 2x10  3x10 3x10      TKE            Mini squats            LAQ   nv 2x10  1 5# 3x10 1 5# 3x12      Tests and measures            Therapeutic Activity            Leg press    nv 55# 2x10 55# 3x10 nv  nv    Step ups   nv 4'' 2x10 4" 2x10 4'' 3x10      Lateral step ups            Sit to stand 2x10  2x10 nv nv 2x10 foam  3x10 foam   2x10   Gait training w SPC   nv Done        Modalities            NMES            CP PRN

## 2021-09-03 ENCOUNTER — OFFICE VISIT (OUTPATIENT)
Dept: FAMILY MEDICINE CLINIC | Facility: CLINIC | Age: 61
End: 2021-09-03
Payer: COMMERCIAL

## 2021-09-03 VITALS
WEIGHT: 175 LBS | DIASTOLIC BLOOD PRESSURE: 80 MMHG | OXYGEN SATURATION: 97 % | TEMPERATURE: 97.2 F | HEIGHT: 62 IN | HEART RATE: 62 BPM | SYSTOLIC BLOOD PRESSURE: 120 MMHG | BODY MASS INDEX: 32.2 KG/M2

## 2021-09-03 DIAGNOSIS — E11.9 TYPE 2 DIABETES MELLITUS WITHOUT COMPLICATION, WITHOUT LONG-TERM CURRENT USE OF INSULIN (HCC): Primary | ICD-10-CM

## 2021-09-03 DIAGNOSIS — F41.9 ANXIETY: ICD-10-CM

## 2021-09-03 DIAGNOSIS — H04.123 DRY EYES: ICD-10-CM

## 2021-09-03 DIAGNOSIS — Z96.651 STATUS POST TOTAL KNEE REPLACEMENT, RIGHT: ICD-10-CM

## 2021-09-03 DIAGNOSIS — I10 ESSENTIAL HYPERTENSION: ICD-10-CM

## 2021-09-03 DIAGNOSIS — E78.5 HYPERLIPIDEMIA, UNSPECIFIED HYPERLIPIDEMIA TYPE: ICD-10-CM

## 2021-09-03 PROCEDURE — 99213 OFFICE O/P EST LOW 20 MIN: CPT | Performed by: CHIROPRACTOR

## 2021-09-03 RX ORDER — PROPYLENE GLYCOL 0.06 MG/ML
SOLUTION/ DROPS OPHTHALMIC
Qty: 15 ML | Refills: 2 | Status: SHIPPED | OUTPATIENT
Start: 2021-09-03 | End: 2022-01-12 | Stop reason: ALTCHOICE

## 2021-09-03 RX ORDER — PRAVASTATIN SODIUM 40 MG
40 TABLET ORAL
Qty: 30 TABLET | Refills: 0 | Status: SHIPPED | OUTPATIENT
Start: 2021-09-03 | End: 2021-11-22 | Stop reason: SDUPTHER

## 2021-09-03 RX ORDER — LANCETS
EACH MISCELLANEOUS
Qty: 100 EACH | Refills: 3 | Status: SHIPPED | OUTPATIENT
Start: 2021-09-03 | End: 2021-11-22

## 2021-09-03 RX ORDER — HYDROXYZINE HYDROCHLORIDE 25 MG/1
25 TABLET, FILM COATED ORAL DAILY PRN
Qty: 30 TABLET | Refills: 0 | Status: SHIPPED | OUTPATIENT
Start: 2021-09-03 | End: 2022-01-12 | Stop reason: ALTCHOICE

## 2021-09-03 RX ORDER — BLOOD SUGAR DIAGNOSTIC
STRIP MISCELLANEOUS
Qty: 100 EACH | Refills: 0 | Status: SHIPPED | OUTPATIENT
Start: 2021-09-03 | End: 2021-11-22 | Stop reason: SDUPTHER

## 2021-09-03 RX ORDER — ATENOLOL AND CHLORTHALIDONE TABLET 50; 25 MG/1; MG/1
1 TABLET ORAL DAILY
Qty: 90 TABLET | Refills: 3 | Status: SHIPPED | OUTPATIENT
Start: 2021-09-03 | End: 2021-11-22 | Stop reason: SDUPTHER

## 2021-09-03 NOTE — PROGRESS NOTES
Family Medicine Follow-Up Office Visit  Mata Jordan 62 y o  female   MRN: [id number] : 1960  ENCOUNTER: 9/3/2021 12:16 PM    Assessment and Plan   Type 2 diabetes mellitus without complication, without long-term current use of insulin ([facility] [address] )    Lab Results   Component Value Date    HGBA1C 7 0 (A) 2021   The patient admits non-compliance with medication and diet over last 2-3 mos  This was verified by daughter who is also present  The potential ramifications of this were discussed in detail with the patient and her daughter  Plan: Medications were renewed and dietary compliance was also discussed  Patient will f/u in 1 month or PRN  Status post total knee replacement, right  The patient is making progress with 110 degrees of flexion now obtained  She still has mild edema, but gait is improving now with far less pain  Again, the impact of her lifestyle was discussed in detail  Plan: f/u in 1 month or PRN  Chief Complaint     Chief Complaint   Patient presents with   • Follow-up       History of Present Illness   Mata Jordan is a 62y o -year-old female who presents today for a follow up office visit  The patient is status post TKR 21 on the right and is currently active in PT  According to PT she has attained up to 110 degrees of right knee flexion to date  She states she is managing her diabetes well with no questions about DM management  Her daughter states she has been non-compliant with her diet especially for the 6-8 weeks surrounding her surgery  She was also non-compliant with with her metformin during this time  Daughter now oversees her meds  We reviewed the need for both diet and medication compliance  Review of Systems   Review of Systems   Constitutional: Negative for chills, fatigue and fever  Respiratory: Negative for shortness of breath  Cardiovascular: Negative for chest pain     Gastrointestinal: Negative for diarrhea, nausea and vomiting  Active Problem List     Patient Active Problem List   Diagnosis   • Bilateral knee pain   • Localized osteoarthritis of knees, bilateral   • Degenerative arthritis of knee, bilateral   • Type 2 diabetes mellitus without complication, without long-term current use of insulin ([address] )   • Essential hypertension   • Hyperlipidemia   • Carpal tunnel syndrome on both sides   • Right arm pain   • Encounter for screening mammogram for breast cancer   • Constipation   • Palpitations   • Arthritis of knee   • Tobacco abuse   • Lower abdominal pain   • Healthcare maintenance   • Primary osteoarthritis of right knee   • Abnormal ultrasound of uterus   • Pre-operative clearance   • Acute postoperative anemia due to expected blood loss   • Status post total knee replacement, right   • Arthrofibrosis of knee joint, right   • Anxiety       Past Medical History, Past Surgical History, Family History, and Social History were reviewed and updated today as appropriate  Objective   /80   Pulse 62   Temp (!) 97 2 °F (36 2 °C)   Ht 5' 2" (1 575 m)   Wt 79 4 kg (175 lb)   SpO2 97%   BMI 32 01 kg/m²     Physical Exam  Constitutional:       General: She is not in acute distress  HENT:      Head: Normocephalic  Eyes:      Extraocular Movements: Extraocular movements intact  Cardiovascular:      Rate and Rhythm: Normal rate and regular rhythm  Heart sounds: Normal heart sounds  No murmur heard  Pulmonary:      Effort: Pulmonary effort is normal       Breath sounds: No wheezing or rales  Abdominal:      General: There is no distension  Palpations: Abdomen is soft  Tenderness: There is no abdominal tenderness  Musculoskeletal:         General: Swelling (mild R knee) present  Skin:     General: Skin is warm and dry  Neurological:      General: No focal deficit present     Psychiatric:         Mood and Affect: Mood normal          Pertinent Laboratory/Diagnostic Studies:  Lab Results Component Value Date    BUN 17 06/02/2021    CREATININE 0 88 06/02/2021    CALCIUM 10 0 06/02/2021    K 4 3 06/02/2021    CO2 27 06/02/2021     06/02/2021     Lab Results   Component Value Date    ALT 33 02/20/2021    AST 25 02/20/2021    ALKPHOS 55 02/20/2021       Lab Results   Component Value Date    WBC 3 79 (L) 06/02/2021    HGB 14 5 06/02/2021    HCT 45 0 06/02/2021    MCV 95 06/02/2021     06/02/2021       No results found for: TSH    No results found for: CHOL  Lab Results   Component Value Date    TRIG 147 02/04/2020     Lab Results   Component Value Date    HDL 36 (L) 02/04/2020     Lab Results   Component Value Date    LDLCALC 71 02/04/2020     Lab Results   Component Value Date    HGBA1C 7 0 (A) 01/26/2021       Results for orders placed or performed during the hospital encounter of 06/17/21   Fingerstick Glucose (POCT)   Result Value Ref Range    POC Glucose 185 (H) 65 - 140 mg/dl       Orders Placed This Encounter   Procedures   • POCT hemoglobin A1c         Current Medications     Current Outpatient Medications   Medication Sig Dispense Refill   • acetaminophen (TYLENOL) 500 mg tablet Three times a day with meals and at bedtime 120 tablet 0   • ascorbic acid (VITAMIN C) 500 mg tablet Take 1 tablet (500 mg total) by mouth daily 30 tablet 0   • atenolol-chlorthalidone (TENORETIC) 50-25 mg per tablet Take 1 tablet by mouth daily 90 tablet 3   • desoximetasone (TOPICORT) 0 25 % cream as needed      • gabapentin (NEURONTIN) 100 mg capsule Take 1 capsule (100 mg total) by mouth 3 (three) times a day 42 capsule 0   • glucose blood (OneTouch Verio) test strip Check Blood Sugar 2 times daily 100 each 0   • hydrOXYzine HCL (ATARAX) 25 mg tablet Take 1 tablet (25 mg total) by mouth daily as needed for anxiety 30 tablet 0   • Lancets (onetouch ultrasoft) lancets Use as instructed 100 each 3   • metFORMIN (GLUCOPHAGE) 500 mg tablet Take 1 tablet (500 mg total) by mouth daily with breakfast 90 tablet 3   • methocarbamol (ROBAXIN) 750 mg tablet Take 1 tablet (750 mg total) by mouth every 6 (six) hours for 14 days 56 tablet 0   • Multiple Vitamins-Minerals (multivitamin with minerals) tablet Take 1 tablet by mouth daily 30 tablet 1   • OneTouch Delica Lancets 66F MISC Use 1 each 2 (two) times a day 200 each 3   • pravastatin (PRAVACHOL) 40 mg tablet Take 1 tablet (40 mg total) by mouth daily in the early morning 30 tablet 0   • Propylene Glycol (Systane Complete) 0 6 % SOLN Place 1 drop in both eyes up to 4 times a day for dry eyes 15 mL 2   • traMADol (ULTRAM) 50 mg tablet Take 1 tablet by mouth 1/2 hour prior to physical therapy up to 3 times per week 12 tablet 0     No current facility-administered medications for this visit  ALLERGIES:  No Known Allergies    Health Maintenance     Health Maintenance   Topic Date Due   • Pneumococcal Vaccine: Pediatrics (0 to 5 Years) and At-Risk Patients (6 to 59 Years) (1 of 2 - PPSV23) Never done   • DM Eye Exam  Never done   • COVID-19 Vaccine (1) Never done   • BMI: Followup Plan  Never done   • Annual Physical  Never done   • Lung Cancer Screening  Never done   • URINE MICROALBUMIN  02/04/2021   • HEMOGLOBIN A1C  07/26/2021   • Influenza Vaccine (1) 09/01/2021   • Depression Screening PHQ  10/06/2021   • DTaP,Tdap,and Td Vaccines (1 - Tdap) 10/06/2021 (Originally 3/27/1982)   • PT PLAN OF CARE  09/24/2021   • Breast Cancer Screening: Mammogram  02/06/2022   • Diabetic Foot Exam  03/16/2022   • BMI: Adult  08/19/2022   • Colorectal Cancer Screening  05/29/2023   • Cervical Cancer Screening  02/25/2026   • HIV Screening  Completed   • Hepatitis C Screening  Completed   • HIB Vaccine  Aged Out   • Hepatitis B Vaccine  Aged Out   • IPV Vaccine  Aged Out   • Hepatitis A Vaccine  Aged Out   • Meningococcal ACWY Vaccine  Aged Out   • HPV Vaccine  Aged Out       There is no immunization history on file for this patient        MD Jeane [address] [place]  9/3/2021  12:16 PM    Parts of this note were dictated using [employer]oc[employer] dictation software and may have sounds-like errors due to variation in pronunciation

## 2021-09-03 NOTE — ASSESSMENT & PLAN NOTE
The patient is making progress with 110 degrees of flexion now obtained  She still has mild edema, but gait is improving now with far less pain  Again, the impact of her lifestyle was discussed in detail  Plan: f/u in 1 month or PRN

## 2021-09-03 NOTE — ASSESSMENT & PLAN NOTE
Lab Results   Component Value Date    HGBA1C 7 0 (A) 01/26/2021   The patient admits non-compliance with medication and diet over last 2-3 mos  This was verified by daughter who is also present  The potential ramifications of this were discussed in detail with the patient and her daughter  Plan: Medications were renewed and dietary compliance was also discussed  Patient will f/u in 1 month or PRN

## 2021-09-08 ENCOUNTER — OFFICE VISIT (OUTPATIENT)
Dept: PHYSICAL THERAPY | Facility: REHABILITATION | Age: 61
End: 2021-09-08
Payer: COMMERCIAL

## 2021-09-08 DIAGNOSIS — Z96.651 STATUS POST TOTAL KNEE REPLACEMENT, RIGHT: Primary | ICD-10-CM

## 2021-09-08 DIAGNOSIS — M25.561 CHRONIC PAIN OF RIGHT KNEE: ICD-10-CM

## 2021-09-08 DIAGNOSIS — G89.29 CHRONIC PAIN OF RIGHT KNEE: ICD-10-CM

## 2021-09-08 PROCEDURE — 97530 THERAPEUTIC ACTIVITIES: CPT

## 2021-09-08 PROCEDURE — 97140 MANUAL THERAPY 1/> REGIONS: CPT

## 2021-09-08 PROCEDURE — 97110 THERAPEUTIC EXERCISES: CPT

## 2021-09-08 NOTE — PROGRESS NOTES
Daily Note     Today's date: 2021  Patient name: Patricia Saini  : 1960  MRN: [id number]  Referring provider: Bartolome Mccarty DO  Dx:   Encounter Diagnosis     ICD-10-CM    1  Status post total knee replacement, right  Z96 651    2  Chronic pain of right knee  M25 561     G89 29        Start Time: 1450  Stop Time: 1550  Total time in clinic (min): 60 minutes    Subjective: Patient reporting knee as "fine" at start of session  Objective: See treatment diary below      Assessment: Tolerated treatment well  Patient demonstrated fatigue post treatment, exhibited good technique with therapeutic exercises and would benefit from continued PT Patient able to achieve 112* knee flexion with overpressure from therapist this session  Trialed additional TE this session with patient tolerating well, no knee pain reported  Plan: Continue per plan of care  Progress treatment as tolerated         Precautions: see protocol   * Indicates part of HEP     Daily Treatment Diary     Manuals    PROM right knee Slideboard 10' Slideboard 12' total nv Done  Done - supine flex Done- supine flex Done supine flex  Slideboard 10'   Prone PROM            Distal quadriceps release            Patella mobs Done ROM Done   Done Done ROM Done ROM   Done   ITB stretch            Hip flexor stretch            Hamstring stretch            Hip adductor stretch            Neuro Re-ed            Heel slides w strap* Supine 10 to 80* Supine 10 to 80* Supine 10x5" supine 10x5'' supine 10x5" Supine 20x5'' Supine 10x5''  Supine 15 to 80*   Gastroc stretch strap            Hamstring stretch            Lateral weight shifting            Therapeutic Exercise            Bike 5' rocks 5' rocks nv 5' rocks 5' rocks  5' rocks to full 5' rocks to full  5' rocks    Patient education            SLR flexion w/ quad set* 2x10 lag 2x10 lag 2x10 no lag 2x10 no lag 3x8 3x10  3x10      SLR hip abduction  3x10 2x10 nv 2x10  3x8 3x10  3x10   3x10   Bridging*   2x10 2x10x5'' 2x10 2x10 3x10      Clamshells   nv PTB 2x10 PTB 3x10 OTB 3x10 OTB 3x10     Heel raises   nv 2x10  3x10 3x10      TKE            Mini squats            LAQ   nv 2x10  1 5# 3x10 1 5# 3x12 2# 3x10     Tests and measures            Therapeutic Activity            Leg press    nv 55# 2x10 55# 3x10 nv 85# 3x10   55# SL 2x10     Step ups   nv 4'' 2x10 4" 2x10 4'' 3x10 6'' 2x10     Lateral step ups       4'' 2x10     Sit to stand 2x10  2x10 nv nv 2x10 foam  3x10 foam 3x10 foam  2x10   Gait training w SPC   nv Done        Modalities            NMES            CP PRN

## 2021-09-10 ENCOUNTER — OFFICE VISIT (OUTPATIENT)
Dept: PHYSICAL THERAPY | Facility: REHABILITATION | Age: 61
End: 2021-09-10
Payer: COMMERCIAL

## 2021-09-10 DIAGNOSIS — M25.561 CHRONIC PAIN OF RIGHT KNEE: ICD-10-CM

## 2021-09-10 DIAGNOSIS — Z96.651 STATUS POST TOTAL KNEE REPLACEMENT, RIGHT: Primary | ICD-10-CM

## 2021-09-10 DIAGNOSIS — G89.29 CHRONIC PAIN OF RIGHT KNEE: ICD-10-CM

## 2021-09-10 PROCEDURE — 97530 THERAPEUTIC ACTIVITIES: CPT

## 2021-09-10 PROCEDURE — 97140 MANUAL THERAPY 1/> REGIONS: CPT

## 2021-09-10 PROCEDURE — 97110 THERAPEUTIC EXERCISES: CPT

## 2021-09-10 NOTE — PROGRESS NOTES
Daily Note     Today's date: 9/10/2021  Patient name: Parker Perry  : 1960  MRN: [id number]  Referring provider: Renato Burnett DO  Dx:   Encounter Diagnosis     ICD-10-CM    1  Status post total knee replacement, right  Z96 651    2  Chronic pain of right knee  M25 561     G89 29        Start Time: 1015  Stop Time: 1110  Total time in clinic (min): 55 minutes    Subjective: Patient reporting knee as "fine" at start of session, pointing to lateral aspect of knee/ITB reporting tightness  Objective: See treatment diary below      Assessment: Tolerated treatment well  Patient demonstrated fatigue post treatment, exhibited good technique with therapeutic exercises and would benefit from continued PT Patient able to achieve 113* knee flexion with over pressure from therapist, cues required to avoid hip hiking initially  Trialed forward step overs with patient tolerating well  Plan: Continue per plan of care  Progress treatment as tolerated         Precautions: see protocol   * Indicates part of HEP     Daily Treatment Diary     Manuals 7/14 7/19 8/25 8/26 8/31 9/2 9/8 9/10    PROM right knee Slideboard 10' Slideboard 12' total nv Done  Done - supine flex Done- supine flex Done supine flex Done supine flex    Prone PROM            Distal quadriceps release            Patella mobs Done ROM Done   Done Done ROM Done ROM  Done ROM    ITB stretch        ITB STM/TFM    Hip flexor stretch            Hamstring stretch            Hip adductor stretch            Neuro Re-ed            Heel slides w strap* Supine 10 to 80* Supine 10 to 80* Supine 10x5" supine 10x5'' supine 10x5" Supine 20x5'' Supine 10x5'' Supine 10x5''    Gastroc stretch strap            Hamstring stretch            Lateral weight shifting            Therapeutic Exercise            Bike 5' rocks 5' rocks nv 5' rocks 5' rocks  5' rocks to full 5' rocks to full 5' rocks to full    Patient education            SLR flexion w/ quad set* 2x10 lag 2x10 lag 2x10 no lag 2x10 no lag 3x8 3x10  3x10  3x12    SLR hip abduction  3x10 2x10 nv 2x10  3x8 3x10  3x10  3x12    Bridging*   2x10 2x10x5'' 2x10 2x10 3x10  3x12    Clamshells   nv PTB 2x10 PTB 3x10 OTB 3x10 OTB 3x10 OTB 3x12    Heel raises   nv 2x10  3x10 3x10      TKE            Mini squats            LAQ   nv 2x10  1 5# 3x10 1 5# 3x12 2# 3x10 nv    Tests and measures            Therapeutic Activity            Leg press    nv 55# 2x10 55# 3x10 nv 85# 3x10   55# SL 2x10 55# SL 3x12   85# BL 3x12    Step ups   nv 4'' 2x10 4" 2x10 4'' 3x10 6'' 2x10 6'' 3x10    Lateral step ups       4'' 2x10 4'' 3x10    Forward step overs        4'' x10     Sit to stand 2x10  2x10 nv nv 2x10 foam  3x10 foam 3x10 foam 3x12 foam    Gait training w SPC   nv Done        Modalities            NMES            CP PRN

## 2021-09-15 ENCOUNTER — OFFICE VISIT (OUTPATIENT)
Dept: PHYSICAL THERAPY | Facility: REHABILITATION | Age: 61
End: 2021-09-15
Payer: COMMERCIAL

## 2021-09-15 DIAGNOSIS — G89.29 CHRONIC PAIN OF RIGHT KNEE: ICD-10-CM

## 2021-09-15 DIAGNOSIS — Z96.651 STATUS POST TOTAL KNEE REPLACEMENT, RIGHT: Primary | ICD-10-CM

## 2021-09-15 DIAGNOSIS — M25.561 CHRONIC PAIN OF RIGHT KNEE: ICD-10-CM

## 2021-09-15 PROCEDURE — 97140 MANUAL THERAPY 1/> REGIONS: CPT

## 2021-09-15 PROCEDURE — 97530 THERAPEUTIC ACTIVITIES: CPT

## 2021-09-15 PROCEDURE — 97110 THERAPEUTIC EXERCISES: CPT

## 2021-09-15 NOTE — PROGRESS NOTES
Daily Note     Today's date: 9/15/2021  Patient name: Zaida Carrel  : 1960  MRN: [id number]  Referring provider: Cristofer Molina DO  Dx:   Encounter Diagnosis     ICD-10-CM    1  Status post total knee replacement, right  Z96 651    2  Chronic pain of right knee  M25 561     G89 29        Start Time: 1455  Stop Time: 1552  Total time in clinic (min): 57 minutes    Subjective: Patient reporting knee as "fine" at start of session  Arrived to session with use of SPC, continues to walk with knee in full extension  Objective: See treatment diary below      Assessment: Tolerated treatment well  Patient demonstrated fatigue post treatment, exhibited good technique with therapeutic exercises and would benefit from continued PT Trialed prone quad stretch with patient tolerating well  Patient able to achieve approx 113* knee flexion with over pressure from therapist         Plan: Continue per plan of care  Progress treatment as tolerated         Precautions: see protocol   * Indicates part of HEP     Daily Treatment Diary     Manuals 8/25 8/26 8/31 9/2 9/8 9/10 9/15   PROM right knee nv Done  Done - supine flex Done- supine flex Done supine flex Done supine flex Done supine flex   Prone PROM          Distal quadriceps release          Patella mobs   Done Done ROM Done ROM  Done ROM Done ROM   ITB stretch      ITB STM/TFM ITB STM/TFM   Hip flexor stretch          Hamstring stretch          Hip adductor stretch          Neuro Re-ed          Heel slides w strap* Supine 10x5" supine 10x5'' supine 10x5" Supine 20x5'' Supine 10x5'' Supine 10x5'' Supine 10x5''   Gastroc stretch strap          Hamstring stretch          Lateral weight shifting          Therapeutic Exercise          Bike nv 5' rocks 5' rocks  5' rocks to full 5' rocks to full 5' rocks to full 5' rocks to full   Patient education          SLR flexion w/ quad set* 2x10 no lag 2x10 no lag 3x8 3x10  3x10  3x12 3x12 trial wt nv   SLR hip abduction  nv 2x10  3x8 3x10  3x10  3x12 3x12 trial wt nv   Bridging* 2x10 2x10x5'' 2x10 2x10 3x10  3x12 3x12   Clamshells nv PTB 2x10 PTB 3x10 OTB 3x10 OTB 3x10 OTB 3x12 nv   Heel raises nv 2x10  3x10 3x10      TKE          Mini squats          Prone quad stretch       :30x3    LAQ nv 2x10  1 5# 3x10 1 5# 3x12 2# 3x10 nv    Tests and measures          Therapeutic Activity          Leg press  nv 55# 2x10 55# 3x10 nv 85# 3x10   55# SL 2x10 55# SL 3x12   85# BL 3x12 55# 3x15   Step ups nv 4'' 2x10 4" 2x10 4'' 3x10 6'' 2x10 6'' 3x10 6'' 3x10   Lateral step ups     4'' 2x10 4'' 3x10 4'' 3x10   Forward step overs      4'' x10  4'' 3x10   Sit to stand nv nv 2x10 foam  3x10 foam 3x10 foam 3x12 foam 3x12 foam   Gait training w SPC nv Done        Modalities          NMES          CP PRN

## 2021-09-17 ENCOUNTER — OFFICE VISIT (OUTPATIENT)
Dept: PHYSICAL THERAPY | Facility: REHABILITATION | Age: 61
End: 2021-09-17
Payer: COMMERCIAL

## 2021-09-17 DIAGNOSIS — E11.9 TYPE 2 DIABETES MELLITUS WITHOUT COMPLICATION, WITHOUT LONG-TERM CURRENT USE OF INSULIN (HCC): ICD-10-CM

## 2021-09-17 DIAGNOSIS — Z96.651 STATUS POST TOTAL KNEE REPLACEMENT, RIGHT: Primary | ICD-10-CM

## 2021-09-17 DIAGNOSIS — M25.561 CHRONIC PAIN OF RIGHT KNEE: ICD-10-CM

## 2021-09-17 DIAGNOSIS — G89.29 CHRONIC PAIN OF RIGHT KNEE: ICD-10-CM

## 2021-09-17 PROCEDURE — 97530 THERAPEUTIC ACTIVITIES: CPT | Performed by: PHYSICAL THERAPIST

## 2021-09-17 PROCEDURE — 97140 MANUAL THERAPY 1/> REGIONS: CPT | Performed by: PHYSICAL THERAPIST

## 2021-09-17 PROCEDURE — 97110 THERAPEUTIC EXERCISES: CPT | Performed by: PHYSICAL THERAPIST

## 2021-09-17 RX ORDER — LANCETS 30 GAUGE
1 EACH MISCELLANEOUS 2 TIMES DAILY
Qty: 200 EACH | Refills: 3 | Status: SHIPPED | OUTPATIENT
Start: 2021-09-17 | End: 2021-11-22 | Stop reason: SDUPTHER

## 2021-09-17 NOTE — PROGRESS NOTES
Daily Note     Today's date: 2021  Patient name: Maritza Cancer  : 1960  MRN: [id number]  Referring provider: Chato Haskins DO  Dx:   Encounter Diagnosis     ICD-10-CM    1  Status post total knee replacement, right  Z96 651    2  Chronic pain of right knee  M25 561     G89 29        Start Time: 1016  Stop Time: 1115  Total time in clinic (min): 59 minutes    Subjective: Patient with no complaints at start of session in regards to right knee stating "it's good "      Objective: See treatment diary below      Assessment: Tolerated treatment well  Good tolerance to manual therapy this visit with addition of prone knee flexion stretching  Patient able to achieve 110* knee flexion pre-stretching and 114* post-stretching  Decreased step height for step overs as patient was unable to control descent with proper right knee eccentric control without compensation  Improved form noted with 2" step  Antalgic stepping noted with forward step ups, evident [place] due to continued BLE weakness  Patient demonstrated fatigue post treatment, exhibited good technique with therapeutic exercises and would benefit from continued PT  Plan: Continue per plan of care        Precautions: see protocol   * Indicates part of HEP     Daily Treatment Diary     Manuals 9/17  8/31 9/2 9/8 9/10 9/15   PROM right knee Done flex/ext supine  Done - supine flex Done- supine flex Done supine flex Done supine flex Done supine flex   Prone PROM Done         Patella mobs Done  Done Done ROM Done ROM  Done ROM Done ROM   Neuro Re-ed          Heel slides w strap* Supine 10x5"   supine 10x5" Supine 20x5'' Supine 10x5'' Supine 10x5'' Supine 10x5''   Hamstring stretch          Therapeutic Exercise          Bike 5' rocks to full  5' rocks  5' rocks to full 5' rocks to full 5' rocks to full 5' rocks to full   Patient education          SLR flexion w/ quad set* 1# 3x10  3x8 3x10  3x10  3x12 3x12 trial wt nv   SLR hip abduction  1# 3x10   3x8 3x10  3x10  3x12 3x12 trial wt nv   Bridging* 3x12  2x10 2x10 3x10  3x12 3x12   Clamshells   PTB 3x10 OTB 3x10 OTB 3x10 OTB 3x12 nv   Heel raises   3x10 3x10      Prone quad stretch Manual Done       :30x3    Tests and measures          Therapeutic Activity          Leg press  65# 3x12  55# 3x10 nv 85# 3x10   55# SL 2x10 55# SL 3x12   85# BL 3x12 55# 3x15   Step ups 6" 3x10  4" 2x10 4'' 3x10 6'' 2x10 6'' 3x10 6'' 3x10   Lateral step ups 4" 3x10    4'' 2x10 4'' 3x10 4'' 3x10   Forward step overs 2" 3x10     4'' x10  4'' 3x10   Sit to stand 3x12 foam  2x10 foam  3x10 foam 3x10 foam 3x12 foam 3x12 foam   Gait training w SPC          Modalities          NMES          CP PRN

## 2021-09-22 ENCOUNTER — OFFICE VISIT (OUTPATIENT)
Dept: PHYSICAL THERAPY | Facility: REHABILITATION | Age: 61
End: 2021-09-22
Payer: COMMERCIAL

## 2021-09-22 DIAGNOSIS — Z96.651 STATUS POST TOTAL KNEE REPLACEMENT, RIGHT: Primary | ICD-10-CM

## 2021-09-22 DIAGNOSIS — G89.29 CHRONIC PAIN OF RIGHT KNEE: ICD-10-CM

## 2021-09-22 DIAGNOSIS — M25.561 CHRONIC PAIN OF RIGHT KNEE: ICD-10-CM

## 2021-09-22 PROCEDURE — 97140 MANUAL THERAPY 1/> REGIONS: CPT

## 2021-09-22 PROCEDURE — 97110 THERAPEUTIC EXERCISES: CPT

## 2021-09-22 PROCEDURE — 97530 THERAPEUTIC ACTIVITIES: CPT

## 2021-09-22 NOTE — PROGRESS NOTES
Daily Note     Today's date: 2021  Patient name: Luis Hudson  : 1960  MRN: [id number]  Referring provider: Barrett Orosco DO  Dx:   Encounter Diagnosis     ICD-10-CM    1  Status post total knee replacement, right  Z96 651    2  Chronic pain of right knee  M25 561     G89 29        Start Time: 1447  Stop Time: 1548  Total time in clinic (min): 61 minutes    Subjective:  Patient reporting knee as "okay today" at start of session  Objective: See treatment diary below      Assessment: Tolerated treatment well  Patient demonstrated fatigue post treatment, exhibited good technique with therapeutic exercises and would benefit from continued PT Patient able to achieve 115* of knee flexion after manuals today  Cues continued to be required for increased knee flexion upon ambulation and with completion of step ups  Plan: Continue per plan of care  Progress treatment as tolerated         Precautions: see protocol   * Indicates part of HEP     Daily Treatment Diary     Manuals 9/17 9/22  9/2 9/8 9/10 9/15   PROM right knee Done flex/ext supine Done flex/ext supine  Done- supine flex Done supine flex Done supine flex Done supine flex   Prone PROM Done Done         Patella mobs Done Done ROM  Done ROM Done ROM  Done ROM Done ROM   Neuro Re-ed          Heel slides w strap* Supine 10x5"  Supine 10x5''  Supine 20x5'' Supine 10x5'' Supine 10x5'' Supine 10x5''   Hamstring stretch          Therapeutic Exercise          Bike 5' rocks to full 5' rocks to full  5' rocks to full 5' rocks to full 5' rocks to full 5' rocks to full   Patient education          SLR flexion w/ quad set* 1# 3x10 1# 3x10  3x10  3x10  3x12 3x12 trial wt nv   SLR hip abduction  1# 3x10  1# 3x10  3x10  3x10  3x12 3x12 trial wt nv   Bridging* 3x12 3x15  2x10 3x10  3x12 3x12   Clamshells    OTB 3x10 OTB 3x10 OTB 3x12 nv   Heel raises    3x10      Prone quad stretch Manual Done  manual done     :30x3    Tests and measures Therapeutic Activity          Leg press  65# 3x12 65# 3x15 SL  nv 85# 3x10   55# SL 2x10 55# SL 3x12   85# BL 3x12 55# 3x15   Step ups 6" 3x10 6'' 3x10  4'' 3x10 6'' 2x10 6'' 3x10 6'' 3x10   Lateral step ups 4" 3x10 4'' 3x10   4'' 2x10 4'' 3x10 4'' 3x10   Forward step overs 2" 3x10 2'' 3x10    4'' x10  4'' 3x10   Sit to stand 3x12 foam 3x15 foam  3x10 foam 3x10 foam 3x12 foam 3x12 foam   Gait training w SPC          Modalities          NMES          CP PRN

## 2021-09-24 ENCOUNTER — OFFICE VISIT (OUTPATIENT)
Dept: PHYSICAL THERAPY | Facility: REHABILITATION | Age: 61
End: 2021-09-24
Payer: COMMERCIAL

## 2021-09-24 DIAGNOSIS — G89.29 CHRONIC PAIN OF RIGHT KNEE: ICD-10-CM

## 2021-09-24 DIAGNOSIS — Z96.651 STATUS POST TOTAL KNEE REPLACEMENT, RIGHT: Primary | ICD-10-CM

## 2021-09-24 DIAGNOSIS — M25.561 CHRONIC PAIN OF RIGHT KNEE: ICD-10-CM

## 2021-09-24 PROCEDURE — 97110 THERAPEUTIC EXERCISES: CPT

## 2021-09-24 PROCEDURE — 97530 THERAPEUTIC ACTIVITIES: CPT

## 2021-09-24 PROCEDURE — 97140 MANUAL THERAPY 1/> REGIONS: CPT

## 2021-09-24 NOTE — PROGRESS NOTES
Daily Note     Today's date: 2021  Patient name: Kendal Fagan  : 1960  MRN: [id number]  Referring provider: Aylin Haines DO  Dx:   Encounter Diagnosis     ICD-10-CM    1  Status post total knee replacement, right  Z96 651    2  Chronic pain of right knee  M25 561     G89 29        Start Time: 1015  Stop Time: 1108  Total time in clinic (min): 53 minutes    Subjective: Patient reporting knee as "fine" at start of session today  Objective: See treatment diary below      Assessment: Tolerated treatment well  Patient demonstrated fatigue post treatment, exhibited good technique with therapeutic exercises and would benefit from continued PT Tolerated increased reps/weight with certain TE well, no knee pain reported, only fatigue  Trialed sit to stands without foam boost this session, initial cues required for proper form, improvements noted once cues given  Plan: Continue per plan of care  Progress treatment as tolerated         Precautions: see protocol   * Indicates part of HEP     Daily Treatment Diary     Manuals 9/17 9/22 9/24  9/8 9/10 9/15   PROM right knee Done flex/ext supine Done flex/ext supine Done flex/ext supine  Done supine flex Done supine flex Done supine flex   Prone PROM Done Done  Done       Patella mobs Done Done ROM Done ROM  Done ROM  Done ROM Done ROM   Neuro Re-ed          Heel slides w strap* Supine 10x5"  Supine 10x5'' Supine 10x5''  Supine 10x5'' Supine 10x5'' Supine 10x5''   Hamstring stretch          Therapeutic Exercise          Bike 5' rocks to full 5' rocks to full 5' rocks to full   5' rocks to full 5' rocks to full 5' rocks to full   Patient education          SLR flexion w/ quad set* 1# 3x10 1# 3x10 1 5# 3x10  3x10  3x12 3x12 trial wt nv   SLR hip abduction  1# 3x10  1# 3x10 1 5# 3x10  3x10  3x12 3x12 trial wt nv   Bridging* 3x12 3x15 10# 3x10  3x10  3x12 3x12   Clamshells     OTB 3x10 OTB 3x12 nv   Heel raises          Prone quad stretch Manual Done  manual done manual done    :30x3    Tests and measures          Therapeutic Activity          Leg press  65# 3x12 65# 3x15 SL 69# 3x12 SL  85# 3x10   55# SL 2x10 55# SL 3x12   85# BL 3x12 55# 3x15   Step ups 6" 3x10 6'' 3x10 6'' 3x12  6'' 2x10 6'' 3x10 6'' 3x10   Lateral step ups 4" 3x10 4'' 3x10 4'' 3x12  4'' 2x10 4'' 3x10 4'' 3x10   Forward step overs 2" 3x10 2'' 3x10 2'' 3x12   4'' x10  4'' 3x10   Sit to stand 3x12 foam 3x15 foam 3x10 no foam  3x10 foam 3x12 foam 3x12 foam   Gait training w SPC          Modalities          NMES          CP PRN

## 2021-09-29 ENCOUNTER — OFFICE VISIT (OUTPATIENT)
Dept: PHYSICAL THERAPY | Facility: REHABILITATION | Age: 61
End: 2021-09-29
Payer: COMMERCIAL

## 2021-09-29 DIAGNOSIS — Z96.651 STATUS POST TOTAL KNEE REPLACEMENT, RIGHT: Primary | ICD-10-CM

## 2021-09-29 DIAGNOSIS — G89.29 CHRONIC PAIN OF RIGHT KNEE: ICD-10-CM

## 2021-09-29 DIAGNOSIS — M25.561 CHRONIC PAIN OF RIGHT KNEE: ICD-10-CM

## 2021-09-29 PROCEDURE — 97530 THERAPEUTIC ACTIVITIES: CPT

## 2021-09-29 PROCEDURE — 97110 THERAPEUTIC EXERCISES: CPT

## 2021-09-29 PROCEDURE — 97140 MANUAL THERAPY 1/> REGIONS: CPT

## 2021-09-29 NOTE — PROGRESS NOTES
Daily Note     Today's date: 2021  Patient name: Mercedes Rodarte  : 1960  MRN: [id number]  Referring provider: Lissette Chowdhury DO  Dx:   Encounter Diagnosis     ICD-10-CM    1  Status post total knee replacement, right  Z96 651    2  Chronic pain of right knee  M25 561     G89 29        Start Time: 1500  Stop Time: 1600  Total time in clinic (min): 60 minutes    Subjective: Patient reporting knee as "fine" at start of session  Patient arrived to session 15 minutes late  Objective: See treatment diary below      Assessment: Tolerated treatment well  Patient demonstrated fatigue post treatment, exhibited good technique with therapeutic exercises and would benefit from continued PT Cues required initially for less UE support on rail with completion of forward step overs, improved tolerance and form noted with patient able to control descent better compared to previous sessions  Trialed lateral walking with some unsteadiness noted, with challenge present patient reporting fatigue  Plan: Continue per plan of care  Progress treatment as tolerated         Precautions: see protocol   * Indicates part of HEP     Daily Treatment Diary     Manuals 9/17 9/22 9/24 9/29       9/15   PROM right knee Done flex/ext supine Done flex/ext supine Done flex/ext supine Done    Done supine flex   Prone PROM Done Done  Done Done       Patella mobs Done Done ROM Done ROM Done ROM   Done ROM   Neuro Re-ed          Heel slides w strap* Supine 10x5"  Supine 10x5'' Supine 10x5'' Supine 10x5''   Supine 10x5''   Hamstring stretch          Therapeutic Exercise          Bike 5' rocks to full 5' rocks to full 5' rocks to full  5'    5' rocks to full   Patient education          SLR flexion w/ quad set* 1# 3x10 1# 3x10 1 5# 3x10 1 5# 3x12   3x12 trial wt nv   SLR hip abduction  1# 3x10  1# 3x10 1 5# 3x10 1 5# 3x12   3x12 trial wt nv   Bridging* 3x12 3x15 10# 3x10 10# 3x12   3x12   Clamshells       nv   Heel raises Prone quad stretch Manual Done  manual done manual done manual done   :30x3    Tests and measures          Therapeutic Activity          Leg press  65# 3x12 65# 3x15 SL 69# 3x12 SL 69# 3x15 SL   55# 3x15   Step ups 6" 3x10 6'' 3x10 6'' 3x12 6'' 3x12   6'' 3x10   Lateral step ups 4" 3x10 4'' 3x10 4'' 3x12 4'' 3x15   4'' 3x10   Forward step overs 2" 3x10 2'' 3x10 2'' 3x12 2'' 3x15   4'' 3x10   Sit to stand 3x12 foam 3x15 foam 3x10 no foam 3x15 no foam   3x12 foam   Lateral walking    2 laps       Gait training w SPC          Modalities          NMES          CP PRN

## 2021-10-01 ENCOUNTER — OFFICE VISIT (OUTPATIENT)
Dept: PHYSICAL THERAPY | Facility: REHABILITATION | Age: 61
End: 2021-10-01
Payer: COMMERCIAL

## 2021-10-01 DIAGNOSIS — Z96.651 STATUS POST TOTAL KNEE REPLACEMENT, RIGHT: Primary | ICD-10-CM

## 2021-10-01 DIAGNOSIS — M25.561 CHRONIC PAIN OF RIGHT KNEE: ICD-10-CM

## 2021-10-01 DIAGNOSIS — G89.29 CHRONIC PAIN OF RIGHT KNEE: ICD-10-CM

## 2021-10-01 PROCEDURE — 97530 THERAPEUTIC ACTIVITIES: CPT | Performed by: PHYSICAL THERAPIST

## 2021-10-01 PROCEDURE — 97112 NEUROMUSCULAR REEDUCATION: CPT | Performed by: PHYSICAL THERAPIST

## 2021-10-01 PROCEDURE — 97110 THERAPEUTIC EXERCISES: CPT | Performed by: PHYSICAL THERAPIST

## 2021-10-01 PROCEDURE — 97140 MANUAL THERAPY 1/> REGIONS: CPT | Performed by: PHYSICAL THERAPIST

## 2021-10-06 ENCOUNTER — APPOINTMENT (OUTPATIENT)
Dept: RADIOLOGY | Facility: AMBULARY SURGERY CENTER | Age: 61
End: 2021-10-06
Attending: ORTHOPAEDIC SURGERY
Payer: COMMERCIAL

## 2021-10-06 ENCOUNTER — OFFICE VISIT (OUTPATIENT)
Dept: OBGYN CLINIC | Facility: CLINIC | Age: 61
End: 2021-10-06
Payer: COMMERCIAL

## 2021-10-06 ENCOUNTER — OFFICE VISIT (OUTPATIENT)
Dept: PHYSICAL THERAPY | Facility: REHABILITATION | Age: 61
End: 2021-10-06
Payer: COMMERCIAL

## 2021-10-06 VITALS — HEIGHT: 62 IN | BODY MASS INDEX: 32.2 KG/M2 | WEIGHT: 175 LBS

## 2021-10-06 DIAGNOSIS — Z96.651 STATUS POST TOTAL KNEE REPLACEMENT, RIGHT: Primary | ICD-10-CM

## 2021-10-06 DIAGNOSIS — Z96.651 STATUS POST TOTAL KNEE REPLACEMENT, RIGHT: ICD-10-CM

## 2021-10-06 DIAGNOSIS — M25.561 CHRONIC PAIN OF RIGHT KNEE: ICD-10-CM

## 2021-10-06 DIAGNOSIS — G89.29 CHRONIC PAIN OF RIGHT KNEE: ICD-10-CM

## 2021-10-06 PROCEDURE — 97530 THERAPEUTIC ACTIVITIES: CPT

## 2021-10-06 PROCEDURE — 97140 MANUAL THERAPY 1/> REGIONS: CPT

## 2021-10-06 PROCEDURE — 99213 OFFICE O/P EST LOW 20 MIN: CPT | Performed by: ORTHOPAEDIC SURGERY

## 2021-10-06 PROCEDURE — 97110 THERAPEUTIC EXERCISES: CPT

## 2021-10-06 PROCEDURE — 73562 X-RAY EXAM OF KNEE 3: CPT

## 2021-10-06 PROCEDURE — 97112 NEUROMUSCULAR REEDUCATION: CPT

## 2021-10-08 ENCOUNTER — EVALUATION (OUTPATIENT)
Dept: PHYSICAL THERAPY | Facility: REHABILITATION | Age: 61
End: 2021-10-08
Payer: COMMERCIAL

## 2021-10-08 DIAGNOSIS — Z96.651 STATUS POST TOTAL KNEE REPLACEMENT, RIGHT: Primary | ICD-10-CM

## 2021-10-08 DIAGNOSIS — G89.29 CHRONIC PAIN OF RIGHT KNEE: ICD-10-CM

## 2021-10-08 DIAGNOSIS — M25.561 CHRONIC PAIN OF RIGHT KNEE: ICD-10-CM

## 2021-10-08 PROCEDURE — 97110 THERAPEUTIC EXERCISES: CPT | Performed by: PHYSICAL THERAPIST

## 2021-10-08 PROCEDURE — 97530 THERAPEUTIC ACTIVITIES: CPT | Performed by: PHYSICAL THERAPIST

## 2021-10-08 PROCEDURE — 97112 NEUROMUSCULAR REEDUCATION: CPT | Performed by: PHYSICAL THERAPIST

## 2021-10-12 ENCOUNTER — OFFICE VISIT (OUTPATIENT)
Dept: PHYSICAL THERAPY | Facility: REHABILITATION | Age: 61
End: 2021-10-12
Payer: COMMERCIAL

## 2021-10-12 DIAGNOSIS — G89.29 CHRONIC PAIN OF RIGHT KNEE: ICD-10-CM

## 2021-10-12 DIAGNOSIS — M25.561 CHRONIC PAIN OF RIGHT KNEE: ICD-10-CM

## 2021-10-12 DIAGNOSIS — Z96.651 STATUS POST TOTAL KNEE REPLACEMENT, RIGHT: Primary | ICD-10-CM

## 2021-10-12 PROCEDURE — 97530 THERAPEUTIC ACTIVITIES: CPT | Performed by: PHYSICAL THERAPIST

## 2021-10-12 PROCEDURE — 97110 THERAPEUTIC EXERCISES: CPT | Performed by: PHYSICAL THERAPIST

## 2021-11-22 ENCOUNTER — OFFICE VISIT (OUTPATIENT)
Dept: FAMILY MEDICINE CLINIC | Facility: CLINIC | Age: 61
End: 2021-11-22
Payer: COMMERCIAL

## 2021-11-22 VITALS
DIASTOLIC BLOOD PRESSURE: 80 MMHG | TEMPERATURE: 97.1 F | BODY MASS INDEX: 32.01 KG/M2 | HEART RATE: 71 BPM | SYSTOLIC BLOOD PRESSURE: 120 MMHG | WEIGHT: 175 LBS | OXYGEN SATURATION: 99 %

## 2021-11-22 DIAGNOSIS — E11.9 TYPE 2 DIABETES MELLITUS WITHOUT COMPLICATION, WITHOUT LONG-TERM CURRENT USE OF INSULIN (HCC): ICD-10-CM

## 2021-11-22 DIAGNOSIS — Z96.651 STATUS POST TOTAL KNEE REPLACEMENT, RIGHT: Primary | ICD-10-CM

## 2021-11-22 DIAGNOSIS — M17.10 ARTHRITIS OF KNEE: ICD-10-CM

## 2021-11-22 DIAGNOSIS — E78.5 HYPERLIPIDEMIA, UNSPECIFIED HYPERLIPIDEMIA TYPE: ICD-10-CM

## 2021-11-22 DIAGNOSIS — I10 ESSENTIAL HYPERTENSION: ICD-10-CM

## 2021-11-22 PROCEDURE — 99213 OFFICE O/P EST LOW 20 MIN: CPT | Performed by: FAMILY MEDICINE

## 2021-11-22 PROCEDURE — 3074F SYST BP LT 130 MM HG: CPT | Performed by: FAMILY MEDICINE

## 2021-11-22 PROCEDURE — 3079F DIAST BP 80-89 MM HG: CPT | Performed by: FAMILY MEDICINE

## 2021-11-22 RX ORDER — PRAVASTATIN SODIUM 40 MG
40 TABLET ORAL
Qty: 30 TABLET | Refills: 3 | Status: SHIPPED | OUTPATIENT
Start: 2021-11-22 | End: 2022-03-28 | Stop reason: SDUPTHER

## 2021-11-22 RX ORDER — LANCETS 30 GAUGE
1 EACH MISCELLANEOUS 2 TIMES DAILY
Qty: 100 EACH | Refills: 3 | Status: SHIPPED | OUTPATIENT
Start: 2021-11-22 | End: 2022-01-19 | Stop reason: SDUPTHER

## 2021-11-22 RX ORDER — LANCETS 30 GAUGE
1 EACH MISCELLANEOUS 2 TIMES DAILY
Qty: 100 EACH | Refills: 3 | Status: SHIPPED | OUTPATIENT
Start: 2021-11-22 | End: 2021-11-22

## 2021-11-22 RX ORDER — ACETAMINOPHEN 500 MG
TABLET ORAL
Qty: 120 TABLET | Refills: 0 | Status: SHIPPED | OUTPATIENT
Start: 2021-11-22

## 2021-11-22 RX ORDER — ATENOLOL AND CHLORTHALIDONE TABLET 50; 25 MG/1; MG/1
1 TABLET ORAL DAILY
Qty: 90 TABLET | Refills: 3 | Status: SHIPPED | OUTPATIENT
Start: 2021-11-22 | End: 2022-03-29 | Stop reason: SDUPTHER

## 2021-11-22 RX ORDER — BLOOD SUGAR DIAGNOSTIC
STRIP MISCELLANEOUS
Qty: 100 EACH | Refills: 0 | Status: SHIPPED | OUTPATIENT
Start: 2021-11-22 | End: 2022-01-19 | Stop reason: SDUPTHER

## 2021-12-17 ENCOUNTER — APPOINTMENT (OUTPATIENT)
Dept: LAB | Facility: CLINIC | Age: 61
End: 2021-12-17
Payer: COMMERCIAL

## 2021-12-17 DIAGNOSIS — E78.5 HYPERLIPIDEMIA, UNSPECIFIED HYPERLIPIDEMIA TYPE: ICD-10-CM

## 2021-12-17 LAB
CHOLEST SERPL-MCNC: 130 MG/DL
HDLC SERPL-MCNC: 38 MG/DL
LDLC SERPL CALC-MCNC: 71 MG/DL (ref 0–100)
TRIGL SERPL-MCNC: 106 MG/DL

## 2021-12-17 PROCEDURE — 80061 LIPID PANEL: CPT

## 2021-12-17 PROCEDURE — 36415 COLL VENOUS BLD VENIPUNCTURE: CPT

## 2021-12-30 ENCOUNTER — OFFICE VISIT (OUTPATIENT)
Dept: OBGYN CLINIC | Facility: CLINIC | Age: 61
End: 2021-12-30
Payer: COMMERCIAL

## 2021-12-30 VITALS — WEIGHT: 175 LBS | HEIGHT: 62 IN | BODY MASS INDEX: 32.2 KG/M2

## 2021-12-30 DIAGNOSIS — M17.11 PRIMARY OSTEOARTHRITIS OF RIGHT KNEE: ICD-10-CM

## 2021-12-30 DIAGNOSIS — M17.0 LOCALIZED OSTEOARTHRITIS OF KNEES, BILATERAL: ICD-10-CM

## 2021-12-30 DIAGNOSIS — Z96.651 STATUS POST TOTAL KNEE REPLACEMENT, RIGHT: Primary | ICD-10-CM

## 2021-12-30 PROCEDURE — 99213 OFFICE O/P EST LOW 20 MIN: CPT | Performed by: ORTHOPAEDIC SURGERY

## 2022-01-06 ENCOUNTER — OFFICE VISIT (OUTPATIENT)
Dept: FAMILY MEDICINE CLINIC | Facility: CLINIC | Age: 62
End: 2022-01-06
Payer: COMMERCIAL

## 2022-01-06 VITALS
WEIGHT: 171.6 LBS | OXYGEN SATURATION: 99 % | TEMPERATURE: 95.4 F | DIASTOLIC BLOOD PRESSURE: 64 MMHG | SYSTOLIC BLOOD PRESSURE: 104 MMHG | HEART RATE: 82 BPM | RESPIRATION RATE: 20 BRPM | BODY MASS INDEX: 31.39 KG/M2

## 2022-01-06 DIAGNOSIS — E11.9 TYPE 2 DIABETES MELLITUS WITHOUT COMPLICATION, WITHOUT LONG-TERM CURRENT USE OF INSULIN (HCC): Primary | ICD-10-CM

## 2022-01-06 LAB — SL AMB POCT HEMOGLOBIN AIC: 8.9 (ref ?–6.5)

## 2022-01-06 PROCEDURE — 3074F SYST BP LT 130 MM HG: CPT | Performed by: FAMILY MEDICINE

## 2022-01-06 PROCEDURE — 83036 HEMOGLOBIN GLYCOSYLATED A1C: CPT | Performed by: FAMILY MEDICINE

## 2022-01-06 PROCEDURE — 3078F DIAST BP <80 MM HG: CPT | Performed by: FAMILY MEDICINE

## 2022-01-06 PROCEDURE — 3052F HG A1C>EQUAL 8.0%<EQUAL 9.0%: CPT | Performed by: FAMILY MEDICINE

## 2022-01-06 PROCEDURE — 99213 OFFICE O/P EST LOW 20 MIN: CPT | Performed by: FAMILY MEDICINE

## 2022-01-12 NOTE — ASSESSMENT & PLAN NOTE
Lab Results   Component Value Date    HGBA1C 8 9 (A) 01/06/2022     POCT hba1c 8 9 today  Increase from previous 7 0  Reports compliance with medications, however she had difficulty with maintaining diabetic diet due to recent holidays  Fasting blood sugars 120-130s at home  States that her glucometer may not be working correctly, as it reports higher numbers then a new one that her daughter purchased  - Increase Metformin to 500mg BID  - Diabetic education referral given  - Recently sent new glucometer, patient has not picked up device yet   Recommend picking up new device  - Check urine microalbumin/creatinine ratio    RTC in 3 months for follow up

## 2022-01-12 NOTE — PROGRESS NOTES
Assessment/Plan:    Type 2 diabetes mellitus without complication, without long-term current use of insulin (Formerly [facility])    Lab Results   Component Value Date    HGBA1C 8 9 (A) 01/06/2022     POCT hba1c 8 9 today  Increase from previous 7 0  Reports compliance with medications, however she had difficulty with maintaining diabetic diet due to recent holidays  Fasting blood sugars 120-130s at home  States that her glucometer may not be working correctly, as it reports higher numbers then a new one that her daughter purchased  - Increase Metformin to 500mg BID  - Diabetic education referral given  - Recently sent new glucometer, patient has not picked up device yet  Recommend picking up new device  - Check urine microalbumin/creatinine ratio    RTC in 3 months for follow up       Diagnoses and all orders for this visit:    Type 2 diabetes mellitus without complication, without long-term current use of insulin ([facility])  -     Microalbumin / creatinine urine ratio  -     POCT hemoglobin A1c  -     Ambulatory Referral to Diabetic Education; Future  -     metFORMIN (GLUCOPHAGE) 500 mg tablet; Take 1 tablet (500 mg total) by mouth 2 (two) times a day with meals          Subjective:      Patient ID: Alex Andrade is a 62 y o  female  She presents today for a follow up on diabetes  States that her sugars at home have been between 120-130  She has had some difficulty with diabetic diet recently due to the holidays  States that she feels well overall  Denies any new complaints today  The following portions of the patient's history were reviewed and updated as appropriate: current medications, past medical history, past surgical history and problem list     Review of Systems   Constitutional: Negative for fatigue and fever  HENT: Negative for congestion, rhinorrhea, sneezing and sore throat  Respiratory: Negative for cough, chest tightness, shortness of breath and wheezing      Cardiovascular: Negative for chest pain and palpitations  Gastrointestinal: Negative for abdominal distention, abdominal pain, constipation, diarrhea, nausea and vomiting  Musculoskeletal: Negative for arthralgias, back pain, joint swelling and myalgias  Skin: Negative for rash  Neurological: Negative for dizziness, weakness, numbness and headaches  Psychiatric/Behavioral: Negative for confusion  Objective:      /64 (BP Location: Right arm, Patient Position: Sitting, Cuff Size: Standard)   Pulse 82   Temp (!) 95 4 °F (35 2 °C)   Resp 20   Wt 77 8 kg (171 lb 9 6 oz)   SpO2 99%   BMI 31 39 kg/m²          Physical Exam  Vitals reviewed  Constitutional:       General: She is not in acute distress  Appearance: Normal appearance  She is well-developed  She is not toxic-appearing or diaphoretic  HENT:      Head: Normocephalic and atraumatic  Eyes:      General: No scleral icterus  Right eye: No discharge  Left eye: No discharge  Conjunctiva/sclera: Conjunctivae normal    Cardiovascular:      Rate and Rhythm: Normal rate and regular rhythm  Heart sounds: Normal heart sounds  No murmur heard  Pulmonary:      Effort: Pulmonary effort is normal  No respiratory distress  Breath sounds: Normal breath sounds  No wheezing  Abdominal:      General: There is no distension  Palpations: Abdomen is soft  Tenderness: There is no abdominal tenderness  Musculoskeletal:         General: No tenderness  Normal range of motion  Skin:     General: Skin is warm and dry  Coloration: Skin is not pale  Findings: No erythema  Neurological:      Mental Status: She is alert     Psychiatric:         Behavior: Behavior normal

## 2022-01-19 ENCOUNTER — TELEPHONE (OUTPATIENT)
Dept: FAMILY MEDICINE CLINIC | Facility: CLINIC | Age: 62
End: 2022-01-19

## 2022-01-19 DIAGNOSIS — E11.9 TYPE 2 DIABETES MELLITUS WITHOUT COMPLICATION, WITHOUT LONG-TERM CURRENT USE OF INSULIN (HCC): ICD-10-CM

## 2022-01-19 RX ORDER — BLOOD SUGAR DIAGNOSTIC
STRIP MISCELLANEOUS
Qty: 100 EACH | Refills: 10 | Status: SHIPPED | OUTPATIENT
Start: 2022-01-19

## 2022-01-19 RX ORDER — LANCETS 30 GAUGE
1 EACH MISCELLANEOUS 2 TIMES DAILY
Qty: 100 EACH | Refills: 10 | Status: SHIPPED | OUTPATIENT
Start: 2022-01-19

## 2022-01-19 NOTE — TELEPHONE ENCOUNTER
Yumi called stating patient has not had a bowel movement since Friday 1/14/2022  She is asking if there is anything that can be prescribed to help her? Please advise   Thank you

## 2022-01-19 NOTE — TELEPHONE ENCOUNTER
Patient's daughter requesting a script for one touch verio flex lancets and test strips  Please advise  Thank you

## 2022-02-12 ENCOUNTER — HOSPITAL ENCOUNTER (OUTPATIENT)
Dept: RADIOLOGY | Age: 62
Discharge: HOME | End: 2022-02-12
Payer: COMMERCIAL

## 2022-02-12 VITALS — HEIGHT: 62 IN | BODY MASS INDEX: 31.28 KG/M2 | WEIGHT: 170 LBS

## 2022-02-12 DIAGNOSIS — Z12.31 ENCOUNTER FOR SCREENING MAMMOGRAM FOR MALIGNANT NEOPLASM OF BREAST: ICD-10-CM

## 2022-02-12 PROCEDURE — 77063 BREAST TOMOSYNTHESIS BI: CPT

## 2022-02-12 PROCEDURE — 77067 SCR MAMMO BI INCL CAD: CPT

## 2022-03-28 DIAGNOSIS — E78.5 HYPERLIPIDEMIA, UNSPECIFIED HYPERLIPIDEMIA TYPE: ICD-10-CM

## 2022-03-28 RX ORDER — PRAVASTATIN SODIUM 40 MG
40 TABLET ORAL
Qty: 30 TABLET | Refills: 3 | Status: SHIPPED | OUTPATIENT
Start: 2022-03-28 | End: 2022-04-07 | Stop reason: SDUPTHER

## 2022-03-29 DIAGNOSIS — I10 ESSENTIAL HYPERTENSION: ICD-10-CM

## 2022-03-29 RX ORDER — ATENOLOL AND CHLORTHALIDONE TABLET 50; 25 MG/1; MG/1
1 TABLET ORAL DAILY
Qty: 90 TABLET | Refills: 3 | Status: SHIPPED | OUTPATIENT
Start: 2022-03-29 | End: 2022-04-07 | Stop reason: SDUPTHER

## 2022-03-30 ENCOUNTER — OFFICE VISIT (OUTPATIENT)
Dept: OBGYN CLINIC | Facility: CLINIC | Age: 62
End: 2022-03-30
Payer: COMMERCIAL

## 2022-03-30 ENCOUNTER — APPOINTMENT (OUTPATIENT)
Dept: RADIOLOGY | Facility: AMBULARY SURGERY CENTER | Age: 62
End: 2022-03-30
Attending: ORTHOPAEDIC SURGERY
Payer: COMMERCIAL

## 2022-03-30 VITALS — HEIGHT: 62 IN | WEIGHT: 170 LBS | BODY MASS INDEX: 31.28 KG/M2

## 2022-03-30 DIAGNOSIS — M25.562 LEFT KNEE PAIN, UNSPECIFIED CHRONICITY: ICD-10-CM

## 2022-03-30 DIAGNOSIS — M17.12 ARTHRITIS OF LEFT KNEE: Primary | ICD-10-CM

## 2022-03-30 DIAGNOSIS — Z96.651 STATUS POST TOTAL KNEE REPLACEMENT, RIGHT: ICD-10-CM

## 2022-03-30 PROCEDURE — 99214 OFFICE O/P EST MOD 30 MIN: CPT | Performed by: ORTHOPAEDIC SURGERY

## 2022-03-30 PROCEDURE — 73562 X-RAY EXAM OF KNEE 3: CPT

## 2022-03-30 RX ORDER — DOCUSATE SODIUM 100 MG/1
100 CAPSULE, LIQUID FILLED ORAL 2 TIMES DAILY
OUTPATIENT
Start: 2022-03-30

## 2022-03-30 RX ORDER — FERROUS SULFATE TAB EC 324 MG (65 MG FE EQUIVALENT) 324 (65 FE) MG
324 TABLET DELAYED RESPONSE ORAL
Qty: 60 TABLET | Refills: 0 | Status: SHIPPED | OUTPATIENT
Start: 2022-03-30

## 2022-03-30 RX ORDER — SENNOSIDES 8.6 MG
1 TABLET ORAL DAILY
OUTPATIENT
Start: 2022-03-30

## 2022-03-30 RX ORDER — DOCUSATE SODIUM 100 MG/1
100 CAPSULE, LIQUID FILLED ORAL 2 TIMES DAILY
Qty: 10 CAPSULE | Refills: 0 | Status: SHIPPED | OUTPATIENT
Start: 2022-03-30

## 2022-03-30 RX ORDER — ONDANSETRON 2 MG/ML
4 INJECTION INTRAMUSCULAR; INTRAVENOUS ONCE
OUTPATIENT
Start: 2022-03-30 | End: 2022-03-30

## 2022-03-30 RX ORDER — ACETAMINOPHEN 325 MG/1
975 TABLET ORAL ONCE
OUTPATIENT
Start: 2022-03-30 | End: 2022-03-30

## 2022-03-30 RX ORDER — CHLORHEXIDINE GLUCONATE 0.12 MG/ML
15 RINSE ORAL ONCE
OUTPATIENT
Start: 2022-03-30 | End: 2022-03-30

## 2022-03-30 RX ORDER — UREA 10 %
400 LOTION (ML) TOPICAL DAILY
Qty: 30 TABLET | Refills: 0 | Status: SHIPPED | OUTPATIENT
Start: 2022-03-30

## 2022-03-30 RX ORDER — ASCORBIC ACID 500 MG
500 TABLET ORAL 2 TIMES DAILY
Qty: 60 TABLET | Refills: 0 | Status: SHIPPED | OUTPATIENT
Start: 2022-03-30

## 2022-03-30 NOTE — PROGRESS NOTES
Assessment:  1  Status post total knee replacement, right  XR knee 3 vw right non injury   2  Left knee pain, unspecified chronicity  XR knee 3 vw left non injury     Patient Active Problem List   Diagnosis    Bilateral knee pain    Localized osteoarthritis of knees, bilateral    Degenerative arthritis of knee, bilateral    Type 2 diabetes mellitus without complication, without long-term current use of insulin (HCC)    Essential hypertension    Hyperlipidemia    Carpal tunnel syndrome on both sides    Right arm pain    Encounter for screening mammogram for breast cancer    Constipation    Palpitations    Arthritis of knee    Tobacco abuse    Lower abdominal pain    Healthcare maintenance    Primary osteoarthritis of right knee    Abnormal ultrasound of uterus    Pre-operative clearance    Acute postoperative anemia due to expected blood loss    Status post total knee replacement, right    Arthrofibrosis of knee joint, right    Anxiety    Dry eyes           Plan  - Physical exam she demonstrates full extension and flexion or right knee 1 year post op  - X-rays taken and reviewed show R knee TKA in good alignment  Left knee osteoarthritis and medial joint line narrowing    - Left knee discussed  Diagnostics reviewed and physical exam performed  Diagnosis, treatment options and associated risks were discussed with the patient including no treatment, nonsurgical treatment and potential for surgical intervention  The patient was given the opportunity to ask questions regarding each  Subjective:     Patient ID:    Chief Complaint:Roxana Ling [facility] 65 y o  female for follow up 1 year post op R TKA  Patient indicates some pain at the lateral joint line with flexion  Over all she is happy with her right TKA and interested in surgical intervention for her L knee  Patient reports right distal hamstring pain, along with some sciatic nerve pain            HPI    Patient comes in today with regards to left knee pain  The patient reports that the pain is in the joint lines and anterior aspect and has been going on for over a year  The pain is rated at 4 at its best and 8 at its worst   The pain is described as ache  It is worsened with activity, stairs, walking, and is made better with rest   The patient has taken aleve for treatment  She also reports mechanical symptoms when walking and going up stairs  The following portions of the patient's history were reviewed and updated as appropriate: allergies, current medications, past family history, past social history, past surgical history and problem list         Social History     Socioeconomic History    Marital status:       Spouse name: Not on file    Number of children: Not on file    Years of education: Not on file    Highest education level: Not on file   Occupational History    Occupation:      Employer: [employer]   Tobacco Use    Smoking status: Former Smoker     Packs/day: 0 50     Years: 45 00     Pack years: 22 50     Types: Cigarettes     Quit date: 2020     Years since quittin 3    Smokeless tobacco: Never Used   Vaping Use    Vaping Use: Never used   Substance and Sexual Activity    Alcohol use: Not Currently     Comment: Socially    Drug use: No    Sexual activity: Not on file   Other Topics Concern    Not on file   Social History Narrative    ** Merged History Encounter **          Social Determinants of Health     Financial Resource Strain: Not on file   Food Insecurity: Not on file   Transportation Needs: Not on file   Physical Activity: Not on file   Stress: Not on file   Social Connections: Not on file   Intimate Partner Violence: Not on file   Housing Stability: Not on file     Past Medical History:   Diagnosis Date    Diabetes mellitus ([facility] [address] )     Hyperlipidemia     Hypertension      Past Surgical History:   Procedure Laterality Date    NO PAST SURGERIES  OK MANIPULATN KNEE JT+ANESTHESIA Right 6/17/2021    Procedure: MANIPULATION JOINT KNEE;  Surgeon: Michael Fields DO;  [facility];  Service: Orthopedics    OK TOTAL KNEE ARTHROPLASTY Right 3/30/2021    Procedure: KNEE TOTAL ARTHROPLASTY;  Surgeon: Michael Fields DO;  [facility];  Service: Orthopedics     No Known Allergies  Current Outpatient Medications on File Prior to Visit   Medication Sig Dispense Refill    acetaminophen (TYLENOL) 500 mg tablet Three times a day with meals and at bedtime 120 tablet 0    atenolol-chlorthalidone (TENORETIC) 50-25 mg per tablet Take 1 tablet by mouth daily 90 tablet 3    glucose blood (OneTouch Verio) test strip Check Blood Sugar 2 times daily 100 each 10    Lancets (OneTouch Delica Plus [id number]) MISC Use 1 each 2 (two) times a day 100 each 10    metFORMIN (GLUCOPHAGE) 500 mg tablet Take 1 tablet (500 mg total) by mouth 2 (two) times a day with meals 90 tablet 3    methocarbamol (ROBAXIN) 750 mg tablet Take 1 tablet (750 mg total) by mouth every 6 (six) hours for 14 days (Patient not taking: Reported on 1/6/2022 ) 56 tablet 0    Multiple Vitamins-Minerals (multivitamin with minerals) tablet Take 1 tablet by mouth daily (Patient not taking: Reported on 11/22/2021 ) 30 tablet 1    pravastatin (PRAVACHOL) 40 mg tablet Take 1 tablet (40 mg total) by mouth daily in the early morning 30 tablet 3     No current facility-administered medications on file prior to visit  Objective:    Review of Systems   Constitutional: Negative for chills and fever  HENT: Negative for ear pain and sore throat  Eyes: Negative for pain and visual disturbance  Respiratory: Negative for cough and shortness of breath  Cardiovascular: Negative for chest pain and palpitations  Gastrointestinal: Negative for abdominal pain and vomiting  Genitourinary: Negative for dysuria and hematuria  Musculoskeletal: Negative for arthralgias and back pain     Skin: Negative for color change and rash  Neurological: Negative for seizures and syncope  All other systems reviewed and are negative  Right Knee Exam     Range of Motion   Extension: 0   Flexion: 130     Tests   Varus: negative Valgus: negative    Other   Erythema: absent  Sensation: normal  Swelling: none  Effusion: no effusion present    Comments:  Scar noted        Left Knee Exam     Tenderness   The patient is experiencing tenderness in the lateral joint line, patella and medial joint line  Range of Motion   Extension:  0 abnormal   Flexion:  130 abnormal     Other   Erythema: absent  Sensation: normal  Swelling: none    Comments:  Slight varus deformity   5/5 strength               Physical Exam  Eyes:      Pupils: Pupils are equal, round, and reactive to light  Cardiovascular:      Pulses: Normal pulses  Musculoskeletal:      Right knee: No effusion  Neurological:      General: No focal deficit present  Mental Status: She is alert  Psychiatric:         Behavior: Behavior normal          Procedures  No Procedures performed today    I have personally reviewed pertinent films in PACS  and I have personally reviewed pertinent films in PACS and my interpretation is Osteoarthritis most severe in the medial joint space  Right total knee looks stable         Portions of the record may have been created with voice recognition software   Occasional wrong word or "sound a like" substitutions may have occurred due to the inherent limitations of voice recognition software   Read the chart carefully and recognize, using context, where substitutions have occurred

## 2022-04-07 ENCOUNTER — OFFICE VISIT (OUTPATIENT)
Dept: FAMILY MEDICINE CLINIC | Facility: CLINIC | Age: 62
End: 2022-04-07
Payer: COMMERCIAL

## 2022-04-07 VITALS
TEMPERATURE: 98 F | SYSTOLIC BLOOD PRESSURE: 128 MMHG | RESPIRATION RATE: 22 BRPM | BODY MASS INDEX: 31.31 KG/M2 | OXYGEN SATURATION: 98 % | DIASTOLIC BLOOD PRESSURE: 80 MMHG | WEIGHT: 170.13 LBS | HEIGHT: 62 IN | HEART RATE: 86 BPM

## 2022-04-07 DIAGNOSIS — I10 ESSENTIAL HYPERTENSION: ICD-10-CM

## 2022-04-07 DIAGNOSIS — E11.9 TYPE 2 DIABETES MELLITUS WITHOUT COMPLICATION, WITHOUT LONG-TERM CURRENT USE OF INSULIN (HCC): Primary | ICD-10-CM

## 2022-04-07 DIAGNOSIS — E78.5 HYPERLIPIDEMIA, UNSPECIFIED HYPERLIPIDEMIA TYPE: ICD-10-CM

## 2022-04-07 LAB — SL AMB POCT HEMOGLOBIN AIC: 8.6 (ref ?–6.5)

## 2022-04-07 PROCEDURE — 3052F HG A1C>EQUAL 8.0%<EQUAL 9.0%: CPT | Performed by: FAMILY MEDICINE

## 2022-04-07 PROCEDURE — 83036 HEMOGLOBIN GLYCOSYLATED A1C: CPT | Performed by: FAMILY MEDICINE

## 2022-04-07 PROCEDURE — 3074F SYST BP LT 130 MM HG: CPT | Performed by: FAMILY MEDICINE

## 2022-04-07 PROCEDURE — 3079F DIAST BP 80-89 MM HG: CPT | Performed by: FAMILY MEDICINE

## 2022-04-07 PROCEDURE — 99213 OFFICE O/P EST LOW 20 MIN: CPT | Performed by: FAMILY MEDICINE

## 2022-04-07 RX ORDER — PRAVASTATIN SODIUM 40 MG
40 TABLET ORAL
Qty: 45 TABLET | Refills: 3 | Status: SHIPPED | OUTPATIENT
Start: 2022-04-07 | End: 2022-05-07

## 2022-04-07 RX ORDER — ATENOLOL AND CHLORTHALIDONE TABLET 50; 25 MG/1; MG/1
1 TABLET ORAL DAILY
Qty: 90 TABLET | Refills: 3 | Status: SHIPPED | OUTPATIENT
Start: 2022-04-07

## 2022-04-07 NOTE — PROGRESS NOTES
Assessment/Plan:    Type 2 diabetes mellitus without complication, without long-term current use of insulin ([facility])    Lab Results   Component Value Date    HGBA1C 8 6 (A) 04/07/2022     a1c 8 6 POCT  Patient only taking metformin 500mg daily instead of prescribed BID  Reports eating a healthy diet  Denies any complaints today  - Discussed BID dosing for metformin  - Order urine microalbumin creatinine   - Diabetic foot exam completed  - Patients last diabetic eye exam 1 year ago, will schedule follow up   - Message sent to orthopedics (Dr Sophie Ham) in regards to patients elevated hba1c, due for another hba1c prior to surgery as per orthopedics orders    Essential hypertension  Stable on tenoretic (128/80)    - Continue current regimen  - Refill tenoretic 50-25       Diagnoses and all orders for this visit:    Type 2 diabetes mellitus without complication, without long-term current use of insulin ([facility] [address] )  -     Cancel: Microalbumin / creatinine urine ratio  -     POCT hemoglobin A1c  -     metFORMIN (GLUCOPHAGE) 500 mg tablet; Take 1 tablet (500 mg total) by mouth 2 (two) times a day with meals  -     Microalbumin / creatinine urine ratio    Essential hypertension  -     atenolol-chlorthalidone (TENORETIC) 50-25 mg per tablet; Take 1 tablet by mouth daily    Hyperlipidemia, unspecified hyperlipidemia type  -     pravastatin (PRAVACHOL) 40 mg tablet; Take 1 tablet (40 mg total) by mouth daily in the early morning          Subjective:      Patient ID: Olena Lennox is a 65 y o  female  She presents today for a follow up on DM  States that she has been following a low carb diet and has increased her intake of healthy foods  Has reduced her sugar intake as well  She admits to taking metformin 500mg only once per day, instead of the prescribed twice per day  She has a left knee replacement scheduled for 5/31/2022  Denies any new complaints today        The following portions of the patient's history were reviewed and updated as appropriate: allergies, current medications, past family history, past medical history, past social history, past surgical history and problem list     Review of Systems   Constitutional: Negative for activity change, appetite change, chills, fatigue and fever  HENT: Negative for congestion, rhinorrhea, sneezing and sore throat  Eyes: Negative for itching  Respiratory: Negative for cough, chest tightness, shortness of breath and wheezing  Cardiovascular: Negative for chest pain and palpitations  Gastrointestinal: Negative for abdominal distention, abdominal pain, constipation, diarrhea, nausea and vomiting  Genitourinary: Negative for dysuria  Musculoskeletal: Positive for arthralgias  Negative for back pain, joint swelling and myalgias  Skin: Negative for rash  Neurological: Negative for dizziness, weakness, numbness and headaches  Objective:      /80 (BP Location: Right arm, Patient Position: Sitting, Cuff Size: Standard)   Pulse 86   Temp 98 °F (36 7 °C) (Temporal)   Resp 22   Ht 5' 2" (1 575 m)   Wt 77 2 kg (170 lb 2 oz)   SpO2 98%   BMI 31 12 kg/m²          Physical Exam  Vitals reviewed  Constitutional:       General: She is not in acute distress  Appearance: Normal appearance  She is well-developed  She is not toxic-appearing or diaphoretic  HENT:      Head: Normocephalic and atraumatic  Nose: Nose normal       Mouth/Throat:      Pharynx: No oropharyngeal exudate  Eyes:      General: No scleral icterus  Right eye: No discharge  Left eye: No discharge  Conjunctiva/sclera: Conjunctivae normal    Cardiovascular:      Rate and Rhythm: Normal rate and regular rhythm  Pulses: Normal pulses  Heart sounds: Normal heart sounds  No murmur heard  Pulmonary:      Effort: Pulmonary effort is normal  No respiratory distress  Breath sounds: Normal breath sounds  No wheezing     Abdominal:      General: There is no distension  Palpations: Abdomen is soft  Tenderness: There is no abdominal tenderness  Musculoskeletal:         General: Tenderness (Left Knee) present  Normal range of motion  Skin:     General: Skin is warm and dry  Coloration: Skin is not pale  Findings: No erythema  Neurological:      Mental Status: She is alert     Psychiatric:         Behavior: Behavior normal

## 2022-04-08 NOTE — ASSESSMENT & PLAN NOTE
Lab Results   Component Value Date    HGBA1C 8 6 (A) 04/07/2022     a1c 8 6 POCT  Patient only taking metformin 500mg daily instead of prescribed BID  Reports eating a healthy diet  Denies any complaints today      - Discussed BID dosing for metformin  - Order urine microalbumin creatinine   - Diabetic foot exam completed  - Patients last diabetic eye exam 1 year ago, will schedule follow up   - Message sent to orthopedics (Dr Radha Lucia) in regards to patients elevated hba1c, due for another hba1c prior to surgery as per orthopedics orders

## 2022-08-15 ENCOUNTER — TELEPHONE (OUTPATIENT)
Dept: OBGYN CLINIC | Facility: HOSPITAL | Age: 62
End: 2022-08-15

## 2022-08-15 NOTE — TELEPHONE ENCOUNTER
Below assessment from 2021 TKA- have to confirm and update with patient  Preoperative Elective Admission Assessment- Assigned to care team  Spoke with pt and daughter, pt is Italian speaking but Chuy Ramirez was kind enough to translate and they decline  services       Living Situation: Pt lives with daughter, Monalisa Montenegro, in multiple level home  Chuy Ramirez, other daughter, is a few blocks away  Multiple level home with Bedrooms on 2nd floor        Steps: to enter, #4 steps  To bedroom level, #14 steps to 2nd level      First Floor Setup: Yes for 1st few weeks  They bought pt lift recliner chair that 1st floor and BSC to accommodate 1st floor setup      DME: Pt has crutches, 2 wheeled RW, BSC  No canes      Patient's Current Level of Function: independent with ambulation and ADLs     Post-op Caregiver:  Daughter, Monalisa Montenegro, will be care person in evening and at night time  Laurie plans to be present in pt's home during daytime  Daughters are caregivers and transport      Post-op Transport:   Daughters      Outpatient Physical Therapy Site: [facility]     Medication Management: self                     Preferred Pharmacy for Post-op Medications: [facility], for surgery meds  After DC, send to [facility]                      Blood Management Vitamin Regimen: pt confirms                      Post-op anticoagulant: lovenox syringes at home ready for post-op use only      Discharge Plan: Pt plans for DC to home and plans to attend OP PT     Barriers to DC identified preoperatively: None     BMI: 30 18     [employer]: text [phone] any time  Ray@[employer]  pt enrolled      Patient Education:  Pt educated on post-op pain, early mobilization (POD0), indication for/use of incentive spirometer (10x/hour while awake) and indication for/use of foot/leg pumps (18 hours/day)    educated that our goal, if at all possible, is to appropriately discharge patient based off their post-op function while striving to maintain maximal independence   If possible, the goal is to discharge patient to home and for them to attend outpatient physical therapy

## 2022-08-16 ENCOUNTER — APPOINTMENT (OUTPATIENT)
Dept: PREADMISSION TESTING | Facility: HOSPITAL | Age: 62
End: 2022-08-16

## 2022-08-24 ENCOUNTER — OFFICE VISIT (OUTPATIENT)
Dept: FAMILY MEDICINE CLINIC | Facility: CLINIC | Age: 62
End: 2022-08-24
Payer: COMMERCIAL

## 2022-08-24 VITALS
SYSTOLIC BLOOD PRESSURE: 124 MMHG | DIASTOLIC BLOOD PRESSURE: 82 MMHG | BODY MASS INDEX: 30.75 KG/M2 | HEART RATE: 77 BPM | TEMPERATURE: 98.7 F | OXYGEN SATURATION: 97 % | WEIGHT: 168.13 LBS

## 2022-08-24 DIAGNOSIS — Z01.818 PRE-OPERATIVE CLEARANCE: ICD-10-CM

## 2022-08-24 DIAGNOSIS — E11.9 TYPE 2 DIABETES MELLITUS WITHOUT COMPLICATION, WITHOUT LONG-TERM CURRENT USE OF INSULIN (HCC): Primary | ICD-10-CM

## 2022-08-24 DIAGNOSIS — I10 ESSENTIAL HYPERTENSION: ICD-10-CM

## 2022-08-24 LAB — SL AMB POCT HEMOGLOBIN AIC: 7.7 (ref ?–6.5)

## 2022-08-24 PROCEDURE — 83036 HEMOGLOBIN GLYCOSYLATED A1C: CPT

## 2022-08-24 PROCEDURE — 99214 OFFICE O/P EST MOD 30 MIN: CPT

## 2022-08-24 PROCEDURE — 3074F SYST BP LT 130 MM HG: CPT

## 2022-08-24 PROCEDURE — 3079F DIAST BP 80-89 MM HG: CPT

## 2022-08-24 PROCEDURE — 3051F HG A1C>EQUAL 7.0%<8.0%: CPT

## 2022-08-24 NOTE — ASSESSMENT & PLAN NOTE
Previous A1C 8 6 in 4/2022  Upped Metformin to 500mg BID (from 500mg QD) at that time    Lab Results   Component Value Date    HGBA1C 7 7 (A) 08/24/2022   -continue current diabetes management

## 2022-08-24 NOTE — PROGRESS NOTES
Preoperative visit    Assessment/Plan:     Pre-operative clearance  Diabetes is controlled on lifestyle and new regimen Metformin 500mg BID  Blood pressure is well controlled  Chronic medical conditions are stable  -A1C today 7 7  -Printed previously-ordered labwork to remind pt to have it done    Type 2 diabetes mellitus without complication, without long-term current use of insulin ([address] )  Previous A1C 8 6 in 4/2022  Upped Metformin to 500mg BID (from 500mg QD) at that time  Lab Results   Component Value Date    HGBA1C 7 7 (A) 08/24/2022   -continue current diabetes management    Essential hypertension  124/82 today  -continue tenoretic 50-25mg tablet QD      Subjective:      Patient ID: Melia Rosa is a 65 y o  female  HPI    Presents for a preop visit for Left TKA s p  Right TKA 6/17/2021  Reports she is feeling well without acute complaint  The right knee has healed well and pt can ambulate most of the day without use of a cane  She denies pain in her left knee, but reports some instability on the left originating from a feeling of weakness in her left hip  Pt says this is similar to how her right knee felt prior to the right TKA and that problem has now resolved  Pt is smoking 2 cigarettes a day  Quit completely before R TKA  Advised quitting completely before L TKA  Last HBA1C in 4/2022 was 8 6 at which point pt's dose of metformin was changed 500mg QD to 500mg BID  She has been taking the 500mg BID, and will have new A1C taken today  Sugars run 130-160 at home  She cooks for herself and enjoys using veggies and legumes in cooking; she enjoys light daily physical activity  Medication list reviewed and pt is taking as directed  Denies any abnormal bleeding or nonhealing cuts  Pt reports some lower thoracic back pain associated with poor posture  It was relieved with muscle relaxers taken post-Right TKA  Advised stretching, movement, and tylenol use        Tobacco and Toxic Substance Assessment and Intervention:     Tobacco use screening performed    Tobacco cessation counseling provided         The following portions of the patient's history were reviewed and updated as appropriate: allergies, current medications, past family history, past medical history, past social history, past surgical history and problem list     Review of Systems   Constitutional: Negative for chills and fever  HENT: Negative for ear pain and sore throat  Eyes: Negative for pain and visual disturbance  Respiratory: Negative for cough and shortness of breath  Cardiovascular: Negative for chest pain and palpitations  Gastrointestinal: Negative for abdominal pain and vomiting  Genitourinary: Negative for dysuria and hematuria  Musculoskeletal: Negative for arthralgias and back pain  Skin: Negative for color change and rash  Neurological: Negative for seizures and syncope  Hematological: Does not bruise/bleed easily  All other systems reviewed and are negative  Objective:      /82 (BP Location: Right arm, Patient Position: Sitting, Cuff Size: Standard)   Pulse 77   Temp 98 7 °F (37 1 °C) (Temporal)   Wt 76 3 kg (168 lb 2 oz)   SpO2 97%   BMI 30 75 kg/m²          Physical Exam  Constitutional:       General: She is not in acute distress  Appearance: She is not ill-appearing  HENT:      Head: Normocephalic and atraumatic  Mouth/Throat:      Mouth: Mucous membranes are moist       Pharynx: Oropharynx is clear  Eyes:      Conjunctiva/sclera: Conjunctivae normal    Cardiovascular:      Rate and Rhythm: Normal rate and regular rhythm  Pulses: Normal pulses  Heart sounds: Normal heart sounds  Pulmonary:      Effort: Pulmonary effort is normal  No respiratory distress  Breath sounds: Normal breath sounds  Abdominal:      General: Bowel sounds are normal       Palpations: Abdomen is soft     Musculoskeletal:         General: No swelling, deformity or signs of injury  Normal range of motion  Cervical back: Normal range of motion and neck supple  Skin:     General: Skin is warm and dry  Neurological:      Mental Status: She is alert and oriented to person, place, and time  Sensory: No sensory deficit  Motor: Weakness (4/5 strength L hip extension, otherwise 5/5 extremities) present     Psychiatric:         Mood and Affect: Mood normal            Zach Mota MD  08/24/22

## 2022-08-24 NOTE — ASSESSMENT & PLAN NOTE
Diabetes is controlled on lifestyle and new regimen Metformin 500mg BID  Blood pressure is well controlled  Chronic medical conditions are stable    -A1C today 7 7  -Printed previously-ordered labwork to remind pt to have it done

## 2022-08-31 RX ORDER — ASPIRIN 81 MG/1
81 TABLET ORAL DAILY
COMMUNITY

## 2022-08-31 NOTE — PRE-PROCEDURE INSTRUCTIONS
Pre-Surgery Instructions:   Medication Instructions   • ascorbic acid (VITAMIN C) 500 mg tablet Hold day of surgery     • aspirin (ECOTRIN LOW STRENGTH) 81 mg EC tablet ot has been instructed to hold 7 days before surgery   • atenolol-chlorthalidone (TENORETIC) 50-25 mg per tablet Hold day of surgery     • docusate sodium (COLACE) 100 mg capsule Hold day of surgery     • ferrous sulfate 324 (65 Fe) mg Hold day of surgery     • folic acid (FOLVITE) 426 MCG tablet Hold day of surgery     • metFORMIN (GLUCOPHAGE) 500 mg tablet Hold day of surgery     • Multiple Vitamins-Minerals (multivitamin with minerals) tablet Hold day of surgery     • pravastatin (PRAVACHOL) 40 mg tablet Take this medication day of surgery if normally taken in the morning  My Surgical Experience    The following information was developed to assist you to prepare for your operation  What do I need to do before coming to the hospital?  • Arrange for a responsible person to drive you to and from the hospital   • Arrange care for your children at home  Children are not allowed in the recovery areas of the hospital  • Plan to wear clothing that is easy to put on and take off  If you are having shoulder surgery, wear a shirt that buttons or zippers in the front  Bathing  o Shower the evening before and the morning of your surgery with an antibacterial soap  Please refer to the Pre Op Showering Instructions for Surgery Patients Sheet   o Remove nail polish and all body piercing jewelry  o Do not shave any body part for at least 24 hours before surgery-this includes face, arms, legs and upper body  Food  o Nothing to eat or drink after midnight the night before your surgery   This includes candy and chewing gum  o Exception: If your surgery is after 12:00pm (noon), you may have clear liquids such as 7-Up®, ginger ale, apple or cranberry juice, Jell-O®, water, or clear broth until 8:00 am  o Do not drink milk or juice with pulp on the morning before surgery  o Do not drink alcohol 24 hours before surgery  Medicine  o Follow instructions you received from your surgeon about which medicines you may take on the day of surgery  o If instructed to take medicine on the morning of surgery, take pills with just a small sip of water  Call your prescribing doctor for specific infroamtion on what to do if you take insulin    What should I bring to the hospital?    Bring:  • Crutches or a walker, if you have them, for foot or knee surgery  • A list of the daily medicines, vitamins, minerals, herbals and nutritional supplements you take  Include the dosages of medicines and the time you take them each day  • Glasses, dentures or hearing aids  • Minimal clothing; you will be wearing hospital sleepwear  • Photo ID; required to verify your identity  • If you have a Living Will or Power of , bring a copy of the documents  • If you have an ostomy, bring an extra pouch and any supplies you use    Do not bring  • Medicines or inhalers  • Money, valuables or jewelry    What other information should I know about the day of surgery? • Notify your surgeons if you develop a cold, sore throat, cough, fever, rash or any other illness  • Report to the Ambulatory Surgical/Same Day Surgery Unit  • You will be instructed to stop at Registration only if you have not been pre-registered  • Inform your  fi they do not stay that they will be asked by the staff to leave a phone number where they can be reached  • Be available to be reached before surgery  In the event the operating room schedule changes, you may be asked to come in earlier or later than expected    *It is important to tell your doctor and others involved in your health care if you are taking or have been taking any non-prescription drugs, vitamins, minerals, herbals or other nutritional supplements   Any of these may interact with some food or medicines and cause a reaction

## 2022-08-31 NOTE — TELEPHONE ENCOUNTER
Spoke to patients daughter, educated on our [facility] protocol goals and patients HA1C of 7 7    They are frustrated, as PCP just cleared her for surgery  They are aware our surgery coordinator will reach out with a new surgery date

## 2022-10-11 DIAGNOSIS — E78.5 HYPERLIPIDEMIA, UNSPECIFIED HYPERLIPIDEMIA TYPE: ICD-10-CM

## 2022-10-11 RX ORDER — PRAVASTATIN SODIUM 40 MG
TABLET ORAL
Qty: 45 TABLET | Refills: 3 | Status: SHIPPED | OUTPATIENT
Start: 2022-10-11

## 2022-10-12 PROBLEM — Z00.00 HEALTHCARE MAINTENANCE: Status: RESOLVED | Noted: 2021-01-26 | Resolved: 2022-10-12

## 2022-10-31 NOTE — PROGRESS NOTES
Family Medicine Follow-Up Office Visit  Walt Krause 62 y o  female   MRN: [id number] : 1960  ENCOUNTER: 2021 1:43 PM    Assessment and Plan   Abnormal ultrasound of uterus  Patient has been complaining of vague abdominal pain  Ultrasound revealed Retroverted uterus with a mildly thickened but markedly heterogeneous endometrium measuring 6 mm  Small fibroid, and 2 small right sided ovarian cysts  - Recommend follow up with OBGYN for workup (possible endometrial biopsy)    Primary osteoarthritis of right knee  Patient has been following orthopedics  She is scheduled for right knee replacement 3/30/21  Requires surgical clearance    - Will complete surgical clearance within 30 days of surgery    Tobacco abuse  Long history of cigarette use  Patient has successfully abstained from smoking, recent negative cotinine testing     - Encourage continued nicotine abstinence   - Commended patient on achievement  - Continue to follow up    RTC in 2 weeks        Tobacco and Toxic Substance Assessment and Intervention:     Tobacco use screening performed    Brief intervention performed for tobacco, alcohol, or drug use    Tobacco cessation counseling provided        Chief Complaint     Chief Complaint   Patient presents with    Pre-op Clearance       History of Present Illness   Walt Krause is a 62y o -year-old female who presents today to discuss results on recent ultrasounds ordered for abdominal pain  Today she continues to complain of right knee pain, however she is scheduled for knee replacement surgery  She has also successfully quit tobacco, which was one of the requirements prior to surgery  No new complaints    Review of Systems   Review of Systems   Constitutional: Negative for activity change, appetite change, chills, fatigue and fever  HENT: Negative for congestion, rhinorrhea, sneezing and sore throat  Eyes: Negative for pain, discharge, redness and itching     Respiratory: Negative for cough, chest tightness, shortness of breath and wheezing  Cardiovascular: Negative for chest pain and palpitations  Gastrointestinal: Positive for abdominal pain (left sided)  Negative for abdominal distention, constipation, diarrhea, nausea and vomiting  Genitourinary: Positive for flank pain (left sided)  Musculoskeletal: Positive for arthralgias (knees) and myalgias  Negative for back pain and joint swelling  Skin: Negative for rash  Neurological: Negative for dizziness, weakness, numbness and headaches  Hematological: Negative for adenopathy  Active Problem List     Patient Active Problem List   Diagnosis    Bilateral knee pain    Localized osteoarthritis of knees, bilateral    Degenerative arthritis of knee, bilateral    Type 2 diabetes mellitus without complication, without long-term current use of insulin (HCC)    Essential hypertension    Hyperlipidemia    Carpal tunnel syndrome on both sides    Right arm pain    Encounter for screening mammogram for breast cancer    Constipation    Palpitations    Arthritis of knee    Tobacco abuse    Lower abdominal pain    Healthcare maintenance    Primary osteoarthritis of right knee    Abnormal ultrasound of uterus       Past Medical History, Past Surgical History, Family History, and Social History were reviewed and updated today as appropriate  Objective   /76 (BP Location: Right arm, Patient Position: Sitting, Cuff Size: Adult)   Pulse 66   Temp (!) 96 °F (35 6 °C) (Tympanic)   Resp 18   Ht 5' 2" (1 575 m)   Wt 75 4 kg (166 lb 2 oz)   SpO2 98%   BMI 30 38 kg/m²     Physical Exam  Constitutional:       General: She is not in acute distress  Appearance: She is well-developed  She is not diaphoretic  HENT:      Head: Normocephalic and atraumatic  Nose: Nose normal       Mouth/Throat:      Pharynx: No oropharyngeal exudate  Eyes:      General: No scleral icterus          Right eye: No discharge  Left eye: No discharge  Conjunctiva/sclera: Conjunctivae normal    Cardiovascular:      Rate and Rhythm: Normal rate and regular rhythm  Heart sounds: Normal heart sounds  No murmur  Pulmonary:      Effort: Pulmonary effort is normal  No respiratory distress  Breath sounds: Normal breath sounds  No wheezing  Abdominal:      General: There is no distension  Palpations: Abdomen is soft  Tenderness: There is no abdominal tenderness  Musculoskeletal: Normal range of motion  General: Tenderness (right knee) present  Lymphadenopathy:      Cervical: No cervical adenopathy  Skin:     General: Skin is warm and dry  Coloration: Skin is not pale  Findings: No erythema  Neurological:      Mental Status: She is alert     Psychiatric:         Behavior: Behavior normal            Pertinent Laboratory/Diagnostic Studies:  Lab Results   Component Value Date    BUN 18 02/20/2021    CREATININE 0 84 02/20/2021    CALCIUM 9 6 02/20/2021    K 3 9 02/20/2021    CO2 30 02/20/2021     02/20/2021     Lab Results   Component Value Date    ALT 33 02/20/2021    AST 25 02/20/2021    ALKPHOS 55 02/20/2021       Lab Results   Component Value Date    WBC 4 03 (L) 02/20/2021    HGB 14 7 02/20/2021    HCT 46 1 02/20/2021    MCV 95 02/20/2021     (L) 02/20/2021       No results found for: TSH    No results found for: CHOL  Lab Results   Component Value Date    TRIG 147 02/04/2020     Lab Results   Component Value Date    HDL 36 (L) 02/04/2020     Lab Results   Component Value Date    LDLCALC 71 02/04/2020     Lab Results   Component Value Date    HGBA1C 7 0 (A) 01/26/2021       Results for orders placed or performed in visit on 02/20/21   Comprehensive metabolic panel   Result Value Ref Range    Sodium 142 136 - 145 mmol/L    Potassium 3 9 3 5 - 5 3 mmol/L    Chloride 104 100 - 108 mmol/L    CO2 30 21 - 32 mmol/L    ANION GAP 8 4 - 13 mmol/L    BUN 18 5 - 25 mg/dL Creatinine 0 84 0 60 - 1 30 mg/dL    Glucose, Fasting 156 (H) 65 - 99 mg/dL    Calcium 9 6 8 3 - 10 1 mg/dL    AST 25 5 - 45 U/L    ALT 33 12 - 78 U/L    Alkaline Phosphatase 55 46 - 116 U/L    Total Protein 7 5 6 4 - 8 2 g/dL    Albumin 4 2 3 5 - 5 0 g/dL    Total Bilirubin 0 49 0 20 - 1 00 mg/dL    eGFR 88 ml/min/1 73sq m   CBC and differential   Result Value Ref Range    WBC 4 03 (L) 4 31 - 10 16 Thousand/uL    RBC 4 85 3 81 - 5 12 Million/uL    Hemoglobin 14 7 11 5 - 15 4 g/dL    Hematocrit 46 1 34 8 - 46 1 %    MCV 95 82 - 98 fL    MCH 30 3 26 8 - 34 3 pg    MCHC 31 9 31 4 - 37 4 g/dL    RDW 12 2 11 6 - 15 1 %    MPV 11 5 8 9 - 12 7 fL    Platelets 686 (L) 234 - 390 Thousands/uL    nRBC 0 /100 WBCs    Neutrophils Relative 34 (L) 43 - 75 %    Immat GRANS % 0 0 - 2 %    Lymphocytes Relative 55 (H) 14 - 44 %    Monocytes Relative 9 4 - 12 %    Eosinophils Relative 2 0 - 6 %    Basophils Relative 0 0 - 1 %    Neutrophils Absolute 1 35 (L) 1 85 - 7 62 Thousands/µL    Immature Grans Absolute 0 01 0 00 - 0 20 Thousand/uL    Lymphocytes Absolute 2 23 0 60 - 4 47 Thousands/µL    Monocytes Absolute 0 35 0 17 - 1 22 Thousand/µL    Eosinophils Absolute 0 08 0 00 - 0 61 Thousand/µL    Basophils Absolute 0 01 0 00 - 0 10 Thousands/µL   C-reactive protein   Result Value Ref Range    CRP <3 0 <3 0 mg/L   Protime-INR   Result Value Ref Range    Protime 12 8 11 6 - 14 5 seconds    INR 0 95 0 84 - 1 19   APTT   Result Value Ref Range    PTT 33 23 - 37 seconds   Nicotine metabolite, urine   Result Value Ref Range    Cotinine, Urine Negative Agidze=447 ng/mL    Drug Screen Comment: Comment    Iron Saturation %   Result Value Ref Range    Iron Saturation 40 %    TIBC 358 250 - 450 ug/dL    Iron 142 50 - 170 ug/dL   Ferritin   Result Value Ref Range    Ferritin 104 8 - 388 ng/mL   Type and screen   Result Value Ref Range    ABO Grouping O     Rh Factor Positive     Antibody Screen Negative     Specimen Expiration Date [id number] No orders of the defined types were placed in this encounter          Current Medications     Current Outpatient Medications   Medication Sig Dispense Refill    acetaminophen (TYLENOL) 500 mg tablet Three times a day with meals and at bedtime 120 tablet 0    ascorbic acid (VITAMIN C) 500 mg tablet Take 1 tablet (500 mg total) by mouth daily 30 tablet 0    atenolol-chlorthalidone (TENORETIC) 50-25 mg per tablet Take 1 tablet by mouth daily 90 tablet 3    enoxaparin (LOVENOX) 40 mg/0 4 mL Inject 0 4 mL (40 mg total) under the skin daily for 28 days Starting after surgery 28 Syringe 0    ferrous sulfate 324 (65 Fe) mg Take 1 tablet (324 mg total) by mouth 2 (two) times a day before meals 60 tablet 0    folic acid (FOLVITE) 336 mcg tablet Take 1 tablet (400 mcg total) by mouth daily 30 tablet 0    glucose blood (OneTouch Verio) test strip Check Blood Sugar 2 times daily 100 each 0    Lancets (onetouch ultrasoft) lancets Use as instructed 100 each 3    metFORMIN (GLUCOPHAGE) 500 mg tablet Take 1 tablet (500 mg total) by mouth daily with breakfast 90 tablet 0    Multiple Vitamins-Minerals (multivitamin with minerals) tablet Take 1 tablet by mouth daily 30 tablet 1    nicotine (NICODERM CQ) 14 mg/24hr TD 24 hr patch Place 1 patch on the skin every 24 hours 28 patch 0    pravastatin (PRAVACHOL) 40 mg tablet Take 1 tablet (40 mg total) by mouth daily 90 tablet 3    desoximetasone (TOPICORT) 0 25 % cream APPLY EXTERNALLY TO THE AFFECTED AREA TWICE DAILY FOR 7 DAYS      Diclofenac Sodium (VOLTAREN) 1 % APPLY 2GM TOPICALLY TWICE DAILY AS NEEDED (Patient not taking: Reported on 2/23/2021) 100 g 0    naproxen (NAPROSYN) 375 mg tablet Take 250 mg by mouth daily      predniSONE 10 mg tablet 5 po daily x2 days, then 4 po daily x2 days, then 3 po daily x2 days, then 2 po daily x2 days,then 1 po daily x2days (Patient not taking: Reported on 2/23/2021) 30 tablet 0     No current facility-administered medications Not Applicable for this visit  ALLERGIES:  No Known Allergies    Health Maintenance     Health Maintenance   Topic Date Due    Hepatitis C Screening  1960    Pneumococcal Vaccine: Pediatrics (0 to 5 Years) and At-Risk Patients (6 to 59 Years) (1 of 1 - PPSV23) 03/27/1967    DM Eye Exam  03/27/1971    HIV Screening  03/27/1976    BMI: Followup Plan  03/27/1979    Annual Physical  03/27/1979    Cervical Cancer Screening  03/27/1982    PT PLAN OF CARE  03/19/2020    URINE MICROALBUMIN  02/04/2021    Diabetic Foot Exam  03/11/2021    Influenza Vaccine (1) 06/30/2021 (Originally 9/1/2020)    DTaP,Tdap,and Td Vaccines (1 - Tdap) 10/06/2021 (Originally 3/27/1982)    HEMOGLOBIN A1C  07/26/2021    Depression Screening PHQ  10/06/2021    MAMMOGRAM  02/06/2022    BMI: Adult  02/23/2022    Colonoscopy Surveillance  05/29/2023    Colorectal Cancer Screening  05/29/2028    HIB Vaccine  Aged Out    Hepatitis B Vaccine  Aged Out    IPV Vaccine  Aged Out    Hepatitis A Vaccine  Aged Out    Meningococcal ACWY Vaccine  Aged Out    HPV Vaccine  Aged Out       There is no immunization history on file for this patient  John Farah MD   [address]  2/23/2021  1:43 PM    Parts of this note were dictated using [employer] dictation software and may have sounds-like errors due to variation in pronunciation

## 2022-11-02 ENCOUNTER — TELEPHONE (OUTPATIENT)
Dept: FAMILY MEDICINE CLINIC | Facility: CLINIC | Age: 62
End: 2022-11-02

## 2022-11-02 NOTE — TELEPHONE ENCOUNTER
I apologize, I meant to send this to Dr Aly Maier  She was seen in August, please advise if she needs to come in again to have form completed

## 2022-11-02 NOTE — TELEPHONE ENCOUNTER
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

## 2022-11-02 NOTE — TELEPHONE ENCOUNTER
Hi can you please ask this patient to come in for an appointment to address this form? I've never met her before and I don't feel that I can accurately answer all of the questions without having met her  If the first available is with someone else, the form will be in my folder  Thanks!

## 2022-11-02 NOTE — TELEPHONE ENCOUNTER
Form completed and returned to front   Looks like the last page is to be completed at the [facility]

## 2022-11-15 DIAGNOSIS — E11.9 TYPE 2 DIABETES MELLITUS WITHOUT COMPLICATION, WITHOUT LONG-TERM CURRENT USE OF INSULIN (HCC): ICD-10-CM

## 2022-11-23 DIAGNOSIS — Z96.651 STATUS POST TOTAL KNEE REPLACEMENT, RIGHT: Primary | ICD-10-CM

## 2022-11-23 RX ORDER — CEPHALEXIN 500 MG/1
CAPSULE ORAL
Qty: 4 CAPSULE | Refills: 0 | Status: SHIPPED | OUTPATIENT
Start: 2022-11-23 | End: 2022-11-30

## 2022-11-28 ENCOUNTER — VBI (OUTPATIENT)
Dept: ADMINISTRATIVE | Facility: OTHER | Age: 62
End: 2022-11-28

## 2022-12-13 ENCOUNTER — TELEPHONE (OUTPATIENT)
Dept: OBGYN CLINIC | Facility: HOSPITAL | Age: 62
End: 2022-12-13

## 2022-12-13 DIAGNOSIS — E11.9 TYPE 2 DIABETES MELLITUS WITHOUT COMPLICATION, WITHOUT LONG-TERM CURRENT USE OF INSULIN (HCC): Primary | ICD-10-CM

## 2022-12-13 DIAGNOSIS — M25.562 LEFT KNEE PAIN, UNSPECIFIED CHRONICITY: ICD-10-CM

## 2022-12-13 NOTE — TELEPHONE ENCOUNTER
Below assessment from 2021 TKA- have to confirm and update with patients daughter  She is requesting a call tomorrow, 12/14     Preoperative Elective Admission Assessment- Assigned to care team  Spoke with pt and daughter, pt is Ken[place] speaking but Gabby Hernandez was kind enough to translate and they decline  services       Living Situation: Pt lives with daughterCrissy, in multiple level home  Gabby Hernandez, other daughter, is a few blocks away    Multiple level home with Bedrooms on 2nd floor        Steps: to enter, #4 steps  To bedroom level, #14 steps to 2nd level      First Floor Setup: Yes for 1st few weeks  They bought pt lift recliner chair that 1st floor and BSC to accommodate 1st floor setup      DME: Pt has crutches, 2 wheeled RW, BSC  No canes      Patient's Current Level of Function: independent with ambulation and ADLs     Post-op Caregiver:  Daughter, Stevan, will be care person in evening and at night time  Laurie plans to be present in pt's home during daytime  Daughters are caregivers and transport      Post-op Transport:   Daughters      Outpatient Physical Therapy Site: [address]     Medication Management: [facility] for Post-op Medications: [facility] Meneses for surgery meds  After DC, send to [facility]                      Blood Management Vitamin Regimen: pt confirms                      Post-op anticoagulant: lovenox syringes at home ready for post-op use only      Discharge Plan: Pt plans for DC to home and plans to attend OP PT     Barriers to DC identified preoperatively: None     BMI: 30 18     [employer]: text [phone] any time   [email or web address]  pt enrolled      Patient [address] educated on post-op pain, early mobilization (POD0), indication for/use of incentive spirometer (10x/hour while awake) and indication for/use of foot/leg pumps (18 hours/day)   educated that our goal, if at all possible, is to appropriately discharge patient based off their post-op function while striving to maintain maximal independence  If possible, the goal is to discharge patient to home and for them to attend outpatient physical therapy

## 2022-12-16 ENCOUNTER — TELEPHONE (OUTPATIENT)
Dept: OBGYN CLINIC | Facility: HOSPITAL | Age: 62
End: 2022-12-16

## 2022-12-16 DIAGNOSIS — M25.562 LEFT KNEE PAIN, UNSPECIFIED CHRONICITY: ICD-10-CM

## 2022-12-16 DIAGNOSIS — M17.11 PRIMARY OSTEOARTHRITIS OF RIGHT KNEE: ICD-10-CM

## 2022-12-16 DIAGNOSIS — Z96.651 STATUS POST TOTAL KNEE REPLACEMENT, RIGHT: ICD-10-CM

## 2022-12-16 DIAGNOSIS — M17.12 ARTHRITIS OF LEFT KNEE: ICD-10-CM

## 2022-12-16 RX ORDER — ASCORBIC ACID 500 MG
500 TABLET ORAL 2 TIMES DAILY
Qty: 60 TABLET | Refills: 0 | Status: SHIPPED | OUTPATIENT
Start: 2022-12-16

## 2022-12-16 RX ORDER — MULTIVIT-MIN/IRON FUM/FOLIC AC 7.5 MG-4
1 TABLET ORAL DAILY
Qty: 30 TABLET | Refills: 1 | Status: SHIPPED | OUTPATIENT
Start: 2022-12-16

## 2022-12-16 RX ORDER — UREA 10 %
400 LOTION (ML) TOPICAL DAILY
Qty: 30 TABLET | Refills: 0 | Status: SHIPPED | OUTPATIENT
Start: 2022-12-16

## 2022-12-16 RX ORDER — FERROUS SULFATE TAB EC 324 MG (65 MG FE EQUIVALENT) 324 (65 FE) MG
324 TABLET DELAYED RESPONSE ORAL
Qty: 60 TABLET | Refills: 0 | Status: SHIPPED | OUTPATIENT
Start: 2022-12-16

## 2022-12-16 NOTE — PRE-PROCEDURE INSTRUCTIONS
Pre-Surgery Instructions:   Medication Instructions   • ascorbic acid (VITAMIN C) 500 mg tablet Hold day of surgery  • aspirin (ECOTRIN LOW STRENGTH) 81 mg EC tablet Stop taking 7 days prior to surgery  • atenolol-chlorthalidone (TENORETIC) 50-25 mg per tablet Take day of surgery  • docusate sodium (COLACE) 100 mg capsule Hold day of surgery  • ferrous sulfate 324 (65 Fe) mg Hold day of surgery  • folic acid (FOLVITE) 395 MCG tablet Hold day of surgery  • metFORMIN (GLUCOPHAGE) 500 mg tablet Hold day of surgery  • Multiple Vitamins-Minerals (multivitamin with minerals) tablet Hold day of surgery  • pravastatin (PRAVACHOL) 40 mg tablet Take day of surgery  •     Have you had / have a sore throat? No  Have you had / have a cough less than 1 week? No  Have you had / have a fever greater than 100 0 - 100  4? No  Are you experiencing any shortness of breath? No    Reviewed with patient and her daughter Yumi via phone all medication instructions  Advised not to take any NSAID's, Vitamins not ordered by Surgeon or Herbal products for 7 days prior to the DOS  Acetaminophen products are ok to take  Instructed about NPO after midnight the night before DOS, except sips of water with medications allowed for the morning of the DOS  Reviewed showering instructions as given by Surgical office  Ortho class and Incentive Spirometry Education given  Instructed to call Surgical office with any questions  Informed about call from [facility] with the time to arrive for the scheduled surgery  Patient verbalized understanding

## 2022-12-16 NOTE — TELEPHONE ENCOUNTER
We can order more vitamins for her but until we have the results of the new D4V we are not certain whether or not she can have surgery next month  If the hemoglobin A1c is too high then it would not be necessary for her to start the vitamins at this time as we would have to delay the surgery further  Can you please find out if we have that result?   I did not see that she had it done at a [address]

## 2022-12-16 NOTE — TELEPHONE ENCOUNTER
Caller: Yumi    Doctor: Cecilio Juarez    Reason for call: patient's daughter is calling to ask for new orders for the pre op vitamins/medications  Patient had already taken these medications and then her surgery was postponed    Please send orders to [facility] in [place]     Call back#: [phone]

## 2022-12-17 ENCOUNTER — APPOINTMENT (OUTPATIENT)
Dept: LAB | Age: 62
End: 2022-12-17

## 2022-12-17 ENCOUNTER — LAB REQUISITION (OUTPATIENT)
Dept: LAB | Facility: HOSPITAL | Age: 62
End: 2022-12-17

## 2022-12-17 DIAGNOSIS — Z96.651 STATUS POST TOTAL KNEE REPLACEMENT, RIGHT: ICD-10-CM

## 2022-12-17 DIAGNOSIS — M25.562 LEFT KNEE PAIN, UNSPECIFIED CHRONICITY: ICD-10-CM

## 2022-12-17 DIAGNOSIS — E11.9 TYPE 2 DIABETES MELLITUS WITHOUT COMPLICATION, WITHOUT LONG-TERM CURRENT USE OF INSULIN (HCC): ICD-10-CM

## 2022-12-17 DIAGNOSIS — Z96.651 PRESENCE OF RIGHT ARTIFICIAL KNEE JOINT: ICD-10-CM

## 2022-12-17 LAB
ABO GROUP BLD: NORMAL
ALBUMIN SERPL BCP-MCNC: 3.9 G/DL (ref 3.5–5)
ALP SERPL-CCNC: 68 U/L (ref 46–116)
ALT SERPL W P-5'-P-CCNC: 23 U/L (ref 12–78)
ANION GAP SERPL CALCULATED.3IONS-SCNC: 4 MMOL/L (ref 4–13)
AST SERPL W P-5'-P-CCNC: 14 U/L (ref 5–45)
BASOPHILS # BLD AUTO: 0.02 THOUSANDS/ÂΜL (ref 0–0.1)
BASOPHILS NFR BLD AUTO: 0 % (ref 0–1)
BILIRUB SERPL-MCNC: 0.37 MG/DL (ref 0.2–1)
BLD GP AB SCN SERPL QL: NEGATIVE
BUN SERPL-MCNC: 10 MG/DL (ref 5–25)
CALCIUM SERPL-MCNC: 9.9 MG/DL (ref 8.3–10.1)
CHLORIDE SERPL-SCNC: 107 MMOL/L (ref 96–108)
CO2 SERPL-SCNC: 31 MMOL/L (ref 21–32)
CREAT SERPL-MCNC: 0.87 MG/DL (ref 0.6–1.3)
CRP SERPL QL: <3 MG/L
EOSINOPHIL # BLD AUTO: 0.06 THOUSAND/ÂΜL (ref 0–0.61)
EOSINOPHIL NFR BLD AUTO: 1 % (ref 0–6)
ERYTHROCYTE [DISTWIDTH] IN BLOOD BY AUTOMATED COUNT: 12.6 % (ref 11.6–15.1)
EST. AVERAGE GLUCOSE BLD GHB EST-MCNC: 160 MG/DL
GFR SERPL CREATININE-BSD FRML MDRD: 72 ML/MIN/1.73SQ M
GLUCOSE P FAST SERPL-MCNC: 139 MG/DL (ref 65–99)
HBA1C MFR BLD: 7.2 %
HCT VFR BLD AUTO: 45.9 % (ref 34.8–46.1)
HGB BLD-MCNC: 14.9 G/DL (ref 11.5–15.4)
IMM GRANULOCYTES # BLD AUTO: 0.01 THOUSAND/UL (ref 0–0.2)
IMM GRANULOCYTES NFR BLD AUTO: 0 % (ref 0–2)
LYMPHOCYTES # BLD AUTO: 2.6 THOUSANDS/ÂΜL (ref 0.6–4.47)
LYMPHOCYTES NFR BLD AUTO: 54 % (ref 14–44)
MCH RBC QN AUTO: 31.3 PG (ref 26.8–34.3)
MCHC RBC AUTO-ENTMCNC: 32.5 G/DL (ref 31.4–37.4)
MCV RBC AUTO: 96 FL (ref 82–98)
MONOCYTES # BLD AUTO: 0.27 THOUSAND/ÂΜL (ref 0.17–1.22)
MONOCYTES NFR BLD AUTO: 6 % (ref 4–12)
NEUTROPHILS # BLD AUTO: 1.92 THOUSANDS/ÂΜL (ref 1.85–7.62)
NEUTS SEG NFR BLD AUTO: 39 % (ref 43–75)
NRBC BLD AUTO-RTO: 0 /100 WBCS
PLATELET # BLD AUTO: 273 THOUSANDS/UL (ref 149–390)
PMV BLD AUTO: 10.2 FL (ref 8.9–12.7)
POTASSIUM SERPL-SCNC: 4.2 MMOL/L (ref 3.5–5.3)
PROT SERPL-MCNC: 7.5 G/DL (ref 6.4–8.4)
RBC # BLD AUTO: 4.76 MILLION/UL (ref 3.81–5.12)
RH BLD: POSITIVE
SODIUM SERPL-SCNC: 142 MMOL/L (ref 135–147)
SPECIMEN EXPIRATION DATE: NORMAL
WBC # BLD AUTO: 4.88 THOUSAND/UL (ref 4.31–10.16)

## 2022-12-19 ENCOUNTER — TELEPHONE (OUTPATIENT)
Dept: OBGYN CLINIC | Facility: HOSPITAL | Age: 62
End: 2022-12-19

## 2022-12-19 ENCOUNTER — ANESTHESIA EVENT (OUTPATIENT)
Dept: PERIOP | Facility: HOSPITAL | Age: 62
End: 2022-12-19

## 2022-12-19 NOTE — TELEPHONE ENCOUNTER
Caller: Mateuskavin - daughter    Doctor: Marlen Verma    Reason for call: Patients daughter is calling to find out hemoglobin a1c results to see if her mother can still proceed with surgery   Also, she would like to know if there is any other testing needed    Call back#: [phone]

## 2022-12-19 NOTE — TELEPHONE ENCOUNTER
"  to Claudia Rivera DO • France Camargo PA-C • [employer]    10:18 AM  Hi Dr Chey Fortune HbA1c redrawn came back at less than the second hard stop of 7 5- her result is 7 2, I believe she is OK to proceed from the protocol perspective if you are OK with her proceeding   She also had her additional repeat labs drawn for you, they also meet our protocol   Please review and advise if any additional testing is needed and if patient is OK to proceed  Thank you! Claudia Rivera DO  to Me • France Camargo PA-C • [employer]      11:36 AM  Thank you Yana Wren, yes she can proceed "      Spoke to daughter, advised OK to proceed  No further testing needed at this time

## 2022-12-20 ENCOUNTER — TELEPHONE (OUTPATIENT)
Dept: OBGYN CLINIC | Facility: HOSPITAL | Age: 62
End: 2022-12-20

## 2022-12-20 DIAGNOSIS — Z96.651 STATUS POST TOTAL KNEE REPLACEMENT, RIGHT: ICD-10-CM

## 2022-12-20 DIAGNOSIS — M25.562 LEFT KNEE PAIN, UNSPECIFIED CHRONICITY: ICD-10-CM

## 2022-12-20 DIAGNOSIS — M17.12 ARTHRITIS OF LEFT KNEE: ICD-10-CM

## 2022-12-20 RX ORDER — DOCUSATE SODIUM 100 MG/1
100 CAPSULE, LIQUID FILLED ORAL 2 TIMES DAILY
Qty: 60 CAPSULE | Refills: 1 | Status: SHIPPED | OUTPATIENT
Start: 2022-12-20

## 2022-12-20 NOTE — TELEPHONE ENCOUNTER
Caller: Patients daughter     Doctor: Dr Jd Sterling    Reason for call: Patients daughter is calling in stating that she is supposed to have surgery on 1/10/23 to her left knee  The Vitamins were sent to the pharmacy for her and she received them but did not get the medication she needed for constipation  She is concerned since she just started taking the vitamins and does not want to have any further constipation issues  She is asking for a medication to be sent over to [facility] pharmacy on file and for a call back relating this      Call back#: [phone] or [phone]

## 2022-12-21 ENCOUNTER — PATIENT OUTREACH (OUTPATIENT)
Dept: OBGYN CLINIC | Facility: HOSPITAL | Age: 62
End: 2022-12-21

## 2022-12-21 NOTE — PROGRESS NOTES
[facility] received a new referral in regard to pt with concerns about [facility]  SW reviewed pt chart prior to contacting pt  Pt resides with her daughter, Katrin Drake, in a multiple level home  Rafael Nash, her other daughter lives a few blocks away  Pt is independent with ADL's and ambulation  Pts daughter, Katrin Drake, will provide caregiver support in the evening and night time  Laurie plans to be available during the daytime when she is not working  Pts daughters will also be providing all transportation  [facility] was referred to family to assist with [facility] options during the day when Rafael Nash is working  [facility] first contacted pt using [employer] services,  # [id number]  Pts daughter Yumi answered and will translate as needed  [employer] call was ended and [facility] contacted family directly  Rafael Petersont was added to the call also  Per family, they now have a family member who will be staying with pt during the day for a month  They no longer have any need for additional [facility] resources  The family has been working on the caregiver support for pt since Rafael Kellyhardt is no working during the day  They prefer to have family with pt  The family was appreciative of the call and report no other needs or concerns at this time  [facility] will remain available to support pt as needed

## 2022-12-28 ENCOUNTER — OFFICE VISIT (OUTPATIENT)
Dept: FAMILY MEDICINE CLINIC | Facility: CLINIC | Age: 62
End: 2022-12-28

## 2022-12-28 ENCOUNTER — TELEPHONE (OUTPATIENT)
Dept: OBGYN CLINIC | Facility: HOSPITAL | Age: 62
End: 2022-12-28

## 2022-12-28 VITALS
HEART RATE: 73 BPM | TEMPERATURE: 96.6 F | SYSTOLIC BLOOD PRESSURE: 110 MMHG | BODY MASS INDEX: 30.55 KG/M2 | OXYGEN SATURATION: 95 % | HEIGHT: 62 IN | WEIGHT: 166 LBS | DIASTOLIC BLOOD PRESSURE: 70 MMHG

## 2022-12-28 DIAGNOSIS — M17.12 OSTEOARTHRITIS OF LEFT KNEE, UNSPECIFIED OSTEOARTHRITIS TYPE: Primary | ICD-10-CM

## 2022-12-28 NOTE — PROGRESS NOTES
[facility]  1960    Arley Jeronimo is a 65 y o  female with L knee pain who is planning to undergo L knee replacement under general by Dr Maki Landry on 1/10/2023  The procedure is indicated for the following condition: L knee osteoarthritis  Patient has not traveled outside the [address] [place] [address] Road  within the last 14 days  had complications with anesthesia in the past     ROS:   • Chest pain: no   • Shortness of breath: no  • Shortness of breath with exertion: no  • Orthopnea: no  • Dizziness: no  • Unexplained weight change: no    PMH:  • CAD: no  • HTN: yes   • CKD: no  • DM: yes on insulin: no  • History of CVA: no    She  reports that she quit smoking about 2 years ago  Her smoking use included cigarettes  She has a 22 50 pack-year smoking history  She has never used smokeless tobacco  She reports that she does not currently use alcohol  She reports that she does not use drugs  There were no vitals taken for this visit  Physical Exam  Constitutional:       Appearance: Normal appearance  HENT:      Head: Normocephalic and atraumatic  Right Ear: External ear normal       Left Ear: External ear normal       Nose: Nose normal       Mouth/Throat:      Mouth: Mucous membranes are moist       Pharynx: Oropharynx is clear  Eyes:      Extraocular Movements: Extraocular movements intact  Conjunctiva/sclera: Conjunctivae normal       Pupils: Pupils are equal, round, and reactive to light  Cardiovascular:      Rate and Rhythm: Normal rate and regular rhythm  Pulses: Normal pulses  Heart sounds: Normal heart sounds  Pulmonary:      Effort: Pulmonary effort is normal       Breath sounds: Normal breath sounds  Abdominal:      Palpations: Abdomen is soft  Tenderness: There is no abdominal tenderness  Musculoskeletal:         General: No tenderness  Right lower leg: No edema  Left lower leg: No edema        Comments: Muscle strength 4-5/5 lower extremities bilaterally   Skin:     General: Skin is warm and dry  Neurological:      Mental Status: She is alert and oriented to person, place, and time  Psychiatric:         Mood and Affect: Mood normal          Behavior: Behavior normal          Revised Cardiac Risk Index (RCRI) for Pre-Operative Risk   (estimates risk of cardiac complications after noncardiac surgery)    · High-risk surgery: No 0   · Intraperitoneal, intrathoracic, suprainguinal vascular  · History of ischemic heart disease: No 0   · Hx of MI, (+) exercise test, current chest pain considered due to myocardial ischemia, use of nitrate therapy or ECG with pathological Q waves)  · History of CHF: No 0   · Pulmonary edema, B/L rales or S3 gallop; MELVIN, orthopnea, PND, CXR showing pulmonary vascular redistribution)  · History of cerebrovascular disease: No 0  · Prior TIA or stroke  · Pre-operative treatment with insulin: No 0   · Pre-operative creatinine >2 mg/dL: No 0     RCRI Scoring:  · 0 points: Class I Risk, 3 9% 30-day risk of death, MI, or cardiac arrest  · 1 point: Class II Risk, 6 0% 30-day risk of death, MI, or cardiac arrest  · 2 points: Class III Risk, 10 1% 30-day risk of death, MI, or cardiac arrest  · 3 points: Class IV Risk, 15% 30-day risk of death, MI, or cardiac arrest  · 4 points: Class IV Risk, 15% 30-day risk of death, MI, or cardiac arrest  · 5 points: Class IV Risk, 15% 30-day risk of death, MI, or cardiac arrest  · 6 points: Class IV Risk, 15% 30-day risk of death, MI, or cardiac arrest    Lab Results   Component Value Date    CREATININE 0 87 12/17/2022       There are no diagnoses linked to this encounter  Recommendations:  Mikal Scott is undergoing an elective Low Risk surgery, L knee replacement  She is RCRI class I risk (0 points) with 3 9% 30-day risk of death, MI, or cardiac arrest  She may proceed with surgery as planned without further workup   Staff will contact Dr Smyth Monday office to inquire about need for any additional forms prior to surgery  She was advised to stop taking aspirin 1 week prior to surgery  She plans to report to [address] for EKG prior to surgery as instructed by surgical team      Discussed with Dr Josias Rendon, who is in agreement with the plan as outlined above

## 2022-12-28 NOTE — TELEPHONE ENCOUNTER
Caller: [facility]    Doctor: Noman Dunham    Reason for call: patient is at their office for clearance prior to her surgery and they asked if there is a form for them to fill out    Call back#: [phone]

## 2022-12-30 ENCOUNTER — TELEPHONE (OUTPATIENT)
Dept: OBGYN CLINIC | Facility: CLINIC | Age: 62
End: 2022-12-30

## 2022-12-30 NOTE — TELEPHONE ENCOUNTER
Called and [facility] for patient to have her EKG done, for her upcoming surgery with Dr Mackenzie Ling, as soon as possible

## 2023-01-03 ENCOUNTER — OFFICE VISIT (OUTPATIENT)
Dept: LAB | Facility: CLINIC | Age: 63
End: 2023-01-03

## 2023-01-04 LAB
ATRIAL RATE: 76 BPM
P AXIS: 34 DEGREES
PR INTERVAL: 170 MS
QRS AXIS: 41 DEGREES
QRSD INTERVAL: 64 MS
QT INTERVAL: 368 MS
QTC INTERVAL: 414 MS
T WAVE AXIS: 40 DEGREES
VENTRICULAR RATE: 76 BPM

## 2023-01-10 ENCOUNTER — HOSPITAL ENCOUNTER (OUTPATIENT)
Facility: HOSPITAL | Age: 63
Setting detail: OUTPATIENT SURGERY
Discharge: HOME | End: 2023-01-11
Attending: ORTHOPAEDIC SURGERY | Admitting: ORTHOPAEDIC SURGERY

## 2023-01-10 ENCOUNTER — ANESTHESIA (OUTPATIENT)
Dept: PERIOP | Facility: HOSPITAL | Age: 63
End: 2023-01-10

## 2023-01-10 DIAGNOSIS — F41.9 ANXIETY: ICD-10-CM

## 2023-01-10 DIAGNOSIS — E78.5 HYPERLIPIDEMIA, UNSPECIFIED HYPERLIPIDEMIA TYPE: ICD-10-CM

## 2023-01-10 DIAGNOSIS — M17.12 PRIMARY OSTEOARTHRITIS OF LEFT KNEE: ICD-10-CM

## 2023-01-10 DIAGNOSIS — E11.9 TYPE 2 DIABETES MELLITUS WITHOUT COMPLICATION, WITHOUT LONG-TERM CURRENT USE OF INSULIN (HCC): ICD-10-CM

## 2023-01-10 DIAGNOSIS — Z96.652 STATUS POST TOTAL KNEE REPLACEMENT USING CEMENT, LEFT: Primary | ICD-10-CM

## 2023-01-10 DIAGNOSIS — I10 ESSENTIAL HYPERTENSION: ICD-10-CM

## 2023-01-10 DIAGNOSIS — H04.123 DRY EYES: ICD-10-CM

## 2023-01-10 DIAGNOSIS — E78.5 HYPERLIPIDEMIA: ICD-10-CM

## 2023-01-10 DIAGNOSIS — M17.10 ARTHRITIS OF KNEE: ICD-10-CM

## 2023-01-10 LAB
GLUCOSE SERPL-MCNC: 115 MG/DL (ref 65–140)
GLUCOSE SERPL-MCNC: 188 MG/DL (ref 65–140)

## 2023-01-10 DEVICE — ATTUNE KNEE SYSTEM FEMORAL POSTERIOR STABILIZED SIZE 5 LEFT CEMENTED
Type: IMPLANTABLE DEVICE | Site: KNEE | Status: FUNCTIONAL
Brand: ATTUNE

## 2023-01-10 DEVICE — ATTUNE KNEE SYSTEM TIBIAL BASE ROTATING PLATFORM SIZE 5 CEMENTED
Type: IMPLANTABLE DEVICE | Site: KNEE | Status: FUNCTIONAL
Brand: ATTUNE

## 2023-01-10 DEVICE — SMARTSET HIGH PERFORMANCE MV MEDIUM VISCOSITY BONE CEMENT 40G
Type: IMPLANTABLE DEVICE | Site: KNEE | Status: FUNCTIONAL
Brand: SMARTSET

## 2023-01-10 DEVICE — ATTUNE PATELLA MEDIALIZED DOME 32MM CEMENTED AOX
Type: IMPLANTABLE DEVICE | Site: KNEE | Status: FUNCTIONAL
Brand: ATTUNE

## 2023-01-10 RX ORDER — METHOCARBAMOL 750 MG/1
750 TABLET, FILM COATED ORAL EVERY 6 HOURS SCHEDULED
Status: DISCONTINUED | OUTPATIENT
Start: 2023-01-10 | End: 2023-01-11 | Stop reason: HOSPADM

## 2023-01-10 RX ORDER — SENNOSIDES 8.6 MG
1 TABLET ORAL DAILY
Status: DISCONTINUED | OUTPATIENT
Start: 2023-01-10 | End: 2023-01-10

## 2023-01-10 RX ORDER — MAGNESIUM HYDROXIDE 1200 MG/15ML
LIQUID ORAL AS NEEDED
Status: DISCONTINUED | OUTPATIENT
Start: 2023-01-10 | End: 2023-01-10 | Stop reason: HOSPADM

## 2023-01-10 RX ORDER — SODIUM CHLORIDE, SODIUM LACTATE, POTASSIUM CHLORIDE, CALCIUM CHLORIDE 600; 310; 30; 20 MG/100ML; MG/100ML; MG/100ML; MG/100ML
INJECTION, SOLUTION INTRAVENOUS CONTINUOUS PRN
Status: DISCONTINUED | OUTPATIENT
Start: 2023-01-10 | End: 2023-01-10

## 2023-01-10 RX ORDER — ASCORBIC ACID 500 MG
500 TABLET ORAL 2 TIMES DAILY
Status: DISCONTINUED | OUTPATIENT
Start: 2023-01-10 | End: 2023-01-11 | Stop reason: HOSPADM

## 2023-01-10 RX ORDER — PRAVASTATIN SODIUM 40 MG
40 TABLET ORAL
Status: DISCONTINUED | OUTPATIENT
Start: 2023-01-10 | End: 2023-01-11 | Stop reason: HOSPADM

## 2023-01-10 RX ORDER — FERROUS SULFATE 325(65) MG
325 TABLET ORAL 2 TIMES DAILY WITH MEALS
Status: DISCONTINUED | OUTPATIENT
Start: 2023-01-10 | End: 2023-01-11 | Stop reason: HOSPADM

## 2023-01-10 RX ORDER — INSULIN LISPRO 100 [IU]/ML
1-6 INJECTION, SOLUTION INTRAVENOUS; SUBCUTANEOUS
Status: DISCONTINUED | OUTPATIENT
Start: 2023-01-11 | End: 2023-01-11 | Stop reason: HOSPADM

## 2023-01-10 RX ORDER — ACETAMINOPHEN 325 MG/1
975 TABLET ORAL EVERY 8 HOURS
Status: DISCONTINUED | OUTPATIENT
Start: 2023-01-10 | End: 2023-01-11 | Stop reason: HOSPADM

## 2023-01-10 RX ORDER — SENNOSIDES 8.6 MG
1 TABLET ORAL DAILY
Status: DISCONTINUED | OUTPATIENT
Start: 2023-01-10 | End: 2023-01-11 | Stop reason: HOSPADM

## 2023-01-10 RX ORDER — BUPIVACAINE HYDROCHLORIDE 7.5 MG/ML
INJECTION, SOLUTION INTRASPINAL AS NEEDED
Status: DISCONTINUED | OUTPATIENT
Start: 2023-01-10 | End: 2023-01-10

## 2023-01-10 RX ORDER — HYDROMORPHONE HCL/PF 1 MG/ML
0.5 SYRINGE (ML) INJECTION EVERY 2 HOUR PRN
Status: DISCONTINUED | OUTPATIENT
Start: 2023-01-10 | End: 2023-01-11 | Stop reason: HOSPADM

## 2023-01-10 RX ORDER — ENOXAPARIN SODIUM 100 MG/ML
40 INJECTION SUBCUTANEOUS DAILY
Status: DISCONTINUED | OUTPATIENT
Start: 2023-01-11 | End: 2023-01-10

## 2023-01-10 RX ORDER — LIDOCAINE HYDROCHLORIDE 20 MG/ML
INJECTION, SOLUTION EPIDURAL; INFILTRATION; INTRACAUDAL; PERINEURAL AS NEEDED
Status: DISCONTINUED | OUTPATIENT
Start: 2023-01-10 | End: 2023-01-10

## 2023-01-10 RX ORDER — INSULIN LISPRO 100 [IU]/ML
1-5 INJECTION, SOLUTION INTRAVENOUS; SUBCUTANEOUS
Status: DISCONTINUED | OUTPATIENT
Start: 2023-01-10 | End: 2023-01-11 | Stop reason: HOSPADM

## 2023-01-10 RX ORDER — METOCLOPRAMIDE HYDROCHLORIDE 5 MG/ML
10 INJECTION INTRAMUSCULAR; INTRAVENOUS ONCE AS NEEDED
Status: DISCONTINUED | OUTPATIENT
Start: 2023-01-10 | End: 2023-01-10 | Stop reason: HOSPADM

## 2023-01-10 RX ORDER — ACETAMINOPHEN 325 MG/1
975 TABLET ORAL ONCE
Status: DISCONTINUED | OUTPATIENT
Start: 2023-01-10 | End: 2023-01-10 | Stop reason: SDUPTHER

## 2023-01-10 RX ORDER — ONDANSETRON 2 MG/ML
4 INJECTION INTRAMUSCULAR; INTRAVENOUS ONCE
Status: COMPLETED | OUTPATIENT
Start: 2023-01-10 | End: 2023-01-10

## 2023-01-10 RX ORDER — CEFAZOLIN SODIUM 1 G/50ML
1000 SOLUTION INTRAVENOUS ONCE
Status: COMPLETED | OUTPATIENT
Start: 2023-01-10 | End: 2023-01-10

## 2023-01-10 RX ORDER — ACETAMINOPHEN 325 MG/1
975 TABLET ORAL ONCE
Status: COMPLETED | OUTPATIENT
Start: 2023-01-10 | End: 2023-01-10

## 2023-01-10 RX ORDER — CALCIUM CARBONATE 200(500)MG
1000 TABLET,CHEWABLE ORAL DAILY PRN
Status: DISCONTINUED | OUTPATIENT
Start: 2023-01-10 | End: 2023-01-11 | Stop reason: HOSPADM

## 2023-01-10 RX ORDER — OXYCODONE HYDROCHLORIDE 5 MG/1
5 TABLET ORAL EVERY 4 HOURS PRN
Qty: 20 TABLET | Refills: 0 | Status: SHIPPED | OUTPATIENT
Start: 2023-01-10 | End: 2023-01-14

## 2023-01-10 RX ORDER — HYDROMORPHONE HCL IN WATER/PF 6 MG/30 ML
0.2 PATIENT CONTROLLED ANALGESIA SYRINGE INTRAVENOUS
Status: DISCONTINUED | OUTPATIENT
Start: 2023-01-10 | End: 2023-01-10 | Stop reason: HOSPADM

## 2023-01-10 RX ORDER — PROPOFOL 10 MG/ML
INJECTION, EMULSION INTRAVENOUS CONTINUOUS PRN
Status: DISCONTINUED | OUTPATIENT
Start: 2023-01-10 | End: 2023-01-10

## 2023-01-10 RX ORDER — ENOXAPARIN SODIUM 100 MG/ML
40 INJECTION SUBCUTANEOUS ONCE
Status: COMPLETED | OUTPATIENT
Start: 2023-01-11 | End: 2023-01-11

## 2023-01-10 RX ORDER — PROPOFOL 10 MG/ML
INJECTION, EMULSION INTRAVENOUS AS NEEDED
Status: DISCONTINUED | OUTPATIENT
Start: 2023-01-10 | End: 2023-01-10

## 2023-01-10 RX ORDER — MIDAZOLAM HYDROCHLORIDE 2 MG/2ML
INJECTION, SOLUTION INTRAMUSCULAR; INTRAVENOUS AS NEEDED
Status: DISCONTINUED | OUTPATIENT
Start: 2023-01-10 | End: 2023-01-10

## 2023-01-10 RX ORDER — BUPIVACAINE HYDROCHLORIDE 5 MG/ML
INJECTION, SOLUTION PERINEURAL
Status: COMPLETED | OUTPATIENT
Start: 2023-01-10 | End: 2023-01-10

## 2023-01-10 RX ORDER — LANOLIN ALCOHOL/MO/W.PET/CERES
400 CREAM (GRAM) TOPICAL DAILY
Status: DISCONTINUED | OUTPATIENT
Start: 2023-01-10 | End: 2023-01-11 | Stop reason: HOSPADM

## 2023-01-10 RX ORDER — CHLORTHALIDONE 25 MG/1
25 TABLET ORAL DAILY
Status: DISCONTINUED | OUTPATIENT
Start: 2023-01-11 | End: 2023-01-11 | Stop reason: HOSPADM

## 2023-01-10 RX ORDER — OXYCODONE HYDROCHLORIDE 5 MG/1
5 TABLET ORAL EVERY 4 HOURS PRN
Status: DISCONTINUED | OUTPATIENT
Start: 2023-01-10 | End: 2023-01-11 | Stop reason: HOSPADM

## 2023-01-10 RX ORDER — DOCUSATE SODIUM 100 MG/1
100 CAPSULE, LIQUID FILLED ORAL 2 TIMES DAILY
Status: DISCONTINUED | OUTPATIENT
Start: 2023-01-10 | End: 2023-01-11 | Stop reason: HOSPADM

## 2023-01-10 RX ORDER — OXYCODONE HYDROCHLORIDE 10 MG/1
10 TABLET ORAL EVERY 4 HOURS PRN
Status: DISCONTINUED | OUTPATIENT
Start: 2023-01-10 | End: 2023-01-11 | Stop reason: HOSPADM

## 2023-01-10 RX ORDER — CHLORHEXIDINE GLUCONATE 0.12 MG/ML
15 RINSE ORAL ONCE
Status: COMPLETED | OUTPATIENT
Start: 2023-01-10 | End: 2023-01-10

## 2023-01-10 RX ORDER — DOCUSATE SODIUM 100 MG/1
100 CAPSULE, LIQUID FILLED ORAL 2 TIMES DAILY
Status: DISCONTINUED | OUTPATIENT
Start: 2023-01-10 | End: 2023-01-10

## 2023-01-10 RX ORDER — SODIUM CHLORIDE, SODIUM LACTATE, POTASSIUM CHLORIDE, CALCIUM CHLORIDE 600; 310; 30; 20 MG/100ML; MG/100ML; MG/100ML; MG/100ML
1.5 INJECTION, SOLUTION INTRAVENOUS CONTINUOUS
Status: DISCONTINUED | OUTPATIENT
Start: 2023-01-10 | End: 2023-01-11 | Stop reason: HOSPADM

## 2023-01-10 RX ORDER — ATENOLOL 50 MG/1
50 TABLET ORAL DAILY
Status: DISCONTINUED | OUTPATIENT
Start: 2023-01-11 | End: 2023-01-11 | Stop reason: HOSPADM

## 2023-01-10 RX ORDER — ENOXAPARIN SODIUM 100 MG/ML
40 INJECTION SUBCUTANEOUS DAILY
Status: DISCONTINUED | OUTPATIENT
Start: 2023-01-12 | End: 2023-01-11 | Stop reason: HOSPADM

## 2023-01-10 RX ORDER — LIDOCAINE HYDROCHLORIDE 10 MG/ML
INJECTION, SOLUTION EPIDURAL; INFILTRATION; INTRACAUDAL; PERINEURAL
Status: COMPLETED | OUTPATIENT
Start: 2023-01-10 | End: 2023-01-10

## 2023-01-10 RX ORDER — ENOXAPARIN SODIUM 100 MG/ML
40 INJECTION SUBCUTANEOUS DAILY
Qty: 11.2 ML | Refills: 0 | Status: SHIPPED | OUTPATIENT
Start: 2023-01-10 | End: 2023-01-10

## 2023-01-10 RX ORDER — CEFAZOLIN SODIUM 1 G/50ML
1000 SOLUTION INTRAVENOUS EVERY 8 HOURS
Status: COMPLETED | OUTPATIENT
Start: 2023-01-10 | End: 2023-01-11

## 2023-01-10 RX ADMIN — BUPIVACAINE HYDROCHLORIDE IN DEXTROSE 1.6 ML: 7.5 INJECTION, SOLUTION SUBARACHNOID at 13:21

## 2023-01-10 RX ADMIN — DOCUSATE SODIUM 100 MG: 100 CAPSULE, LIQUID FILLED ORAL at 19:38

## 2023-01-10 RX ADMIN — SODIUM CHLORIDE, SODIUM LACTATE, POTASSIUM CHLORIDE, AND CALCIUM CHLORIDE: .6; .31; .03; .02 INJECTION, SOLUTION INTRAVENOUS at 13:45

## 2023-01-10 RX ADMIN — OXYCODONE HYDROCHLORIDE AND ACETAMINOPHEN 500 MG: 500 TABLET ORAL at 18:17

## 2023-01-10 RX ADMIN — BUPIVACAINE HYDROCHLORIDE 10 ML: 5 INJECTION, SOLUTION PERINEURAL at 12:25

## 2023-01-10 RX ADMIN — Medication 325 MG: at 18:16

## 2023-01-10 RX ADMIN — SODIUM CHLORIDE, SODIUM LACTATE, POTASSIUM CHLORIDE, AND CALCIUM CHLORIDE: .6; .31; .03; .02 INJECTION, SOLUTION INTRAVENOUS at 13:08

## 2023-01-10 RX ADMIN — PROPOFOL 50 MG: 10 INJECTION, EMULSION INTRAVENOUS at 13:25

## 2023-01-10 RX ADMIN — MIDAZOLAM HYDROCHLORIDE 2 MG: 1 INJECTION, SOLUTION INTRAMUSCULAR; INTRAVENOUS at 12:22

## 2023-01-10 RX ADMIN — PHENYLEPHRINE HYDROCHLORIDE 30 MCG/MIN: 50 INJECTION INTRAVENOUS at 13:53

## 2023-01-10 RX ADMIN — CEFAZOLIN SODIUM 1000 MG: 1 SOLUTION INTRAVENOUS at 20:05

## 2023-01-10 RX ADMIN — METHOCARBAMOL 750 MG: 500 TABLET ORAL at 18:16

## 2023-01-10 RX ADMIN — PROPOFOL 70 MCG/KG/MIN: 10 INJECTION, EMULSION INTRAVENOUS at 13:25

## 2023-01-10 RX ADMIN — ACETAMINOPHEN 975 MG: 325 TABLET, FILM COATED ORAL at 12:17

## 2023-01-10 RX ADMIN — CEFAZOLIN SODIUM 1000 MG: 1 SOLUTION INTRAVENOUS at 13:08

## 2023-01-10 RX ADMIN — ONDANSETRON 4 MG: 2 INJECTION INTRAMUSCULAR; INTRAVENOUS at 13:25

## 2023-01-10 RX ADMIN — SODIUM CHLORIDE, SODIUM LACTATE, POTASSIUM CHLORIDE, AND CALCIUM CHLORIDE 1.5 ML/KG/HR: .6; .31; .03; .02 INJECTION, SOLUTION INTRAVENOUS at 19:44

## 2023-01-10 RX ADMIN — OXYCODONE HYDROCHLORIDE 10 MG: 10 TABLET ORAL at 23:01

## 2023-01-10 RX ADMIN — LIDOCAINE HYDROCHLORIDE 60 MG: 20 INJECTION, SOLUTION EPIDURAL; INFILTRATION; INTRACAUDAL; PERINEURAL at 13:24

## 2023-01-10 RX ADMIN — CHLORHEXIDINE GLUCONATE 15 ML: 1.2 RINSE ORAL at 12:18

## 2023-01-10 RX ADMIN — PRAVASTATIN SODIUM 40 MG: 40 TABLET ORAL at 18:17

## 2023-01-10 RX ADMIN — FOLIC ACID TAB 400 MCG 400 MCG: 400 TAB at 18:16

## 2023-01-10 RX ADMIN — ACETAMINOPHEN 975 MG: 325 TABLET, FILM COATED ORAL at 20:04

## 2023-01-10 RX ADMIN — LIDOCAINE HYDROCHLORIDE 2 ML: 10 INJECTION, SOLUTION EPIDURAL; INFILTRATION; INTRACAUDAL; PERINEURAL at 12:25

## 2023-01-10 RX ADMIN — TRANEXAMIC ACID 1000 MG: 1 INJECTION, SOLUTION INTRAVENOUS at 13:16

## 2023-01-10 RX ADMIN — BUPIVACAINE 10 ML: 13.3 INJECTION, SUSPENSION, LIPOSOMAL INFILTRATION at 12:25

## 2023-01-10 RX ADMIN — STANDARDIZED SENNA CONCENTRATE 8.6 MG: 8.6 TABLET ORAL at 18:17

## 2023-01-10 NOTE — ANESTHESIA POSTPROCEDURE EVALUATION
Post-Op Assessment Note    CV Status:  Stable  Pain Score: 0    Pain management: adequate     Mental Status:  Alert and awake   Hydration Status:  Euvolemic   PONV Controlled:  Controlled   Airway Patency:  Patent      Post Op Vitals Reviewed: Yes      Staff: CRNA         No notable events documented      BP   118/70   Temp  97 4   Pulse  63   Resp   18   SpO2   100

## 2023-01-10 NOTE — ANESTHESIA PREPROCEDURE EVALUATION
Procedure:  ARTHROPLASTY KNEE TOTAL (Left: Knee)     - denies any chest pain, palpitations, shortness of breath, syncope, lightheadedness, seizures   - denies any recent infectious symptoms such as fevers, chills, cough   - denies taking any anticoagulation medications or any issues with bleeding, bruising, clotting    Relevant Problems   ANESTHESIA (within normal limits)      CARDIO   (+) Essential hypertension   (+) Hyperlipidemia      ENDO   (+) Type 2 diabetes mellitus without complication, without long-term current use of insulin (HCC)      GI/HEPATIC (within normal limits)      /RENAL (within normal limits)      GYN (within normal limits)      HEMATOLOGY   (+) Acute postoperative anemia due to expected blood loss      MUSCULOSKELETAL   (+) Arthritis of knee   (+) Degenerative arthritis of knee, bilateral   (+) Localized osteoarthritis of knees, bilateral   (+) Primary osteoarthritis of right knee      NEURO/PSYCH   (+) Anxiety      PULMONARY (within normal limits)      Nervous and Auditory   (+) Carpal tunnel syndrome on both sides      Other   (+) Bilateral knee pain   (+) Tobacco abuse      Lab Results   Component Value Date    WBC 4 88 12/17/2022    HGB 14 9 12/17/2022    HCT 45 9 12/17/2022    MCV 96 12/17/2022     12/17/2022     Lab Results   Component Value Date    SODIUM 142 12/17/2022    K 4 2 12/17/2022     12/17/2022    CO2 31 12/17/2022    AGAP 4 12/17/2022    BUN 10 12/17/2022    CREATININE 0 87 12/17/2022    GLUC 190 (H) 06/02/2021    GLUF 139 (H) 12/17/2022    CALCIUM 9 9 12/17/2022    AST 14 12/17/2022    ALT 23 12/17/2022    ALKPHOS 68 12/17/2022    TP 7 5 12/17/2022    TBILI 0 37 12/17/2022    EGFR 72 12/17/2022     Lab Results   Component Value Date    PTT 33 02/20/2021     Lab Results   Component Value Date    INR 0 95 02/20/2021    PROTIME 12 8 02/20/2021       Physical Exam    Airway    Mallampati score:  I  TM Distance: >3 FB  Neck ROM: full     Dental   Comment: Front four teeth missing,     Cardiovascular  Rhythm: regular, Rate: normal, Cardiovascular exam normal    Pulmonary  Pulmonary exam normal Breath sounds clear to auscultation,     Other Findings        Anesthesia Plan  ASA Score- 2     Anesthesia Type- spinal with ASA Monitors  Additional Monitors:   Airway Plan:     Comment: Spinal with blocks  Plan Factors-Exercise tolerance (METS): >4 METS  Chart reviewed  EKG reviewed  Imaging results reviewed  Existing labs reviewed  Patient summary reviewed  Patient is not a current smoker  Patient did not smoke on day of surgery  Obstructive sleep apnea risk education given perioperatively  Induction- intravenous  Postoperative Plan- Plan for postoperative opioid use  Informed Consent- Anesthetic plan and risks discussed with patient  I personally reviewed this patient with the CRNA  Discussed and agreed on the Anesthesia Plan with the CRNA  Noel Michele

## 2023-01-10 NOTE — PLAN OF CARE
Problem: PHYSICAL THERAPY ADULT  Goal: Performs mobility at highest level of function for planned discharge setting  See evaluation for individualized goals  Description: Treatment/Interventions: LE strengthening/ROM, Therapeutic exercise, Endurance training, Cognitive reorientation, Patient/family training, Equipment eval/education, Bed mobility, Continued evaluation, Spoke to nursing, Spoke to advanced practitioner  Equipment Recommended:  (most likely walker)       See flowsheet documentation for full assessment, interventions and recommendations  Note: Prognosis: Fair  Problem List: Decreased strength, Decreased range of motion, Decreased endurance, Impaired balance, Decreased mobility, Pain, Orthopedic restrictions, Impaired sensation  Assessment: Pt is a 65 y o  female seen for PT evaluation s/p admit to [facility] on 1/10/2023 w/ Primary osteoarthritis of left knee  Pt had above procedure and in seen in [place] [address] on POD#0  Order placed for PT  Prior to admission: Pt lives w/ daughter Margie Monk, in multiple level home with 4 JEFF (one rail), and option of first floor set up with recliner or sofa  Samuel Dianapaige, other daughter, is a few blocks away  Upon evaluation: Pt was only able to participate in bedlevel assessment due to continued "numbness and heaviness of LLE" @ stretcher, but did participate w/min A into long sitting activities  Pt's clinical presentation is currently unstable/unpredictable given the functional mobility deficits above, especially (but not limited to) weakness, decreased ROM and pain, coupled with fall risks including limited sensation/neuropathy, and combined with medical complications of bradycardia, new onset O2 use and continued decreased sensation/motor post op while in [place] [address]  Recommend therex bedlevel, and further assessment w/ EOB and OOB mobility from hospital bed with improvements in sensory and motor   Will likely benefit from rolling walker  Barriers to Discharge: Decreased caregiver support, Inaccessible home environment     PT Discharge Recommendation:  (TBD once EOB and OOB mobility assessed)    See flowsheet documentation for full assessment

## 2023-01-10 NOTE — DISCHARGE INSTR - AVS FIRST PAGE
Discharge Instructions:    Weight Bearing Status:                                           Weight bearing as tolerated lower extremity    DVT prophylaxis:  Complete course of Lovenox as directed  One injection into abdomen daily for 30 days postoperatively  Wear thigh high compression GILBERTO stockings on operative leg for 3 months  Wear them on both legs for the first month after surgery  Start one tablet of 81mg of Aspirin twice daily up to 6 months postoperatively  Start the Aspirin after you have finished the 30 days of Lovenox  Pain:  You have been prescribed a narcotic  Take this sparingly and only as needed for pain  May take one tab every 4-6hrs as needed for pain  Dressing Instructions: May remove dressings after 2 days  Put ABD pad over incision and under GILBERTO stocking and change daily  May shower 3 days after surgery as long as there is no drainage from the incision, but please clean incision with alcohol after showers until postoperative appointment and clean once daily with alcohol pad regardless of showering  NO SOAKING the incision  If there is drainage from the incision do not shower until drainage stops then may shower  PT/OT:  Continue PT/OT on outpatient basis as directed    Appt Instructions: If you do not have your appointment, please call the clinic at [phone] to follow up with Dr Caden Jiménez in 7-10 days from surgery date  If you develop significant pain in your calf, calf swelling/discoloration, these may be signs of a blood clot  Call the office or go to the emergency department

## 2023-01-10 NOTE — INTERVAL H&P NOTE
H&P reviewed  After examining the patient I find no changes in the patients condition since the H&P had been written      Vitals:    01/10/23 1148   BP: 147/71   Pulse: 58   Resp: 18   Temp: (!) 96 8 °F (36 °C)

## 2023-01-10 NOTE — ANESTHESIA PROCEDURE NOTES
Peripheral Block    Patient location during procedure: holding area  Start time: 1/10/2023 12:25 PM  Reason for block: at surgeon's request and post-op pain management  Staffing  Performed: Anesthesiologist   Anesthesiologist: Blaze Mccarty MD  Preanesthetic Checklist  Completed: patient identified, IV checked, site marked, risks and benefits discussed, surgical consent, monitors and equipment checked, pre-op evaluation and timeout performed  Peripheral Block  Patient position: supine  Prep: ChloraPrep  Patient monitoring: continuous pulse ox, frequent blood pressure checks, heart rate and cardiac monitor  Block type: adductor canal block  Laterality: left  Injection technique: single-shot  Procedures: ultrasound guided, Ultrasound guidance required for the procedure to increase accuracy and safety of medication placement and decrease risk of complications    Ultrasound permanent image savedlidocaine (PF) (XYLOCAINE-MPF) 1 % - Infiltration   2 mL - 1/10/2023 12:25:00 PM  bupivacaine (MARCAINE) 0 5 % - Perineural   10 mL - 1/10/2023 12:25:00 PM  Needle  Needle type: Stimuplex   Needle gauge: 20 G  Needle length: 10 cm  Needle localization: ultrasound guidance  Test dose: negative  Assessment  Injection assessment: incremental injection, local visualized surrounding nerve on ultrasound, negative aspiration for heme and no paresthesia on injection  Paresthesia pain: none  Heart rate change: no  Slow fractionated injection: yes  Post-procedure:  site cleaned  patient tolerated the procedure well with no immediate complications  Additional Notes  10cc bupivacaine 0 5% + 10cc Exparel 1 3%

## 2023-01-10 NOTE — ANESTHESIA PROCEDURE NOTES
Spinal Block    Patient location during procedure: OR  Start time: 1/10/2023 1:21 PM  Reason for block: procedure for pain and at surgeon's request  Staffing  Performed: Anesthesiologist   Anesthesiologist: Jose Angel Hernandez MD  Preanesthetic Checklist  Completed: patient identified, IV checked, site marked, risks and benefits discussed, surgical consent, monitors and equipment checked, pre-op evaluation and timeout performed  Spinal Block  Patient position: sitting  Prep: ChloraPrep  Patient monitoring: cardiac monitor, frequent blood pressure checks, continuous pulse ox and heart rate  Approach: midline  Location: L3-4  Injection technique: single-shot  Needle  Needle type: pencil-tip   Needle gauge: 25 G  Needle length: 10 cm  Assessment  Sensory level: T4  Injection Assessment:  negative aspiration for heme, no paresthesia on injection and positive aspiration for clear CSF    Post-procedure:  adhesive bandage applied, pressure dressing applied, secured with tape, site cleaned and sterile dressing applied  Additional Notes  Spinal placed at L3-L4, attempt x1, CSF swirl, administered 1 6cc hyperbaric bupivacaine 0 75%

## 2023-01-10 NOTE — OP NOTE
OPERATIVE REPORT  PATIENT NAME: Jaren Coles    :  1960  MRN: [id number]  Pt Location: AN OR ROOM 01    SURGERY DATE: 1/10/2023    Surgeon(s) and Role:     * Jaqueline Galaviz DO - Primary  [facility] utilized during the case for assistance with positioning draping retraction closure of proximal incision and dressing application    Preop Diagnosis:  Status post total knee replacement, right [Z96 651]  Left knee pain, unspecified chronicity [M25 562]    Post-Op Diagnosis Codes:     * Status post total knee replacement, right [Z96 651]     * Left knee pain, unspecified chronicity [M25 562]    Procedure(s) (LRB):  ARTHROPLASTY KNEE TOTAL (Left)    Specimen(s):  * No specimens in log *    Estimated Blood Loss:   Minimal    Drains:  Urethral Catheter Latex; Double-lumen 16 Fr  (Active)   Number of days: 701       Anesthesia Type:   General    Operative Indications:  Status post total knee replacement, right [Z96 651]  Left knee pain, unspecified chronicity [M25 562]      Operative Findings:  Arthritis affecting the medial joint line areas of bone exposed as well    Complications:   None    Procedure and Technique:  Patient was seen and examined in the preoperative area  The left lower extremity was marked, the consent an H&P had been reviewed  The patient was brought back to the operative suite  The patient was intubated sedated  Tourniquet was applied to left thigh  The left lower extremity was prepped and draped in a sterile fashion  After proper timeout commenced and identified the left lower extremity as the operative site  Esmarch was utilized to exsanguinate the extremity, the tourniquet was turned up to 275 mmHg  We started with injection of a mixture into the subdermal tissue  An incision was made midline over the patella  We dissected down to the fascia  We identified the patella made a parapatellar approach using 10 blade    We made sure to have 5 mm of remnant tissue on the patella  We then moved on the prepatellar fat pad resected most of the prepatellar fat pad  We moved medially freeing the capsule from the proximal tibia making sure not to extend distally more than 9 mm  We then moved onto meniscus and ACL PCL resected as much as possible  We then hyperflexed the knee and used our opening canal Reamer 1 cm anterior to the PCL insertion opened the canal   Using our interna medullary jig we placed our distal femoral cutting jig  We performed our distal femoral cut  We then moved onto our tibia using our external jig we excised 2 mm off the deficient deficient side or 8-10 off the good side or less deficient side  We then used the extension block found good balance in medial out stress  We moved back to the femur performed our anterior posterior and chamfer cuts after sizing the femur  We found the size to be 5  We then moved back to the tibia  We finished the tibia and sized this to be 5 we used the Sizer guide and placed 2 screws to hold in position then placed the tower using the opening Reamer and then the broach  We then moved to our patella we excised 9 5 mm of bone  We sized the patella be 26 mm thick before excising  We then used our lollipop sizing guide and measured the patella to be a size 32 we then used the lollipop guide to drill 3 holes into the patella  Implanted our size 32 patella trial   Implanted our size 5 femur trial and our size 5 tibia trial   We took the knee through range of motion found good balance in flexion and extension  We explanted all implants irrigate the wound copiously injected our mixture in the posterior capsule  We mixed the cement we placed a piece of bone into the femoral canal to block excessive bleeding  We placed cement into the tibia impacted our tibial tray excised any excess cement  Placed cement on the posterior flange of the implant of the femur  We placed cement on the femoral condyles implanted the femoral implant   We impacted an removed excess cement  We then placed cement on the patella and the back of the patellar implant  Placed the implant on the patella held that in place with clamp removed excess cement  We waited for the cement to dry  Once the cement had hardened we then irrigated the wound again ran the knee through range of motion and found good patellar tracking  We closed the deep fascia with 0 Vicryl at the proximal middle and distal end of the capsular incision as well as using strata fix to close over a previous Vicryl of the capsule  We then closed the subcutaneous fascia with 2 O strata fix and glue on skin  4x4s ABDs Webril Hydrocool and Ace wrap were applied to the operative leg  A stocking net Derrick stocking was applied to the left lower extremity  Patient left the OR with the guerrero intact  Anesthesia was reversed and patient was taken back to PACU in stable condition     I was present for the entire procedure and A qualified resident physician was not available    Patient Disposition:  PACU         SIGNATURE: Petra Bhatia, DO  DATE: January 10, 2023  TIME: 12:38 PM

## 2023-01-10 NOTE — PHYSICAL THERAPY NOTE
PHYSICAL THERAPY EVALUATION  NAME:  April Purdy  DATE: 01/10/23    AGE:   65 y o  Mrn:   [id number]  Principal problem: Principal Problem:    Primary osteoarthritis of left knee  Active Problems:    Type 2 diabetes mellitus without complication, without long-term current use of insulin (HCC)    Essential hypertension    Hyperlipidemia    Anxiety    Dry eyes      Vitals:    01/10/23 1630 01/10/23 1656 01/10/23 1741 01/10/23 1800   BP: 146/69 146/70 136/68 141/71   Pulse: (!) 46 (!) 47 (!) 54 62   Resp: 16 18 18 18   Temp:       TempSrc:       SpO2: 96% 100% 99% 98%   Weight:       Height:           Length Of Stay: 0  Performed at least 2 patient identifiers during session: Name and Birthday  PHYSICAL THERAPY EVALUATION :    01/10/23 1705   PT Last Visit   PT Visit Date 01/10/23   Note Type   Note type Evaluation   Pain Assessment   Pain Assessment Tool 0-10   Pain Score No Pain  (@ rest, reports leg is "Still numb and heavy"  but does c/o pain w/ therex/ MMT)   Pain Rating: FLACC (Activity) - Face 1   Pain Rating: FLACC (Activity) - Legs 1   Pain Rating: FLACC (Activity) - Activity 1   Pain Rating: FLACC (Activity) - Cry 1   Pain Rating: FLACC (Activity) - Consolability 0   Score: FLACC (Activity) 4   Restrictions/Precautions   Weight Bearing Precautions Per Order Yes   LLE Weight Bearing Per Order WBAT   Other Precautions Fall Risk;Multiple lines;O2;Telemetry   Home Living   Type of Home House   Home Layout Two level   Home Equipment Walker;Cane;Crutches  (did not use prior to admission  Pt has crutches, 2 wheeled RW, BSC, and cane)   Additional Comments Pt lives with daughter, Judy Lim, in multiple level home   Prior Function   Level of [place] Independent with ADLs; Independent with functional mobility   Lives With Daughter   Receives Help From Family  (another daughter Merlene Rose lives nearby but Judy Lim reports that she will likely not be assisting much)   Falls in the last 6 months 0   Comments When I asked pt/Stevan how pt was able to make progress after the last TKA and MELANIE, she reports that she "sent pt to the [facility] where she had to be more indep"   General   Additional Pertinent History L TKA 1/10/2023 w/ Adductor block and spinal  Pt/daughter declined interpretter services on eval   Family/Caregiver Present   (daughter Stevan and Pt's sister)   Cognition   Overall Cognitive Status WFL   Arousal/Participation Responsive   Following Commands Follows one step commands with increased time or repetition  (via daughter  who was translating)   Subjective   Subjective Pt pleasant and cooperative, denies lightheadedness w/ upright change of position  REports LLE feels "numb and heavy"   RUE Assessment   RUE Assessment WFL   LUE Assessment   LUE Assessment WFL   Strength RLE   R Hip Flexion 4+/5   R Knee Extension 4/5   R Ankle Dorsiflexion 4+/5   LLE Overall AROM   L Knee Flexion limited to <45 degrees in supine   LLE Overall PROM   L Knee Extension WFL   Strength LLE   L Hip Flexion 3-/5  (unable to SLR to 3)   L Knee Extension 3-/5   L Ankle Dorsiflexion 3-/5   Vision-Basic Assessment   Current Vision Does not wear glasses  (during assessment)   Light Touch   RLE Light Touch Grossly intact   LLE Light Touch Impaired  (around knee area; feet WFL)   Bed Mobility   Supine to Sit 4  Minimal assistance   Additional items Assist x 1; Increased time required;Verbal cues; Bedrails  (to progress into long sit w/UE support of railing   short sit NT in [place] [id number] due to LE weakness/numbness)   Transfers   Sit to Stand Unable to assess   Ambulation/Elevation   Gait pattern Not appropriate   Endurance Deficit   Endurance Deficit Yes   Endurance Deficit Description limited activity tolerance   Activity Tolerance   Activity Tolerance Patient limited by fatigue;Patient limited by pain   Medical Staff [address] from ortho re: limitations on eval w/ LE weakness/limited sensation   Nurse Made Aware spoke to RN Assessment   Prognosis Fair   Problem List Decreased strength;Decreased range of motion;Decreased endurance; Impaired balance;Decreased mobility;Pain;Orthopedic restrictions; Impaired sensation   Assessment Pt is a 65 y o  female seen for PT evaluation s/p admit to [facility] on 1/10/2023 w/ Primary osteoarthritis of left knee  Pt had above procedure and in seen in [place] [address] on POD#0  Order placed for PT  Prior to admission: Pt lives w/ daughter oJy Villagran, in multiple level home with 4 JEFF (one rail), and option of first floor set up with recliner or ada Ramirez, other daughter, is a few blocks away  Upon evaluation: Pt was only able to participate in bedlevel assessment due to continued "numbness and heaviness of LLE" @ stretcher, but did participate w/min A into long sitting activities  Pt's clinical presentation is currently unstable/unpredictable given the functional mobility deficits above, especially (but not limited to) weakness, decreased ROM and pain, coupled with fall risks including limited sensation/neuropathy, and combined with medical complications of bradycardia, new onset O2 use and continued decreased sensation/motor post op while in [place] [address]  Recommend therex bedlevel, and further assessment w/ EOB and OOB mobility from hospital bed with improvements in sensory and motor  Will likely benefit from rolling walker   Barriers to Discharge Decreased caregiver support; Inaccessible home environment   Goals   Patient Goals to walk better without pain   STG Expiration Date 01/11/23   Short Term Goal #1 Pt will: Perform supine<>Short/long sitting bed mobility tasks with consistent S to prepare for transfers and reposition in bed  Assess transfers and gait/stairs and set goals to increase Indep in home environment and return to home with JEFF  Perform at least 2 HEP w/o physical A to demonstrate compliance w/therex and improve ease of transfers     Perform TUG w/ DME within 30 seconds to promote DC to home as opposed to post acute rehab   PT Treatment Day 1   Plan   Treatment/Interventions LE strengthening/ROM; Therapeutic exercise; Endurance training;Cognitive reorientation;Patient/family training;Equipment eval/education; Bed mobility;Continued evaluation;Spoke to nursing;Spoke to advanced practitioner   PT Frequency Twice a day  (PT to see next session)   Recommendation   PT Discharge Recommendation   (TBD once EOB and OOB mobility assessed)   Equipment Recommended   (most likely walker)   AM-PAC Basic Mobility Inpatient   Turning in Flat Bed Without Bedrails 2   Lying on Back to Sitting on Edge of Flat Bed Without Bedrails 2   Moving Bed to Chair 1   Standing Up From Chair Using Arms 1   Walk in Room 1   Climb 3-5 Stairs With Railing 1   Basic Mobility Inpatient Raw Score 8   Turning Head Towards Sound 4   Follow Simple Instructions 3   Low Function Basic Mobility Raw Score 15   Low Function Basic Mobility Standardized Score 23 9   Highest Level Of Mobility   -HLM Goal 3: Sit at edge of bed   JH-HLM Achieved 2: Bed activities/Dependent transfer  (limited due to decreased sensation/motor@ starting surface of ASC stretcher)   Additional Treatment Session   Start Time 1720   End Time 1730   Treatment Assessment Pt requires up to 50% A for therex during session and verbal instruction for proper technique during therex  Exercises   Quad Sets 10 reps; Left;AROM   Heelslides Supine;10 reps;AAROM; Left   Hip Abduction Supine;10 reps;AAROM; Left   Hip Adduction Supine;10 reps;AAROM; Left  (to neutral)   Knee AROM Short Arc Quad Supine;10 reps;AAROM; Left;PROM   Ankle Pumps Supine;10 reps;AAROM;PROM; Left   Neuro re-ed Pt requires  verbal instruction for   End of Consult   Patient Position at End of Consult Supine; All needs within reach   (Please find full objective findings from PT assessment regarding body systems outlined above)       Pt to benefit from continued skilled PT tx while in hospital and upon DC to address deficits as defined above and maximize level of functional mobility  Co-morbidities affecting pt's physical performance at time of assessment include: DM, HTN, R TKA and MELANIE  Personal factors affecting pt at time of IE include: steps to enter environment, multi-level environment, past experience and behavioral pattern  The patient's -[facility] Basic Mobility Inpatient Short Form Raw Score is 8  A Raw score of less than or equal to 16 suggests the patient may benefit from discharge to post-acute rehabilitation services, which DOES NOT coincide with CURRENT above PT recommendations as definitive DC plan is pending EOB and OOB mobility  However please refer to therapist recommendation for discharge planning given other factors that may influence destination  Adapted from [employer] [facility] “6-Clicks” Basic Mobility and Daily Activity Scores With Discharge Destination  Physical Therapy, 2021;101:1-9  DOI: [id number]    Portions of the record may have been created with voice recognition software  Occasional wrong word or "sound a like" substitutions may have occurred due to the inherent limitations of voice recognition software    Read the chart carefully and recognize, using context, where substitutions have occurred

## 2023-01-11 VITALS
SYSTOLIC BLOOD PRESSURE: 100 MMHG | OXYGEN SATURATION: 94 % | RESPIRATION RATE: 16 BRPM | HEIGHT: 62 IN | BODY MASS INDEX: 30.91 KG/M2 | TEMPERATURE: 97.8 F | DIASTOLIC BLOOD PRESSURE: 56 MMHG | HEART RATE: 68 BPM | WEIGHT: 168 LBS

## 2023-01-11 LAB
ANION GAP SERPL CALCULATED.3IONS-SCNC: 5 MMOL/L (ref 4–13)
BUN SERPL-MCNC: 11 MG/DL (ref 5–25)
CALCIUM SERPL-MCNC: 9.2 MG/DL (ref 8.4–10.2)
CHLORIDE SERPL-SCNC: 103 MMOL/L (ref 96–108)
CO2 SERPL-SCNC: 32 MMOL/L (ref 21–32)
CREAT SERPL-MCNC: 0.81 MG/DL (ref 0.6–1.3)
ERYTHROCYTE [DISTWIDTH] IN BLOOD BY AUTOMATED COUNT: 12.6 % (ref 11.6–15.1)
GFR SERPL CREATININE-BSD FRML MDRD: 78 ML/MIN/1.73SQ M
GLUCOSE SERPL-MCNC: 102 MG/DL (ref 65–140)
GLUCOSE SERPL-MCNC: 142 MG/DL (ref 65–140)
GLUCOSE SERPL-MCNC: 153 MG/DL (ref 65–140)
HCT VFR BLD AUTO: 41.1 % (ref 34.8–46.1)
HGB BLD-MCNC: 13.4 G/DL (ref 11.5–15.4)
MCH RBC QN AUTO: 31.3 PG (ref 26.8–34.3)
MCHC RBC AUTO-ENTMCNC: 32.6 G/DL (ref 31.4–37.4)
MCV RBC AUTO: 96 FL (ref 82–98)
PLATELET # BLD AUTO: 258 THOUSANDS/UL (ref 149–390)
PMV BLD AUTO: 9.9 FL (ref 8.9–12.7)
POTASSIUM SERPL-SCNC: 4.1 MMOL/L (ref 3.5–5.3)
RBC # BLD AUTO: 4.28 MILLION/UL (ref 3.81–5.12)
SODIUM SERPL-SCNC: 140 MMOL/L (ref 135–147)
WBC # BLD AUTO: 6.63 THOUSAND/UL (ref 4.31–10.16)

## 2023-01-11 RX ADMIN — ACETAMINOPHEN 975 MG: 325 TABLET, FILM COATED ORAL at 05:04

## 2023-01-11 RX ADMIN — METHOCARBAMOL 750 MG: 500 TABLET ORAL at 12:46

## 2023-01-11 RX ADMIN — FOLIC ACID TAB 400 MCG 400 MCG: 400 TAB at 09:54

## 2023-01-11 RX ADMIN — ENOXAPARIN SODIUM 40 MG: 40 INJECTION SUBCUTANEOUS at 03:05

## 2023-01-11 RX ADMIN — MULTIPLE VITAMINS W/ MINERALS TAB 1 TABLET: TAB ORAL at 09:53

## 2023-01-11 RX ADMIN — SODIUM CHLORIDE, SODIUM LACTATE, POTASSIUM CHLORIDE, AND CALCIUM CHLORIDE 1.5 ML/KG/HR: .6; .31; .03; .02 INJECTION, SOLUTION INTRAVENOUS at 05:08

## 2023-01-11 RX ADMIN — OXYCODONE HYDROCHLORIDE AND ACETAMINOPHEN 500 MG: 500 TABLET ORAL at 09:53

## 2023-01-11 RX ADMIN — STANDARDIZED SENNA CONCENTRATE 8.6 MG: 8.6 TABLET ORAL at 09:53

## 2023-01-11 RX ADMIN — DOCUSATE SODIUM 100 MG: 100 CAPSULE, LIQUID FILLED ORAL at 09:53

## 2023-01-11 RX ADMIN — ACETAMINOPHEN 975 MG: 325 TABLET, FILM COATED ORAL at 12:46

## 2023-01-11 RX ADMIN — METHOCARBAMOL 750 MG: 500 TABLET ORAL at 06:05

## 2023-01-11 RX ADMIN — Medication 325 MG: at 07:50

## 2023-01-11 RX ADMIN — OXYCODONE HYDROCHLORIDE 5 MG: 5 TABLET ORAL at 12:49

## 2023-01-11 RX ADMIN — METHOCARBAMOL 750 MG: 500 TABLET ORAL at 00:09

## 2023-01-11 RX ADMIN — OXYCODONE HYDROCHLORIDE 5 MG: 5 TABLET ORAL at 07:49

## 2023-01-11 RX ADMIN — CEFAZOLIN SODIUM 1000 MG: 1 SOLUTION INTRAVENOUS at 05:05

## 2023-01-11 NOTE — ASSESSMENT & PLAN NOTE
Lab Results   Component Value Date    HGBA1C 7 2 (H) 12/17/2022     · Resume metformin on discharge  · Recommend accucheck, SSI inpatient, however patient and family refusing     · RN reports that patient had brought in and taken her home metformin herself today, BG overall stable  · No further inpatient recommendations

## 2023-01-11 NOTE — PLAN OF CARE
Problem: PHYSICAL THERAPY ADULT  Goal: Performs mobility at highest level of function for planned discharge setting  See evaluation for individualized goals  Description: Treatment/Interventions: LE strengthening/ROM, Therapeutic exercise, Endurance training, Cognitive reorientation, Patient/family training, Equipment eval/education, Bed mobility, Continued evaluation, Spoke to nursing, Spoke to advanced practitioner  Equipment Recommended:  (most likely walker)       See flowsheet documentation for full assessment, interventions and recommendations  Outcome: Progressing  Note: Prognosis: Fair  Problem List: Decreased strength, Decreased range of motion, Decreased endurance, Impaired balance, Decreased mobility, Pain, Orthopedic restrictions, Impaired sensation  Assessment: pt shows improvement in mobility status from previous session w/ initiation of ambulation and stair use  pt needed standby to min assist to [facility] SHARMAINE safety w/ all phases of mobility  cuing was needed for appropriate technique and walker management  continued inaptient PT is needed to reduce fall risk factors and progress mobility training as appropriate  discharge recommendation is for outpatient PT to expedite return to independent level of function and mobility  Barriers to Discharge: Decreased caregiver support, Inaccessible home environment     PT Discharge Recommendation: Home with outpatient rehabilitation    See flowsheet documentation for full assessment

## 2023-01-11 NOTE — PROGRESS NOTES
[facility]  Progress Note - Gabriella John 1960, 65 y o  female MRN: [id number]  Unit/Bed#: S -24 Encounter: [id number]  Primary Care Provider: Selena Flores DO   Date and time admitted to hospital: 1/10/2023 11:23 AM    * Primary osteoarthritis of left knee  Assessment & Plan  Medical consult from orthopedic team for diabetic management  S/p L total knee arthroplasty 1/10  · POD#1 L TKA  · Patient's pain is managed fairly well, currently 4/10 and was able to ambulate with therapy without significant difficulty  · No concerns for discharge from medical perspective  · Further management per primary     Essential hypertension  Assessment & Plan  · BP reviewed and stable on current regimen  · No further inpatient recommendations, resume usual regimen on discharge    Type 2 diabetes mellitus without complication, without long-term current use of insulin [facility])  Assessment & Plan    Lab Results   Component Value Date    HGBA1C 7 2 (H) 12/17/2022     · Resume metformin on discharge  · Recommend accucheck, SSI inpatient, however patient and family refusing  · RN reports that patient had brought in and taken her home metformin herself today, BG overall stable  · No further inpatient recommendations      VTE Pharmacologic Prophylaxis: VTE Score: 8 High Risk (Score >/= 5) - Pharmacological DVT Prophylaxis Ordered: enoxaparin (Lovenox)  Sequential Compression Devices Ordered  Patient Centered Rounds: I performed bedside rounds with nursing staff today  Discussions with Specialists or Other Care Team Provider: Orthopedics, case management    Education and Discussions with Family / Patient: Updated  (daughter) at bedside  Time Spent for Care: 30 minutes  More than 50% of total time spent on counseling and coordination of care as described above      Current Length of Stay: 0 day(s)  Current Patient Status: Outpatient Surgery   Certification Statement: Discharge per primary team  Discharge Plan: [facility] is following this patient on consult  They are medically stable for discharge when deemed appropriate by primary service  Code Status: No Order    Subjective:   Patient is seen at bedside this afternoon, daughter at bedside to translate  Patient states that pain is managed fairly well after surgery yesterday, ambulated with therapy and feels ready for discharge home  Objective:     Vitals:   Temp (24hrs), Av 8 °F (36 6 °C), Min:97 °F (36 1 °C), Max:98 4 °F (36 9 °C)    Temp:  [97 °F (36 1 °C)-98 4 °F (36 9 °C)] 97 8 °F (36 6 °C)  HR:  [44-68] 68  Resp:  [12-20] 16  BP: (100-153)/(56-82) 100/56  SpO2:  [93 %-100 %] 94 %  Body mass index is 30 73 kg/m²  Input and Output Summary (last 24 hours): Intake/Output Summary (Last 24 hours) at 2023 1309  Last data filed at 2023 0744  Gross per 24 hour   Intake 3551 6 ml   Output 1580 ml   Net 1971 6 ml       Physical Exam:   Physical Exam  Constitutional:       General: She is not in acute distress  Appearance: She is not ill-appearing, toxic-appearing or diaphoretic  Cardiovascular:      Rate and Rhythm: Normal rate and regular rhythm  Pulses: Normal pulses  Heart sounds: Normal heart sounds  Pulmonary:      Effort: Pulmonary effort is normal  No respiratory distress  Breath sounds: Normal breath sounds  Abdominal:      General: Bowel sounds are normal  There is no distension  Palpations: Abdomen is soft  Tenderness: There is no abdominal tenderness  Musculoskeletal:         General: Tenderness present  No swelling  Comments: Dressing on left knee clean, dry, intact, mild tenderness/pain with passive ROM  Skin:     General: Skin is warm  Neurological:      General: No focal deficit present  Mental Status: She is alert     Psychiatric:         Mood and Affect: Mood normal          Behavior: Behavior normal          Additional Data:     Labs:  Results from last 7 days   Lab Units 01/11/23  0628   WBC Thousand/uL 6 63   HEMOGLOBIN g/dL 13 4   HEMATOCRIT % 41 1   PLATELETS Thousands/uL 258     Results from last 7 days   Lab Units 01/11/23  0519   SODIUM mmol/L 140   POTASSIUM mmol/L 4 1   CHLORIDE mmol/L 103   CO2 mmol/L 32   BUN mg/dL 11   CREATININE mg/dL 0 81   ANION GAP mmol/L 5   CALCIUM mg/dL 9 2   GLUCOSE RANDOM mg/dL 102         Results from last 7 days   Lab Units 01/11/23  1101 01/11/23  0816 01/10/23  2100 01/10/23  1157   POC GLUCOSE mg/dl 142* 153* 188* 115               Lines/Drains:  Invasive Devices     None                       Imaging: No pertinent imaging reviewed      Recent Cultures (last 7 days):         Last 24 Hours Medication List:   Current Facility-Administered Medications   Medication Dose Route Frequency Provider Last Rate   • acetaminophen  975 mg Oral Q8H Kwadwo Estrada PA-C     • ascorbic acid  500 mg Oral BID Kwadwo Estrada PA-C     • atenolol  50 mg Oral Daily Kwadwo Estrada PA-C      And   • chlorthalidone  25 mg Oral Daily Kwadwo Estrada PA-C     • calcium carbonate  1,000 mg Oral Daily PRN Kwadwo Estrada PA-C     • docusate sodium  100 mg Oral BID Kwadwo Estrada PA-C     • [START ON 1/12/2023] enoxaparin  40 mg Subcutaneous Daily Emily Coronel DO     • ferrous sulfate  325 mg Oral BID With Meals Kwadwo Estrada PA-C     • folic acid  776 mcg Oral Daily Kwadwo Estrada PA-C     • HYDROmorphone  0 5 mg Intravenous Q2H PRN Kwadwo Estrada PA-C     • insulin lispro  1-5 Units Subcutaneous HS OSCAR Philip     • insulin lispro  1-6 Units Subcutaneous TID [facility] OSCAR Philip     • lactated ringers  1 5 mL/kg/hr Intravenous Continuous Kwadwo Estraad PA-C Stopped (01/11/23 0744)   • methocarbamol  750 mg Oral Q6H [address] Kwadwo Estrada PA-C     • multivitamin-minerals  1 tablet Oral Daily Kwadwo Estrada PA-C     • oxyCODONE  10 mg Oral Q4H PRN Kwadwo Estrada PA-C     • oxyCODONE  5 mg Oral Q4H PRN Abdi Chakraborty PA-C     • pravastatin  40 mg Oral Daily With Viki Kimbrough PA-C     • Dr  Orlean Dose Injection (400mg 0 5% Ropivacaine, 8mg Morphine, 30mg Ketorolac, 0 5mL Epinephrine in 16 5mL 0 9% sodium chloride)   Injection Once Abdi Chakraborty PA-C     • senna  1 tablet Oral Daily Abdi Chakraborty PA-C          Today, Patient Was Seen By: Roderick Arnett PA-C    **Please Note: This note may have been constructed using a voice recognition system  **

## 2023-01-11 NOTE — ASSESSMENT & PLAN NOTE
Lab Results   Component Value Date    HGBA1C 7 2 (H) 12/17/2022     · Resume metformin on discharge  · Recommend accucheck, SSI inpatient, however, patient and family refusing  · No further inpatient recommendations

## 2023-01-11 NOTE — PHYSICAL THERAPY NOTE
PHYSICAL THERAPY TREATMENT NOTE    Patient Name: Allison Pendleton  [id number] Date: 1/11/2023 01/11/23 6746   Pain Assessment   Pain Assessment Tool 0-10   Pain Score 4   Pain Location/Orientation Orientation: Left; Location: Knee   Hospital Pain Intervention(s) Ambulation/increased activity;Repositioned; Other (Comment)  (Nishi Sharma provided pain medication prior to session)   Restrictions/Precautions   LLE Weight Bearing Per Order WBAT   Other Precautions Chair Alarm; Bed Alarm;WBS;Fall Risk  (Masimo)   General   Chart Reviewed Yes   Additional Pertinent History room air resting pulse ox 96% and 81 BPM    Family/Caregiver Present Yes   Cognition   Arousal/Participation Alert; Cooperative   Attention Attends with cues to redirect   Orientation Level Oriented to person; Other (Comment)  (pt was identified w/ full name, birth date)   Following Commands Follows all commands and directions without difficulty   Subjective   Subjective pt seen supine in bed w/ daughter present  pt agreed to PT session  pt states having left knee pain  Bed Mobility   Supine to Sit 4  Minimal assistance   Additional items Assist x 1; Increased time required;HOB elevated; Bedrails   Transfers   Sit to Stand 4  Minimal assistance  (initially then progressed to supervision)   Additional items Assist x 1; Increased time required;Verbal cues  (for hand placement)   Stand to Sit 5  Supervision   Additional items Increased time required;Verbal cues  (for body positioning)   Toilet transfer 5  Supervision   Additional items Commode; Increased time required;Verbal cues  (for body positioning)   Ambulation/Elevation   Gait pattern Decreased L stance; Antalgic; Foward flexed   Gait Assistance 5  Supervision   Additional items Verbal cues  (for walker positioning)   Assistive Device Rolling walker   Distance 20 feet  30 feet  80 feet  30 feet   seated rest breaks were required   (additional not possible due to fatigue and pain)   Stair Management Assistance 4  Minimal assist   Additional items Assist x 1;Verbal cues; Increased time required  (for sequencing, railing use)   Stair Management Technique One rail R;With cane; Step to pattern   Number of Stairs 4   Balance   Static Sitting Fair +   Static Standing Fair -  (w/ roller walker)   Ambulatory Fair -  (w/ roller walker)   Activity Tolerance   Activity Tolerance Patient limited by fatigue;Patient limited by pain   Nurse Made Aware spoke to [facility] CTR, Amy OT, Mary Cummings CM AP   Equipment Use   Comments ankle pumps 10  quad sets and heel slides 5 each  pt was provided w/ exercise handout  Assessment   Problem List Decreased strength;Decreased range of motion;Decreased endurance; Impaired balance;Decreased mobility;Pain;Orthopedic restrictions; Impaired sensation   Assessment pt shows improvement in mobility status from previous session w/ initiation of ambulation and stair use  pt needed standby to min assist to [facility] SHARMAINE safety w/ all phases of mobility  cuing was needed for appropriate technique and walker management  continued inaptient PT is needed to reduce fall risk factors and progress mobility training as appropriate  discharge recommendation is for outpatient PT to expedite return to independent level of function and mobility  Goals   Patient Goals have less pain  be independent  STG Expiration Date 01/11/23   Short Term Goal #1 pt will:  Increase L LE strength 1/2 grade to facilitate independent mobility, Perform bed mobility independently to increase level of independence, Perform all transfers independently to improve independence, Ambulate 300 ft  with least restrictive assistive device independently w/o LOB to improve functional independence, Navigate 10 stair(s) modified independent with unilateral handrail to facilitate return to previous living environment, Increase ambulatory balance 1 grade to decrease risk for falls, Complete exercise program independently to increase strength and endurance, Tolerate 3 hr OOB to faciliate upright tolerance, Improve Barthel Index score to 80 or greater to facilitate independence and Complete Timed Up and Go or Comfortable Gait Speed to further assess mobility and monitor progress   PT Treatment Day 2   Plan   Treatment/Interventions Functional transfer training;LE strengthening/ROM; Elevations; Therapeutic exercise; Endurance training;Patient/family training;Equipment eval/education; Bed mobility;Gait training   Progress Progressing toward goals   PT Frequency Twice a day   Recommendation   PT Discharge Recommendation Home with outpatient rehabilitation   Additional Comments recommend roller walker use w/ mobility and single point cane use w/ stairs   AM-PAC Basic Mobility Inpatient   Turning in Flat Bed Without Bedrails 3   Lying on Back to Sitting on Edge of Flat Bed Without Bedrails 3   Moving Bed to Chair 3   Standing Up From Chair Using Arms 3   Walk in Room 3   Climb 3-5 Stairs With Railing 3   Basic Mobility Inpatient Raw Score 18   Basic Mobility Standardized Score 41 05   Highest Level Of Mobility   JH-HLM Goal 6: Walk 10 steps or more   JH-HLM Achieved 7: Walk 25 feet or more   End of Consult   Patient Position at End of Consult Bedside chair;Bed/Chair alarm activated; All needs within reach   The patient's AM-PAC Basic Mobility Inpatient Short Form Raw Score is 18  A Raw score of greater than 16 suggests the patient may benefit from discharge to home  Please also refer to the recommendation of the Physical Therapist for safe discharge planning  Pt requires PT/OT co-treat during part of session due to signficant assistance with mobility and cognitive-behavioral impairments  Skilled inpatient PT recommended while in hospital to progress pt toward treatment goals      Manjula Santos, PT

## 2023-01-11 NOTE — ASSESSMENT & PLAN NOTE
· BP reviewed and stable on current regimen  · No further inpatient recommendations, resume usual regimen on discharge

## 2023-01-11 NOTE — PROGRESS NOTES
Peripheral Nerve Block Follow-up Note - Acute Pain Service    Melia Rosa 65 y o  female MRN: [id number]  Unit/Bed#: S -01 Encounter: [id number]      Assessment:   Principal Problem:    Primary osteoarthritis of left knee  Active Problems:    Type 2 diabetes mellitus without complication, without long-term current use of insulin (HCC)    Essential hypertension    Melia Rosa is a 65y o  year old female with history of left knee osteoarthritis, which failed conservative treatment as outpatient  She is now status post left total knee arthroplasty with orthopedics  Preoperatively she had received a left-sided single shot adductor canal nerve block with Exparel for postoperative analgesia  Acute pain service was consulted for postoperative block follow-up  Patient seen and evaluated at bedside this morning  Patient currently up and ambulating in room with daughter  Patient does report some moderate pain and associated muscle tightness to her left lower extremity, and does report mild improvement with use of oxycodone  She is cleared for discharge from a pain management standpoint  Plan:   Left-sided single shot adductor canal block with Exparel has resolved appropriately, there is no residual sensory or motor deficit     - Multimodal analgesia with:  · Oxycodone 5 mg every 4 hours as needed, for moderate pain  · Oxycodone 10 mg every 4 hours as needed, for severe pain    · Tylenol 975 mg every 8 hours scheduled  · Robaxin 750 mg every 6 hours scheduled    Treatment plan discussed with bedside nursing and primary care service  APS will sign off at this time  Thank you for the consult  All opioids and other analgesics to be written at discretion of primary team  Please contact Acute Pain Service - [facility] via [employer] from [phone] with additional questions or concerns  See Karen or Felix for additional contacts and after hours information      Pain History  Current pain location(s): Left knee  Pain Scale:  5-6/10  Quality: Aching  24 hour history: No acute events overnight, patient hemodynamically stable  Patient has been up and ambulating out of bed  Tolerating current medication regimen, no complaints of shortness of breath, nausea/vomiting, constipation/diarrhea  Patient is tentatively being discharged today per orthopedic surgery      Opioid requirement previous 24 hours:   15 mg oxycodone    Meds/Allergies   all current active meds have been reviewed and current meds:   Current Facility-Administered Medications   Medication Dose Route Frequency   • acetaminophen (TYLENOL) tablet 975 mg  975 mg Oral Q8H   • ascorbic acid (VITAMIN C) tablet 500 mg  500 mg Oral BID   • atenolol (TENORMIN) tablet 50 mg  50 mg Oral Daily    And   • chlorthalidone tablet 25 mg  25 mg Oral Daily   • calcium carbonate (TUMS) chewable tablet 1,000 mg  1,000 mg Oral Daily PRN   • docusate sodium (COLACE) capsule 100 mg  100 mg Oral BID   • [START ON 1/12/2023] enoxaparin (LOVENOX) subcutaneous injection 40 mg  40 mg Subcutaneous Daily   • ferrous sulfate tablet 325 mg  325 mg Oral BID With Meals   • folic acid (FOLVITE) tablet 400 mcg  400 mcg Oral Daily   • HYDROmorphone (DILAUDID) injection 0 5 mg  0 5 mg Intravenous Q2H PRN   • insulin lispro (HumaLOG) 100 units/mL subcutaneous injection 1-5 Units  1-5 Units Subcutaneous HS   • insulin lispro (HumaLOG) 100 units/mL subcutaneous injection 1-6 Units  1-6 Units Subcutaneous TID AC   • lactated ringers bolus 1,000 mL  1,000 mL Intravenous Once PRN    And   • lactated ringers bolus 1,000 mL  1,000 mL Intravenous Once PRN   • lactated ringers infusion  1 5 mL/kg/hr Intravenous Continuous   • methocarbamol (ROBAXIN) tablet 750 mg  750 mg Oral Q6H MONTRELL   • multivitamin-minerals (CENTRUM) tablet 1 tablet  1 tablet Oral Daily   • oxyCODONE (ROXICODONE) immediate release tablet 10 mg  10 mg Oral Q4H PRN   • oxyCODONE (ROXICODONE) IR tablet 5 mg  5 mg Oral Q4H PRN   • pravastatin (PRAVACHOL) tablet 40 mg  40 mg Oral Daily With Dinner   • ropivacaine (NAROPIN) 0 5 % 400 mg, morphine 8 mg, ketorolac (TORADOL) 30 mg, EPINEPHrine PF (ADRENALIN) 0 5 mL, sodium chloride (PF) 0 9 % 16 5 mL injection   Injection Once   • senna (SENOKOT) tablet 8 6 mg  1 tablet Oral Daily   • sodium chloride 0 9 % bolus 1,000 mL  1,000 mL Intravenous Once PRN    And   • sodium chloride 0 9 % bolus 1,000 mL  1,000 mL Intravenous Once PRN       No Known Allergies    Objective     Temp:  [96 8 °F (36 °C)-98 4 °F (36 9 °C)] 97 8 °F (36 6 °C)  HR:  [44-68] 68  Resp:  [12-20] 16  BP: (100-153)/(56-82) 100/56    Physical Exam  Vitals reviewed  Constitutional:       General: She is not in acute distress  Appearance: She is not ill-appearing, toxic-appearing or diaphoretic  HENT:      Head: Normocephalic and atraumatic  Nose: Nose normal  No congestion or rhinorrhea  Mouth/Throat:      Mouth: Mucous membranes are moist    Eyes:      Extraocular Movements: Extraocular movements intact  Cardiovascular:      Rate and Rhythm: Normal rate  Pulmonary:      Effort: Pulmonary effort is normal  No tachypnea, bradypnea or respiratory distress  Abdominal:      General: There is no distension  Palpations: Abdomen is soft  There is no mass  Tenderness: There is no abdominal tenderness  Musculoskeletal:         General: Tenderness (L knee) present  Normal range of motion  Cervical back: Normal range of motion  Right lower leg: No edema  Left lower leg: No edema  Skin:     General: Skin is warm and dry  Coloration: Skin is not pale  Findings: No rash  Neurological:      Mental Status: She is alert and oriented to person, place, and time  Mental status is at baseline  Sensory: Sensation is intact  No sensory deficit  Motor: Motor function is intact  No weakness or tremor     Psychiatric:         Attention and Perception: Attention normal  Mood and Affect: Mood normal          Speech: Speech normal          Behavior: Behavior normal  Behavior is cooperative  Lab Results:   Results from last 7 days   Lab Units 01/11/23  0628   WBC Thousand/uL 6 63   HEMOGLOBIN g/dL 13 4   HEMATOCRIT % 41 1   PLATELETS Thousands/uL 258      Results from last 7 days   Lab Units 01/11/23  0519   POTASSIUM mmol/L 4 1   CHLORIDE mmol/L 103   CO2 mmol/L 32   BUN mg/dL 11   CREATININE mg/dL 0 81   CALCIUM mg/dL 9 2       Imaging Studies: I have personally reviewed pertinent reports  Please note that the APS provides consultative services regarding pain management only  With the exception of ketamine, peripheral nerve catheters, and epidural infusions (and except when indicated), final decisions regarding starting or changing doses of analgesic medications are at the discretion of the consulting service  Off hours consultation and/or medication management is generally not available      OSCAR Farias  Acute Pain Service

## 2023-01-11 NOTE — PLAN OF CARE
Problem: OCCUPATIONAL THERAPY ADULT  Goal: Performs self-care activities at highest level of function for planned discharge setting  See evaluation for individualized goals  Description: Treatment Interventions: ADL retraining, Functional transfer training, Endurance training, Patient/family training, Equipment evaluation/education, Compensatory technique education, Continued evaluation, Energy conservation, Activityengagement  Equipment Recommended: Shower/Tub chair with back ($), Bedside commode (pt reports having DME)       See flowsheet documentation for full assessment, interventions and recommendations  Note: Limitation: Decreased ADL status, Decreased endurance, Decreased self-care trans, Decreased high-level ADLs     Assessment: Pt is a 62yo female [facility]10[facility]23 s/p L TKA by Dr Nichole Dietz  Pt w/ active OT orders and activity orders  Per orthopedics, pt is WBAT L LE  Significant PMH impacting her occupational performance includes R TKA, DM, anxiety, tobacco use / abuse  Pt reports living in [address] [place] [address] w/ 4 JEFF and I w/ ADL/ light IADL (meal prep /cleaning)  Upon eval, pt alert and generally oriented  Primarily Austrian speaking but able to follow directions/ communicate wants / needs w/ translation  Pt completed bed mobility w/ min A  Initial sit to stand w/ min A and progressed to S  Pt required mod A LBD, mod I UBD, mod I UB bathing, S grooming standing at sink  Pt engaged in functional mobility w/ S using RW  Pt presents w/ increased pain, expected L LE ROM / strength deficits, decreased standing tolerance, decreased standing balance impacting her I w/ dressing, bathing, functional mobility / transfers, activity engagement, meal prep  Pt completing ADL below baseline level of I and would benefit form OT while in acute care to address deficits  Based on Barthel Index and AMPAC 6 clicks recommend DC home w/ family support / assist using RW, DME and OPPT  Will continue to follow in acute care  OT Discharge Recommendation: Home with outpatient rehabilitation (OPPT; no OT needs at DC)

## 2023-01-11 NOTE — PROGRESS NOTES
Progress Note - Orthopedics   Ynes Powers 65 y o  female MRN: [id number]  Unit/Bed#: S -01      Subjective:    65 y  o female seen and examined at bedside  Her daughter is present and aides in translation  She reports her pain is currently a 4/10  Manageable, tolerable  She has worked with PT/OT today  She is eating without issue  Urinating without issue  No new or different complaints       Labs:  0   Lab Value Date/Time    HCT 41 1 01/11/2023 0628    HCT 45 9 12/17/2022 1048    HCT 45 0 06/02/2021 1131    HGB 13 4 01/11/2023 0628    HGB 14 9 12/17/2022 1048    HGB 14 5 06/02/2021 1131    INR 0 95 02/20/2021 0954    WBC 6 63 01/11/2023 0628    WBC 4 88 12/17/2022 1048    WBC 3 79 (L) 06/02/2021 1131    CRP <3 0 12/17/2022 1048       Meds:    Current Facility-Administered Medications:   •  acetaminophen (TYLENOL) tablet 975 mg, 975 mg, Oral, Q8H, Vernadine Peres, PA-C, 975 mg at 01/11/23 7261  •  ascorbic acid (VITAMIN C) tablet 500 mg, 500 mg, Oral, BID, Vernadine Peres, PA-C, 500 mg at 01/11/23 1078  •  atenolol (TENORMIN) tablet 50 mg, 50 mg, Oral, Daily **AND** chlorthalidone tablet 25 mg, 25 mg, Oral, Daily, Vernadine Peres, PA-C  •  calcium carbonate (TUMS) chewable tablet 1,000 mg, 1,000 mg, Oral, Daily PRN, Vernadine Peres, PA-C  •  docusate sodium (COLACE) capsule 100 mg, 100 mg, Oral, BID, Vernadine Peres, PA-C, 100 mg at 01/11/23 9649  •  [START ON 1/12/2023] enoxaparin (LOVENOX) subcutaneous injection 40 mg, 40 mg, Subcutaneous, Daily, Roxane Viveros DO  •  ferrous sulfate tablet 325 mg, 325 mg, Oral, BID With Meals, Vernadine Peres, PA-C, 325 mg at 11/75/46 2743  •  folic acid (FOLVITE) tablet 400 mcg, 400 mcg, Oral, Daily, Vernadine BARBIE Peres, 400 mcg at 01/11/23 1780  •  HYDROmorphone (DILAUDID) injection 0 5 mg, 0 5 mg, Intravenous, Q2H PRN, Vernadine BARBIE Peres  •  insulin lispro (HumaLOG) 100 units/mL subcutaneous injection 1-5 Units, 1-5 Units, Subcutaneous, HS, OSCAR Wild  •  insulin lispro (HumaLOG) 100 units/mL subcutaneous injection 1-6 Units, 1-6 Units, Subcutaneous, TID AC **AND** Fingerstick Glucose (POCT), , , TID AC, OSCAR Wild  •  lactated ringers bolus 1,000 mL, 1,000 mL, Intravenous, Once PRN **AND** lactated ringers bolus 1,000 mL, 1,000 mL, Intravenous, Once PRN, Sallye Pennant, PA-C  •  lactated ringers infusion, 1 5 mL/kg/hr, Intravenous, Continuous, Sallye Pennant, PA-C, Stopped at 01/11/23 0744  •  methocarbamol (ROBAXIN) tablet 750 mg, 750 mg, Oral, Q6H [address], Sallye Pennant, PA-C, 750 mg at 01/11/23 7236  •  multivitamin-minerals (CENTRUM) tablet 1 tablet, 1 tablet, Oral, Daily, Sallye Pennant, PA-C, 1 tablet at 01/11/23 2737  •  oxyCODONE (ROXICODONE) immediate release tablet 10 mg, 10 mg, Oral, Q4H PRN, Sallye Pennant, PA-C, 10 mg at 01/10/23 2301  •  oxyCODONE (ROXICODONE) IR tablet 5 mg, 5 mg, Oral, Q4H PRN, Sallye Pennant, PA-C, 5 mg at 01/11/23 5385  •  pravastatin (PRAVACHOL) tablet 40 mg, 40 mg, Oral, Daily With Dinner, Sallye Pennant, PA-C, 40 mg at 01/10/23 1817  •  ropivacaine (NAROPIN) 0 5 % 400 mg, morphine 8 mg, ketorolac (TORADOL) 30 mg, EPINEPHrine PF (ADRENALIN) 0 5 mL, sodium chloride (PF) 0 9 % 16 5 mL injection, , Injection, Once, Sallye Pennant, PA-C  •  senna (SENOKOT) tablet 8 6 mg, 1 tablet, Oral, Daily, Sallye Pennant, PA-C, 8 6 mg at 01/11/23 1908  •  sodium chloride 0 9 % bolus 1,000 mL, 1,000 mL, Intravenous, Once PRN **AND** sodium chloride 0 9 % bolus 1,000 mL, 1,000 mL, Intravenous, Once PRN, Dash Cordon PA-C    Blood Culture:   No results found for: BLOODCX    Wound Culture:   No results found for: WOUNDCULT    Ins and Outs:  I/O last 24 hours:   In: 3551 6 [P O :480; I V :3071 6]  Out: 1580 [Urine:1530; Blood:50]          Physical:  Vitals:    01/11/23 0820   BP: 100/56   Pulse: 68   Resp: 16   Temp: 97 8 °F (36 6 °C)   SpO2: 94%     Musculoskeletal: left Lower Extremity  · Surgical incisions C/D/I, taken down  Surgical incision intact without drainage  New ABD applied, GILBERTO stockings reapplied  · Sensation intact to saphenous, sural, tibial, superficial peroneal nerve, and deep peroneal  · Motor intact to +FHL/EHL, +ankle dorsi/plantar flexion  · 2+ DP pulse, symmetric bilaterally  · Digits warm and well perfused  · Capillary refill < 2 seconds    Assessment:    65 y  o female s/p left total knee arthroplasty with Dr Bishop Ye 1/10/2023  Patient doing well  Plan:  · WBAT to the left lower extremity  · Hgb 13 4  Greater than 2 gram drop which qualifies for diagnosis of acute blood loss anemia, will monitor and administer IVF/prbc as indicated   · PT/OT - cleared for discharge  · Pain control  · DVT ppx: lovenox     · Cleared for discharge from [place] [address], medicine team    · Dispo: [id number] [address] Gunn for discharge from ortho perspective   · Will need follow up as outpatient with [facility] SrinivasanC

## 2023-01-11 NOTE — CASE MANAGEMENT
Case Management Discharge Planning Note    Patient name Melia Santos [address] 426/[address] [id number] MRN [id number]  : 1960 Date 2023       Current Admission Date: 1/10/2023  Current Admission Diagnosis:Primary osteoarthritis of left knee   Patient Active Problem List    Diagnosis Date Noted   • Primary osteoarthritis of left knee 01/10/2023   • Dry eyes 2021   • Anxiety 2021   • Arthrofibrosis of knee joint, right 2021   • Status post total knee replacement, right 2021   • Acute postoperative anemia due to expected blood loss 2021   • Pre-operative clearance 2021   • Abnormal ultrasound of uterus 2021   • Primary osteoarthritis of right knee 2021   • Lower abdominal pain 2021   • Palpitations 10/06/2020   • Arthritis of knee 10/06/2020   • Tobacco abuse 10/06/2020   • Constipation 2020   • Encounter for screening mammogram for breast cancer 2020   • Degenerative arthritis of knee, bilateral 2020   • Type 2 diabetes mellitus without complication, without long-term current use of insulin ([facility] ) 2020   • Essential hypertension 2020   • Hyperlipidemia 2020   • Carpal tunnel syndrome on both sides 2020   • Right arm pain 2020   • Bilateral knee pain 10/10/2017   • Localized osteoarthritis of knees, bilateral 10/10/2017      LOS (days): 0  Geometric Mean LOS (GMLOS) (days):   Days to GMLOS:     OBJECTIVE:            Current admission status: Outpatient Surgery   Preferred Pharmacy:   [facility] [address] [facility] [address] [place] [address] [address] [facility] [address]  Phone: [phone] Fax: [phone]    Primary Care Provider: Antwon Dacosta DO    Primary Insurance: [facility]  Secondary Insurance:     DISCHARGE DETAILS:     CM received consult for DCP  CM met with pt and family to review plans for after DC    Care team recommendations are for OP PT  Pt states she has an appointment Friday with OP PT  She will have transportation  She does not have any other needs at this time  Pt has transportation home by family and they are here to pick her up  Daughter wanted to know which pharmacy medications had been sent to  CM confirmed they had been sent to [facility]  No CM needs at this time

## 2023-01-11 NOTE — ASSESSMENT & PLAN NOTE
· POD 0 s/p total knee replacement right  · Management per primary   · Patient was encouraged to utilize PRN medications  Patient is now aware pain medications must be requested in order for her to receive them  Currently 5/10 pain

## 2023-01-11 NOTE — CONSULTS
[address] 1960, 65 y o  female MRN: [id number]  Unit/Bed#: S -01 Encounter: [id number]  Primary Care Provider: Jesús Vega DO   Date and time admitted to hospital: 1/10/2023 11:23 AM    Inpatient consult to Internal Medicine  Consult performed by: OSCAR Garay  Consult ordered by: Katharine Sheikh PA-C          * Primary osteoarthritis of left knee  Assessment & Plan  · POD 0 s/p total knee replacement right  · Management per primary   · Patient was encouraged to utilize PRN medications  Patient is now aware pain medications must be requested in order for her to receive them  Currently 5/10 pain  Type 2 diabetes mellitus without complication, without long-term current use of insulin (HCC)  Assessment & Plan    Lab Results   Component Value Date    HGBA1C 7 2 (H) 12/17/2022     · Resume metformin on discharge  · Recommend accucheck, SSI inpatient, however, patient and family refusing  · No further inpatient recommendations    Essential hypertension  Assessment & Plan  · BP reviewed and stable on current regimen  · No further inpatient recommendations      VTE Prophylaxis: VTE Score: 8 High Risk (Score >/= 5) - Pharmacological DVT Prophylaxis Ordered: enoxaparin (Lovenox)  Sequential Compression Devices Ordered  Recommendations for Discharge:  · Per primary   · No further inpatient recommendations from [facility] standpoint  Counseling / Coordination of Care Time: 60 minutes Greater than 50% of total time spent on patient counseling and coordination of care  Collaboration of Care: Were Recommendations Directly Discussed with Primary Treatment Team? Yes    History of Present Illness:  Ani Rao is a 65 y o  female who is originally admitted to the orthopedic surgery service due to total knee replacement  Currently with 5/10 pain    Patient and family educated that pain medication is on an as needed basis and must be requested by nursing  Tolerating diet  [facility] is consulted for diabetes management  Recommend patient resume metformin on discharge  Recommend accucheck and SSI, however, patient and family adamantly refusing  Blood sugar most recently 188  Review of Systems:  Review of Systems   Musculoskeletal: Positive for arthralgias  All other systems reviewed and are negative  Past Medical and Surgical History:   Past Medical History:   Diagnosis Date   • Arthritis    • Diabetes mellitus ([address] )    • Hyperlipidemia    • Hypertension        Past Surgical History:   Procedure Laterality Date   • COLONOSCOPY     • IN ARTHRP KNE CONDYLE&PLATU MEDIAL&LAT COMPARTMENTS Right 2021    Procedure: KNEE TOTAL ARTHROPLASTY;  Surgeon: Nasim Morrison DO;  Location: [facility];  Service: Orthopedics   • IN MANIPULATION KNEE JOINT UNDER GENERAL ANESTHESIA Right 2021    Procedure: MANIPULATION JOINT KNEE;  Surgeon: Nasim Morrison DO;  [facility];  Service: Orthopedics   • TUBAL LIGATION         Meds/Allergies:  all medications and allergies reviewed    Allergies: No Known Allergies    Social History:  Marital Status:    Substance Use History:   Social History     Substance and Sexual Activity   Alcohol Use Not Currently    Comment: Socially     Social History     Tobacco Use   Smoking Status Former   • Packs/day: 0 50   • Years: 45 00   • Pack years: 22 50   • Types: Cigarettes   • Quit date: 2020   • Years since quittin 1   Smokeless Tobacco Never     Social History     Substance and Sexual Activity   Drug Use Never       Family History:  Family History   Problem Relation Age of Onset   • Arthritis Mother    • Uterine cancer Mother    • Arthritis Father    • Bone cancer Father    • Breast cancer Sister    • No Known Problems Daughter    • No Known Problems Sister    • No Known Problems Sister    • No Known Problems Sister    • No Known Problems Sister    • No Known Problems Sister    • No Known Problems Daughter    • No Known Problems Daughter    • No Known Problems Maternal Aunt    • No Known Problems Maternal Aunt    • No Known Problems Maternal Aunt    • No Known Problems Maternal Aunt    • No Known Problems Maternal Aunt    • No Known Problems Paternal Aunt    • No Known Problems Paternal Aunt    • No Known Problems Paternal Aunt    • No Known Problems Paternal Aunt    • Prostate cancer Maternal Grandfather    • Breast cancer Cousin    • Breast cancer Cousin        Physical Exam:   Vitals:   Blood Pressure: 153/74 (01/10/23 2207)  Pulse: 55 (01/10/23 2207)  Temperature: 98 4 °F (36 9 °C) (01/10/23 2207)  Temp Source: Oral (01/10/23 2207)  Respirations: 18 (01/10/23 2207)  Height: 5' 2" (157 5 cm) (08/31/22 0826)  Weight - Scale: 76 2 kg (168 lb) (08/31/22 0826)  SpO2: 98 % (01/10/23 2207)    Physical Exam  Constitutional:       General: She is not in acute distress  Appearance: Normal appearance  She is not ill-appearing  HENT:      Head: Normocephalic and atraumatic  Right Ear: External ear normal       Left Ear: External ear normal       Nose: Nose normal       Mouth/Throat:      Pharynx: Oropharynx is clear  Eyes:      Extraocular Movements: Extraocular movements intact  Conjunctiva/sclera: Conjunctivae normal    Cardiovascular:      Rate and Rhythm: Normal rate and regular rhythm  Pulses: Normal pulses  Heart sounds: Normal heart sounds  Pulmonary:      Effort: Pulmonary effort is normal       Breath sounds: Normal breath sounds  Abdominal:      General: Bowel sounds are normal  There is no distension  Palpations: Abdomen is soft  Tenderness: There is no abdominal tenderness  There is no guarding or rebound  Musculoskeletal:      Cervical back: Normal range of motion  Skin:     General: Skin is warm  Capillary Refill: Capillary refill takes less than 2 seconds        Comments: Dressing cdi per nursing   Neurological:      General: No focal deficit present  Mental Status: She is alert and oriented to person, place, and time  Psychiatric:         Mood and Affect: Mood normal          Behavior: Behavior normal           Additional Data:   Lab Results:                    Lab Results   Component Value Date/Time    HGBA1C 7 2 (H) 12/17/2022 10:48 AM    HGBA1C 7 7 (A) 08/24/2022 02:04 PM    HGBA1C 8 6 (A) 04/07/2022 02:10 PM    HGBA1C 8 9 (A) 01/06/2022 02:55 PM     Results from last 7 days   Lab Units 01/10/23  2100 01/10/23  1157   POC GLUCOSE mg/dl 188* 115           Imaging: No pertinent imaging reviewed  No orders to display       EKG, Pathology, and Other Studies Reviewed on Admission:   · EKG: No EKG obtained  ** Please Note: This note may have been constructed using a voice recognition system   **

## 2023-01-11 NOTE — PLAN OF CARE
Problem: PAIN - ADULT  Goal: Verbalizes/displays adequate comfort level or baseline comfort level  Description: Interventions:  - Encourage patient to monitor pain and request assistance  - Assess pain using appropriate pain scale  - Administer analgesics based on type and severity of pain and evaluate response  - Implement non-pharmacological measures as appropriate and evaluate response  - Consider cultural and social influences on pain and pain management  - Notify physician/advanced practitioner if interventions unsuccessful or patient reports new pain  1/10/2023 2338 by Shahid Morocho RN  Outcome: Progressing  1/10/2023 2317 by Shahid Morocho RN  Outcome: Progressing     Problem: INFECTION - ADULT  Goal: Absence or prevention of progression during hospitalization  Description: INTERVENTIONS:  - Assess and monitor for signs and symptoms of infection  - Monitor lab/diagnostic results  - Monitor all insertion sites, i e  indwelling lines, tubes, and drains  - Monitor endotracheal if appropriate and nasal secretions for changes in amount and color  - [place] appropriate cooling/warming therapies per order  - Administer medications as ordered  - Instruct and encourage patient and family to use good hand hygiene technique  - Identify and instruct in appropriate isolation precautions for identified infection/condition  1/10/2023 2338 by Shahid Morocho RN  Outcome: Progressing  1/10/2023 2317 by Shahid Morocho RN  Outcome: Progressing  Goal: Absence of fever/infection during neutropenic period  Description: INTERVENTIONS:  - Monitor WBC    1/10/2023 2338 by Shahid Morocho RN  Outcome: Progressing  1/10/2023 2317 by Shahid Morocho RN  Outcome: Progressing     Problem: SAFETY ADULT  Goal: Patient will remain free of falls  Description: INTERVENTIONS:  - Educate patient/family on patient safety including physical limitations  - Instruct patient to call for assistance with activity   - Consult OT/PT to assist with strengthening/mobility   - Keep Call bell within reach  - Keep bed low and locked with side rails adjusted as appropriate  - Keep care items and personal belongings within reach  - Initiate and maintain comfort rounds  - Make Fall Risk Sign visible to staff  - Apply yellow socks and bracelet for high fall risk patients  - Consider moving patient to room near nurses station  1/10/2023 2338 by Morro Maurer RN  Outcome: Progressing  1/10/2023 2317 by Morro Maurer RN  Outcome: Progressing  Goal: Maintain or return to baseline ADL function  Description: INTERVENTIONS:  -  Assess patient's ability to carry out ADLs; assess patient's baseline for ADL function and identify physical deficits which impact ability to perform ADLs (bathing, care of mouth/teeth, toileting, grooming, dressing, etc )  - Assess/evaluate cause of self-care deficits   - Assess range of motion  - Assess patient's mobility; develop plan if impaired  - Assess patient's need for assistive devices and provide as appropriate  - Encourage maximum independence but intervene and supervise when necessary  - Involve family in performance of ADLs  - Assess for home care needs following discharge   - Consider OT consult to assist with ADL evaluation and planning for discharge  - Provide patient education as appropriate  1/10/2023 2338 by Morro Maurer RN  Outcome: Progressing  1/10/2023 2317 by Morro Maurer RN  Outcome: Progressing  Goal: Maintains/Returns to pre admission functional level  Description: INTERVENTIONS:  - Perform BMAT or MOVE assessment daily    - Set and communicate daily mobility goal to care team and patient/family/caregiver     - Collaborate with rehabilitation services on mobility goals if consulted  - Out of bed to chair 3 times a day   - Out of bed for meals 3 times a day  - Out of bed for toileting  - Record patient progress and toleration of activity level   1/10/2023 2338 by Morro Maurer RN  Outcome: Progressing  1/10/2023 2317 by Sally Ching, RN  Outcome: Progressing     Problem: DISCHARGE PLANNING  Goal: Discharge to home or other facility with appropriate resources  Description: INTERVENTIONS:  - Identify barriers to discharge w/patient and caregiver  - Arrange for needed discharge resources and transportation as appropriate  - Identify discharge learning needs (meds, wound care, etc )  - Arrange for interpretive services to assist at discharge as needed  - Refer to Case Management Department for coordinating discharge planning if the patient needs post-hospital services based on physician/advanced practitioner order or complex needs related to functional status, cognitive ability, or social support system  1/10/2023 2338 by Sally Flower RN  Outcome: Progressing  1/10/2023 2317 by Sally Flower RN  Outcome: Progressing     Problem: Knowledge Deficit  Goal: Patient/family/caregiver demonstrates understanding of disease process, treatment plan, medications, and discharge instructions  Description: Complete learning assessment and assess knowledge base    Interventions:  - Provide teaching at level of understanding  - Provide teaching via preferred learning methods  1/10/2023 2338 by Sally Flower RN  Outcome: Progressing  1/10/2023 2317 by Sally Flower RN  Outcome: Progressing

## 2023-01-11 NOTE — ASSESSMENT & PLAN NOTE
Medical consult from orthopedic team for diabetic management  S/p L total knee arthroplasty 1/10    · POD#1 L TKA  · Patient's pain is managed fairly well, currently 4/10 and was able to ambulate with therapy without significant difficulty  · No concerns for discharge from medical perspective  · Further management per primary

## 2023-01-11 NOTE — PLAN OF CARE
Problem: PAIN - ADULT  Goal: Verbalizes/displays adequate comfort level or baseline comfort level  Description: Interventions:  - Encourage patient to monitor pain and request assistance  - Assess pain using appropriate pain scale  - Administer analgesics based on type and severity of pain and evaluate response  - Implement non-pharmacological measures as appropriate and evaluate response  - Consider cultural and social influences on pain and pain management  - Notify physician/advanced practitioner if interventions unsuccessful or patient reports new pain  Outcome: Progressing     Problem: INFECTION - ADULT  Goal: Absence or prevention of progression during hospitalization  Description: INTERVENTIONS:  - Assess and monitor for signs and symptoms of infection  - Monitor lab/diagnostic results  - Monitor all insertion sites, i e  indwelling lines, tubes, and drains  - Monitor endotracheal if appropriate and nasal secretions for changes in amount and color  - [place] appropriate cooling/warming therapies per order  - Administer medications as ordered  - Instruct and encourage patient and family to use good hand hygiene technique  - Identify and instruct in appropriate isolation precautions for identified infection/condition  Outcome: Progressing  Goal: Absence of fever/infection during neutropenic period  Description: INTERVENTIONS:  - Monitor WBC    Outcome: Progressing     Problem: SAFETY ADULT  Goal: Patient will remain free of falls  Description: INTERVENTIONS:  - Educate patient/family on patient safety including physical limitations  - Instruct patient to call for assistance with activity   - Consult OT/PT to assist with strengthening/mobility   - Keep Call bell within reach  - Keep bed low and locked with side rails adjusted as appropriate  - Keep care items and personal belongings within reach  - Initiate and maintain comfort rounds  - Make Fall Risk Sign visible to staff  - Offer Toileting every *** Hours, in advance of need  - Initiate/Maintain ***alarm  - Obtain necessary fall risk management equipment: ***  - Apply yellow socks and bracelet for high fall risk patients  - Consider moving patient to room near nurses station  Outcome: Progressing  Goal: Maintain or return to baseline ADL function  Description: INTERVENTIONS:  -  Assess patient's ability to carry out ADLs; assess patient's baseline for ADL function and identify physical deficits which impact ability to perform ADLs (bathing, care of mouth/teeth, toileting, grooming, dressing, etc )  - Assess/evaluate cause of self-care deficits   - Assess range of motion  - Assess patient's mobility; develop plan if impaired  - Assess patient's need for assistive devices and provide as appropriate  - Encourage maximum independence but intervene and supervise when necessary  - Involve family in performance of ADLs  - Assess for home care needs following discharge   - Consider OT consult to assist with ADL evaluation and planning for discharge  - Provide patient education as appropriate  Outcome: Progressing  Goal: Maintains/Returns to pre admission functional level  Description: INTERVENTIONS:  - Perform BMAT or MOVE assessment daily    - Set and communicate daily mobility goal to care team and patient/family/caregiver  - Collaborate with rehabilitation services on mobility goals if consulted  - Perform Range of Motion *** times a day  - Reposition patient every *** hours    - Dangle patient *** times a day  - Stand patient *** times a day  - Ambulate patient *** times a day  - Out of bed to chair *** times a day   - Out of bed for meals *** times a day  - Out of bed for toileting  - Record patient progress and toleration of activity level   Outcome: Progressing     Problem: DISCHARGE PLANNING  Goal: Discharge to home or other facility with appropriate resources  Description: INTERVENTIONS:  - Identify barriers to discharge w/patient and caregiver  - Arrange for needed discharge resources and transportation as appropriate  - Identify discharge learning needs (meds, wound care, etc )  - Arrange for interpretive services to assist at discharge as needed  - Refer to Case Management Department for coordinating discharge planning if the patient needs post-hospital services based on physician/advanced practitioner order or complex needs related to functional status, cognitive ability, or social support system  Outcome: Progressing     Problem: Knowledge Deficit  Goal: Patient/family/caregiver demonstrates understanding of disease process, treatment plan, medications, and discharge instructions  Description: Complete learning assessment and assess knowledge base    Interventions:  - Provide teaching at level of understanding  - Provide teaching via preferred learning methods  Outcome: Progressing

## 2023-01-11 NOTE — OCCUPATIONAL THERAPY NOTE
Occupational Therapy Evaluation     Patient Name: Krzysztof Groves  Today's Date: 1/11/2023  Problem List  Principal Problem:    Primary osteoarthritis of left knee  Active Problems:    Type 2 diabetes mellitus without complication, without long-term current use of insulin ([facility] [address] )    Essential hypertension    Past Medical History  Past Medical History:   Diagnosis Date    Arthritis     Diabetes mellitus ([facility] [address] )     Hyperlipidemia     Hypertension      Past Surgical History  Past Surgical History:   Procedure Laterality Date    COLONOSCOPY      LA ARTHRP KNE CONDYLE&PLATU MEDIAL&LAT COMPARTMENTS Right 03/30/2021    Procedure: KNEE TOTAL ARTHROPLASTY;  Surgeon: Vanessa Jacobs DO;  [facility];  Service: Orthopedics    LA ARTHRP KNE CONDYLE&PLATU MEDIAL&LAT COMPARTMENTS Left 1/10/2023    Procedure: ARTHROPLASTY KNEE TOTAL;  Surgeon: Vanessa Jacobs DO;  [facility];  Service: Orthopedics    LA MANIPULATION KNEE JOINT UNDER GENERAL ANESTHESIA Right 06/17/2021    Procedure: MANIPULATION JOINT KNEE;  Surgeon: Vanessa Jacobs DO;  [facility];  Service: Orthopedics    TUBAL LIGATION           01/11/23 0853   OT Last Visit   OT Visit Date 01/11/23  (Wednesday)   Note Type   Note type Evaluation   Pain Assessment   Pain Assessment Tool 0-10   Pain Score 4   Pain Location/Orientation Orientation: Left; Location: Leg;Location: Knee   Effect of Pain on Daily Activities limits pace and I w/ LB ADLs, bed mobility   Patient's Stated Pain Goal No pain   Hospital Pain Intervention(s) Cold applied;Repositioned; Ambulation/increased activity; Emotional support  (RNErma medicated prior to eval)   Restrictions/Precautions   Weight Bearing Precautions Per Order Yes   LLE Weight Bearing Per Order WBAT  (s/p L TKA on 1/10/2023)   Braces or Orthoses   (ace bandage L LE, compression stockings, electricool)   Other Precautions Chair Alarm; Bed Alarm;WBS;Fall Risk;Pain  (Jermaine on room air; language barrier (PT and daughter assisted w/ translation))   Home Living   Type of Home House  (4 JEFF)   Home Layout Two level   Bathroom Shower/Tub Walk-in shower   Bathroom Toilet Standard   Bathroom Equipment Grab bars in shower; Shower chair;Commode   Bathroom Accessibility Accessible   Home Equipment Walker;Cane;Crutches   Additional Comments Pt lives w/ her daughter  Other daughterAcacia will be home w/ pt until Friday  Pt's sister will be w/ pt then  Prior Function   Level of [place] Independent with ADLs   Lives With Daughter  (Yumi)   Receives Help From Family   IADLs Independent with meal prep   Falls in the last 6 months 0   Vocational Retired   Comments Pt reports I w/ ADL at baseline  Lifestyle   Autonomy Pt reports I w/ ADLs prior to admission   Reciprocal Relationships Supportive family  Pt's daughterAcacia will be home until Friday with pt and then pt's sister flew in from [facility] to assist   Service to Others Pt reports retired  in [employer] [address] Enjoys cooking   General   Family/Caregiver Present Yes  (daughterAcacia present and assisted w/ translation)   Subjective   Subjective "I want to use the bathroom"   ADL   Where Assessed Edge of bed  (vs bathroom)   Eating Assistance 6  Modified independent   Eating Deficit Setup   Grooming Assistance 5  Supervision/Setup   Grooming Deficit Setup;Standing with assistive device  (cues for walker mgmt at sink)   [address] 6  Modified Independent  (simulated based on fxal obs skills)   UB Bathing Deficit Setup; Increased time to complete   LB Bathing Assistance Unable to assess   UB Dressing Assistance 6  Modified independent   UB Dressing Deficit Setup   LB Dressing Assistance 3  Moderate Assistance   LB Dressing Deficit Setup; Requires assistive device for steadying;Steadying; Increased time to complete;Verbal cueing;Supervision/safety; Thread LLE into pants; Thread LLE into underwear   Toileting Assistance  5 Supervision/Setup   Toileting Deficit Setup;Supervison/safety; Increased time to complete;Verbal cueing; Bedside commode  (commode over standard toilet in bathroom)   Additional Comments Educated pt on tech to complete LBD threading L LE first   Bed Mobility   Supine to Sit 4  Minimal assistance   Additional items Assist x 1; Increased time required;Verbal cues;LE management; Bedrails  (to pt's R)   Sit to Supine Unable to assess   Additional Comments Pt seated OOB In chair post eval w/ needs met, call in reach, and daughter/ PT, RJ present   Transfers   Sit to Stand 4  Minimal assistance  (min A initially -- > S additional)   Additional items Assist x 1; Increased time required;Verbal cues; Bedrails;Armrests  (instruction for hand placement)   Stand to Sit 5  Supervision   Additional items Assist x 1; Increased time required;Verbal cues; Bedrails;Armrests   Toilet transfer 5  Supervision   Additional items Assist x 1; Increased time required;Commode  (commode over toilet in bathroom)   Additional Comments Pt performed sit <> stand 2X  Initially required min A from EOB and progresesd to S  Pt benefits / required cues for walker mgmt / body positiong standing at sink to participate in ADLs   Functional Mobility   Functional Mobility 5  Supervision   Additional Comments engaged in functional mobility w/ S using RW   Additional items Rolling walker   Balance   Static Sitting Fair +   Static Standing Fair -   Ambulatory Fair -   Activity Tolerance   Activity Tolerance Patient limited by pain; Patient limited by fatigue   Medical Staff Made Aware care coordination w/ PTMARGARETTE due to decreased activity tolerance, regression from baseline and anticiapted physical assistance  Spoke to [facility], [address] [facility] per RNBogdan appropriate to see pt     RUE Assessment   RUE Assessment WFL   RUE Strength   RUE Overall Strength Within Functional Limits - able to perform ADL tasks with strength   LUE Assessment   LUE Assessment WFL   LUE Strength   LUE Overall Strength Within Functional Limits - able to perform ADL tasks with strength   Hand Function   Gross Motor Coordination Functional   Fine Motor Coordination Functional   Cognition   Overall Cognitive Status WFL  (primiarily Swedish speaking  Daughter vs PT, RJ asisted w/ translation)   Arousal/Participation Alert; Cooperative   Attention Attends with cues to redirect  (language barrier)   Orientation Level Oriented X4   Memory Within functional limits   Following Commands Follows multistep commands without difficulty   Comments Identified pt by full name and birthdate  Agreeable to participate w/ encouragement  Assessment   Limitation Decreased ADL status; Decreased endurance;Decreased self-care trans;Decreased high-level ADLs   Assessment Pt is a 62yo female [facility] s/p L TKA by Dr Clarisa Rea  Pt w/ active OT orders and activity orders  Per orthopedics, pt is WBAT L LE  Significant PMH impacting her occupational performance includes R TKA, DM, anxiety, tobacco use / abuse  Pt reports living in [address] [place] [address] w/ 4 JEFF and I w/ ADL/ light IADL (meal prep /cleaning)  Upon eval, pt alert and generally oriented  Primarily Swedish speaking but able to follow directions/ communicate wants / needs w/ translation  Pt completed bed mobility w/ min A  Initial sit to stand w/ min A and progressed to S  Pt required mod A LBD, mod I UBD, mod I UB bathing, S grooming standing at sink  Pt engaged in functional mobility w/ S using RW  Pt presents w/ increased pain, expected L LE ROM / strength deficits, decreased standing tolerance, decreased standing balance impacting her I w/ dressing, bathing, functional mobility / transfers, activity engagement, meal prep  Pt completing ADL below baseline level of I and would benefit form OT while in acute care to address deficits  Based on Barthel Index and AMPAC 6 clicks recommend DC home w/ family support / assist using RW, DME and OPPT   Will continue to follow in acute care  Goals   Patient Goals Pt stated that she would like to have less pain and reurn to baseline level of I   Plan   Treatment Interventions ADL retraining;Functional transfer training; Endurance training;Patient/family training;Equipment evaluation/education; Compensatory technique education;Continued evaluation; Energy conservation; Activityengagement   Goal Expiration Date 01/16/23   OT Frequency 2-3x/wk   Recommendation   OT Discharge Recommendation Home with outpatient rehabilitation  (OPPT; no OT needs at DC)   Equipment Recommended Shower/Tub chair with back ($);Bedside commode  (pt reports having DME)   Commode Type Standard   AM-PAC Daily Activity Inpatient   Lower Body Dressing 3   Bathing 3   Toileting 3   Upper Body Dressing 4   Grooming 4   Eating 4   Daily Activity Raw Score 21   Daily Activity Standardized Score (Calc for Raw Score >=11) 44 27   AM-[facility] Applied Cognition Inpatient   Following a Speech/Presentation 3   Understanding Ordinary Conversation 4   Taking Medications 4   Remembering Where Things Are Placed or Put Away 4   Remembering List of 4-5 Errands 4   Taking Care of Complicated Tasks 3   Applied Cognition Raw Score 22   Applied Cognition Standardized Score 47 83   Barthel Index   Feeding 10   Bathing 0   Grooming Score 5   Dressing Score 5   Bladder Score 10   Bowels Score 10   Toilet Use Score 5   Transfers (Bed/Chair) Score 10   Mobility (Level Surface) Score 0   Stairs Score 5   Barthel Index Score 60   Modified [place] Scale   Modified [place] Scale 3     The patient's raw score on the AM-PAC Daily Activity inpatient short form is 21, standardized score is 44 27, greater than 39 4  Patients at this level are likely to benefit from discharge to home  Please refer to the recommendation of the Occupational Therapist for safe discharge planning      Pt goals to be met by 1/16/2023 to max I w/ ADLs and improve engagement to return home and cook includes:    -Pt will complete functional transfers to all surfaces w/ mod I using AD, DME as needed    -Pt will complete bed mobility supine <> sit w/ S to max I w/ ADLs    -Pt will consistently engage in functional mobility using AD household distances w/ mod I to max I w/ ADLs    -Pt will complete LBD w/ S to max I and minimize burden of care    Nunu Araujo,OTR/L

## 2023-01-11 NOTE — CONSULTS
Please see progress note from today by this provider for formal details of encounter      OSCAR Pino  Acute Pain Service

## 2023-01-12 ENCOUNTER — TELEPHONE (OUTPATIENT)
Dept: OBGYN CLINIC | Facility: HOSPITAL | Age: 63
End: 2023-01-12

## 2023-01-12 DIAGNOSIS — M17.12 ARTHRITIS OF LEFT KNEE: Primary | ICD-10-CM

## 2023-01-13 ENCOUNTER — EVALUATION (OUTPATIENT)
Dept: PHYSICAL THERAPY | Facility: REHABILITATION | Age: 63
End: 2023-01-13

## 2023-01-13 DIAGNOSIS — Z96.651 STATUS POST TOTAL KNEE REPLACEMENT, RIGHT: ICD-10-CM

## 2023-01-13 DIAGNOSIS — M25.562 LEFT KNEE PAIN, UNSPECIFIED CHRONICITY: ICD-10-CM

## 2023-01-13 RX ORDER — ACETAMINOPHEN 500 MG
1000 TABLET ORAL EVERY 8 HOURS SCHEDULED
Qty: 90 TABLET | Refills: 1 | Status: SHIPPED | OUTPATIENT
Start: 2023-01-13 | End: 2023-01-28

## 2023-01-13 NOTE — TELEPHONE ENCOUNTER
Postoperative Follow-up Assessment-S/w pt's daughter who translated for patient  Pain:  Pt is using Oxycodone as needed, about every 4 hours, they note she is not taking anything additional for pain  NN advised can take tylenol 1000mg every 8 hours as standing order, MDD 3G  Then can use oxycodone 1 tablet every 4-6 as needed for severe/break through pain  Daughter plans to get OTC tylenol and have pt start using this for pain control  Swelling: no major swelling, confirms wearing GILBERTO stocking  denies drainage  Denies elevating  NN encouraged on icing and elevating multiple times per day,educated on NOT propping pillows behind knee, using pillows under thigh and calf during elevation to allow for knee to relax into extension  Encouraged on icing and elevating for pain and swelling control  Dressing:  Daughter notes dressing was removed yesterday, they deny any drainage or incisional issues  Ambulation: ambulating using RW, no falls  AVS:reviewed  AVS Med List: Reviewed    Anticoagulant: pt notes she is doing lovenox injection daily for DVT PPX  She denies bleeding from any source        GI: denies constipation or abdominal issues  Follow-ups: Pt attended PT today  She is on her way home currently  "went well"  Concerning Symptoms to Patient:None  Pt denies any concerning symptoms to her daughter  They report she is doing well  Pt encouraged to call with any questions, concerns or issues

## 2023-01-13 NOTE — PROGRESS NOTES
PT Evaluation     Today's date: 2023  Patient name: Alexandra Solis  : 1960  MRN: [id number]  Referring provider: Vinh Bardales DO  Dx:   Encounter Diagnosis     ICD-10-CM    1  Status post total knee replacement, right  Z96 651 Ambulatory referral to Physical Therapy      2  Left knee pain, unspecified chronicity  M25 562 Ambulatory referral to Physical Therapy          Start Time: 1524  Stop Time: 1125  Total time in clinic (min): 40 minutes    Assessment  Assessment details: Patient presents to PT with L knee pain s/p L TKA  Patient displays decreased knee ROM, knee strength and gait abnormalities  These impairments are leading to pain with walking, standing, stair climbing  Patient will benefit from skilled PT to address above impairments and help them return to PLOF  Impairments: abnormal coordination, abnormal gait, abnormal muscle firing, abnormal or restricted ROM, abnormal movement, activity intolerance, impaired balance, impaired physical strength, lacks appropriate home exercise program, pain with function and weight-bearing intolerance  Barriers to therapy: Language barrier  High pain levels  Understanding of Dx/Px/POC: good   Prognosis: fair    Goals  STG  1  Patient will display decreased pain to 0-2 in 6 weeks  2  Patient will display knee ROM WNL in 6 weeks  3  Patient will display knee strength WNL in 6 weeks    LTG  1  Patient will be able to stand for 30 minutes without pain in 12 weeks  2  Patient will be able to walk for 30 minutes without pain in 12 weeks  3   Patient will be able to go up/down 3 flights of stairs without pain in 12 weeks      Plan  Patient would benefit from: PT eval and skilled physical therapy  Referral necessary: Yes  Planned modality interventions: thermotherapy: hydrocollator packs, manual electrical stimulation, TENS, electrical stimulation/Russian stimulation and cryotherapy  Planned therapy interventions: activity modification, ADL training, balance, balance/weight bearing training, body mechanics training, coordination, flexibility, functional ROM exercises, gait training, graded activity, graded exercise, home exercise program, therapeutic training, therapeutic exercise, therapeutic activities, stretching, strengthening, neuromuscular re-education, motor coordination training, massage, manual therapy and joint mobilization  Frequency: 2x week  Duration in visits: 12  Duration in weeks: 6  Plan of Care beginning date: 2023  Plan of Care expiration date: 3/10/2023  Treatment plan discussed with: patient        Subjective Evaluation    History of Present Illness  Mechanism of injury: Patient had a L TKA on   She was in the hospital for 1 day and then discharged home  She is using the RW to walk  She has 3 JEFF and is living on the first floor right now  Patient didn't have any pain before the surgery, just weakness? Patient taking oxy but she only has 3 days worth  Patient seems to be in a lot of pain  Patient had R TKA last year   Patient denies S&S of DVT/infection          Not a recurrent problem   Quality of life: fair    Pain  Current pain ratin  At best pain ratin  At worst pain rating: 10  Quality: sharp and radiating  Relieving factors: ice and medications  Aggravating factors: walking, sitting, stair climbing and standing    Social Support  Steps to enter house: yes    Employment status: not working  Patient Goals  Patient goals for therapy: increased motion, decreased pain, increased strength, return to work, improved balance and decreased edema          Objective     Active Range of Motion   Left Knee   Flexion: 50 degrees with pain  Extension: 15 degrees with pain    Passive Range of Motion   Left Knee   Flexion: 55 degrees with pain  Extension: 10 degrees with pain    Strength/Myotome Testing     Left Knee   Flexion: 3+  Extension: 1  Quadriceps contraction: poor    Ambulation   Weight-Bearing Status   Weight-Bearing Status (Left): weight-bearing as tolerated   Assistive device used: two-wheeled walker    Functional Assessment        Comments  TUG 1 minute and 55 seconds             Precautions:  L TKA 1/10 R TKA 1 year ago      Manuals             PROM                                                    Neuro Re-Ed             bridges             clamshells                                                                              Ther Ex             Quad sets             SAQ             SLR             LAQ             Heel slides                                                    Ther Activity             Squats             Step ups             Gait Training                                       Modalities

## 2023-01-13 NOTE — TELEPHONE ENCOUNTER
Due to patient's pain, Pt's daughter and NN discussed this AM patient can take OTC tylenol for additional pain control, in addition to using the prescribed oxycodone  Did advise that she can take 1000mg every 8 hours with MDD being 3G  Daughter is requesting tylenol be called in as a prescription to [facility]

## 2023-01-17 ENCOUNTER — OFFICE VISIT (OUTPATIENT)
Dept: PHYSICAL THERAPY | Facility: REHABILITATION | Age: 63
End: 2023-01-17

## 2023-01-17 DIAGNOSIS — Z96.651 STATUS POST TOTAL KNEE REPLACEMENT, RIGHT: Primary | ICD-10-CM

## 2023-01-17 DIAGNOSIS — M25.562 LEFT KNEE PAIN, UNSPECIFIED CHRONICITY: ICD-10-CM

## 2023-01-17 NOTE — PROGRESS NOTES
Daily Note     Today's date: 2023  Patient name: Daniel Viveros  : 1960  MRN: [id number]  Referring provider: Naseem Fierro DO  Dx:   Encounter Diagnosis     ICD-10-CM    1  Status post total knee replacement, right  Z96 651       2  Left knee pain, unspecified chronicity  M25 562                      Subjective: patient states she is in a lot of pain      Objective: See treatment diary below      Assessment: Tolerated treatment well  Patient demonstrated fatigue post treatment, exhibited good technique with therapeutic exercises and would benefit from continued PT Patient is in a lot of pain and isn't toleratung things well       Plan: Continue per plan of care        Precautions:  L TKA 1/10 R TKA 1 year ago      Manuals             PROM done                                                   Neuro Re-Ed             crystal johnston             E-STIM 5 mins                                                                Ther Ex             Quad sets 5"x10            SAQ             SLR             LAQ             Heel slides 5"x20            Calf s 3x20s                                      Ther Activity             Squats             Step ups             Gait Training                                       Modalities

## 2023-01-19 ENCOUNTER — TELEPHONE (OUTPATIENT)
Dept: OBGYN CLINIC | Facility: MEDICAL CENTER | Age: 63
End: 2023-01-19

## 2023-01-19 DIAGNOSIS — Z96.652 STATUS POST TOTAL KNEE REPLACEMENT USING CEMENT, LEFT: Primary | ICD-10-CM

## 2023-01-19 RX ORDER — OXYCODONE HYDROCHLORIDE 5 MG/1
5 TABLET ORAL EVERY 4 HOURS PRN
Qty: 20 TABLET | Refills: 0 | Status: SHIPPED | OUTPATIENT
Start: 2023-01-19 | End: 2023-01-24

## 2023-01-19 NOTE — TELEPHONE ENCOUNTER
Caller: Yumi Garcia    Doctor: Sandra Kent     Reason for call:     Daughter is calling to request a refill on the Oxycodone 5 mg, has 0 left and uses the [facility] in [employer] on file  Please let her know when this has been refilled      Call back#: [phone]

## 2023-01-20 ENCOUNTER — OFFICE VISIT (OUTPATIENT)
Dept: PHYSICAL THERAPY | Facility: REHABILITATION | Age: 63
End: 2023-01-20

## 2023-01-20 DIAGNOSIS — Z96.651 STATUS POST TOTAL KNEE REPLACEMENT, RIGHT: Primary | ICD-10-CM

## 2023-01-20 DIAGNOSIS — M25.562 LEFT KNEE PAIN, UNSPECIFIED CHRONICITY: ICD-10-CM

## 2023-01-20 NOTE — PROGRESS NOTES
Daily Note     Today's date: 2023  Patient name: Dane Mcdowell  : 1960  MRN: [id number]  Referring provider: Afshin Obrien DO  Dx:   Encounter Diagnosis     ICD-10-CM    1  Status post total knee replacement, right  Z96 651       2  Left knee pain, unspecified chronicity  M25 562                      Subjective: Pt continues to report being in a lot of pain today  Objective: See treatment diary below      Assessment: Tolerated treatment poor  Patient demonstrated fatigue post treatment, exhibited good technique with therapeutic exercises and would benefit from continued PT Guarding was present with manuals today being limited in both knee directions  Her and her daughter were educated on the importance of her HEP and self stretching  NMES was performed at the end of session to promote quad activation  Plan: Continue per plan of care        Precautions:  L TKA 1/10 R TKA 1 year ago      Manuals            PROM done done                                                  Neuro Re-Ed             crystal johnston             E-STIM 5 mins NMES 5'                                                               Ther Ex             Quad sets 5"x10 5"x10           SAQ             SLR             LAQ             Heel slides 5"x20 5"x20           Calf s 3x20s 3x20s                                     Ther Activity             Squats             Step ups             Gait Training                                       Modalities

## 2023-01-24 ENCOUNTER — OFFICE VISIT (OUTPATIENT)
Dept: PHYSICAL THERAPY | Facility: REHABILITATION | Age: 63
End: 2023-01-24

## 2023-01-24 DIAGNOSIS — Z96.651 STATUS POST TOTAL KNEE REPLACEMENT, RIGHT: Primary | ICD-10-CM

## 2023-01-24 DIAGNOSIS — M25.562 LEFT KNEE PAIN, UNSPECIFIED CHRONICITY: ICD-10-CM

## 2023-01-24 NOTE — PROGRESS NOTES
Daily Note     Today's date: 2023  Patient name: Orly Duran  : 1960  MRN: [id number]  Referring provider: Zelda Fofana DO  Dx:   Encounter Diagnosis     ICD-10-CM    1  Status post total knee replacement, right  Z96 651       2  Left knee pain, unspecified chronicity  M25 562           Start Time: 09  Stop Time: 1020  Total time in clinic (min): 45 minutes    Subjective: Patient reports pain at start of session  Objective: See treatment diary below      Assessment: Tolerated treatment fair  Patient achieving about 50* knee flexion passively and AAROM  Achieving near full knee extesion post-treatment  Palpable quad activation but overall poor contraction, NMES used at end of session to promote increased quad activation  Patient demonstrated fatigue post treatment, exhibited good technique with therapeutic exercises and would benefit from continued PT  Plan: Continue per plan of care        Precautions:  L TKA 1/10 R TKA 1 year ago      Manuals           PROM done done done                                                 Neuro Re-Ed             crystal johnston             E-STIM 5 mins NMES 5' NMES 10' lv 22                                                              Ther Ex             Quad sets 5"x10 5"x10 10x5"          SAQ             SLR             LAQ             Heel slides 5"x20 5"x20 10x10"          Calf s 3x20s 3x20s 5x20"                                     Ther Activity             Squats             Step ups             Gait Training                                       Modalities

## 2023-01-27 ENCOUNTER — OFFICE VISIT (OUTPATIENT)
Dept: OBGYN CLINIC | Facility: CLINIC | Age: 63
End: 2023-01-27

## 2023-01-27 ENCOUNTER — OFFICE VISIT (OUTPATIENT)
Dept: PHYSICAL THERAPY | Facility: REHABILITATION | Age: 63
End: 2023-01-27

## 2023-01-27 ENCOUNTER — APPOINTMENT (OUTPATIENT)
Dept: RADIOLOGY | Facility: AMBULARY SURGERY CENTER | Age: 63
End: 2023-01-27
Attending: ORTHOPAEDIC SURGERY

## 2023-01-27 VITALS — HEIGHT: 62 IN | WEIGHT: 166 LBS | BODY MASS INDEX: 30.55 KG/M2

## 2023-01-27 DIAGNOSIS — Z96.651 STATUS POST TOTAL KNEE REPLACEMENT, RIGHT: Primary | ICD-10-CM

## 2023-01-27 DIAGNOSIS — Z96.652 STATUS POST TOTAL KNEE REPLACEMENT USING CEMENT, LEFT: ICD-10-CM

## 2023-01-27 DIAGNOSIS — Z96.652 STATUS POST TOTAL KNEE REPLACEMENT USING CEMENT, LEFT: Primary | ICD-10-CM

## 2023-01-27 DIAGNOSIS — M25.562 LEFT KNEE PAIN, UNSPECIFIED CHRONICITY: ICD-10-CM

## 2023-01-27 NOTE — PROGRESS NOTES
Daily Note     Today's date: 2023  Patient name: Penny Medina  : 1960  MRN: [id number]  Referring provider: Macario Perez DO  Dx:   Encounter Diagnosis     ICD-10-CM    1  Status post total knee replacement, right  Z96 651       2  Left knee pain, unspecified chronicity  M25 562           Start Time: 1000  Stop Time: 1045  Total time in clinic (min): 45 minutes    Subjective: patient states the pain is improving       Objective: See treatment diary below      Assessment: Tolerated treatment well  Patient demonstrated fatigue post treatment, exhibited good technique with therapeutic exercises and would benefit from continued PT Patient able to contract quad on her own today  Patient able to do SAQ with NMES      Plan: Continue per plan of care        Precautions:  L TKA 1/10 R TKA 1 year ago      Manuals          PROM done done done done                                                Neuro Re-Ed             crystal johnston             E-STIM 5 mins NMES 5' NMES 10' lv 22 NMES 5'  With SAQ                                                             Ther Ex             Quad sets 5"x10 5"x10 10x5" 5"x10         SAQ             SLR             LAQ             Heel slides 5"x20 5"x20 10x10" 10"x10         Calf s 3x20s 3x20s 5x20"                                     Ther Activity             Squats             Step ups    4"2x10         Gait Training                                       Modalities

## 2023-01-27 NOTE — PROGRESS NOTES
Assessment:  1  Status post total knee replacement using cement, left  XR knee 3 vw left non injury        Patient Active Problem List   Diagnosis   • Bilateral knee pain   • Localized osteoarthritis of knees, bilateral   • Degenerative arthritis of knee, bilateral   • Type 2 diabetes mellitus without complication, without long-term current use of insulin (HCC)   • Essential hypertension   • Hyperlipidemia   • Carpal tunnel syndrome on both sides   • Right arm pain   • Encounter for screening mammogram for breast cancer   • Constipation   • Palpitations   • Arthritis of knee   • Tobacco abuse   • Lower abdominal pain   • Primary osteoarthritis of right knee   • Abnormal ultrasound of uterus   • Pre-operative clearance   • Acute postoperative anemia due to expected blood loss   • Status post total knee replacement, right   • Arthrofibrosis of knee joint, right   • Anxiety   • Dry eyes   • Primary osteoarthritis of left knee           Plan      65 y o  female s/p left TKA (1/10/23)    · Continue with PT activities  · Goal of 90 degrees flexion by 4 weeks post op  · Continue with DVT ppx  · Complete lovenox injections  · Wear GILBERTO stockings  · Start ASA 81 mg daily  · Follow up in 3 weeks for repeat evaluation and xray        Subjective:     Patient ID:    Chief Complaint:Roxana Edwards 65 y o  female      HPI    Patient is presenting to [facility] for follow up after a left total knee arthroplasty  Patient is doing well  She has some pain at this time, but tolerable  She has been working with physical therapy  she denies any issues today  Patient is here with sister and daughter, who provided [place] translation  Social History     Socioeconomic History   • Marital status:       Spouse name: Not on file   • Number of children: Not on file   • Years of education: Not on file   • Highest education level: Not on file   Occupational History   • Occupation:      Employer: [employer]   Tobacco Use   • Smoking status: Former     Packs/day: 0 50     Years: 45 00     Pack years: 22 50     Types: Cigarettes     Quit date: 2020     Years since quittin 1   • Smokeless tobacco: Never   Vaping Use   • Vaping Use: Never used   Substance and Sexual Activity   • Alcohol use: Not Currently     Comment: Socially   • Drug use: Never   • Sexual activity: Yes   Other Topics Concern   • Not on file   Social History Narrative    ** Merged History Encounter **          Social Determinants of Health     Financial Resource Strain: Not on file   Food Insecurity: Not on file   Transportation Needs: Not on file   Physical Activity: Not on file   Stress: Not on file   Social Connections: Not on file   Intimate Partner Violence: Not on file   Housing Stability: Not on file     Past Medical History:   Diagnosis Date   • Arthritis    • Diabetes mellitus ([facility] [address] )    • Hyperlipidemia    • Hypertension      Past Surgical History:   Procedure Laterality Date   • COLONOSCOPY     • AZ ARTHRP KNE CONDYLE&PLATU MEDIAL&LAT COMPARTMENTS Right 2021    Procedure: KNEE TOTAL ARTHROPLASTY;  Surgeon: Jo Valdez DO;  [facility];  Service: Orthopedics   • AZ ARTHRP KNE CONDYLE&PLATU MEDIAL&LAT COMPARTMENTS Left 1/10/2023    Procedure: ARTHROPLASTY KNEE TOTAL;  Surgeon: Jo Valdez DO;  [facility];  Service: Orthopedics   • AZ MANIPULATION KNEE JOINT UNDER GENERAL ANESTHESIA Right 2021    Procedure: MANIPULATION JOINT KNEE;  Surgeon: Jo Valdez DO;  [facility];  Service: Orthopedics   • TUBAL LIGATION       No Known Allergies  Current Outpatient Medications on File Prior to Visit   Medication Sig Dispense Refill   • acetaminophen (TYLENOL) 500 mg tablet Take 2 tablets (1,000 mg total) by mouth every 8 (eight) hours for 15 days 90 tablet 1   • ascorbic acid (VITAMIN C) 500 mg tablet Take 1 tablet (500 mg total) by mouth 2 (two) times a day 60 tablet 0   • aspirin (ECOTRIN LOW STRENGTH) 81 mg EC tablet Take 81 mg by mouth daily     • atenolol-chlorthalidone (TENORETIC) 50-25 mg per tablet Take 1 tablet by mouth daily 90 tablet 3   • docusate sodium (COLACE) 100 mg capsule Take 1 capsule (100 mg total) by mouth 2 (two) times a day 60 capsule 1   • ferrous sulfate 324 (65 Fe) mg Take 1 tablet (324 mg total) by mouth 2 (two) times a day before meals 60 tablet 0   • folic acid (FOLVITE) 277 MCG tablet Take 0 5 tablets (400 mcg total) by mouth daily 30 tablet 0   • glucose blood (OneTouch Verio) test strip Check Blood Sugar 2 times daily 100 each 10   • Lancets (OneTouch Delica Plus [id number]) MISC Use 1 each 2 (two) times a day 100 each 10   • metFORMIN (GLUCOPHAGE) 500 mg tablet Take 1 tablet (500 mg total) by mouth 2 (two) times a day with meals 90 tablet 3   • Multiple Vitamins-Minerals (multivitamin with minerals) tablet Take 1 tablet by mouth daily 30 tablet 1   • pravastatin (PRAVACHOL) 40 mg tablet TAKE 1 TABLET(40 MG) BY MOUTH DAILY EARLY MORNING 45 tablet 3     No current facility-administered medications on file prior to visit                Objective:        Left Knee Exam     Range of Motion   Extension: -5   Flexion: 70     Other   Erythema: absent  Scars: present  Sensation: normal  Swelling: mild

## 2023-01-31 ENCOUNTER — OFFICE VISIT (OUTPATIENT)
Dept: PHYSICAL THERAPY | Facility: REHABILITATION | Age: 63
End: 2023-01-31

## 2023-01-31 DIAGNOSIS — M25.562 LEFT KNEE PAIN, UNSPECIFIED CHRONICITY: ICD-10-CM

## 2023-01-31 DIAGNOSIS — Z96.651 STATUS POST TOTAL KNEE REPLACEMENT, RIGHT: Primary | ICD-10-CM

## 2023-01-31 NOTE — PROGRESS NOTES
Daily Note     Today's date: 2023  Patient name: Arley Jeronimo  : 1960  MRN: [id number]  Referring provider: Ariel Grewal DO  Dx:   Encounter Diagnosis     ICD-10-CM    1  Status post total knee replacement, right  Z96 651       2  Left knee pain, unspecified chronicity  M25 562                      Subjective: patient states they saw the doctor and they said things were going well      Objective: See treatment diary below      Assessment: Tolerated treatment well  Patient demonstrated fatigue post treatment, exhibited good technique with therapeutic exercises and would benefit from continued PT Patient with improving knee flexion ROM  Patient requiring UE assist for step ups      Plan: Continue per plan of care        Precautions:  L TKA 1/10 R TKA 1 year ago      Manuals         PROM done done done done done                                               Neuro Re-Ed             crystal johnston             E-STIM 5 mins NMES 5' NMES 10' lv 22 NMES 5'  With SAQ NMES 5' with SAQ                                                            Ther Ex             Quad sets 5"x10 5"x10 10x5" 5"x10         SAQ             SLR             LAQ             Heel slides 5"x20 5"x20 10x10" 10"x10 10"x10        Calf s 3x20s 3x20s 5x20"           bike     5'                     Ther Activity             Squats             Step ups    4"2x10 4"2x10        Gait Training                                       Modalities

## 2023-02-03 ENCOUNTER — OFFICE VISIT (OUTPATIENT)
Dept: PHYSICAL THERAPY | Facility: REHABILITATION | Age: 63
End: 2023-02-03

## 2023-02-03 DIAGNOSIS — M25.562 LEFT KNEE PAIN, UNSPECIFIED CHRONICITY: ICD-10-CM

## 2023-02-03 DIAGNOSIS — Z96.651 STATUS POST TOTAL KNEE REPLACEMENT, RIGHT: Primary | ICD-10-CM

## 2023-02-03 NOTE — PROGRESS NOTES
Daily Note     Today's date: 2/3/2023  Patient name: Arley Jeronimo  : 1960  MRN: [id number]  Referring provider: Ariel Grewal DO  Dx:   Encounter Diagnosis     ICD-10-CM    1  Status post total knee replacement, right  Z96 651       2  Left knee pain, unspecified chronicity  M25 562           Start Time: 918  Stop Time: 1010  Total time in clinic (min): 52 minutes    Subjective: Patient states she is "good" at start of session  Objective: See treatment diary below      Assessment: Tolerated treatment fair  Patient demonstrated fatigue post treatment and would benefit from continued PT  Progressive knee flexion ROM ranging from about 65-70* pre to post treatment  Significant difficulty with step ups with compensation from BUE and RLE  Modified to half step up with tactile cues to focus on left quad activation  Plan: Continue per plan of care        Precautions:  L TKA 1/10 R TKA 1 year ago      Manuals 1/17 1/20 1/24 1/27 1/31 2/3       PROM done done done done done done                                              Neuro Re-Ed             crystal johnston             E-STIM 5 mins NMES 5' NMES 10' lv 22 NMES 5'  With SAQ NMES 5' with SAQ NMES 5' w SAQ 10" on                                                           Ther Ex             Quad sets 5"x10 5"x10 10x5" 5"x10         SAQ             SLR             LAQ             Heel slides 5"x20 5"x20 10x10" 10"x10 10"x10 10x10" PT overpressure       Calf s 3x20s 3x20s 5x20"           bike     5' 5' rocks                    Ther Activity             Squats             Step ups    4"2x10 4"2x10 2" half step up 2x10       Gait Training                                       Modalities

## 2023-02-06 ENCOUNTER — TELEPHONE (OUTPATIENT)
Dept: OBGYN CLINIC | Facility: HOSPITAL | Age: 63
End: 2023-02-06

## 2023-02-06 NOTE — TELEPHONE ENCOUNTER
Caller: Patient's daughter    Doctor: Jeri Obando    Reason for call: Patient's daughter is calling because patient recently had TKR and had a "blister" pop on the incision site  Knee area is very hot        Call back#: [phone]

## 2023-02-06 NOTE — TELEPHONE ENCOUNTER
Spoke to patients daughter, she reports small blister at bottom of incision "popped " We discussed keeping area clean, and dry, covered with gauze for now  Advised to monitor for s/s of infection list redness, warmth, fevers or drainage

## 2023-02-07 ENCOUNTER — OFFICE VISIT (OUTPATIENT)
Dept: PHYSICAL THERAPY | Facility: REHABILITATION | Age: 63
End: 2023-02-07

## 2023-02-07 DIAGNOSIS — M25.562 LEFT KNEE PAIN, UNSPECIFIED CHRONICITY: ICD-10-CM

## 2023-02-07 DIAGNOSIS — Z96.651 STATUS POST TOTAL KNEE REPLACEMENT, RIGHT: Primary | ICD-10-CM

## 2023-02-07 NOTE — PROGRESS NOTES
Daily Note     Today's date: 2023  Patient name: Alfred Combs  : 1960  MRN: [id number]  Referring provider: Nathan Amin DO  Dx:   Encounter Diagnosis     ICD-10-CM    1  Status post total knee replacement, right  Z96 651       2  Left knee pain, unspecified chronicity  M25 562           Start Time: 907  Stop Time: 957  Total time in clinic (min): 50 minutes    Subjective: Patient states the knee is "good" at start of session  Objective: See treatment diary below      Assessment: Tolerated treatment fair  Greater focus on AAROM as patient has improved tolerance to this compared to PROM  Achieving about 70-75* knee flexion this visit  Greater quad contraction noted with NMES this visit  Initiated leg press for additional quad activation with fair tolerance  Patient demonstrated fatigue post treatment, exhibited good technique with therapeutic exercises and would benefit from continued PT  Plan: Continue per plan of care        Precautions:  L TKA 1/10 R TKA 1 year ago      Manuals 1/17 1/20 1/24 1/27 1/31 2/3 2/7      PROM done done done done done done                                              Neuro Re-Ed             crystal johnston             E-STIM 5 mins NMES 5' NMES 10' lv 22 NMES 5'  With SAQ NMES 5' with SAQ NMES 5' w SAQ 10" on                                                           Ther Ex             Quad sets 5"x10 5"x10 10x5" 5"x10         SAQ       NMES 10" on/10" off 5' total      SLR             LAQ             Heel slides 5"x20 5"x20 10x10" 10"x10 10"x10 10x10" PT overpressure 10x5" 70*      Seated heel slide w overpressure       10x10"      Calf s 3x20s 3x20s 5x20"     5x20"      bike     5' 5' rocks 5' rocks      Leg press SL [id number]       15# 2x10       Ther Activity             Squats             Step ups    4"2x10 4"2x10 2" half step up 2x10       Gait Training                                       Modalities

## 2023-02-10 ENCOUNTER — OFFICE VISIT (OUTPATIENT)
Dept: PHYSICAL THERAPY | Facility: REHABILITATION | Age: 63
End: 2023-02-10

## 2023-02-10 DIAGNOSIS — Z96.651 STATUS POST TOTAL KNEE REPLACEMENT, RIGHT: Primary | ICD-10-CM

## 2023-02-10 DIAGNOSIS — M25.562 LEFT KNEE PAIN, UNSPECIFIED CHRONICITY: ICD-10-CM

## 2023-02-10 NOTE — PROGRESS NOTES
Daily Note     Today's date: 2/10/2023  Patient name: Guero Rodríguez  : 1960  MRN: [id number]  Referring provider: Tru Rivers DO  Dx:   Encounter Diagnosis     ICD-10-CM    1  Status post total knee replacement, right  Z96 651       2  Left knee pain, unspecified chronicity  M25 562           Start Time: 0915  Stop Time: 1006  Total time in clinic (min): 51 minutes    Subjective: No new complaints at start of session  Objective: See treatment diary below      Assessment: Tolerated treatment well  Mild improvement in quad activation with SL leg press and SAQ  Requires cues to isolate quad contraction for knee extension vs compensating with hip flexion during SAQ  Patient demonstrated fatigue post treatment, exhibited good technique with therapeutic exercises and would benefit from continued PT  Plan: Continue per plan of care        Precautions:  L TKA 1/10 R TKA 1 year ago      Manuals 1/17 1/20 1/24 1/27 1/31 2/3 2/7 2/9     PROM done done done done done done                    Neuro Re-Ed             crystal johnston             E-STIM 5 mins NMES 5' NMES 10' lv 22 NMES 5'  With SAQ NMES 5' with SAQ NMES 5' w SAQ 10" on                    Ther Ex             Quad sets 5"x10 5"x10 10x5" 5"x10         SAQ       NMES 10" on/10" off 5' total NMES 10" on/10" off 5' total     SLR             LAQ             Heel slides 5"x20 5"x20 10x10" 10"x10 10"x10 10x10" PT overpressure 10x5" 70* 2x10x5"  70*     Seated heel slide w overpressure       10x10" 10x10"     Calf s 3x20s 3x20s 5x20"     5x20" 5x20"     bike     5' 5' rocks 5' rocks 5' rocks     Leg press SL [id number]       15# 2x10  15# 2x10     Ther Activity             Squats             Step ups    4"2x10 4"2x10 2" half step up 2x10       Gait Training                                       Modalities

## 2023-02-14 ENCOUNTER — OFFICE VISIT (OUTPATIENT)
Dept: PHYSICAL THERAPY | Facility: REHABILITATION | Age: 63
End: 2023-02-14

## 2023-02-14 DIAGNOSIS — M25.562 LEFT KNEE PAIN, UNSPECIFIED CHRONICITY: ICD-10-CM

## 2023-02-14 DIAGNOSIS — Z96.651 STATUS POST TOTAL KNEE REPLACEMENT, RIGHT: Primary | ICD-10-CM

## 2023-02-14 NOTE — PROGRESS NOTES
Daily Note     Today's date: 2023  Patient name: Daniel Mir  : 1960  MRN: [id number]  Referring provider: Kelli Morales DO  Dx:   Encounter Diagnosis     ICD-10-CM    1  Status post total knee replacement, right  Z96 651       2  Left knee pain, unspecified chronicity  M25 562                      Subjective: patient states the pain is a little better      Objective: See treatment diary below      Assessment: Tolerated treatment well  Patient demonstrated fatigue post treatment, exhibited good technique with therapeutic exercises and would benefit from continued PT Patient able to achieve 80 degrees of flexion today  Patient still unable to lift her leg on her own due to quad weakness      Plan: Continue per plan of care        Precautions:  L TKA 1/10 R TKA 1 year ago      Manuals 1/17 1/20 1/24 1/27 1/31 2/3 2/7 2/9 2/14    PROM done done done done done done   done                 Neuro Re-Ed             crystal johnston             E-STIM 5 mins NMES 5' NMES 10' lv 22 NMES 5'  With SAQ NMES 5' with SAQ NMES 5' w SAQ 10" on                    Ther Ex             Quad sets 5"x10 5"x10 10x5" 5"x10         SAQ       NMES 10" on/10" off 5' total NMES 10" on/10" off 5' total NMES 10" on, 10" off, 5 mins    SLR             LAQ             Heel slides 5"x20 5"x20 10x10" 10"x10 10"x10 10x10" PT overpressure 10x5" 70* 2x10x5"  70* 2x10x5"    Seated heel slide w overpressure       10x10" 10x10" 10"x10    Calf s 3x20s 3x20s 5x20"     5x20" 5x20"     bike     5' 5' rocks 5' rocks 5' rocks 5' rock    Leg press SL P8S3       15# 2x10  15# 2x10 15# 2x10    Ther Activity             Squats             Step ups    4"2x10 4"2x10 2" half step up 2x10       Gait Training                                       Modalities

## 2023-02-17 ENCOUNTER — OFFICE VISIT (OUTPATIENT)
Dept: PHYSICAL THERAPY | Facility: REHABILITATION | Age: 63
End: 2023-02-17

## 2023-02-17 DIAGNOSIS — M25.562 LEFT KNEE PAIN, UNSPECIFIED CHRONICITY: ICD-10-CM

## 2023-02-17 DIAGNOSIS — Z96.651 STATUS POST TOTAL KNEE REPLACEMENT, RIGHT: Primary | ICD-10-CM

## 2023-02-17 NOTE — PROGRESS NOTES
Daily Note     Today's date: 2023  Patient name: Penny Medina  : 1960  MRN: [id number]  Referring provider: Macario Perez DO  Dx:   Encounter Diagnosis     ICD-10-CM    1  Status post total knee replacement, right  Z96 651       2  Left knee pain, unspecified chronicity  M25 562           Start Time: 0810  Stop Time: 0857  Total time in clinic (min): 47 minutes    Subjective: Patient arrived to appointment 10 minutes late  Objective: See treatment diary below      Assessment: Tolerated treatment fair  Patient demonstrated fatigue post treatment, exhibited good technique with therapeutic exercises and would benefit from continued PT  Achieving 75 degrees knee flexion range of motion this visit  Continues to demonstrate quad weakness evident with transfers and ambulation  Plan: Continue per plan of care        Precautions:  L TKA 1/10 R TKA 1 year ago      Manuals  2/3 2/7 2/9 2/14 2/17   PROM done done done done done done   done                 Neuro Re-Ed             crystal johnston             E-STIM 5 mins NMES 5' NMES 10' lv 22 NMES 5'  With SAQ NMES 5' with SAQ NMES 5' w SAQ 10" on                    Ther Ex             Quad sets 5"x10 5"x10 10x5" 5"x10         SAQ       NMES 10" on/10" off 5' total NMES 10" on/10" off 5' total NMES 10" on, 10" off, 5 mins NMES 10" on, 10" off, 5 mins   SLR             LAQ             Heel slides 5"x20 5"x20 10x10" 10"x10 10"x10 10x10" PT overpressure 10x5" 70* 2x10x5"  70* 2x10x5" 10x5" 72*   Seated heel slide w overpressure       10x10" 10x10" 10"x10 15x10" 75*   Calf s 3x20s 3x20s 5x20"     5x20" 5x20"  3x10"   bike     5' 5' rocks 5' rocks 5' rocks 5' rock 5' rock   Leg press SL P8S3       15# 2x10  15# 2x10 15# 2x10 15# 3x8    Ther Activity             Squats             Step ups    4"2x10 4"2x10 2" half step up 2x10       Gait Training                                       Modalities

## 2023-02-20 ENCOUNTER — VBI (OUTPATIENT)
Dept: ADMINISTRATIVE | Facility: OTHER | Age: 63
End: 2023-02-20

## 2023-02-20 DIAGNOSIS — E11.9 TYPE 2 DIABETES MELLITUS WITHOUT COMPLICATION, WITHOUT LONG-TERM CURRENT USE OF INSULIN (HCC): ICD-10-CM

## 2023-02-20 RX ORDER — LANCETS 30 GAUGE
1 EACH MISCELLANEOUS 2 TIMES DAILY
Qty: 100 EACH | Refills: 10 | Status: SHIPPED | OUTPATIENT
Start: 2023-02-20

## 2023-02-21 ENCOUNTER — OFFICE VISIT (OUTPATIENT)
Dept: PHYSICAL THERAPY | Facility: REHABILITATION | Age: 63
End: 2023-02-21

## 2023-02-21 DIAGNOSIS — Z96.651 STATUS POST TOTAL KNEE REPLACEMENT, RIGHT: Primary | ICD-10-CM

## 2023-02-21 DIAGNOSIS — M25.562 CHRONIC PAIN OF LEFT KNEE: ICD-10-CM

## 2023-02-21 DIAGNOSIS — G89.29 CHRONIC PAIN OF LEFT KNEE: ICD-10-CM

## 2023-02-21 NOTE — PROGRESS NOTES
Daily Note     Today's date: 2023  Patient name: Mata Jordan  : 1960  MRN: [id number]  Referring provider: Adrian Pacheco DO  Dx:   Encounter Diagnosis     ICD-10-CM    1  Status post total knee replacement, right  Z96 651       2  Chronic pain of left knee  M25 562     G89 29                      Subjective: Monroe Gutierrez reports that her knee is feeling more tight today  Objective: See treatment diary below      Assessment: Tolerated treatment fair  Patient demonstrated fatigue post treatment and would benefit from continued PT  Cues during SAQ to avoid lifting entire leg vs just tibia, Extension almost equal to contralateral knee  Flexion continues to be greatest limitation  Plan: Continue per plan of care           Precautions:  L TKA 1/10 R TKA 1 year ago      Manuals 1/24 1/27 1/31 2/3 2/7 2/9 2/14 2/17 2/21   PROM done done done done   done  done   Patellar mobilizations         done   Neuro Re-Ed            bridges            preston            E-STIM NMES 10' lv 22 NMES 5'  With SAQ NMES 5' with SAQ NMES 5' w SAQ 10" on                    Ther Ex            Quad sets 10x5" 5"x10          SAQ     NMES 10" on/10" off 5' total NMES 10" on/10" off 5' total NMES 10" on, 10" off, 5 mins NMES 10" on, 10" off, 5 mins NMES 10" on, 10" off, 5 mins   SLR            LAQ            Heel slides 10x10" 10"x10 10"x10 10x10" PT overpressure 10x5" 70* 2x10x5"  70* 2x10x5" 10x5" 72* 10x5"   Seated heel slide w overpressure     10x10" 10x10" 10"x10 15x10" 75* 15x10"   Calf s 5x20"     5x20" 5x20"  3x10" 5x20"   Bike (rom)   5' 5' rocks 5' rocks 5' rocks 5' rock 5' rock 5' rock   Leg press SL P8S3     15# 2x10  15# 2x10 15# 2x10 15# 3x8  np   Ther Activity            Squats            Step ups  4"2x10 4"2x10 2" half step up 2x10     nv   Standing heel raises         3x10   Gait Training                                    Modalities

## 2023-02-22 ENCOUNTER — APPOINTMENT (OUTPATIENT)
Dept: PHYSICAL THERAPY | Facility: REHABILITATION | Age: 63
End: 2023-02-22

## 2023-02-23 DIAGNOSIS — E11.9 TYPE 2 DIABETES MELLITUS WITHOUT COMPLICATION, WITHOUT LONG-TERM CURRENT USE OF INSULIN (HCC): ICD-10-CM

## 2023-02-23 RX ORDER — BLOOD SUGAR DIAGNOSTIC
STRIP MISCELLANEOUS
Qty: 100 EACH | Refills: 10 | Status: SHIPPED | OUTPATIENT
Start: 2023-02-23

## 2023-02-24 ENCOUNTER — APPOINTMENT (OUTPATIENT)
Dept: RADIOLOGY | Facility: AMBULARY SURGERY CENTER | Age: 63
End: 2023-02-24
Attending: ORTHOPAEDIC SURGERY

## 2023-02-24 ENCOUNTER — OFFICE VISIT (OUTPATIENT)
Dept: OBGYN CLINIC | Facility: CLINIC | Age: 63
End: 2023-02-24

## 2023-02-24 ENCOUNTER — OFFICE VISIT (OUTPATIENT)
Dept: PHYSICAL THERAPY | Facility: REHABILITATION | Age: 63
End: 2023-02-24

## 2023-02-24 VITALS
WEIGHT: 166 LBS | HEIGHT: 62 IN | HEART RATE: 73 BPM | BODY MASS INDEX: 30.55 KG/M2 | SYSTOLIC BLOOD PRESSURE: 119 MMHG | DIASTOLIC BLOOD PRESSURE: 76 MMHG

## 2023-02-24 DIAGNOSIS — Z96.652 STATUS POST TOTAL KNEE REPLACEMENT USING CEMENT, LEFT: ICD-10-CM

## 2023-02-24 DIAGNOSIS — M25.562 LEFT KNEE PAIN, UNSPECIFIED CHRONICITY: ICD-10-CM

## 2023-02-24 DIAGNOSIS — G89.29 CHRONIC PAIN OF LEFT KNEE: ICD-10-CM

## 2023-02-24 DIAGNOSIS — Z96.652 STATUS POST TOTAL KNEE REPLACEMENT USING CEMENT, LEFT: Primary | ICD-10-CM

## 2023-02-24 DIAGNOSIS — M25.562 CHRONIC PAIN OF LEFT KNEE: ICD-10-CM

## 2023-02-24 DIAGNOSIS — Z96.651 STATUS POST TOTAL KNEE REPLACEMENT, RIGHT: Primary | ICD-10-CM

## 2023-02-24 NOTE — PROGRESS NOTES
Daily Note     Today's date: 2023  Patient name: Carlyn Acosta  : 1960  MRN: [id number]  Referring provider: Iam Novak DO  Dx:   Encounter Diagnosis     ICD-10-CM    1  Status post total knee replacement, right  Z96 651       2  Chronic pain of left knee  M25 562     G89 29       3  Left knee pain, unspecified chronicity  M25 562                      Subjective: patient states she is still having pain on the inside of her knee with bending      Objective: See treatment diary below      Assessment: Tolerated treatment well  Patient demonstrated fatigue post treatment, exhibited good technique with therapeutic exercises and would benefit from continued PT Patient continues to display slow and steady improvements in knee ROM and strength  Patient approaching 90 degrees of flexion  Patient able to lift leg on her own now      Plan: Continue per plan of care        Precautions:  L TKA 1/10 R TKA 1 year ago      Manuals  2/3 2/7 2/9 2/14 2/17 2/21 2/24       PROM done done done done   done  done done       Patellar mobilizations         done        Neuro Re-Ed                 crystal johnston                 E-STIM NMES 10' lv 22 NMES 5'  With SAQ NMES 5' with SAQ NMES 5' w SAQ 10" on                              Ther Ex                 Quad sets 10x5" 5"x10               SAQ     NMES 10" on/10" off 5' total NMES 10" on/10" off 5' total NMES 10" on, 10" off, 5 mins NMES 10" on, 10" off, 5 mins NMES 10" on, 10" off, 5 mins without NMES 2x10       SLR                 LAQ                 Heel slides 10x10" 10"x10 10"x10 10x10" PT overpressure 10x5" 70* 2x10x5"  70* 2x10x5" 10x5" 72* 10x5" 5"x10       Seated heel slide w overpressure     10x10" 10x10" 10"x10 15x10" 75* 15x10" 10x10"       Calf s 5x20"     5x20" 5x20"  3x10" 5x20" 5x20"       Bike (rom)   5' 5' rocks 5' rocks 5' rocks 5' rock 5' rock 5' rock 5' rock       Leg press SL P8S3     15# 2x10  15# 2x10 15# 2x10 15# 3x8  np 15# 2x10       Ther Activity                 Squats                 Step ups  4"2x10 4"2x10 2" half step up 2x10     nv        Standing heel raises         3x10 3x10       Gait Training                                                   Modalities

## 2023-02-24 NOTE — PROGRESS NOTES
ASSESSMENT/PLAN:    Diagnoses and all orders for this visit:    Status post total knee replacement using cement, left  -     XR knee 3 vw left non injury; Future        X-rays of the patient's left knee are consistent with a well-seated left total knee replacement  The prosthesis is in good alignment  There are no signs of loosening  There are no fractures or dislocations  The patient should continue physical therapy to work on strengthening, stretching and range of motion  It is imperative that the patient work on knee flexion  She will follow-up with our office in 6 weeks  The patient is acceptable to this plan  Return in about 6 weeks (around 4/7/2023)  _____________________________________________________  CHIEF COMPLAINT:  No chief complaint on file  SUBJECTIVE:  April Purdy is a 65 y o  female who presents to our office for postop visit  The patient is status post left total knee replacement from 1/10/2023  She is pleased with the results from surgery so far  She is ambulating with a rolling walker  She has been participating in physical therapy  She does complain of some pain along the medial aspect of her knee  She denies any numbness or tingling  She denies any fever or chills      The following portions of the patient's history were reviewed and updated as appropriate: allergies, current medications, past family history, past medical history, past social history, past surgical history and problem list     PAST MEDICAL HISTORY:  Past Medical History:   Diagnosis Date   • Arthritis    • Diabetes mellitus ([address] )    • Hyperlipidemia    • Hypertension        PAST SURGICAL HISTORY:  Past Surgical History:   Procedure Laterality Date   • COLONOSCOPY     • [place] ARTHRP KNE CONDYLE&PLATU MEDIAL&LAT COMPARTMENTS Right 03/30/2021    Procedure: KNEE TOTAL ARTHROPLASTY;  Surgeon: Jozef Frias DO;  [facility];  Service: Orthopedics   • [place] ARTHRP KNE CONDYLE&PLATU MEDIAL&LAT COMPARTMENTS Left 1/10/2023    Procedure: ARTHROPLASTY KNEE TOTAL;  Surgeon: Miroslava Bui DO;  [facility];  Service: Orthopedics   • [place] MANIPULATION KNEE JOINT UNDER GENERAL ANESTHESIA Right 2021    Procedure: MANIPULATION JOINT KNEE;  Surgeon: Miroslava Bui DO;  [facility];  Service: Orthopedics   • TUBAL LIGATION         FAMILY HISTORY:  Family History   Problem Relation Age of Onset   • Arthritis Mother    • Uterine cancer Mother    • Arthritis Father    • Bone cancer Father    • Breast cancer Sister    • No Known Problems Daughter    • No Known Problems Sister    • No Known Problems Sister    • No Known Problems Sister    • No Known Problems Sister    • No Known Problems Sister    • No Known Problems Daughter    • No Known Problems Daughter    • No Known Problems Maternal Aunt    • No Known Problems Maternal Aunt    • No Known Problems Maternal Aunt    • No Known Problems Maternal Aunt    • No Known Problems Maternal Aunt    • No Known Problems Paternal Aunt    • No Known Problems Paternal Aunt    • No Known Problems Paternal Aunt    • No Known Problems Paternal Aunt    • Prostate cancer Maternal Grandfather    • Breast cancer Cousin    • Breast cancer Cousin        SOCIAL HISTORY:  Social History     Tobacco Use   • Smoking status: Former     Packs/day: 0 50     Years: 45 00     Pack years: 22 50     Types: Cigarettes     Quit date: 2020     Years since quittin 2   • Smokeless tobacco: Never   Vaping Use   • Vaping Use: Never used   Substance Use Topics   • Alcohol use: Not Currently     Comment: Socially   • Drug use: Never       MEDICATIONS:    Current Outpatient Medications:   •  ascorbic acid (VITAMIN C) 500 mg tablet, Take 1 tablet (500 mg total) by mouth 2 (two) times a day, Disp: 60 tablet, Rfl: 0  •  aspirin (ECOTRIN LOW STRENGTH) 81 mg EC tablet, Take 81 mg by mouth daily, Disp: , Rfl:   •  atenolol-chlorthalidone (TENORETIC) 50-25 mg per tablet, Take 1 tablet by mouth daily, Disp: 90 tablet, Rfl: 3  •  docusate sodium (COLACE) 100 mg capsule, Take 1 capsule (100 mg total) by mouth 2 (two) times a day, Disp: 60 capsule, Rfl: 1  •  ferrous sulfate 324 (65 Fe) mg, Take 1 tablet (324 mg total) by mouth 2 (two) times a day before meals, Disp: 60 tablet, Rfl: 0  •  folic acid (FOLVITE) 547 MCG tablet, Take 0 5 tablets (400 mcg total) by mouth daily, Disp: 30 tablet, Rfl: 0  •  glucose blood (OneTouch Verio) test strip, Check Blood Sugar 2 times daily, Disp: 100 each, Rfl: 10  •  Lancets (OneTouch Delica Plus [id number]) MISC, Use 1 each 2 (two) times a day, Disp: 100 each, Rfl: 10  •  metFORMIN (GLUCOPHAGE) 500 mg tablet, Take 1 tablet (500 mg total) by mouth 2 (two) times a day with meals, Disp: 90 tablet, Rfl: 3  •  Multiple Vitamins-Minerals (multivitamin with minerals) tablet, Take 1 tablet by mouth daily, Disp: 30 tablet, Rfl: 1  •  pravastatin (PRAVACHOL) 40 mg tablet, TAKE 1 TABLET(40 MG) BY MOUTH DAILY EARLY MORNING, Disp: 45 tablet, Rfl: 3    ALLERGIES:  No Known Allergies    ROS:  Review of Systems     Constitutional: Negative for fatigue, fever or loss of appetite  HENT: Negative  Respiratory: Negative for shortness of breath, dyspnea  Cardiovascular: Negative for chest pain/tightness  Gastrointestinal: Negative for abdominal pain, N/V  Endocrine: Negative for cold/heat intolerance, unexplained weight loss/gain  Genitourinary: Negative for flank pain, dysuria, hematuria  Musculoskeletal: Negative for arthralgia   Skin: Negative for rash  Neurological: Negative for numbness or tingling  Psychiatric/Behavioral: Negative for agitation  _____________________________________________________  PHYSICAL EXAMINATION:    Blood pressure 119/76, pulse 73, height 5' 2" (1 575 m), weight 75 3 kg (166 lb)  Constitutional: Oriented to person, place, and time  Appears well-developed and well-nourished  No distress  HENT:   Head: Normocephalic     Eyes: Conjunctivae are normal  Right eye exhibits no discharge  Left eye exhibits no discharge  No scleral icterus  Cardiovascular: Normal rate  Pulmonary/Chest: Effort normal    Neurological: Alert and oriented to person, place, and time  Skin: Skin is warm and dry  No rash noted  Not diaphoretic  No erythema  No pallor  Psychiatric: Normal mood and affect  Behavior is normal  Judgment and thought content normal       MUSCULOSKELETAL EXAMINATION:   Physical Exam  Ortho Exam    Left lower extremity is neurovascularly intact  Toes are pink and mobile  Compartments are soft  Range of motion of the knee is from 5 to 90 degrees  No ligament laxity  Incision is healing well  Brisk cap refill  Sensation intact  No effusion present    Objective:  BP Readings from Last 1 Encounters:   02/24/23 119/76      Wt Readings from Last 1 Encounters:   02/24/23 75 3 kg (166 lb)        BMI:   Estimated body mass index is 30 36 kg/m² as calculated from the following:    Height as of this encounter: 5' 2" (1 575 m)  Weight as of this encounter: 75 3 kg (166 lb)        Scribe Attestation    I,:  Hue Chaudhari PA-C am acting as a scribe while in the presence of the attending physician :       I,:  Becca Langston DO personally performed the services described in this documentation    as scribed in my presence :

## 2023-02-28 ENCOUNTER — OFFICE VISIT (OUTPATIENT)
Dept: PHYSICAL THERAPY | Facility: REHABILITATION | Age: 63
End: 2023-02-28

## 2023-02-28 DIAGNOSIS — M25.562 LEFT KNEE PAIN, UNSPECIFIED CHRONICITY: ICD-10-CM

## 2023-02-28 DIAGNOSIS — G89.29 CHRONIC PAIN OF LEFT KNEE: ICD-10-CM

## 2023-02-28 DIAGNOSIS — Z96.651 STATUS POST TOTAL KNEE REPLACEMENT, RIGHT: Primary | ICD-10-CM

## 2023-02-28 DIAGNOSIS — M25.562 CHRONIC PAIN OF LEFT KNEE: ICD-10-CM

## 2023-02-28 NOTE — PROGRESS NOTES
Daily Note     Today's date: 2023  Patient name: Leland Cuevas  : 1960  MRN: [id number]  Referring provider: Taylor Webber DO  Dx:   Encounter Diagnosis     ICD-10-CM    1  Status post total knee replacement, right  Z96 651       2  Chronic pain of left knee  M25 562     G89 29       3  Left knee pain, unspecified chronicity  M25 562           Start Time: 1015  Stop Time: 1100  Total time in clinic (min): 45 minutes    Subjective: Patient reporting knee as "okay" at start of session  Patient reports following up with MD and is to follow back up in 6 weeks  Patient arrived to session 15 minutes late  Objective: See treatment diary below      Assessment: Tolerated treatment well  Patient demonstrated fatigue post treatment, exhibited good technique with therapeutic exercises and would benefit from continued PT      Plan: Continue per plan of care  Progress treatment as tolerated         Precautions:  L TKA 1/10 R TKA 1 year ago      Manuals 1/31 2/3 2/7 2/9 2/14 2/17 2/21 2/24 2/28      PROM done done   done  done done done      Patellar mobilizations       done        Neuro Re-Ed               crystal johnston               E-STIM NMES 5' with SAQ NMES 5' w SAQ 10" on                            Ther Ex               Quad sets               SAQ   NMES 10" on/10" off 5' total NMES 10" on/10" off 5' total NMES 10" on, 10" off, 5 mins NMES 10" on, 10" off, 5 mins NMES 10" on, 10" off, 5 mins without NMES 2x10 3x10       SLR               LAQ               Heel slides 10"x10 10x10" PT overpressure 10x5" 70* 2x10x5"  70* 2x10x5" 10x5" 72* 10x5" 5"x10 5''x10      Seated heel slide w overpressure   10x10" 10x10" 10"x10 15x10" 75* 15x10" 10x10" 15x10''      Calf s   5x20" 5x20"  3x10" 5x20" 5x20" 5x20''      Bike (rom) 5' 5' rocks 5' rocks 5' rocks 5' rock 5' rock 5' rock 5' rock 5' rock      Leg press SL P8S3   15# 2x10  15# 2x10 15# 2x10 15# 3x8  np 15# 2x10 15# 3x10      Ther Activity               Squats               Step ups 4"2x10 2" half step up 2x10     nv  nv      Standing heel raises       3x10 3x10 3x10       Gait Training                                             Modalities

## 2023-03-03 ENCOUNTER — OFFICE VISIT (OUTPATIENT)
Dept: PHYSICAL THERAPY | Facility: REHABILITATION | Age: 63
End: 2023-03-03

## 2023-03-03 DIAGNOSIS — M25.562 CHRONIC PAIN OF LEFT KNEE: ICD-10-CM

## 2023-03-03 DIAGNOSIS — G89.29 CHRONIC PAIN OF LEFT KNEE: ICD-10-CM

## 2023-03-03 DIAGNOSIS — Z96.651 STATUS POST TOTAL KNEE REPLACEMENT, RIGHT: Primary | ICD-10-CM

## 2023-03-03 DIAGNOSIS — M25.562 LEFT KNEE PAIN, UNSPECIFIED CHRONICITY: ICD-10-CM

## 2023-03-03 NOTE — PROGRESS NOTES
Daily Note     Today's date: 3/3/2023  Patient name: Deshawn Jaffe  : 1960  MRN: [id number]  Referring provider: Leopold Fend, DO  Dx:   Encounter Diagnosis     ICD-10-CM    1  Status post total knee replacement, right  Z96 651       2  Chronic pain of left knee  M25 562     G89 29       3  Left knee pain, unspecified chronicity  M25 562                      Subjective: patient states she is still getting pain in the knee and she is still using the walker      Objective: See treatment diary below      Assessment: Tolerated treatment well  Patient demonstrated fatigue post treatment, exhibited good technique with therapeutic exercises and would benefit from continued PT Patient able to achieve 90 degrees of flexion today and able to go up and down the stairs      Plan: Continue per plan of care        Precautions:  L TKA 1/10 R TKA 1 year ago      Manuals  2/3 2/7 2/9 2/14 2/17 2/21 2/24 2/28 3/3     PROM done done   done  done done done done     Patellar mobilizations       done        Neuro Re-Ed               crystal johnston               E-STIM NMES 5' with SAQ NMES 5' w SAQ 10" on                            Ther Ex               Quad sets               SAQ   NMES 10" on/10" off 5' total NMES 10" on/10" off 5' total NMES 10" on, 10" off, 5 mins NMES 10" on, 10" off, 5 mins NMES 10" on, 10" off, 5 mins without NMES 2x10 3x10  3x10     SLR               LAQ               Heel slides 10"x10 10x10" PT overpressure 10x5" 70* 2x10x5"  70* 2x10x5" 10x5" 72* 10x5" 5"x10 5''x10 5"x10     Seated heel slide w overpressure   10x10" 10x10" 10"x10 15x10" 75* 15x10" 10x10" 15x10'' 15x10"     Calf s   5x20" 5x20"  3x10" 5x20" 5x20" 5x20'' 5x20"     Bike (rom) 5' 5' rocks 5' rocks 5' rocks 5' rock 5' rock 5' rock 5' rock 5' rock 5' rock     Leg press SL P8S3   15# 2x10  15# 2x10 15# 2x10 15# 3x8  np 15# 2x10 15# 3x10 25# 3x10     Ther Activity               Squats               Step ups 4"2x10 2" half step up 2x10     nv  nv 3x10     Standing heel raises       3x10 3x10 3x10  3x10     Gait Training                                             Modalities

## 2023-03-07 ENCOUNTER — OFFICE VISIT (OUTPATIENT)
Dept: PHYSICAL THERAPY | Facility: REHABILITATION | Age: 63
End: 2023-03-07

## 2023-03-07 DIAGNOSIS — M25.562 LEFT KNEE PAIN, UNSPECIFIED CHRONICITY: ICD-10-CM

## 2023-03-07 DIAGNOSIS — G89.29 CHRONIC PAIN OF LEFT KNEE: ICD-10-CM

## 2023-03-07 DIAGNOSIS — Z96.651 STATUS POST TOTAL KNEE REPLACEMENT, RIGHT: Primary | ICD-10-CM

## 2023-03-07 DIAGNOSIS — M25.562 CHRONIC PAIN OF LEFT KNEE: ICD-10-CM

## 2023-03-07 NOTE — PROGRESS NOTES
Daily Note     Today's date: 3/7/2023  Patient name: Penny Medina  : 1960  MRN: [id number]  Referring provider: Macario Perez DO  Dx:   Encounter Diagnosis     ICD-10-CM    1  Status post total knee replacement, right  Z96 651       2  Chronic pain of left knee  M25 562     G89 29       3  Left knee pain, unspecified chronicity  M25 562                      Subjective: patient states she is still feeling unsteady with walking and likes to use the walker because it is more secure      Objective: See treatment diary below      Assessment: Tolerated treatment well  Patient demonstrated fatigue post treatment, exhibited good technique with therapeutic exercises and would benefit from continued PT Patient continues to be limited most in knee flexion ROM  Approx 90 degrees  Patient still having difficulty with SAQ      Plan: Continue per plan of care        Precautions:  L TKA 1/10 R TKA 1 year ago      Manuals 1/31 2/3 2/7 2/9 2/14 2/17 2/21 2/24 2/28 3/3 3/7    PROM done done   done  done done done done done    Patellar mobilizations       done        Neuro Re-Ed               crystal johnston               E-STIM NMES 5' with SAQ NMES 5' w SAQ 10" on                            Ther Ex               Quad sets               SAQ   NMES 10" on/10" off 5' total NMES 10" on/10" off 5' total NMES 10" on, 10" off, 5 mins NMES 10" on, 10" off, 5 mins NMES 10" on, 10" off, 5 mins without NMES 2x10 3x10  3x10 3x10    SLR               LAQ               Heel slides 10"x10 10x10" PT overpressure 10x5" 70* 2x10x5"  70* 2x10x5" 10x5" 72* 10x5" 5"x10 5''x10 5"x10 5"x10    Seated heel slide w overpressure   10x10" 10x10" 10"x10 15x10" 75* 15x10" 10x10" 15x10'' 15x10" 15x10"    Calf s   5x20" 5x20"  3x10" 5x20" 5x20" 5x20'' 5x20" 5x20s    Bike (rom) 5' 5' rocks 5' rocks 5' rocks 5' rock 5' rock 5' rock 5' rock 5' rock 5' rock 5' rock    Leg press SL P8S3   15# 2x10  15# 2x10 15# 2x10 15# 3x8  np 15# 2x10 15# 3x10 25# 3x10 25# 3x12    Ther Activity               Squats               Step ups 4"2x10 2" half step up 2x10     nv  nv 3x10 3x12    Standing heel raises       3x10 3x10 3x10  3x10 3x12    Gait Training                                             Modalities

## 2023-03-10 ENCOUNTER — OFFICE VISIT (OUTPATIENT)
Dept: PHYSICAL THERAPY | Facility: REHABILITATION | Age: 63
End: 2023-03-10

## 2023-03-10 DIAGNOSIS — Z96.651 STATUS POST TOTAL KNEE REPLACEMENT, RIGHT: Primary | ICD-10-CM

## 2023-03-10 DIAGNOSIS — M25.562 LEFT KNEE PAIN, UNSPECIFIED CHRONICITY: ICD-10-CM

## 2023-03-10 DIAGNOSIS — G89.29 CHRONIC PAIN OF LEFT KNEE: ICD-10-CM

## 2023-03-10 DIAGNOSIS — M25.562 CHRONIC PAIN OF LEFT KNEE: ICD-10-CM

## 2023-03-10 NOTE — PROGRESS NOTES
Daily Note     Today's date: 3/10/2023  Patient name: Roberto Horan  : 1960  MRN: [id number]  Referring provider: Nicolle Dhillon DO  Dx:   Encounter Diagnosis     ICD-10-CM    1  Status post total knee replacement, right  Z96 651       2  Chronic pain of left knee  M25 562     G89 29       3  Left knee pain, unspecified chronicity  M25 562           Start Time: 1018  Stop Time: 1102  Total time in clinic (min): 44 minutes    Subjective: Patient reports the knee is "ok" at start of session  Continues to ambulate with RW        Objective: See treatment diary below      Assessment: Tolerated treatment well  Patient would benefit from continued PT  Achieving up to 85* knee flexion post-treatment  Plan: Continue per plan of care        Precautions:  L TKA 1/10 R TKA 1 year ago      Manuals 1/31 2/3 2/7 2/9 2/14 2/17 2/21 2/24 2/28 3/3 3/7 3/10   PROM done done   done  done done done done done Done    Patellar mobilizations       done        Neuro Re-Ed               crystla johnston               E-STIM NMES 5' with SAQ NMES 5' w SAQ 10" on                            Ther Ex               Quad sets               SAQ   NMES 10" on/10" off 5' total NMES 10" on/10" off 5' total NMES 10" on, 10" off, 5 mins NMES 10" on, 10" off, 5 mins NMES 10" on, 10" off, 5 mins without NMES 2x10 3x10  3x10 3x10 3x10   SLR               LAQ               Heel slides 10"x10 10x10" PT overpressure 10x5" 70* 2x10x5"  70* 2x10x5" 10x5" 72* 10x5" 5"x10 5''x10 5"x10 5"x10 10x5"   Seated heel slide w overpressure   10x10" 10x10" 10"x10 15x10" 75* 15x10" 10x10" 15x10'' 15x10" 15x10" 15x10" w overpressure   Calf s   5x20" 5x20"  3x10" 5x20" 5x20" 5x20'' 5x20" 5x20s    Bike (rom) 5' 5' rocks 5' rocks 5' rocks 5' rock 5' rock 5' rock 5' rock 5' rock 5' rock 5' rock 5' rock   Leg press SL P8S3   15# 2x10  15# 2x10 15# 2x10 15# 3x8  np 15# 2x10 15# 3x10 25# 3x10 25# 3x12 25# 3x12   Ther Activity Squats               Step ups 4"2x10 2" half step up 2x10     nv  nv 3x10 3x12 3x10 staircase w BUE   Standing heel raises       3x10 3x10 3x10  3x10 3x12 3x12   Gait Training                                             Modalities

## 2023-03-14 ENCOUNTER — OFFICE VISIT (OUTPATIENT)
Dept: PHYSICAL THERAPY | Facility: REHABILITATION | Age: 63
End: 2023-03-14

## 2023-03-14 DIAGNOSIS — Z96.651 STATUS POST TOTAL KNEE REPLACEMENT, RIGHT: Primary | ICD-10-CM

## 2023-03-14 DIAGNOSIS — M25.562 CHRONIC PAIN OF LEFT KNEE: ICD-10-CM

## 2023-03-14 DIAGNOSIS — G89.29 CHRONIC PAIN OF LEFT KNEE: ICD-10-CM

## 2023-03-14 DIAGNOSIS — M25.562 LEFT KNEE PAIN, UNSPECIFIED CHRONICITY: ICD-10-CM

## 2023-03-14 NOTE — PROGRESS NOTES
Daily Note     Today's date: 3/14/2023  Patient name: Krysta Stroud  : 1960  MRN: [id number]  Referring provider: Viktor Avalos DO  Dx:   Encounter Diagnosis     ICD-10-CM    1  Status post total knee replacement, right  Z96 651       2  Chronic pain of left knee  M25 562     G89 29       3  Left knee pain, unspecified chronicity  M25 562                      Subjective: patient states she has been practiving the stairs      Objective: See treatment diary below      Assessment: Tolerated treatment well  Patient demonstrated fatigue post treatment, exhibited good technique with therapeutic exercises and would benefit from continued PT Patient continues to be stuck at 90 degrees of flexion  Improving ability to perform stairs      Plan: Continue per plan of care        Precautions:  L TKA 1/10 R TKA 1 year ago      Manuals 3/14     1/31 2/3 2/7 2/9 2/14 2/17 2/21 2/24 2/28 3/3 3/7 3/10   PROM done     done done   done  done done done done done Done    Patellar mobilizations            done        Neuro Re-Ed                    crystal johnston                    E-STIM       NMES 5' with SAQ NMES 5' w SAQ 10" on                                 Ther Ex                    Quad sets                    SAQ 3x10       NMES 10" on/10" off 5' total NMES 10" on/10" off 5' total NMES 10" on, 10" off, 5 mins NMES 10" on, 10" off, 5 mins NMES 10" on, 10" off, 5 mins without NMES 2x10 3x10  3x10 3x10 3x10   SLR                    LAQ                    Heel slides 5"x20     10"x10 10x10" PT overpressure 10x5" 70* 2x10x5"  70* 2x10x5" 10x5" 72* 10x5" 5"x10 5''x10 5"x10 5"x10 10x5"   Seated heel slide w overpressure 5"x20       10x10" 10x10" 10"x10 15x10" 75* 15x10" 10x10" 15x10'' 15x10" 15x10" 15x10" w overpressure   Calf s        5x20" 5x20"  3x10" 5x20" 5x20" 5x20'' 5x20" 5x20s    Bike (rom) 5' rock     5' 5' rocks 5' rocks 5' rocks 5' rock 5' rock 5' rock 5' rock 5' rock 5' rock 5' rock 5' rock   Leg press SL P8S3 25# 2x10       15# 2x10  15# 2x10 15# 2x10 15# 3x8  np 15# 2x10 15# 3x10 25# 3x10 25# 3x12 25# 3x12   Ther Activity                    Squats                    Step ups x5     4"2x10 2" half step up 2x10     nv  nv 3x10 3x12 3x10 staircase w BUE   Standing heel raises            3x10 3x10 3x10  3x10 3x12 3x12   Gait Training                                                            Modalities

## 2023-03-17 ENCOUNTER — OFFICE VISIT (OUTPATIENT)
Dept: PHYSICAL THERAPY | Facility: REHABILITATION | Age: 63
End: 2023-03-17

## 2023-03-17 DIAGNOSIS — G89.29 CHRONIC PAIN OF LEFT KNEE: ICD-10-CM

## 2023-03-17 DIAGNOSIS — Z96.651 STATUS POST TOTAL KNEE REPLACEMENT, RIGHT: Primary | ICD-10-CM

## 2023-03-17 DIAGNOSIS — M25.562 LEFT KNEE PAIN, UNSPECIFIED CHRONICITY: ICD-10-CM

## 2023-03-17 DIAGNOSIS — M25.562 CHRONIC PAIN OF LEFT KNEE: ICD-10-CM

## 2023-03-17 NOTE — PROGRESS NOTES
Daily Note     Today's date: 3/17/2023  Patient name: Eloisa Higuera  : 1960  MRN: [id number]  Referring provider: Blaze Guajardo DO  Dx:   Encounter Diagnosis     ICD-10-CM    1  Status post total knee replacement, right  Z96 651       2  Chronic pain of left knee  M25 562     G89 29       3  Left knee pain, unspecified chronicity  M25 562           Start Time: 09  Stop Time: 1025  Total time in clinic (min): 40 minutes    Subjective: No new complaints at start of session  Objective: See treatment diary below      Assessment: Tolerated treatment well  Initially achieving about 70* knee flexion at start of session progressing to about 90* knee flexion with progressive repetitions of heel slides and overpressure from therapist  Patient demonstrates improved quad activation with SAQ this visit  Patient demonstrated fatigue post treatment, exhibited good technique with therapeutic exercises and would benefit from continued PT  Plan: Continue per plan of care  Progress treatment as tolerated         Precautions:  L TKA 1/10 R TKA 1 year ago      Manuals 3/14 3/17    1/31 2/3 2/7 2/9 2/14 2/17 2/21 2/24 2/28 3/3 3/7 3/10   PROM done done    done done   done  done done done done done Done    Patellar mobilizations            done        Neuro Re-Ed                    bridges                    clacallyls                    E-STIM       NMES 5' with SAQ NMES 5' w SAQ 10" on                                 Ther Ex                    Quad sets                    SAQ 3x10 3x10      NMES 10" on/10" off 5' total NMES 10" on/10" off 5' total NMES 10" on, 10" off, 5 mins NMES 10" on, 10" off, 5 mins NMES 10" on, 10" off, 5 mins without NMES 2x10 3x10  3x10 3x10 3x10   SLR                    LAQ                    Heel slides 5"x20 20x5"    10"x10 10x10" PT overpressure 10x5" 70* 2x10x5"  70* 2x10x5" 10x5" 72* 10x5" 5"x10 5''x10 5"x10 5"x10 10x5"   Seated heel slide w overpressure 5"x20 20x5" 10x10" 10x10" 10"x10 15x10" 75* 15x10" 10x10" 15x10'' 15x10" 15x10" 15x10" w overpressure   Calf s        5x20" 5x20"  3x10" 5x20" 5x20" 5x20'' 5x20" 5x20s    Bike (rom) 5' rock 5' rock    5' 5' rocks 5' rocks 5' rocks 5' rock 5' rock 5' rock 5' rock 5' rock 5' rock 5' rock 5' rock   Leg press SL P8S3 25# 2x10 25# 3x10      15# 2x10  15# 2x10 15# 2x10 15# 3x8  np 15# 2x10 15# 3x10 25# 3x10 25# 3x12 25# 3x12   Ther Activity                    Squats                    Step ups x5 Stair negotiation x4    4"2x10 2" half step up 2x10     nv  nv 3x10 3x12 3x10 staircase w BUE   Standing heel raises            3x10 3x10 3x10  3x10 3x12 3x12   Gait Training                                                            Modalities

## 2023-03-21 ENCOUNTER — OFFICE VISIT (OUTPATIENT)
Dept: PHYSICAL THERAPY | Facility: REHABILITATION | Age: 63
End: 2023-03-21

## 2023-03-21 DIAGNOSIS — M25.562 LEFT KNEE PAIN, UNSPECIFIED CHRONICITY: ICD-10-CM

## 2023-03-21 DIAGNOSIS — M25.562 CHRONIC PAIN OF LEFT KNEE: ICD-10-CM

## 2023-03-21 DIAGNOSIS — G89.29 CHRONIC PAIN OF LEFT KNEE: ICD-10-CM

## 2023-03-21 DIAGNOSIS — Z96.651 STATUS POST TOTAL KNEE REPLACEMENT, RIGHT: Primary | ICD-10-CM

## 2023-03-21 NOTE — PROGRESS NOTES
Daily Note     Today's date: 3/21/2023  Patient name: Mata Jordan  : 1960  MRN: [id number]  Referring provider: Adrian Pacheco DO  Dx:   Encounter Diagnosis     ICD-10-CM    1  Status post total knee replacement, right  Z96 651       2  Chronic pain of left knee  M25 562     G89 29       3  Left knee pain, unspecified chronicity  M25 562           Start Time: 926  Stop Time: 100  Total time in clinic (min): 42 minutes    Subjective: Patient presents to PT ambulating with bilateral axillary crutches this visit  Patient states "I feel like these give me more support "      Objective: See treatment diary below      Assessment: Tolerated treatment well  Initiated additional TE this visit for glute and quadriceps strengthening with fair tolerance  Evident quad lag noted with SLR  Patient demonstrated fatigue post treatment, exhibited good technique with therapeutic exercises and would benefit from continued PT  Plan: Continue per plan of care        Precautions:  L TKA 1/10 R TKA 1 year ago      Manuals 3/14 3/17 3/21   1/31 2/3 2/7 2/9 2/14 2/17 2/21 2/24 2/28 3/3 3/7 3/10   PROM done done    done done   done  done done done done done Done    Patellar mobilizations            done        Neuro Re-Ed                    bridges                    preston                    E-STIM       NMES 5' with SAQ NMES 5' w SAQ 10" on                                 Ther Ex                    Quad sets                    SAQ 3x10 3x10 3x12     NMES 10" on/10" off 5' total NMES 10" on/10" off 5' total NMES 10" on, 10" off, 5 mins NMES 10" on, 10" off, 5 mins NMES 10" on, 10" off, 5 mins without NMES 2x10 3x10  3x10 3x10 3x10   Bridging    75* 2x10                 SLR   Quad lag  2x5                 LAQ                    Heel slides 5"x20 20x5" 20x5"  80*   10"x10 10x10" PT overpressure 10x5" 70* 2x10x5"  70* 2x10x5" 10x5" 72* 10x5" 5"x10 5''x10 5"x10 5"x10 10x5"   Seated heel slide w overpressure 5"x20 20x5"  20x5" 80*     10x10" 10x10" 10"x10 15x10" 75* 15x10" 10x10" 15x10'' 15x10" 15x10" 15x10" w overpressure   Calf s        5x20" 5x20"  3x10" 5x20" 5x20" 5x20'' 5x20" 5x20s    Bike (rom) 5' rock 5' rock 5' rock   5' 5' rocks 5' rocks 5' rocks 5' rock 5' rock 5' rock 5' rock 5' rock 5' rock 5' rock 5' rock   Leg press SL P8S3 25# 2x10 25# 3x10 25# 3x10     15# 2x10  15# 2x10 15# 2x10 15# 3x8  np 15# 2x10 15# 3x10 25# 3x10 25# 3x12 25# 3x12   Ther Activity                    Squats                    Step ups x5 Stair negotiation x4 Stair negotiation x4   4"2x10 2" half step up 2x10     nv  nv 3x10 3x12 3x10 staircase w BUE   Standing heel raises            3x10 3x10 3x10  3x10 3x12 3x12   Gait Training                                                            Modalities

## 2023-03-24 ENCOUNTER — OFFICE VISIT (OUTPATIENT)
Dept: PHYSICAL THERAPY | Facility: REHABILITATION | Age: 63
End: 2023-03-24

## 2023-03-24 DIAGNOSIS — M25.562 CHRONIC PAIN OF LEFT KNEE: ICD-10-CM

## 2023-03-24 DIAGNOSIS — M25.562 LEFT KNEE PAIN, UNSPECIFIED CHRONICITY: ICD-10-CM

## 2023-03-24 DIAGNOSIS — G89.29 CHRONIC PAIN OF LEFT KNEE: ICD-10-CM

## 2023-03-24 DIAGNOSIS — Z96.651 STATUS POST TOTAL KNEE REPLACEMENT, RIGHT: Primary | ICD-10-CM

## 2023-03-24 NOTE — PROGRESS NOTES
Daily Note     Today's date: 3/24/2023  Patient name: Dolly Garcia  : 1960  MRN: [id number]  Referring provider: Jacquelyn Cartwright DO  Dx:   Encounter Diagnosis     ICD-10-CM    1  Status post total knee replacement, right  Z96 651       2  Chronic pain of left knee  M25 562     G89 29       3  Left knee pain, unspecified chronicity  M25 562                      Subjective: patient states she is now using crutches to walk now      Objective: See treatment diary below      Assessment: Tolerated treatment well  Patient demonstrated fatigue post treatment, exhibited good technique with therapeutic exercises and would benefit from continued PT Patient finally able to do a SLR with mild lag signifying improving quad strength  Knee flexion ROM continues to be limited      Plan: Continue per plan of care        Precautions:  L TKA 1/10 R TKA 1 year ago      Manuals 3/14 3/17 3/21 3/24  1/31 2/3 2/7 2/9 2/14 2/17 2/21 2/24 2/28 3/3 3/7 3/10   PROM done done  done  done done   done  done done done done done Done    Patellar mobilizations            done        Neuro Re-Ed                    bridges                    preston                    E-STIM       NMES 5' with SAQ NMES 5' w SAQ 10" on                                 Ther Ex                    Quad sets                    SAQ 3x10 3x10 3x12 3x12    NMES 10" on/10" off 5' total NMES 10" on/10" off 5' total NMES 10" on, 10" off, 5 mins NMES 10" on, 10" off, 5 mins NMES 10" on, 10" off, 5 mins without NMES 2x10 3x10  3x10 3x10 3x10   Bridging    75* 2x10                 SLR   Quad lag  2x5 x10                LAQ                    Heel slides 5"x20 20x5" 20x5"  80*   10"x10 10x10" PT overpressure 10x5" 70* 2x10x5"  70* 2x10x5" 10x5" 72* 10x5" 5"x10 5''x10 5"x10 5"x10 10x5"   Seated heel slide w overpressure 5"x20 20x5"  20x5" 80*     10x10" 10x10" 10"x10 15x10" 75* 15x10" 10x10" 15x10'' 15x10" 15x10" 15x10" w overpressure   Calf s        5x20" 5x20" 3x10" 5x20" 5x20" 5x20'' 5x20" 5x20s    Bike (rom) 5' rock 5' rock 5' rock   5' 5' rocks 5' rocks 5' rocks 5' rock 5' rock 5' rock 5' rock 5' rock 5' rock 5' rock 5' rock   Leg press SL P8S3 25# 2x10 25# 3x10 25# 3x10 25# 3x10    15# 2x10  15# 2x10 15# 2x10 15# 3x8  np 15# 2x10 15# 3x10 25# 3x10 25# 3x12 25# 3x12   Ther Activity                    Squats                    Step ups x5 Stair negotiation x4 Stair negotiation x4 x5  4"2x10 2" half step up 2x10     nv  nv 3x10 3x12 3x10 staircase w BUE   Standing heel raises            3x10 3x10 3x10  3x10 3x12 3x12   Gait Training                                                            Modalities

## 2023-03-28 ENCOUNTER — OFFICE VISIT (OUTPATIENT)
Dept: PHYSICAL THERAPY | Facility: REHABILITATION | Age: 63
End: 2023-03-28

## 2023-03-28 DIAGNOSIS — G89.29 CHRONIC PAIN OF LEFT KNEE: ICD-10-CM

## 2023-03-28 DIAGNOSIS — Z96.651 STATUS POST TOTAL KNEE REPLACEMENT, RIGHT: Primary | ICD-10-CM

## 2023-03-28 DIAGNOSIS — M25.562 LEFT KNEE PAIN, UNSPECIFIED CHRONICITY: ICD-10-CM

## 2023-03-28 DIAGNOSIS — M25.562 CHRONIC PAIN OF LEFT KNEE: ICD-10-CM

## 2023-03-28 NOTE — PROGRESS NOTES
"Daily Note     Today's date: 3/28/2023  Patient name: Zan Butts  : 1960  MRN: [id number]  Referring provider: Tina Rowe DO  Dx:   Encounter Diagnosis     ICD-10-CM    1  Status post total knee replacement, right  Z96 651       2  Chronic pain of left knee  M25 562     G89 29       3  Left knee pain, unspecified chronicity  M25 562                      Subjective: patient states she isn't using the walker anymore      Objective: See treatment diary below      Assessment: Tolerated treatment well  Patient demonstrated fatigue post treatment, exhibited good technique with therapeutic exercises and would benefit from continued PT Patient continues to make slow and steady improvements of knee flexion ROM  Patient is now able to perform SLR but there continues to be lag      Plan: Continue per plan of care        Precautions:  L TKA 1/10 R TKA 1 year ago      Manuals 3/14 3/17 3/21 3/24 3/28 1/31 2/3 2/7 2/9 2/14 2/17 2/21 2/24 2/28 3/3 3/7 3/10   PROM done done  done done done done   done  done done done done done Done    Patellar mobilizations            done        Neuro Re-Ed                    bridges                    preston                    E-STIM       NMES 5' with SAQ NMES 5' w SAQ 10\" on                                 Ther Ex                    Quad sets                    SAQ 3x10 3x10 3x12 3x12 3x1   NMES 10\" on/10\" off 5' total NMES 10\" on/10\" off 5' total NMES 10\" on, 10\" off, 5 mins NMES 10\" on, 10\" off, 5 mins NMES 10\" on, 10\" off, 5 mins without NMES 2x10 3x10  3x10 3x10 3x10   Bridging    75* 2x10                 SLR   Quad lag  2x5 x10 2x10               LAQ                    Heel slides 5\"x20 20x5\" 20x5\"  80*  5\"x20 10\"x10 10x10\" PT overpressure 10x5\" 70* 2x10x5\"  70* 2x10x5\" 10x5\" 72* 10x5\" 5\"x10 5''x10 5\"x10 5\"x10 10x5\"   Seated heel slide w overpressure 5\"x20 20x5\"  20x5\" 80*  5\"x20   10x10\" 10x10\" 10\"x10 15x10\" 75* 15x10\" 10x10\" 15x10'' 15x10\" 15x10\" 15x10\" w " "overpressure   Calf s        5x20\" 5x20\"  3x10\" 5x20\" 5x20\" 5x20'' 5x20\" 5x20s    Bike (rom) 5' rock 5' rock 5' rock   5' 5' rocks 5' rocks 5' rocks 5' rock 5' rock 5' rock 5' rock 5' rock 5' rock 5' rock 5' rock   Leg press SL P8S3 25# 2x10 25# 3x10 25# 3x10 25# 3x10 35# 3x10   15# 2x10  15# 2x10 15# 2x10 15# 3x8  np 15# 2x10 15# 3x10 25# 3x10 25# 3x12 25# 3x12   Ther Activity                    Squats                    Step ups x5 Stair negotiation x4 Stair negotiation x4 x5 x5 4\"2x10 2\" half step up 2x10     nv  nv 3x10 3x12 3x10 staircase w BUE   Standing heel raises            3x10 3x10 3x10  3x10 3x12 3x12   Gait Training                                                            Modalities                                                                                       "

## 2023-03-31 ENCOUNTER — OFFICE VISIT (OUTPATIENT)
Dept: PHYSICAL THERAPY | Facility: REHABILITATION | Age: 63
End: 2023-03-31

## 2023-03-31 DIAGNOSIS — G89.29 CHRONIC PAIN OF LEFT KNEE: ICD-10-CM

## 2023-03-31 DIAGNOSIS — Z96.651 STATUS POST TOTAL KNEE REPLACEMENT, RIGHT: Primary | ICD-10-CM

## 2023-03-31 DIAGNOSIS — M25.562 LEFT KNEE PAIN, UNSPECIFIED CHRONICITY: ICD-10-CM

## 2023-03-31 DIAGNOSIS — M25.562 CHRONIC PAIN OF LEFT KNEE: ICD-10-CM

## 2023-03-31 NOTE — PROGRESS NOTES
"Daily Note     Today's date: 3/31/2023  Patient name: Jadyn Hoffman  : 1960  MRN: [id number]  Referring provider: Richelle Guadarrama DO  Dx:   Encounter Diagnosis     ICD-10-CM    1  Status post total knee replacement, right  Z96 651       2  Chronic pain of left knee  M25 562     G89 29       3  Left knee pain, unspecified chronicity  M25 562                      Subjective: patient states she is starting to do stairs at home      Objective: See treatment diary below      Assessment: Tolerated treatment well  Patient demonstrated fatigue post treatment, exhibited good technique with therapeutic exercises and would benefit from continued PT Patient is progressing slowly but she does continue to display improvements week to week      Plan: Continue per plan of care        Precautions:  L TKA 1/10 R TKA 1 year ago      Manuals 3/14 3/17 3/21 3/24 3/28 3/31     1/31 2/3 2/7 2/9 2/14 2/17 2/21 2/24 2/28 3/3 3/7 3/10   PROM done done  done done done     done done   done  done done done done done Done    Patellar mobilizations                 done        Neuro Re-Ed                         bridges                         nimals                         E-STIM            NMES 5' with SAQ NMES 5' w SAQ 10\" on                                      Ther Ex                         Quad sets                         SAQ 3x10 3x10 3x12 3x12 3x10 3x10       NMES 10\" on/10\" off 5' total NMES 10\" on/10\" off 5' total NMES 10\" on, 10\" off, 5 mins NMES 10\" on, 10\" off, 5 mins NMES 10\" on, 10\" off, 5 mins without NMES 2x10 3x10  3x10 3x10 3x10   Bridging    75* 2x10                      SLR   Quad lag  2x5 x10 2x10 3x10                   LAQ                         Heel slides 5\"x20 20x5\" 20x5\"  80*  5\"x20 5\"x20     10\"x10 10x10\" PT overpressure 10x5\" 70* 2x10x5\"  70* 2x10x5\" 10x5\" 72* 10x5\" 5\"x10 5''x10 5\"x10 5\"x10 10x5\"   Seated heel slide w overpressure 5\"x20 20x5\"  20x5\" 80*  5\"x20 5\"x20       10x10\" 10x10\" 10\"x10 " "15x10\" 75* 15x10\" 10x10\" 15x10'' 15x10\" 15x10\" 15x10\" w overpressure   Calf s             5x20\" 5x20\"  3x10\" 5x20\" 5x20\" 5x20'' 5x20\" 5x20s    Bike (rom) 5' rock 5' rock 5' rock        5' 5' rocks 5' rocks 5' rocks 5' rock 5' rock 5' rock 5' rock 5' rock 5' rock 5' rock 5' rock   Leg press SL P8S3 25# 2x10 25# 3x10 25# 3x10 25# 3x10 35# 3x10 35# 3x10       15# 2x10  15# 2x10 15# 2x10 15# 3x8  np 15# 2x10 15# 3x10 25# 3x10 25# 3x12 25# 3x12   Ther Activity                         Squats                         Step ups x5 Stair negotiation x4 Stair negotiation x4 x5 x5 x5     4\"2x10 2\" half step up 2x10     nv  nv 3x10 3x12 3x10 staircase w BUE   Standing heel raises                 3x10 3x10 3x10  3x10 3x12 3x12   Gait Training                                                                           Modalities                                                                                                        "

## 2023-04-04 ENCOUNTER — OFFICE VISIT (OUTPATIENT)
Dept: PHYSICAL THERAPY | Facility: REHABILITATION | Age: 63
End: 2023-04-04

## 2023-04-04 DIAGNOSIS — M25.562 LEFT KNEE PAIN, UNSPECIFIED CHRONICITY: ICD-10-CM

## 2023-04-04 DIAGNOSIS — G89.29 CHRONIC PAIN OF LEFT KNEE: ICD-10-CM

## 2023-04-04 DIAGNOSIS — Z96.651 STATUS POST TOTAL KNEE REPLACEMENT, RIGHT: Primary | ICD-10-CM

## 2023-04-04 DIAGNOSIS — M25.562 CHRONIC PAIN OF LEFT KNEE: ICD-10-CM

## 2023-04-04 NOTE — PROGRESS NOTES
PT Evaluation     Today's date: 2023  Patient name: Callum Carvajal  : 1960  MRN: [id number]  Referring provider: Kenny Zaidi DO  Dx:   Encounter Diagnosis     ICD-10-CM    1  Status post total knee replacement, right  Z96 651       2  Chronic pain of left knee  M25 562     G89 29       3  Left knee pain, unspecified chronicity  M25 562                      Assessment  Assessment details: Patient presents to PT with L knee pain s/p L TKA  Patient is starting to really make progress  She is now walking with 1 crutch and going up and eliezer stairs 1 at a time  Despite the improvements, patient continues to display decreased knee ROM, knee strength and abnormal gait  These impairments are leading to pain with walking, standing, and stair climbing  Patient will benefit from skilled PT to address above impairments and help them return to PLOF  Impairments: abnormal coordination, abnormal gait, abnormal muscle firing, abnormal or restricted ROM, abnormal movement, activity intolerance, impaired balance, impaired physical strength, lacks appropriate home exercise program, pain with function and weight-bearing intolerance  Barriers to therapy: Language barrier  Understanding of Dx/Px/POC: good   Prognosis: good    Goals  STG  1  Patient will display decreased pain to 0-2 in 6 weeks  MET  2  Patient will display knee ROM WNL in 6 weeks  ONGOING  3  Patient will display knee strength WNL in 6 weeks  ONGOING    LTG  1  Patient will be able to stand for 30 minutes without pain in 12 weeks  MET  2  Patient will be able to walk for 30 minutes without pain in 12 weeks  ONGOING  3   Patient will be able to go up/down 3 flights of stairs without pain in 12 weeks [address] [facility]  Patient would benefit from: PT eval and skilled physical therapy  Referral necessary: Yes  Planned modality interventions: thermotherapy: hydrocollator packs, manual electrical stimulation, TENS, electrical stimulation/Russian stimulation and cryotherapy  Planned therapy interventions: activity modification, ADL training, balance, balance/weight bearing training, body mechanics training, coordination, flexibility, functional ROM exercises, gait training, graded activity, graded exercise, home exercise program, therapeutic training, therapeutic exercise, therapeutic activities, stretching, strengthening, neuromuscular re-education, motor coordination training, massage, manual therapy and joint mobilization  Frequency: 2x week  Duration in visits: 12  Duration in weeks: 6  Plan of Care beginning date: 2023  Plan of Care expiration date: 2023  Treatment plan discussed with: patient        Subjective Evaluation    History of Present Illness  Mechanism of injury: Patient is now using just 1 crutch for walking  She is able to go up the stairs 1 leg at a time   She is still having some pain on the top of her knee from time to time          Not a recurrent problem   Quality of life: fair    Pain  Current pain ratin  At best pain ratin  At worst pain ratin  Quality: dull ache  Aggravating factors: standing, walking and stair climbing  Progression: improved    Social Support  Steps to enter house: yes    Employment status: not working  Patient Goals  Patient goals for therapy: decreased edema, decreased pain, increased motion, improved balance and increased strength          Objective     Active Range of Motion   Left Knee   Flexion: 90 degrees with pain  Extension: 0 degrees     Passive Range of Motion   Left Knee   Flexion: 95 degrees with pain  Extension: 0 degrees     Strength/Myotome Testing     Left Knee   Flexion: 4+  Extension: 4  Quadriceps contraction: good    Ambulation     Comments   Walking with 1 crutch    Functional Assessment        Comments  TU sec  5xSTS: 22 sec             Precautions:  L TKA 1/10 R TKA 1 year ago      Manuals 3/14 3/17 3/21 3/24 3/28 3/31     1/31 2/3 2/7 2/9 2/14 2/17 2/21 2/24 2/28 3/3 "3/7 3/10   PROM done done  done done done     done done   done  done done done done done Done    Patellar mobilizations                 done        Neuro Re-Ed                         bridges                         [place]                         E-STIM            NMES 5' with SAQ NMES 5' w SAQ 10\" on                                      Ther Ex                         Quad sets                         SAQ 3x10 3x10 3x12 3x12 3x10 3x10       NMES 10\" on/10\" off 5' total NMES 10\" on/10\" off 5' total NMES 10\" on, 10\" off, 5 mins NMES 10\" on, 10\" off, 5 mins NMES 10\" on, 10\" off, 5 mins without NMES 2x10 3x10  3x10 3x10 3x10   Bridging    75* 2x10                      SLR   Quad lag  2x5 x10 2x10 3x10                   LAQ                         Heel slides 5\"x20 20x5\" 20x5\"  80*  5\"x20 5\"x20     10\"x10 10x10\" PT overpressure 10x5\" 70* 2x10x5\"  70* 2x10x5\" 10x5\" 72* 10x5\" 5\"x10 5''x10 5\"x10 5\"x10 10x5\"   Seated heel slide w overpressure 5\"x20 20x5\"  20x5\" 80*  5\"x20 5\"x20       10x10\" 10x10\" 10\"x10 15x10\" 75* 15x10\" 10x10\" 15x10'' 15x10\" 15x10\" 15x10\" w overpressure   Calf s             5x20\" 5x20\"  3x10\" 5x20\" 5x20\" 5x20'' 5x20\" 5x20s    Bike (rom) 5' rock 5' rock 5' rock        5' 5' rocks 5' rocks 5' rocks 5' rock 5' rock 5' rock 5' rock 5' rock 5' rock 5' rock 5' rock   Leg press SL P8S3 25# 2x10 25# 3x10 25# 3x10 25# 3x10 35# 3x10 35# 3x10       15# 2x10  15# 2x10 15# 2x10 15# 3x8  np 15# 2x10 15# 3x10 25# 3x10 25# 3x12 25# 3x12   Ther Activity                         Squats                         Step ups x5 Stair negotiation x4 Stair negotiation x4 x5 x5 x5     4\"2x10 2\" half step up 2x10     nv  nv 3x10 3x12 3x10 staircase w BUE   Standing heel raises                 3x10 3x10 3x10  3x10 3x12 3x12   Gait Training                                                                           Modalities                                                                                "

## 2023-04-07 ENCOUNTER — APPOINTMENT (OUTPATIENT)
Dept: RADIOLOGY | Facility: AMBULARY SURGERY CENTER | Age: 63
End: 2023-04-07
Attending: ORTHOPAEDIC SURGERY

## 2023-04-07 ENCOUNTER — OFFICE VISIT (OUTPATIENT)
Dept: OBGYN CLINIC | Facility: CLINIC | Age: 63
End: 2023-04-07

## 2023-04-07 ENCOUNTER — OFFICE VISIT (OUTPATIENT)
Dept: PHYSICAL THERAPY | Facility: REHABILITATION | Age: 63
End: 2023-04-07

## 2023-04-07 VITALS — HEIGHT: 62 IN | WEIGHT: 166 LBS | BODY MASS INDEX: 30.55 KG/M2

## 2023-04-07 DIAGNOSIS — M25.562 LEFT KNEE PAIN, UNSPECIFIED CHRONICITY: ICD-10-CM

## 2023-04-07 DIAGNOSIS — Z96.651 STATUS POST TOTAL KNEE REPLACEMENT, RIGHT: Primary | ICD-10-CM

## 2023-04-07 DIAGNOSIS — M25.562 CHRONIC PAIN OF LEFT KNEE: ICD-10-CM

## 2023-04-07 DIAGNOSIS — G89.29 CHRONIC PAIN OF LEFT KNEE: ICD-10-CM

## 2023-04-07 DIAGNOSIS — Z96.652 STATUS POST TOTAL KNEE REPLACEMENT USING CEMENT, LEFT: Primary | ICD-10-CM

## 2023-04-07 DIAGNOSIS — Z96.652 STATUS POST TOTAL KNEE REPLACEMENT USING CEMENT, LEFT: ICD-10-CM

## 2023-04-07 NOTE — PROGRESS NOTES
"Daily Note     Today's date: 2023  Patient name: Elena Weems  : 1960  MRN: [id number]  Referring provider: Dawn De Anda DO  Dx:   Encounter Diagnosis     ICD-10-CM    1  Status post total knee replacement, right  Z96 651       2  Chronic pain of left knee  M25 562     G89 29       3  Left knee pain, unspecified chronicity  M25 562           Start Time: 09  Stop Time: 1014  Total time in clinic (min): 39 minutes    Subjective: Patient reports some soreness in the knee  She is ambulating with 1 crutch  Objective: See treatment diary below      Assessment: Tolerated treatment well  Increased muscle fatigue noted this visit  Patient ascended stairs reciprocally and attempted to descend reciprocally, however evident muscle fatigue noted with poor eccentric control  Patient transitioned to descended one at a time  Provided patient with updated HEP  Patient demonstrated fatigue post treatment, exhibited good technique with therapeutic exercises and would benefit from continued PT  Plan: Continue per plan of care        Precautions:  L TKA 1/10 R TKA 1 year ago      Manuals 3/14 3/17 3/21 3/24 3/28 3/31 4/7    1/31 2/3 2/7 2/9 2/14 2/17 2/21 2/24 2/28 3/3 3/7 3/10   PROM done done  done done done     done done   done  done done done done done Done    Patellar mobilizations                 done        Neuro Re-Ed                         bridges                         [place]                         E-STIM            NMES 5' with SAQ NMES 5' w SAQ 10\" on                                      Ther Ex                         Quad sets                         SAQ 3x10 3x10 3x12 3x12 3x10 3x10 3x10      NMES 10\" on/10\" off 5' total NMES 10\" on/10\" off 5' total NMES 10\" on, 10\" off, 5 mins NMES 10\" on, 10\" off, 5 mins NMES 10\" on, 10\" off, 5 mins without NMES 2x10 3x10  3x10 3x10 3x10   Bridging    75* 2x10                      SLR   Quad lag  2x5 x10 2x10 3x10 3x10                  LAQ    " "                     Heel slides 5\"x20 20x5\" 20x5\"  80*  5\"x20 5\"x20 20x5\"    10\"x10 10x10\" PT overpressure 10x5\" 70* 2x10x5\"  70* 2x10x5\" 10x5\" 72* 10x5\" 5\"x10 5''x10 5\"x10 5\"x10 10x5\"   Seated heel slide w overpressure 5\"x20 20x5\"  20x5\" 80*  5\"x20 5\"x20 20x5\" overpressure from PT      10x10\" 10x10\" 10\"x10 15x10\" 75* 15x10\" 10x10\" 15x10'' 15x10\" 15x10\" 15x10\" w overpressure   Calf s             5x20\" 5x20\"  3x10\" 5x20\" 5x20\" 5x20'' 5x20\" 5x20s    Bike (rom) 5' rock 5' rock 5' rock    5' rock    5' 5' rocks 5' rocks 5' rocks 5' rock 5' rock 5' rock 5' rock 5' rock 5' rock 5' rock 5' rock   Leg press SL P8S3 25# 2x10 25# 3x10 25# 3x10 25# 3x10 35# 3x10 35# 3x10 41# 3x10      15# 2x10  15# 2x10 15# 2x10 15# 3x8  np 15# 2x10 15# 3x10 25# 3x10 25# 3x12 25# 3x12   Ther Activity                         Squats                         Step ups x5 Stair negotiation x4 Stair negotiation x4 x5 x5 x5 x3    4\"2x10 2\" half step up 2x10     nv  nv 3x10 3x12 3x10 staircase w BUE   Standing heel raises                 3x10 3x10 3x10  3x10 3x12 3x12   Gait Training                                                                           Modalities                                                                                "

## 2023-04-07 NOTE — PROGRESS NOTES
Assessment:   Status Post  Arthroplasty Knee Total - Left on 1/10/2023   Possibly developing arthrofibrosis of the left knee    Plan:   Weight bearing as tolerated   PT -focus aggressively on range of motion  Analgesics PRN       Follow Up:  1 month    To Do Next Visit:  Range of motion check  At this moment patient does not desire manipulation under anesthesia she wants to work more with physical therapy, advised that she go at least twice a week and that we do have a small window of opportunity to perform manipulation under anesthesia on her, if her motion is not satisfactory by her next visit she may consider this      CHIEF COMPLAINT:  Chief Complaint   Patient presents with   • Left Knee - Post-op         SUBJECTIVE:  Ar Whittaker is a 59y o  year old female who presents for follow up after Arthroplasty Knee Total - Left  Today patient has pain and stiffness  She presents along with her family member who aids in translation    Pt denies any fevers or chills  PHYSICAL EXAMINATION:    MUSCULOSKELETAL EXAMINATION: Left knee    General: Alert and oriented x 3, WNWD, NAD  Incision: healed  Normal postoperative swelling as expected -very mild today  Joint effusion: None  Range of Motion: -5 degrees extension, 80-90 degrees flexion  Neurovascular status: Sensation intact  to light touch L3-S1   Motor intact L3-S1 including 5/5 ankle DF/PF  DP pulse intact        STUDIES REVIEWED:  I have personally reviewed pertinent films in PACS and my interpretation is X-ray of the left knee performed today demonstrates stable intact hardware status post total knee arthroplasty no evidence of failure or complication    Implants in good alignment      PROCEDURES PERFORMED:  Procedures  No Procedures performed today

## 2023-04-10 ENCOUNTER — APPOINTMENT (OUTPATIENT)
Dept: PHYSICAL THERAPY | Facility: REHABILITATION | Age: 63
End: 2023-04-10

## 2023-04-12 ENCOUNTER — APPOINTMENT (OUTPATIENT)
Dept: PHYSICAL THERAPY | Facility: REHABILITATION | Age: 63
End: 2023-04-12

## 2023-04-24 ENCOUNTER — OFFICE VISIT (OUTPATIENT)
Dept: PHYSICAL THERAPY | Facility: REHABILITATION | Age: 63
End: 2023-04-24

## 2023-04-24 DIAGNOSIS — M25.562 CHRONIC PAIN OF LEFT KNEE: ICD-10-CM

## 2023-04-24 DIAGNOSIS — M25.562 LEFT KNEE PAIN, UNSPECIFIED CHRONICITY: ICD-10-CM

## 2023-04-24 DIAGNOSIS — G89.29 CHRONIC PAIN OF LEFT KNEE: ICD-10-CM

## 2023-04-24 DIAGNOSIS — Z96.651 STATUS POST TOTAL KNEE REPLACEMENT, RIGHT: Primary | ICD-10-CM

## 2023-04-24 NOTE — PROGRESS NOTES
"Daily Note     Today's date: 2023  Patient name: Mel Angel  : 1960  MRN: [id number]  Referring provider: Ryanne Freeman DO  Dx:   Encounter Diagnosis     ICD-10-CM    1  Status post total knee replacement, right  Z96 651       2  Chronic pain of left knee  M25 562     G89 29       3  Left knee pain, unspecified chronicity  M25 562           Start Time: 1110  Stop Time: 1145  Total time in clinic (min): 35 minutes    Subjective: Patient reporting knee as \"okay\" at start of session, reporting \"tight\"  Objective: See treatment diary below      Assessment: Tolerated treatment well  Patient demonstrated fatigue post treatment, exhibited good technique with therapeutic exercises and would benefit from continued PT Patient unable to complete full revolution on bike today  Attempted to have patient complete step over while descending, unable to perform  Cues required for increased knee flexion upon ascending to avoid hip hiking  Trialed mini squats with good tolerance, cues required for less UE support  Plan: Continue per plan of care  Progress treatment as tolerated         Precautions:  L TKA 1/10 R TKA 1 year ago        Manuals 3/24 3/28 3/31 4/7 4/10 4/14 4/17 4/21 4/24    1/31 2/3 2/7 2/9 2/14 2/17 2/21 2/24 2/28 3/3 3/7 3/10   PROM done done done   done done done done    done done   done  done done done done done Done    Patellar mobilizations                   done        Neuro Re-Ed                           bridges                           nimals                           E-STIM             NMES 5' with SAQ NMES 5' w SAQ 10\" on                                        Ther Ex                           Quad sets                           SAQ 3x12 3x10 3x10 3x10 3x10 1# 2x10 1# 3x10 1# 3x10 1# 3x12      NMES 10\" on/10\" off 5' total NMES 10\" on/10\" off 5' total NMES 10\" on, 10\" off, 5 mins NMES 10\" on, 10\" off, 5 mins NMES 10\" on, 10\" off, 5 mins without NMES 2x10 3x10  3x10 " "3x10 3x10   Bridging                            SLR x10 2x10 3x10 3x10 3x10 3x10 1#3x10 1# 3x10 1# 3x12                  LAQ                           Heel slides  5\"x20 5\"x20 20x5\" 20x5'' 5\"x20 5\"x20 5\"x20 5x20''    10\"x10 10x10\" PT overpressure 10x5\" 70* 2x10x5\"  70* 2x10x5\" 10x5\" 72* 10x5\" 5\"x10 5''x10 5\"x10 5\"x10 10x5\"   Seated heel slide w overpressure  5\"x20 5\"x20 20x5\" overpressure from PT 20x5'' overpressure from PT          10x10\" 10x10\" 10\"x10 15x10\" 75* 15x10\" 10x10\" 15x10'' 15x10\" 15x10\" 15x10\" w overpressure   Calf s               5x20\" 5x20\"  3x10\" 5x20\" 5x20\" 5x20'' 5x20\" 5x20s    Bike (rom)    5' rock 5' rock 5' rock 5' rock 5' rocks 5' rocks    5' 5' rocks 5' rocks 5' rocks 5' rock 5' rock 5' rock 5' rock 5' rock 5' rock 5' rock 5' rock   Leg press SL P8S3 25# 3x10 35# 3x10 35# 3x10 41# 3x10 45# 3x10 47# 3x10 55# 2x10 55# 3x10 65# 3x10      15# 2x10  15# 2x10 15# 2x10 15# 3x8  np 15# 2x10 15# 3x10 25# 3x10 25# 3x12 25# 3x12   Ther Activity                           Squats         Mini squats 2x10                   Step ups x5 x5 x5 x3 x4 x5 x5 x5 x6    4\"2x10 2\" half step up 2x10     nv  nv 3x10 3x12 3x10 staircase w BUE   Standing heel raises                   3x10 3x10 3x10  3x10 3x12 3x12   Gait Training                                                                                 Modalities                                                                                      "

## 2023-04-28 ENCOUNTER — OFFICE VISIT (OUTPATIENT)
Dept: PHYSICAL THERAPY | Facility: REHABILITATION | Age: 63
End: 2023-04-28

## 2023-04-28 DIAGNOSIS — Z96.651 STATUS POST TOTAL KNEE REPLACEMENT, RIGHT: Primary | ICD-10-CM

## 2023-04-28 DIAGNOSIS — G89.29 CHRONIC PAIN OF LEFT KNEE: ICD-10-CM

## 2023-04-28 DIAGNOSIS — M25.562 CHRONIC PAIN OF LEFT KNEE: ICD-10-CM

## 2023-04-28 DIAGNOSIS — M25.562 LEFT KNEE PAIN, UNSPECIFIED CHRONICITY: ICD-10-CM

## 2023-04-28 NOTE — PROGRESS NOTES
"Daily Note     Today's date: 2023  Patient name: Mel Angel  : 1960  MRN: [id number]  Referring provider: Ryanne Freeman DO  Dx:   Encounter Diagnosis     ICD-10-CM    1  Status post total knee replacement, right  Z96 651       2  Chronic pain of left knee  M25 562     G89 29       3  Left knee pain, unspecified chronicity  M25 562           Start Time: 0840  Stop Time: 925  Total time in clinic (min): 45 minutes    Subjective: Patient reporting knee as \"fine\" at start of session  She reports no complaints after previous session  Objective: See treatment diary below      Assessment: Tolerated treatment well  Patient demonstrated fatigue post treatment, exhibited good technique with therapeutic exercises and would benefit from continued PT Trialed SLS unable to perform, trialed tandem stance with good tolerance, minimal sway noted  Improved form/ROM with mini squats today compared to previous session  Re-eval next visit  Plan: Continue per plan of care  Progress treatment as tolerated         Precautions:  L TKA 1/10 R TKA 1 year ago        Manuals 3/31 4/7 4/10 4/14 4/17 4/21 4/24 4/28   1/31 2/3 2/7 2/9 2/14 2/17 2/21 2/24 2/28 3/3 3/7 3/10   PROM done   done done done done done   done done   done  done done done done done Done    Patellar mobilizations                 done        Neuro Re-Ed                         bridges                         clamshells                         E-STIM           NMES 5' with SAQ NMES 5' w SAQ 10\" on             Tandem        10''x3 ea                 SLS        hard                 Ther Ex                         Quad sets                         SAQ 3x10 3x10 3x10 1# 2x10 1# 3x10 1# 3x10 1# 3x12 1 5# 3x12     NMES 10\" on/10\" off 5' total NMES 10\" on/10\" off 5' total NMES 10\" on, 10\" off, 5 mins NMES 10\" on, 10\" off, 5 mins NMES 10\" on, 10\" off, 5 mins without NMES 2x10 3x10  3x10 3x10 3x10   Bridging                          SLR 3x10 3x10 " "3x10 3x10 1#3x10 1# 3x10 1# 3x12 1 5# 3x12                 LAQ                         Heel slides 5\"x20 20x5\" 20x5'' 5\"x20 5\"x20 5\"x20 5x20'' 5x20''   10\"x10 10x10\" PT overpressure 10x5\" 70* 2x10x5\"  70* 2x10x5\" 10x5\" 72* 10x5\" 5\"x10 5''x10 5\"x10 5\"x10 10x5\"   Seated heel slide w overpressure 5\"x20 20x5\" overpressure from PT 20x5'' overpressure from PT          10x10\" 10x10\" 10\"x10 15x10\" 75* 15x10\" 10x10\" 15x10'' 15x10\" 15x10\" 15x10\" w overpressure   Calf s             5x20\" 5x20\"  3x10\" 5x20\" 5x20\" 5x20'' 5x20\" 5x20s    Bike (rom)  5' rock 5' rock 5' rock 5' rock 5' rocks 5' rocks 5' rocks   5' 5' rocks 5' rocks 5' rocks 5' rock 5' rock 5' rock 5' rock 5' rock 5' rock 5' rock 5' rock   Leg press SL P8S3 35# 3x10 41# 3x10 45# 3x10 47# 3x10 55# 2x10 55# 3x10 65# 3x10 65# 3x12      15# 2x10  15# 2x10 15# 2x10 15# 3x8  np 15# 2x10 15# 3x10 25# 3x10 25# 3x12 25# 3x12   Ther Activity                         Squats       Mini squats 2x10  Mini squats 2x10                 Step ups x5 x3 x4 x5 x5 x5 x6 x6   4\"2x10 2\" half step up 2x10     nv  nv 3x10 3x12 3x10 staircase w BUE   Standing heel raises                 3x10 3x10 3x10  3x10 3x12 3x12   Gait Training                                                                           Modalities                                                                                "

## 2023-05-01 ENCOUNTER — OFFICE VISIT (OUTPATIENT)
Dept: OBGYN CLINIC | Facility: CLINIC | Age: 63
End: 2023-05-01

## 2023-05-01 ENCOUNTER — OFFICE VISIT (OUTPATIENT)
Dept: PHYSICAL THERAPY | Facility: REHABILITATION | Age: 63
End: 2023-05-01

## 2023-05-01 VITALS — WEIGHT: 166 LBS | BODY MASS INDEX: 30.55 KG/M2 | HEIGHT: 62 IN

## 2023-05-01 DIAGNOSIS — Z96.651 STATUS POST TOTAL KNEE REPLACEMENT, RIGHT: Primary | ICD-10-CM

## 2023-05-01 DIAGNOSIS — G89.29 CHRONIC PAIN OF LEFT KNEE: ICD-10-CM

## 2023-05-01 DIAGNOSIS — M25.562 LEFT KNEE PAIN, UNSPECIFIED CHRONICITY: ICD-10-CM

## 2023-05-01 DIAGNOSIS — Z96.652 STATUS POST TOTAL KNEE REPLACEMENT USING CEMENT, LEFT: Primary | ICD-10-CM

## 2023-05-01 DIAGNOSIS — M25.562 CHRONIC PAIN OF LEFT KNEE: ICD-10-CM

## 2023-05-01 NOTE — PROGRESS NOTES
PT Re-Evaluation     Today's date: 2023  Patient name: Mirna Vaughn  : 1960  MRN: [id number]  Referring provider: Jazmine Hutchinson DO  Dx:   Encounter Diagnosis     ICD-10-CM    1  Status post total knee replacement, right  Z96 651       2  Chronic pain of left knee  M25 562     G89 29       3  Left knee pain, unspecified chronicity  M25 562                      Assessment  Assessment details: Patient presents to PT with L knee pain s/p L TKA  Patient continues to progress with her function but she is still limited  She is now walking short distances without AD  Patient is able to go up stairs normally but is still having difficulty descending stairs reciprocally  Patient displays decreased knee ROM and strength  These impairments are leading to difficulty with walking, standing and stair climbing  Patient will benefit from skilled PT to address above impairments and help them return to PLOF  Impairments: abnormal coordination, abnormal gait, abnormal muscle firing, abnormal or restricted ROM, abnormal movement, activity intolerance, impaired balance, impaired physical strength, lacks appropriate home exercise program, pain with function and weight-bearing intolerance  Barriers to therapy: Language barrier  Understanding of Dx/Px/POC: good   Prognosis: good    Goals  STG  1  Patient will display decreased pain to 0-2 in 6 weeks  2  Patient will display knee ROM WNL in 6 weeks  3  Patient will display knee strength WNL in 6 weeks    LTG  1  Patient will be able to stand for 30 minutes without pain in 12 weeks  2  Patient will be able to walk for 30 minutes without pain in 12 weeks  3   Patient will be able to go up/down 3 flights of stairs without pain in 12 weeks      Plan  Patient would benefit from: PT eval and skilled physical therapy  Referral necessary: Yes  Planned modality interventions: thermotherapy: hydrocollator packs, manual electrical stimulation, TENS, electrical stimulation/Russian stimulation and cryotherapy  Planned therapy interventions: activity modification, ADL training, balance, balance/weight bearing training, body mechanics training, coordination, flexibility, functional ROM exercises, gait training, graded activity, graded exercise, home exercise program, therapeutic training, therapeutic exercise, therapeutic activities, stretching, strengthening, neuromuscular re-education, motor coordination training, massage, manual therapy and joint mobilization  Frequency: 2x week  Duration in visits: 8  Duration in weeks: 4  Plan of Care beginning date: 2023  Plan of Care expiration date: 2023  Treatment plan discussed with: patient        Subjective Evaluation    History of Present Illness  Mechanism of injury: Patient states she is now just using a cane  She is able to walk around her house a little without the cane but she needs it when she is out and about   She is doing better with stairs but is still really having issues going down with the involved leg          Not a recurrent problem   Quality of life: good    Pain  Current pain ratin  At best pain ratin  At worst pain ratin  Quality: tight  Aggravating factors: standing, walking and stair climbing  Progression: improved    Social Support  Steps to enter house: yes    Employment status: not working        Objective     Active Range of Motion   Left Knee   Flexion: 100 degrees with pain  Extension: 0 degrees     Passive Range of Motion   Left Knee   Flexion: 100 degrees with pain  Extension: 0 degrees     Strength/Myotome Testing     Left Knee   Flexion: 5  Extension: 4+  Quadriceps contraction: good    Ambulation     Comments   Walking with 1 cane for longer distances    Functional Assessment        Comments  TU sec  5xSTS: 16 sec             Precautions:  L TKA 1/10 R TKA 1 year ago    PT 1 on 1: 10:00-10:23        Manuals 3/31 4/7 4/10 4/14 4/17 4/21 4/24 4/28 5/1  1/31 2/3 2/7 2/9 2/14 2/17 2/21 2/24 2/28 "3/3 3/7 3/10   PROM done   done done done done done done  done done   done  done done done done done Done    Patellar mobilizations                 done        Neuro Re-Ed                         bridges                         clamshells                         E-STIM           NMES 5' with SAQ NMES 5' w SAQ 10\" on             Tandem        10''x3 ea                 SLS        hard                 Ther Ex                         Quad sets                         SAQ 3x10 3x10 3x10 1# 2x10 1# 3x10 1# 3x10 1# 3x12 1 5# 3x12     NMES 10\" on/10\" off 5' total NMES 10\" on/10\" off 5' total NMES 10\" on, 10\" off, 5 mins NMES 10\" on, 10\" off, 5 mins NMES 10\" on, 10\" off, 5 mins without NMES 2x10 3x10  3x10 3x10 3x10   Bridging                          SLR 3x10 3x10 3x10 3x10 1#3x10 1# 3x10 1# 3x12 1 5# 3x12                 LAQ                         Heel slides 5\"x20 20x5\" 20x5'' 5\"x20 5\"x20 5\"x20 5x20'' 5x20'' 20\"x5  10\"x10 10x10\" PT overpressure 10x5\" 70* 2x10x5\"  70* 2x10x5\" 10x5\" 72* 10x5\" 5\"x10 5''x10 5\"x10 5\"x10 10x5\"   Seated heel slide w overpressure 5\"x20 20x5\" overpressure from PT 20x5'' overpressure from PT          10x10\" 10x10\" 10\"x10 15x10\" 75* 15x10\" 10x10\" 15x10'' 15x10\" 15x10\" 15x10\" w overpressure   Calf s             5x20\" 5x20\"  3x10\" 5x20\" 5x20\" 5x20'' 5x20\" 5x20s    Bike (rom)  5' rock 5' rock 5' rock 5' rock 5' rocks 5' rocks 5' rocks 5' rocks  5' 5' rocks 5' rocks 5' rocks 5' rock 5' rock 5' rock 5' rock 5' rock 5' rock 5' rock 5' rock   Leg press SL P8S3 35# 3x10 41# 3x10 45# 3x10 47# 3x10 55# 2x10 55# 3x10 65# 3x10 65# 3x12  65# 3x12    15# 2x10  15# 2x10 15# 2x10 15# 3x8  np 15# 2x10 15# 3x10 25# 3x10 25# 3x12 25# 3x12   Ther Activity                         Squats       Mini squats 2x10  Mini squats 2x10 Mini squats 2x10                Step ups x5 x3 x4 x5 x5 x5 x6 x6   4\"2x10 2\" half step up 2x10     nv  nv 3x10 3x12 3x10 staircase w BUE   Standing heel raises                 3x10 3x10 3x10  " 3x10 3x12 3x12   Gait Training                                                                           Modalities

## 2023-05-01 NOTE — PROGRESS NOTES
Assessment:   Status Post  Arthroplasty Knee Total - Left on 1/10/2023       Plan:   Weight bearing as tolerated   Continue PT -focus aggressively on range of motion  Analgesics PRN   She is near full passive extension at near 100 degrees flexion while seated    Follow Up:  2 months    To Do Next Visit:  Range of motion check, x-ray of left knee        CHIEF COMPLAINT:  Chief Complaint   Patient presents with    Left Knee - Post-op         SUBJECTIVE:  Phillip Crain is a 59y o  year old female who presents for follow up after Arthroplasty Knee Total - Left  Today patient has pain and stiffness  She presents along with her family member who aids in translation     She feels she is doing better since last appointment  Has been going to physical therapy          PHYSICAL EXAMINATION:    MUSCULOSKELETAL EXAMINATION: Left knee    General: Alert and oriented x 3, WNWD, NAD  Incision: healed, small areas hypertrophic scar mid incision  Normal postoperative swelling as expected -very mild today  Joint effusion: None  Range of Motion: Seated: -2 degrees extension, 100 degrees flexion  Supine: - 5 degrees extension, 90 degrees flexion  Neurovascular status: Sensation intact  to light touch L3-S1   Motor intact L3-S1 including 5/5 ankle DF/PF  DP pulse intact        STUDIES REVIEWED:  I have personally reviewed pertinent films in PACS and my interpretation is X-ray of the left knee performed today demonstrates stable intact hardware status post total knee arthroplasty no evidence of failure or complication    Implants in good alignment      PROCEDURES PERFORMED:  Procedures  No Procedures performed today

## 2023-05-05 ENCOUNTER — OFFICE VISIT (OUTPATIENT)
Dept: PHYSICAL THERAPY | Facility: REHABILITATION | Age: 63
End: 2023-05-05

## 2023-05-05 ENCOUNTER — OFFICE VISIT (OUTPATIENT)
Dept: FAMILY MEDICINE CLINIC | Facility: CLINIC | Age: 63
End: 2023-05-05

## 2023-05-05 VITALS
TEMPERATURE: 97.4 F | SYSTOLIC BLOOD PRESSURE: 110 MMHG | DIASTOLIC BLOOD PRESSURE: 70 MMHG | BODY MASS INDEX: 31.28 KG/M2 | HEART RATE: 70 BPM | HEIGHT: 62 IN | WEIGHT: 170 LBS | OXYGEN SATURATION: 98 %

## 2023-05-05 DIAGNOSIS — G89.29 CHRONIC PAIN OF LEFT KNEE: ICD-10-CM

## 2023-05-05 DIAGNOSIS — E11.9 TYPE 2 DIABETES MELLITUS WITHOUT COMPLICATION, WITHOUT LONG-TERM CURRENT USE OF INSULIN (HCC): Primary | ICD-10-CM

## 2023-05-05 DIAGNOSIS — M25.562 CHRONIC PAIN OF LEFT KNEE: ICD-10-CM

## 2023-05-05 DIAGNOSIS — M25.562 LEFT KNEE PAIN, UNSPECIFIED CHRONICITY: ICD-10-CM

## 2023-05-05 DIAGNOSIS — I10 ESSENTIAL HYPERTENSION: ICD-10-CM

## 2023-05-05 DIAGNOSIS — R79.89 ABNORMAL CBC: ICD-10-CM

## 2023-05-05 DIAGNOSIS — Z96.651 STATUS POST TOTAL KNEE REPLACEMENT, RIGHT: Primary | ICD-10-CM

## 2023-05-05 LAB — SL AMB POCT HEMOGLOBIN AIC: 7.6 (ref ?–6.5)

## 2023-05-05 NOTE — ASSESSMENT & PLAN NOTE
"-A1c slightly increased from 7 2 to 7 6  -She states that she does eat lots of fruits and vegetables, but frequently eats high carb foods like rice as well   -Currently takes metformin 500 BID    -Continue to encourage healthy diet, limiting intake of simple carbohydrates  -Discussed increasing metformin to 1000 BID but she is not willing to increase dose at this time, stating that that would be \"too much  \"  -Plan to follow up for re check in 3 months  Depending on result, will likely once again discuss increasing metformin dose if she is willing, or adding an additional agent         Lab Results   Component Value Date    HGBA1C 7 6 (A) 05/05/2023     "

## 2023-05-05 NOTE — PROGRESS NOTES
"Family Medicine Follow-Up Office Visit  Neetu Lunsford 59 y o  female   MRN: [id number] : 1960  ENCOUNTER: 2023 5:02 PM    Assessment and Plan     Type 2 diabetes mellitus without complication, without long-term current use of insulin (HCC)  -A1c slightly increased from 7 2 to 7 6  -She states that she does eat lots of fruits and vegetables, but frequently eats high carb foods like rice as well   -Currently takes metformin 500 BID    -Continue to encourage healthy diet, limiting intake of simple carbohydrates  -Discussed increasing metformin to 1000 BID but she is not willing to increase dose at this time, stating that that would be \"too much  \"  -Plan to follow up for re check in 3 months  Depending on result, will likely once again discuss increasing metformin dose if she is willing, or adding an additional agent  Lab Results   Component Value Date    HGBA1C 7 6 (A) 2023       Chief Complaint     Chief Complaint   Patient presents with    Follow-up       History of Present Illness   Neetu Lunsford is a 59y o -year-old female who presents today for DM follow up  A1c today is 7 6  She has no other acute complaints  Review of Systems   Review of Systems   Constitutional: Negative for fever  Respiratory: Negative for shortness of breath  Cardiovascular: Negative for chest pain  Gastrointestinal: Negative for abdominal pain  Musculoskeletal: Positive for gait problem (improving)          Knee pain s/p surgery       Active Problem List     Patient Active Problem List   Diagnosis    Bilateral knee pain    Localized osteoarthritis of knees, bilateral    Degenerative arthritis of knee, bilateral    Type 2 diabetes mellitus without complication, without long-term current use of insulin (HCC)    Essential hypertension    Hyperlipidemia    Carpal tunnel syndrome on both sides    Right arm pain    Encounter for screening mammogram for breast cancer    Constipation " "   Palpitations    Arthritis of knee    Tobacco abuse    Lower abdominal pain    Primary osteoarthritis of right knee    Abnormal ultrasound of uterus    Pre-operative clearance    Acute postoperative anemia due to expected blood loss    Status post total knee replacement, right    Arthrofibrosis of knee joint, right    Anxiety    Dry eyes    Primary osteoarthritis of left knee    Status post total knee replacement using cement, left       Past Medical History, Past Surgical History, Family History, and Social History were reviewed and updated today as appropriate  Objective   /70 (BP Location: Left arm, Patient Position: Sitting, Cuff Size: Standard)   Pulse 70   Temp (!) 97 4 °F (36 3 °C) (Tympanic)   Ht 5' 2\" (1 575 m)   Wt 77 1 kg (170 lb)   SpO2 98%   BMI 31 09 kg/m²     Physical Exam  Constitutional:       General: She is not in acute distress  HENT:      Nose: Nose normal    Eyes:      Extraocular Movements: Extraocular movements intact  Conjunctiva/sclera: Conjunctivae normal    Cardiovascular:      Rate and Rhythm: Normal rate and regular rhythm  Pulses: Normal pulses  Heart sounds: Normal heart sounds  Pulmonary:      Effort: Pulmonary effort is normal       Breath sounds: Normal breath sounds  Abdominal:      Palpations: Abdomen is soft  Tenderness: There is no abdominal tenderness  Musculoskeletal:      Right lower leg: No edema  Left lower leg: No edema  Skin:     General: Skin is warm and dry  Comments: Surgical scars b/l knees   Neurological:      Mental Status: She is alert and oriented to person, place, and time         Diabetic Foot Exam    Pertinent Laboratory/Diagnostic Studies:  Lab Results   Component Value Date    BUN 11 01/11/2023    CREATININE 0 81 01/11/2023    CALCIUM 9 2 01/11/2023    K 4 1 01/11/2023    CO2 32 01/11/2023     01/11/2023     Lab Results   Component Value Date    ALT 23 12/17/2022    AST 14 12/17/2022 " ALKPHOS 68 12/17/2022       Lab Results   Component Value Date    WBC 6 63 01/11/2023    HGB 13 4 01/11/2023    HCT 41 1 01/11/2023    MCV 96 01/11/2023     01/11/2023       No results found for: TSH    No results found for: CHOL  Lab Results   Component Value Date    TRIG 106 12/17/2021     Lab Results   Component Value Date    HDL 38 (L) 12/17/2021     Lab Results   Component Value Date    LDLCALC 71 12/17/2021     Lab Results   Component Value Date    HGBA1C 7 6 (A) 05/05/2023       Results for orders placed or performed in visit on 05/05/23   POCT hemoglobin A1c   Result Value Ref Range    Hemoglobin A1C 7 6 (A) 6 5       Orders Placed This Encounter   Procedures    Lipid panel    Comprehensive metabolic panel    CBC and Platelet    POCT hemoglobin A1c         Current Medications     Current Outpatient Medications   Medication Sig Dispense Refill    aspirin (ECOTRIN LOW STRENGTH) 81 mg EC tablet Take 81 mg by mouth daily      atenolol-chlorthalidone (TENORETIC) 50-25 mg per tablet Take 1 tablet by mouth daily 90 tablet 3    glucose blood (OneTouch Verio) test strip Check Blood Sugar 2 times daily 100 each 10    Lancets (OneTouch Delica Plus [id number]) MISC Use 1 each 2 (two) times a day 100 each 10    metFORMIN (GLUCOPHAGE) 500 mg tablet Take 1 tablet (500 mg total) by mouth 2 (two) times a day with meals 180 tablet 1    pravastatin (PRAVACHOL) 40 mg tablet Take one tablet by mouth daily in the morning 45 tablet 3    ascorbic acid (VITAMIN C) 500 mg tablet Take 1 tablet (500 mg total) by mouth 2 (two) times a day (Patient not taking: Reported on 5/5/2023) 60 tablet 0    docusate sodium (COLACE) 100 mg capsule Take 1 capsule (100 mg total) by mouth 2 (two) times a day (Patient not taking: Reported on 5/5/2023) 60 capsule 1    ferrous sulfate 324 (65 Fe) mg Take 1 tablet (324 mg total) by mouth 2 (two) times a day before meals (Patient not taking: Reported on 5/5/2023) 60 tablet 0    folic acid (FOLVITE) 800 MCG tablet Take 0 5 tablets (400 mcg total) by mouth daily (Patient not taking: Reported on 5/5/2023) 30 tablet 0    Multiple Vitamins-Minerals (multivitamin with minerals) tablet Take 1 tablet by mouth daily (Patient not taking: Reported on 5/5/2023) 30 tablet 1     No current facility-administered medications for this visit  ALLERGIES:  No Known Allergies    Health Maintenance     Health Maintenance   Topic Date Due    COVID-19 Vaccine (1) Never done    Pneumococcal Vaccine: Pediatrics (0 to 5 Years) and At-Risk Patients (6 to 59 Years) (1 - PCV) Never done    DM Eye Exam  Never done    BMI: Followup Plan  Never done    Annual Physical  Never done    DTaP,Tdap,and Td Vaccines (1 - Tdap) Never done    Kidney Health Evaluation: Microalbumin/Creatinine Ratio  02/04/2021    Diabetic Foot Exam  03/16/2022    Breast Cancer Screening: Mammogram  02/12/2023    Colorectal Cancer Screening  05/29/2023    HEMOGLOBIN A1C  08/05/2023    PT PLAN OF CARE  05/31/2023    Influenza Vaccine (Season Ended) 09/01/2023    Kidney Health Evaluation: GFR  01/11/2024    Depression Screening  05/05/2024    BMI: Adult  05/05/2024    Cervical Cancer Screening  02/25/2026    HIV Screening  Completed    Hepatitis C Screening  Completed    HIB Vaccine  Aged Out    IPV Vaccine  Aged Out    Hepatitis A Vaccine  Aged Out    Meningococcal ACWY Vaccine  Aged Out    HPV Vaccine  Aged Out       There is no immunization history on file for this patient  Chencho Valentine  5/5/2023  5:02 PM    Parts of this note were dictated using [employer] dictation software and may have sounds-like errors due to variation in pronunciation

## 2023-05-05 NOTE — PROGRESS NOTES
"Daily Note     Today's date: 2023  Patient name: Myrna Macedo  : 1960  MRN: [id number]  Referring provider: Rangel Diaz DO  Dx:   Encounter Diagnosis     ICD-10-CM    1  Status post total knee replacement, right  Z96 651       2  Chronic pain of left knee  M25 562     G89 29       3  Left knee pain, unspecified chronicity  M25 562           Start Time: 0850  Stop Time: 0930  Total time in clinic (min): 40 minutes    Subjective: Patient reports knee as \"good\" at start of session  She followed up with MD        Objective: See treatment diary below      Assessment: Tolerated treatment well  Patient demonstrated fatigue post treatment, exhibited good technique with therapeutic exercises and would benefit from continued PT Trialed additional TE this session with good tolerance  No knee pain noted with any TE performed  Plan: Continue per plan of care  Progress treatment as tolerated         Precautions:  L TKA 1/10 R TKA 1 year ago    PT 1 on 1: 10:00-10:23        Manuals  5 5 3/3 3/7 3/10   PROM done done done done done     done  done done done done done Done    Patellar mobilizations            done        Neuro Re-Ed                    bridges                    nimals                    E-STIM                    Tandem    10''x3 ea  30''x3 ea              SLS    hard                Ther Ex                    Quad sets                    SAQ 1# 3x10 1# 3x10 1# 3x12 1 5# 3x12    NMES 10\" on/10\" off 5' total NMES 10\" on/10\" off 5' total NMES 10\" on, 10\" off, 5 mins NMES 10\" on, 10\" off, 5 mins NMES 10\" on, 10\" off, 5 mins without NMES 2x10 3x10  3x10 3x10 3x10   Bridging                     SLR 1#3x10 1# 3x10 1# 3x12 1 5# 3x12  1 5# 3x15              LAQ                    Heel slides 5\"x20 5\"x20 5x20'' 5x20'' 20\"x5 20x5''  10x5\" 70* 2x10x5\"  70* 2x10x5\" 10x5\" 72* 10x5\" 5\"x10 5''x10 5\"x10 5\"x10 10x5\"   Seated heel slide w " "overpressure        10x10\" 10x10\" 10\"x10 15x10\" 75* 15x10\" 10x10\" 15x10'' 15x10\" 15x10\" 15x10\" w overpressure   Calf s        5x20\" 5x20\"  3x10\" 5x20\" 5x20\" 5x20'' 5x20\" 5x20s    Bike (rom) 5' rock 5' rocks 5' rocks 5' rocks 5' rocks 5' rocks to full  5' rocks 5' rocks 5' rock 5' rock 5' rock 5' rock 5' rock 5' rock 5' rock 5' rock   Leg press SL P8S3 55# 2x10 55# 3x10 65# 3x10 65# 3x12  65# 3x12 75# 3x10  15# 2x10  15# 2x10 15# 2x10 15# 3x8  np 15# 2x10 15# 3x10 25# 3x10 25# 3x12 25# 3x12   Ther Activity                    Squats   Mini squats 2x10  Mini squats 2x10 Mini squats 2x10 Mini squats 3x10              Step ups x5 x5 x6 x6  x6      nv  nv 3x10 3x12 3x10 staircase w BUE   Sit to stands      3x10 chair               Lateral walking      [place] TB at mirror 5 laps Slight UE support              Standing heel raises            3x10 3x10 3x10  3x10 3x12 3x12   Gait Training                                                            Modalities                                                                 "

## 2023-05-08 ENCOUNTER — OFFICE VISIT (OUTPATIENT)
Dept: PHYSICAL THERAPY | Facility: REHABILITATION | Age: 63
End: 2023-05-08

## 2023-05-08 DIAGNOSIS — G89.29 CHRONIC PAIN OF LEFT KNEE: ICD-10-CM

## 2023-05-08 DIAGNOSIS — M25.562 CHRONIC PAIN OF LEFT KNEE: ICD-10-CM

## 2023-05-08 DIAGNOSIS — M25.562 LEFT KNEE PAIN, UNSPECIFIED CHRONICITY: ICD-10-CM

## 2023-05-08 DIAGNOSIS — Z96.651 STATUS POST TOTAL KNEE REPLACEMENT, RIGHT: Primary | ICD-10-CM

## 2023-05-08 NOTE — PROGRESS NOTES
"Daily Note     Today's date: 2023  Patient name: Martha Louise  : 1960  MRN: [id number]  Referring provider: Reese De La Fuente DO  Dx:   Encounter Diagnosis     ICD-10-CM    1  Status post total knee replacement, right  Z96 651       2  Left knee pain, unspecified chronicity  M25 562       3  Chronic pain of left knee  M25 562     G89 29                      Subjective: Pt states she has no changes since her last PT session  Objective: See treatment diary below      Assessment: Tolerated treatment well as she was able to preform step ups today with less pain  Pt focused on glute contraction to decrease valgus/ verus instability   Pt continues to be limed with ROM seen wit self stretching and manual PT  Pt would continue to benefit from PT  Plan: Continue per plan of care  Progress treatment as tolerated         Precautions:  L TKA 1/10 R TKA 1 year ago            Manuals     PROM done done done done done  CF   Patellar mobilizations          Neuro Re-Ed          bridges          clamshells          E-STIM          Tandem    10''x3 ea  30''x3 ea 3  X30\" ea    SLS    hard      Ther Ex          Quad sets          SAQ 1# 3x10 1# 3x10 1# 3x12 1 5# 3x12      Bridging           SLR 1#3x10 1# 3x10 1# 3x12 1 5# 3x12  1 5# 3x15 1 5# 3 x 15    LAQ          Heel slides 5\"x20 5\"x20 5x20'' 5x20'' 20\"x5 20x5'' 20 x 5\"    Seated heel slide w overpressure          Calf s          Bike (rom) 5' rock 5' rocks 5' rocks 5' rocks 5' rocks 5' rocks to full 5' rocks to full   Leg press SL P8S3 55# 2x10 55# 3x10 65# 3x10 65# 3x12  65# 3x12 75# 3x10 75# 3 x 10    Ther Activity          Squats   Mini squats 2x10  Mini squats 2x10 Mini squats 2x10 Mini squats 3x10 Mini squats 3x10   Step ups x5 x5 x6 x6  x6 6x    Sit to stands      3x10 chair  3  x10 chair    Lateral walking      [place] TB at mirror 5 laps Slight UE support [place] TB at mirror 5 laps Slight UE support   Standing heel " raises          Gait Training                              Modalities

## 2023-05-10 ENCOUNTER — VBI (OUTPATIENT)
Dept: ADMINISTRATIVE | Facility: OTHER | Age: 63
End: 2023-05-10

## 2023-05-12 ENCOUNTER — OFFICE VISIT (OUTPATIENT)
Dept: PHYSICAL THERAPY | Facility: REHABILITATION | Age: 63
End: 2023-05-12

## 2023-05-12 DIAGNOSIS — G89.29 CHRONIC PAIN OF LEFT KNEE: ICD-10-CM

## 2023-05-12 DIAGNOSIS — M25.562 CHRONIC PAIN OF LEFT KNEE: ICD-10-CM

## 2023-05-12 DIAGNOSIS — M25.562 LEFT KNEE PAIN, UNSPECIFIED CHRONICITY: Primary | ICD-10-CM

## 2023-05-12 DIAGNOSIS — Z96.651 STATUS POST TOTAL KNEE REPLACEMENT, RIGHT: ICD-10-CM

## 2023-05-12 NOTE — PROGRESS NOTES
"Daily Note     Today's date: 2023  Patient name: Jony Garg  : 1960  MRN: [id number]  Referring provider: Virginia Merrill DO  Dx:   Encounter Diagnosis     ICD-10-CM    1  Left knee pain, unspecified chronicity  M25 562       2  Chronic pain of left knee  M25 562     G89 29       3  Status post total knee replacement, right  Z96 651                      Subjective: Pt states she had an eye doctors appointment today and is having trouble seeing, which is why she is using a crutch today  Pt states her knee is feeling okay she just doesn't feel as stable as she can't see very well  Objective: See treatment diary below      Assessment: Tolerated treatment well as we focused on table exercises today d/t decreased vision  Pt continues to be limited with ROM which improved post session  Pt would continue to benefit from PT  Plan: Continue per plan of care  Progress treatment as tolerated         Precautions:  L TKA 1/10 R TKA 1 year ago            Manuals     PROM done done done done done  CF CF   Patellar mobilizations           Neuro Re-Ed           bridges           clamshells           E-STIM           Tandem    10''x3 ea  30''x3 ea 3  X30\" ea  np    SLS    hard       Ther Ex           Quad sets           SAQ 1# 3x10 1# 3x10 1# 3x12 1 5# 3x12       Bridging         2  x10    SLR 1#3x10 1# 3x10 1# 3x12 1 5# 3x12  1 5# 3x15 1 5# 3 x 15  1 5# 3 x 15    LAQ        1 5# 3 x 10    Heel slides 5\"x20 5\"x20 5x20'' 5x20'' 20\"x5 20x5'' 20 x 5\"  20 x 5\"    Seated heel slide w overpressure           Calf s           Bike (rom) 5' rock 5' rocks 5' rocks 5' rocks 5' rocks 5' rocks to full 5' rocks to full 5' rocks to full   Leg press SL P8S3 55# 2x10 55# 3x10 65# 3x10 65# 3x12  65# 3x12 75# 3x10 75# 3 x 10  75# 3 x 10    Ther Activity           Squats   Mini squats 2x10  Mini squats 2x10 Mini squats 2x10 Mini squats 3x10 Mini squats 3x10 np   Step ups x5 x5 x6 " x6  x6 6x  np   Sit to stands      3x10 chair  3  x10 chair   2x 10 chair    Lateral walking      [place] TB at mirror 5 laps Slight UE support [place] TB at mirror 5 laps Slight UE support np   Standing heel raises           Gait Training                                 Modalities

## 2023-05-15 ENCOUNTER — OFFICE VISIT (OUTPATIENT)
Dept: PHYSICAL THERAPY | Facility: REHABILITATION | Age: 63
End: 2023-05-15

## 2023-05-15 DIAGNOSIS — M25.562 LEFT KNEE PAIN, UNSPECIFIED CHRONICITY: Primary | ICD-10-CM

## 2023-05-15 DIAGNOSIS — G89.29 CHRONIC PAIN OF LEFT KNEE: ICD-10-CM

## 2023-05-15 DIAGNOSIS — M25.562 CHRONIC PAIN OF LEFT KNEE: ICD-10-CM

## 2023-05-15 DIAGNOSIS — Z96.651 STATUS POST TOTAL KNEE REPLACEMENT, RIGHT: ICD-10-CM

## 2023-05-15 NOTE — PROGRESS NOTES
"Daily Note     Today's date: 5/15/2023  Patient name: Evens Huffman  : 1960  MRN: [id number]  Referring provider: Christal Newby DO  Dx:   Encounter Diagnosis     ICD-10-CM    1  Left knee pain, unspecified chronicity  M25 562       2  Chronic pain of left knee  M25 562     G89 29       3  Status post total knee replacement, right  Z96 651                      Subjective: patient states she is using a cane now      Objective: See treatment diary below      Assessment: Tolerated treatment well  Patient demonstrated fatigue post treatment, exhibited good technique with therapeutic exercises and would benefit from continued PT Patient with improving knee ROM and strength  Patient now walking short distances without AD and longer distances with [facility]      Plan: Continue per plan of care        Precautions:  L TKA 1/10 R TKA 1 year ago            Manuals 4/17 4/21 4/24 4/28 5/1 5/5 5/8  5/12  5/15       PROM done done done done done  CF CF done       Patellar mobilizations                Neuro Re-Ed                bridges                clamshells                E-STIM                Tandem    10''x3 ea  30''x3 ea 3  X30\" ea  np         SLS    hard            Ther Ex                Quad sets                SAQ 1# 3x10 1# 3x10 1# 3x12 1 5# 3x12            Bridging         2  x10  3x10       SLR 1#3x10 1# 3x10 1# 3x12 1 5# 3x12  1 5# 3x15 1 5# 3 x 15  1 5# 3 x 15  1 5# 3x15       LAQ        1 5# 3 x 10         Heel slides 5\"x20 5\"x20 5x20'' 5x20'' 20\"x5 20x5'' 20 x 5\"  20 x 5\"  5\"x20       Seated heel slide w overpressure                Calf s                Bike (rom) 5' rock 5' rocks 5' rocks 5' rocks 5' rocks 5' rocks to full 5' rocks to full 5' rocks to full 5' rocks       Leg press SL P8S3 55# 2x10 55# 3x10 65# 3x10 65# 3x12  65# 3x12 75# 3x10 75# 3 x 10  75# 3 x 10  75# 3x10       Ther Activity                Squats   Mini squats 2x10  Mini squats 2x10 Mini squats 2x10 Mini squats 3x10 Mini squats 3x10 " np        Step ups x5 x5 x6 x6  x6 6x  np        Sit to stands      3x10 chair  3  x10 chair   2x 10 chair  2x10 chair       Lateral walking      [place] TB at mirror 5 laps Slight UE support [place] at mirror 5 laps Slight UE support np        Standing heel raises                Gait Training                                                Modalities

## 2023-05-17 DIAGNOSIS — E11.9 TYPE 2 DIABETES MELLITUS WITHOUT COMPLICATION, WITHOUT LONG-TERM CURRENT USE OF INSULIN (HCC): ICD-10-CM

## 2023-05-17 DIAGNOSIS — I10 ESSENTIAL HYPERTENSION: ICD-10-CM

## 2023-05-17 DIAGNOSIS — E78.5 HYPERLIPIDEMIA, UNSPECIFIED HYPERLIPIDEMIA TYPE: ICD-10-CM

## 2023-05-17 NOTE — TELEPHONE ENCOUNTER
Medication Refill Request     Name  metFORMIN (GLUCOPHAGE) 500 mg tablet    Dose/Frequency Take 1 tablet (500 mg total) by mouth 2 (two) times a day with meals  Quantity 180 tablet  Verified pharmacy   [x]  Verified ordering Provider   [x]  Does patient have enough for the next 3 days? Yes [] No [x]      Medication Refill Request     Name pravastatin (PRAVACHOL) 40 mg tablet     Dose/Frequency Take one tablet by mouth daily in the morning   Quantity 45  Verified pharmacy   [x]  Verified ordering Provider   [x]  Does patient have enough for the next 3 days? Yes [] No [x]      Medication Refill Request     Name atenolol-chlorthalidone (TENORETIC) 50-25 mg per tablet    Dose/Frequency Take 1 tablet by mouth daily  Quantity 90  Verified pharmacy   [x]  Verified ordering Provider   [x]  Does patient have enough for the next 3 days?  Yes [] No [x]

## 2023-05-18 RX ORDER — PRAVASTATIN SODIUM 40 MG
TABLET ORAL
Qty: 45 TABLET | Refills: 0 | Status: SHIPPED | OUTPATIENT
Start: 2023-05-18

## 2023-05-18 RX ORDER — ATENOLOL AND CHLORTHALIDONE TABLET 50; 25 MG/1; MG/1
1 TABLET ORAL DAILY
Qty: 90 TABLET | Refills: 0 | Status: SHIPPED | OUTPATIENT
Start: 2023-05-18

## 2023-05-19 ENCOUNTER — OFFICE VISIT (OUTPATIENT)
Dept: PHYSICAL THERAPY | Facility: REHABILITATION | Age: 63
End: 2023-05-19

## 2023-05-19 DIAGNOSIS — M25.562 LEFT KNEE PAIN, UNSPECIFIED CHRONICITY: Primary | ICD-10-CM

## 2023-05-19 DIAGNOSIS — M25.562 CHRONIC PAIN OF LEFT KNEE: ICD-10-CM

## 2023-05-19 DIAGNOSIS — G89.29 CHRONIC PAIN OF LEFT KNEE: ICD-10-CM

## 2023-05-19 DIAGNOSIS — Z96.651 STATUS POST TOTAL KNEE REPLACEMENT, RIGHT: ICD-10-CM

## 2023-05-19 NOTE — PROGRESS NOTES
"Daily Note     Today's date: 2023  Patient name: Keyanna Chandler  : 1960  MRN: [id number]  Referring provider: Lina Bishop DO  Dx:   Encounter Diagnosis     ICD-10-CM    1  Left knee pain, unspecified chronicity  M25 562       2  Chronic pain of left knee  M25 562     G89 29       3  Status post total knee replacement, right  Z96 651           Start Time: 855  Stop Time: 935  Total time in clinic (min): 40 minutes    Subjective: Patient reports feeling \"alright\" at start of session today  Objective: See treatment diary below      Assessment: Tolerated treatment well  Patient demonstrated fatigue post treatment, exhibited good technique with therapeutic exercises and would benefit from continued PT       Plan: Continue per plan of care  Progress treatment as tolerated         Precautions:  L TKA 1/10 R TKA 1 year ago            Manuals 4/28 5/1 5/5 5/8  5/12  5/15 5/19      PROM done done  CF CF done       Patellar mobilizations             Neuro Re-Ed             bridges             clamshells             E-STIM             Tandem 10''x3 ea  30''x3 ea 3  X30\" ea  np         SLS hard            Ther Ex             Quad sets             SAQ 1 5# 3x12            Bridging      2  x10  3x10 3x10      SLR 1 5# 3x12  1 5# 3x15 1 5# 3 x 15  1 5# 3 x 15  1 5# 3x15 1 5# 3x15      LAQ     1 5# 3 x 10         Heel slides 5x20'' 20\"x5 20x5'' 20 x 5\"  20 x 5\"  5\"x20 5x20''      Seated heel slide w overpressure             Calf s             Bike (rom) 5' rocks 5' rocks 5' rocks to full 5' rocks to full 5' rocks to full 5' rocks 5' rocks to full      Leg press SL P8S3 65# 3x12  65# 3x12 75# 3x10 75# 3 x 10  75# 3 x 10  75# 3x10       Ther Activity             Squats Mini squats 2x10 Mini squats 2x10 Mini squats 3x10 Mini squats 3x10 np        Step ups x6  x6 6x  np  6'' 3x10      Sit to stands   3x10 chair  3  x10 chair   2x 10 chair  2x10 chair 3x10 chair      Lateral walking   [place] TB at mirror 5 " laps Slight UE support [place] TB at mirror 5 laps Slight UE support np  [place] TB 5 laps counter      Lateral step ups       4'' 2x10       Standing heel raises             Gait Training                                       Modalities

## 2023-05-22 ENCOUNTER — OFFICE VISIT (OUTPATIENT)
Dept: PHYSICAL THERAPY | Facility: REHABILITATION | Age: 63
End: 2023-05-22

## 2023-05-22 DIAGNOSIS — M25.562 CHRONIC PAIN OF LEFT KNEE: ICD-10-CM

## 2023-05-22 DIAGNOSIS — M25.562 LEFT KNEE PAIN, UNSPECIFIED CHRONICITY: Primary | ICD-10-CM

## 2023-05-22 DIAGNOSIS — G89.29 CHRONIC PAIN OF LEFT KNEE: ICD-10-CM

## 2023-05-22 DIAGNOSIS — Z96.651 STATUS POST TOTAL KNEE REPLACEMENT, RIGHT: ICD-10-CM

## 2023-05-22 NOTE — PROGRESS NOTES
"Daily Note     Today's date: 2023  Patient name: Elisa Monaco  : 1960  MRN: [id number]  Referring provider: Lelia Beal DO  Dx:   Encounter Diagnosis     ICD-10-CM    1  Left knee pain, unspecified chronicity  M25 562       2  Chronic pain of left knee  M25 562     G89 29       3  Status post total knee replacement, right  Z96 651                      Subjective: patient states the knee is feeling better      Objective: See treatment diary below      Assessment: Tolerated treatment well  Patient demonstrated fatigue post treatment, exhibited good technique with therapeutic exercises and would benefit from continued PT Patient continues to make functional improvements  Patient is now able to walk without the cane for short distances      Plan: Continue per plan of care        Precautions:  L TKA 1/10 R TKA 1 year ago            Manuals 4/28 5/1 5/5 5/8  5/12  5/15 5/19 5/22     PROM done done  CF CF done  done     Patellar mobilizations             Neuro Re-Ed             bridges             clamshells             E-STIM             Tandem 10''x3 ea  30''x3 ea 3  X30\" ea  np         SLS hard            Ther Ex             Quad sets             SAQ 1 5# 3x12            Bridging      2  x10  3x10 3x10 3x10     SLR 1 5# 3x12  1 5# 3x15 1 5# 3 x 15  1 5# 3 x 15  1 5# 3x15 1 5# 3x15 1 5# 3x15     LAQ     1 5# 3 x 10         Heel slides 5x20'' 20\"x5 20x5'' 20 x 5\"  20 x 5\"  5\"x20 5x20'' 5\"x20     Seated heel slide w overpressure             Calf s             Bike (rom) 5' rocks 5' rocks 5' rocks to full 5' rocks to full 5' rocks to full 5' rocks 5' rocks to full 5'      Leg press SL P8S3 65# 3x12  65# 3x12 75# 3x10 75# 3 x 10  75# 3 x 10  75# 3x10  75# 3x10     Ther Activity             Squats Mini squats 2x10 Mini squats 2x10 Mini squats 3x10 Mini squats 3x10 np        Step ups x6  x6 6x  np  6'' 3x10 6\"3x10     Sit to stands   3x10 chair  3  x10 chair   2x 10 chair  2x10 chair 3x10 chair 3x10 " "chair     Lateral walking   [place] TB at mirror 5 laps Slight UE support [place] TB at mirror 5 laps Slight UE support np  [place] TB 5 laps counter      Lateral step ups       4'' 2x10  4\"2x10     Standing heel raises             Gait Training                                       Modalities                                            "

## 2023-05-26 ENCOUNTER — OFFICE VISIT (OUTPATIENT)
Dept: PHYSICAL THERAPY | Facility: REHABILITATION | Age: 63
End: 2023-05-26

## 2023-05-26 DIAGNOSIS — G89.29 CHRONIC PAIN OF LEFT KNEE: ICD-10-CM

## 2023-05-26 DIAGNOSIS — M25.562 CHRONIC PAIN OF LEFT KNEE: ICD-10-CM

## 2023-05-26 DIAGNOSIS — Z96.651 STATUS POST TOTAL KNEE REPLACEMENT, RIGHT: ICD-10-CM

## 2023-05-26 DIAGNOSIS — M25.562 LEFT KNEE PAIN, UNSPECIFIED CHRONICITY: Primary | ICD-10-CM

## 2023-05-26 NOTE — PROGRESS NOTES
"Daily Note     Today's date: 2023  Patient name: Polo Rodríguez  : 1960  MRN: [id number]  Referring provider: Ava Ochoa DO  Dx:   Encounter Diagnosis     ICD-10-CM    1  Left knee pain, unspecified chronicity  M25 562       2  Chronic pain of left knee  M25 562     G89 29       3  Status post total knee replacement, right  [id number]                      Subjective: patient states she walking more without her cane      Objective: See treatment diary below      Assessment: Tolerated treatment well  Patient demonstrated fatigue post treatment, exhibited good technique with therapeutic exercises and would benefit from continued PT Patient making good progress  She is almost able to go up and down 6\" step one after the other      Plan: Continue per plan of care        Precautions:  L TKA 1/10 R TKA 1 year ago            Manuals 4/28 5/1 5/5 5/8  5/12  5/15 5/19 5/22 5/26    PROM done done  CF CF done  done done    Patellar mobilizations             Neuro Re-Ed             bridges             clamshells             E-STIM             Tandem 10''x3 ea  30''x3 ea 3  X30\" ea  np         SLS hard            Ther Ex             Quad sets             SAQ 1 5# 3x12            Bridging      2  x10  3x10 3x10 3x10 3x10    SLR 1 5# 3x12  1 5# 3x15 1 5# 3 x 15  1 5# 3 x 15  1 5# 3x15 1 5# 3x15 1 5# 3x15 1 5# 3x15    LAQ     1 5# 3 x 10         Heel slides 5x20'' 20\"x5 20x5'' 20 x 5\"  20 x 5\"  5\"x20 5x20'' 5\"x20 5\"x20    Seated heel slide w overpressure             Calf s             Bike (rom) 5' rocks 5' rocks 5' rocks to full 5' rocks to full 5' rocks to full 5' rocks 5' rocks to full 5'  5'    Leg press SL P8S3 65# 3x12  65# 3x12 75# 3x10 75# 3 x 10  75# 3 x 10  75# 3x10  75# 3x10 75# 3x10    Ther Activity             Squats Mini squats 2x10 Mini squats 2x10 Mini squats 3x10 Mini squats 3x10 np        Step ups x6  x6 6x  np  6'' 3x10 6\"3x10 6\" 3x10    Sit to stands   3x10 chair  3  x10 chair   2x 10 chair  " "2x10 chair 3x10 chair 3x10 chair 3x10 chair    Lateral walking   [place] TB at mirror 5 laps Slight UE support Aplin TB at mirror 5 laps Slight UE support np  Pink TB 5 laps counter      Lateral step ups       4'' 2x10  4\"2x10 4\"3x10    Standing heel raises             Gait Training                                       Modalities                                            "

## 2023-05-30 ENCOUNTER — OFFICE VISIT (OUTPATIENT)
Dept: PHYSICAL THERAPY | Facility: REHABILITATION | Age: 63
End: 2023-05-30

## 2023-05-30 DIAGNOSIS — Z96.651 STATUS POST TOTAL KNEE REPLACEMENT, RIGHT: ICD-10-CM

## 2023-05-30 DIAGNOSIS — M25.562 LEFT KNEE PAIN, UNSPECIFIED CHRONICITY: Primary | ICD-10-CM

## 2023-05-30 DIAGNOSIS — G89.29 CHRONIC PAIN OF LEFT KNEE: ICD-10-CM

## 2023-05-30 DIAGNOSIS — M25.562 CHRONIC PAIN OF LEFT KNEE: ICD-10-CM

## 2023-05-30 NOTE — PROGRESS NOTES
"Daily Note     Today's date: 2023  Patient name: Francia Gaitan  : 1960  MRN: [id number]  Referring provider: Brittney De La Cruz DO  Dx:   Encounter Diagnosis     ICD-10-CM    1  Left knee pain, unspecified chronicity  M25 562       2  Chronic pain of left knee  M25 562     G89 29       3  Status post total knee replacement, right  [id number]                      Subjective: patient states she is doing a lot better and she will continue her exercises at home      Objective: See treatment diary below      Assessment: Tolerated treatment well  Patient demonstrated fatigue post treatment, exhibited good technique with therapeutic exercises and would benefit from continued PT Patient still has limitations in knee ROM and strength  Patient given updated HEP to continue at home      Plan: Continue per plan of care        Precautions:  L TKA 1/10 R TKA 1 year ago            Manuals 4/28 5/1 5/5 5/8  5/12  5/15 5/19 5/22 5/26 5/30   PROM done done  CF CF done  done done done   Patellar mobilizations             Neuro Re-Ed             bridges             clamshells             E-STIM             Tandem 10''x3 ea  30''x3 ea 3  X30\" ea  np         SLS hard            Ther Ex             Quad sets             SAQ 1 5# 3x12            Bridging      2  x10  3x10 3x10 3x10 3x10 3x12   SLR 1 5# 3x12  1 5# 3x15 1 5# 3 x 15  1 5# 3 x 15  1 5# 3x15 1 5# 3x15 1 5# 3x15 1 5# 3x15 2# 3x10   LAQ     1 5# 3 x 10         Heel slides 5x20'' 20\"x5 20x5'' 20 x 5\"  20 x 5\"  5\"x20 5x20'' 5\"x20 5\"x20 5\"x20   Seated heel slide w overpressure             Calf s             Bike (rom) 5' rocks 5' rocks 5' rocks to full 5' rocks to full 5' rocks to full 5' rocks 5' rocks to full 5'  5' 5'   Leg press SL P8S3 65# 3x12  65# 3x12 75# 3x10 75# 3 x 10  75# 3 x 10  75# 3x10  75# 3x10 75# 3x10 75# 3x10   Ther Activity             Squats Mini squats 2x10 Mini squats 2x10 Mini squats 3x10 Mini squats 3x10 np        Step ups x6  x6 6x  np  6'' " "3x10 6\"3x10 6\" 3x10 6\"3x10   Sit to stands   3x10 chair  3  x10 chair   2x 10 chair  2x10 chair 3x10 chair 3x10 chair 3x10 chair 3x12 chair   Lateral walking   [place] TB at mirror 5 laps Slight UE support [place] TB at mirror 5 laps Slight UE support np  Pink TB 5 laps counter      Lateral step ups       4'' 2x10  4\"2x10 4\"3x10 4\" 3x10   Standing heel raises             Gait Training                                       Modalities                                            "

## 2023-06-23 ENCOUNTER — OFFICE VISIT (OUTPATIENT)
Dept: FAMILY MEDICINE CLINIC | Facility: CLINIC | Age: 63
End: 2023-06-23

## 2023-06-23 VITALS
OXYGEN SATURATION: 98 % | RESPIRATION RATE: 18 BRPM | HEIGHT: 62 IN | BODY MASS INDEX: 30.73 KG/M2 | WEIGHT: 167 LBS | TEMPERATURE: 97.1 F | HEART RATE: 69 BPM | SYSTOLIC BLOOD PRESSURE: 123 MMHG | DIASTOLIC BLOOD PRESSURE: 70 MMHG

## 2023-06-23 DIAGNOSIS — Z01.419 WELL WOMAN EXAM: Primary | ICD-10-CM

## 2023-06-23 DIAGNOSIS — Z12.4 SCREENING FOR CERVICAL CANCER: ICD-10-CM

## 2023-06-23 PROCEDURE — G0145 SCR C/V CYTO,THINLAYER,RESCR: HCPCS

## 2023-06-23 NOTE — PATIENT INSTRUCTIONS
Frotis de Papanicolaou   LO QUE NECESITA SABER:   Un frotis o de Papanicolaou o crispin citología se [place] en la detección del cáncer cervical  También se [place] para detectar células cancerosas y precancerosas en la vulva y en la vagina  INSTRUCCIONES SOBRE [facility]:   Prepárese para un Papanicolaou: El mejor momento para programar el examen es rosa después de que termine álvarez menstruación  No programe crispin citología leia álvarez periodo menstrual   Leia la prueba de Papanicolaou:  Usted se acostará boca arriba y colocará juancarlos pies en unos reposapiés que se conocen yuniel estribos  Álvarez médico introducirá cuidadosamente en álvarez vagina un dispositivo que se conoce yuniel espéculo  El espéculo se [place] para abrir las eddy de la vagina para que el médico pueda observar álvarez bruce uterino  Álvarez médico tomará muestras de células de álvarez bruce uterino y vagina con cuidado  Las muestras se colocan en un tubo con líquido o en un portaobjetos de buster  Las muestras se mandan al laboratorio para ser analizadas por la presencia de células anormales  Podrían realizarle crispin prueba para detectar el virus del papiloma humano (VPH) al [employer]  El VPH es un virus de transmisión sexual que puede provocar cambios en juancarlos células cervicales  Después de la prueba de Papanicolaou: Es posible que presente manchado el día del procedimiento  Resultados de la prueba: Álvarez médico le indicará para cuándo puede esperar juancarlos resultados de la prueba del Papanicolaou  Suelen tardar alrededor de 1 a 3 semanas  Los [employer] normales significan que no se encontraron cambios celulares en el bruce del [place]  Es posible que pueda esperar de 3 a 5 años para álvarez próxima prueba de Papanicolaou  Los [employer] xenia pueden significar que no obtuvieron [place] de tejido del bruce del [place]  O topher, puede significar que hay cambios celulares que parecen anormales   [place] podría ser debido a crispin infección, a un embarazo, a la menopausia o al VPH  Es posible que necesite otra prueba de Papanicolaou en 1 año  Álvarez médico le indicará qué hacer a continuación  Los resultados anormales significan que se encontraron cambios celulares en el bruce del [place]  [place] no significa que tenga cáncer de bruce uterino  Podría significar que tiene inflamación o crispin infección  También podría significar que tiene VPH o células que podrían convertirse en cáncer  Álvarez médico le explicará el resultado anormal de la prueba y repasará los siguientes pasos con usted  Es posible que le recomiende crispin colposcopia  Myles kendal procedimiento, se utiliza un pequeño endoscopio con [place] para observar más de cerca el bruce uterino y la vagina  Frecuencia de realización del Papanicolaou: Las pruebas de Papanicolaou generalmente comienzan a los 24 años y [place] [address] pastora  Se puede hacer crispin prueba de Papanicolaou cherise cada 3 años  La prueba del VPH cherise o con la prueba de Papanicolaou se puede hacer cada 5 años, comenzando a los 27 años de [place]  Usted podría necesitar un frotis de Papanicolaou con mayor frecuencia después de los 72 años si presenta cualquiera de los siguientes:  Resultado anormal de la prueba de Papanicolaou    Resultado positivo en la prueba de VPH    Antecedentes de cáncer de bruce uterino o un alto riesgo de cáncer de bruce uterino    El VIH    Un sistema inmunitario débil    Exposición al medicamento de dietilestilbestrol (TSERING) cuando álvarez madre estaba embarazada de usted    Llame a álvarez médico si:  Tiene crispin hemorragia grave  Artis pasado 3 semanas y no tiene los resultados de la prueba  Usted tiene preguntas o inquietudes acerca de álvarez condición o cuidado  © Copyright [facility] 2022 Information is for End User's use only and may not be sold, redistributed or otherwise used for commercial purposes  Esta información es sólo para uso en educación  Álvarez intención no es darle un consejo médico sobre enfermedades o tratamientos   Colsulte con álvarez Denzel Horton farmacéutico antes de seguir cualquier régimen médico para saber si es seguro y efectivo para usted

## 2023-06-23 NOTE — PROGRESS NOTES
"  Annual GYN Examination  Mihir Cheney  1960    Subjective      Mihir Cheney is a 59 y o  female who presents for annual well woman exam      GYN:  · Denies vaginal discharge, labial erythema or lesions, dyspareunia  · LMP more than 10 years ago, normal   · Menarche at 16  · Menses were regular, q 28 days, lasting 4 days  · does not have menstrual periods  · no unusual pelvic pain  · no unusual vaginal discharge  · no previous abnormal Pap tests  · no family or personal history of cervical cancer  · Contraception: nothing  · Patient is not currently sexually active; partner passed away 14 years ago  Gynecologic surgeries: Reports tubal ligation, no other gyn surgery  OB:  ·  female  · Pregnancies were uncomplicated  :  · Denies dysuria, urinary frequency or urgency  · Denies hematuria, flank pain, incontinence  Breast:  · Denies breast mass, skin changes, dimpling, reddening, nipple retraction  · Denies breast discharge  · Patient does do monthly breast exams  · Patient does have a family history of breast, endometrial, or ovarian cancer     General:  · Diet: well balanced diet  · Exercise: Does not engage in regular exercise   · Work: retired  · ETOH: occasional, social use  · Tobacco: Current user:  average ppd: 0 25  · Recreational drug use: denies use    Screening:  · Cervical cancer: Last pap done 4 years ago, normal, no abnormalities, high-risk HPV negative  · Breast cancer: Last mammogram was in   Results were normal--routine follow-up in 12 months  · Colon cancer: routine screening None, completed in 2018  · STD screening: Very low risk of STD exposure      Review of Systems  Pertinent items are noted in HPI        Objective      /70 (BP Location: Right arm, Patient Position: Sitting, Cuff Size: Standard)   Pulse 69   Temp (!) 97 1 °F (36 2 °C) (Tympanic)   Resp 18   Ht 5' 2\" (1 575 m)   Wt 75 8 kg (167 lb)   SpO2 98%   BMI 30 54 kg/m² " General:   alert, appears stated age and cooperative   Heart: regular rate and rhythm, S1, S2 normal, no murmur, click, rub or gallop   Lungs: clear to auscultation bilaterally   Breasts: normal appearance, no masses or tenderness   Abdomen: soft, non-tender, without masses or organomegaly   Vulva: normal   Vagina: normal mucosa, normal discharge   Cervix: no lesions   Uterus: non-tender   Adnexa: not evaluated               Assessment/Plan:   Diagnoses and all orders for this visit:    Screening for cervical cancer  -     Cancel: Liquid-based pap, screening  -     Liquid-based pap, screening  -     Molecular Hold Sample        -      Pap smear with HPV co-testing performed today   -      Will await results and notify patient when results received  -      STI Screening: declined by patient   -      GC/chlamydia swab collected today? No   -      Additional STI tests ordered (lab slips given):   -      Reviewed safe-sex practices & contraception options in detail  -      Mammogram ordered today: due for mammography this year  Counseled on compliance and importance of this screening    -      Colonoscopy ordered today: Not Indicated  -      Health maintenance counseling performed on the following topics:   -      Regular exercise (at least 150 minutes of moderate aerobic activity per week or 75 minutes of vigorous aerobic activity per week) for CV & bone health    -      Diet well balanced diet to maintain a healthy weight   -      Adequate intake of calcium & vitamin D to reduce osteoporosis risk  -      All questions have been answered to her satisfaction      SAIDA Merino  PGY-2  [address]

## 2023-06-29 LAB
LAB AP GYN PRIMARY INTERPRETATION: NORMAL
Lab: NORMAL

## 2023-07-10 ENCOUNTER — OFFICE VISIT (OUTPATIENT)
Dept: OBGYN CLINIC | Facility: CLINIC | Age: 63
End: 2023-07-10
Payer: COMMERCIAL

## 2023-07-10 ENCOUNTER — APPOINTMENT (OUTPATIENT)
Dept: RADIOLOGY | Facility: AMBULARY SURGERY CENTER | Age: 63
End: 2023-07-10
Attending: ORTHOPAEDIC SURGERY
Payer: COMMERCIAL

## 2023-07-10 VITALS
HEART RATE: 60 BPM | BODY MASS INDEX: 30.73 KG/M2 | SYSTOLIC BLOOD PRESSURE: 106 MMHG | HEIGHT: 62 IN | WEIGHT: 167 LBS | DIASTOLIC BLOOD PRESSURE: 68 MMHG

## 2023-07-10 DIAGNOSIS — Z96.652 STATUS POST TOTAL KNEE REPLACEMENT USING CEMENT, LEFT: Primary | ICD-10-CM

## 2023-07-10 DIAGNOSIS — Z96.652 STATUS POST TOTAL KNEE REPLACEMENT USING CEMENT, LEFT: ICD-10-CM

## 2023-07-10 PROCEDURE — 99213 OFFICE O/P EST LOW 20 MIN: CPT | Performed by: ORTHOPAEDIC SURGERY

## 2023-07-10 PROCEDURE — 73562 X-RAY EXAM OF KNEE 3: CPT

## 2023-07-10 NOTE — PROGRESS NOTES
Assessment:  1. Status post total knee replacement using cement, left  XR knee 3 vw left non injury        Patient Active Problem List   Diagnosis   • Bilateral knee pain   • Localized osteoarthritis of knees, bilateral   • Degenerative arthritis of knee, bilateral   • Type 2 diabetes mellitus without complication, without long-term current use of insulin (HCC)   • Essential hypertension   • Hyperlipidemia   • Carpal tunnel syndrome on both sides   • Right arm pain   • Encounter for screening mammogram for breast cancer   • Constipation   • Palpitations   • Arthritis of knee   • Tobacco abuse   • Lower abdominal pain   • Primary osteoarthritis of right knee   • Abnormal ultrasound of uterus   • Pre-operative clearance   • Acute postoperative anemia due to expected blood loss   • Status post total knee replacement, right   • Arthrofibrosis of knee joint, right   • Anxiety   • Dry eyes   • Primary osteoarthritis of left knee   • Status post total knee replacement using cement, left           Plan      59 y.o. female 6 months s/p left knee replacement   · Continue activities as tolerated  · Follow up in 6 months for 1 year s/p visit             Subjective:     Patient ID:    Chief Complaint:Roxana Edwards 59 y.o. female      HPI    Patient presents to the office 6 months status post left total knee replacement. She presents with a family member who aids in translation. Over all patient is doing very well. She notes mild intermittent medial knee pain. Rates pain 4/10. Denies any numbness or paresthesias. The following portions of the patient's history were reviewed and updated as appropriate: allergies, current medications, past family history, past social history, past surgical history and problem list.        Social History     Socioeconomic History   • Marital status:       Spouse name: Not on file   • Number of children: Not on file   • Years of education: Not on file   • Highest education level: Not on file   Occupational History   • Occupation:      Employer: [employer]   Tobacco Use   • Smoking status: Every Day     Packs/day: 0.25     Years: 45.00     Total pack years: 11.25     Types: Cigarettes     Last attempt to quit: 2020     Years since quittin.6   • Smokeless tobacco: Never   Vaping Use   • Vaping Use: Never used   Substance and Sexual Activity   • Alcohol use: Not Currently     Comment: Socially   • Drug use: Never   • Sexual activity: Yes   Other Topics Concern   • Not on file   Social History Narrative    ** Merged History Encounter **          Social Determinants of Health     Financial Resource Strain: Low Risk  (3/15/2021)    Overall Financial Resource Strain (CARDIA)    • Difficulty of Paying Living Expenses: Not hard at all   Food Insecurity: No Food Insecurity (3/15/2021)    Hunger Vital Sign    • Worried About Running Out of Food in the Last Year: Never true    • Ran Out of Food in the Last Year: Never true   Transportation Needs: No Transportation Needs (3/15/2021)    PRAPARE - Transportation    • Lack of Transportation (Medical): No    • Lack of Transportation (Non-Medical): No   Physical Activity: Inactive (2020)    Exercise Vital Sign    • Days of Exercise per Week: 0 days    • Minutes of Exercise per Session: 0 min   Stress: Not on file   Social Connections: Socially Isolated (2020)    Social Connection and Isolation Panel [NHANES]    • Frequency of Communication with Friends and Family: More than three times a week    • Frequency of Social Gatherings with Friends and Family: More than three times a week    • Attends Orthodox Services: Never    • Active Member of Clubs or Organizations: No    • Attends Club or Organization Meetings: Never    • Marital Status:    Intimate Partner Violence: Not At Risk (2020)    Humiliation, Afraid, Rape, and Kick questionnaire    • Fear of Current or Ex-Partner: No    • Emotionally Abused:  No • Physically Abused: No    • Sexually Abused: No   Housing Stability: Not on file     Past Medical History:   Diagnosis Date   • Arthritis    • Diabetes mellitus ([address])    • Hyperlipidemia    • Hypertension      Past Surgical History:   Procedure Laterality Date   • COLONOSCOPY     • CO ARTHRP KNE CONDYLE&PLATU MEDIAL&LAT COMPARTMENTS Right 03/30/2021    Procedure: KNEE TOTAL ARTHROPLASTY;  Surgeon: Nabila Bajwa DO;  [facility];  Service: Orthopedics   • CO ARTHRP KNE CONDYLE&PLATU MEDIAL&LAT COMPARTMENTS Left 1/10/2023    Procedure: ARTHROPLASTY KNEE TOTAL;  Surgeon: Nabila Bajwa DO;  [facility];  Service: Orthopedics   • CO MANIPULATION KNEE JOINT UNDER GENERAL ANESTHESIA Right 06/17/2021    Procedure: MANIPULATION JOINT KNEE;  Surgeon: Nabila Bajwa DO;  [facility];  Service: Orthopedics   • TUBAL LIGATION       No Known Allergies  Current Outpatient Medications on File Prior to Visit   Medication Sig Dispense Refill   • ascorbic acid (VITAMIN C) 500 mg tablet Take 1 tablet (500 mg total) by mouth 2 (two) times a day (Patient not taking: Reported on 5/5/2023) 60 tablet 0   • aspirin (ECOTRIN LOW STRENGTH) 81 mg EC tablet Take 81 mg by mouth daily     • atenolol-chlorthalidone (TENORETIC) 50-25 mg per tablet Take 1 tablet by mouth daily 90 tablet 0   • docusate sodium (COLACE) 100 mg capsule Take 1 capsule (100 mg total) by mouth 2 (two) times a day (Patient not taking: Reported on 5/5/2023) 60 capsule 1   • ferrous sulfate 324 (65 Fe) mg Take 1 tablet (324 mg total) by mouth 2 (two) times a day before meals (Patient not taking: Reported on 5/5/2023) 60 tablet 0   • folic acid (FOLVITE) 356 MCG tablet Take 0.5 tablets (400 mcg total) by mouth daily (Patient not taking: Reported on 5/5/2023) 30 tablet 0   • glucose blood (OneTouch Verio) test strip Check Blood Sugar 2 times daily 100 each 10   • Lancets (OneTouch Delica Plus [id number]) MISC Use 1 each 2 (two) times a day 100 each 10 • metFORMIN (GLUCOPHAGE) 500 mg tablet Take 1 tablet (500 mg total) by mouth 2 (two) times a day with meals 180 tablet 0   • Multiple Vitamins-Minerals (multivitamin with minerals) tablet Take 1 tablet by mouth daily (Patient not taking: Reported on 5/5/2023) 30 tablet 1   • pravastatin (PRAVACHOL) 40 mg tablet Take one tablet by mouth daily in the morning 45 tablet 0     No current facility-administered medications on file prior to visit. Objective:    Review of Systems   Constitutional: Negative for chills and fever. HENT: Negative for ear pain and sore throat. Eyes: Negative for pain and visual disturbance. Respiratory: Negative for cough and shortness of breath. Cardiovascular: Negative for chest pain and palpitations. Gastrointestinal: Negative for abdominal pain and vomiting. Genitourinary: Negative for dysuria and hematuria. Musculoskeletal: Negative for arthralgias and back pain. Skin: Negative for color change and rash. Neurological: Negative for seizures and syncope. All other systems reviewed and are negative. Left Knee Exam     Tenderness   The patient is experiencing no tenderness. Range of Motion   Extension: 0   Flexion: 120     Tests   Varus: negative Valgus: negative    Other   Erythema: absent  Scars: present  Sensation: normal  Pulse: present  Swelling: none  Effusion: no effusion present    Comments:  Knee is stable to stress on exam   Patella tracks midline and flat             Physical Exam  Vitals and nursing note reviewed. HENT:      Head: Normocephalic. Eyes:      Extraocular Movements: Extraocular movements intact. Cardiovascular:      Rate and Rhythm: Normal rate. Pulses: Normal pulses. Pulmonary:      Effort: Pulmonary effort is normal.   Musculoskeletal:         General: Normal range of motion. Cervical back: Normal range of motion. Left knee: No effusion. Skin:     General: Skin is warm and dry.    Neurological: General: No focal deficit present. Mental Status: She is alert. Psychiatric:         Behavior: Behavior normal.         Procedures  No Procedures performed today    I have personally reviewed pertinent films in PACS. X-ray's of left knee obtained today demonstrate, stable, well aligned hardware status post total knee arthroplasty. No evidence of failure or loosening. Scribe Attestation    I,:  Rickie Mccarthy am acting as a scribe while in the presence of the attending physician.:       I,:  Elena Burns, DO personally performed the services described in this documentation    as scribed in my presence.:           Portions of the record may have been created with voice recognition software.  Occasional wrong word or "sound a like" substitutions may have occurred due to the inherent limitations of voice recognition software.  Read the chart carefully and recognize, using context, where substitutions have occurred.

## 2023-07-31 DIAGNOSIS — E78.5 HYPERLIPIDEMIA, UNSPECIFIED HYPERLIPIDEMIA TYPE: ICD-10-CM

## 2023-08-01 RX ORDER — PRAVASTATIN SODIUM 40 MG
TABLET ORAL
Qty: 45 TABLET | Refills: 5 | Status: SHIPPED | OUTPATIENT
Start: 2023-08-01

## 2023-09-29 ENCOUNTER — OFFICE VISIT (OUTPATIENT)
Dept: FAMILY MEDICINE CLINIC | Facility: CLINIC | Age: 63
End: 2023-09-29
Payer: COMMERCIAL

## 2023-09-29 ENCOUNTER — APPOINTMENT (OUTPATIENT)
Dept: LAB | Facility: CLINIC | Age: 63
End: 2023-09-29
Payer: COMMERCIAL

## 2023-09-29 VITALS
OXYGEN SATURATION: 100 % | WEIGHT: 166.4 LBS | HEART RATE: 88 BPM | HEIGHT: 62 IN | BODY MASS INDEX: 30.62 KG/M2 | DIASTOLIC BLOOD PRESSURE: 70 MMHG | RESPIRATION RATE: 18 BRPM | SYSTOLIC BLOOD PRESSURE: 121 MMHG | TEMPERATURE: 97.4 F

## 2023-09-29 DIAGNOSIS — I10 ESSENTIAL HYPERTENSION: ICD-10-CM

## 2023-09-29 DIAGNOSIS — E11.9 TYPE 2 DIABETES MELLITUS WITHOUT COMPLICATION, WITHOUT LONG-TERM CURRENT USE OF INSULIN (HCC): ICD-10-CM

## 2023-09-29 DIAGNOSIS — R79.89 ABNORMAL CBC: ICD-10-CM

## 2023-09-29 DIAGNOSIS — Z12.11 SCREENING FOR COLON CANCER: ICD-10-CM

## 2023-09-29 DIAGNOSIS — Z12.31 ENCOUNTER FOR SCREENING MAMMOGRAM FOR MALIGNANT NEOPLASM OF BREAST: ICD-10-CM

## 2023-09-29 DIAGNOSIS — E11.9 TYPE 2 DIABETES MELLITUS WITHOUT COMPLICATION, WITHOUT LONG-TERM CURRENT USE OF INSULIN (HCC): Primary | ICD-10-CM

## 2023-09-29 LAB
ALBUMIN SERPL BCP-MCNC: 4.7 G/DL (ref 3.5–5)
ALP SERPL-CCNC: 63 U/L (ref 34–104)
ALT SERPL W P-5'-P-CCNC: 13 U/L (ref 7–52)
ANION GAP SERPL CALCULATED.3IONS-SCNC: 6 MMOL/L
AST SERPL W P-5'-P-CCNC: 17 U/L (ref 13–39)
BILIRUB SERPL-MCNC: 0.54 MG/DL (ref 0.2–1)
BUN SERPL-MCNC: 15 MG/DL (ref 5–25)
CALCIUM SERPL-MCNC: 10.9 MG/DL (ref 8.4–10.2)
CHLORIDE SERPL-SCNC: 102 MMOL/L (ref 96–108)
CHOLEST SERPL-MCNC: 215 MG/DL
CO2 SERPL-SCNC: 32 MMOL/L (ref 21–32)
CREAT SERPL-MCNC: 0.77 MG/DL (ref 0.6–1.3)
CREAT UR-MCNC: 157.6 MG/DL
ERYTHROCYTE [DISTWIDTH] IN BLOOD BY AUTOMATED COUNT: 12.8 % (ref 11.6–15.1)
EST. AVERAGE GLUCOSE BLD GHB EST-MCNC: 154 MG/DL
GFR SERPL CREATININE-BSD FRML MDRD: 83 ML/MIN/1.73SQ M
GLUCOSE P FAST SERPL-MCNC: 134 MG/DL (ref 65–99)
HBA1C MFR BLD: 7 %
HCT VFR BLD AUTO: 47.7 % (ref 34.8–46.1)
HDLC SERPL-MCNC: 43 MG/DL
HGB BLD-MCNC: 15.5 G/DL (ref 11.5–15.4)
LDLC SERPL CALC-MCNC: 138 MG/DL (ref 0–100)
MCH RBC QN AUTO: 31.7 PG (ref 26.8–34.3)
MCHC RBC AUTO-ENTMCNC: 32.5 G/DL (ref 31.4–37.4)
MCV RBC AUTO: 98 FL (ref 82–98)
MICROALBUMIN UR-MCNC: <7 MG/L
MICROALBUMIN/CREAT 24H UR: <4 MG/G CREATININE (ref 0–30)
NONHDLC SERPL-MCNC: 172 MG/DL
PLATELET # BLD AUTO: 263 THOUSANDS/UL (ref 149–390)
PMV BLD AUTO: 10.9 FL (ref 8.9–12.7)
POTASSIUM SERPL-SCNC: 4.8 MMOL/L (ref 3.5–5.3)
PROT SERPL-MCNC: 7.9 G/DL (ref 6.4–8.4)
RBC # BLD AUTO: 4.89 MILLION/UL (ref 3.81–5.12)
SODIUM SERPL-SCNC: 140 MMOL/L (ref 135–147)
TRIGL SERPL-MCNC: 170 MG/DL
WBC # BLD AUTO: 4.7 THOUSAND/UL (ref 4.31–10.16)

## 2023-09-29 PROCEDURE — 85027 COMPLETE CBC AUTOMATED: CPT

## 2023-09-29 PROCEDURE — 82570 ASSAY OF URINE CREATININE: CPT

## 2023-09-29 PROCEDURE — 82043 UR ALBUMIN QUANTITATIVE: CPT

## 2023-09-29 PROCEDURE — 36415 COLL VENOUS BLD VENIPUNCTURE: CPT

## 2023-09-29 PROCEDURE — 99213 OFFICE O/P EST LOW 20 MIN: CPT

## 2023-09-29 PROCEDURE — 80053 COMPREHEN METABOLIC PANEL: CPT

## 2023-09-29 PROCEDURE — 83036 HEMOGLOBIN GLYCOSYLATED A1C: CPT

## 2023-09-29 PROCEDURE — 80061 LIPID PANEL: CPT

## 2023-09-29 NOTE — PROGRESS NOTES
Name: Keri Baumgarten      : 1960      MRN: [id number]  Encounter Provider: Alicia Mcginnis MD  Encounter Date: 2023   Encounter department: [facility]     1. Type 2 diabetes mellitus without complication, without long-term current use of insulin (Formerly [facility])  Assessment & Plan:    Lab Results   Component Value Date    HGBA1C 7.0 (H) 2023     Continue Metformin at current dose  FUIC in 6 months    Orders:  -     Albumin / creatinine urine ratio; Future; Expected date: 2023  -     Basic metabolic panel; Future; Expected date: 2023  -     Hemoglobin A1C; Future; Expected date: 2023  -     Ambulatory Referral to Ophthalmology; Future    2. Screening for colon cancer  -     Ambulatory Referral to Gastroenterology; Future    3. Encounter for screening mammogram for malignant neoplasm of breast           Subjective      Pt presenting for follow up for her T2DM. She reports that her sugars have been well controlled at home. She has been compliant with her home antihyperglycemics. She has no concerns at this time. Review of Systems   Constitutional:  Negative for chills and fever. HENT:  Negative for ear pain and sore throat. Eyes:  Negative for pain and visual disturbance. Respiratory:  Negative for cough and shortness of breath. Cardiovascular:  Negative for chest pain and palpitations. Gastrointestinal:  Negative for abdominal pain and vomiting. Genitourinary:  Negative for dysuria and hematuria. Musculoskeletal:  Positive for gait problem (improving). Negative for arthralgias and back pain. Knee pain s/p surgery   Skin:  Negative for color change and rash. Neurological:  Negative for seizures and syncope.        Current Outpatient Medications on File Prior to Visit   Medication Sig    aspirin (ECOTRIN LOW STRENGTH) 81 mg EC tablet Take 81 mg by mouth daily    glucose blood (OneTouch Verio) test strip Check Blood Sugar 2 times daily    Lancets (OneTouch Delica Plus [id number]) MISC Use 1 each 2 (two) times a day       Objective     /70 (BP Location: Right arm, Patient Position: Sitting, Cuff Size: Large)   Pulse 88   Temp (!) 97.4 °F (36.3 °C) (Tympanic)   Resp 18   Ht 5' 2" (1.575 m)   Wt 75.5 kg (166 lb 6.4 oz)   SpO2 100%   BMI 30.43 kg/m²     Physical Exam  Constitutional:       General: She is not in acute distress. HENT:      Nose: Nose normal.   Eyes:      Extraocular Movements: Extraocular movements intact. Conjunctiva/sclera: Conjunctivae normal.   Cardiovascular:      Rate and Rhythm: Normal rate and regular rhythm. Pulses: Normal pulses. no weak pulses          Dorsalis pedis pulses are 2+ on the right side and 2+ on the left side. Posterior tibial pulses are 2+ on the right side and 2+ on the left side. Heart sounds: Normal heart sounds. Pulmonary:      Effort: Pulmonary effort is normal.      Breath sounds: Normal breath sounds. Abdominal:      Palpations: Abdomen is soft. Tenderness: There is no abdominal tenderness. Musculoskeletal:      Right lower leg: No edema. Left lower leg: No edema. Feet:      Right foot:      Skin integrity: No ulcer, skin breakdown, erythema, warmth, callus or dry skin. Left foot:      Skin integrity: No ulcer, skin breakdown, erythema, warmth, callus or dry skin. Skin:     General: Skin is warm and dry. Comments: Surgical scars b/l knees   Neurological:      Mental Status: She is alert and oriented to person, place, and time. Diabetic Foot Exam    Patient's shoes and socks removed. Right Foot/Ankle   Right Foot Inspection  Skin Exam: skin normal and skin intact. No dry skin, no warmth, no callus, no erythema, no maceration, no abnormal color, no pre-ulcer, no ulcer and no callus. Toe Exam: ROM and strength within normal limits.  No swelling, no tenderness, erythema and  no right toe deformity    Sensory   Vibration: intact  Proprioception: intact  Monofilament testing: intact    Vascular  Capillary refills: < 3 seconds  The right DP pulse is 2+. The right PT pulse is 2+. Right Toe  - Comprehensive Exam  Ecchymosis: none  Arch: normal  Hammertoes: absent  Claw Toes: absent  Swelling: none   Tenderness: none       Left Foot/Ankle  Left Foot Inspection  Skin Exam: skin normal and skin intact. No dry skin, no warmth, no erythema, no maceration, normal color, no pre-ulcer, no ulcer and no callus. Toe Exam: ROM and strength within normal limits. No swelling, no tenderness, no erythema and no left toe deformity. Sensory   Vibration: intact  Proprioception: intact  Monofilament testing: intact    Vascular  Capillary refills: < 3 seconds  The left DP pulse is 2+. The left PT pulse is 2+.      Left Toe  - Comprehensive Exam  Ecchymosis: none  Arch: normal  Hammertoes: absent  Claw toes: absent  Swelling: none   Tenderness: none       Assign Risk Category  No deformity present  No loss of protective sensation  No weak pulses  Risk: 0      Fabby Echols MD

## 2023-10-15 ENCOUNTER — OCCMED (OUTPATIENT)
Dept: URGENT CARE | Age: 63
End: 2023-10-15

## 2023-10-15 DIAGNOSIS — Z02.89 ENCOUNTER FOR EXAMINATION REQUIRED BY DEPARTMENT OF TRANSPORTATION (DOT): Primary | ICD-10-CM

## 2023-10-21 ENCOUNTER — NURSE TRIAGE (OUTPATIENT)
Dept: OTHER | Facility: OTHER | Age: 63
End: 2023-10-21

## 2023-10-21 DIAGNOSIS — I10 ESSENTIAL HYPERTENSION: ICD-10-CM

## 2023-10-21 DIAGNOSIS — E11.9 TYPE 2 DIABETES MELLITUS WITHOUT COMPLICATION, WITHOUT LONG-TERM CURRENT USE OF INSULIN (HCC): Primary | ICD-10-CM

## 2023-10-21 RX ORDER — ATENOLOL AND CHLORTHALIDONE TABLET 50; 25 MG/1; MG/1
1 TABLET ORAL DAILY
Qty: 30 TABLET | Refills: 0 | Status: SHIPPED | OUTPATIENT
Start: 2023-10-21

## 2023-10-21 RX ORDER — BLOOD-GLUCOSE METER
EACH MISCELLANEOUS
Qty: 100 EACH | Refills: 0 | Status: SHIPPED | OUTPATIENT
Start: 2023-10-21

## 2023-10-21 NOTE — TELEPHONE ENCOUNTER
Regarding: Urgent Med Refill  ----- Message from Fozia Samson sent at 10/21/2023  3:59 PM EDT -----  "My mom is out of her medication, I was also wondering if we could get more test strips sent since the amount they send is not enough"    Medication Refill Request     Name atenolol-chlorthalidone (TENORETIC) 50-25 mg per tablet     Dose/Frequency Take 1 tablet by mouth daily    Quantity 90    Does patient have enough for the next 3 days? Yes [ ] No [X]     Medication Refill Request     Name glucose blood (OneTouch Verio) test strip     Dose/Frequency Check Blood Sugar 2 times daily    Quantity 100    Does patient have enough for the next 3 days?  Yes [ ] No [X]       [facility] [address], [address] [facility] [address] [facility] [address]  Phone: [phone]  Fax: [phone]

## 2023-10-21 NOTE — TELEPHONE ENCOUNTER
Reason for Disposition  • [1] Caller requesting a prescription renewal (no refills left), no triage required, AND [2] triager able to renew prescription per department policy    Answer Assessment - Initial Assessment Questions  1. DRUG NAME: "What medicine do you need to have refilled?"      Atenolol-chlorthalidone 50-25 mg / tab- take once daily. One Touch Verio test strips- test BG twice daily. 2. REFILLS REMAINING: "How many refills are remaining?" (Note: The label on the medicine or pill bottle will show how many refills are remaining. If there are no refills remaining, then a renewal may be needed.)      None    3. EXPIRATION DATE: "What is the expiration date?" (Note: The label states when the prescription will , and thus can no longer be refilled.)      N/a    4. PRESCRIBING HCP: "Who prescribed it?" Reason: If prescribed by specialist, call should be referred to that group. Dr. Manfred Murphy    Protocols used: Medication Refill and Renewal Call-ADULT-    Do not see Dr. Linden Benson listed as provider for [address] Reached out to on call to see if ok to send meds under her name. Per Fabienne Osman- ok to send under her name. Ok to dispense 30 tabs of atenolol-chlorthalidone.

## 2023-11-02 ENCOUNTER — OFFICE VISIT (OUTPATIENT)
Dept: FAMILY MEDICINE CLINIC | Facility: CLINIC | Age: 63
End: 2023-11-02
Payer: COMMERCIAL

## 2023-11-02 VITALS
HEART RATE: 83 BPM | SYSTOLIC BLOOD PRESSURE: 110 MMHG | BODY MASS INDEX: 29.48 KG/M2 | DIASTOLIC BLOOD PRESSURE: 80 MMHG | TEMPERATURE: 97.6 F | RESPIRATION RATE: 18 BRPM | WEIGHT: 166.4 LBS | OXYGEN SATURATION: 99 % | HEIGHT: 63 IN

## 2023-11-02 DIAGNOSIS — I10 ESSENTIAL HYPERTENSION: ICD-10-CM

## 2023-11-02 DIAGNOSIS — E66.3 OVERWEIGHT WITH BODY MASS INDEX (BMI) OF 29 TO 29.9 IN ADULT: ICD-10-CM

## 2023-11-02 DIAGNOSIS — E78.2 MIXED HYPERLIPIDEMIA: Primary | ICD-10-CM

## 2023-11-02 DIAGNOSIS — Z12.11 COLON CANCER SCREENING: ICD-10-CM

## 2023-11-02 DIAGNOSIS — E11.9 TYPE 2 DIABETES MELLITUS WITHOUT COMPLICATION, WITHOUT LONG-TERM CURRENT USE OF INSULIN (HCC): ICD-10-CM

## 2023-11-02 DIAGNOSIS — E78.5 HYPERLIPIDEMIA, UNSPECIFIED HYPERLIPIDEMIA TYPE: ICD-10-CM

## 2023-11-02 DIAGNOSIS — Z12.31 ENCOUNTER FOR SCREENING MAMMOGRAM FOR BREAST CANCER: ICD-10-CM

## 2023-11-02 PROCEDURE — 99214 OFFICE O/P EST MOD 30 MIN: CPT | Performed by: FAMILY MEDICINE

## 2023-11-02 RX ORDER — PRAVASTATIN SODIUM 40 MG
40 TABLET ORAL DAILY
Qty: 90 TABLET | Refills: 1 | Status: SHIPPED | OUTPATIENT
Start: 2023-11-02

## 2023-11-02 RX ORDER — ATENOLOL AND CHLORTHALIDONE TABLET 50; 25 MG/1; MG/1
1 TABLET ORAL DAILY
Qty: 90 TABLET | Refills: 0 | Status: SHIPPED | OUTPATIENT
Start: 2023-11-02

## 2023-11-02 NOTE — PROGRESS NOTES
Subjective:      Patient ID: Fly Avendaño is a 59 y.o. female.     Here to establish care, she wants a [place]an speaking doctor and will be establishing with Dr Alexey Gama , however daughter wanted her to start coming to the office sooner    With type 2 diabetes, hyperlipidemia, bilateral TKA, hypertension  Not compliant with pravastatin  Does not want immunizations, never had flu, shingrix or tdap recently  Her with daughter, I offered a tele- , she refused, would like daughter to translate        Past Medical History:   Diagnosis Date    Arthritis     Diabetes mellitus ([address])     Hyperlipidemia     Hypertension        Family History   Problem Relation Age of Onset    Arthritis Mother     Uterine cancer Mother     Arthritis Father     Bone cancer Father     Breast cancer Sister     No Known Problems Daughter     No Known Problems Sister     No Known Problems Sister     No Known Problems Sister     No Known Problems Sister     No Known Problems Sister     No Known Problems Daughter     No Known Problems Daughter     No Known Problems Maternal Aunt     No Known Problems Maternal Aunt     No Known Problems Maternal Aunt     No Known Problems Maternal Aunt     No Known Problems Maternal Aunt     No Known Problems Paternal Aunt     No Known Problems Paternal Aunt     No Known Problems Paternal Aunt     No Known Problems Paternal Aunt     Prostate cancer Maternal Grandfather     Breast cancer Cousin     Breast cancer Cousin        Past Surgical History:   Procedure Laterality Date    COLONOSCOPY      CA ARTHRP KNE CONDYLE&PLATU MEDIAL&LAT COMPARTMENTS Right 03/30/2021    Procedure: KNEE TOTAL ARTHROPLASTY;  Surgeon: Rocky Bardales DO;  [facility];  Service: Orthopedics    CA ARTHRP KNE CONDYLE&PLATU MEDIAL&LAT COMPARTMENTS Left 1/10/2023    Procedure: ARTHROPLASTY KNEE TOTAL;  Surgeon: Rocky Bardales DO;  [facility];  Service: Orthopedics    CA MANIPULATION KNEE JOINT UNDER GENERAL ANESTHESIA Right 06/17/2021    Procedure: MANIPULATION JOINT KNEE;  Surgeon: Flori Baker DO;  [facility]: [facility];  Service: Orthopedics    TUBAL LIGATION          reports that she has been smoking cigarettes. She has a 11.25 pack-year smoking history. She has never used smokeless tobacco. She reports that she does not currently use alcohol. She reports that she does not use drugs. Current Outpatient Medications:     aspirin (ECOTRIN LOW STRENGTH) 81 mg EC tablet, Take 81 mg by mouth daily, Disp: , Rfl:     atenolol-chlorthalidone (TENORETIC) 50-25 mg per tablet, Take 1 tablet by mouth daily, Disp: 90 tablet, Rfl: 0    glucose blood (OneTouch Verio) test strip, Check Blood Sugar 2 times daily, Disp: 100 each, Rfl: 10    glucose blood (OneTouch Verio) test strip, Check Blood Sugar 2 times daily, Disp: 100 each, Rfl: 0    Lancets (OneTouch Delica Plus [id number]) MISC, Use 1 each 2 (two) times a day, Disp: 100 each, Rfl: 10    metFORMIN (GLUCOPHAGE) 500 mg tablet, Take 1 tablet (500 mg total) by mouth 2 (two) times a day with meals, Disp: 180 tablet, Rfl: 0    pravastatin (PRAVACHOL) 40 mg tablet, Take 1 tablet (40 mg total) by mouth daily TAKE 1 TABLET BY MOUTH DAILY IN THE MORNING, Disp: 90 tablet, Rfl: 1    The following portions of the patient's history were reviewed and updated as appropriate: allergies, current medications, past family history, past medical history, past social history, past surgical history and problem list.    Review of Systems   Constitutional:  Negative for fatigue and fever. HENT:  Negative for congestion, facial swelling, mouth sores, rhinorrhea, sore throat and trouble swallowing. Eyes:  Negative for pain and redness. Respiratory:  Negative for cough, shortness of breath and wheezing. Cardiovascular:  Negative for chest pain, palpitations and leg swelling. Gastrointestinal:  Negative for abdominal pain, blood in stool, constipation, diarrhea and nausea.    Genitourinary: Negative for dysuria, hematuria and urgency. Musculoskeletal:  Negative for arthralgias, back pain and myalgias. Skin:  Negative for rash and wound. Neurological:  Negative for seizures, syncope and headaches. Hematological:  Negative for adenopathy. Psychiatric/Behavioral:  Negative for agitation and behavioral problems. PHQ-2/9 Depression Screening                 Objective:    /80 (BP Location: Right arm, Patient Position: Sitting, Cuff Size: Standard)   Pulse 83   Temp 97.6 °F (36.4 °C) (Tympanic)   Resp 18   Ht 5' 3" (1.6 m)   Wt 75.5 kg (166 lb 6.4 oz)   SpO2 99%   BMI 29.48 kg/m²      Physical Exam  Vitals and nursing note reviewed. Constitutional:       Appearance: Normal appearance. She is well-developed. She is not ill-appearing. HENT:      Head: Normocephalic and atraumatic. Right Ear: External ear normal.      Left Ear: External ear normal.      Nose: Nose normal.      Mouth/Throat:      Mouth: Mucous membranes are moist.      Pharynx: No oropharyngeal exudate or posterior oropharyngeal erythema. Eyes:      General: No scleral icterus. Right eye: No discharge. Left eye: No discharge. Conjunctiva/sclera: Conjunctivae normal.   Cardiovascular:      Rate and Rhythm: Normal rate. Heart sounds: No murmur heard. No gallop. Pulmonary:      Effort: Pulmonary effort is normal. No respiratory distress. Breath sounds: Normal breath sounds. No stridor. No wheezing, rhonchi or rales. Abdominal:      Palpations: Abdomen is soft. Tenderness: There is no abdominal tenderness. Musculoskeletal:         General: Swelling (bilateral knee trace effusion, surgical scar noted) present. No tenderness or deformity. Right lower leg: No edema. Left lower leg: No edema. Skin:     Findings: No erythema or rash. Neurological:      Mental Status: She is alert. Mental status is at baseline.    Psychiatric:         Behavior: Behavior normal.         Judgment: Judgment normal.           Recent Results (from the past 8736 hour(s))   Comprehensive metabolic panel    Collection Time: 12/17/22 10:48 AM   Result Value Ref Range    Sodium 142 135 - 147 mmol/L    Potassium 4.2 3.5 - 5.3 mmol/L    Chloride 107 96 - 108 mmol/L    CO2 31 21 - 32 mmol/L    ANION GAP 4 4 - 13 mmol/L    BUN 10 5 - 25 mg/dL    Creatinine 0.87 0.60 - 1.30 mg/dL    Glucose, Fasting 139 (H) 65 - 99 mg/dL    Calcium 9.9 8.3 - 10.1 mg/dL    AST 14 5 - 45 U/L    ALT 23 12 - 78 U/L    Alkaline Phosphatase 68 46 - 116 U/L    Total Protein 7.5 6.4 - 8.4 g/dL    Albumin 3.9 3.5 - 5.0 g/dL    Total Bilirubin 0.37 0.20 - 1.00 mg/dL    eGFR 72 ml/min/1.73sq m   CBC and differential    Collection Time: 12/17/22 10:48 AM   Result Value Ref Range    WBC 4.88 4.31 - 10.16 Thousand/uL    RBC 4.76 3.81 - 5.12 Million/uL    Hemoglobin 14.9 11.5 - 15.4 g/dL    Hematocrit 45.9 34.8 - 46.1 %    MCV 96 82 - 98 fL    MCH 31.3 26.8 - 34.3 pg    MCHC 32.5 31.4 - 37.4 g/dL    RDW 12.6 11.6 - 15.1 %    MPV 10.2 8.9 - 12.7 fL    Platelets 230 154 - 413 Thousands/uL    nRBC 0 /100 WBCs    Neutrophils Relative 39 (L) 43 - 75 %    Immat GRANS % 0 0 - 2 %    Lymphocytes Relative 54 (H) 14 - 44 %    Monocytes Relative 6 4 - 12 %    Eosinophils Relative 1 0 - 6 %    Basophils Relative 0 0 - 1 %    Neutrophils Absolute 1.92 1.85 - 7.62 Thousands/µL    Immature Grans Absolute 0.01 0.00 - 0.20 Thousand/uL    Lymphocytes Absolute 2.60 0.60 - 4.47 Thousands/µL    Monocytes Absolute 0.27 0.17 - 1.22 Thousand/µL    Eosinophils Absolute 0.06 0.00 - 0.61 Thousand/µL    Basophils Absolute 0.02 0.00 - 0.10 Thousands/µL   C-reactive protein    Collection Time: 12/17/22 10:48 AM   Result Value Ref Range    CRP <3.0 <3.0 mg/L   HEMOGLOBIN A1C W/ EAG ESTIMATION    Collection Time: 12/17/22 10:48 AM   Result Value Ref Range    Hemoglobin A1C 7.2 (H) Normal 3.8-5.6%; PreDiabetic 5.7-6.4%;  Diabetic >=6.5%; Glycemic control for adults with diabetes <7.0% %     mg/dl   Type and screen    Collection Time: 12/17/22 10:48 AM   Result Value Ref Range    ABO Grouping O     Rh Factor Positive     Antibody Screen Negative     Specimen Expiration Date 20230114    ECG 12 lead    Collection Time: 01/03/23  2:49 PM   Result Value Ref Range    Ventricular Rate 76 BPM    Atrial Rate 76 BPM    AL Interval 170 ms    QRSD Interval 64 ms    QT Interval 368 ms    QTC Interval 414 ms    P Axis 34 degrees    QRS Axis 41 degrees    T Wave Axis 40 degrees   Fingerstick Glucose (POCT)    Collection Time: 01/10/23 11:57 AM   Result Value Ref Range    POC Glucose 115 65 - 140 mg/dl   Fingerstick Glucose (POCT)    Collection Time: 01/10/23  9:00 PM   Result Value Ref Range    POC Glucose 188 (H) 65 - 140 mg/dl   Basic metabolic panel    Collection Time: 01/11/23  5:19 AM   Result Value Ref Range    Sodium 140 135 - 147 mmol/L    Potassium 4.1 3.5 - 5.3 mmol/L    Chloride 103 96 - 108 mmol/L    CO2 32 21 - 32 mmol/L    ANION GAP 5 4 - 13 mmol/L    BUN 11 5 - 25 mg/dL    Creatinine 0.81 0.60 - 1.30 mg/dL    Glucose 102 65 - 140 mg/dL    Calcium 9.2 8.4 - 10.2 mg/dL    eGFR 78 ml/min/1.73sq m   CBC    Collection Time: 01/11/23  6:28 AM   Result Value Ref Range    WBC 6.63 4.31 - 10.16 Thousand/uL    RBC 4.28 3.81 - 5.12 Million/uL    Hemoglobin 13.4 11.5 - 15.4 g/dL    Hematocrit 41.1 34.8 - 46.1 %    MCV 96 82 - 98 fL    MCH 31.3 26.8 - 34.3 pg    MCHC 32.6 31.4 - 37.4 g/dL    RDW 12.6 11.6 - 15.1 %    Platelets 096 683 - 368 Thousands/uL    MPV 9.9 8.9 - 12.7 fL   Fingerstick Glucose (POCT)    Collection Time: 01/11/23  8:16 AM   Result Value Ref Range    POC Glucose 153 (H) 65 - 140 mg/dl   Fingerstick Glucose (POCT)    Collection Time: 01/11/23 11:01 AM   Result Value Ref Range    POC Glucose 142 (H) 65 - 140 mg/dl   POCT hemoglobin A1c    Collection Time: 05/05/23  2:19 PM   Result Value Ref Range    Hemoglobin A1C 7.6 (A) 6.5   Liquid-based pap, screening Collection Time: 06/23/23  9:44 AM   Result Value Ref Range    Case Report       Gynecologic Cytology Report                       Case: [id number]                                  Authorizing Provider:  Aleksander Dobbs,  Collected:           06/23/2023 0944                                     MD                                                                           Ordering Location:     [facility]  Received:            06/23/2023 [address] [place] [address] Screen:          Dawne Pallas                                                                Rescreen:              Narinder David, CT                                                       Specimen:    LIQUID-BASED PAP, SCREENING, Cervix                                                        Primary Interpretation Negative for intraepithelial lesion or malignancy     Specimen Adequacy       Satisfactory for evaluation. Endocervical/transformation zone component present. Additional Information       [employer]'s FDA approved ,  and ThinPrep Imaging Duo System are utilized with strict adherence to the 's instruction manual to prepare gynecologic and non-gynecologic cytology specimens for the production of ThinPrep slides as well as for gynecologic ThinPrep imaging. These processes have been validated by our laboratory and/or by the . The Pap test is not a diagnostic procedure and should not be used as the sole means to detect cervical cancer. It is only a screening procedure to aid in the detection of cervical cancer and its precursors. Both false-negative and false-positive results have been experienced. Your patient's test result should be interpreted in this context together with the history and clinical findings.      Lipid panel    Collection Time: 09/29/23  9:15 AM   Result Value Ref Range Cholesterol 215 (H) See Comment mg/dL    Triglycerides 170 (H) See Comment mg/dL    HDL, Direct 43 (L) >=50 mg/dL    LDL Calculated 138 (H) 0 - 100 mg/dL    Non-HDL-Chol (CHOL-HDL) 172 mg/dl   Comprehensive metabolic panel    Collection Time: 09/29/23  9:15 AM   Result Value Ref Range    Sodium 140 135 - 147 mmol/L    Potassium 4.8 3.5 - 5.3 mmol/L    Chloride 102 96 - 108 mmol/L    CO2 32 21 - 32 mmol/L    ANION GAP 6 mmol/L    BUN 15 5 - 25 mg/dL    Creatinine 0.77 0.60 - 1.30 mg/dL    Glucose, Fasting 134 (H) 65 - 99 mg/dL    Calcium 10.9 (H) 8.4 - 10.2 mg/dL    AST 17 13 - 39 U/L    ALT 13 7 - 52 U/L    Alkaline Phosphatase 63 34 - 104 U/L    Total Protein 7.9 6.4 - 8.4 g/dL    Albumin 4.7 3.5 - 5.0 g/dL    Total Bilirubin 0.54 0.20 - 1.00 mg/dL    eGFR 83 ml/min/1.73sq m   CBC and Platelet    Collection Time: 09/29/23  9:15 AM   Result Value Ref Range    WBC 4.70 4.31 - 10.16 Thousand/uL    RBC 4.89 3.81 - 5.12 Million/uL    Hemoglobin 15.5 (H) 11.5 - 15.4 g/dL    Hematocrit 47.7 (H) 34.8 - 46.1 %    MCV 98 82 - 98 fL    MCH 31.7 26.8 - 34.3 pg    MCHC 32.5 31.4 - 37.4 g/dL    RDW 12.8 11.6 - 15.1 %    Platelets 928 536 - 059 Thousands/uL    MPV 10.9 8.9 - 12.7 fL   Albumin / creatinine urine ratio    Collection Time: 09/29/23  9:15 AM   Result Value Ref Range    Creatinine, Ur 157.6 Reference range not established. mg/dL    Albumin,U,Random <7.0 <20.0 mg/L    Albumin Creat Ratio <4 0 - 30 mg/g creatinine   Hemoglobin A1C    Collection Time: 09/29/23  9:15 AM   Result Value Ref Range    Hemoglobin A1C 7.0 (H) Normal 4.0-5.6%; PreDiabetic 5.7-6.4%; Diabetic >=6.5%; Glycemic control for adults with diabetes <7.0% %     mg/dl       Laboratory Results: I have personally reviewed the pertinent laboratory results/reports     Radiology/Other Diagnostic Testing Results: I have personally reviewed pertinent reports.         Assessment/Plan:  Problem List Items Addressed This Visit          Endocrine    Type 2 diabetes mellitus without complication, without long-term current use of insulin (HCC)    Relevant Medications    metFORMIN (GLUCOPHAGE) 500 mg tablet       Cardiovascular and Mediastinum    Essential hypertension    Relevant Medications    atenolol-chlorthalidone (TENORETIC) 50-25 mg per tablet       Other    Hyperlipidemia - Primary    Relevant Medications    pravastatin (PRAVACHOL) 40 mg tablet    Encounter for screening mammogram for breast cancer    Relevant Orders    Mammo screening bilateral w 3d & cad     Other Visit Diagnoses       Colon cancer screening        Relevant Orders    Ambulatory Referral to Gastroenterology    Overweight with body mass index (BMI) of 29 to 29.9 in adult              BP is controlled, continue current medication, low sodium diet  Needs follow up colonoscopy ordered  Does not want higher intensity statin or increase in metformin  Labs on file reviewed  Diabetic diet handout given  Educated on diabetic foot care  Refused influenze, shingrix and tdap despite counselling  Due for mammogram order place    BMI Counseling: Body mass index is 29.48 kg/m². The BMI is above normal. Nutrition recommendations include decreasing portion sizes, encouraging healthy choices of fruits and vegetables, decreasing fast food intake, consuming healthier snacks, limiting drinks that contain sugar, moderation in carbohydrate intake and reducing intake of cholesterol. Exercise recommendations include moderate physical activity 150 minutes/week. No pharmacotherapy was ordered. Rationale for BMI follow-up plan is due to patient being overweight or obese. Read package inserts for all medications before starting a new medications, call me if you have any questions. Patient was given opportunity to ask questions and all questions were answered. Disclaimer: Portions of the record may have been created with voice recognition software.  Occasional wrong word or "sound a like" substitutions may have occurred due to the inherent limitations of voice recognition software. Read the chart carefully and recognize, using context, where substitutions have occurred. I have used the Epic copy/forward function to compose this note. I have reviewed my current note to ensure it reflects the current patient status, exam, assessment and plan.

## 2023-11-21 NOTE — ASSESSMENT & PLAN NOTE
Lab Results   Component Value Date    HGBA1C 7.0 (H) 09/29/2023     Continue Metformin at current dose  FUIC in 6 months

## 2024-01-10 ENCOUNTER — OFFICE VISIT (OUTPATIENT)
Dept: OBGYN CLINIC | Facility: CLINIC | Age: 64
End: 2024-01-10
Payer: COMMERCIAL

## 2024-01-10 ENCOUNTER — APPOINTMENT (OUTPATIENT)
Dept: RADIOLOGY | Facility: AMBULARY SURGERY CENTER | Age: 64
End: 2024-01-10
Attending: ORTHOPAEDIC SURGERY
Payer: COMMERCIAL

## 2024-01-10 VITALS — WEIGHT: 166 LBS | BODY MASS INDEX: 29.41 KG/M2 | HEIGHT: 63 IN

## 2024-01-10 DIAGNOSIS — Z96.652 STATUS POST TOTAL KNEE REPLACEMENT USING CEMENT, LEFT: Primary | ICD-10-CM

## 2024-01-10 DIAGNOSIS — Z96.652 STATUS POST TOTAL KNEE REPLACEMENT USING CEMENT, LEFT: ICD-10-CM

## 2024-01-10 PROCEDURE — 99212 OFFICE O/P EST SF 10 MIN: CPT | Performed by: ORTHOPAEDIC SURGERY

## 2024-01-10 PROCEDURE — 73562 X-RAY EXAM OF KNEE 3: CPT

## 2024-01-10 NOTE — PROGRESS NOTES
Assessment:  1. Status post total knee replacement using cement, left  XR knee 3 vw left non injury        Patient Active Problem List   Diagnosis    Bilateral knee pain    Localized osteoarthritis of knees, bilateral    Degenerative arthritis of knee, bilateral    Type 2 diabetes mellitus without complication, without long-term current use of insulin (HCC)    Essential hypertension    Hyperlipidemia    Carpal tunnel syndrome on both sides    Right arm pain    Encounter for screening mammogram for breast cancer    Constipation    Palpitations    Arthritis of knee    Tobacco abuse    Lower abdominal pain    Primary osteoarthritis of right knee    Abnormal ultrasound of uterus    Pre-operative clearance    Acute postoperative anemia due to expected blood loss    Status post total knee replacement, right    Arthrofibrosis of knee joint, right    Anxiety    Dry eyes    Primary osteoarthritis of left knee    Status post total knee replacement using cement, left       Plan:    63 y.o. female  s/p TKA of left knee on 1/10/2023     Patient reports today for 1 year appointment following left TKA on 1/10/2023   Patient doing well. ROM improved. Encouraged to continue PT exercises at home to continue strengthening.   Discussed prophylactic antibiotics prior to dental work. Discussed she can contact our office or the dental office if needed for antibiotics.     The assessment and plan were formulated by Dr. Bush and I assisted in carrying it out.    Subjective:   Patient ID: Roxana Ventura is a 63 y.o. female .    HPI    Patient presents to the office for 1 year follow up of left TKA, DOS 01/10/2023. Since the last visit, the patient reports improved ROM of left knee. Notes continuing PT exercises at home. Ambulates with cane.     The following portions of the patient's history were reviewed and updated as appropriate: allergies, current medications, past family history, past social history, past surgical history and  problem list.    Social History     Socioeconomic History    Marital status:      Spouse name: Not on file    Number of children: Not on file    Years of education: Not on file    Highest education level: Not on file   Occupational History    Occupation:      Employer: [employer]   Tobacco Use    Smoking status: Every Day     Current packs/day: 0.00     Average packs/day: 0.3 packs/day for 45.0 years (11.3 ttl pk-yrs)     Types: Cigarettes     Start date: 12/1/1975     Last attempt to quit: 12/1/2020     Years since quitting: 3.1    Smokeless tobacco: Never   Vaping Use    Vaping status: Never Used   Substance and Sexual Activity    Alcohol use: Not Currently     Comment: Socially    Drug use: Never    Sexual activity: Yes   Other Topics Concern    Not on file   Social History Narrative    ** Merged History Encounter **          Social Determinants of Health     Financial Resource Strain: Low Risk  (3/15/2021)    Overall Financial Resource Strain (CARDIA)     Difficulty of Paying Living Expenses: Not hard at all   Food Insecurity: No Food Insecurity (3/15/2021)    Hunger Vital Sign     Worried About Running Out of Food in the Last Year: Never true     Ran Out of Food in the Last Year: Never true   Transportation Needs: No Transportation Needs (3/15/2021)    PRAPARE - Transportation     Lack of Transportation (Medical): No     Lack of Transportation (Non-Medical): No   Physical Activity: Inactive (1/17/2020)    Exercise Vital Sign     Days of Exercise per Week: 0 days     Minutes of Exercise per Session: 0 min   Stress: Not on file   Social Connections: Socially Isolated (1/17/2020)    Social Connection and Isolation Panel [NHANES]     Frequency of Communication with Friends and Family: More than three times a week     Frequency of Social Gatherings with Friends and Family: More than three times a week     Attends Yarsani Services: Never     Active Member of Clubs or Organizations:  No     Attends Club or Organization Meetings: Never     Marital Status:    Intimate Partner Violence: Not At Risk (1/17/2020)    Humiliation, Afraid, Rape, and Kick questionnaire     Fear of Current or Ex-Partner: No     Emotionally Abused: No     Physically Abused: No     Sexually Abused: No   Housing Stability: Not on file     Past Medical History:   Diagnosis Date    Arthritis     Diabetes mellitus (HCC)     Hyperlipidemia     Hypertension      Past Surgical History:   Procedure Laterality Date    COLONOSCOPY      [place] ARTHRP KNE CONDYLE&PLATU MEDIAL&LAT COMPARTMENTS Right 03/30/2021    Procedure: KNEE TOTAL ARTHROPLASTY;  Surgeon: Ryan Bush DO;  [facility];  Service: Orthopedics    [place] ARTHRP KNE CONDYLE&PLATU MEDIAL&LAT COMPARTMENTS Left 1/10/2023    Procedure: ARTHROPLASTY KNEE TOTAL;  Surgeon: Ryan Bush DO;  [facility];  Service: Orthopedics    [place] MANIPULATION KNEE JOINT UNDER GENERAL ANESTHESIA Right 06/17/2021    Procedure: MANIPULATION JOINT KNEE;  Surgeon: Ryan Bush DO;  [facility];  Service: Orthopedics    TUBAL LIGATION       No Known Allergies  Current Outpatient Medications on File Prior to Visit   Medication Sig Dispense Refill    aspirin (ECOTRIN LOW STRENGTH) 81 mg EC tablet Take 81 mg by mouth daily      atenolol-chlorthalidone (TENORETIC) 50-25 mg per tablet Take 1 tablet by mouth daily 90 tablet 0    glucose blood (OneTouch Verio) test strip Check Blood Sugar 2 times daily 100 each 10    glucose blood (OneTouch Verio) test strip Check Blood Sugar 2 times daily 100 each 0    Lancets (OneTouch Delica Plus [id number]) MISC Use 1 each 2 (two) times a day 100 each 10    metFORMIN (GLUCOPHAGE) 500 mg tablet Take 1 tablet (500 mg total) by mouth 2 (two) times a day with meals 180 tablet 0    pravastatin (PRAVACHOL) 40 mg tablet Take 1 tablet (40 mg total) by mouth daily TAKE 1 TABLET BY MOUTH DAILY IN THE MORNING 90 tablet 1     No current facility-administered  medications on file prior to visit.       Review of Systems   Constitutional:  Negative for chills and fever.   HENT:  Negative for ear pain and sore throat.    Eyes:  Negative for pain and visual disturbance.   Respiratory:  Negative for cough and shortness of breath.    Cardiovascular:  Negative for chest pain and palpitations.   Gastrointestinal:  Negative for abdominal pain and vomiting.   Genitourinary:  Negative for dysuria and hematuria.   Musculoskeletal:  Negative for arthralgias and back pain.   Skin:  Negative for color change and rash.   Neurological:  Negative for seizures and syncope.   All other systems reviewed and are negative.    See HPi    Objective:    There were no vitals filed for this visit.    Physical Exam  Vitals and nursing note reviewed.   Constitutional:       General: She is not in acute distress.     Appearance: She is well-developed.   HENT:      Head: Normocephalic and atraumatic.   Eyes:      Conjunctiva/sclera: Conjunctivae normal.   Cardiovascular:      Rate and Rhythm: Normal rate and regular rhythm.      Heart sounds: No murmur heard.  Pulmonary:      Effort: Pulmonary effort is normal. No respiratory distress.      Breath sounds: Normal breath sounds.   Abdominal:      Palpations: Abdomen is soft.      Tenderness: There is no abdominal tenderness.   Musculoskeletal:         General: No swelling.      Cervical back: Neck supple.   Skin:     General: Skin is warm and dry.      Capillary Refill: Capillary refill takes less than 2 seconds.   Neurological:      Mental Status: She is alert.   Psychiatric:         Mood and Affect: Mood normal.         Left Knee Exam     Tenderness   The patient is experiencing no tenderness.     Range of Motion   Extension:  normal   Flexion:  normal     Other   Erythema: absent  Sensation: normal  Swelling: none    Comments:  Well healed incisional scar             I have personally reviewed pertinent films in PACS and my interpretation is Xray left  "knee reveals stable alignment of hardware of left knee.    Procedures  No Procedures performed today       Portions of the record may have been created with voice recognition software.  Occasional wrong word or \"sound a like\" substitutions may have occurred due to the inherent limitations of voice recognition software.  Read the chart carefully and recognize, using context, where substitutions have occurred.    "

## 2024-01-29 DIAGNOSIS — I10 ESSENTIAL HYPERTENSION: ICD-10-CM

## 2024-01-30 RX ORDER — ATENOLOL AND CHLORTHALIDONE TABLET 50; 25 MG/1; MG/1
1 TABLET ORAL DAILY
Qty: 90 TABLET | Refills: 0 | Status: SHIPPED | OUTPATIENT
Start: 2024-01-30

## 2024-03-27 ENCOUNTER — OFFICE VISIT (OUTPATIENT)
Dept: FAMILY MEDICINE CLINIC | Facility: CLINIC | Age: 64
End: 2024-03-27
Payer: COMMERCIAL

## 2024-03-27 VITALS
DIASTOLIC BLOOD PRESSURE: 62 MMHG | HEIGHT: 63 IN | SYSTOLIC BLOOD PRESSURE: 102 MMHG | BODY MASS INDEX: 29.95 KG/M2 | RESPIRATION RATE: 16 BRPM | HEART RATE: 71 BPM | WEIGHT: 169 LBS | OXYGEN SATURATION: 96 % | TEMPERATURE: 97.3 F

## 2024-03-27 DIAGNOSIS — E11.9 TYPE 2 DIABETES MELLITUS WITHOUT COMPLICATION, WITHOUT LONG-TERM CURRENT USE OF INSULIN (HCC): ICD-10-CM

## 2024-03-27 DIAGNOSIS — E78.5 DYSLIPIDEMIA: ICD-10-CM

## 2024-03-27 DIAGNOSIS — M17.10 ARTHRITIS OF KNEE: ICD-10-CM

## 2024-03-27 DIAGNOSIS — Z53.20 COLONOSCOPY REFUSED: ICD-10-CM

## 2024-03-27 DIAGNOSIS — M25.511 ACUTE PAIN OF RIGHT SHOULDER: ICD-10-CM

## 2024-03-27 DIAGNOSIS — Z86.010 HISTORY OF COLON POLYPS: ICD-10-CM

## 2024-03-27 DIAGNOSIS — Z12.31 ENCOUNTER FOR SCREENING MAMMOGRAM FOR MALIGNANT NEOPLASM OF BREAST: ICD-10-CM

## 2024-03-27 DIAGNOSIS — E83.52 HYPERCALCEMIA: ICD-10-CM

## 2024-03-27 DIAGNOSIS — I10 ESSENTIAL HYPERTENSION: Primary | ICD-10-CM

## 2024-03-27 DIAGNOSIS — M17.0 PRIMARY OSTEOARTHRITIS OF BOTH KNEES: ICD-10-CM

## 2024-03-27 DIAGNOSIS — R79.89 ABNORMAL CBC: ICD-10-CM

## 2024-03-27 DIAGNOSIS — Z72.0 TOBACCO ABUSE: ICD-10-CM

## 2024-03-27 PROBLEM — D62 ACUTE POSTOPERATIVE ANEMIA DUE TO EXPECTED BLOOD LOSS: Status: RESOLVED | Noted: 2021-03-31 | Resolved: 2024-03-27

## 2024-03-27 PROBLEM — M17.12 PRIMARY OSTEOARTHRITIS OF LEFT KNEE: Status: RESOLVED | Noted: 2023-01-10 | Resolved: 2024-03-27

## 2024-03-27 PROBLEM — M24.661 ARTHROFIBROSIS OF KNEE JOINT, RIGHT: Status: RESOLVED | Noted: 2021-06-02 | Resolved: 2024-03-27

## 2024-03-27 PROBLEM — M17.11 PRIMARY OSTEOARTHRITIS OF RIGHT KNEE: Status: RESOLVED | Noted: 2021-02-03 | Resolved: 2024-03-27

## 2024-03-27 PROBLEM — R93.5 ABNORMAL ULTRASOUND OF UTERUS: Status: RESOLVED | Noted: 2021-02-23 | Resolved: 2024-03-27

## 2024-03-27 PROBLEM — Z01.818 PRE-OPERATIVE CLEARANCE: Status: RESOLVED | Noted: 2021-03-16 | Resolved: 2024-03-27

## 2024-03-27 PROBLEM — R10.30 LOWER ABDOMINAL PAIN: Status: RESOLVED | Noted: 2021-01-26 | Resolved: 2024-03-27

## 2024-03-27 PROBLEM — R00.2 PALPITATIONS: Status: RESOLVED | Noted: 2020-10-06 | Resolved: 2024-03-27

## 2024-03-27 PROBLEM — M25.561 BILATERAL KNEE PAIN: Status: RESOLVED | Noted: 2017-10-10 | Resolved: 2024-03-27

## 2024-03-27 PROBLEM — K59.00 CONSTIPATION: Status: RESOLVED | Noted: 2020-03-11 | Resolved: 2024-03-27

## 2024-03-27 PROBLEM — Z86.0100 HISTORY OF COLON POLYPS: Status: ACTIVE | Noted: 2024-03-27

## 2024-03-27 PROBLEM — M79.601 RIGHT ARM PAIN: Status: RESOLVED | Noted: 2020-01-17 | Resolved: 2024-03-27

## 2024-03-27 PROBLEM — M25.562 BILATERAL KNEE PAIN: Status: RESOLVED | Noted: 2017-10-10 | Resolved: 2024-03-27

## 2024-03-27 LAB — SL AMB POCT HEMOGLOBIN AIC: 7 (ref ?–6.5)

## 2024-03-27 PROCEDURE — 83036 HEMOGLOBIN GLYCOSYLATED A1C: CPT | Performed by: FAMILY MEDICINE

## 2024-03-27 PROCEDURE — 99214 OFFICE O/P EST MOD 30 MIN: CPT | Performed by: FAMILY MEDICINE

## 2024-03-27 RX ORDER — PRAVASTATIN SODIUM 40 MG
40 TABLET ORAL
Qty: 90 TABLET | Refills: 3 | Status: SHIPPED | OUTPATIENT
Start: 2024-03-27

## 2024-03-27 RX ORDER — ATENOLOL AND CHLORTHALIDONE TABLET 50; 25 MG/1; MG/1
1 TABLET ORAL DAILY
Qty: 90 TABLET | Refills: 0 | Status: SHIPPED | OUTPATIENT
Start: 2024-03-27

## 2024-03-27 RX ORDER — PRAVASTATIN SODIUM 80 MG/1
80 TABLET ORAL
Qty: 90 TABLET | Refills: 3 | Status: CANCELLED | OUTPATIENT
Start: 2024-03-27

## 2024-03-27 NOTE — PROGRESS NOTES
Name: Roxana Ventura      : 1960      MRN: [id number]  Encounter Provider: Vika Baer MD  Encounter Date: 3/27/2024   Encounter department: [facility] MEDICINE    Assessment & Plan     1. Essential hypertension  -     atenolol-chlorthalidone (TENORETIC) 50-25 mg per tablet; Take 1 tablet by mouth daily  -     aspirin (ECOTRIN LOW STRENGTH) 81 mg EC tablet; Take 1 tablet (81 mg total) by mouth daily  -     Comprehensive metabolic panel; Future; Expected date: 2024    2. Type 2 diabetes mellitus without complication, without long-term current use of insulin (HCC)  -     POCT hemoglobin A1c  -     metFORMIN (GLUCOPHAGE) 500 mg tablet; Take 1 tablet (500 mg total) by mouth 2 (two) times a day with meals    3. Arthritis of knee    4. Primary osteoarthritis of both knees    5. Dyslipidemia  Assessment & Plan:  Pt not taking her meds to restart and recheck 1 mo    Orders:  -     Lipid Panel with Direct LDL reflex; Future; Expected date: 2024  -     pravastatin (PRAVACHOL) 40 mg tablet; Take 1 tablet (40 mg total) by mouth daily at bedtime    6. History of colon polyps  Assessment & Plan:  Due for colonoscopy       7. Acute pain of right shoulder  Assessment & Plan:  Likely tendonitis pt declines PT and ortho referral       8. Hypercalcemia  Assessment & Plan:  Ionized calcium      Orders:  -     Calcium, ionized; Future    9. Abnormal CBC  Assessment & Plan:  High hemoglobin      Orders:  -     CBC and differential; Future    10. Tobacco abuse  Assessment & Plan:  Pt not ready to quit      11. Colonoscopy refused  Assessment & Plan:  Pt had 5 yrs  ago  declines now      12. Encounter for screening mammogram for malignant neoplasm of breast  -     Mammo screening bilateral w 3d & cad; Future           Subjective      New Pt is here for interval visit and evaluation of multiple medical problems, review of medications, labs, Health Maintenance and any recent specialty  "consults        Review of Systems   Constitutional:  Negative for appetite change, chills, fatigue and fever.   Respiratory:  Negative for cough, chest tightness and shortness of breath.    Cardiovascular:  Negative for chest pain, palpitations and leg swelling.   Gastrointestinal:  Negative for abdominal pain, constipation, diarrhea, nausea and vomiting.   Genitourinary:  Negative for difficulty urinating and frequency.   Musculoskeletal:  Positive for arthralgias (right nshoulder pain). Negative for back pain, gait problem and neck pain.   Skin:  Negative for rash.   Neurological:  Negative for dizziness, weakness, light-headedness, numbness and headaches.   Hematological:  Does not bruise/bleed easily.   Psychiatric/Behavioral:  Negative for dysphoric mood and sleep disturbance. The patient is not nervous/anxious.        Current Outpatient Medications on File Prior to Visit   Medication Sig   • glucose blood (OneTouch Verio) test strip Check Blood Sugar 2 times daily   • Lancets (OneTouch Delica Plus [id number]) MISC Use 1 each 2 (two) times a day   • [DISCONTINUED] aspirin (ECOTRIN LOW STRENGTH) 81 mg EC tablet Take 81 mg by mouth daily   • [DISCONTINUED] atenolol-chlorthalidone (TENORETIC) 50-25 mg per tablet TAKE 1 TABLET BY MOUTH DAILY   • [DISCONTINUED] metFORMIN (GLUCOPHAGE) 500 mg tablet Take 1 tablet (500 mg total) by mouth 2 (two) times a day with meals   • [DISCONTINUED] glucose blood (OneTouch Verio) test strip Check Blood Sugar 2 times daily (Patient not taking: Reported on 3/27/2024)   • [DISCONTINUED] pravastatin (PRAVACHOL) 40 mg tablet Take 1 tablet (40 mg total) by mouth daily TAKE 1 TABLET BY MOUTH DAILY IN THE MORNING       Objective     /62 (BP Location: Left arm, Patient Position: Sitting, Cuff Size: Standard)   Pulse 71   Temp (!) 97.3 °F (36.3 °C) (Tympanic)   Resp 16   Ht 5' 3\" (1.6 m)   Wt 76.7 kg (169 lb)   SpO2 96%   BMI 29.94 kg/m²     Physical Exam  Vitals reviewed. "   Constitutional:       General: She is not in acute distress.     Appearance: Normal appearance. She is well-developed. She is not ill-appearing.   HENT:      Mouth/Throat:      Mouth: Mucous membranes are moist.   Eyes:      Extraocular Movements: Extraocular movements intact.      Conjunctiva/sclera: Conjunctivae normal.      Pupils: Pupils are equal, round, and reactive to light.   Neck:      Thyroid: No thyromegaly.      Vascular: No carotid bruit.   Cardiovascular:      Rate and Rhythm: Normal rate and regular rhythm.      Pulses: Normal pulses.      Heart sounds: Normal heart sounds. No murmur heard.  Pulmonary:      Effort: Pulmonary effort is normal. No respiratory distress.      Breath sounds: Normal breath sounds.   Chest:      Chest wall: No tenderness.   Breasts:     Right: Normal.      Left: Normal.   Abdominal:      General: Bowel sounds are normal. There is no distension.      Palpations: Abdomen is soft.      Tenderness: There is no abdominal tenderness.   Musculoskeletal:         General: Tenderness (subacromial  tenderness) present.      Cervical back: Normal range of motion and neck supple.   Lymphadenopathy:      Cervical: No cervical adenopathy.   Skin:     General: Skin is warm and dry.   Neurological:      General: No focal deficit present.      Mental Status: She is alert and oriented to person, place, and time. Mental status is at baseline.      Cranial Nerves: No cranial nerve deficit.      Deep Tendon Reflexes: Reflexes normal.   Psychiatric:         Mood and Affect: Mood normal.         Behavior: Behavior normal.     Vika Baer MD

## 2024-04-08 DIAGNOSIS — E11.9 TYPE 2 DIABETES MELLITUS WITHOUT COMPLICATION, WITHOUT LONG-TERM CURRENT USE OF INSULIN (HCC): ICD-10-CM

## 2024-04-09 RX ORDER — LANCETS 30 GAUGE
1 EACH MISCELLANEOUS 2 TIMES DAILY
Qty: 200 EACH | Refills: 1 | Status: SHIPPED | OUTPATIENT
Start: 2024-04-09

## 2024-04-09 RX ORDER — BLOOD SUGAR DIAGNOSTIC
STRIP MISCELLANEOUS
Qty: 200 EACH | Refills: 1 | Status: SHIPPED | OUTPATIENT
Start: 2024-04-09

## 2024-05-09 ENCOUNTER — TELEPHONE (OUTPATIENT)
Age: 64
End: 2024-05-09

## 2024-05-09 ENCOUNTER — HOSPITAL ENCOUNTER (OUTPATIENT)
Dept: RADIOLOGY | Facility: HOSPITAL | Age: 64
End: 2024-05-09
Payer: COMMERCIAL

## 2024-05-09 ENCOUNTER — OFFICE VISIT (OUTPATIENT)
Dept: FAMILY MEDICINE CLINIC | Facility: CLINIC | Age: 64
End: 2024-05-09
Payer: COMMERCIAL

## 2024-05-09 VITALS
HEART RATE: 75 BPM | DIASTOLIC BLOOD PRESSURE: 78 MMHG | HEIGHT: 63 IN | BODY MASS INDEX: 30.12 KG/M2 | TEMPERATURE: 97.4 F | SYSTOLIC BLOOD PRESSURE: 130 MMHG | OXYGEN SATURATION: 99 % | WEIGHT: 170 LBS

## 2024-05-09 DIAGNOSIS — Z23 ENCOUNTER FOR IMMUNIZATION: Primary | ICD-10-CM

## 2024-05-09 DIAGNOSIS — M25.552 LEFT HIP PAIN: ICD-10-CM

## 2024-05-09 DIAGNOSIS — E11.9 TYPE 2 DIABETES MELLITUS WITHOUT COMPLICATION, WITHOUT LONG-TERM CURRENT USE OF INSULIN (HCC): ICD-10-CM

## 2024-05-09 DIAGNOSIS — M54.42 CHRONIC LEFT-SIDED LOW BACK PAIN WITH LEFT-SIDED SCIATICA: ICD-10-CM

## 2024-05-09 DIAGNOSIS — G89.29 CHRONIC RIGHT-SIDED LOW BACK PAIN WITH LEFT-SIDED SCIATICA: ICD-10-CM

## 2024-05-09 DIAGNOSIS — I10 ESSENTIAL HYPERTENSION: ICD-10-CM

## 2024-05-09 DIAGNOSIS — Z86.010 HISTORY OF COLON POLYPS: ICD-10-CM

## 2024-05-09 DIAGNOSIS — M25.552 CHRONIC LEFT HIP PAIN: ICD-10-CM

## 2024-05-09 DIAGNOSIS — G89.29 CHRONIC LEFT-SIDED LOW BACK PAIN WITH LEFT-SIDED SCIATICA: ICD-10-CM

## 2024-05-09 DIAGNOSIS — G89.29 CHRONIC LEFT HIP PAIN: ICD-10-CM

## 2024-05-09 DIAGNOSIS — M25.551 RIGHT HIP PAIN: ICD-10-CM

## 2024-05-09 DIAGNOSIS — M54.42 CHRONIC RIGHT-SIDED LOW BACK PAIN WITH LEFT-SIDED SCIATICA: ICD-10-CM

## 2024-05-09 PROBLEM — M54.50 LOW BACK PAIN: Status: ACTIVE | Noted: 2024-05-09

## 2024-05-09 PROCEDURE — 99214 OFFICE O/P EST MOD 30 MIN: CPT | Performed by: FAMILY MEDICINE

## 2024-05-09 PROCEDURE — 73502 X-RAY EXAM HIP UNI 2-3 VIEWS: CPT

## 2024-05-09 PROCEDURE — 72110 X-RAY EXAM L-2 SPINE 4/>VWS: CPT

## 2024-05-09 RX ORDER — DICLOFENAC POTASSIUM 50 MG/1
50 TABLET, FILM COATED ORAL 2 TIMES DAILY
Qty: 60 TABLET | Refills: 0 | Status: SHIPPED | OUTPATIENT
Start: 2024-05-09

## 2024-05-09 NOTE — PROGRESS NOTES
Name: Roxana Ventura      : 1960      MRN: [id number]  Encounter Provider: Vika Baer MD  Encounter Date: 2024   Encounter department: [facility] MEDICINE    Assessment & Plan     1. Encounter for immunization    2. History of colon polyps  -     Ambulatory Referral to Gastroenterology; Future    3. Essential hypertension  Assessment & Plan:  Well controlled on current therapy continue with current medications and will reassess next visit        4. Type 2 diabetes mellitus without complication, without long-term current use of insulin (HCC)  Assessment & Plan:    Lab Results   Component Value Date    HGBA1C 7.0 (A) 2024   ok      5. Chronic left hip pain  Assessment & Plan:  [Pain in right hip worse with  flexion ext rotation will check xray and send to ortho     Orders:  -     Ambulatory Referral to Orthopedic Surgery; Future  -     diclofenac potassium (CATAFLAM) 50 mg tablet; Take 1 tablet (50 mg total) by mouth 2 (two) times a day    6. Chronic left-sided low back pain with left-sided sciatica  Assessment & Plan:  Left side    Orders:  -     XR spine lumbar minimum 4 views non injury; Future; Expected date: 2024  -     Ambulatory Referral to Orthopedic Surgery; Future    7. Left hip pain  Assessment & Plan:  Check xray and  send for pT    Orders:  -     XR hip/pelv 2-3 vws left if performed; Future; Expected date: 2024  -     XR hip/pelv 2-3 vws left if performed; Future; Expected date: 2024  -     Ambulatory Referral to Physical Therapy; Future           Subjective      Pt here for pain left leg      Review of Systems   Musculoskeletal:  Positive for arthralgias (left hip) and back pain (low back pain).       Current Outpatient Medications on File Prior to Visit   Medication Sig    atenolol-chlorthalidone (TENORETIC) 50-25 mg per tablet Take 1 tablet by mouth daily    glucose blood (OneTouch Verio) test strip Check Blood Sugar 2 times daily  "   Lancets (OneTouch Delica Plus [id number]) MISC Use 1 each 2 (two) times a day    metFORMIN (GLUCOPHAGE) 500 mg tablet Take 1 tablet (500 mg total) by mouth 2 (two) times a day with meals    pravastatin (PRAVACHOL) 40 mg tablet Take 1 tablet (40 mg total) by mouth daily at bedtime    [DISCONTINUED] aspirin (ECOTRIN LOW STRENGTH) 81 mg EC tablet Take 1 tablet (81 mg total) by mouth daily       Objective     /78   Pulse 75   Temp (!) 97.4 °F (36.3 °C) (Tympanic)   Ht 5' 3\" (1.6 m)   Wt 77.1 kg (170 lb)   SpO2 99%   BMI 30.11 kg/m²     Physical Exam  Musculoskeletal:         General: Tenderness (left hip tender pain with ext rotationa dn flexion ; tender left sciatic notch and left lumbar paraspinal muscles) present.       Vika Baer MD    "

## 2024-05-09 NOTE — TELEPHONE ENCOUNTER
Yumi is calling for her mother in regards to the referrals and orders that were put in for Roxana today.   The diagnosis on her orders is RIGHT sided back pain with LEFT sided sciatica. She states that she called the office and a new xray order was put in and completed by patient for LEFT hip pain.     Now, Yumi is trying to schedule her Physical Therapy but her PT order is stating the pain is in the RIGHT hip and they wont let her schedule.     She would like a call back to find out what side her moms pain is on, what they talked about and if the referrals just need to be fixed to the Left side, not the right side.     Please advise - [phone]

## 2024-05-13 ENCOUNTER — TELEPHONE (OUTPATIENT)
Age: 64
End: 2024-05-13

## 2024-05-13 ENCOUNTER — OFFICE VISIT (OUTPATIENT)
Dept: OBGYN CLINIC | Facility: CLINIC | Age: 64
End: 2024-05-13
Payer: COMMERCIAL

## 2024-05-13 VITALS — BODY MASS INDEX: 30.12 KG/M2 | WEIGHT: 170 LBS | HEIGHT: 63 IN

## 2024-05-13 DIAGNOSIS — M54.42 CHRONIC LEFT-SIDED LOW BACK PAIN WITH LEFT-SIDED SCIATICA: ICD-10-CM

## 2024-05-13 DIAGNOSIS — M25.551 RIGHT HIP PAIN: Primary | ICD-10-CM

## 2024-05-13 DIAGNOSIS — G89.29 CHRONIC LEFT-SIDED LOW BACK PAIN WITH LEFT-SIDED SCIATICA: ICD-10-CM

## 2024-05-13 DIAGNOSIS — M25.552 CHRONIC LEFT HIP PAIN: ICD-10-CM

## 2024-05-13 DIAGNOSIS — G89.29 CHRONIC LEFT HIP PAIN: ICD-10-CM

## 2024-05-13 PROCEDURE — 99214 OFFICE O/P EST MOD 30 MIN: CPT | Performed by: ORTHOPAEDIC SURGERY

## 2024-05-13 RX ORDER — METHYLPREDNISOLONE 4 MG/1
TABLET ORAL
Qty: 21 EACH | Refills: 0 | Status: SHIPPED | OUTPATIENT
Start: 2024-05-13

## 2024-05-13 NOTE — PROGRESS NOTES
Assessment:  1. Right hip pain        2. Chronic left hip pain  Ambulatory Referral to Orthopedic Surgery      3. Chronic left-sided low back pain with left-sided sciatica  Ambulatory Referral to Orthopedic Surgery        Patient Active Problem List   Diagnosis    Type 2 diabetes mellitus without complication, without long-term current use of insulin (HCC)    Essential hypertension    Dyslipidemia    Carpal tunnel syndrome on both sides    Encounter for screening mammogram for breast cancer    Tobacco abuse    Status post total knee replacement, right    Anxiety    Dry eyes    Status post total knee replacement using cement, left    History of colon polyps    Acute pain of right shoulder    Hypercalcemia    Abnormal CBC    Colonoscopy refused    Low back pain    Left hip pain           Plan      64 y.o. female with lumbar radiculopathy, sciatic nerve irritation  Diagnostics reviewed and physical exam performed.  Diagnosis, treatment options and associated risks were discussed with the patient including no treatment, nonsurgical treatment and potential for surgical intervention.  The patient was given the opportunity to ask questions regarding each.  Referral placed today for Roxana to follow up with Spine and Pain for evaluation and treatment   Order placed for Medrol Dose Nilson to aid in pain reduction  Return for follow up on an as-needed basis (PRN).          Subjective:     Patient ID: Roxana Thorntoneugenio Ventura 64 y.o. female    HPI    Patient presents today for initial evaluation of right sided pain originating at her low back and radiating down the entire leg that is constant and ongoing for the past 2+ weeks. She notes numbness and cold sensation from the knee distally. Denies mechanism of injury or inciting event. She was taking Tylenol and Diclofenac with some relief.       The following portions of the patient's history were reviewed and updated as appropriate: allergies, current medications, past family  history, past social history, past surgical history and problem list.      Objective:    Review of Systems  Pertinent items are noted in HPI.  All other systems were reviewed and are negative.    Past Medical History:   Diagnosis Date    Arthritis     Diabetes mellitus (HCC)     Hyperlipidemia     Hypertension        Past Surgical History:   Procedure Laterality Date    COLONOSCOPY      [place] ARTHRP KNE CONDYLE&PLATU MEDIAL&LAT COMPARTMENTS Right 03/30/2021    Procedure: KNEE TOTAL ARTHROPLASTY;  Surgeon: Ryan Bush DO;  [facility];  Service: Orthopedics    [place] ARTHRP KNE CONDYLE&PLATU MEDIAL&LAT COMPARTMENTS Left 1/10/2023    Procedure: ARTHROPLASTY KNEE TOTAL;  Surgeon: Ryan Bush DO;  [facility];  Service: Orthopedics    [place] MANIPULATION KNEE JOINT UNDER GENERAL ANESTHESIA Right 06/17/2021    Procedure: MANIPULATION JOINT KNEE;  Surgeon: Ryan Bush DO;  [facility];  Service: Orthopedics    TUBAL LIGATION         Family History   Problem Relation Age of Onset    Arthritis Mother     Uterine cancer Mother     Arthritis Father     Bone cancer Father     Breast cancer Sister     No Known Problems Daughter     No Known Problems Sister     No Known Problems Sister     No Known Problems Sister     No Known Problems Sister     No Known Problems Sister     No Known Problems Daughter     No Known Problems Daughter     No Known Problems Maternal Aunt     No Known Problems Maternal Aunt     No Known Problems Maternal Aunt     No Known Problems Maternal Aunt     No Known Problems Maternal Aunt     No Known Problems Paternal Aunt     No Known Problems Paternal Aunt     No Known Problems Paternal Aunt     No Known Problems Paternal Aunt     Prostate cancer Maternal Grandfather     Breast cancer Cousin     Breast cancer Cousin        Social History     Occupational History    Occupation:      Employer: [employer]   Tobacco Use    Smoking status: Every Day     Current  "packs/day: 0.00     Average packs/day: 0.3 packs/day for 45.0 years (11.3 ttl pk-yrs)     Types: Cigarettes     Start date: 12/1/1975     Last attempt to quit: 12/1/2020     Years since quitting: 3.4    Smokeless tobacco: Never   Vaping Use    Vaping status: Never Used   Substance and Sexual Activity    Alcohol use: Not Currently     Comment: Socially    Drug use: Never    Sexual activity: Yes         Current Outpatient Medications:     atenolol-chlorthalidone (TENORETIC) 50-25 mg per tablet, Take 1 tablet by mouth daily, Disp: 90 tablet, Rfl: 0    diclofenac potassium (CATAFLAM) 50 mg tablet, Take 1 tablet (50 mg total) by mouth 2 (two) times a day, Disp: 60 tablet, Rfl: 0    glucose blood (OneTouch Verio) test strip, Check Blood Sugar 2 times daily, Disp: 200 each, Rfl: 1    Lancets (OneTouch Delica Plus [id number]) MISC, Use 1 each 2 (two) times a day, Disp: 200 each, Rfl: 1    metFORMIN (GLUCOPHAGE) 500 mg tablet, Take 1 tablet (500 mg total) by mouth 2 (two) times a day with meals, Disp: 180 tablet, Rfl: 0    pravastatin (PRAVACHOL) 40 mg tablet, Take 1 tablet (40 mg total) by mouth daily at bedtime, Disp: 90 tablet, Rfl: 3    No Known Allergies    Physical Exam  Ht 5' 3\" (1.6 m)   Wt 77.1 kg (170 lb)   BMI 30.11 kg/m²   Cons: Appears well.  No apparent distress.  Psych: Alert. Oriented x3.  Mood and affect normal.  Eyes: PERRLA, EOMI  Resp: Normal effort.  No audible wheezing or stridor.  CV: Palpable pulse.  No discernable arrhythmia.  No LE edema.  Lymph:  No palpable cervical, axillary, or inguinal lymphadenopathy.  Skin: Warm.  No palpable masses.  No visible lesions.  Neuro: Normal muscle tone.  Normal and symmetric DTR's.    Back Exam     Tenderness   The patient is experiencing tenderness in the sacroiliac and lumbar (sciatic notch).    Comments:    Psoas tenderness and pain with SLR   - Slump test              Procedures  No Procedures performed today      I have personally reviewed pertinent films in " "PACS.      Scribe Attestation      I,:  Iris Flynn am acting as a scribe while in the presence of the attending physician.:       I,:  Ryan Bush, DO personally performed the services described in this documentation    as scribed in my presence.:                Portions of the record may have been created with voice recognition software.  Occasional wrong word or \"sound a like\" substitutions may have occurred due to the inherent limitations of voice recognition software.  Read the chart carefully and recognize, using context, where substitutions have occurred.  "

## 2024-05-13 NOTE — TELEPHONE ENCOUNTER
Pt's daughter called in upset stating she has called several times regarding the prior auth for Diclofenac potassium 50 mg, she stated her mom is in pain and needs this medication.    Please give her a call back.

## 2024-05-17 ENCOUNTER — CONSULT (OUTPATIENT)
Dept: PAIN MEDICINE | Facility: CLINIC | Age: 64
End: 2024-05-17
Payer: COMMERCIAL

## 2024-05-17 VITALS — HEIGHT: 63 IN | BODY MASS INDEX: 30.12 KG/M2 | WEIGHT: 170 LBS

## 2024-05-17 DIAGNOSIS — S76.312A STRAIN OF LEFT PIRIFORMIS MUSCLE, INITIAL ENCOUNTER: ICD-10-CM

## 2024-05-17 DIAGNOSIS — M47.816 LUMBAR SPONDYLOSIS: ICD-10-CM

## 2024-05-17 DIAGNOSIS — M25.552 LEFT HIP PAIN: ICD-10-CM

## 2024-05-17 DIAGNOSIS — M54.42 CHRONIC LEFT-SIDED LOW BACK PAIN WITH LEFT-SIDED SCIATICA: ICD-10-CM

## 2024-05-17 DIAGNOSIS — M54.16 LUMBAR RADICULOPATHY: Primary | ICD-10-CM

## 2024-05-17 DIAGNOSIS — G89.29 CHRONIC LEFT-SIDED LOW BACK PAIN WITH LEFT-SIDED SCIATICA: ICD-10-CM

## 2024-05-17 PROCEDURE — 99244 OFF/OP CNSLTJ NEW/EST MOD 40: CPT | Performed by: PAIN MEDICINE

## 2024-05-17 RX ORDER — GABAPENTIN 300 MG/1
CAPSULE ORAL
Qty: 90 CAPSULE | Refills: 0 | Status: SHIPPED | OUTPATIENT
Start: 2024-05-17

## 2024-05-17 NOTE — PROGRESS NOTES
Assessment  1. Lumbar radiculopathy  -     MRI lumbar spine wo contrast; Future; Expected date: 05/17/2024  -     Ambulatory Referral to [facility]; Future  -     gabapentin (NEURONTIN) 300 mg capsule; Take 1 tablet at bedtime for 3 days, then 1 tablet twice daily for 3 days, then 1 tablet 3 times daily  2. Chronic left-sided low back pain with left-sided sciatica  -     Ambulatory referral to Spine & Pain Management  3. Lumbar spondylosis  4. Strain of left piriformis muscle, initial encounter  5. Left hip pain        Ms. Gutierrez presenting with pain symptoms low back and left buttocks and pain in the left leg, and numbness in the anterior shin.  She has failed at least 6 weeks of conservative therapy including PT/Home exercise program/Chiro care.  Patient has also failed trial of NSAIDS for at least 3 weeks.  Imaging consistent with normal MRI but, exam consistent with + SLR, + HARMEET.  Due to lack of improvement in pain symptoms recommend at this point to improve quality of life and function:    Procedure defer, risks, benefits, alternative regarding procedure were discussed with the patient, including bleeding infection, nerve injury, spinal cord injury  Medications: start gabapentin 300 mg tid, risks, benefits, alternative regarding new medications were discussed with the patient  Imaging: obtain MRI L spine given symptoms of weakness and numbness in the anterior shin  Conservative therapy: start PT/low back exercises    Concern for possible left piriformis vs left hip but given n/t in the in left anterior shin appears to be more radicular in nature, with + femoral stretch       My impressions and treatment recommendations were discussed in detail with the patient who verbalized understanding and had no further questions.  Discharge instructions were provided. I personally saw and examined the patient and I agree with the above discussed plan of care.    Plan      New Medications Ordered This  Visit   Medications   • gabapentin (NEURONTIN) 300 mg capsule     Sig: Take 1 tablet at bedtime for 3 days, then 1 tablet twice daily for 3 days, then 1 tablet 3 times daily     Dispense:  90 capsule     Refill:  0         Orders Placed This Encounter   Procedures   • MRI lumbar spine wo contrast     Standing Status:   Future     Standing Expiration Date:   5/17/2028     Scheduling Instructions:      There is no preparation for this test. Please leave your jewelry and valuables at home, wedding rings are the exception. All patients will be required to change into a hospital gown and pants.  Street clothes are not permitted in the MRI.  Magnetic nail polish must be removed prior to arrival for your test. Please bring your insurance cards, a form of photo ID and a list of your medications with you. Arrive 15 minutes prior to your appointment time in order to register. Please bring any prior CT or MRI studies of this area that were not performed at a [facility].            To schedule this appointment, please contact Central Scheduling at [phone].            Prior to your appointment, please make sure you complete the MRI Screening Form when you e-Check in for your appointment. This will be available starting 7 days before your appointment in [employer]. You may receive an e-mail with an activation code if you do not have a [employer] account. If you do not have access to a device, we will complete your screening at your appointment.     Order Specific Question:   What is the patient's sedation requirement?     Answer:   No Sedation     Order Specific Question:   Does the patient have metallic implants?     Answer:   No     Order Specific Question:   Does this procedure require the 3T MRI at [place] or [place]?     Answer:   No     Order Specific Question:   Release to patient through [employer]     Answer:   Immediate     Order Specific Question:   Is order priority selected as STAT?     Answer:   No     Order  Specific Question:   Reason for Exam     Answer:   Lumbar radicular pain   • Ambulatory Referral to [facility]     Standing Status:   Future     Standing Expiration Date:   5/17/2025     Referral Priority:   Routine     Referral Type:   Physical Therapy     Referral Reason:   Specialty Services Required     Requested Specialty:   Physical Therapy     Number of Visits Requested:   1     Expiration Date:   5/17/2025         History of Present Illness    Roxana Ventura is a 64 y.o. female with relevant PMH of HTN, s/p right knee replacement     Presenting to  [facility] for chief complaint of low back and left leg pain referred by    Symptoms have been present for 2 months and include low back and left leg pain in the distriubtion of the L4/5 level, numbness in the distal to the knee, but pain in the anterior thihg. Quality of pain: moderate to severe .  Symptoms are worst: with standing and walking, Alleviating factors identifiable by patient are: sitting On a scale of 1-10, pain typically increases to 8/10, currently impacting quality of life      Conservative therapy (PT/chiro/accupuncture): not started  Previous treatments: medrol pack  Prior surgeries of the spine: none  Bowel/bladder incontinence or saddle anesthesia: no  Relevant imaging: x ray L spine and x ray Hip  Anticoagulants: none  Contrast allergy: none    I have personally reviewed and/or updated the patient's past medical history, past surgical history, family history, social history, current medications, allergies, and vital signs today.     Review of Systems   Musculoskeletal:  Positive for arthralgias, joint swelling and myalgias.   Neurological:  Positive for numbness.   All other systems reviewed and are negative.      Patient Active Problem List   Diagnosis   • Type 2 diabetes mellitus without complication, without long-term current use of insulin (HCC)   • Essential hypertension   • Dyslipidemia   •  Carpal tunnel syndrome on both sides   • Encounter for screening mammogram for breast cancer   • Tobacco abuse   • Status post total knee replacement, right   • Anxiety   • Dry eyes   • Status post total knee replacement using cement, left   • History of colon polyps   • Acute pain of right shoulder   • Hypercalcemia   • Abnormal CBC   • Colonoscopy refused   • Low back pain   • Left hip pain       Past Medical History:   Diagnosis Date   • Arthritis    • Diabetes mellitus (HCC)    • Hyperlipidemia    • Hypertension        Past Surgical History:   Procedure Laterality Date   • COLONOSCOPY     • HI ARTHRP KNE CONDYLE&PLATU MEDIAL&LAT COMPARTMENTS Right 03/30/2021    Procedure: KNEE TOTAL ARTHROPLASTY;  Surgeon: Ryan Bush DO;  [facility];  Service: Orthopedics   • [place] ARTHRP KNE CONDYLE&PLATU MEDIAL&LAT COMPARTMENTS Left 1/10/2023    Procedure: ARTHROPLASTY KNEE TOTAL;  Surgeon: Ryan Bush DO;  [facility];  Service: Orthopedics   • HI MANIPULATION KNEE JOINT UNDER GENERAL ANESTHESIA Right 06/17/2021    Procedure: MANIPULATION JOINT KNEE;  Surgeon: Ryan Bush DO;  [facility];  Service: Orthopedics   • TUBAL LIGATION         Family History   Problem Relation Age of Onset   • Arthritis Mother    • Uterine cancer Mother    • Arthritis Father    • Bone cancer Father    • Breast cancer Sister    • No Known Problems Daughter    • No Known Problems Sister    • No Known Problems Sister    • No Known Problems Sister    • No Known Problems Sister    • No Known Problems Sister    • No Known Problems Daughter    • No Known Problems Daughter    • No Known Problems Maternal Aunt    • No Known Problems Maternal Aunt    • No Known Problems Maternal Aunt    • No Known Problems Maternal Aunt    • No Known Problems Maternal Aunt    • No Known Problems Paternal Aunt    • No Known Problems Paternal Aunt    • No Known Problems Paternal Aunt    • No Known Problems Paternal Aunt    • Prostate cancer  "Maternal Grandfather    • Breast cancer Cousin    • Breast cancer Cousin        Social History     Occupational History   • Occupation:      Employer: [employer]   Tobacco Use   • Smoking status: Every Day     Current packs/day: 0.00     Average packs/day: 0.3 packs/day for 45.0 years (11.3 ttl pk-yrs)     Types: Cigarettes     Start date: 12/1/1975     Last attempt to quit: 12/1/2020     Years since quitting: 3.4   • Smokeless tobacco: Never   Vaping Use   • Vaping status: Never Used   Substance and Sexual Activity   • Alcohol use: Not Currently     Comment: Socially   • Drug use: Never   • Sexual activity: Yes       Current Outpatient Medications on File Prior to Visit   Medication Sig   • atenolol-chlorthalidone (TENORETIC) 50-25 mg per tablet Take 1 tablet by mouth daily   • glucose blood (OneTouch Verio) test strip Check Blood Sugar 2 times daily   • Lancets (OneTouch Delica Plus [id number]) MISC Use 1 each 2 (two) times a day   • metFORMIN (GLUCOPHAGE) 500 mg tablet Take 1 tablet (500 mg total) by mouth 2 (two) times a day with meals   • diclofenac potassium (CATAFLAM) 50 mg tablet Take 1 tablet (50 mg total) by mouth 2 (two) times a day (Patient not taking: Reported on 5/17/2024)   • methylPREDNISolone 4 MG tablet therapy pack Use as directed on package (Patient not taking: Reported on 5/17/2024)   • pravastatin (PRAVACHOL) 40 mg tablet Take 1 tablet (40 mg total) by mouth daily at bedtime (Patient not taking: Reported on 5/17/2024)     No current facility-administered medications on file prior to visit.       No Known Allergies      Physical Exam    Ht 5' 3\" (1.6 m)   Wt 77.1 kg (170 lb)   BMI 30.11 kg/m²     Constitutional: normal, well developed, well nourished, alert, in no distress and non-toxic and no overt pain behavior.  Eyes: anicteric  HEENT: grossly intact  Neck: supple, symmetric, trachea midline and no masses   Pulmonary:even and unlabored  Cardiovascular:No edema or " pitting edema present  Skin:Normal without rashes or lesions and well hydrated  Psychiatric:Mood and affect appropriate  Neurologic:Cranial Nerves II-XII grossly intact Sensation grossly intact; no clonus negative ryan's.      MSK:      Lumbar Spine:  No masses or atrophy,    Range of motion - Decreased extension/flexion  Palpation -Tenderness to palpation in the lumbar parapsinals   PSIS tenderness yes  Antonio's/HARMEET positive  Gaenslen's no  SLR positive left       Strength Right Left   Hip flexion L1,2 5 4 (pain)   Knee extension L3 5 5   Ankle dorsiflexion L4 5 5   Great toe extension L5 5 5   Ankle Plantarflexion S1 5 5       Sensory Exam:  iDecreased to pinprcik and light touch in the left anterior shin       Reflexes:     Right Left   Biceps 2+ 2+   Triceps 2+ 2+   Brachioradialis 2+ 2+   Patellar 1+ 1+   Achilles 1+ 1+   Babinski neg neg        Gait antalgic                  Imaging    Xr hips left       There is no acute fracture or dislocation.     There is mild narrowing of bilateral hip spaces.     No lytic or blastic osseous lesion.     Soft tissues are unremarkable.     The visualized lumbar spine is unremarkable.     IMPRESSION:        No acute osseous abnormality. Mild degenerative changes of the hips noted.    X ray L spine      Narrative & Impression         LUMBAR SPINE     INDICATION:   Lumbago with sciatica, left side. Other chronic pain.      COMPARISON:  None.     VIEWS:  XR SPINE LUMBAR MINIMUM 4 VIEWS NON INJURY  Images: 4     FINDINGS:     There are 5 non rib bearing lumbar vertebral bodies.      There is no evidence of acute fracture or destructive osseous lesion.     Alignment is unremarkable.      No significant lumbar degenerative change noted.     The pedicles appear intact.     Soft tissues are unremarkable.     IMPRESSION:        Normal examination.

## 2024-05-17 NOTE — TELEPHONE ENCOUNTER
PA for Diclofenac potassium (CATAFLAM) 50 mg tablet    Submitted via    [x]CMM-KEY [id number]  []Surescripts-Case ID #   []Faxed to plan   []Other website   []Phone call Case ID #     Office notes sent, clinical questions answered. Awaiting determination    Turnaround time for your insurance to make a decision on your Prior Authorization can take 7-21 business days.

## 2024-05-17 NOTE — TELEPHONE ENCOUNTER
PA for Diclofenac potassium (CATAFLAM) 50 mg tablet Approved     Date(s) approved 5- - 5-        Patient advised by          [] [facility] Message  [x] Phone call   []LMOM  []L/M to call office as no active Communication consent on file  []Unable to leave detailed message as VM not approved on Communication consent       Pharmacy advised by    [x]Fax  []Phone call    Approval letter scanned into Media Yes

## 2024-05-29 ENCOUNTER — HOSPITAL ENCOUNTER (OUTPATIENT)
Dept: MRI IMAGING | Facility: HOSPITAL | Age: 64
Discharge: HOME | End: 2024-05-29
Attending: PAIN MEDICINE
Payer: COMMERCIAL

## 2024-05-29 DIAGNOSIS — M54.16 LUMBAR RADICULOPATHY: ICD-10-CM

## 2024-05-29 PROCEDURE — 72148 MRI LUMBAR SPINE W/O DYE: CPT

## 2024-06-03 DIAGNOSIS — I10 ESSENTIAL HYPERTENSION: ICD-10-CM

## 2024-06-03 RX ORDER — ATENOLOL AND CHLORTHALIDONE TABLET 50; 25 MG/1; MG/1
1 TABLET ORAL DAILY
Qty: 90 TABLET | Refills: 0 | Status: SHIPPED | OUTPATIENT
Start: 2024-06-03

## 2024-06-03 NOTE — TELEPHONE ENCOUNTER
Reason for call:   [x] Refill   [] Prior Auth  [] Other:     Office:   [x] PCP/Provider - Vika Baer MD   [] Specialty/Provider -     Medication: atenolol-chlorthalidone (TENORETIC) 50-25 mg per tablet     Dose/Frequency: Take 1 tablet by mouth daily     Quantity: 90 + 1 refill    Pharmacy: [id number] [address] [facility]    Does the patient have enough for 3 days?   [] Yes   [x] No - Send as HP to POD

## 2024-06-05 ENCOUNTER — OFFICE VISIT (OUTPATIENT)
Dept: PAIN MEDICINE | Facility: CLINIC | Age: 64
End: 2024-06-05
Payer: COMMERCIAL

## 2024-06-05 ENCOUNTER — EVALUATION (OUTPATIENT)
Dept: PHYSICAL THERAPY | Facility: REHABILITATION | Age: 64
End: 2024-06-05
Payer: COMMERCIAL

## 2024-06-05 VITALS
SYSTOLIC BLOOD PRESSURE: 122 MMHG | WEIGHT: 170 LBS | BODY MASS INDEX: 30.12 KG/M2 | HEIGHT: 63 IN | DIASTOLIC BLOOD PRESSURE: 78 MMHG

## 2024-06-05 DIAGNOSIS — M54.16 LUMBAR RADICULOPATHY: ICD-10-CM

## 2024-06-05 DIAGNOSIS — M51.16 LUMBAR DISC DISEASE WITH RADICULOPATHY: Primary | ICD-10-CM

## 2024-06-05 DIAGNOSIS — M51.26 LUMBAR DISC HERNIATION: ICD-10-CM

## 2024-06-05 PROCEDURE — 99214 OFFICE O/P EST MOD 30 MIN: CPT | Performed by: PAIN MEDICINE

## 2024-06-05 PROCEDURE — 97162 PT EVAL MOD COMPLEX 30 MIN: CPT | Performed by: PHYSICAL THERAPIST

## 2024-06-05 NOTE — PROGRESS NOTES
Assessment  1. Lumbar disc disease with radiculopathy  2. Lumbar disc herniation          Ms. Gutierrez presenting with pain symptoms low back and left buttocks and pain in the left leg, and numbness in the anterior shin.  Patient has also failed trial of NSAIDS for at least 3 weeks.      Imaging MRI  L4-5 disc foraminal herniation at L4-5 compressing L4 and L5 nerve on the left consistent with pain symptoms    Due to lack of improvement in pain symptoms recommend at this point to improve quality of life and function:    Procedure schedule Left L4-5, L5-s1 TFESI risks, benefits, alternative regarding procedure were discussed with the patient, including bleeding infection, nerve injury, spinal cord injury  Medications: start gabapentin 300 mg qhs as she has not started gabapentin and is hesitant to try risks, benefits, alternative regarding new medications were discussed with the patient  Imaging: obtain MRI L spine given symptoms of weakness and numbness in the anterior shin  Conservative therapy: start PT           My impressions and treatment recommendations were discussed in detail with the patient who verbalized understanding and had no further questions.  Discharge instructions were provided. I personally saw and examined the patient and I agree with the above discussed plan of care.    Plan      No orders of the defined types were placed in this encounter.        No orders of the defined types were placed in this encounter.        History of Present Illness  F/u 6/5/24  Roxana comes for follow-up to review her MRI.  She continues to have low back and left leg pain in distribution L4-5.  Symptoms are worse with walking and standing she is relieved by sitting her pain level today is a 7-8 out of 10. She has numbness in the anterior shin, and pain in the back of the leg in the L5 distribution.         Initial Consult  Roxana Gutierrez Dale Edwards is a 64 y.o. female with relevant PMH of HTN, s/p right knee replacement      Presenting to  [facility] for chief complaint of low back and left leg pain referred by    Symptoms have been present for 2 months and include low back and left leg pain in the distriubtion of the L4/5 level, numbness in the distal to the knee, but pain in the anterior thihg. Quality of pain: moderate to severe .  Symptoms are worst: with standing and walking, Alleviating factors identifiable by patient are: sitting On a scale of 1-10, pain typically increases to 8/10, currently impacting quality of life      Conservative therapy (PT/chiro/accupuncture): not started  Previous treatments: medrol pack  Prior surgeries of the spine: none  Bowel/bladder incontinence or saddle anesthesia: no  Relevant imaging: x ray L spine and x ray Hip  Anticoagulants: none  Contrast allergy: none    I have personally reviewed and/or updated the patient's past medical history, past surgical history, family history, social history, current medications, allergies, and vital signs today.     Review of Systems   Musculoskeletal:  Positive for arthralgias, joint swelling and myalgias.   Neurological:  Positive for numbness.   All other systems reviewed and are negative.      Patient Active Problem List   Diagnosis   • Type 2 diabetes mellitus without complication, without long-term current use of insulin (HCC)   • Essential hypertension   • Dyslipidemia   • Carpal tunnel syndrome on both sides   • Encounter for screening mammogram for breast cancer   • Tobacco abuse   • Status post total knee replacement, right   • Anxiety   • Dry eyes   • Status post total knee replacement using cement, left   • History of colon polyps   • Acute pain of right shoulder   • Hypercalcemia   • Abnormal CBC   • Colonoscopy refused   • Low back pain   • Left hip pain       Past Medical History:   Diagnosis Date   • Arthritis    • Diabetes mellitus (HCC)    • Hyperlipidemia    • Hypertension        Past Surgical History:   Procedure  Laterality Date   • COLONOSCOPY     • [place] ARTHRP KNE CONDYLE&PLATU MEDIAL&LAT COMPARTMENTS Right 03/30/2021    Procedure: KNEE TOTAL ARTHROPLASTY;  Surgeon: Ryan Bush DO;  [facility];  Service: Orthopedics   • VT ARTHRP KNE CONDYLE&PLATU MEDIAL&LAT COMPARTMENTS Left 1/10/2023    Procedure: ARTHROPLASTY KNEE TOTAL;  Surgeon: Ryan Bush DO;  [facility];  Service: Orthopedics   • VT MANIPULATION KNEE JOINT UNDER GENERAL ANESTHESIA Right 06/17/2021    Procedure: MANIPULATION JOINT KNEE;  Surgeon: Ryan Bush DO;  [facility];  Service: Orthopedics   • TUBAL LIGATION         Family History   Problem Relation Age of Onset   • Arthritis Mother    • Uterine cancer Mother    • Arthritis Father    • Bone cancer Father    • Breast cancer Sister    • No Known Problems Daughter    • No Known Problems Sister    • No Known Problems Sister    • No Known Problems Sister    • No Known Problems Sister    • No Known Problems Sister    • No Known Problems Daughter    • No Known Problems Daughter    • No Known Problems Maternal Aunt    • No Known Problems Maternal Aunt    • No Known Problems Maternal Aunt    • No Known Problems Maternal Aunt    • No Known Problems Maternal Aunt    • No Known Problems Paternal Aunt    • No Known Problems Paternal Aunt    • No Known Problems Paternal Aunt    • No Known Problems Paternal Aunt    • Prostate cancer Maternal Grandfather    • Breast cancer Cousin    • Breast cancer Cousin        Social History     Occupational History   • Occupation:      Employer: [employer]   Tobacco Use   • Smoking status: Every Day     Current packs/day: 0.00     Average packs/day: 0.3 packs/day for 45.0 years (11.3 ttl pk-yrs)     Types: Cigarettes     Start date: 12/1/1975     Last attempt to quit: 12/1/2020     Years since quitting: 3.5   • Smokeless tobacco: Never   Vaping Use   • Vaping status: Never Used   Substance and Sexual Activity   • Alcohol use: Not  "Currently     Comment: Socially   • Drug use: Never   • Sexual activity: Yes       Current Outpatient Medications on File Prior to Visit   Medication Sig   • atenolol-chlorthalidone (TENORETIC) 50-25 mg per tablet Take 1 tablet by mouth daily   • gabapentin (NEURONTIN) 300 mg capsule Take 1 tablet at bedtime for 3 days, then 1 tablet twice daily for 3 days, then 1 tablet 3 times daily   • glucose blood (OneTouch Verio) test strip Check Blood Sugar 2 times daily   • Lancets (OneTouch Delica Plus [id number]) MISC Use 1 each 2 (two) times a day   • metFORMIN (GLUCOPHAGE) 500 mg tablet Take 1 tablet (500 mg total) by mouth 2 (two) times a day with meals   • diclofenac potassium (CATAFLAM) 50 mg tablet Take 1 tablet (50 mg total) by mouth 2 (two) times a day (Patient not taking: Reported on 5/17/2024)   • methylPREDNISolone 4 MG tablet therapy pack Use as directed on package (Patient not taking: Reported on 5/17/2024)   • pravastatin (PRAVACHOL) 40 mg tablet Take 1 tablet (40 mg total) by mouth daily at bedtime (Patient not taking: Reported on 5/17/2024)     No current facility-administered medications on file prior to visit.       No Known Allergies      Physical Exam    /78   Ht 5' 3\" (1.6 m)   Wt 77.1 kg (170 lb)   BMI 30.11 kg/m²     Constitutional: normal, well developed, well nourished, alert, in no distress and non-toxic and no overt pain behavior.  Eyes: anicteric  HEENT: grossly intact  Neck: supple, symmetric, trachea midline and no masses   Pulmonary:even and unlabored  Cardiovascular:No edema or pitting edema present  Skin:Normal without rashes or lesions and well hydrated  Psychiatric:Mood and affect appropriate  Neurologic:Cranial Nerves II-XII grossly intact Sensation grossly intact; no clonus negative ryan's.      MSK:      Lumbar Spine:  No masses or atrophy,    Range of motion - Decreased extension/flexion  Palpation -Tenderness to palpation in the lumbar parapsinals   PSIS tenderness " yes  Antonio's/HARMEET positive  Gaenslen's no  SLR positive left       Strength Right Left   Hip flexion L1,2 5 4 (pain)   Knee extension L3 5 5   Ankle dorsiflexion L4 5 5   Great toe extension L5 5 5   Ankle Plantarflexion S1 5 5       Sensory Exam:  iDecreased to pinprcik and light touch in the left anterior shin       Reflexes:     Right Left   Biceps 2+ 2+   Triceps 2+ 2+   Brachioradialis 2+ 2+   Patellar 1+ 1+   Achilles 1+ 1+   Babinski neg neg        Gait antalgic                  Imaging      MRI L spine 5/24      L3-L4: Congenital narrowing, diffuse disc bulge, ligamentum flavum thickening and facet arthrosis. Mild to moderate spinal canal narrowing. No foraminal narrowing.     L4-L5: Congenital narrowing, diffuse disc bulge, left foraminal disc protrusion, ligamentum flavum thickening and facet arthrosis. Left subarticular recess narrowing, encroaching the descending left L5 nerve root. Mild central canal narrowing. Mild right   and moderate to severe left foraminal narrowing.     L5-S1: Diffuse disc bulge, central disc protrusion and right greater than left facet arthrosis. No central canal narrowing. Moderate right greater than left foraminal narrowing.    Xr hips left       There is no acute fracture or dislocation.     There is mild narrowing of bilateral hip spaces.     No lytic or blastic osseous lesion.     Soft tissues are unremarkable.     The visualized lumbar spine is unremarkable.     IMPRESSION:        No acute osseous abnormality. Mild degenerative changes of the hips noted.    X ray L spine      Narrative & Impression         LUMBAR SPINE     INDICATION:   Lumbago with sciatica, left side. Other chronic pain.      COMPARISON:  None.     VIEWS:  XR SPINE LUMBAR MINIMUM 4 VIEWS NON INJURY  Images: 4     FINDINGS:     There are 5 non rib bearing lumbar vertebral bodies.      There is no evidence of acute fracture or destructive osseous lesion.     Alignment is unremarkable.      No significant  lumbar degenerative change noted.     The pedicles appear intact.     Soft tissues are unremarkable.     IMPRESSION:        Normal examination.

## 2024-06-05 NOTE — PROGRESS NOTES
PT Evaluation     Today's date: 2024  Patient name: Roxana Ventura  : 1960  MRN: [id number]  Referring provider: Henry Pace  Dx:   Encounter Diagnosis     ICD-10-CM    1. Lumbar radiculopathy  M54.16 Ambulatory Referral to [facility]                     Assessment  Impairments: abnormal coordination, abnormal gait, abnormal muscle firing, abnormal or restricted ROM, abnormal movement, activity intolerance, impaired balance, impaired physical strength, lacks appropriate home exercise program, pain with function and weight-bearing intolerance    Assessment details: Patient presents to PT with low back pain and pain radiating down LLE. Patient displays decreased lumbar ROM, core strength and hip strength. These impairments are leading to pain with sitting, standing and bending/lifting. Patient will benefit from skilled PT to address above impairments and help them return to PLOF.    Barriers to therapy: Language barrier  Understanding of Dx/Px/POC: good     Prognosis: good    Goals  STG  1. Patient will display decreased pain to 0-2 in 6 weeks  2. Patient will display lumbar ROM WNL in 6 weeks  3. Patient will display core strength WNL in 6 weeks    LTG  1. Patient will be able to stand for 30 minutes without pain in 12 weeks  2. Patient will be able to walk for 30 minutes without pain in 12 weeks  3. Patient will be pick an object up off the floor without pain in 12 weeks          Plan  Planned modality interventions: traction, TENS, biofeedback, cryotherapy and thermotherapy: hydrocollator packs    Planned therapy interventions: abdominal trunk stabilization, activity modification, ADL training, balance, balance/weight bearing training, body mechanics training, joint mobilization, manual therapy, massage, motor coordination training, neuromuscular re-education, patient education, postural training, self care, strengthening, stretching, therapeutic activities, therapeutic  "exercise, transfer training, home exercise program, graded activity, graded exercise, functional ROM exercises and flexibility    Frequency: 2x week  Duration in weeks: 6  Plan of Care beginning date: 2024  Plan of Care expiration date: 2024        Subjective Evaluation    History of Present Illness  Mechanism of injury: Patient states she has had back pain for a while but the last month the pain in her back and down her L LE has really increased. Patient has more pain when she gets up from sitting and in the morning. Patient feels like her L knee is giving out from time to time. She has had both knees replaced in the past. Patient feels like her left shin is \"sleeping\"          Not a recurrent problem   Quality of life: fair    Patient Goals  Patient goals for therapy: decreased edema, decreased pain, increased motion and increased strength    Pain  Current pain ratin  At best pain ratin  At worst pain ratin  Quality: sharp, radiating and tight  Relieving factors: heat and medications  Aggravating factors: lifting, sitting, standing and walking    Social Support  Steps to enter house: yes    Employment status: not working    Diagnostic Tests  MRI studies: abnormal        Objective     Active Range of Motion   Cervical/Thoracic Spine       Thoracic    Flexion:  WFL  Extension: 10 degrees     with pain Restriction level: moderate  Left lateral flexion: 15 degrees    with pain Restriction level: minimal  Right lateral flexion: 15 degrees  with pain Restriction level: minimal  Left rotation: 20 degrees  Restriction level: minimal  Right rotation: 20 degrees  with pain Restriction level: minimal    Strength/Myotome Testing     Left Hip   Planes of Motion   Extension: 3+  Abduction: 3+  External rotation: 3+    Tests     Lumbar     Left   Positive slump test.   Negative passive SLR.   Graphical documentation:                  Precautions: B TKA      Manuals             STM                             "                        Neuro Re-Ed             crystal johnston             sidesteps                                                                 Ther Ex             Hip abd/ext             LTR             SKTC             LAQ                                                                 Ther Activity                                       Gait Training                                       Modalities

## 2024-06-10 ENCOUNTER — OFFICE VISIT (OUTPATIENT)
Dept: PHYSICAL THERAPY | Facility: REHABILITATION | Age: 64
End: 2024-06-10
Payer: COMMERCIAL

## 2024-06-10 DIAGNOSIS — M54.16 LUMBAR RADICULOPATHY: Primary | ICD-10-CM

## 2024-06-10 PROCEDURE — 97110 THERAPEUTIC EXERCISES: CPT

## 2024-06-10 PROCEDURE — 97112 NEUROMUSCULAR REEDUCATION: CPT

## 2024-06-10 NOTE — PROGRESS NOTES
Daily Note     Today's date: 6/10/2024  Patient name: Roxana Ventura  : 1960  MRN: [id number]  Referring provider: Henry Pace*  Dx:   Encounter Diagnosis     ICD-10-CM    1. Lumbar radiculopathy  M54.16           Start Time: 1054  Stop Time: 1132  Total time in clinic (min): 38 minutes    Subjective: Pt denies medical updates and reports pain in her back is mild today.       Objective: See treatment diary below      Assessment: Roxana tolerated treatment well with consistent cuing throughout. Began session on recumbent bike for endurance. TE's were performed with increased reps and increased resistance. New TE's were demonstrated with proper technique, and tolerated well. Following treatment, the patient demonstrated fatigue post treatment, exhibited good technique with therapeutic exercises, and would benefit from continued PT.         Plan: Continue per plan of care.      Precautions: B TKA      Manuals 6/10            STM                                                    Neuro Re-Ed 6/10            bridges 5''x20            clamshells GTB 2x10             sidesteps 4 laps             Mini squats  X10                                                    Ther Ex 6/10            Hip abd/ext Standing 2x10 ea            LTR 5''x20            SKTC 10''x10            LAQ 2x10 ea            Bike  5min L 1            Tra w/ SLR 2x10 R, x10 R            Tra w/ hip add tl 5''x20            Tra with hip abd 5''x20 GTB            Ther Activity                                       Gait Training                                       Modalities

## 2024-06-13 ENCOUNTER — APPOINTMENT (OUTPATIENT)
Dept: PHYSICAL THERAPY | Facility: REHABILITATION | Age: 64
End: 2024-06-13
Payer: COMMERCIAL

## 2024-06-17 ENCOUNTER — OFFICE VISIT (OUTPATIENT)
Dept: PHYSICAL THERAPY | Facility: REHABILITATION | Age: 64
End: 2024-06-17
Payer: COMMERCIAL

## 2024-06-17 DIAGNOSIS — M54.16 LUMBAR RADICULOPATHY: Primary | ICD-10-CM

## 2024-06-17 PROCEDURE — 97112 NEUROMUSCULAR REEDUCATION: CPT

## 2024-06-17 PROCEDURE — 97110 THERAPEUTIC EXERCISES: CPT

## 2024-06-21 ENCOUNTER — OFFICE VISIT (OUTPATIENT)
Dept: PHYSICAL THERAPY | Facility: REHABILITATION | Age: 64
End: 2024-06-21
Payer: COMMERCIAL

## 2024-06-21 DIAGNOSIS — M54.16 LUMBAR RADICULOPATHY: Primary | ICD-10-CM

## 2024-06-21 PROCEDURE — 97110 THERAPEUTIC EXERCISES: CPT | Performed by: PHYSICAL THERAPIST

## 2024-06-21 PROCEDURE — 97112 NEUROMUSCULAR REEDUCATION: CPT | Performed by: PHYSICAL THERAPIST

## 2024-06-21 NOTE — PROGRESS NOTES
"Daily Note     Today's date: 2024  Patient name: Roxana Ventura  : 1960  MRN: [id number]  Referring provider: Henry Pace  Dx:   Encounter Diagnosis     ICD-10-CM    1. Lumbar radiculopathy  M54.16                      Subjective: patient states her back is improving but it is still bothering her      Objective: See treatment diary below      Assessment: Tolerated treatment well. Patient demonstrated fatigue post treatment, exhibited good technique with therapeutic exercises, and would benefit from continued PT Patient with improving tolerance to strengthening. Patient given HEP to continue in DR      Plan: Continue per plan of care.      Precautions: B TKA      Manuals 6/10 6/17 6/21          STM                                                    Neuro Re-Ed 6/10            bridges 5''x20 5''x20 2x10          clamshells GTB 2x10  GTB 3x10 GTB 3x10          sidesteps 4 laps  5 laps table           Mini squats  X10  2x10                                                  Ther Ex 6/10            Piriformis s   3x20s          Hip abd/ext Standing 2x10 ea Standing 3x10 ea Std 3x10 ea          LTR 5''x20 5''x20 5\"x20          SKTC 10''x10 10''x10 10\"x10          LAQ 2x10 ea 3x10 ea            Bike  5min L 1 5' lvl 1 5'          Tra w/ SLR 2x10 R, x10 R 2x10 ea  2x10          Tra w/ hip add tl 5''x20 5''x20 5\"x20          Tra with hip abd 5''x20 GTB 5''x20 GTB 5\"x20 GTB          Ther Activity                                       Gait Training                                       Modalities                                                "

## 2024-06-23 ENCOUNTER — TELEPHONE (OUTPATIENT)
Dept: OTHER | Facility: OTHER | Age: 64
End: 2024-06-23

## 2024-06-23 DIAGNOSIS — E11.9 TYPE 2 DIABETES MELLITUS WITHOUT COMPLICATION, WITHOUT LONG-TERM CURRENT USE OF INSULIN (HCC): ICD-10-CM

## 2024-06-23 NOTE — TELEPHONE ENCOUNTER
Medication Refill Request     Name metFORMIN (GLUCOPHAGE) 500 mg tablet   Dose/Frequency 500 mg   Quantity 180 tablet   Verified pharmacy   [x]  Verified ordering Provider   [x]  Does patient have enough for the next 3 days? Yes [x] No []    Pt is leaving the country Tuesday for 2 months and will not have enough medication after Monday .   Please follow up , thank you!

## 2024-06-24 ENCOUNTER — OFFICE VISIT (OUTPATIENT)
Dept: PHYSICAL THERAPY | Facility: REHABILITATION | Age: 64
End: 2024-06-24
Payer: COMMERCIAL

## 2024-06-24 DIAGNOSIS — M54.16 LUMBAR RADICULOPATHY: Primary | ICD-10-CM

## 2024-06-24 PROCEDURE — 97110 THERAPEUTIC EXERCISES: CPT

## 2024-06-24 PROCEDURE — 97112 NEUROMUSCULAR REEDUCATION: CPT

## 2024-06-24 NOTE — PROGRESS NOTES
"Daily Note     Today's date: 2024  Patient name: Roxana Ventura  : 1960  MRN: [id number]  Referring provider: Henry Pace*  Dx:   Encounter Diagnosis     ICD-10-CM    1. Lumbar radiculopathy  M54.16           Start Time: 1040  Stop Time: 1125  Total time in clinic (min): 45 minutes    Subjective: Patient reports back is \"okay\" at start of session.       Objective: See treatment diary below      Assessment: Tolerated treatment well. Patient demonstrated fatigue post treatment and exhibited good technique with therapeutic exercises      Plan:  Patient will be away traveling. Patient given updated HEP this session with patient reporting understanding. Patient to transition to HEP and will continue to follow up with patient as appropriate.     Precautions: B TKA      Manuals 6/10 6/17 6/21 6/24         STM                                                    Neuro Re-Ed 6/10            bridges 5''x20 5''x20 2x10 2x10         clamshells GTB 2x10  GTB 3x10 GTB 3x10 GTB 3x10 ea         sidesteps 4 laps  5 laps table           Mini squats  X10  2x10                                                  Ther Ex 6/10            Piriformis s   3x20s 3x20'' ea         Hip abd/ext Standing 2x10 ea Standing 3x10 ea Std 3x10 ea Standing 3x10 ea         LTR 5''x20 5''x20 5\"x20 5''x20 ea         SKTC 10''x10 10''x10 10\"x10 10''x10 ea         LAQ 2x10 ea 3x10 ea            Bike  5min L 1 5' lvl 1 5' 5 lvl 1         Tra w/ SLR 2x10 R, x10 R 2x10 ea  2x10 2x10 ea         Tra w/ hip add tl 5''x20 5''x20 5\"x20 5''x20         Tra with hip abd 5''x20 GTB 5''x20 GTB 5\"x20 GTB 5''x20 GTB         Ther Activity                                       Gait Training                                       Modalities                                                  "

## 2024-08-26 ENCOUNTER — VBI (OUTPATIENT)
Dept: ADMINISTRATIVE | Facility: OTHER | Age: 64
End: 2024-08-26

## 2024-08-26 NOTE — TELEPHONE ENCOUNTER
08/26/24 8:12 AM     Chart reviewed for CRC: Colonoscopy ; nothing is submitted to the patient's insurance at this time.     Shania Medina   [facility]

## 2024-08-29 ENCOUNTER — OFFICE VISIT (OUTPATIENT)
Dept: FAMILY MEDICINE CLINIC | Facility: CLINIC | Age: 64
End: 2024-08-29
Payer: COMMERCIAL

## 2024-08-29 ENCOUNTER — HOSPITAL ENCOUNTER (OUTPATIENT)
Dept: MAMMOGRAPHY | Facility: HOSPITAL | Age: 64
Discharge: HOME | End: 2024-08-29
Attending: FAMILY MEDICINE
Payer: COMMERCIAL

## 2024-08-29 VITALS
HEART RATE: 65 BPM | WEIGHT: 171 LBS | DIASTOLIC BLOOD PRESSURE: 76 MMHG | BODY MASS INDEX: 30.3 KG/M2 | OXYGEN SATURATION: 99 % | SYSTOLIC BLOOD PRESSURE: 118 MMHG | HEIGHT: 63 IN

## 2024-08-29 VITALS — WEIGHT: 170 LBS | BODY MASS INDEX: 30.12 KG/M2 | HEIGHT: 63 IN

## 2024-08-29 DIAGNOSIS — G89.29 CHRONIC LEFT HIP PAIN: ICD-10-CM

## 2024-08-29 DIAGNOSIS — Z00.01 ENCOUNTER FOR GENERAL ADULT MEDICAL EXAMINATION WITH ABNORMAL FINDINGS: Primary | ICD-10-CM

## 2024-08-29 DIAGNOSIS — E78.2 MIXED HYPERLIPIDEMIA: ICD-10-CM

## 2024-08-29 DIAGNOSIS — M25.552 CHRONIC LEFT HIP PAIN: ICD-10-CM

## 2024-08-29 DIAGNOSIS — Z12.31 ENCOUNTER FOR SCREENING MAMMOGRAM FOR MALIGNANT NEOPLASM OF BREAST: ICD-10-CM

## 2024-08-29 DIAGNOSIS — E11.9 TYPE 2 DIABETES MELLITUS WITHOUT COMPLICATION, WITHOUT LONG-TERM CURRENT USE OF INSULIN (HCC): ICD-10-CM

## 2024-08-29 DIAGNOSIS — M54.16 LUMBAR RADICULOPATHY: ICD-10-CM

## 2024-08-29 DIAGNOSIS — Z12.11 COLON CANCER SCREENING: ICD-10-CM

## 2024-08-29 DIAGNOSIS — I10 ESSENTIAL HYPERTENSION: ICD-10-CM

## 2024-08-29 PROCEDURE — 77063 BREAST TOMOSYNTHESIS BI: CPT

## 2024-08-29 PROCEDURE — 99214 OFFICE O/P EST MOD 30 MIN: CPT | Performed by: FAMILY MEDICINE

## 2024-08-29 PROCEDURE — 77067 SCR MAMMO BI INCL CAD: CPT

## 2024-08-29 PROCEDURE — 99396 PREV VISIT EST AGE 40-64: CPT | Performed by: FAMILY MEDICINE

## 2024-08-29 RX ORDER — DICLOFENAC POTASSIUM 50 MG/1
50 TABLET, FILM COATED ORAL 2 TIMES DAILY
Qty: 60 TABLET | Refills: 0 | Status: SHIPPED | OUTPATIENT
Start: 2024-08-29

## 2024-08-29 RX ORDER — GABAPENTIN 300 MG/1
CAPSULE ORAL
Qty: 90 CAPSULE | Refills: 0 | Status: SHIPPED | OUTPATIENT
Start: 2024-08-29

## 2024-08-29 RX ORDER — ATENOLOL AND CHLORTHALIDONE TABLET 50; 25 MG/1; MG/1
1 TABLET ORAL DAILY
Qty: 90 TABLET | Refills: 0 | Status: SHIPPED | OUTPATIENT
Start: 2024-08-29

## 2024-08-29 NOTE — PROGRESS NOTES
Adult Annual Physical  Name: Roxana Ventura      : 1960      MRN: [id number]  Encounter Provider: Berta Francois MD  Encounter Date: 2024   Encounter department: [facility] MEDICINE    Assessment & Plan   1. Encounter for general adult medical examination with abnormal findings  2. Type 2 diabetes mellitus without complication, without long-term current use of insulin (HCC)  -     Cologuard  -     CBC and differential; Future  -     Comprehensive metabolic panel; Future  -     Hemoglobin A1C; Future  -     TSH, 3rd generation with Free T4 reflex; Future  -     Albumin / creatinine urine ratio; Future  -     metFORMIN (GLUCOPHAGE) 500 mg tablet; Take 1 tablet (500 mg total) by mouth 2 (two) times a day with meals  3. Mixed hyperlipidemia  -     Lipid panel; Future  4. Colon cancer screening  5. Lumbar radiculopathy  -     gabapentin (NEURONTIN) 300 mg capsule; Take 1 tablet at bedtime for 3 days, then 1 tablet twice daily for 3 days, then 1 tablet 3 times daily  6. Essential hypertension  -     atenolol-chlorthalidone (TENORETIC) 50-25 mg per tablet; Take 1 tablet by mouth daily  7. Chronic left hip pain  -     diclofenac potassium (CATAFLAM) 50 mg tablet; Take 1 tablet (50 mg total) by mouth 2 (two) times a day  She does not want to do repeat colonoscopy and prefers to do a Cologuard instead.  She has a history of 5 mm polyp 5 years ago and surveillance was recommended at 5 years.  She denies any other symptoms  Recommend Diclofenac for hip pain.  Continue current medications.  Gabapentin at bedtime.  Recommend labs as above.  Diabetic diet, daily diabetic foot exams.  She does not want any immunizations.  Immunizations and preventive care screenings were discussed with patient today. Appropriate education was printed on patient's after visit summary.    Counseling:  Alcohol/drug use: discussed moderation in alcohol intake, the recommendations for healthy alcohol use, and  avoidance of illicit drug use.  Dental Health: discussed importance of regular tooth brushing, flossing, and dental visits.  Injury prevention: discussed safety/seat belts, safety helmets, smoke detectors, carbon dioxide detectors, and smoking near bedding or upholstery.  Sexual health: discussed sexually transmitted diseases, partner selection, use of condoms, avoidance of unintended pregnancy, and contraceptive alternatives.  Exercise: the importance of regular exercise/physical activity was discussed. Recommend exercise 3-5 times per week for at least 30 minutes.          History of Present Illness     Adult Annual Physical:  Patient presents for annual physical.     Diet and Physical Activity:  - Diet/Nutrition: well balanced diet and consuming 3-5 servings of fruits/vegetables daily.  - Exercise: moderate cardiovascular exercise.    Depression Screening:  - PHQ-2 Score: 0    General Health:  - Sleep: sleeps well.  - Hearing: normal hearing bilateral ears.  - Vision: goes for regular eye exams.  - Dental: regular dental visits.    /GYN Health:    - Menopause: postmenopausal.         Review of Systems   Constitutional:  Negative for fatigue and fever.   HENT:  Negative for congestion, facial swelling, mouth sores, rhinorrhea, sore throat and trouble swallowing.    Eyes:  Negative for pain and redness.   Respiratory:  Negative for cough, shortness of breath and wheezing.    Cardiovascular:  Negative for chest pain, palpitations and leg swelling.   Gastrointestinal:  Negative for abdominal pain, blood in stool, constipation, diarrhea and nausea.   Genitourinary:  Negative for dysuria, hematuria and urgency.   Musculoskeletal:  Negative for arthralgias, back pain and myalgias.   Skin:  Negative for rash and wound.   Neurological:  Negative for seizures, syncope and headaches.   Hematological:  Negative for adenopathy.   Psychiatric/Behavioral:  Negative for agitation and behavioral problems.      Medical History  "Reviewed by provider this encounter:  Tobacco  Allergies  Meds  Problems  Med Hx  Surg Hx  Fam Hx       Current Outpatient Medications on File Prior to Visit   Medication Sig Dispense Refill    glucose blood (OneTouch Verio) test strip Check Blood Sugar 2 times daily 200 each 1    Lancets (OneTouch Delica Plus [id number]) MISC Use 1 each 2 (two) times a day 200 each 1    pravastatin (PRAVACHOL) 40 mg tablet Take 1 tablet (40 mg total) by mouth daily at bedtime 90 tablet 3    [DISCONTINUED] atenolol-chlorthalidone (TENORETIC) 50-25 mg per tablet Take 1 tablet by mouth daily 90 tablet 0    [DISCONTINUED] gabapentin (NEURONTIN) 300 mg capsule Take 1 tablet at bedtime for 3 days, then 1 tablet twice daily for 3 days, then 1 tablet 3 times daily 90 capsule 0    [DISCONTINUED] metFORMIN (GLUCOPHAGE) 500 mg tablet TAKE 1 TABLET(500 MG) BY MOUTH TWICE DAILY WITH MEALS 180 tablet 1    [DISCONTINUED] diclofenac potassium (CATAFLAM) 50 mg tablet Take 1 tablet (50 mg total) by mouth 2 (two) times a day (Patient not taking: Reported on 5/17/2024) 60 tablet 0    [DISCONTINUED] methylPREDNISolone 4 MG tablet therapy pack Use as directed on package (Patient not taking: Reported on 5/17/2024) 21 each 0     No current facility-administered medications on file prior to visit.      Social History     Tobacco Use    Smoking status: Every Day     Current packs/day: 0.00     Average packs/day: 0.3 packs/day for 45.0 years (11.3 ttl pk-yrs)     Types: Cigarettes     Start date: 12/1/1975     Last attempt to quit: 12/1/2020     Years since quitting: 3.7    Smokeless tobacco: Never   Vaping Use    Vaping status: Never Used   Substance and Sexual Activity    Alcohol use: Not Currently     Comment: Socially    Drug use: Never    Sexual activity: Yes       Objective     /76 (BP Location: Left arm, Patient Position: Sitting, Cuff Size: Standard)   Pulse 65   Ht 5' 3\" (1.6 m)   Wt 77.6 kg (171 lb)   SpO2 99%   BMI 30.29 kg/m² "     Physical Exam  Vitals and nursing note reviewed.   Constitutional:       Appearance: She is well-developed.   HENT:      Head: Normocephalic and atraumatic.      Right Ear: Tympanic membrane, ear canal and external ear normal.      Left Ear: Tympanic membrane, ear canal and external ear normal.      Nose: Nose normal.      Mouth/Throat:      Pharynx: No oropharyngeal exudate.   Eyes:      General: No scleral icterus.        Right eye: No discharge.         Left eye: No discharge.      Conjunctiva/sclera: Conjunctivae normal.      Pupils: Pupils are equal, round, and reactive to light.   Neck:      Thyroid: No thyromegaly.   Cardiovascular:      Rate and Rhythm: Normal rate and regular rhythm.      Pulses: no weak pulses.           Dorsalis pedis pulses are 2+ on the right side and 2+ on the left side.      Heart sounds: No murmur heard.     No gallop.   Pulmonary:      Effort: Pulmonary effort is normal. No respiratory distress.      Breath sounds: Normal breath sounds. No wheezing or rales.   Abdominal:      Palpations: Abdomen is soft.      Tenderness: There is no abdominal tenderness.   Musculoskeletal:         General: No tenderness or deformity.      Cervical back: Normal range of motion.      Right lower leg: No edema.      Left lower leg: No edema.   Feet:      Right foot:      Skin integrity: No ulcer, skin breakdown, erythema, warmth, callus or dry skin.      Left foot:      Skin integrity: No ulcer, skin breakdown, erythema, warmth, callus or dry skin.   Lymphadenopathy:      Cervical: No cervical adenopathy.   Skin:     General: Skin is warm.      Capillary Refill: Capillary refill takes less than 2 seconds.      Findings: No erythema or rash.   Neurological:      Mental Status: She is alert and oriented to person, place, and time.      Deep Tendon Reflexes: Reflexes normal.   Psychiatric:         Behavior: Behavior normal.         Thought Content: Thought content normal.         Judgment: Judgment  normal.         Patient's shoes and socks removed.    Right Foot/Ankle   Right Foot Inspection  Skin Exam: skin normal. Skin not intact, no dry skin, no warmth, no callus, no erythema, no maceration, no abnormal color, no pre-ulcer, no ulcer and no callus.     Toe Exam: ROM and strength within normal limits.     Sensory   Monofilament testing: intact    Vascular  Capillary refills: < 3 seconds  The right DP pulse is 2+.     Right Toe  - Comprehensive Exam  Ecchymosis: none  Swelling: none   Tenderness: none       Left Foot/Ankle  Left Foot Inspection  Skin Exam: skin normal. Skin not intact, no dry skin, no warmth, no erythema, no maceration, normal color, no pre-ulcer, no ulcer and no callus.     Toe Exam: ROM and strength within normal limits.     Sensory   Monofilament testing: intact    Vascular  Capillary refills: < 3 seconds  The left DP pulse is 2+.     Left Toe  - Comprehensive Exam  Ecchymosis: none  Swelling: none   Tenderness: none       Assign Risk Category  No deformity present  No loss of protective sensation  No weak pulses  Risk: 0

## 2024-09-10 ENCOUNTER — TELEPHONE (OUTPATIENT)
Dept: PAIN MEDICINE | Facility: CLINIC | Age: 64
End: 2024-09-10

## 2024-09-10 NOTE — TELEPHONE ENCOUNTER
Called pt and spoke with her daughter moo. She will speak to her mom to see if she wants to r/s now or hold off, will call me back to advise

## 2024-09-12 ENCOUNTER — TELEPHONE (OUTPATIENT)
Age: 64
End: 2024-09-12

## 2024-09-12 DIAGNOSIS — Z12.11 COLON CANCER SCREENING: Primary | ICD-10-CM

## 2024-09-12 NOTE — TELEPHONE ENCOUNTER
Calling to inform doctor that the insurance is not recognizing the color guard diagnosis as a proper ICD code for colorectal screening code.  Hilaria Mccormack

## 2024-09-13 ENCOUNTER — OFFICE VISIT (OUTPATIENT)
Age: 64
End: 2024-09-13
Payer: COMMERCIAL

## 2024-09-13 VITALS — WEIGHT: 171 LBS | BODY MASS INDEX: 30.3 KG/M2 | HEIGHT: 63 IN

## 2024-09-13 DIAGNOSIS — G89.29 CHRONIC BILATERAL LOW BACK PAIN, UNSPECIFIED WHETHER SCIATICA PRESENT: ICD-10-CM

## 2024-09-13 DIAGNOSIS — M54.50 CHRONIC BILATERAL LOW BACK PAIN, UNSPECIFIED WHETHER SCIATICA PRESENT: ICD-10-CM

## 2024-09-13 DIAGNOSIS — M99.05 SEGMENTAL DYSFUNCTION OF PELVIC REGION: Primary | ICD-10-CM

## 2024-09-13 DIAGNOSIS — M54.16 LUMBAR RADICULOPATHY: ICD-10-CM

## 2024-09-13 DIAGNOSIS — M99.04 SEGMENTAL DYSFUNCTION OF SACRAL REGION: ICD-10-CM

## 2024-09-13 DIAGNOSIS — M99.03 SEGMENTAL DYSFUNCTION OF LUMBAR REGION: ICD-10-CM

## 2024-09-13 PROCEDURE — 98941 CHIROPRACT MANJ 3-4 REGIONS: CPT | Performed by: CHIROPRACTOR

## 2024-09-13 PROCEDURE — 99203 OFFICE O/P NEW LOW 30 MIN: CPT | Performed by: CHIROPRACTOR

## 2024-09-25 ENCOUNTER — PROCEDURE VISIT (OUTPATIENT)
Age: 64
End: 2024-09-25
Payer: COMMERCIAL

## 2024-09-25 VITALS — HEIGHT: 63 IN | WEIGHT: 171 LBS | BODY MASS INDEX: 30.3 KG/M2

## 2024-09-25 DIAGNOSIS — M99.03 SEGMENTAL DYSFUNCTION OF LUMBAR REGION: ICD-10-CM

## 2024-09-25 DIAGNOSIS — M51.9 LUMBAR DISC DISORDER: ICD-10-CM

## 2024-09-25 DIAGNOSIS — M99.04 SEGMENTAL DYSFUNCTION OF SACRAL REGION: ICD-10-CM

## 2024-09-25 DIAGNOSIS — M54.16 LUMBAR RADICULOPATHY: Primary | ICD-10-CM

## 2024-09-25 DIAGNOSIS — M99.02 SEGMENTAL DYSFUNCTION OF THORACIC REGION: ICD-10-CM

## 2024-09-25 PROCEDURE — 98941 CHIROPRACT MANJ 3-4 REGIONS: CPT | Performed by: CHIROPRACTOR

## 2024-09-25 NOTE — PROGRESS NOTES
Initial date of service: 9/25/24    Diagnoses and all orders for this visit:    Lumbar radiculopathy    Lumbar disc disorder    Segmental dysfunction of sacral region    Segmental dysfunction of lumbar region    Segmental dysfunction of thoracic region    Pt responded well to extension biased stretches and mobilization today; reviewed HEP    TREATMENT: 15038  Ther-ex: IASTM to affected mm hypertonicities (discussed soreness/ecchymosis up to 36 hrs post procedure); prone on elbows, prone push-ups at shoulders, standing lumbopelvic extension, hip flexor ischemic compression, alternating prone hip extension, glute bridge, transitional mvmt education, abdominal bracing; Thoracic mobilization/manipulation: prone P-A mob, supine A-P manip; Lumbar mobilization/manipulation: extension-traction; SIJ Manipulation/Mobilization: L SIJ HVLA - long axis distraction    DUTCH Ventura is a 64 y.o. female  Chief Complaint   Patient presents with    Back Pain     Low back and sciatic pain left side, also has some pain in her thoracic area about an 8      Back Pain  This is a chronic problem. The current episode started more than 1 year ago. The problem occurs daily. The problem has been waxing and waning since onset. The pain is present in the gluteal, lumbar spine, sacro-iliac and thoracic spine. The quality of the pain is described as burning and aching. The pain radiates to the left thigh, left knee and left foot. The pain is Worse during the day. The symptoms are aggravated by sitting, standing, twisting and bending (palliative includes laying prone). Pertinent negatives include no bladder incontinence or bowel incontinence.     Past Medical History:   Diagnosis Date    Arthritis     Diabetes mellitus (HCC)     Hyperlipidemia     Hypertension       Past Surgical History:   Procedure Laterality Date    COLONOSCOPY      NH ARTHRP KNE CONDYLE&PLATU MEDIAL&LAT COMPARTMENTS Right 03/30/2021    Procedure: KNEE TOTAL  ARTHROPLASTY;  Surgeon: Ryan Bush DO;  [facility];  Service: Orthopedics    NE ARTHRP KNE CONDYLE&PLATU MEDIAL&LAT COMPARTMENTS Left 1/10/2023    Procedure: ARTHROPLASTY KNEE TOTAL;  Surgeon: Ryan Bush DO;  Location[facility];  Service: Orthopedics    NE MANIPULATION KNEE JOINT UNDER GENERAL ANESTHESIA Right 06/17/2021    Procedure: MANIPULATION JOINT KNEE;  Surgeon: Ryan Bush DO;  Location: [facility];  Service: Orthopedics    TUBAL LIGATION       The following portions of the patient's history were reviewed and updated as appropriate: allergies, past family history, past medical history, past social history, past surgical history, and problem list.  Review of Systems   Gastrointestinal:  Negative for bowel incontinence.   Genitourinary:  Negative for bladder incontinence.   Musculoskeletal:  Positive for back pain.     Physical Exam  Musculoskeletal:      Thoracic back: Spasms and tenderness present. Decreased range of motion.      Lumbar back: Spasms and tenderness present. Decreased range of motion. Positive left straight leg raise test.        Back:       Comments: Pnful and limited in fl, peripheralizes, centralizes in ext       SOFT TISSUE ASSESSMENT: Hypertonicity and tenderness palpated L T11-S1 erector spinae, hip flexor, glute med/min, QL JOINT RESTRICTIONS: T11-S1 and L SIJ ORTHO: SI jt point tenderness: +; Nemo repeated flexion peripheralizes, extension centralizes; ramona's, iliac compression, thigh thrust elicit stiffness in L SIJ; prone femoral nerve stretch neg for upper lumbar neural tension, elicits L SIJ stiffness; sitting root elicits lbp on R/L; slump test elicits neural tension into LLE    Return in about 1 week (around 10/2/2024) for Next scheduled follow up.

## 2024-09-26 ENCOUNTER — HOSPITAL ENCOUNTER (EMERGENCY)
Facility: HOSPITAL | Age: 64
Discharge: HOME | End: 2024-09-26
Attending: EMERGENCY MEDICINE
Payer: COMMERCIAL

## 2024-09-26 ENCOUNTER — APPOINTMENT (EMERGENCY)
Dept: CT IMAGING | Facility: HOSPITAL | Age: 64
End: 2024-09-26
Payer: COMMERCIAL

## 2024-09-26 ENCOUNTER — APPOINTMENT (EMERGENCY)
Dept: RADIOLOGY | Facility: HOSPITAL | Age: 64
End: 2024-09-26
Payer: COMMERCIAL

## 2024-09-26 VITALS
OXYGEN SATURATION: 99 % | TEMPERATURE: 97.3 F | DIASTOLIC BLOOD PRESSURE: 76 MMHG | SYSTOLIC BLOOD PRESSURE: 140 MMHG | HEART RATE: 67 BPM | RESPIRATION RATE: 19 BRPM | HEIGHT: 63 IN | BODY MASS INDEX: 30.29 KG/M2

## 2024-09-26 DIAGNOSIS — R42 DIZZINESS: Primary | ICD-10-CM

## 2024-09-26 DIAGNOSIS — E87.6 HYPOKALEMIA: ICD-10-CM

## 2024-09-26 LAB
2HR DELTA HS TROPONIN: 0 NG/L
ALBUMIN SERPL BCG-MCNC: 4.3 G/DL (ref 3.5–5)
ALP SERPL-CCNC: 52 U/L (ref 34–104)
ALT SERPL W P-5'-P-CCNC: 20 U/L (ref 7–52)
ANION GAP SERPL CALCULATED.3IONS-SCNC: 11 MMOL/L (ref 4–13)
APTT PPP: 27 SECONDS (ref 23–34)
AST SERPL W P-5'-P-CCNC: 21 U/L (ref 13–39)
BASOPHILS # BLD AUTO: 0.03 THOUSANDS/ΜL (ref 0–0.1)
BASOPHILS NFR BLD AUTO: 1 % (ref 0–1)
BILIRUB SERPL-MCNC: 0.3 MG/DL (ref 0.2–1)
BNP SERPL-MCNC: 16 PG/ML (ref 0–100)
BUN SERPL-MCNC: 20 MG/DL (ref 5–25)
CALCIUM SERPL-MCNC: 9.7 MG/DL (ref 8.4–10.2)
CARDIAC TROPONIN I PNL SERPL HS: 3 NG/L
CARDIAC TROPONIN I PNL SERPL HS: 3 NG/L
CHLORIDE SERPL-SCNC: 102 MMOL/L (ref 96–108)
CO2 SERPL-SCNC: 24 MMOL/L (ref 21–32)
CREAT SERPL-MCNC: 0.87 MG/DL (ref 0.6–1.3)
EOSINOPHIL # BLD AUTO: 0.07 THOUSAND/ΜL (ref 0–0.61)
EOSINOPHIL NFR BLD AUTO: 1 % (ref 0–6)
ERYTHROCYTE [DISTWIDTH] IN BLOOD BY AUTOMATED COUNT: 12.2 % (ref 11.6–15.1)
FLUAV RNA RESP QL NAA+PROBE: NEGATIVE
FLUBV RNA RESP QL NAA+PROBE: NEGATIVE
GFR SERPL CREATININE-BSD FRML MDRD: 71 ML/MIN/1.73SQ M
GLUCOSE SERPL-MCNC: 184 MG/DL (ref 65–140)
GLUCOSE SERPL-MCNC: 197 MG/DL (ref 65–140)
HCT VFR BLD AUTO: 42.4 % (ref 34.8–46.1)
HGB BLD-MCNC: 14.1 G/DL (ref 11.5–15.4)
IMM GRANULOCYTES # BLD AUTO: 0.01 THOUSAND/UL (ref 0–0.2)
IMM GRANULOCYTES NFR BLD AUTO: 0 % (ref 0–2)
INR PPP: 0.97 (ref 0.85–1.19)
LYMPHOCYTES # BLD AUTO: 3.91 THOUSANDS/ΜL (ref 0.6–4.47)
LYMPHOCYTES NFR BLD AUTO: 63 % (ref 14–44)
MAGNESIUM SERPL-MCNC: 2 MG/DL (ref 1.9–2.7)
MCH RBC QN AUTO: 31.7 PG (ref 26.8–34.3)
MCHC RBC AUTO-ENTMCNC: 33.3 G/DL (ref 31.4–37.4)
MCV RBC AUTO: 95 FL (ref 82–98)
MONOCYTES # BLD AUTO: 0.37 THOUSAND/ΜL (ref 0.17–1.22)
MONOCYTES NFR BLD AUTO: 6 % (ref 4–12)
NEUTROPHILS # BLD AUTO: 1.77 THOUSANDS/ΜL (ref 1.85–7.62)
NEUTS SEG NFR BLD AUTO: 29 % (ref 43–75)
NRBC BLD AUTO-RTO: 0 /100 WBCS
PLATELET # BLD AUTO: 262 THOUSANDS/UL (ref 149–390)
PMV BLD AUTO: 9.9 FL (ref 8.9–12.7)
POTASSIUM SERPL-SCNC: 3.3 MMOL/L (ref 3.5–5.3)
PROT SERPL-MCNC: 6.8 G/DL (ref 6.4–8.4)
PROTHROMBIN TIME: 13.3 SECONDS (ref 12.3–15)
RBC # BLD AUTO: 4.45 MILLION/UL (ref 3.81–5.12)
RSV RNA RESP QL NAA+PROBE: NEGATIVE
SARS-COV-2 RNA RESP QL NAA+PROBE: NEGATIVE
SODIUM SERPL-SCNC: 137 MMOL/L (ref 135–147)
WBC # BLD AUTO: 6.16 THOUSAND/UL (ref 4.31–10.16)

## 2024-09-26 PROCEDURE — 96361 HYDRATE IV INFUSION ADD-ON: CPT

## 2024-09-26 PROCEDURE — 99285 EMERGENCY DEPT VISIT HI MDM: CPT

## 2024-09-26 PROCEDURE — 96360 HYDRATION IV INFUSION INIT: CPT

## 2024-09-26 PROCEDURE — 93005 ELECTROCARDIOGRAM TRACING: CPT

## 2024-09-26 PROCEDURE — 71045 X-RAY EXAM CHEST 1 VIEW: CPT

## 2024-09-26 PROCEDURE — 99285 EMERGENCY DEPT VISIT HI MDM: CPT | Performed by: EMERGENCY MEDICINE

## 2024-09-26 PROCEDURE — 83880 ASSAY OF NATRIURETIC PEPTIDE: CPT | Performed by: EMERGENCY MEDICINE

## 2024-09-26 PROCEDURE — 85730 THROMBOPLASTIN TIME PARTIAL: CPT | Performed by: EMERGENCY MEDICINE

## 2024-09-26 PROCEDURE — 80053 COMPREHEN METABOLIC PANEL: CPT | Performed by: EMERGENCY MEDICINE

## 2024-09-26 PROCEDURE — 0241U HB NFCT DS VIR RESP RNA 4 TRGT: CPT | Performed by: EMERGENCY MEDICINE

## 2024-09-26 PROCEDURE — 83735 ASSAY OF MAGNESIUM: CPT | Performed by: EMERGENCY MEDICINE

## 2024-09-26 PROCEDURE — 85025 COMPLETE CBC W/AUTO DIFF WBC: CPT | Performed by: EMERGENCY MEDICINE

## 2024-09-26 PROCEDURE — 70450 CT HEAD/BRAIN W/O DYE: CPT

## 2024-09-26 PROCEDURE — 84484 ASSAY OF TROPONIN QUANT: CPT | Performed by: EMERGENCY MEDICINE

## 2024-09-26 PROCEDURE — 85610 PROTHROMBIN TIME: CPT | Performed by: EMERGENCY MEDICINE

## 2024-09-26 PROCEDURE — 36415 COLL VENOUS BLD VENIPUNCTURE: CPT | Performed by: EMERGENCY MEDICINE

## 2024-09-26 PROCEDURE — 82948 REAGENT STRIP/BLOOD GLUCOSE: CPT

## 2024-09-26 RX ORDER — POTASSIUM CHLORIDE 1500 MG/1
40 TABLET, EXTENDED RELEASE ORAL ONCE
Status: COMPLETED | OUTPATIENT
Start: 2024-09-26 | End: 2024-09-26

## 2024-09-26 RX ADMIN — SODIUM CHLORIDE 1000 ML: 0.9 INJECTION, SOLUTION INTRAVENOUS at 19:41

## 2024-09-26 RX ADMIN — POTASSIUM CHLORIDE 40 MEQ: 1500 TABLET, EXTENDED RELEASE ORAL at 19:56

## 2024-09-26 NOTE — ED PROVIDER NOTES
Final diagnoses:   Dizziness   Hypokalemia     ED Disposition       ED Disposition   Discharge    Condition   Stable    Date/Time   u Sep 26, 2024 10:18 PM    Comment   Roxana Ventura discharge to home/self care.                   Assessment & Plan       Medical Decision Making  Patient is a 64-year-old female seen in the emergency department with concern for dizziness/lightheadedness.  Fingerstick blood glucose was elevated at 184. EKG was obtained and noted, and showed no evidence of arrhythmia.  CT head showed no acute intracranial abnormality.  Chest x-ray showed no infiltrate or pneumothorax.  COVID-19/influenza/RSV swab was ordered in the emergency department, and these tests were negative.  Patient was treated with IV fluids, with good effect.  Laboratory evaluation remarkable for low potassium of 3.3, elevated glucose of 197, serial troponins of 3, 3. No definite cause of the patient's symptoms was discovered.  Evaluation is not consistent with CVA, arrhythmia, ACS, CHF, acute anemia. Patient's symptoms might be due to vasovagal episode.  Plan to have patient follow up with PCP/outpatient providers.  Patient stable for discharge home.  Discharge instructions were reviewed with patient.    Problems Addressed:  Dizziness: acute illness or injury    Amount and/or Complexity of Data Reviewed  Labs: ordered. Decision-making details documented in ED Course.  Radiology: ordered and independent interpretation performed. Decision-making details documented in ED Course.  ECG/medicine tests: ordered and independent interpretation performed. Decision-making details documented in ED Course.    Risk  Prescription drug management.      Results Reviewed       Procedure Component Value Units Date/Time    HS Troponin I 2hr [[id number]]  (Normal) Collected: 09/26/24 2144    Lab Status: Final result Specimen: Blood from Arm, Left Updated: 09/26/24 2211     hs TnI 2hr 3 ng/L      Delta 2hr hsTnI 0 ng/L     COVID19,  Influenza A/B, RSV PCR, [facility] [[id number]]  (Normal) Collected: 09/26/24 1915    Lab Status: Final result Specimen: Nares from Nose Updated: 09/26/24 2029     SARS-CoV-2 Negative     INFLUENZA A PCR Negative     INFLUENZA B PCR Negative     RSV PCR Negative    Narrative:      This test has been performed using the CoV-2/Flu/RSV plus assay on the [employer] GeneXpert platform. This test has been validated by the  and verified by the performing laboratory.     This test is designed to amplify and detect the following: nucleocapsid (N), envelope (E), and RNA-dependent RNA polymerase (RdRP) genes of the SARS-CoV-2 genome; matrix (M), basic polymerase (PB2), and acidic protein (PA) segments of the influenza A genome; matrix (M) and non-structural protein (NS) segments of the influenza B genome, and the nucleocapsid genes of RSV A and RSV B.     Positive results are indicative of the presence of Flu A, Flu B, RSV, and/or SARS-CoV-2 RNA. Positive results for SARS-CoV-2 or suspected novel influenza should be reported to state, local, or federal health departments according to local reporting requirements.      All results should be assessed in conjunction with clinical presentation and other laboratory markers for clinical management.     FOR PEDIATRIC PATIENTS - copy/paste COVID Guidelines URL to browser: [email or web address]       HS Troponin I 4hr [[id number]]     Lab Status: No result Specimen: Blood     B-Type Natriuretic Peptide(BNP) [[id number]]  (Normal) Collected: 09/26/24 1915    Lab Status: Final result Specimen: Blood from Arm, Left Updated: 09/26/24 1945     BNP 16 pg/mL     HS Troponin 0hr (reflex protocol) [[id number]]  (Normal) Collected: 09/26/24 1915    Lab Status: Final result Specimen: Blood from Arm, Left Updated: 09/26/24 1943     hs TnI 0hr 3 ng/L     Comprehensive metabolic panel [[id number]]  (Abnormal) Collected: 09/26/24 1915    Lab Status:  Final result Specimen: Blood from Arm, Left Updated: 09/26/24 1938     Sodium 137 mmol/L      Potassium 3.3 mmol/L      Chloride 102 mmol/L      CO2 24 mmol/L      ANION GAP 11 mmol/L      BUN 20 mg/dL      Creatinine 0.87 mg/dL      Glucose 197 mg/dL      Calcium 9.7 mg/dL      AST 21 U/L      ALT 20 U/L      Alkaline Phosphatase 52 U/L      Total Protein 6.8 g/dL      Albumin 4.3 g/dL      Total Bilirubin 0.30 mg/dL      eGFR 71 ml/min/1.73sq m     Narrative:      National Kidney Disease Foundation guidelines for Chronic Kidney Disease (CKD):     Stage 1 with normal or high GFR (GFR > 90 mL/min/1.73 square meters)    Stage 2 Mild CKD (GFR = 60-89 mL/min/1.73 square meters)    Stage 3A Moderate CKD (GFR = 45-59 mL/min/1.73 square meters)    Stage 3B Moderate CKD (GFR = 30-44 mL/min/1.73 square meters)    Stage 4 Severe CKD (GFR = 15-29 mL/min/1.73 square meters)    Stage 5 End Stage CKD (GFR <15 mL/min/1.73 square meters)  Note: GFR calculation is accurate only with a steady state creatinine    Magnesium [[id number]]  (Normal) Collected: 09/26/24 1915    Lab Status: Final result Specimen: Blood from Arm, Left Updated: 09/26/24 1938     Magnesium 2.0 mg/dL     APTT [[id number]]  (Normal) Collected: 09/26/24 1915    Lab Status: Final result Specimen: Blood from Arm, Left Updated: 09/26/24 1929     PTT 27 seconds     Protime-INR [[id number]]  (Normal) Collected: 09/26/24 1915    Lab Status: Final result Specimen: Blood from Arm, Left Updated: 09/26/24 1929     Protime 13.3 seconds      INR 0.97    Narrative:      INR Therapeutic Range    Indication                                             INR Range      Atrial Fibrillation                                               2.0-3.0  Hypercoagulable State                                    2.0.2.3  Left Ventricular Asist Device                            2.0-3.0  Mechanical Heart Valve                                  -    Aortic(with afib, MI, embolism, HF, LA  enlargement,    and/or coagulopathy)                                     2.0-3.0 (2.5-3.5)     Mitral                                                             2.5-3.5  Prosthetic/Bioprosthetic Heart Valve               2.0-3.0  Venous thromboembolism (VTE: VT, PE        2.0-3.0    CBC and differential [[id number]]  (Abnormal) Collected: 09/26/24 1915    Lab Status: Final result Specimen: Blood from Arm, Left Updated: 09/26/24 1920     WBC 6.16 Thousand/uL      RBC 4.45 Million/uL      Hemoglobin 14.1 g/dL      Hematocrit 42.4 %      MCV 95 fL      MCH 31.7 pg      MCHC 33.3 g/dL      RDW 12.2 %      MPV 9.9 fL      Platelets 262 Thousands/uL      nRBC 0 /100 WBCs      Segmented % 29 %      Immature Grans % 0 %      Lymphocytes % 63 %      Monocytes % 6 %      Eosinophils Relative 1 %      Basophils Relative 1 %      Absolute Neutrophils 1.77 Thousands/µL      Absolute Immature Grans 0.01 Thousand/uL      Absolute Lymphocytes 3.91 Thousands/µL      Absolute Monocytes 0.37 Thousand/µL      Eosinophils Absolute 0.07 Thousand/µL      Basophils Absolute 0.03 Thousands/µL     Fingerstick Glucose (POCT) [[id number]]  (Abnormal) Collected: 09/26/24 1903    Lab Status: Final result Specimen: Blood Updated: 09/26/24 1904     POC Glucose 184 mg/dl             ED Course as of 09/26/24 2235   Thu Sep 26, 2024 2054 CT BRAIN - WITHOUT CONTRAST     INDICATION:   dizziness.     COMPARISON:  None.     TECHNIQUE:  CT examination of the brain was performed.  Multiplanar 2D reformatted images were created from the source data.     Radiation dose length product (DLP) for this visit:  742.6 mGy-cm .  This examination, like all CT scans performed in the [facility], was performed utilizing techniques to minimize radiation dose exposure, including the use of iterative   reconstruction and automated exposure control.     IMAGE QUALITY:  Diagnostic.     FINDINGS:     PARENCHYMA:  No intracranial mass, mass effect or midline  shift. No CT signs of acute infarction.  No acute parenchymal hemorrhage.     VENTRICLES AND EXTRA-AXIAL SPACES:  Normal for the patient's age.     VISUALIZED ORBITS: Normal visualized orbits.     PARANASAL SINUSES: Normal visualized paranasal sinuses.     CALVARIUM AND EXTRACRANIAL SOFT TISSUES:  Normal.     IMPRESSION:     No acute intracranial abnormality.               Medications   sodium chloride 0.9 % bolus 1,000 mL (0 mL Intravenous Stopped 9/26/24 2145)   potassium chloride (Klor-Con M20) CR tablet 40 mEq (40 mEq Oral Given 9/26/24 1956)       ED Risk Strat Scores   HEART Risk Score      Flowsheet Row Most Recent Value   Heart Score Risk Calculator    History 0 Filed at: 09/26/2024 2231   ECG 1 Filed at: 09/26/2024 2231   Age 1 Filed at: 09/26/2024 2231   Risk Factors 2 Filed at: 09/26/2024 2231   Troponin 0 Filed at: 09/26/2024 2231   HEART Score 4 Filed at: 09/26/2024 2231                               SBIRT 22yo+      Flowsheet Row Most Recent Value   Initial Alcohol Screen: US AUDIT-C     1. How often do you have a drink containing alcohol? 0 Filed at: 09/26/2024 1906   Audit-C Score 0 Filed at: 09/26/2024 1906   ZUNILDA: How many times in the past year have you...    Used an illegal drug or used a prescription medication for non-medical reasons? Never Filed at: 09/26/2024 1906                            History of Present Illness       Chief Complaint   Patient presents with    Dizziness     Arrives via EMS for evaluation of dizziness; pt's daughter reports it started approx 1 hour pta when pt went outside and sat down to smoke a cigarette. Denies any chest pain.       Past Medical History:   Diagnosis Date    Arthritis     Diabetes mellitus (HCC)     Hyperlipidemia     Hypertension       Past Surgical History:   Procedure Laterality Date    COLONOSCOPY      ME ARTHRP KNE CONDYLE&PLATU MEDIAL&LAT COMPARTMENTS Right 03/30/2021    Procedure: KNEE TOTAL ARTHROPLASTY;  Surgeon: Ryan Bush DO;  Location: [facility];  Service: Orthopedics    OK ARTHRP KNE CONDYLE&PLATU MEDIAL&LAT COMPARTMENTS Left 1/10/2023    Procedure: ARTHROPLASTY KNEE TOTAL;  Surgeon: Ryan Bush DO;  Location[facility];  Service: Orthopedics    OK MANIPULATION KNEE JOINT UNDER GENERAL ANESTHESIA Right 06/17/2021    Procedure: MANIPULATION JOINT KNEE;  Surgeon: Ryan Bush DO;  Location: [facility];  Service: Orthopedics    TUBAL LIGATION        Family History   Problem Relation Age of Onset    Arthritis Mother     Uterine cancer Mother     Arthritis Father     Bone cancer Father     Breast cancer Sister 45    No Known Problems Sister     No Known Problems Sister     No Known Problems Sister     No Known Problems Sister     No Known Problems Sister     No Known Problems Daughter     No Known Problems Daughter     No Known Problems Daughter     Prostate cancer Maternal Grandfather     No Known Problems Maternal Aunt     No Known Problems Maternal Aunt     No Known Problems Maternal Aunt     No Known Problems Maternal Aunt     No Known Problems Maternal Aunt     No Known Problems Paternal Aunt     No Known Problems Paternal Aunt     No Known Problems Paternal Aunt     No Known Problems Paternal Aunt     Breast cancer Cousin     Breast cancer Cousin       Social History     Tobacco Use    Smoking status: Every Day     Current packs/day: 0.00     Average packs/day: 0.3 packs/day for 45.0 years (11.3 ttl pk-yrs)     Types: Cigarettes     Start date: 12/1/1975     Last attempt to quit: 12/1/2020     Years since quitting: 3.8    Smokeless tobacco: Never   Vaping Use    Vaping status: Never Used   Substance Use Topics    Alcohol use: Not Currently     Comment: Socially    Drug use: Never      E-Cigarette/Vaping    E-Cigarette Use Never User       E-Cigarette/Vaping Substances    Nicotine No     THC No     CBD No     Flavoring No     Other No     Unknown No       I have reviewed and agree with the history as documented.     Patient is a 64-year-old  "female seen in the emergency department with concern for episode of dizziness/lightheadedness which began this evening when the patient went outdoors to smoke a cigarette this evening.  Patient states that she felt like everything \"went black\".  Patient notes no fever, chest pain, shortness of breath, abdominal pain, vomiting, diarrhea, weakness, numbness, tingling.  Patient is feeling better in the emergency department.  Patient notes no other definite clear exacerbating or alleviating factors for her symptoms.  Patient states that she has been dealing with pain from left-sided sciatica over the past several days.  Patient states that she has been taking Tylenol at home for pain control.        Review of Systems   Constitutional:  Negative for chills and fever.   HENT:  Negative for ear pain and sore throat.    Eyes:  Negative for pain and visual disturbance.   Respiratory:  Negative for cough and shortness of breath.    Cardiovascular:  Negative for chest pain and palpitations.   Gastrointestinal:  Negative for abdominal pain and vomiting.   Genitourinary:  Negative for decreased urine volume and difficulty urinating.   Musculoskeletal:  Negative for neck pain and neck stiffness.   Skin:  Negative for color change and rash.   Neurological:  Positive for dizziness and light-headedness. Negative for seizures and weakness.   Psychiatric/Behavioral:  Negative for agitation and confusion.    All other systems reviewed and are negative.          Objective       ED Triage Vitals   Temperature Pulse Blood Pressure Respirations SpO2 Patient Position - Orthostatic VS   09/26/24 1903 09/26/24 1903 09/26/24 1905 09/26/24 1903 09/26/24 1903 09/26/24 1905   (!) 97.3 °F (36.3 °C) 70 106/61 18 100 % Lying      Temp Source Heart Rate Source BP Location FiO2 (%) Pain Score    09/26/24 1903 09/26/24 1903 09/26/24 1905 -- 09/26/24 1903    Oral Monitor Left arm  No Pain      Vitals      Date and Time Temp Pulse SpO2 Resp BP Pain Score " FACES Pain Rating User   09/26/24 2145 -- 67 99 % 19 140/76 -- -- AF   09/26/24 1915 -- -- -- -- -- 6 -- VA   09/26/24 1905 -- -- -- -- 106/61 -- -- KLB   09/26/24 1903 97.3 °F (36.3 °C) 70 100 % 18 -- No Pain -- KLB            Physical Exam  Vitals and nursing note reviewed.   Constitutional:       General: She is not in acute distress.     Appearance: She is well-developed.   HENT:      Head: Normocephalic and atraumatic.      Right Ear: External ear normal.      Left Ear: External ear normal.      Nose: Nose normal.      Mouth/Throat:      Pharynx: Oropharynx is clear.   Eyes:      General: No scleral icterus.     Conjunctiva/sclera: Conjunctivae normal.   Cardiovascular:      Rate and Rhythm: Normal rate and regular rhythm.      Heart sounds: No murmur heard.  Pulmonary:      Effort: Pulmonary effort is normal. No respiratory distress.      Breath sounds: Normal breath sounds.   Abdominal:      General: There is no distension.      Palpations: Abdomen is soft.      Tenderness: There is no abdominal tenderness.   Musculoskeletal:         General: No deformity or signs of injury.      Cervical back: Normal range of motion and neck supple.   Skin:     General: Skin is warm and dry.   Neurological:      General: No focal deficit present.      Mental Status: She is alert.      Cranial Nerves: No cranial nerve deficit.      Sensory: No sensory deficit.   Psychiatric:         Mood and Affect: Mood normal.         Thought Content: Thought content normal.         Results Reviewed       Procedure Component Value Units Date/Time    HS Troponin I 2hr [[id number]]  (Normal) Collected: 09/26/24 2144    Lab Status: Final result Specimen: Blood from Arm, Left Updated: 09/26/24 2211     hs TnI 2hr 3 ng/L      Delta 2hr hsTnI 0 ng/L     COVID19, Influenza A/B, RSV PCR, [facility] [[id number]]  (Normal) Collected: 09/26/24 1915    Lab Status: Final result Specimen: Nares from Nose Updated: 09/26/24 2029     SARS-CoV-2 Negative      INFLUENZA A PCR Negative     INFLUENZA B PCR Negative     RSV PCR Negative    Narrative:      This test has been performed using the CoV-2/Flu/RSV plus assay on the [employer] Gene[employer]pert platform. This test has been validated by the  and verified by the performing laboratory.     This test is designed to amplify and detect the following: nucleocapsid (N), envelope (E), and RNA-dependent RNA polymerase (RdRP) genes of the SARS-CoV-2 genome; matrix (M), basic polymerase (PB2), and acidic protein (PA) segments of the influenza A genome; matrix (M) and non-structural protein (NS) segments of the influenza B genome, and the nucleocapsid genes of RSV A and RSV B.     Positive results are indicative of the presence of Flu A, Flu B, RSV, and/or SARS-CoV-2 RNA. Positive results for SARS-CoV-2 or suspected novel influenza should be reported to state, local, or federal health departments according to local reporting requirements.      All results should be assessed in conjunction with clinical presentation and other laboratory markers for clinical management.     FOR PEDIATRIC PATIENTS - copy/paste COVID Guidelines URL to browser: [email or web address]       HS Troponin I 4hr [[id number]]     Lab Status: No result Specimen: Blood     B-Type Natriuretic Peptide(BNP) [[id number]]  (Normal) Collected: 09/26/24 1915    Lab Status: Final result Specimen: Blood from Arm, Left Updated: 09/26/24 1945     BNP 16 pg/mL     HS Troponin 0hr (reflex protocol) [[id number]]  (Normal) Collected: 09/26/24 1915    Lab Status: Final result Specimen: Blood from Arm, Left Updated: 09/26/24 1943     hs TnI 0hr 3 ng/L     Comprehensive metabolic panel [[id number]]  (Abnormal) Collected: 09/26/24 1915    Lab Status: Final result Specimen: Blood from Arm, Left Updated: 09/26/24 1938     Sodium 137 mmol/L      Potassium 3.3 mmol/L      Chloride 102 mmol/L      CO2 24 mmol/L      ANION GAP 11  mmol/L      BUN 20 mg/dL      Creatinine 0.87 mg/dL      Glucose 197 mg/dL      Calcium 9.7 mg/dL      AST 21 U/L      ALT 20 U/L      Alkaline Phosphatase 52 U/L      Total Protein 6.8 g/dL      Albumin 4.3 g/dL      Total Bilirubin 0.30 mg/dL      eGFR 71 ml/min/1.73sq m     Narrative:      National Kidney Disease Foundation guidelines for Chronic Kidney Disease (CKD):     Stage 1 with normal or high GFR (GFR > 90 mL/min/1.73 square meters)    Stage 2 Mild CKD (GFR = 60-89 mL/min/1.73 square meters)    Stage 3A Moderate CKD (GFR = 45-59 mL/min/1.73 square meters)    Stage 3B Moderate CKD (GFR = 30-44 mL/min/1.73 square meters)    Stage 4 Severe CKD (GFR = 15-29 mL/min/1.73 square meters)    Stage 5 End Stage CKD (GFR <15 mL/min/1.73 square meters)  Note: GFR calculation is accurate only with a steady state creatinine    Magnesium [[id number]]  (Normal) Collected: 09/26/24 1915    Lab Status: Final result Specimen: Blood from Arm, Left Updated: 09/26/24 1938     Magnesium 2.0 mg/dL     APTT [[id number]]  (Normal) Collected: 09/26/24 1915    Lab Status: Final result Specimen: Blood from Arm, Left Updated: 09/26/24 1929     PTT 27 seconds     Protime-INR [[id number]]  (Normal) Collected: 09/26/24 1915    Lab Status: Final result Specimen: Blood from Arm, Left Updated: 09/26/24 1929     Protime 13.3 seconds      INR 0.97    Narrative:      INR Therapeutic Range    Indication                                             INR Range      Atrial Fibrillation                                               2.0-3.0  Hypercoagulable State                                    2.0.2.3  Left Ventricular Asist Device                            2.0-3.0  Mechanical Heart Valve                                  -    Aortic(with afib, MI, embolism, HF, LA enlargement,    and/or coagulopathy)                                     2.0-3.0 (2.5-3.5)     Mitral                                                              2.5-3.5  Prosthetic/Bioprosthetic Heart Valve               2.0-3.0  Venous thromboembolism (VTE: VT, PE        2.0-3.0    CBC and differential [[id number]]  (Abnormal) Collected: 09/26/24 1915    Lab Status: Final result Specimen: Blood from Arm, Left Updated: 09/26/24 1920     WBC 6.16 Thousand/uL      RBC 4.45 Million/uL      Hemoglobin 14.1 g/dL      Hematocrit 42.4 %      MCV 95 fL      MCH 31.7 pg      MCHC 33.3 g/dL      RDW 12.2 %      MPV 9.9 fL      Platelets 262 Thousands/uL      nRBC 0 /100 WBCs      Segmented % 29 %      Immature Grans % 0 %      Lymphocytes % 63 %      Monocytes % 6 %      Eosinophils Relative 1 %      Basophils Relative 1 %      Absolute Neutrophils 1.77 Thousands/µL      Absolute Immature Grans 0.01 Thousand/uL      Absolute Lymphocytes 3.91 Thousands/µL      Absolute Monocytes 0.37 Thousand/µL      Eosinophils Absolute 0.07 Thousand/µL      Basophils Absolute 0.03 Thousands/µL     Fingerstick Glucose (POCT) [[id number]]  (Abnormal) Collected: 09/26/24 1903    Lab Status: Final result Specimen: Blood Updated: 09/26/24 1904     POC Glucose 184 mg/dl             XR chest 1 view portable   ED Interpretation by Arnulfo Brunson MD (09/26 2025)   No infiltrate or pneumothorax      CT head wo contrast   Final Interpretation by Lucero Styles MD (09/26 2051)      No acute intracranial abnormality.                  Workstation performed: [id number]             ECG 12 Lead Documentation Only    Date/Time: 9/26/2024 7:16 PM    Performed by: Arnulfo Brunson MD  Authorized by: Arnulfo Brunson MD    Indications / Diagnosis:  Dizziness  ECG reviewed by me, the ED Provider: yes    Patient location:  ED  Rate:     ECG rate:  66    ECG rate assessment: normal    Rhythm:     Rhythm: sinus rhythm    QRS:     QRS axis:  Normal  T waves:     T waves: non-specific    Comments:      Normal sinus rhythm at 66, normal axis, , QRS 68, QTc 406, nonspecific T-wave abnormality, no definite evidence of  acute ischemia      ED Medication and Procedure Management   Prior to Admission Medications   Prescriptions Last Dose Informant Patient Reported? Taking?   Lancets (OneTouch Delica Plus [id number]) MISC   No No   Sig: Use 1 each 2 (two) times a day   atenolol-chlorthalidone (TENORETIC) 50-25 mg per tablet   No No   Sig: Take 1 tablet by mouth daily   diclofenac potassium (CATAFLAM) 50 mg tablet   No No   Sig: Take 1 tablet (50 mg total) by mouth 2 (two) times a day   gabapentin (NEURONTIN) 300 mg capsule   No No   Sig: Take 1 tablet at bedtime for 3 days, then 1 tablet twice daily for 3 days, then 1 tablet 3 times daily   glucose blood (OneTouch Verio) test strip   No No   Sig: Check Blood Sugar 2 times daily   metFORMIN (GLUCOPHAGE) 500 mg tablet   No No   Sig: Take 1 tablet (500 mg total) by mouth 2 (two) times a day with meals   pravastatin (PRAVACHOL) 40 mg tablet   No No   Sig: Take 1 tablet (40 mg total) by mouth daily at bedtime      Facility-Administered Medications: None     Discharge Medication List as of 9/26/2024 10:18 PM        CONTINUE these medications which have NOT CHANGED    Details   atenolol-chlorthalidone (TENORETIC) 50-25 mg per tablet Take 1 tablet by mouth daily, Starting Thu 8/29/2024, Normal      diclofenac potassium (CATAFLAM) 50 mg tablet Take 1 tablet (50 mg total) by mouth 2 (two) times a day, Starting Thu 8/29/2024, Normal      gabapentin (NEURONTIN) 300 mg capsule Take 1 tablet at bedtime for 3 days, then 1 tablet twice daily for 3 days, then 1 tablet 3 times daily, Normal      glucose blood (OneTouch Verio) test strip Check Blood Sugar 2 times daily, Normal      Lancets (OneTouch Delica Plus [id number]) MISC Use 1 each 2 (two) times a day, Starting Tue 4/9/2024, Normal      metFORMIN (GLUCOPHAGE) 500 mg tablet Take 1 tablet (500 mg total) by mouth 2 (two) times a day with meals, Starting Thu 8/29/2024, Normal      pravastatin (PRAVACHOL) 40 mg tablet Take 1 tablet (40 mg total) by  mouth daily at bedtime, Starting Wed 3/27/2024, Normal             ED SEPSIS DOCUMENTATION   Time reflects when diagnosis was documented in both MDM as applicable and the Disposition within this note       Time User Action Codes Description Comment    9/26/2024  7:09 PM Arnulfo Brunson [R42] Dizziness     9/26/2024  7:45 PM Arnulfo Brunson [E87.6] Hypokalemia                  Arnulfo Brunson MD  09/26/24 7532

## 2024-09-26 NOTE — DISCHARGE INSTRUCTIONS
Follow up with your primary doctor/outpatient providers, and return to the emergency department for new or worsening symptoms.      CT BRAIN - WITHOUT CONTRAST     INDICATION:   dizziness.     COMPARISON:  None.     TECHNIQUE:  CT examination of the brain was performed.  Multiplanar 2D reformatted images were created from the source data.     Radiation dose length product (DLP) for this visit:  742.6 mGy-cm .  This examination, like all CT scans performed in the [facility], was performed utilizing techniques to minimize radiation dose exposure, including the use of iterative   reconstruction and automated exposure control.     IMAGE QUALITY:  Diagnostic.     FINDINGS:     PARENCHYMA:  No intracranial mass, mass effect or midline shift. No CT signs of acute infarction.  No acute parenchymal hemorrhage.     VENTRICLES AND EXTRA-AXIAL SPACES:  Normal for the patient's age.     VISUALIZED ORBITS: Normal visualized orbits.     PARANASAL SINUSES: Normal visualized paranasal sinuses.     CALVARIUM AND EXTRACRANIAL SOFT TISSUES:  Normal.     IMPRESSION:     No acute intracranial abnormality.

## 2024-09-27 LAB
ATRIAL RATE: 66 BPM
P AXIS: 31 DEGREES
PR INTERVAL: 188 MS
QRS AXIS: 61 DEGREES
QRSD INTERVAL: 68 MS
QT INTERVAL: 388 MS
QTC INTERVAL: 406 MS
T WAVE AXIS: 24 DEGREES
VENTRICULAR RATE: 66 BPM

## 2024-09-27 PROCEDURE — 93010 ELECTROCARDIOGRAM REPORT: CPT | Performed by: INTERNAL MEDICINE

## 2024-09-29 NOTE — PROGRESS NOTES
HPI:  64-year-old female presents for evaluation and possible treatment of chronic low back pain.  Reports primarily axial symptoms with radiation to the bilateral hips from time to time.  Has a history of a total knee replacement.  Feels that gait changes do affect her lower back.  Denies red flag symptoms reports improvement with stretching and rest      Roxana Ventura is a 64 y.o. female who presents for evaluation and possible treatment of ongoing chronic low back pain with occasional radiation into the lower extremities.  She denies any's significant numbness or tingling or weakness in the lower extremity.  Pain is aggravated by bending twisting and turning she feels that her recent knee replacement definitely has an effect on her gait and then in turn her back pain.    She denies any recent fever chills night sweats infection pain upon recumbency relates no bowel bladder changes.      No chief complaint on file.    Past Medical History:   Diagnosis Date    Arthritis     Diabetes mellitus (HCC)     Hyperlipidemia     Hypertension       Past Surgical History:   Procedure Laterality Date    COLONOSCOPY      ME ARTHRP KNE CONDYLE&PLATU MEDIAL&LAT COMPARTMENTS Right 03/30/2021    Procedure: KNEE TOTAL ARTHROPLASTY;  Surgeon: Ryan Bush DO;  [facility];  Service: Orthopedics    ME ARTHRP KNE CONDYLE&PLATU MEDIAL&LAT COMPARTMENTS Left 1/10/2023    Procedure: ARTHROPLASTY KNEE TOTAL;  Surgeon: Ryan Bush DO;  [facility];  Service: Orthopedics    ME MANIPULATION KNEE JOINT UNDER GENERAL ANESTHESIA Right 06/17/2021    Procedure: MANIPULATION JOINT KNEE;  Surgeon: Ryan Bush DO;  [facility];  Service: Orthopedics    TUBAL LIGATION       Family History   Problem Relation Age of Onset    Arthritis Mother     Uterine cancer Mother     Arthritis Father     Bone cancer Father     Breast cancer Sister 45    No Known Problems Sister     No Known Problems Sister     No Known  Problems Sister     No Known Problems Sister     No Known Problems Sister     No Known Problems Daughter     No Known Problems Daughter     No Known Problems Daughter     Prostate cancer Maternal Grandfather     No Known Problems Maternal Aunt     No Known Problems Maternal Aunt     No Known Problems Maternal Aunt     No Known Problems Maternal Aunt     No Known Problems Maternal Aunt     No Known Problems Paternal Aunt     No Known Problems Paternal Aunt     No Known Problems Paternal Aunt     No Known Problems Paternal Aunt     Breast cancer Cousin     Breast cancer Cousin      Social History     Socioeconomic History    Marital status:      Spouse name: None    Number of children: None    Years of education: None    Highest education level: None   Occupational History    Occupation:      Employer: [employer]   Tobacco Use    Smoking status: Every Day     Current packs/day: 0.00     Average packs/day: 0.3 packs/day for 45.0 years (11.3 ttl pk-yrs)     Types: Cigarettes     Start date: 12/1/1975     Last attempt to quit: 12/1/2020     Years since quitting: 3.8    Smokeless tobacco: Never   Vaping Use    Vaping status: Never Used   Substance and Sexual Activity    Alcohol use: Not Currently     Comment: Socially    Drug use: Never    Sexual activity: Yes   Other Topics Concern    None   Social History Narrative    ** Merged History Encounter **          Social Determinants of Health     Financial Resource Strain: Low Risk  (3/15/2021)    Overall Financial Resource Strain (CARDIA)     Difficulty of Paying Living Expenses: Not hard at all   Food Insecurity: No Food Insecurity (3/15/2021)    Hunger Vital Sign     Worried About Running Out of Food in the Last Year: Never true     Ran Out of Food in the Last Year: Never true   Transportation Needs: No Transportation Needs (3/15/2021)    PRAPARE - Transportation     Lack of Transportation (Medical): No     Lack of Transportation  (Non-Medical): No   Physical Activity: Inactive (1/17/2020)    Exercise Vital Sign     Days of Exercise per Week: 0 days     Minutes of Exercise per Session: 0 min   Stress: Not on file   Social Connections: Socially Isolated (1/17/2020)    Social Connection and Isolation Panel [NHANES]     Frequency of Communication with Friends and Family: More than three times a week     Frequency of Social Gatherings with Friends and Family: More than three times a week     Attends Jainism Services: Never     Active Member of Clubs or Organizations: No     Attends Club or Organization Meetings: Never     Marital Status:    Intimate Partner Violence: Not At Risk (1/17/2020)    Humiliation, Afraid, Rape, and Kick questionnaire     Fear of Current or Ex-Partner: No     Emotionally Abused: No     Physically Abused: No     Sexually Abused: No   Housing Stability: Not on file       Current Outpatient Medications:     atenolol-chlorthalidone (TENORETIC) 50-25 mg per tablet, Take 1 tablet by mouth daily, Disp: 90 tablet, Rfl: 0    diclofenac potassium (CATAFLAM) 50 mg tablet, Take 1 tablet (50 mg total) by mouth 2 (two) times a day, Disp: 60 tablet, Rfl: 0    gabapentin (NEURONTIN) 300 mg capsule, Take 1 tablet at bedtime for 3 days, then 1 tablet twice daily for 3 days, then 1 tablet 3 times daily, Disp: 90 capsule, Rfl: 0    glucose blood (OneTouch Verio) test strip, Check Blood Sugar 2 times daily, Disp: 200 each, Rfl: 1    Lancets (OneTouch Delica Plus [id number]) MISC, Use 1 each 2 (two) times a day, Disp: 200 each, Rfl: 1    metFORMIN (GLUCOPHAGE) 500 mg tablet, Take 1 tablet (500 mg total) by mouth 2 (two) times a day with meals, Disp: 180 tablet, Rfl: 1    pravastatin (PRAVACHOL) 40 mg tablet, Take 1 tablet (40 mg total) by mouth daily at bedtime, Disp: 90 tablet, Rfl: 3  Allergies as of 09/13/2024    (No Known Allergies)         The following portions of the patient's history were reviewed and updated as appropriate:  allergies, current medications, past family history, past medical history, past social history, past surgical history, and problem list.    Review of Systems   Constitutional: Negative.    Musculoskeletal:  Positive for back pain and myalgias.   Neurological: Negative.    Psychiatric/Behavioral: Negative.         Physical Exam:  Physical Exam  Vitals reviewed.   Constitutional:       Appearance: Normal appearance.   Cardiovascular:      Rate and Rhythm: Normal rate.      Pulses: Normal pulses.   Pulmonary:      Effort: Pulmonary effort is normal.   Musculoskeletal:      Lumbar back: Spasms and tenderness present. Decreased range of motion. Negative right straight leg raise test and negative left straight leg raise test.        Back:    Skin:     General: Skin is warm and dry.   Neurological:      General: No focal deficit present.      Mental Status: She is alert and oriented to person, place, and time.      Sensory: Sensation is intact.      Motor: Motor function is intact.      Deep Tendon Reflexes: Reflexes are normal and symmetric.         Assessment:  Diagnoses and all orders for this visit:    Segmental dysfunction of pelvic region    Lumbar radiculopathy    Chronic bilateral low back pain, unspecified whether sciatica present    Segmental dysfunction of sacral region    Segmental dysfunction of lumbar region         Treatment:  61117  Manipulation right innominate, sacrum, L5 via Laurent drop maneuver well-tolerated    Discussion:  I reviewed past medical records.  Discussed case with patient today.  While treatment today she will see my colleagues in the upcoming weeks as I will be out of the office we will see her back for follow-up after that.  No follow-ups on file.

## 2024-09-30 LAB — COLOGUARD RESULT REPORTABLE: NEGATIVE

## 2024-10-01 ENCOUNTER — TELEPHONE (OUTPATIENT)
Age: 64
End: 2024-10-01

## 2024-10-02 ENCOUNTER — PROCEDURE VISIT (OUTPATIENT)
Age: 64
End: 2024-10-02
Payer: COMMERCIAL

## 2024-10-02 VITALS — WEIGHT: 171 LBS | HEIGHT: 63 IN | BODY MASS INDEX: 30.3 KG/M2

## 2024-10-02 DIAGNOSIS — M99.03 SEGMENTAL DYSFUNCTION OF LUMBAR REGION: ICD-10-CM

## 2024-10-02 DIAGNOSIS — M51.9 LUMBAR DISC DISORDER: ICD-10-CM

## 2024-10-02 DIAGNOSIS — M99.05 SEGMENTAL DYSFUNCTION OF PELVIC REGION: ICD-10-CM

## 2024-10-02 DIAGNOSIS — M99.02 SEGMENTAL DYSFUNCTION OF THORACIC REGION: ICD-10-CM

## 2024-10-02 DIAGNOSIS — M99.04 SEGMENTAL DYSFUNCTION OF SACRAL REGION: ICD-10-CM

## 2024-10-02 DIAGNOSIS — M54.16 LUMBAR RADICULOPATHY: Primary | ICD-10-CM

## 2024-10-02 PROCEDURE — 98941 CHIROPRACT MANJ 3-4 REGIONS: CPT | Performed by: CHIROPRACTOR

## 2024-10-02 NOTE — PROGRESS NOTES
Initial date of service: 9/25/24    Diagnoses and all orders for this visit:    Lumbar radiculopathy    Lumbar disc disorder    Segmental dysfunction of sacral region    Segmental dysfunction of lumbar region    Segmental dysfunction of thoracic region    Segmental dysfunction of pelvic region    Pt improved with reduced pain frequency    TREATMENT: 81240  Ther-ex: IASTM to affected mm hypertonicities (discussed soreness/ecchymosis up to 36 hrs post procedure); prone on elbows, prone push-ups at shoulders, standing lumbopelvic extension, hip flexor ischemic compression, alternating prone hip extension, glute bridge, transitional mvmt education, abdominal bracing; Thoracic mobilization/manipulation: prone P-A mob, supine A-P manip; Lumbar mobilization/manipulation: extension-traction; SIJ Manipulation/Mobilization: L SIJ HVLA - long axis distraction    DUTCH Ventura is a 64 y.o. female  Chief Complaint   Patient presents with    Back Pain     Low back about a 7 this morning but an 8 currently      10/3: pt reports feeling same level of pain but less often    Back Pain  This is a chronic problem. The current episode started more than 1 year ago. The problem occurs daily. The problem has been waxing and waning since onset. The pain is present in the gluteal, lumbar spine, sacro-iliac and thoracic spine. The quality of the pain is described as burning and aching. The pain radiates to the left thigh, left knee and left foot. The pain is Worse during the day. The symptoms are aggravated by sitting, standing, twisting and bending (palliative includes laying prone). Pertinent negatives include no bladder incontinence or bowel incontinence.     Past Medical History:   Diagnosis Date    Arthritis     Diabetes mellitus (HCC)     Hyperlipidemia     Hypertension       Past Surgical History:   Procedure Laterality Date    COLONOSCOPY      [place] ARTHRP KNE CONDYLE&PLATU MEDIAL&LAT COMPARTMENTS Right 03/30/2021     Procedure: KNEE TOTAL ARTHROPLASTY;  Surgeon: Ryan Bush DO;  [facility];  Service: Orthopedics    [place] ARTHRP KNE CONDYLE&PLATU MEDIAL&LAT COMPARTMENTS Left 1/10/2023    Procedure: ARTHROPLASTY KNEE TOTAL;  Surgeon: Ryan Bush DO;  [facility];  Service: Orthopedics    FL MANIPULATION KNEE JOINT UNDER GENERAL ANESTHESIA Right 06/17/2021    Procedure: MANIPULATION JOINT KNEE;  Surgeon: Ryan Bush DO;  [facility];  Service: Orthopedics    TUBAL LIGATION       The following portions of the patient's history were reviewed and updated as appropriate: allergies, past family history, past medical history, past social history, past surgical history, and problem list.  Review of Systems   Gastrointestinal:  Negative for bowel incontinence.   Genitourinary:  Negative for bladder incontinence.   Musculoskeletal:  Positive for back pain.     Physical Exam  Musculoskeletal:      Thoracic back: Spasms and tenderness present. Decreased range of motion.      Lumbar back: Spasms and tenderness present. Decreased range of motion. Positive left straight leg raise test.        Back:       Comments: Pnful and limited in fl, peripheralizes, centralizes in ext       SOFT TISSUE ASSESSMENT: Hypertonicity and tenderness palpated L T11-S1 erector spinae, hip flexor, glute med/min, QL JOINT RESTRICTIONS: T11-S1 and L SIJ     No follow-ups on file.

## 2024-10-11 ENCOUNTER — APPOINTMENT (OUTPATIENT)
Dept: LAB | Facility: HOSPITAL | Age: 64
End: 2024-10-11
Attending: FAMILY MEDICINE
Payer: COMMERCIAL

## 2024-10-11 DIAGNOSIS — E78.2 MIXED HYPERLIPIDEMIA: ICD-10-CM

## 2024-10-11 DIAGNOSIS — E11.9 TYPE 2 DIABETES MELLITUS WITHOUT COMPLICATION, WITHOUT LONG-TERM CURRENT USE OF INSULIN (HCC): ICD-10-CM

## 2024-10-11 LAB
ALBUMIN SERPL BCG-MCNC: 4.8 G/DL (ref 3.5–5)
ALP SERPL-CCNC: 53 U/L (ref 34–104)
ALT SERPL W P-5'-P-CCNC: 23 U/L (ref 7–52)
ANION GAP SERPL CALCULATED.3IONS-SCNC: 6 MMOL/L (ref 4–13)
AST SERPL W P-5'-P-CCNC: 19 U/L (ref 13–39)
BASOPHILS # BLD AUTO: 0.03 THOUSANDS/ΜL (ref 0–0.1)
BASOPHILS NFR BLD AUTO: 1 % (ref 0–1)
BILIRUB SERPL-MCNC: 0.52 MG/DL (ref 0.2–1)
BUN SERPL-MCNC: 20 MG/DL (ref 5–25)
CALCIUM SERPL-MCNC: 10.7 MG/DL (ref 8.4–10.2)
CHLORIDE SERPL-SCNC: 100 MMOL/L (ref 96–108)
CHOLEST SERPL-MCNC: 143 MG/DL
CO2 SERPL-SCNC: 33 MMOL/L (ref 21–32)
CREAT SERPL-MCNC: 0.85 MG/DL (ref 0.6–1.3)
CREAT UR-MCNC: 83.2 MG/DL
EOSINOPHIL # BLD AUTO: 0.05 THOUSAND/ΜL (ref 0–0.61)
EOSINOPHIL NFR BLD AUTO: 1 % (ref 0–6)
ERYTHROCYTE [DISTWIDTH] IN BLOOD BY AUTOMATED COUNT: 12.2 % (ref 11.6–15.1)
EST. AVERAGE GLUCOSE BLD GHB EST-MCNC: 160 MG/DL
GFR SERPL CREATININE-BSD FRML MDRD: 73 ML/MIN/1.73SQ M
GLUCOSE P FAST SERPL-MCNC: 144 MG/DL (ref 65–99)
HBA1C MFR BLD: 7.2 %
HCT VFR BLD AUTO: 45.7 % (ref 34.8–46.1)
HDLC SERPL-MCNC: 39 MG/DL
HGB BLD-MCNC: 14.7 G/DL (ref 11.5–15.4)
IMM GRANULOCYTES # BLD AUTO: 0.01 THOUSAND/UL (ref 0–0.2)
IMM GRANULOCYTES NFR BLD AUTO: 0 % (ref 0–2)
LDLC SERPL CALC-MCNC: 74 MG/DL (ref 0–100)
LYMPHOCYTES # BLD AUTO: 2.87 THOUSANDS/ΜL (ref 0.6–4.47)
LYMPHOCYTES NFR BLD AUTO: 58 % (ref 14–44)
MCH RBC QN AUTO: 31.2 PG (ref 26.8–34.3)
MCHC RBC AUTO-ENTMCNC: 32.2 G/DL (ref 31.4–37.4)
MCV RBC AUTO: 97 FL (ref 82–98)
MICROALBUMIN UR-MCNC: <7 MG/L
MONOCYTES # BLD AUTO: 0.34 THOUSAND/ΜL (ref 0.17–1.22)
MONOCYTES NFR BLD AUTO: 7 % (ref 4–12)
NEUTROPHILS # BLD AUTO: 1.6 THOUSANDS/ΜL (ref 1.85–7.62)
NEUTS SEG NFR BLD AUTO: 33 % (ref 43–75)
NONHDLC SERPL-MCNC: 104 MG/DL
NRBC BLD AUTO-RTO: 0 /100 WBCS
PLATELET # BLD AUTO: 266 THOUSANDS/UL (ref 149–390)
PMV BLD AUTO: 10.2 FL (ref 8.9–12.7)
POTASSIUM SERPL-SCNC: 4.5 MMOL/L (ref 3.5–5.3)
PROT SERPL-MCNC: 7.2 G/DL (ref 6.4–8.4)
RBC # BLD AUTO: 4.71 MILLION/UL (ref 3.81–5.12)
SODIUM SERPL-SCNC: 139 MMOL/L (ref 135–147)
TRIGL SERPL-MCNC: 151 MG/DL
TSH SERPL DL<=0.05 MIU/L-ACNC: 0.86 UIU/ML (ref 0.45–4.5)
WBC # BLD AUTO: 4.9 THOUSAND/UL (ref 4.31–10.16)

## 2024-10-11 PROCEDURE — 85025 COMPLETE CBC W/AUTO DIFF WBC: CPT

## 2024-10-11 PROCEDURE — 83036 HEMOGLOBIN GLYCOSYLATED A1C: CPT

## 2024-10-11 PROCEDURE — 80061 LIPID PANEL: CPT

## 2024-10-11 PROCEDURE — 36415 COLL VENOUS BLD VENIPUNCTURE: CPT

## 2024-10-11 PROCEDURE — 80053 COMPREHEN METABOLIC PANEL: CPT

## 2024-10-11 PROCEDURE — 82570 ASSAY OF URINE CREATININE: CPT

## 2024-10-11 PROCEDURE — 84443 ASSAY THYROID STIM HORMONE: CPT

## 2024-10-11 PROCEDURE — 82043 UR ALBUMIN QUANTITATIVE: CPT

## 2024-10-22 DIAGNOSIS — G89.29 CHRONIC LEFT HIP PAIN: ICD-10-CM

## 2024-10-22 DIAGNOSIS — E11.9 TYPE 2 DIABETES MELLITUS WITHOUT COMPLICATION, WITHOUT LONG-TERM CURRENT USE OF INSULIN (HCC): ICD-10-CM

## 2024-10-22 DIAGNOSIS — M25.552 CHRONIC LEFT HIP PAIN: ICD-10-CM

## 2024-10-23 DIAGNOSIS — M54.16 LUMBAR RADICULOPATHY: ICD-10-CM

## 2024-10-23 DIAGNOSIS — M25.552 CHRONIC LEFT HIP PAIN: ICD-10-CM

## 2024-10-23 DIAGNOSIS — E78.5 DYSLIPIDEMIA: ICD-10-CM

## 2024-10-23 DIAGNOSIS — I10 ESSENTIAL HYPERTENSION: ICD-10-CM

## 2024-10-23 DIAGNOSIS — G89.29 CHRONIC LEFT HIP PAIN: ICD-10-CM

## 2024-10-23 DIAGNOSIS — E11.9 TYPE 2 DIABETES MELLITUS WITHOUT COMPLICATION, WITHOUT LONG-TERM CURRENT USE OF INSULIN (HCC): ICD-10-CM

## 2024-10-23 RX ORDER — BLOOD SUGAR DIAGNOSTIC
STRIP MISCELLANEOUS
Qty: 100 STRIP | Refills: 1 | Status: SHIPPED | OUTPATIENT
Start: 2024-10-23 | End: 2024-10-23 | Stop reason: SDUPTHER

## 2024-10-23 RX ORDER — DICLOFENAC POTASSIUM 50 MG/1
50 TABLET, FILM COATED ORAL 2 TIMES DAILY
Qty: 60 TABLET | Refills: 0 | Status: SHIPPED | OUTPATIENT
Start: 2024-10-23 | End: 2024-10-23 | Stop reason: SDUPTHER

## 2024-10-24 ENCOUNTER — CONSULT (OUTPATIENT)
Dept: CARDIOLOGY CLINIC | Facility: CLINIC | Age: 64
End: 2024-10-24
Payer: COMMERCIAL

## 2024-10-24 VITALS
HEART RATE: 70 BPM | DIASTOLIC BLOOD PRESSURE: 78 MMHG | BODY MASS INDEX: 29.76 KG/M2 | OXYGEN SATURATION: 99 % | SYSTOLIC BLOOD PRESSURE: 140 MMHG | WEIGHT: 168 LBS

## 2024-10-24 DIAGNOSIS — I10 ESSENTIAL HYPERTENSION: Primary | ICD-10-CM

## 2024-10-24 DIAGNOSIS — R42 DIZZINESS: ICD-10-CM

## 2024-10-24 DIAGNOSIS — R42 LIGHTHEADEDNESS: ICD-10-CM

## 2024-10-24 DIAGNOSIS — E78.5 DYSLIPIDEMIA: ICD-10-CM

## 2024-10-24 PROCEDURE — 99204 OFFICE O/P NEW MOD 45 MIN: CPT | Performed by: INTERNAL MEDICINE

## 2024-10-24 RX ORDER — DICLOFENAC POTASSIUM 50 MG/1
50 TABLET, FILM COATED ORAL 2 TIMES DAILY
Qty: 60 TABLET | Refills: 0 | Status: SHIPPED | OUTPATIENT
Start: 2024-10-24

## 2024-10-24 RX ORDER — ATENOLOL AND CHLORTHALIDONE TABLET 50; 25 MG/1; MG/1
1 TABLET ORAL DAILY
Qty: 90 TABLET | Refills: 0 | Status: SHIPPED | OUTPATIENT
Start: 2024-10-24

## 2024-10-24 RX ORDER — PRAVASTATIN SODIUM 40 MG
40 TABLET ORAL
Qty: 90 TABLET | Refills: 3 | Status: SHIPPED | OUTPATIENT
Start: 2024-10-24

## 2024-10-24 RX ORDER — GABAPENTIN 300 MG/1
CAPSULE ORAL
Qty: 90 CAPSULE | Refills: 0 | Status: SHIPPED | OUTPATIENT
Start: 2024-10-24

## 2024-10-24 RX ORDER — LANCETS 30 GAUGE
1 EACH MISCELLANEOUS 2 TIMES DAILY
Qty: 200 EACH | Refills: 1 | Status: SHIPPED | OUTPATIENT
Start: 2024-10-24

## 2024-10-24 RX ORDER — BLOOD SUGAR DIAGNOSTIC
STRIP MISCELLANEOUS
Qty: 100 STRIP | Refills: 1 | Status: SHIPPED | OUTPATIENT
Start: 2024-10-24

## 2024-10-24 NOTE — PROGRESS NOTES
"Cardiology   Roxana Ventura 64 y.o. female MRN: [id number]        Impression:  Hypertension - borderline control.  Dyslipidemia - on statin.  Dizziness - unclear etiology.  Possibly volume depletion, but given palpitations and symptoms, want to evaluate for structural heart disease or dysrhythmias.   Palpitations - want to evaluate for dysrhythmias.     Recommendations:  Continue current medications.  Check echo to evaluate for structural heart disease.  Check 1 week Zio monitor to evaluate for dysrhythmias.  Increase fluid intake.   Follow up in 2 months.       HPI: Roxana Ventura is a 64 y.o. year old female with hypertension and dyslipidemia who presents for evaluation of dizziness/lightheadedness.  Was in ED 9/26/24 - went for a smoke and felt like everything was going \"black\".  Went to ED - labs unremarkable.  W/U negative. Still occasionally feels dizzy, which has been occurring for 4 months - 2-3x/weeks.  Occurs usually in mornings.  Not associated with standing.  Lasts for 1-2 minutes.  Occasionally feels her heart racing at the same time.  Major complaint is back  pain.  No chest pain or dyspnea.          Review of Systems   Constitutional: Negative.    HENT: Negative.     Eyes: Negative.    Respiratory:  Negative for chest tightness and shortness of breath.    Cardiovascular:  Positive for palpitations. Negative for chest pain and leg swelling.   Gastrointestinal: Negative.    Endocrine: Negative.    Genitourinary: Negative.    Musculoskeletal: Negative.    Skin: Negative.    Allergic/Immunologic: Negative.    Neurological:  Positive for light-headedness.   Hematological: Negative.    Psychiatric/Behavioral: Negative.     All other systems reviewed and are negative.        Past Medical History:   Diagnosis Date    Arthritis     Diabetes mellitus (HCC)     Hyperlipidemia     Hypertension      Past Surgical History:   Procedure Laterality Date    COLONOSCOPY      [place] ARTHRP KNE CONDYLE&PLATU " MEDIAL&LAT COMPARTMENTS Right 03/30/2021    Procedure: KNEE TOTAL ARTHROPLASTY;  Surgeon: Ryan Bush DO;  [facility];  Service: Orthopedics    [place] ARTHRP KNE CONDYLE&PLATU MEDIAL&LAT COMPARTMENTS Left 1/10/2023    Procedure: ARTHROPLASTY KNEE TOTAL;  Surgeon: Ryan Bush DO;  [facility];  Service: Orthopedics    [place] MANIPULATION KNEE JOINT UNDER GENERAL ANESTHESIA Right 06/17/2021    Procedure: MANIPULATION JOINT KNEE;  Surgeon: Ryan Bush DO;  [facility];  Service: Orthopedics    TUBAL LIGATION       Social History     Substance and Sexual Activity   Alcohol Use Not Currently    Comment: Socially     Social History     Substance and Sexual Activity   Drug Use Never     Social History     Tobacco Use   Smoking Status Every Day    Current packs/day: 0.00    Average packs/day: 0.3 packs/day for 45.0 years (11.3 ttl pk-yrs)    Types: Cigarettes    Start date: 12/1/1975    Last attempt to quit: 12/1/2020    Years since quitting: 3.8   Smokeless Tobacco Never     Family History   Problem Relation Age of Onset    Arthritis Mother     Uterine cancer Mother     Arthritis Father     Bone cancer Father     Breast cancer Sister 45    No Known Problems Sister     No Known Problems Sister     No Known Problems Sister     No Known Problems Sister     No Known Problems Sister     No Known Problems Daughter     No Known Problems Daughter     No Known Problems Daughter     Prostate cancer Maternal Grandfather     No Known Problems Maternal Aunt     No Known Problems Maternal Aunt     No Known Problems Maternal Aunt     No Known Problems Maternal Aunt     No Known Problems Maternal Aunt     No Known Problems Paternal Aunt     No Known Problems Paternal Aunt     No Known Problems Paternal Aunt     No Known Problems Paternal Aunt     Breast cancer Cousin     Breast cancer Cousin        Allergies:  No Known Allergies    Medications:     Current Outpatient Medications:     atenolol-chlorthalidone  (TENORETIC) 50-25 mg per tablet, Take 1 tablet by mouth daily, Disp: 90 tablet, Rfl: 0    diclofenac potassium (CATAFLAM) 50 mg tablet, TAKE 1 TABLET(50 MG) BY MOUTH TWICE DAILY, Disp: 60 tablet, Rfl: 0    glucose blood (OneTouch Verio) test strip, TEST TWICE DAILY, Disp: 100 strip, Rfl: 1    Lancets (OneTouch Delica Plus [id number]) MISC, Use 1 each 2 (two) times a day, Disp: 200 each, Rfl: 1    metFORMIN (GLUCOPHAGE) 500 mg tablet, Take 1 tablet (500 mg total) by mouth 2 (two) times a day with meals, Disp: 180 tablet, Rfl: 1    pravastatin (PRAVACHOL) 40 mg tablet, Take 1 tablet (40 mg total) by mouth daily at bedtime, Disp: 90 tablet, Rfl: 3    gabapentin (NEURONTIN) 300 mg capsule, Take 1 tablet at bedtime for 3 days, then 1 tablet twice daily for 3 days, then 1 tablet 3 times daily (Patient not taking: Reported on 10/24/2024), Disp: 90 capsule, Rfl: 0      Wt Readings from Last 3 Encounters:   10/24/24 76.2 kg (168 lb)   10/02/24 77.6 kg (171 lb)   09/25/24 77.6 kg (171 lb)     Temp Readings from Last 3 Encounters:   09/26/24 (!) 97.3 °F (36.3 °C) (Oral)   05/09/24 (!) 97.4 °F (36.3 °C) (Tympanic)   03/27/24 (!) 97.3 °F (36.3 °C) (Tympanic)     BP Readings from Last 3 Encounters:   10/24/24 140/78   09/26/24 140/76   08/29/24 118/76     Pulse Readings from Last 3 Encounters:   10/24/24 70   09/26/24 67   08/29/24 65         Physical Exam  HENT:      Head: Atraumatic.      Mouth/Throat:      Mouth: Mucous membranes are moist.   Eyes:      Extraocular Movements: Extraocular movements intact.   Cardiovascular:      Rate and Rhythm: Normal rate and regular rhythm.      Heart sounds: Normal heart sounds.   Pulmonary:      Effort: Pulmonary effort is normal.      Breath sounds: Normal breath sounds.   Abdominal:      General: Abdomen is flat.   Musculoskeletal:         General: Normal range of motion.      Cervical back: Normal range of motion.   Skin:     General: Skin is warm.   Neurological:      General: No focal  "deficit present.      Mental Status: She is alert and oriented to person, place, and time.   Psychiatric:         Mood and Affect: Mood normal.         Behavior: Behavior normal.           Laboratory Studies:  CMP:  Lab Results   Component Value Date    K 4.5 10/11/2024     10/11/2024    CO2 33 (H) 10/11/2024    BUN 20 10/11/2024    CREATININE 0.85 10/11/2024    AST 19 10/11/2024    ALT 23 10/11/2024    EGFR 73 10/11/2024       Lipid Profile:   No results found for: \"CHOL\"  Lab Results   Component Value Date    HDL 39 (L) 10/11/2024     Lab Results   Component Value Date    LDLCALC 74 10/11/2024     Lab Results   Component Value Date    TRIG 151 (H) 10/11/2024       Cardiac testing:   EKG reviewed personally: 9/26/24 - NSR Falguni REZA          "

## 2024-10-24 NOTE — PATIENT INSTRUCTIONS
Recommendations:  Continue current medications.  Check echo to evaluate for structural heart disease.  Check 1 week Zio monitor to evaluate for dysrhythmias.  Increase fluid intake.   Follow up in 2 months.

## 2024-11-01 ENCOUNTER — EVALUATION (OUTPATIENT)
Dept: PHYSICAL THERAPY | Facility: REHABILITATION | Age: 64
End: 2024-11-01
Payer: COMMERCIAL

## 2024-11-01 DIAGNOSIS — M54.42 CHRONIC LEFT-SIDED LOW BACK PAIN WITH LEFT-SIDED SCIATICA: Primary | ICD-10-CM

## 2024-11-01 DIAGNOSIS — M25.552 LEFT HIP PAIN: ICD-10-CM

## 2024-11-01 DIAGNOSIS — G89.29 CHRONIC LEFT-SIDED LOW BACK PAIN WITH LEFT-SIDED SCIATICA: Primary | ICD-10-CM

## 2024-11-01 PROCEDURE — 97530 THERAPEUTIC ACTIVITIES: CPT | Performed by: PHYSICAL THERAPIST

## 2024-11-01 PROCEDURE — 97161 PT EVAL LOW COMPLEX 20 MIN: CPT | Performed by: PHYSICAL THERAPIST

## 2024-11-01 NOTE — PROGRESS NOTES
PT Evaluation     Today's date: 2024  Patient name: Roxana Ventura  : 1960  MRN: [id number]  Referring provider: Vika Baer MD  Dx:   Encounter Diagnosis     ICD-10-CM    1. Chronic left-sided low back pain with left-sided sciatica  M54.42     G89.29       2. Left hip pain  M25.552 Ambulatory Referral to Physical Therapy            Start Time: 1045  Stop Time: 1125  Total time in clinic (min): 40 minutes    Assessment  Impairments: abnormal coordination, abnormal gait, abnormal muscle firing, abnormal or restricted ROM, abnormal movement, activity intolerance, impaired balance, impaired physical strength, lacks appropriate home exercise program, pain with function and weight-bearing intolerance    Assessment details: Patient presents to PT with chronic low back pain. Patient displays decreased lumbar ROM, core strength and hip strength, especially LLE weakness. These impairments are leading to pain with sitting, standing and bending/lifting. Patient will benefit from skilled PT to address above impairments and help them return to PLOF.    Barriers to therapy: Language barrier  Understanding of Dx/Px/POC: good     Prognosis: good    Goals  STG 4-6 weeks  1. Patient will display decreased pain <7/10 with ADLs.  2. Patient will display lumbar ROM WNL.      LTG 6-12 weeks  1. Patient will be able to stand for 30 minutes without pain upon discharge  2. Patient will be able to walk for 30 minutes without pain upon discharge  3. Patient will be pick an object up off the floor without pain upon discharge  4. Patient will display core strength WNL.  5. Patient will demonstrate at least 3+/5 LLE strength.        Plan  Planned modality interventions: cryotherapy and thermotherapy: hydrocollator packs    Planned therapy interventions: abdominal trunk stabilization, activity modification, ADL training, balance, balance/weight bearing training, body mechanics training, joint mobilization, manual  therapy, massage, motor coordination training, neuromuscular re-education, patient education, postural training, self care, strengthening, stretching, therapeutic activities, therapeutic exercise, transfer training, home exercise program, graded activity, graded exercise, functional ROM exercises and flexibility    Frequency: 2x week  Duration in weeks: 8  Plan of Care beginning date: 11/1/2024  Plan of Care expiration date: 12/27/2024  Treatment plan discussed with: patient and family        Subjective Evaluation    History of Present Illness  Mechanism of injury: Patient is a 65 yo female with complaints of chronic low back pain. Patient received PT for this issue in June 2024 but stopped secondary to traveling out of the country. She is now resuming PT. Patient reports the exercises in PT helped a little bit but has not been able to manage the pain at home and feels it has worsened. She feels pain in the central mid to low back. She reports no pain down the left leg but a lot of weakness in the leg with difficulty lifting it. She feels after standing for a long time her back starts to bend forward.     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 Resultant mild to moderate spinal canal narrowing most prominent at L3-L4. Multilevel foraminal narrowing most prominent on the left at L4-L5 (moderate to severe) and the right at L5-S1. Details   above.     -Multilevel facet arthropathy most prominent at involving the lower right lumbar spine. Mild edema involving the right L4-L5 facets with adjacent joint effusion which may be degenerative. Clinical correlation advised including ESR and CRP if there is concern for underlying infection. Further post contrast imaging can be obtained if clinically indicated.            Not a recurrent problem   Quality of life: fair    Patient Goals  Patient goals for therapy: decreased pain, increased motion, increased strength, independence with ADLs/IADLs  and return to sport/leisure activities  Patient goal: calm the pain in back and strengthen left leg  Pain  Current pain ratin  At best pain ratin  At worst pain ratin  Quality: sharp, radiating and tight  Relieving factors: change in position and medications (lumbra extension in the morning; Tylenol and Diclofenac prn)  Aggravating factors: lifting, sitting, standing and walking (laying down, lifting legs, bending forward or backward)    Social Support  Steps to enter house: yes    Employment status: not working    Diagnostic Tests  MRI studies: abnormal  Treatments  Previous treatment: physical therapy  Current treatment: chiropractic and massage        Objective     Postural Observations  Seated posture: poor  Standing posture: poor      Palpation   Left   Hypertonic in the erector spinae.   Tenderness of the erector spinae.     Right   Hypertonic in the erector spinae.     Tenderness     Left Hip   No tenderness in the PSIS.     Right Hip   No tenderness in the PSIS.     Active Range of Motion   Cervical/Thoracic Spine       Thoracic    Extension: 10 degrees        Lumbar   Flexion:  with pain Restriction level: minimal  Extension:  WFL  Left lateral flexion:  WFL  Right lateral flexion:  WFL    Additional Active Range of Motion Details  Flexion 75% - pain and weakness with reversing into extension - walking hands up thighs     Passive Range of Motion   Left Hip   Normal passive range of motion    Right Hip   Normal passive range of motion    Joint Play     Hypomobile: T8, T9, T10, T11 and T12     Pain: L1, L2, L3 and L4     Strength/Myotome Testing     Left Hip   Planes of Motion   Flexion: 2-  Extension: 1  Abduction: 3  External rotation: 3+    Right Hip   Planes of Motion   Flexion: 2+  Extension: 3-  Abduction: 3+    Left Knee   Flexion: 4  Extension: 4    Right Knee   Flexion: 5  Extension: 5    Left Ankle/Foot   Dorsiflexion: 4+    Right Ankle/Foot   Dorsiflexion: 5    Tests     Lumbar     Left    Negative passive SLR.     Right   Negative passive SLR.     Left Hip   90/90 SLR: Positive.     Right Hip   90/90 SLR: Positive.              Precautions: B TKA   Past Medical History:   Diagnosis Date    Arthritis     Diabetes mellitus (HCC)     Hyperlipidemia     Hypertension          Manuals 11/1            Lumbar  + ES STM nv            Lumbar distraction pball             Neuro Re-Ed             Bridges nv            clamshells             Sidesteps nv            Standing hip abd TB nv            Standing hip ext TB nv            Mini squats nv            LAQ                                       Ther Ex             Bike - strength/endurance nv            Prone press up nv            Standing lumbar extensions                                                    Ther Activity             STS **            Standing marches             Leg press             Pt edu Done             Gait Training                                       Modalities

## 2024-11-05 ENCOUNTER — OFFICE VISIT (OUTPATIENT)
Dept: PHYSICAL THERAPY | Facility: REHABILITATION | Age: 64
End: 2024-11-05
Payer: COMMERCIAL

## 2024-11-05 DIAGNOSIS — M25.552 LEFT HIP PAIN: ICD-10-CM

## 2024-11-05 DIAGNOSIS — G89.29 CHRONIC LEFT-SIDED LOW BACK PAIN WITH LEFT-SIDED SCIATICA: Primary | ICD-10-CM

## 2024-11-05 DIAGNOSIS — M54.42 CHRONIC LEFT-SIDED LOW BACK PAIN WITH LEFT-SIDED SCIATICA: Primary | ICD-10-CM

## 2024-11-05 PROCEDURE — 97140 MANUAL THERAPY 1/> REGIONS: CPT | Performed by: PHYSICAL THERAPIST

## 2024-11-05 PROCEDURE — 97112 NEUROMUSCULAR REEDUCATION: CPT | Performed by: PHYSICAL THERAPIST

## 2024-11-05 NOTE — PROGRESS NOTES
Daily Note     Today's date: 2024  Patient name: Roxana Ventura  : 1960  MRN: [id number]  Referring provider: Vika Baer MD  Dx:   Encounter Diagnosis     ICD-10-CM    1. Chronic left-sided low back pain with left-sided sciatica  M54.42     G89.29       2. Left hip pain  M25.552           Start Time: 1050  Stop Time: 1128  Total time in clinic (min): 38 minutes    Subjective: Patient reports a little pain at start of session.      Objective: See treatment diary below      Assessment: Tolerated treatment well. Initiated plan of care this visit with good tolerance. Positive response to manual therapy and patient reporting improvement post-treatment noting she can adduct her legs in sitting with less pain. Evident cardiovascular and muscular fatigue noted throughout session. Provided patient with updated HEP. Patient demonstrated fatigue post treatment, exhibited good technique with therapeutic exercises, and would benefit from continued PT. Visualized scarring on left flank, spoke to patient's daughter who reported the patient had a hot pack there twice about 3 months ago which caused burning on the skin. Educated patient and daughter on skin checks with heat.      Plan: Continue per plan of care.      Precautions: B TKA   Past Medical History:   Diagnosis Date    Arthritis     Diabetes mellitus (HCC)     Hyperlipidemia     Hypertension          Manuals            Lumbar  + L ES STM nv Done            Lumbar distraction pball             Neuro Re-Ed             Bridges nv 3x10           clamshells             Sidesteps nv 3 laps counter           Standing hip abd TB nv 2x10 b/l           Standing hip ext TB nv 2x10 b/l           Mini squats nv 2x10           LAQ  nv           Standing plank with alt arm/leg exts  nv                        Ther Ex             Bike - strength/endurance nv 5' lv 0  Try lv 1          Prone press up nv            Standing lumbar extensions                                                     Ther Activity             STS **            Standing marches             Leg press             Pt edu Done  Done           Gait Training                                       Modalities

## 2024-11-08 ENCOUNTER — OFFICE VISIT (OUTPATIENT)
Dept: PHYSICAL THERAPY | Facility: REHABILITATION | Age: 64
End: 2024-11-08
Payer: COMMERCIAL

## 2024-11-08 DIAGNOSIS — M25.552 LEFT HIP PAIN: ICD-10-CM

## 2024-11-08 DIAGNOSIS — G89.29 CHRONIC LEFT-SIDED LOW BACK PAIN WITH LEFT-SIDED SCIATICA: Primary | ICD-10-CM

## 2024-11-08 DIAGNOSIS — M54.42 CHRONIC LEFT-SIDED LOW BACK PAIN WITH LEFT-SIDED SCIATICA: Primary | ICD-10-CM

## 2024-11-08 PROCEDURE — 97112 NEUROMUSCULAR REEDUCATION: CPT | Performed by: PHYSICAL THERAPIST

## 2024-11-08 PROCEDURE — 97140 MANUAL THERAPY 1/> REGIONS: CPT | Performed by: PHYSICAL THERAPIST

## 2024-11-08 NOTE — PROGRESS NOTES
Daily Note     Today's date: 2024  Patient name: Roxana Ventura  : 1960  MRN: [id number]  Referring provider: Vika Baer MD  Dx:   Encounter Diagnosis     ICD-10-CM    1. Chronic left-sided low back pain with left-sided sciatica  M54.42     G89.29       2. Left hip pain  M25.552           Start Time: 1015  Stop Time: 1057  Total time in clinic (min): 42 minutes    Subjective: Patient reports feeling ok today.      Objective: See treatment diary below      Assessment: Tolerated treatment well. Progressed all LLE and hip strengthening this visit with good tolerance but increase in muscular and cardiovascular fatigue noted. Patient demonstrated fatigue post treatment, exhibited good technique with therapeutic exercises, and would benefit from continued PT.       Plan: Continue per plan of care.      Precautions: B TKA   Past Medical History:   Diagnosis Date    Arthritis     Diabetes mellitus (HCC)     Hyperlipidemia     Hypertension          Manuals           Lumbar  + L ES STM nv Done  Done +thoracic ES STM          Lumbar distraction pball             Neuro Re-Ed             Bridges nv 3x10 3x12          clamshells             Sidesteps nv 3 laps counter 3 laps counter          Standing hip abd TB nv 2x10 b/l 2x10 b/l          Standing hip ext TB nv 2x10 b/l 2x10 b/l          Mini squats nv 2x10           LAQ  nv 2# 3x10          Standing plank with alt arm/leg exts  nv                        Ther Ex             Bike - strength/endurance nv 5' lv 0  5' lv 1          Prone press up nv            Standing lumbar extensions                                                    Ther Activity             STS **            Standing marches             Leg press             Pt edu Done  Done           Gait Training                                       Modalities

## 2024-11-13 ENCOUNTER — OFFICE VISIT (OUTPATIENT)
Dept: PHYSICAL THERAPY | Facility: REHABILITATION | Age: 64
End: 2024-11-13
Payer: COMMERCIAL

## 2024-11-13 DIAGNOSIS — G89.29 CHRONIC LEFT-SIDED LOW BACK PAIN WITH LEFT-SIDED SCIATICA: Primary | ICD-10-CM

## 2024-11-13 DIAGNOSIS — M54.42 CHRONIC LEFT-SIDED LOW BACK PAIN WITH LEFT-SIDED SCIATICA: Primary | ICD-10-CM

## 2024-11-13 DIAGNOSIS — M25.552 LEFT HIP PAIN: ICD-10-CM

## 2024-11-13 PROCEDURE — 97140 MANUAL THERAPY 1/> REGIONS: CPT

## 2024-11-13 PROCEDURE — 97112 NEUROMUSCULAR REEDUCATION: CPT

## 2024-11-13 NOTE — PROGRESS NOTES
"Daily Note     Today's date: 2024  Patient name: Roxana Ventura  : 1960  MRN: [id number]  Referring provider: Vika Baer MD  Dx:   Encounter Diagnosis     ICD-10-CM    1. Chronic left-sided low back pain with left-sided sciatica  M54.42     G89.29       2. Left hip pain  M25.552           Start Time: 1135  Stop Time: 1220  Total time in clinic (min): 45 minutes    Subjective: Patient reports her back as \"better\" continues to report LLE weakness. She reports no complaints after previous session.       Objective: See treatment diary below      Assessment: Tolerated treatment well. Patient demonstrated fatigue post treatment, exhibited good technique with therapeutic exercises, and would benefit from continued PT Good tolerance to increased reps with certain TE. Fatigue noted throughout session, no pain reported. Patient reports relief with manuals today.       Plan: Continue per plan of care.  Progress treatment as tolerated.       Precautions: B TKA   Past Medical History:   Diagnosis Date    Arthritis     Diabetes mellitus (HCC)     Hyperlipidemia     Hypertension          Manuals          Lumbar  + L ES STM nv Done  Done +thoracic ES STM Done + thoracic ES STM         Lumbar distraction pball             Neuro Re-Ed             Bridges nv 3x10 3x12 3x12         clamshells             Sidesteps nv 3 laps counter 3 laps counter 5 laps counter         Standing hip abd TB nv 2x10 b/l 2x10 b/l 3x10 b/l         Standing hip ext TB nv 2x10 b/l 2x10 b/l 3x10 b/l         Mini squats nv 2x10  2x10         LAQ  nv 2# 3x10 2# 3x10          Standing plank with alt arm/leg exts  nv                        Ther Ex             Bike - strength/endurance nv 5' lv 0  5' lv 1 5' lvl 1         Prone press up nv            Standing lumbar extensions                                                    Ther Activity             STS **            Standing marches             Leg press       "       Pt edu Done  Done           Gait Training                                       Modalities

## 2024-11-15 ENCOUNTER — OFFICE VISIT (OUTPATIENT)
Dept: PHYSICAL THERAPY | Facility: REHABILITATION | Age: 64
End: 2024-11-15
Payer: COMMERCIAL

## 2024-11-15 DIAGNOSIS — M25.552 LEFT HIP PAIN: ICD-10-CM

## 2024-11-15 DIAGNOSIS — G89.29 CHRONIC LEFT-SIDED LOW BACK PAIN WITH LEFT-SIDED SCIATICA: Primary | ICD-10-CM

## 2024-11-15 DIAGNOSIS — M54.42 CHRONIC LEFT-SIDED LOW BACK PAIN WITH LEFT-SIDED SCIATICA: Primary | ICD-10-CM

## 2024-11-15 PROCEDURE — 97140 MANUAL THERAPY 1/> REGIONS: CPT | Performed by: PHYSICAL THERAPIST

## 2024-11-15 PROCEDURE — 97112 NEUROMUSCULAR REEDUCATION: CPT | Performed by: PHYSICAL THERAPIST

## 2024-11-15 NOTE — PROGRESS NOTES
Daily Note     Today's date: 11/15/2024  Patient name: Roxana Ventura  : 1960  MRN: [id number]  Referring provider: Vika Baer MD  Dx:   Encounter Diagnosis     ICD-10-CM    1. Chronic left-sided low back pain with left-sided sciatica  M54.42     G89.29       2. Left hip pain  M25.552           Start Time: 1100  Stop Time: 1142  Total time in clinic (min): 42 minutes    Subjective: Patient reports the back bothered her yesterday but feels ok today.      Objective: See treatment diary below      Assessment: Tolerated treatment well. Patient demonstrated fatigue post treatment, exhibited good technique with therapeutic exercises, and would benefit from continued PT. Significant muscle and cardiovascular fatigue noted throughout session.      Plan: Continue per plan of care.      Precautions: B TKA   Past Medical History:   Diagnosis Date    Arthritis     Diabetes mellitus (HCC)     Hyperlipidemia     Hypertension          Manuals 11/1 11/5 11/8 11/13 11/15        Lumbar  + L ES STM nv Done  Done +thoracic ES STM Done + thoracic ES STM Done + thoracic ES STM        Lumbar distraction pball             Neuro Re-Ed             Bridges nv 3x10 3x12 3x12 3x12        clamshells             Sidesteps nv 3 laps counter 3 laps counter 5 laps counter 5 laps counter        Standing hip abd TB nv 2x10 b/l 2x10 b/l 3x10 b/l 3x10 b/l        Standing hip ext TB nv 2x10 b/l 2x10 b/l 3x10 b/l 3x10 b/l        Mini squats nv 2x10  2x10 2x10        LAQ  nv 2# 3x10 2# 3x10  2# 3x10        Standing plank with alt arm/leg exts  nv                        Ther Ex             Bike - strength/endurance nv 5' lv 0  5' lv 1 5' lvl 1 5' lv 1        Prone press up nv            Standing lumbar extensions             TA marches     nv        TA leg exts     nv        Supine pball crushes     nv        TA+TB low rows     nv                     Ther Activity             STS **            Standing marches             Leg press              Pt edu Done  Done           Gait Training                                       Modalities

## 2024-11-19 ENCOUNTER — OFFICE VISIT (OUTPATIENT)
Dept: PHYSICAL THERAPY | Facility: REHABILITATION | Age: 64
End: 2024-11-19
Payer: COMMERCIAL

## 2024-11-19 DIAGNOSIS — M25.552 LEFT HIP PAIN: ICD-10-CM

## 2024-11-19 DIAGNOSIS — G89.29 CHRONIC LEFT-SIDED LOW BACK PAIN WITH LEFT-SIDED SCIATICA: Primary | ICD-10-CM

## 2024-11-19 DIAGNOSIS — M54.42 CHRONIC LEFT-SIDED LOW BACK PAIN WITH LEFT-SIDED SCIATICA: Primary | ICD-10-CM

## 2024-11-19 PROCEDURE — 97140 MANUAL THERAPY 1/> REGIONS: CPT

## 2024-11-19 PROCEDURE — 97110 THERAPEUTIC EXERCISES: CPT

## 2024-11-19 PROCEDURE — 97112 NEUROMUSCULAR REEDUCATION: CPT

## 2024-11-19 NOTE — PROGRESS NOTES
"Daily Note     Today's date: 2024  Patient name: Roxana Ventura  : 1960  MRN: [id number]  Referring provider: Vika Baer MD  Dx:   Encounter Diagnosis     ICD-10-CM    1. Chronic left-sided low back pain with left-sided sciatica  M54.42     G89.29       2. Left hip pain  M25.552           Start Time: 1045  Stop Time: 1130  Total time in clinic (min): 45 minutes    Subjective: Patient reports her back as \"fine\" at start of session.      Objective: See treatment diary below      Assessment: Tolerated treatment well. Patient demonstrated fatigue post treatment, exhibited good technique with therapeutic exercises, and would benefit from continued PT Trialed additional TA TE today with patient reporting left sided back discomfort, cues required for tolerable ranges and increased TA activation, will continue to progress as able next visit.       Plan: Continue per plan of care.  Progress treatment as tolerated.       Precautions: B TKA   Past Medical History:   Diagnosis Date    Arthritis     Diabetes mellitus (HCC)     Hyperlipidemia     Hypertension          Manuals 11/1 11/5 11/8 11/13 11/15 11/19       Lumbar  + L ES STM nv Done  Done +thoracic ES STM Done + thoracic ES STM Done + thoracic ES STM Done + thoracic ES STM       Lumbar distraction pball             Neuro Re-Ed             Bridges nv 3x10 3x12 3x12 3x12 3x12       clamshells             Sidesteps nv 3 laps counter 3 laps counter 5 laps counter 5 laps counter 5 laps table       Standing hip abd TB nv 2x10 b/l 2x10 b/l 3x10 b/l 3x10 b/l 3x12 b/l       Standing hip ext TB nv 2x10 b/l 2x10 b/l 3x10 b/l 3x10 b/l 3x12 b/l       Mini squats nv 2x10  2x10 2x10 2x10       LAQ  nv 2# 3x10 2# 3x10  2# 3x10 2# 3x12        Standing plank with alt arm/leg exts  nv                        Ther Ex             Bike - strength/endurance nv 5' lv 0  5' lv 1 5' lvl 1 5' lv 1 5' lvl 1       Prone press up nv            Standing lumbar extensions  "            TA marches     nv 2x10 pain  ea       TA leg exts     nv nv       Supine pball crushes     nv nv       TA+TB low rows     nv nv                    Ther Activity             STS **            Standing marches             Leg press             Pt edu Done  Done           Gait Training                                       Modalities

## 2024-11-22 ENCOUNTER — HOSPITAL ENCOUNTER (OUTPATIENT)
Dept: NON INVASIVE DIAGNOSTICS | Facility: CLINIC | Age: 64
Discharge: HOME | End: 2024-11-22
Payer: COMMERCIAL

## 2024-11-22 ENCOUNTER — OFFICE VISIT (OUTPATIENT)
Dept: PHYSICAL THERAPY | Facility: REHABILITATION | Age: 64
End: 2024-11-22
Payer: COMMERCIAL

## 2024-11-22 VITALS
HEIGHT: 63 IN | WEIGHT: 168 LBS | HEART RATE: 76 BPM | SYSTOLIC BLOOD PRESSURE: 140 MMHG | DIASTOLIC BLOOD PRESSURE: 78 MMHG | BODY MASS INDEX: 29.77 KG/M2

## 2024-11-22 DIAGNOSIS — I10 ESSENTIAL HYPERTENSION: ICD-10-CM

## 2024-11-22 DIAGNOSIS — G89.29 CHRONIC LEFT-SIDED LOW BACK PAIN WITH LEFT-SIDED SCIATICA: Primary | ICD-10-CM

## 2024-11-22 DIAGNOSIS — M25.552 LEFT HIP PAIN: ICD-10-CM

## 2024-11-22 DIAGNOSIS — M54.42 CHRONIC LEFT-SIDED LOW BACK PAIN WITH LEFT-SIDED SCIATICA: Primary | ICD-10-CM

## 2024-11-22 DIAGNOSIS — R42 LIGHTHEADEDNESS: ICD-10-CM

## 2024-11-22 LAB
AORTIC ROOT: 2.6 CM
APICAL FOUR CHAMBER EJECTION FRACTION: 65 %
ASCENDING AORTA: 3.3 CM
BSA FOR ECHO PROCEDURE: 1.8 M2
E WAVE DECELERATION TIME: 213 MS
E/A RATIO: 0.81
FRACTIONAL SHORTENING: 32 (ref 28–44)
INTERVENTRICULAR SEPTUM IN DIASTOLE (PARASTERNAL SHORT AXIS VIEW): 1.1 CM
INTERVENTRICULAR SEPTUM: 1.1 CM (ref 0.6–1.1)
LAAS-AP2: 17.7 CM2
LAAS-AP4: 13.7 CM2
LEFT ATRIUM SIZE: 3.5 CM
LEFT ATRIUM VOLUME (MOD BIPLANE): 41 ML
LEFT ATRIUM VOLUME INDEX (MOD BIPLANE): 22.8 ML/M2
LEFT INTERNAL DIMENSION IN SYSTOLE: 2.6 CM (ref 2.1–4)
LEFT VENTRICULAR INTERNAL DIMENSION IN DIASTOLE: 3.8 CM (ref 3.5–6)
LEFT VENTRICULAR POSTERIOR WALL IN END DIASTOLE: 1.1 CM
LEFT VENTRICULAR STROKE VOLUME: 37 ML
LVSV (TEICH): 37 ML
MV E'TISSUE VEL-SEP: 6 CM/S
MV PEAK A VEL: 0.8 M/S
MV PEAK E VEL: 65 CM/S
MV STENOSIS PRESSURE HALF TIME: 62 MS
MV VALVE AREA P 1/2 METHOD: 3.55
RIGHT ATRIUM AREA SYSTOLE A4C: 12.4 CM2
RIGHT VENTRICLE ID DIMENSION: 2.7 CM
SL CV LEFT ATRIUM LENGTH A2C: 4.9 CM
SL CV LV EF: 65
SL CV PED ECHO LEFT VENTRICLE DIASTOLIC VOLUME (MOD BIPLANE) 2D: 60 ML
SL CV PED ECHO LEFT VENTRICLE SYSTOLIC VOLUME (MOD BIPLANE) 2D: 24 ML
TRICUSPID ANNULAR PLANE SYSTOLIC EXCURSION: 1.9 CM

## 2024-11-22 PROCEDURE — 97140 MANUAL THERAPY 1/> REGIONS: CPT | Performed by: PHYSICAL THERAPIST

## 2024-11-22 PROCEDURE — 93306 TTE W/DOPPLER COMPLETE: CPT

## 2024-11-22 PROCEDURE — 97110 THERAPEUTIC EXERCISES: CPT | Performed by: PHYSICAL THERAPIST

## 2024-11-22 PROCEDURE — 97112 NEUROMUSCULAR REEDUCATION: CPT | Performed by: PHYSICAL THERAPIST

## 2024-11-22 PROCEDURE — 93306 TTE W/DOPPLER COMPLETE: CPT | Performed by: INTERNAL MEDICINE

## 2024-11-22 NOTE — PROGRESS NOTES
Daily Note     Today's date: 2024  Patient name: Roxana Ventura  : 1960  MRN: [id number]  Referring provider: Vika Baer MD  Dx:   Encounter Diagnosis     ICD-10-CM    1. Chronic left-sided low back pain with left-sided sciatica  M54.42     G89.29       2. Left hip pain  M25.552           Start Time: 1110  Stop Time: 1200  Total time in clinic (min): 50 minutes    Subjective: Patient reports discomfort at start of session secondary to changes in weather.       Objective: See treatment diary below      Assessment: Tolerated treatment well. Evident tension noted to thoracic ES this visit with hemodynamic response noted upon soft tissue mobilization. Initiated additional postural and core strengthening this visit with fair tolerance but significant fatigue noted post-treatment. Patient demonstrated fatigue post treatment, exhibited good technique with therapeutic exercises, and would benefit from continued PT.       Plan: Continue per plan of care.      Precautions: B TKA   Past Medical History:   Diagnosis Date    Arthritis     Diabetes mellitus (HCC)     Hyperlipidemia     Hypertension          Manuals 11/1 11/5 11/8 11/13 11/15 11/19 11/22      Lumbar  + L ES STM nv Done  Done +thoracic ES STM Done + thoracic ES STM Done + thoracic ES STM Done + thoracic ES STM Done + thoracic ES STM      Lumbar distraction pball             Neuro Re-Ed             Bridges nv 3x10 3x12 3x12 3x12 3x12 3x12      clamshells             Sidesteps nv 3 laps counter 3 laps counter 5 laps counter 5 laps counter 5 laps table 5 laps table      Standing hip abd TB nv 2x10 b/l 2x10 b/l 3x10 b/l 3x10 b/l 3x12 b/l 3x10 b/l      Standing hip ext TB nv 2x10 b/l 2x10 b/l 3x10 b/l 3x10 b/l 3x12 b/l 3x10 b/l      Mini squats nv 2x10  2x10 2x10 2x10       LAQ  nv 2# 3x10 2# 3x10  2# 3x10 2# 3x12  2# 3x12      Standing plank with alt arm/leg exts  nv                        Ther Ex             Bike - strength/endurance nv  "5' lv 0  5' lv 1 5' lvl 1 5' lv 1 5' lvl 1 5' lv 1      Prone press up nv            Standing lumbar extensions             TA marches     nv 2x10 pain  ea       TA leg exts     nv nv P!      Supine pball crushes     nv nv 2x10x5\"      TA+TB low rows     nv nv Prpl 2x10      TA + TB mid rows       Prpl 2x10      Ther Activity             STS **            Standing marches             Leg press             Pt edu Done  Done           Gait Training                                       Modalities                                                        "

## 2024-11-25 NOTE — PROGRESS NOTES
Daily Note     Today's date: 6/15/2021  Patient name: Melia Rosa  : 1960  MRN: [id number]  Referring provider: Abigail Rubio  Dx:   Encounter Diagnosis     ICD-10-CM    1  Primary osteoarthritis of right knee  M17 11    2  Status post total right knee replacement  Z96 651    3  Chronic pain of right knee  M25 561     G89 29        Start Time: 845  Stop Time: 930  Total time in clinic (min): 45 minutes    Subjective: Patient reports "it's good " Patient is scheduled for MELAN[facility] with Dr Siena Hamilton on 2021  Objective: See treatment diary below      Assessment: Tolerated treatment fair  Increased time spent on manual therapy this visit to address muscular tightness with good tolerance  Continues to have significant guarding with knee flexion/bike rocks despite maximal verbal and tactile cues from PT and patient's daughter  Improved quadriceps engagement and range of motion noted with SAQ this visit  Patient demonstrated fatigue post treatment and exhibited good technique with therapeutic exercises  Plan: Continue per plan of care        Precautions: see protocol   * Indicates part of HEP     Daily Treatment Diary     Manuals 5/21 5/24 5/26 5/28 6/1 6/3 6/11 6/15    PROM right knee   Flexion in sitting         Supine extension overpressure   done         Trial prone PROM             Patella mobs        Done    Scar massage   done         Hip flexor stretch   Done in sidelying Done Done  Done 8' Done 8' Done 15' total    Hamstring stretch   done Done done Done done Done    Hip adductor stretch   done Done 10' total Done 10' total       Neuro Re-ed            Quad sets*            SAQ W/ stim W/ stim   W/ stim W/ stim W/ stim No stim 3x10                Heel slides w strap*            Gastroc stretch strap            Hamstring stretch            Wedge stretch            Lateral weight shifting 2x10  2x10          Therapeutic Exercise            Bike Rocks 10' overpressure from Initial visit 11/25/2024 Colonoscopy 8/1/2024 performed for history of polyps: BBPS score 9, LANETTE normal, terminal ileum appeared normal, 2 small sessile polyps removed in the cecum using cold snare, 4 small sessile polyps removed from the transverse colon using cold snare, 2 small sessile polyps removed from the descending colon using cold snare, 3 small sessile polyps in the sigmoid colon removed with cold snare, grade 2 nonbleeding internal hemorrhoids found on retroflexion.  All polyps removed consistent with tubular adenoma/sessile serrated adenoma. He is feeling well, no GI symptoms today. therapist Rocks 10' overpressure from therapist rocks 10' w/ overpressure from PT Rocks 8' w overpressure Rocks 8' w/ overpressure Rocks 8' Rocks 8' Rocks 10' assisted    Patient education            SLR flexion w/ quad set*            SLR hip abduction             Bridging*            Clamshells            Heel raises            TKE  OTB 2x10 OTB 2x10 nv OTB 2x10 OTB 2x10 OTB 3x10     Mini squats 2x10 2x10          Bilateral hip abduction   OTB 2x10 OTB 2x10 OTB 3x10 OTB 3x10 OTB 3x10 OTB 3x10    Bilateral hip adduction   Ball squeeze 2x10 2x10 2x10  3x10 3x10 3x10    LAQ        2x10    Standing marching            Tests and measures    8'        Therapeutic Activity            Leg press             Step ups  4'' 5x           Lateral step ups            Sit to stand to table (knee flexion) 2x10  2x10  2x10 2x10 2x10  2x10      Lunges onto step w/ overpressure from therapist 2x10  2x19          Gait training w RW            Standing march to [facility]            Modalities            NMES 10'  SAQ 10' SAQ   10' SAQ 10' SAQ lv 40 10' SAQ L40     CP PRN

## 2024-11-26 ENCOUNTER — OFFICE VISIT (OUTPATIENT)
Dept: PHYSICAL THERAPY | Facility: REHABILITATION | Age: 64
End: 2024-11-26
Payer: COMMERCIAL

## 2024-11-26 DIAGNOSIS — M25.552 LEFT HIP PAIN: ICD-10-CM

## 2024-11-26 DIAGNOSIS — G89.29 CHRONIC LEFT-SIDED LOW BACK PAIN WITH LEFT-SIDED SCIATICA: Primary | ICD-10-CM

## 2024-11-26 DIAGNOSIS — M54.42 CHRONIC LEFT-SIDED LOW BACK PAIN WITH LEFT-SIDED SCIATICA: Primary | ICD-10-CM

## 2024-11-26 PROCEDURE — 97110 THERAPEUTIC EXERCISES: CPT | Performed by: PHYSICAL THERAPIST

## 2024-11-26 PROCEDURE — 97140 MANUAL THERAPY 1/> REGIONS: CPT | Performed by: PHYSICAL THERAPIST

## 2024-11-26 PROCEDURE — 97112 NEUROMUSCULAR REEDUCATION: CPT | Performed by: PHYSICAL THERAPIST

## 2024-11-26 NOTE — PROGRESS NOTES
"Daily Note     Today's date: 2024  Patient name: Roxana Ventura  : 1960  MRN: [id number]  Referring provider: Vika Baer MD  Dx:   Encounter Diagnosis     ICD-10-CM    1. Chronic left-sided low back pain with left-sided sciatica  M54.42     G89.29       2. Left hip pain  M25.552           Start Time: 1600  Stop Time: 1645  Total time in clinic (min): 45 minutes    Subjective: Patient reports pain in the mid back today stating \"because of the cold.\" She used an ice pack prior to therapy.      Objective: See treatment diary below      Assessment: Tolerated treatment well. Patient demonstrated fatigue post treatment, exhibited good technique with therapeutic exercises, and would benefit from continued PT. Evident muscle tension at thoracic ES with manual therapy. May benefit from additional focus on thoracic spine mobility and strength.      Plan: Continue per plan of care.      Precautions: B TKA   Past Medical History:   Diagnosis Date    Arthritis     Diabetes mellitus (HCC)     Hyperlipidemia     Hypertension          Manuals 11/1 11/5 11/8 11/13 11/15 11/19 11/22 11/26     Lumbar  + L ES STM nv Done  Done +thoracic ES STM Done + thoracic ES STM Done + thoracic ES STM Done + thoracic ES STM Done + thoracic ES STM Thoracic  + thoracic ES STM     Lumbar distraction pball             Neuro Re-Ed             Bridges nv 3x10 3x12 3x12 3x12 3x12 3x12 3x12     clamshells             Sidesteps nv 3 laps counter 3 laps counter 5 laps counter 5 laps counter 5 laps table 5 laps table 5 laps table     Standing hip abd TB nv 2x10 b/l 2x10 b/l 3x10 b/l 3x10 b/l 3x12 b/l 3x10 b/l 3x10 b/l     Standing hip ext TB nv 2x10 b/l 2x10 b/l 3x10 b/l 3x10 b/l 3x12 b/l 3x10 b/l 3x10 b/l     Mini squats nv 2x10  2x10 2x10 2x10       LAQ  nv 2# 3x10 2# 3x10  2# 3x10 2# 3x12  2# 3x12 2# 3x12     Standing plank with alt arm/leg exts  nv                        Ther Ex             Bike - strength/endurance nv " Patient requesting refill on amphetamine-dextroamphetamine (ADDERALL) 10 MG tablet   "5' lv 0  5' lv 1 5' lvl 1 5' lv 1 5' lvl 1 5' lv 1 5' lv 1     Prone press up nv            Standing lumbar extensions             TA marches     nv 2x10 pain  ea       TA leg exts     nv nv P!      Thoracic ext stretch over 1/2 foam             Supine pball crushes     nv nv 2x10x5\" 2x10x5\"     TA+TB low rows     nv nv Prpl 2x10 Prpl 2x10     TA + TB mid rows       Prpl 2x10 Prpl 2x10     No Moneys        nv     Ther Activity             STS **            Standing marches             Leg press             Pt edu Done  Done           Gait Training                                       Modalities                                                          "

## 2024-11-27 ENCOUNTER — RESULTS FOLLOW-UP (OUTPATIENT)
Dept: CARDIOLOGY CLINIC | Facility: CLINIC | Age: 64
End: 2024-11-27

## 2024-11-27 NOTE — TELEPHONE ENCOUNTER
----- Message from Nighat MURGUIA sent at 11/27/2024 12:43 PM EST -----    ----- Message -----  From: Williams Calderon MD  Sent: 11/27/2024  11:30 AM EST  To: Nighat Mckinley MA    Please let patient know her echo demonstrated normal cardiac function with no significant valvular abnormalities. Thanks.

## 2024-11-29 ENCOUNTER — APPOINTMENT (OUTPATIENT)
Dept: PHYSICAL THERAPY | Facility: REHABILITATION | Age: 64
End: 2024-11-29
Payer: COMMERCIAL

## 2024-11-29 DIAGNOSIS — M25.552 LEFT HIP PAIN: ICD-10-CM

## 2024-11-29 DIAGNOSIS — G89.29 CHRONIC LEFT-SIDED LOW BACK PAIN WITH LEFT-SIDED SCIATICA: Primary | ICD-10-CM

## 2024-11-29 DIAGNOSIS — M54.42 CHRONIC LEFT-SIDED LOW BACK PAIN WITH LEFT-SIDED SCIATICA: Primary | ICD-10-CM

## 2024-11-29 NOTE — PROGRESS NOTES
"Daily Note     Today's date: 2024  Patient name: Roxana Ventura  : 1960  MRN: [id number]  Referring provider: Vika Baer MD  Dx:   Encounter Diagnosis     ICD-10-CM    1. Chronic left-sided low back pain with left-sided sciatica  M54.42     G89.29       2. Left hip pain  M25.552                      Subjective: ***      Objective: See treatment diary below      Assessment: Tolerated treatment {Tolerated treatment :9961860681}. Patient {assessment:3925994105}      Plan: {PLAN:6801053224}     Precautions: B TKA   Past Medical History:   Diagnosis Date    Arthritis     Diabetes mellitus (HCC)     Hyperlipidemia     Hypertension          Manuals 11/1 11/5 11/8 11/13 11/15 11/19 11/22 11/26 11/29    Lumbar  + L ES STM nv Done  Done +thoracic ES STM Done + thoracic ES STM Done + thoracic ES STM Done + thoracic ES STM Done + thoracic ES STM Thoracic  + thoracic ES STM     Lumbar distraction pball             Neuro Re-Ed             Bridges nv 3x10 3x12 3x12 3x12 3x12 3x12 3x12     clamshells             Sidesteps nv 3 laps counter 3 laps counter 5 laps counter 5 laps counter 5 laps table 5 laps table 5 laps table     Standing hip abd TB nv 2x10 b/l 2x10 b/l 3x10 b/l 3x10 b/l 3x12 b/l 3x10 b/l 3x10 b/l     Standing hip ext TB nv 2x10 b/l 2x10 b/l 3x10 b/l 3x10 b/l 3x12 b/l 3x10 b/l 3x10 b/l     Mini squats nv 2x10  2x10 2x10 2x10       LAQ  nv 2# 3x10 2# 3x10  2# 3x10 2# 3x12  2# 3x12 2# 3x12     Standing plank with alt arm/leg exts  nv                        Ther Ex             Bike - strength/endurance nv 5' lv 0  5' lv 1 5' lvl 1 5' lv 1 5' lvl 1 5' lv 1 5' lv 1     Prone press up nv            Standing lumbar extensions             TA marches     nv 2x10 pain  ea       TA leg exts     nv nv P!      Thoracic ext stretch over 1/2 foam             Supine pball crushes     nv nv 2x10x5\" 2x10x5\"     TA+TB low rows     nv nv Prpl 2x10 Prpl 2x10     TA + TB mid rows       Prpl 2x10 Prpl 2x10 "     No Moneys        nv     Ther Activity             STS **            Standing marches             Leg press             Pt edu Done  Done           Gait Training                                       Modalities

## 2024-12-04 ENCOUNTER — OFFICE VISIT (OUTPATIENT)
Dept: PHYSICAL THERAPY | Facility: REHABILITATION | Age: 64
End: 2024-12-04
Payer: COMMERCIAL

## 2024-12-04 DIAGNOSIS — M25.552 LEFT HIP PAIN: ICD-10-CM

## 2024-12-04 DIAGNOSIS — G89.29 CHRONIC LEFT-SIDED LOW BACK PAIN WITH LEFT-SIDED SCIATICA: Primary | ICD-10-CM

## 2024-12-04 DIAGNOSIS — M54.42 CHRONIC LEFT-SIDED LOW BACK PAIN WITH LEFT-SIDED SCIATICA: Primary | ICD-10-CM

## 2024-12-04 PROCEDURE — 97112 NEUROMUSCULAR REEDUCATION: CPT | Performed by: PHYSICAL THERAPIST

## 2024-12-04 PROCEDURE — 97110 THERAPEUTIC EXERCISES: CPT | Performed by: PHYSICAL THERAPIST

## 2024-12-04 PROCEDURE — 97140 MANUAL THERAPY 1/> REGIONS: CPT | Performed by: PHYSICAL THERAPIST

## 2024-12-04 NOTE — PROGRESS NOTES
Daily Note     Today's date: 2024  Patient name: Roxana Ventura  : 1960  MRN: [id number]  Referring provider: Vika Baer MD  Dx:   Encounter Diagnosis     ICD-10-CM    1. Chronic left-sided low back pain with left-sided sciatica  M54.42     G89.29       2. Left hip pain  M25.552           Start Time:   Stop Time:   Total time in clinic (min): 48 minutes    Subjective: No new complaints.      Objective: See treatment diary below      Assessment: Tolerated treatment well. Significant fatigue continues to be noted throughout session with all TE. Initiated additional core stabilization this visit with alt arm/leg reaches with left leg buckling noted with progressive reps. Patient demonstrated fatigue post treatment, exhibited good technique with therapeutic exercises, and would benefit from continued PT.      Plan: Continue per plan of care.      Precautions: B TKA   Past Medical History:   Diagnosis Date    Arthritis     Diabetes mellitus (HCC)     Hyperlipidemia     Hypertension          Manuals 11/1 11/5 11/8 11/13 11/15 11/19 11/22 11/26 12/4    Lumbar  + L ES STM nv Done  Done +thoracic ES STM Done + thoracic ES STM Done + thoracic ES STM Done + thoracic ES STM Done + thoracic ES STM Thoracic  + thoracic ES STM Thoracic  + thoracic ES STM    Lumbar distraction pball             Neuro Re-Ed             Bridges nv 3x10 3x12 3x12 3x12 3x12 3x12 3x12 3x15    clamshells             Sidesteps nv 3 laps counter 3 laps counter 5 laps counter 5 laps counter 5 laps table 5 laps table 5 laps table np    Standing hip abd TB nv 2x10 b/l 2x10 b/l 3x10 b/l 3x10 b/l 3x12 b/l 3x10 b/l 3x10 b/l 2x10 b/l    Standing hip ext TB nv 2x10 b/l 2x10 b/l 3x10 b/l 3x10 b/l 3x12 b/l 3x10 b/l 3x10 b/l 2x10 b/l    Mini squats nv 2x10  2x10 2x10 2x10       LAQ  nv 2# 3x10 2# 3x10  2# 3x10 2# 3x12  2# 3x12 2# 3x12     Standing plank with alt arm/leg exts  nv       10x                 Ther Ex           "   Bike - strength/endurance nv 5' lv 0  5' lv 1 5' lvl 1 5' lv 1 5' lvl 1 5' lv 1 5' lv 1 5' lv 1    Prone press up nv            Standing lumbar extensions             TA marches     nv 2x10 pain  ea       TA leg exts     nv nv P!      Thoracic ext stretch over 1/2 foam             Supine pball crushes     nv nv 2x10x5\" 2x10x5\" 2x10x5\"    TA+TB low rows     nv nv Prpl 2x10 Prpl 2x10 Prpl 2x10    TA + TB mid rows       Prpl 2x10 Prpl 2x10 Prpl 2x10    No Moneys        nv Pink 2x10    Ther Activity             STS **        nv    Standing marches             Leg press             Pt edu Done  Done           Gait Training                                       Modalities                                                              "

## 2024-12-05 ENCOUNTER — OFFICE VISIT (OUTPATIENT)
Dept: FAMILY MEDICINE CLINIC | Facility: CLINIC | Age: 64
End: 2024-12-05
Payer: COMMERCIAL

## 2024-12-05 VITALS
DIASTOLIC BLOOD PRESSURE: 62 MMHG | HEIGHT: 63 IN | BODY MASS INDEX: 29.84 KG/M2 | TEMPERATURE: 97 F | RESPIRATION RATE: 18 BRPM | WEIGHT: 168.4 LBS | OXYGEN SATURATION: 98 % | HEART RATE: 77 BPM | SYSTOLIC BLOOD PRESSURE: 110 MMHG

## 2024-12-05 DIAGNOSIS — M54.16 LUMBAR RADICULOPATHY: ICD-10-CM

## 2024-12-05 DIAGNOSIS — G89.29 CHRONIC LEFT HIP PAIN: ICD-10-CM

## 2024-12-05 DIAGNOSIS — Z12.11 COLON CANCER SCREENING: ICD-10-CM

## 2024-12-05 DIAGNOSIS — I10 ESSENTIAL HYPERTENSION: ICD-10-CM

## 2024-12-05 DIAGNOSIS — M25.552 CHRONIC LEFT HIP PAIN: ICD-10-CM

## 2024-12-05 DIAGNOSIS — E78.5 DYSLIPIDEMIA: ICD-10-CM

## 2024-12-05 DIAGNOSIS — E11.9 TYPE 2 DIABETES MELLITUS WITHOUT COMPLICATION, WITHOUT LONG-TERM CURRENT USE OF INSULIN (HCC): Primary | ICD-10-CM

## 2024-12-05 DIAGNOSIS — K63.5 POLYP OF COLON, UNSPECIFIED PART OF COLON, UNSPECIFIED TYPE: ICD-10-CM

## 2024-12-05 PROCEDURE — 99214 OFFICE O/P EST MOD 30 MIN: CPT | Performed by: FAMILY MEDICINE

## 2024-12-05 RX ORDER — PRAVASTATIN SODIUM 40 MG
40 TABLET ORAL
Qty: 90 TABLET | Refills: 3 | Status: SHIPPED | OUTPATIENT
Start: 2024-12-05

## 2024-12-05 RX ORDER — DICLOFENAC POTASSIUM 50 MG/1
50 TABLET, FILM COATED ORAL 2 TIMES DAILY
Qty: 60 TABLET | Refills: 0 | Status: SHIPPED | OUTPATIENT
Start: 2024-12-05

## 2024-12-05 RX ORDER — TIZANIDINE 2 MG/1
2 TABLET ORAL
Qty: 30 TABLET | Refills: 0 | Status: SHIPPED | OUTPATIENT
Start: 2024-12-05

## 2024-12-05 RX ORDER — GABAPENTIN 300 MG/1
300 CAPSULE ORAL
Qty: 90 CAPSULE | Refills: 0 | Status: SHIPPED | OUTPATIENT
Start: 2024-12-05

## 2024-12-05 RX ORDER — GABAPENTIN 300 MG/1
300 CAPSULE ORAL
Qty: 90 CAPSULE | Refills: 0 | Status: SHIPPED | OUTPATIENT
Start: 2024-12-05 | End: 2024-12-05 | Stop reason: SDUPTHER

## 2024-12-05 RX ORDER — ATENOLOL AND CHLORTHALIDONE TABLET 50; 25 MG/1; MG/1
1 TABLET ORAL DAILY
Qty: 90 TABLET | Refills: 0 | Status: SHIPPED | OUTPATIENT
Start: 2024-12-05

## 2024-12-05 NOTE — PROGRESS NOTES
Subjective:      Patient ID: Roxana Ventura is a 64 y.o. female.    Here for follow up    With type 2 diabetes, hyperlipidemia, bilateral TKA, hypertension  Chronic low back pain- did not start gabapentin,   Does not want immunizations, never had flu, shingrix or tdap recently  Her with daughter, I offered a tele- , she refused, would like daughter to translate        Past Medical History:   Diagnosis Date    Arthritis     Diabetes mellitus (HCC)     Hyperlipidemia     Hypertension        Family History   Problem Relation Age of Onset    Arthritis Mother     Uterine cancer Mother     Arthritis Father     Bone cancer Father     Breast cancer Sister 45    No Known Problems Sister     No Known Problems Sister     No Known Problems Sister     No Known Problems Sister     No Known Problems Sister     No Known Problems Daughter     No Known Problems Daughter     No Known Problems Daughter     Prostate cancer Maternal Grandfather     No Known Problems Maternal Aunt     No Known Problems Maternal Aunt     No Known Problems Maternal Aunt     No Known Problems Maternal Aunt     No Known Problems Maternal Aunt     No Known Problems Paternal Aunt     No Known Problems Paternal Aunt     No Known Problems Paternal Aunt     No Known Problems Paternal Aunt     Breast cancer Cousin     Breast cancer Cousin        Past Surgical History:   Procedure Laterality Date    COLONOSCOPY      AL ARTHRP KNE CONDYLE&PLATU MEDIAL&LAT COMPARTMENTS Right 03/30/2021    Procedure: KNEE TOTAL ARTHROPLASTY;  Surgeon: Ryan Bush DO;  [facility];  Service: Orthopedics    AL ARTHRP KNE CONDYLE&PLATU MEDIAL&LAT COMPARTMENTS Left 1/10/2023    Procedure: ARTHROPLASTY KNEE TOTAL;  Surgeon: Ryan Bush DO;  [facility];  Service: Orthopedics    AL MANIPULATION KNEE JOINT UNDER GENERAL ANESTHESIA Right 06/17/2021    Procedure: MANIPULATION JOINT KNEE;  Surgeon: Ryan Bush DO;  [facility];  Service:  Orthopedics    TUBAL LIGATION          reports that she has been smoking cigarettes. She started smoking about 49 years ago. She has a 11.3 pack-year smoking history. She has never used smokeless tobacco. She reports that she does not currently use alcohol. She reports that she does not use drugs.      Current Outpatient Medications:     atenolol-chlorthalidone (TENORETIC) 50-25 mg per tablet, Take 1 tablet by mouth daily, Disp: 90 tablet, Rfl: 0    diclofenac potassium (CATAFLAM) 50 mg tablet, Take 1 tablet (50 mg total) by mouth 2 (two) times a day, Disp: 60 tablet, Rfl: 0    gabapentin (NEURONTIN) 300 mg capsule, Take 1 capsule (300 mg total) by mouth daily at bedtime, Disp: 90 capsule, Rfl: 0    glucose blood (OneTouch Verio) test strip, TEST TWICE DAILY, Disp: 100 strip, Rfl: 1    Lancets (OneTouch Delica Plus [id number]) MISC, Use 1 each 2 (two) times a day, Disp: 200 each, Rfl: 1    metFORMIN (GLUCOPHAGE) 500 mg tablet, Take 1 tablet (500 mg total) by mouth 2 (two) times a day with meals, Disp: 180 tablet, Rfl: 1    pravastatin (PRAVACHOL) 40 mg tablet, Take 1 tablet (40 mg total) by mouth daily at bedtime, Disp: 90 tablet, Rfl: 3    tiZANidine (ZANAFLEX) 2 mg tablet, Take 1 tablet (2 mg total) by mouth daily at bedtime as needed for muscle spasms, Disp: 30 tablet, Rfl: 0    The following portions of the patient's history were reviewed and updated as appropriate: allergies, current medications, past family history, past medical history, past social history, past surgical history and problem list.    Review of Systems   Constitutional:  Negative for fatigue and fever.   HENT:  Negative for congestion, facial swelling, mouth sores, rhinorrhea, sore throat and trouble swallowing.    Eyes:  Negative for pain and redness.   Respiratory:  Negative for cough, shortness of breath and wheezing.    Cardiovascular:  Negative for chest pain, palpitations and leg swelling.   Gastrointestinal:  Negative for abdominal pain,  "blood in stool, constipation, diarrhea and nausea.   Genitourinary:  Negative for dysuria, hematuria and urgency.   Musculoskeletal:  Negative for arthralgias, back pain and myalgias.   Skin:  Negative for rash and wound.   Neurological:  Negative for seizures, syncope and headaches.   Hematological:  Negative for adenopathy.   Psychiatric/Behavioral:  Negative for agitation and behavioral problems.          PHQ-2/9 Depression Screening    Little interest or pleasure in doing things: 0 - not at all  Feeling down, depressed, or hopeless: 0 - not at all  PHQ-2 Score: 0  PHQ-2 Interpretation: Negative depression screen             Objective:    /62 (BP Location: Left arm, Patient Position: Sitting, Cuff Size: Adult)   Pulse 77   Temp (!) 97 °F (36.1 °C) (Tympanic)   Resp 18   Ht 5' 3\" (1.6 m)   Wt 76.4 kg (168 lb 6.4 oz)   SpO2 98%   BMI 29.83 kg/m²      Physical Exam  Vitals and nursing note reviewed.   Constitutional:       Appearance: Normal appearance. She is well-developed. She is obese.   HENT:      Head: Normocephalic and atraumatic.      Right Ear: External ear normal.      Left Ear: External ear normal.      Nose: Nose normal.   Eyes:      General: No scleral icterus.        Right eye: No discharge.         Left eye: No discharge.      Conjunctiva/sclera: Conjunctivae normal.      Pupils: Pupils are equal, round, and reactive to light.   Neck:      Thyroid: No thyromegaly.   Cardiovascular:      Rate and Rhythm: Normal rate and regular rhythm.      Heart sounds: Normal heart sounds. No murmur heard.     No gallop.   Pulmonary:      Effort: Pulmonary effort is normal.      Breath sounds: Normal breath sounds. No wheezing or rales.   Abdominal:      General: There is no distension.      Palpations: Abdomen is soft.      Tenderness: There is no abdominal tenderness.   Musculoskeletal:         General: Tenderness (lumbar spine) present. No deformity.      Cervical back: Normal range of motion and neck " supple.      Right lower leg: No edema.      Left lower leg: No edema.   Lymphadenopathy:      Cervical: No cervical adenopathy.   Skin:     Findings: No erythema or rash.   Neurological:      General: No focal deficit present.      Mental Status: She is alert. Mental status is at baseline.   Psychiatric:         Mood and Affect: Mood normal.         Behavior: Behavior normal.           Recent Results (from the past 52 weeks)    Diabetes Eye Exam    Collection Time: 03/07/24  3:13 PM   Result Value Ref Range    Severity Normal     Right Eye Diabetic Retinopathy None     Left Eye Diabetic Retinopathy None    POCT hemoglobin A1c    Collection Time: 03/27/24  4:46 PM   Result Value Ref Range    Hemoglobin A1C 7.0 (A) <=6.5   COLOGUARD    Collection Time: 09/25/24  1:00 AM   Result Value Ref Range    Cologuard Result Negative Negative   Cologuard    Collection Time: 09/25/24  5:00 AM   Result Value Ref Range    Cologuard Result Negative Negative   Fingerstick Glucose (POCT)    Collection Time: 09/26/24  7:03 PM   Result Value Ref Range    POC Glucose 184 (H) 65 - 140 mg/dl   ECG 12 lead    Collection Time: 09/26/24  7:09 PM   Result Value Ref Range    Ventricular Rate 66 BPM    Atrial Rate 66 BPM    LA Interval 188 ms    QRSD Interval 68 ms    QT Interval 388 ms    QTC Interval 406 ms    P Axis 31 degrees    QRS Axis 61 degrees    T Wave Axis 24 degrees   CBC and differential    Collection Time: 09/26/24  7:15 PM   Result Value Ref Range    WBC 6.16 4.31 - 10.16 Thousand/uL    RBC 4.45 3.81 - 5.12 Million/uL    Hemoglobin 14.1 11.5 - 15.4 g/dL    Hematocrit 42.4 34.8 - 46.1 %    MCV 95 82 - 98 fL    MCH 31.7 26.8 - 34.3 pg    MCHC 33.3 31.4 - 37.4 g/dL    RDW 12.2 11.6 - 15.1 %    MPV 9.9 8.9 - 12.7 fL    Platelets 262 149 - 390 Thousands/uL    nRBC 0 /100 WBCs    Segmented % 29 (L) 43 - 75 %    Immature Grans % 0 0 - 2 %    Lymphocytes % 63 (H) 14 - 44 %    Monocytes % 6 4 - 12 %    Eosinophils Relative 1 0 - 6 %     "Basophils Relative 1 0 - 1 %    Absolute Neutrophils 1.77 (L) 1.85 - 7.62 Thousands/µL    Absolute Immature Grans 0.01 0.00 - 0.20 Thousand/uL    Absolute Lymphocytes 3.91 0.60 - 4.47 Thousands/µL    Absolute Monocytes 0.37 0.17 - 1.22 Thousand/µL    Eosinophils Absolute 0.07 0.00 - 0.61 Thousand/µL    Basophils Absolute 0.03 0.00 - 0.10 Thousands/µL   Comprehensive metabolic panel    Collection Time: 09/26/24  7:15 PM   Result Value Ref Range    Sodium 137 135 - 147 mmol/L    Potassium 3.3 (L) 3.5 - 5.3 mmol/L    Chloride 102 96 - 108 mmol/L    CO2 24 21 - 32 mmol/L    ANION GAP 11 4 - 13 mmol/L    BUN 20 5 - 25 mg/dL    Creatinine 0.87 0.60 - 1.30 mg/dL    Glucose 197 (H) 65 - 140 mg/dL    Calcium 9.7 8.4 - 10.2 mg/dL    AST 21 13 - 39 U/L    ALT 20 7 - 52 U/L    Alkaline Phosphatase 52 34 - 104 U/L    Total Protein 6.8 6.4 - 8.4 g/dL    Albumin 4.3 3.5 - 5.0 g/dL    Total Bilirubin 0.30 0.20 - 1.00 mg/dL    eGFR 71 ml/min/1.73sq m   Magnesium    Collection Time: 09/26/24  7:15 PM   Result Value Ref Range    Magnesium 2.0 1.9 - 2.7 mg/dL   HS Troponin 0hr (reflex protocol)    Collection Time: 09/26/24  7:15 PM   Result Value Ref Range    hs TnI 0hr 3 \"Refer to ACS Flowchart\"- see link ng/L   B-Type Natriuretic Peptide(BNP)    Collection Time: 09/26/24  7:15 PM   Result Value Ref Range    BNP 16 0 - 100 pg/mL   APTT    Collection Time: 09/26/24  7:15 PM   Result Value Ref Range    PTT 27 23 - 34 seconds   Protime-INR    Collection Time: 09/26/24  7:15 PM   Result Value Ref Range    Protime 13.3 12.3 - 15.0 seconds    INR 0.97 0.85 - 1.19   COVID19, Influenza A/B, RSV PCR, [facility]    Collection Time: 09/26/24  7:15 PM    Specimen: Nose; Nares   Result Value Ref Range    SARS-CoV-2 Negative Negative    INFLUENZA A PCR Negative Negative    INFLUENZA B PCR Negative Negative    RSV PCR Negative Negative   HS Troponin I 2hr    Collection Time: 09/26/24  9:44 PM   Result Value Ref Range    hs TnI 2hr 3 \"Refer to ACS " "Flowchart\"- see link ng/L    Delta 2hr hsTnI 0 <20 ng/L   CBC and differential    Collection Time: 10/11/24  9:42 AM   Result Value Ref Range    WBC 4.90 4.31 - 10.16 Thousand/uL    RBC 4.71 3.81 - 5.12 Million/uL    Hemoglobin 14.7 11.5 - 15.4 g/dL    Hematocrit 45.7 34.8 - 46.1 %    MCV 97 82 - 98 fL    MCH 31.2 26.8 - 34.3 pg    MCHC 32.2 31.4 - 37.4 g/dL    RDW 12.2 11.6 - 15.1 %    MPV 10.2 8.9 - 12.7 fL    Platelets 266 149 - 390 Thousands/uL    nRBC 0 /100 WBCs    Segmented % 33 (L) 43 - 75 %    Immature Grans % 0 0 - 2 %    Lymphocytes % 58 (H) 14 - 44 %    Monocytes % 7 4 - 12 %    Eosinophils Relative 1 0 - 6 %    Basophils Relative 1 0 - 1 %    Absolute Neutrophils 1.60 (L) 1.85 - 7.62 Thousands/µL    Absolute Immature Grans 0.01 0.00 - 0.20 Thousand/uL    Absolute Lymphocytes 2.87 0.60 - 4.47 Thousands/µL    Absolute Monocytes 0.34 0.17 - 1.22 Thousand/µL    Eosinophils Absolute 0.05 0.00 - 0.61 Thousand/µL    Basophils Absolute 0.03 0.00 - 0.10 Thousands/µL   Comprehensive metabolic panel    Collection Time: 10/11/24  9:42 AM   Result Value Ref Range    Sodium 139 135 - 147 mmol/L    Potassium 4.5 3.5 - 5.3 mmol/L    Chloride 100 96 - 108 mmol/L    CO2 33 (H) 21 - 32 mmol/L    ANION GAP 6 4 - 13 mmol/L    BUN 20 5 - 25 mg/dL    Creatinine 0.85 0.60 - 1.30 mg/dL    Glucose, Fasting 144 (H) 65 - 99 mg/dL    Calcium 10.7 (H) 8.4 - 10.2 mg/dL    AST 19 13 - 39 U/L    ALT 23 7 - 52 U/L    Alkaline Phosphatase 53 34 - 104 U/L    Total Protein 7.2 6.4 - 8.4 g/dL    Albumin 4.8 3.5 - 5.0 g/dL    Total Bilirubin 0.52 0.20 - 1.00 mg/dL    eGFR 73 ml/min/1.73sq m   Lipid panel    Collection Time: 10/11/24  9:42 AM   Result Value Ref Range    Cholesterol 143 See Comment mg/dL    Triglycerides 151 (H) See Comment mg/dL    HDL, Direct 39 (L) >=50 mg/dL    LDL Calculated 74 0 - 100 mg/dL    Non-HDL-Chol (CHOL-HDL) 104 mg/dl   Hemoglobin A1C    Collection Time: 10/11/24  9:42 AM   Result Value Ref Range    Hemoglobin " A1C 7.2 (H) Normal 4.0-5.6%; PreDiabetic 5.7-6.4%; Diabetic >=6.5%; Glycemic control for adults with diabetes <7.0% %     mg/dl   TSH, 3rd generation with Free T4 reflex    Collection Time: 10/11/24  9:42 AM   Result Value Ref Range    TSH 3RD GENERATON 0.858 0.450 - 4.500 uIU/mL   Albumin / creatinine urine ratio    Collection Time: 10/11/24  4:26 PM   Result Value Ref Range    Creatinine, Ur 83.2 Reference range not established. mg/dL    Albumin,U,Random <7.0 <20.0 mg/L    Albumin Creat Ratio     Echo complete w/ contrast if indicated    Collection Time: 11/22/24  3:41 PM   Result Value Ref Range    RAA A4C 12.4 cm2    LA Volume Index (BP) 22.8 mL/m2    MV Peak A Cody 0.8 m/s    MV stenosis pressure 1/2 time 62 ms    MV Peak E Cody 65 cm/s    E wave deceleration time 213 ms    E/A ratio 0.81     MV valve area p 1/2 method 3.55     RVID d 2.7 cm    A4C EF 65 %    Left ventricular stroke volume (2D) 37.00 mL    IVSd 1.10 cm    Tricuspid annular plane systolic excursion 1.90 cm    Ao root 2.60 cm    LVPWd 1.10 cm    LA size 3.5 cm    Asc Ao 3.3 cm    LA volume (BP) 41 mL    FS 32 28 - 44    LVIDS 2.60 cm    IVS 1.1 cm    LVIDd 3.80 cm    LA length (A2C) 4.90 cm    LEFT VENTRICLE SYSTOLIC VOLUME (MOD BIPLANE) 2D 24 mL    LV DIASTOLIC VOLUME (MOD BIPLANE) 2D 60 mL    Left Atrium Area-systolic Four Chamber 13.7 cm2    Left Atrium Area-systolic Apical Two Chamber 17.7 cm2    MV E' Tissue Velocity Septal 6 cm/s    LVSV, 2D 37 mL    BSA 1.8 m2    LV EF 65        Laboratory Results: I have personally reviewed the pertinent laboratory results/reports     Radiology/Other Diagnostic Testing Results: I have reviewed the following imaging and agree with the interpretation below.    Echo complete w/ contrast if indicated  Result Date: 11/22/2024    Left Ventricle: Left ventricular cavity size is normal. Wall thickness is mildly increased. The left ventricular ejection fraction is 65%. Systolic function is normal. Wall motion  "is normal. Diastolic function is normal for age.        Assessment/Plan:  1. Type 2 diabetes mellitus without complication, without long-term current use of insulin (HCC)  -     Hemoglobin A1C; Future  -     Comprehensive metabolic panel; Future  -     metFORMIN (GLUCOPHAGE) 500 mg tablet; Take 1 tablet (500 mg total) by mouth 2 (two) times a day with meals  2. Lumbar radiculopathy  -     gabapentin (NEURONTIN) 300 mg capsule; Take 1 capsule (300 mg total) by mouth daily at bedtime  -     tiZANidine (ZANAFLEX) 2 mg tablet; Take 1 tablet (2 mg total) by mouth daily at bedtime as needed for muscle spasms  3. Chronic left hip pain  -     diclofenac potassium (CATAFLAM) 50 mg tablet; Take 1 tablet (50 mg total) by mouth 2 (two) times a day  -     tiZANidine (ZANAFLEX) 2 mg tablet; Take 1 tablet (2 mg total) by mouth daily at bedtime as needed for muscle spasms  4. Essential hypertension  -     atenolol-chlorthalidone (TENORETIC) 50-25 mg per tablet; Take 1 tablet by mouth daily  5. Dyslipidemia  -     pravastatin (PRAVACHOL) 40 mg tablet; Take 1 tablet (40 mg total) by mouth daily at bedtime  6. Colon cancer screening  7. Polyp of colon, unspecified part of colon, unspecified type    Reviewed and discussed labs  Continue current meds  Discussed to start gabapentin hs,continue diclofenac prn, can use tizanidine at bedtime as well.  Check labs,  BP is contorlled, continue current meds  Spine PT may help          Depression Screening and Follow-up Plan: Patient was screened for depression during today's encounter. They screened negative with a PHQ-2 score of 0.          Read package inserts for all medications before starting a new medications, call me if you have any questions.    Patient was given opportunity to ask questions and all questions were answered.    Disclaimer: Portions of the record may have been created with voice recognition software. Occasional wrong word or \"sound a like\" substitutions may have occurred " due to the inherent limitations of voice recognition software. Read the chart carefully and recognize, using context, where substitutions have occurred. I have used the Epic copy/forward function to compose this note. I have reviewed my current note to ensure it reflects the current patient status, exam, assessment and plan.

## 2024-12-06 ENCOUNTER — OFFICE VISIT (OUTPATIENT)
Dept: PHYSICAL THERAPY | Facility: REHABILITATION | Age: 64
End: 2024-12-06
Payer: COMMERCIAL

## 2024-12-06 ENCOUNTER — TELEPHONE (OUTPATIENT)
Dept: ADMINISTRATIVE | Facility: OTHER | Age: 64
End: 2024-12-06

## 2024-12-06 DIAGNOSIS — M25.552 LEFT HIP PAIN: ICD-10-CM

## 2024-12-06 DIAGNOSIS — G89.29 CHRONIC LEFT-SIDED LOW BACK PAIN WITH LEFT-SIDED SCIATICA: Primary | ICD-10-CM

## 2024-12-06 DIAGNOSIS — M54.42 CHRONIC LEFT-SIDED LOW BACK PAIN WITH LEFT-SIDED SCIATICA: Primary | ICD-10-CM

## 2024-12-06 PROCEDURE — 97530 THERAPEUTIC ACTIVITIES: CPT | Performed by: PHYSICAL THERAPIST

## 2024-12-06 PROCEDURE — 97140 MANUAL THERAPY 1/> REGIONS: CPT | Performed by: PHYSICAL THERAPIST

## 2024-12-06 PROCEDURE — 97110 THERAPEUTIC EXERCISES: CPT | Performed by: PHYSICAL THERAPIST

## 2024-12-06 NOTE — TELEPHONE ENCOUNTER
----- Message from Berta Francois MD sent at 12/5/2024  4:05 PM EST -----  Regarding: cologuard done 9/2024  She did cologuard in 9/2024- see the results section and close the care gap, she does not want follow up colonoscopies

## 2024-12-06 NOTE — PROGRESS NOTES
Daily Note     Today's date: 2024  Patient name: Roxana Ventura  : 1960  MRN: [id number]  Referring provider: Vika Baer MD  Dx:   Encounter Diagnosis     ICD-10-CM    1. Chronic left-sided low back pain with left-sided sciatica  M54.42     G89.29       2. Left hip pain  M25.552           Start Time: 1124  Stop Time: 1207  Total time in clinic (min): 43 minutes    Subjective: Patient reports the cold weather makes her back feel stiff.      Objective: See treatment diary below      Assessment: Tolerated treatment well. Provided patient with updated HEP for postural and thoracic strengthening. Patient demonstrated fatigue post treatment, exhibited good technique with therapeutic exercises, and would benefit from continued PT.       Plan: Continue per plan of care.      Precautions: B TKA   Past Medical History:   Diagnosis Date    Arthritis     Diabetes mellitus (HCC)     Hyperlipidemia     Hypertension          Manuals 11/1 11/5 11/8 11/13 11/15 11/19 11/22 11/26 12/4 12/6   Lumbar  + L ES STM nv Done  Done +thoracic ES STM Done + thoracic ES STM Done + thoracic ES STM Done + thoracic ES STM Done + thoracic ES STM Thoracic  + thoracic ES STM Thoracic  + thoracic ES STM Thoracic  + thoracic ES STM   Lumbar distraction pball             Neuro Re-Ed             Bridges nv 3x10 3x12 3x12 3x12 3x12 3x12 3x12 3x15 3x15   clamshells             Sidesteps nv 3 laps counter 3 laps counter 5 laps counter 5 laps counter 5 laps table 5 laps table 5 laps table np    Standing hip abd TB nv 2x10 b/l 2x10 b/l 3x10 b/l 3x10 b/l 3x12 b/l 3x10 b/l 3x10 b/l 2x10 b/l    Standing hip ext TB nv 2x10 b/l 2x10 b/l 3x10 b/l 3x10 b/l 3x12 b/l 3x10 b/l 3x10 b/l 2x10 b/l    Mini squats nv 2x10  2x10 2x10 2x10    nv   LAQ  nv 2# 3x10 2# 3x10  2# 3x10 2# 3x12  2# 3x12 2# 3x12     Standing plank with alt arm/leg exts  nv       10x                 Ther Ex             Bike - strength/endurance nv 5' lv 0  5'  "lv 1 5' lvl 1 5' lv 1 5' lvl 1 5' lv 1 5' lv 1 5' lv 1 5' lv 1   Prone press up nv            Standing lumbar extensions             TA marches     nv 2x10 pain  ea       TA leg exts     nv nv P!      Thoracic ext stretch over 1/2 foam             Supine pball crushes     nv nv 2x10x5\" 2x10x5\" 2x10x5\" 2x10x5\"   TA+TB low rows     nv nv Prpl 2x10 Prpl 2x10 Prpl 2x10 Prpl 2x10   TA + TB mid rows       Prpl 2x10 Prpl 2x10 Prpl 2x10 Prpl 2x10   No Moneys        nv Pink 2x10 Pink 2x10   Ther Activity             STS **        nv Raised hi lo 2x10   Standing marches             Leg press          nv   Pt edu Done  Done           Gait Training                                       Modalities                                                                "

## 2024-12-09 NOTE — TELEPHONE ENCOUNTER
Upon review of the In Basket request we were able to note that a Health Maintenance (HM) modifier was needed to update HM. We have made the appropriate adjustments to the HM Modifier.    Any additional questions or concerns should be emailed to the Practice Liaisons via the appropriate education email address, please do not reply via In Basket.    Thank you  Erin Lozada MA   [facility]

## 2024-12-11 ENCOUNTER — OFFICE VISIT (OUTPATIENT)
Dept: PHYSICAL THERAPY | Facility: REHABILITATION | Age: 64
End: 2024-12-11
Payer: COMMERCIAL

## 2024-12-11 DIAGNOSIS — M54.42 CHRONIC LEFT-SIDED LOW BACK PAIN WITH LEFT-SIDED SCIATICA: Primary | ICD-10-CM

## 2024-12-11 DIAGNOSIS — M25.552 LEFT HIP PAIN: ICD-10-CM

## 2024-12-11 DIAGNOSIS — G89.29 CHRONIC LEFT-SIDED LOW BACK PAIN WITH LEFT-SIDED SCIATICA: Primary | ICD-10-CM

## 2024-12-11 PROCEDURE — 97530 THERAPEUTIC ACTIVITIES: CPT | Performed by: PHYSICAL THERAPIST

## 2024-12-11 PROCEDURE — 97110 THERAPEUTIC EXERCISES: CPT | Performed by: PHYSICAL THERAPIST

## 2024-12-11 PROCEDURE — 97140 MANUAL THERAPY 1/> REGIONS: CPT | Performed by: PHYSICAL THERAPIST

## 2024-12-11 NOTE — PROGRESS NOTES
Daily Note     Today's date: 2024  Patient name: Roxana Ventura  : 1960  MRN: [id number]  Referring provider: Vika Baer MD  Dx:   Encounter Diagnosis     ICD-10-CM    1. Chronic left-sided low back pain with left-sided sciatica  M54.42     G89.29       2. Left hip pain  M25.552           Start Time: 1118  Stop Time: 1203  Total time in clinic (min): 45 minutes    Subjective: No new complaints offered by patient.      Objective: See treatment diary below      Assessment: Tolerated treatment well. Patient demonstrated fatigue post treatment, exhibited good technique with therapeutic exercises, and would benefit from continued PT. Initiated additional BLE strengthening with good tolerance. Evident fatigue noted post treatment. Continues to present with soft tissue restriction and tension at mid thoracic spine.      Plan: Continue per plan of care.      Precautions: B TKA   Past Medical History:   Diagnosis Date    Arthritis     Diabetes mellitus (HCC)     Hyperlipidemia     Hypertension          Manuals 11/1 11/5 11/8 11/13 11/15 11/19 11/22 11/26 12/4 12/6 12/11   Lumbar  + L ES STM nv Done  Done +thoracic ES STM Done + thoracic ES STM Done + thoracic ES STM Done + thoracic ES STM Done + thoracic ES STM Thoracic  + thoracic ES STM Thoracic  + thoracic ES STM Thoracic  + thoracic ES STM Thoracic and lumbar STM   Lumbar distraction pball              Neuro Re-Ed              Bridges nv 3x10 3x12 3x12 3x12 3x12 3x12 3x12 3x15 3x15 3x15   clamshells              Sidesteps nv 3 laps counter 3 laps counter 5 laps counter 5 laps counter 5 laps table 5 laps table 5 laps table np     Standing hip abd TB nv 2x10 b/l 2x10 b/l 3x10 b/l 3x10 b/l 3x12 b/l 3x10 b/l 3x10 b/l 2x10 b/l     Standing hip ext TB nv 2x10 b/l 2x10 b/l 3x10 b/l 3x10 b/l 3x12 b/l 3x10 b/l 3x10 b/l 2x10 b/l     Mini squats nv 2x10  2x10 2x10 2x10    nv    LAQ  nv 2# 3x10 2# 3x10  2# 3x10 2# 3x12  2# 3x12 2# 3x12     "  Standing plank with alt arm/leg exts  nv       10x                   Ther Ex              Bike - strength/endurance nv 5' lv 0  5' lv 1 5' lvl 1 5' lv 1 5' lvl 1 5' lv 1 5' lv 1 5' lv 1 5' lv 1 5' lv 1   Prone press up nv             Standing lumbar extensions              TA marches     nv 2x10 pain  ea        TA leg exts     nv nv P!       Thoracic ext stretch over 1/2 foam              Supine pball crushes     nv nv 2x10x5\" 2x10x5\" 2x10x5\" 2x10x5\" 2x10x5\"   TA+TB low rows     nv nv Prpl 2x10 Prpl 2x10 Prpl 2x10 Prpl 2x10 Prpl 2x10   TA + TB mid rows       Prpl 2x10 Prpl 2x10 Prpl 2x10 Prpl 2x10 Prpl 2x10   No Moneys        nv Pink 2x10 Pink 2x10 Pink 2x10   Ther Activity              STS **        nv Raised hi lo 2x10 Hi lo 2x10   Standing marches              Leg press          nv 75# 2x10   Pt edu Done  Done            Gait Training                                          Modalities                                                                     "

## 2024-12-13 ENCOUNTER — OFFICE VISIT (OUTPATIENT)
Dept: PHYSICAL THERAPY | Facility: REHABILITATION | Age: 64
End: 2024-12-13
Payer: COMMERCIAL

## 2024-12-13 DIAGNOSIS — M54.42 CHRONIC LEFT-SIDED LOW BACK PAIN WITH LEFT-SIDED SCIATICA: Primary | ICD-10-CM

## 2024-12-13 DIAGNOSIS — M25.552 LEFT HIP PAIN: ICD-10-CM

## 2024-12-13 DIAGNOSIS — G89.29 CHRONIC LEFT-SIDED LOW BACK PAIN WITH LEFT-SIDED SCIATICA: Primary | ICD-10-CM

## 2024-12-13 PROCEDURE — 97140 MANUAL THERAPY 1/> REGIONS: CPT | Performed by: PHYSICAL THERAPIST

## 2024-12-13 PROCEDURE — 97530 THERAPEUTIC ACTIVITIES: CPT | Performed by: PHYSICAL THERAPIST

## 2024-12-13 PROCEDURE — 97110 THERAPEUTIC EXERCISES: CPT | Performed by: PHYSICAL THERAPIST

## 2024-12-13 NOTE — PROGRESS NOTES
Daily Note     Today's date: 2024  Patient name: Roxana Ventura  : 1960  MRN: [id number]  Referring provider: Vika Baer MD  Dx:   Encounter Diagnosis     ICD-10-CM    1. Chronic left-sided low back pain with left-sided sciatica  M54.42     G89.29       2. Left hip pain  M25.552           Start Time: 1112  Stop Time: 1200  Total time in clinic (min): 48 minutes    Subjective: Patient reports tightness in the back with cold weather.      Objective: See treatment diary below      Assessment: Tolerated treatment well. Able to progress in terms of increased repetitions this visit. Patient demonstrated fatigue post treatment, exhibited good technique with therapeutic exercises, and would benefit from continued PT.       Plan: Continue per plan of care.      Precautions: B TKA   Past Medical History:   Diagnosis Date    Arthritis     Diabetes mellitus (HCC)     Hyperlipidemia     Hypertension          Manuals 11/1 11/5 11/8 11/13 11/15 11/19 11/22 11/26 12/4 12/6 12/11 12/13   Lumbar  + L ES STM nv Done  Done +thoracic ES STM Done + thoracic ES STM Done + thoracic ES STM Done + thoracic ES STM Done + thoracic ES STM Thoracic  + thoracic ES STM Thoracic  + thoracic ES STM Thoracic  + thoracic ES STM Thoracic and lumbar STM Thoracic and lumbar STM + soft cupping thoracic   Lumbar distraction pball               Neuro Re-Ed               Bridges nv 3x10 3x12 3x12 3x12 3x12 3x12 3x12 3x15 3x15 3x15 3x15   clamshells               Sidesteps nv 3 laps counter 3 laps counter 5 laps counter 5 laps counter 5 laps table 5 laps table 5 laps table np      Standing hip abd TB nv 2x10 b/l 2x10 b/l 3x10 b/l 3x10 b/l 3x12 b/l 3x10 b/l 3x10 b/l 2x10 b/l      Standing hip ext TB nv 2x10 b/l 2x10 b/l 3x10 b/l 3x10 b/l 3x12 b/l 3x10 b/l 3x10 b/l 2x10 b/l      Mini squats nv 2x10  2x10 2x10 2x10    nv     LAQ  nv 2# 3x10 2# 3x10  2# 3x10 2# 3x12  2# 3x12 2# 3x12       Standing plank with alt arm/leg  "exts  nv       10x                     Ther Ex               Bike - strength/endurance nv 5' lv 0  5' lv 1 5' lvl 1 5' lv 1 5' lvl 1 5' lv 1 5' lv 1 5' lv 1 5' lv 1 5' lv 1 5' lv 1   Prone press up nv              Standing lumbar extensions               TA marches     nv 2x10 pain  ea         TA leg exts     nv nv P!        Thoracic ext stretch over 1/2 foam               Supine pball crushes     nv nv 2x10x5\" 2x10x5\" 2x10x5\" 2x10x5\" 2x10x5\" 2x10x5\"   TA+TB low rows     nv nv Prpl 2x10 Prpl 2x10 Prpl 2x10 Prpl 2x10 Prpl 2x10 Prpl 2x10   TA + TB mid rows       Prpl 2x10 Prpl 2x10 Prpl 2x10 Prpl 2x10 Prpl 2x10 Prpl 2x10   No Moneys        nv Pink 2x10 Pink 2x10 Pink 2x10 Pink 3x10   Ther Activity               STS **        nv Raised hi lo 2x10 Hi lo 2x10 Hi lo 3x10   Standing marches               Leg press          nv 75# 2x10 75# 2x10   Pt edu Done  Done             Gait Training                                             Modalities                                                                          "

## 2024-12-18 ENCOUNTER — OFFICE VISIT (OUTPATIENT)
Dept: PHYSICAL THERAPY | Facility: REHABILITATION | Age: 64
End: 2024-12-18
Payer: COMMERCIAL

## 2024-12-18 DIAGNOSIS — G89.29 CHRONIC LEFT-SIDED LOW BACK PAIN WITH LEFT-SIDED SCIATICA: Primary | ICD-10-CM

## 2024-12-18 DIAGNOSIS — M25.552 LEFT HIP PAIN: ICD-10-CM

## 2024-12-18 DIAGNOSIS — M54.42 CHRONIC LEFT-SIDED LOW BACK PAIN WITH LEFT-SIDED SCIATICA: Primary | ICD-10-CM

## 2024-12-18 PROCEDURE — 97112 NEUROMUSCULAR REEDUCATION: CPT | Performed by: PHYSICAL THERAPIST

## 2024-12-18 PROCEDURE — 97530 THERAPEUTIC ACTIVITIES: CPT | Performed by: PHYSICAL THERAPIST

## 2024-12-18 PROCEDURE — 97110 THERAPEUTIC EXERCISES: CPT | Performed by: PHYSICAL THERAPIST

## 2024-12-18 NOTE — PROGRESS NOTES
Daily Note     Today's date: 2024  Patient name: Roxana Ventura  : 1960  MRN: [id number]  Referring provider: Vika Baer MD  Dx:   Encounter Diagnosis     ICD-10-CM    1. Chronic left-sided low back pain with left-sided sciatica  M54.42     G89.29       2. Left hip pain  M25.552           Start Time: 1103  Stop Time: 1153  Total time in clinic (min): 50 minutes    Subjective: Patient reports overall the back has been feeling better but has low back discomfort in the mornings. She reports left sided mid and low back pain today.      Objective: See treatment diary below      Assessment: Tolerated treatment well. Progressed core strengthening this visit in upright position for greater strength and endurance with standing/walking. Positive response to manual therapy. Patient demonstrated fatigue post treatment, exhibited good technique with therapeutic exercises, and would benefit from continued PT.      Plan: Continue per plan of care.      Precautions: B TKA   Past Medical History:   Diagnosis Date    Arthritis     Diabetes mellitus (HCC)     Hyperlipidemia     Hypertension          Manuals 11/1 11/5 11/8 11/13 11/15 11/19 11/22 11/26 12/4 12/6 12/11 12/13 12/18   Lumbar  + L ES STM nv Done  Done +thoracic ES STM Done + thoracic ES STM Done + thoracic ES STM Done + thoracic ES STM Done + thoracic ES STM Thoracic  + thoracic ES STM Thoracic  + thoracic ES STM Thoracic  + thoracic ES STM Thoracic and lumbar STM Thoracic and lumbar STM + soft cupping thoracic Thoracic & lumbar STM   Lumbar distraction pball                Neuro Re-Ed                Bridges nv 3x10 3x12 3x12 3x12 3x12 3x12 3x12 3x15 3x15 3x15 3x15 3x15   clamshells                Sidesteps nv 3 laps counter 3 laps counter 5 laps counter 5 laps counter 5 laps table 5 laps table 5 laps table np       Standing hip abd TB nv 2x10 b/l 2x10 b/l 3x10 b/l 3x10 b/l 3x12 b/l 3x10 b/l 3x10 b/l 2x10 b/l       Standing hip  "ext TB nv 2x10 b/l 2x10 b/l 3x10 b/l 3x10 b/l 3x12 b/l 3x10 b/l 3x10 b/l 2x10 b/l       Mini squats nv 2x10  2x10 2x10 2x10    nv      LAQ  nv 2# 3x10 2# 3x10  2# 3x10 2# 3x12  2# 3x12 2# 3x12        Standing plank with alt arm/leg exts  nv       10x                       Ther Ex                Bike - strength/endurance nv 5' lv 0  5' lv 1 5' lvl 1 5' lv 1 5' lvl 1 5' lv 1 5' lv 1 5' lv 1 5' lv 1 5' lv 1 5' lv 1 5' lv 1   Prone press up nv               Standing lumbar extensions                TA marches     nv 2x10 pain  ea          TA leg exts     nv nv P!         Thoracic ext stretch over 1/2 foam                Supine pball crushes     nv nv 2x10x5\" 2x10x5\" 2x10x5\" 2x10x5\" 2x10x5\" 2x10x5\" Stand 2x10x5\"   TA+TB low rows     nv nv Prpl 2x10 Prpl 2x10 Prpl 2x10 Prpl 2x10 Prpl 2x10 Prpl 2x10 Prpl 2x10   TA + TB mid rows       Prpl 2x10 Prpl 2x10 Prpl 2x10 Prpl 2x10 Prpl 2x10 Prpl 2x10 Prpl 2x10   No Moneys        nv Pink 2x10 Pink 2x10 Pink 2x10 Pink 3x10 Pink 3x10   Ther Activity                STS **        nv Raised hi lo 2x10 Hi lo 2x10 Hi lo 3x10 Hi lo 3x10   Standing marches                Leg press          nv 75# 2x10 75# 2x10 75# 2x10   Pt edu Done  Done              Gait Training                                                Modalities                                                                               "

## 2024-12-20 ENCOUNTER — OFFICE VISIT (OUTPATIENT)
Dept: PHYSICAL THERAPY | Facility: REHABILITATION | Age: 64
End: 2024-12-20
Payer: COMMERCIAL

## 2024-12-20 DIAGNOSIS — G89.29 CHRONIC LEFT-SIDED LOW BACK PAIN WITH LEFT-SIDED SCIATICA: Primary | ICD-10-CM

## 2024-12-20 DIAGNOSIS — M54.42 CHRONIC LEFT-SIDED LOW BACK PAIN WITH LEFT-SIDED SCIATICA: Primary | ICD-10-CM

## 2024-12-20 DIAGNOSIS — M25.552 LEFT HIP PAIN: ICD-10-CM

## 2024-12-20 PROCEDURE — 97110 THERAPEUTIC EXERCISES: CPT

## 2024-12-20 PROCEDURE — 97112 NEUROMUSCULAR REEDUCATION: CPT

## 2024-12-20 PROCEDURE — 97140 MANUAL THERAPY 1/> REGIONS: CPT

## 2024-12-20 NOTE — PROGRESS NOTES
Daily Note     Today's date: 2024  Patient name: Roxana Ventura  : 1960  MRN: [id number]  Referring provider: Vika Baer MD  Dx:   Encounter Diagnosis     ICD-10-CM    1. Chronic left-sided low back pain with left-sided sciatica  M54.42     G89.29       2. Left hip pain  M25.552                      Subjective: Patient reports overall the back has been feeling better but has low back discomfort in the mornings. She reports left sided mid and low back pain today.      Objective: See treatment diary below      Assessment: Tolerated treatment well. Continued to focus on core strengthening this session. She demonstrated increased tightness along her lumbar paraspinals as well as over b/l QLs. Challenged towards end of session due to fatigue but able to complete. Patient demonstrated fatigue post treatment, exhibited good technique with therapeutic exercises, and would benefit from continued PT.      Plan: Continue per plan of care.      Precautions: B TKA   Past Medical History:   Diagnosis Date    Arthritis     Diabetes mellitus (HCC)     Hyperlipidemia     Hypertension          Manuals    Lumbar  + L ES STM Done + thoracic ES STM Thoracic  + thoracic ES STM Thoracic  + thoracic ES STM Thoracic  + thoracic ES STM Thoracic and lumbar STM Thoracic and lumbar STM + soft cupping thoracic Thoracic & lumbar STM Thoracic & lumbar STM   Lumbar distraction pball           Neuro Re-Ed           Bridges 3x12 3x12 3x15 3x15 3x15 3x15 3x15 3x15   clamshells           Sidesteps 5 laps table 5 laps table np        Standing hip abd TB 3x10 b/l 3x10 b/l 2x10 b/l        Standing hip ext TB 3x10 b/l 3x10 b/l 2x10 b/l        Mini squats    nv       LAQ 2# 3x12 2# 3x12         Standing plank with alt arm/leg exts   10x                   Ther Ex           Bike - strength/endurance 5' lv 1 5' lv 1 5' lv 1 5' lv 1 5' lv 1 5' lv 1 5' lv 1 5' Lv 1   Prone  "press up           Standing lumbar extensions           TA marches           TA leg exts P!          Thoracic ext stretch over 1/2 foam           Supine pball crushes 2x10x5\" 2x10x5\" 2x10x5\" 2x10x5\" 2x10x5\" 2x10x5\" Stand 2x10x5\" Stand 2x10x5\"   TA+TB low rows Prpl 2x10 Prpl 2x10 Prpl 2x10 Prpl 2x10 Prpl 2x10 Prpl 2x10 Prpl 2x10 Prpl 2x10   TA + TB mid rows Prpl 2x10 Prpl 2x10 Prpl 2x10 Prpl 2x10 Prpl 2x10 Prpl 2x10 Prpl 2x10 Prpl 2x10   No Moneys  nv Pink 2x10 Pink 2x10 Pink 2x10 Pink 3x10 Pink 3x10 Pink 3x10   Ther Activity           STS   nv Raised hi lo 2x10 Hi lo 2x10 Hi lo 3x10 Hi lo 3x10 Hi lo 3x10   Standing marches           Leg press    nv 75# 2x10 75# 2x10 75# 2x10 75# 2x10   Pt edu           Gait Training                                 Modalities                                                                "

## 2024-12-23 ENCOUNTER — OFFICE VISIT (OUTPATIENT)
Dept: PHYSICAL THERAPY | Facility: REHABILITATION | Age: 64
End: 2024-12-23
Payer: COMMERCIAL

## 2024-12-23 DIAGNOSIS — M54.42 CHRONIC LEFT-SIDED LOW BACK PAIN WITH LEFT-SIDED SCIATICA: Primary | ICD-10-CM

## 2024-12-23 DIAGNOSIS — M25.552 LEFT HIP PAIN: ICD-10-CM

## 2024-12-23 DIAGNOSIS — G89.29 CHRONIC LEFT-SIDED LOW BACK PAIN WITH LEFT-SIDED SCIATICA: Primary | ICD-10-CM

## 2024-12-23 PROCEDURE — 97140 MANUAL THERAPY 1/> REGIONS: CPT | Performed by: PHYSICAL THERAPIST

## 2024-12-23 PROCEDURE — 97110 THERAPEUTIC EXERCISES: CPT | Performed by: PHYSICAL THERAPIST

## 2024-12-23 PROCEDURE — 97530 THERAPEUTIC ACTIVITIES: CPT | Performed by: PHYSICAL THERAPIST

## 2024-12-23 NOTE — PROGRESS NOTES
"Daily Note     Today's date: 2024  Patient name: Roxana Ventura  : 1960  MRN: [id number]  Referring provider: Vika Baer MD  Dx:   Encounter Diagnosis     ICD-10-CM    1. Chronic left-sided low back pain with left-sided sciatica  M54.42     G89.29       2. Left hip pain  M25.552           Start Time: 1055          Subjective: Patient reports she was on her feet cooking a lot yesterday and has fatigue in the legs, especially with lifting them. She reports most discomfort in the left mid back and right low back and notes the cold weather makes everything feel \"tight.\"      Objective: See treatment diary below      Assessment: Tolerated treatment well. Patient demonstrated fatigue post treatment, exhibited good technique with therapeutic exercises, and would benefit from continued PT.       Plan: Continue per plan of care.      Precautions: B TKA   Past Medical History:   Diagnosis Date    Arthritis     Diabetes mellitus (HCC)     Hyperlipidemia     Hypertension          Manuals    Lumbar  + L ES STM Done + thoracic ES STM Thoracic  + thoracic ES STM Thoracic  + thoracic ES STM Thoracic  + thoracic ES STM Thoracic and lumbar STM Thoracic and lumbar STM + soft cupping thoracic Thoracic & lumbar STM Thoracic & lumbar STM Thoracic & lumbar STM + soft cupping   Lumbar distraction pball            Neuro Re-Ed            Bridges 3x12 3x12 3x15 3x15 3x15 3x15 3x15 3x15 3x15   clamshells            Sidesteps 5 laps table 5 laps table np         Standing hip abd TB 3x10 b/l 3x10 b/l 2x10 b/l         Standing hip ext TB 3x10 b/l 3x10 b/l 2x10 b/l         Mini squats    nv        LAQ 2# 3x12 2# 3x12          Standing plank with alt arm/leg exts   10x                     Ther Ex            Bike - strength/endurance 5' lv 1 5' lv 1 5' lv 1 5' lv 1 5' lv 1 5' lv 1 5' lv 1 5' Lv 1 5' lv 1   Prone press up            Standing lumbar " "extensions            TA marches            TA leg exts P!           Thoracic ext stretch over 1/2 foam            Supine pball crushes 2x10x5\" 2x10x5\" 2x10x5\" 2x10x5\" 2x10x5\" 2x10x5\" Stand 2x10x5\" Stand 2x10x5\" Stand 2x10x5\"   TA+TB low rows Prpl 2x10 Prpl 2x10 Prpl 2x10 Prpl 2x10 Prpl 2x10 Prpl 2x10 Prpl 2x10 Prpl 2x10 Prpl 2x10   TA + TB mid rows Prpl 2x10 Prpl 2x10 Prpl 2x10 Prpl 2x10 Prpl 2x10 Prpl 2x10 Prpl 2x10 Prpl 2x10 Prpl 2x10   No Moneys  nv Pink 2x10 Pink 2x10 Pink 2x10 Pink 3x10 Pink 3x10 Pink 3x10 Pink 3x10   Ther Activity            STS   nv Raised hi lo 2x10 Hi lo 2x10 Hi lo 3x10 Hi lo 3x10 Hi lo 3x10 Chair 3x10    Standing marches            Leg press    nv 75# 2x10 75# 2x10 75# 2x10 75# 2x10 75# 2x10   Pt edu            Gait Training                                    Modalities                                                                     "

## 2024-12-27 ENCOUNTER — OFFICE VISIT (OUTPATIENT)
Dept: PHYSICAL THERAPY | Facility: REHABILITATION | Age: 64
End: 2024-12-27
Payer: COMMERCIAL

## 2024-12-27 DIAGNOSIS — G89.29 CHRONIC LEFT-SIDED LOW BACK PAIN WITH LEFT-SIDED SCIATICA: Primary | ICD-10-CM

## 2024-12-27 DIAGNOSIS — M54.42 CHRONIC LEFT-SIDED LOW BACK PAIN WITH LEFT-SIDED SCIATICA: Primary | ICD-10-CM

## 2024-12-27 DIAGNOSIS — M25.552 LEFT HIP PAIN: ICD-10-CM

## 2024-12-27 PROCEDURE — 97140 MANUAL THERAPY 1/> REGIONS: CPT

## 2024-12-27 PROCEDURE — 97110 THERAPEUTIC EXERCISES: CPT

## 2024-12-27 PROCEDURE — 97530 THERAPEUTIC ACTIVITIES: CPT

## 2024-12-27 NOTE — PROGRESS NOTES
"Daily Note     Today's date: 2024  Patient name: Roxana Ventura  : 1960  MRN: [id number]  Referring provider: Vika Baer MD  Dx:   Encounter Diagnosis     ICD-10-CM    1. Chronic left-sided low back pain with left-sided sciatica  M54.42     G89.29       2. Left hip pain  M25.552           Start Time: 1105  Stop Time: 1155  Total time in clinic (min): 50 minutes    Subjective: Patient reports her back is a little more bothersome today.       Objective: See treatment diary below      Assessment: Tolerated treatment well. Patient demonstrated fatigue post treatment, exhibited good technique with therapeutic exercises, and would benefit from continued PT       Plan: Continue per plan of care.  Progress treatment as tolerated.       Precautions: B TKA   Past Medical History:   Diagnosis Date    Arthritis     Diabetes mellitus (HCC)     Hyperlipidemia     Hypertension          Manuals    Lumbar  + L ES STM Thoracic + lumbar STM + soft cupping  Thoracic  + thoracic ES STM Thoracic  + thoracic ES STM Thoracic and lumbar STM Thoracic and lumbar STM + soft cupping thoracic Thoracic & lumbar STM Thoracic & lumbar STM Thoracic & lumbar STM + soft cupping   Lumbar distraction pball            Neuro Re-Ed            Bridges 3x15  3x15 3x15 3x15 3x15 3x15 3x15 3x15   clamshells            Sidesteps   np         Standing hip abd TB   2x10 b/l         Standing hip ext TB   2x10 b/l         Mini squats    nv        LAQ            Standing plank with alt arm/leg exts   10x                     Ther Ex            Bike - strength/endurance 5' lvl 1  5' lv 1 5' lv 1 5' lv 1 5' lv 1 5' lv 1 5' Lv 1 5' lv 1   Prone press up            Standing lumbar extensions            TA marches            TA leg exts            Thoracic ext stretch over 1/2 foam            Supine pball crushes Stand 2x10x5''  2x10x5\" 2x10x5\" 2x10x5\" 2x10x5\" Stand 2x10x5\" Stand 2x10x5\" " "Stand 2x10x5\"   TA+TB low rows Purple 3x10  Prpl 2x10 Prpl 2x10 Prpl 2x10 Prpl 2x10 Prpl 2x10 Prpl 2x10 Prpl 2x10   TA + TB mid rows Purple 3x10  Prpl 2x10 Prpl 2x10 Prpl 2x10 Prpl 2x10 Prpl 2x10 Prpl 2x10 Prpl 2x10   No Moneys Pink 3x10  Pink 2x10 Pink 2x10 Pink 2x10 Pink 3x10 Pink 3x10 Pink 3x10 Pink 3x10   Ther Activity            STS Chair 3x10  nv Raised hi lo 2x10 Hi lo 2x10 Hi lo 3x10 Hi lo 3x10 Hi lo 3x10 Chair 3x10    Standing marches            Leg press 75# 3x10   nv 75# 2x10 75# 2x10 75# 2x10 75# 2x10 75# 2x10   Pt edu            Gait Training                                    Modalities                                                                       "

## 2024-12-30 ENCOUNTER — APPOINTMENT (OUTPATIENT)
Dept: PHYSICAL THERAPY | Facility: REHABILITATION | Age: 64
End: 2024-12-30
Payer: COMMERCIAL

## 2024-12-30 DIAGNOSIS — G89.29 CHRONIC LEFT-SIDED LOW BACK PAIN WITH LEFT-SIDED SCIATICA: Primary | ICD-10-CM

## 2024-12-30 DIAGNOSIS — M54.42 CHRONIC LEFT-SIDED LOW BACK PAIN WITH LEFT-SIDED SCIATICA: Primary | ICD-10-CM

## 2024-12-30 DIAGNOSIS — M25.552 LEFT HIP PAIN: ICD-10-CM

## 2024-12-30 NOTE — PROGRESS NOTES
"Daily Note     Today's date: 2024  Patient name: Roxana Ventura  : 1960  MRN: [id number]  Referring provider: Vika Baer MD  Dx:   Encounter Diagnosis     ICD-10-CM    1. Chronic left-sided low back pain with left-sided sciatica  M54.42     G89.29       2. Left hip pain  M25.552                      Subjective: ***      Objective: See treatment diary below      Assessment: Tolerated treatment {Tolerated treatment :2975130144}. Patient {assessment:[id number]}      Plan: {PLAN:[id number]}     Precautions: B TKA   Past Medical History:   Diagnosis Date    Arthritis     Diabetes mellitus (HCC)     Hyperlipidemia     Hypertension          Manuals    Lumbar  + L ES STM Thoracic + lumbar STM + soft cupping  Thoracic  + thoracic ES STM Thoracic  + thoracic ES STM Thoracic and lumbar STM Thoracic and lumbar STM + soft cupping thoracic Thoracic & lumbar STM Thoracic & lumbar STM Thoracic & lumbar STM + soft cupping   Lumbar distraction pball            Neuro Re-Ed            Bridges 3x15  3x15 3x15 3x15 3x15 3x15 3x15 3x15   clamshells            Sidesteps   np         Standing hip abd TB   2x10 b/l         Standing hip ext TB   2x10 b/l         Mini squats    nv        LAQ            Standing plank with alt arm/leg exts   10x                     Ther Ex            Bike - strength/endurance 5' lvl 1  5' lv 1 5' lv 1 5' lv 1 5' lv 1 5' lv 1 5' Lv 1 5' lv 1   Prone press up            Standing lumbar extensions            TA marches            TA leg exts            Thoracic ext stretch over 1/2 foam            Supine pball crushes Stand 2x10x5''  2x10x5\" 2x10x5\" 2x10x5\" 2x10x5\" Stand 2x10x5\" Stand 2x10x5\" Stand 2x10x5\"   TA+TB low rows Purple 3x10  Prpl 2x10 Prpl 2x10 Prpl 2x10 Prpl 2x10 Prpl 2x10 Prpl 2x10 Prpl 2x10   TA + TB mid rows Purple 3x10  Prpl 2x10 Prpl 2x10 Prpl 2x10 Prpl 2x10 Prpl 2x10 Prpl 2x10 Prpl 2x10   No Moneys Pink 3x10  " Pink 2x10 Pink 2x10 Pink 2x10 Pink 3x10 Pink 3x10 Pink 3x10 Pink 3x10   Ther Activity            STS Chair 3x10  nv Raised hi lo 2x10 Hi lo 2x10 Hi lo 3x10 Hi lo 3x10 Hi lo 3x10 Chair 3x10    Standing marches            Leg press 75# 3x10   nv 75# 2x10 75# 2x10 75# 2x10 75# 2x10 75# 2x10   Pt edu            Gait Training                                    Modalities

## 2025-01-03 ENCOUNTER — OFFICE VISIT (OUTPATIENT)
Dept: PHYSICAL THERAPY | Facility: REHABILITATION | Age: 65
End: 2025-01-03
Payer: COMMERCIAL

## 2025-01-03 DIAGNOSIS — M54.42 CHRONIC LEFT-SIDED LOW BACK PAIN WITH LEFT-SIDED SCIATICA: Primary | ICD-10-CM

## 2025-01-03 DIAGNOSIS — G89.29 CHRONIC LEFT-SIDED LOW BACK PAIN WITH LEFT-SIDED SCIATICA: Primary | ICD-10-CM

## 2025-01-03 DIAGNOSIS — M25.552 LEFT HIP PAIN: ICD-10-CM

## 2025-01-03 PROCEDURE — 97112 NEUROMUSCULAR REEDUCATION: CPT | Performed by: PHYSICAL THERAPIST

## 2025-01-03 PROCEDURE — 97110 THERAPEUTIC EXERCISES: CPT | Performed by: PHYSICAL THERAPIST

## 2025-01-03 PROCEDURE — 97140 MANUAL THERAPY 1/> REGIONS: CPT | Performed by: PHYSICAL THERAPIST

## 2025-01-03 NOTE — PROGRESS NOTES
Daily Note  Today's date: 1/3/2025  Patient name: Roxana Ventura  : 1960  MRN: [id number]  Referring provider: Vika Baer MD  Dx:   Encounter Diagnosis     ICD-10-CM    1. Chronic left-sided low back pain with left-sided sciatica  M54.42     G89.29       2. Left hip pain  M25.552                      Subjective: patient states she is feeling a little better today      Objective: See treatment diary below      Assessment: Tolerated treatment well. Patient demonstrated fatigue post treatment, exhibited good technique with therapeutic exercises, and would benefit from continued PT Patient continues to have tightness of L paraspinals and QL. Patient with improving LE strength but that is one of patient's biggest concerns      Plan: Continue per plan of care.      Precautions: B TKA   Past Medical History:   Diagnosis Date    Arthritis     Diabetes mellitus (HCC)     Hyperlipidemia     Hypertension          Manuals 12/27 1/3 12/4 12/6 12/11 12/13 12/18 12/20 12/23   Lumbar  + L ES STM Thoracic + lumbar STM + soft cupping Thoracic and lumbar STM Thoracic  + thoracic ES STM Thoracic  + thoracic ES STM Thoracic and lumbar STM Thoracic and lumbar STM + soft cupping thoracic Thoracic & lumbar STM Thoracic & lumbar STM Thoracic & lumbar STM + soft cupping   Lumbar distraction pball            Neuro Re-Ed            Bridges 3x15 3x15 3x15 3x15 3x15 3x15 3x15 3x15 3x15   clamshells            Sidesteps   np         Standing hip abd TB   2x10 b/l         Standing hip ext TB   2x10 b/l         Mini squats    nv        LAQ            Standing plank with alt arm/leg exts   10x                     Ther Ex            Bike - strength/endurance 5' lvl 1 5' lvl 1 5' lv 1 5' lv 1 5' lv 1 5' lv 1 5' lv 1 5' Lv 1 5' lv 1   Prone press up            Standing lumbar extensions            TA marches            TA leg exts            Thoracic ext stretch over 1/2 foam            Supine pball crushes Stand  "2x10x5'' Std 5\"x20 2x10x5\" 2x10x5\" 2x10x5\" 2x10x5\" Stand 2x10x5\" Stand 2x10x5\" Stand 2x10x5\"   TA+TB low rows Purple 3x10 Purple 3x10 Prpl 2x10 Prpl 2x10 Prpl 2x10 Prpl 2x10 Prpl 2x10 Prpl 2x10 Prpl 2x10   TA + TB mid rows Purple 3x10 Purple 3x10 Prpl 2x10 Prpl 2x10 Prpl 2x10 Prpl 2x10 Prpl 2x10 Prpl 2x10 Prpl 2x10   No Moneys Pink 3x10 Pink 3x10 Pink 2x10 Pink 2x10 Pink 2x10 Pink 3x10 Pink 3x10 Pink 3x10 Pink 3x10   Ther Activity            STS Chair 3x10 Chair 3x10 nv Raised hi lo 2x10 Hi lo 2x10 Hi lo 3x10 Hi lo 3x10 Hi lo 3x10 Chair 3x10    Standing marches            Leg press 75# 3x10 85# 3x10  nv 75# 2x10 75# 2x10 75# 2x10 75# 2x10 75# 2x10   Pt edu            Gait Training                                    Modalities                                                                         "

## 2025-01-07 ENCOUNTER — OFFICE VISIT (OUTPATIENT)
Dept: PHYSICAL THERAPY | Facility: REHABILITATION | Age: 65
End: 2025-01-07
Payer: COMMERCIAL

## 2025-01-07 DIAGNOSIS — G89.29 CHRONIC LEFT-SIDED LOW BACK PAIN WITH LEFT-SIDED SCIATICA: Primary | ICD-10-CM

## 2025-01-07 DIAGNOSIS — M54.42 CHRONIC LEFT-SIDED LOW BACK PAIN WITH LEFT-SIDED SCIATICA: Primary | ICD-10-CM

## 2025-01-07 DIAGNOSIS — M25.552 LEFT HIP PAIN: ICD-10-CM

## 2025-01-07 PROCEDURE — 97140 MANUAL THERAPY 1/> REGIONS: CPT | Performed by: PHYSICAL THERAPIST

## 2025-01-07 PROCEDURE — 97530 THERAPEUTIC ACTIVITIES: CPT | Performed by: PHYSICAL THERAPIST

## 2025-01-07 PROCEDURE — 97110 THERAPEUTIC EXERCISES: CPT | Performed by: PHYSICAL THERAPIST

## 2025-01-07 NOTE — PROGRESS NOTES
Daily Note     Today's date: 2025  Patient name: Roxana Ventura  : 1960  MRN: [id number]  Referring provider: Vika Baer MD  Dx:   Encounter Diagnosis     ICD-10-CM    1. Chronic left-sided low back pain with left-sided sciatica  M54.42     G89.29       2. Left hip pain  M25.552           Start Time: 0950  Stop Time: 1045  Total time in clinic (min): 55 minutes    Subjective: Patient reports back in the left low back that is worse today secondary to the cold weather.      Objective: See treatment diary below      Assessment: Tolerated treatment well. Patient reports some improvement in low back pain post-treatment compared to arrival. Patient demonstrated fatigue post treatment, exhibited good technique with therapeutic exercises, and would benefit from continued PT      Plan: Continue per plan of care.      Precautions: B TKA   Past Medical History:   Diagnosis Date    Arthritis     Diabetes mellitus (HCC)     Hyperlipidemia     Hypertension          Manuals 12/27 1/3 1/7  12/11 12/13 12/18 12/20 12/23   Lumbar  + L ES STM Thoracic + lumbar STM + soft cupping Thoracic and lumbar STM Thoracic and lumbar STM gentle Gr I mobs L4/L5  Thoracic and lumbar STM Thoracic and lumbar STM + soft cupping thoracic Thoracic & lumbar STM Thoracic & lumbar STM Thoracic & lumbar STM + soft cupping   Lumbar distraction pball            Neuro Re-Ed            Bridges 3x15 3x15 3x15  3x15 3x15 3x15 3x15 3x15   clamshells            Sidesteps            Standing hip abd TB            Standing hip ext TB            Mini squats            LAQ            Standing plank with alt arm/leg exts                        Ther Ex            Bike - strength/endurance 5' lvl 1 5' lvl 1 5' lv 1  5' lv 1 5' lv 1 5' lv 1 5' Lv 1 5' lv 1   Prone press up            Standing lumbar extensions            TA marches            TA leg exts            Thoracic ext stretch over 1/2 foam            Supine pball crushes Stand  "2x10x5'' Std 5\"x20 Stnd 20x5\"  2x10x5\" 2x10x5\" Stand 2x10x5\" Stand 2x10x5\" Stand 2x10x5\"   TA+TB low rows Purple 3x10 Purple 3x10 Prpl 3x10  Prpl 2x10 Prpl 2x10 Prpl 2x10 Prpl 2x10 Prpl 2x10   TA + TB mid rows Purple 3x10 Purple 3x10 Prpl 3x10  Prpl 2x10 Prpl 2x10 Prpl 2x10 Prpl 2x10 Prpl 2x10   No Moneys Pink 3x10 Pink 3x10   Pink 2x10 Pink 3x10 Pink 3x10 Pink 3x10 Pink 3x10   Lumbar FLX/RSB rolls   15x10\"         Ther Activity            STS Chair 3x10 Chair 3x10 Chair 3x10  Hi lo 2x10 Hi lo 3x10 Hi lo 3x10 Hi lo 3x10 Chair 3x10    Standing marches            Leg press 75# 3x10 85# 3x10 85# 3x10  75# 2x10 75# 2x10 75# 2x10 75# 2x10 75# 2x10   Pt edu            Gait Training                                    Modalities                                                                           "

## 2025-01-10 ENCOUNTER — OFFICE VISIT (OUTPATIENT)
Dept: PHYSICAL THERAPY | Facility: REHABILITATION | Age: 65
End: 2025-01-10
Payer: COMMERCIAL

## 2025-01-10 DIAGNOSIS — G89.29 CHRONIC LEFT-SIDED LOW BACK PAIN WITH LEFT-SIDED SCIATICA: Primary | ICD-10-CM

## 2025-01-10 DIAGNOSIS — M25.552 LEFT HIP PAIN: ICD-10-CM

## 2025-01-10 DIAGNOSIS — M54.42 CHRONIC LEFT-SIDED LOW BACK PAIN WITH LEFT-SIDED SCIATICA: Primary | ICD-10-CM

## 2025-01-10 PROCEDURE — 97530 THERAPEUTIC ACTIVITIES: CPT

## 2025-01-10 PROCEDURE — 97110 THERAPEUTIC EXERCISES: CPT

## 2025-01-10 PROCEDURE — 97140 MANUAL THERAPY 1/> REGIONS: CPT

## 2025-01-10 NOTE — PROGRESS NOTES
Daily Note     Today's date: 1/10/2025  Patient name: Roxana Ventura  : 1960  MRN: [id number]  Referring provider: Vika Baer MD  Dx:   Encounter Diagnosis     ICD-10-CM    1. Chronic left-sided low back pain with left-sided sciatica  M54.42     G89.29       2. Left hip pain  M25.552           Start Time: 1110  Stop Time: 1200  Total time in clinic (min): 50 minutes    Subjective: Patient reports no new changes.       Objective: See treatment diary below      Assessment: Tolerated treatment well. Presents with SPC and antalgic gait. Continued to focus on TA activation,during TE's proximal and L LE strength to address deficits. Preformed STM to L lower thoracic/lumbar paraspinal region- TTP noted the greatest around T12.  Pt able to complete program without increase to symptoms. Noted some relief in symptoms post session.  Patient is appropriately fatigued post session and would benefit from continued PT to address impairments and restore LOF.         Plan: Continue per plan of care.      Precautions: B TKA   Past Medical History:   Diagnosis Date    Arthritis     Diabetes mellitus (HCC)     Hyperlipidemia     Hypertension          Manuals 12/27 1/3 1/7 1/10   12/18 12/20 12/23   Lumbar  + L ES STM Thoracic + lumbar STM + soft cupping Thoracic and lumbar STM Thoracic and lumbar STM gentle Gr I mobs L4/L5 Thoracic and lumbar STM   Thoracic & lumbar STM Thoracic & lumbar STM Thoracic & lumbar STM + soft cupping   Lumbar distraction pball            Neuro Re-Ed            Bridges 3x15 3x15 3x15 3x15   3x15 3x15 3x15   clamshells            Sidesteps            Standing hip abd TB            Standing hip ext TB            Mini squats            LAQ            Standing plank with alt arm/leg exts                        Ther Ex            Bike - strength/endurance 5' lvl 1 5' lvl 1 5' lv 1 5' lv 1   5' lv 1 5' Lv 1 5' lv 1   Prone press up            Standing lumbar extensions            TA  "[place]            TA leg exts            Thoracic ext stretch over 1/2 foam            Supine pball crushes Stand 2x10x5'' Std 5\"x20 Stnd 20x5\" Stnd  20x5\"   Stand 2x10x5\" Stand 2x10x5\" Stand 2x10x5\"   TA+TB low rows Purple 3x10 Purple 3x10 Prpl 3x10 Prpl 3x10   Prpl 2x10 Prpl 2x10 Prpl 2x10   TA + TB mid rows Purple 3x10 Purple 3x10 Prpl 3x10 Prpl 3x10   Prpl 2x10 Prpl 2x10 Prpl 2x10   No Moneys Pink 3x10 Pink 3x10     Pink 3x10 Pink 3x10 Pink 3x10   Lumbar FLX/RSB rolls   15x10\" 15x10\"        Ther Activity            STS Chair 3x10 Chair 3x10 Chair 3x10 Chair 3x10   Hi lo 3x10 Hi lo 3x10 Chair 3x10    Standing [place]            Leg press 75# 3x10 85# 3x10 85# 3x10 85#  3x10   75# 2x10 75# 2x10 75# 2x10   Pt edu            Gait Training                                    Modalities                                                                           "

## 2025-01-14 ENCOUNTER — OFFICE VISIT (OUTPATIENT)
Dept: PHYSICAL THERAPY | Facility: REHABILITATION | Age: 65
End: 2025-01-14
Payer: COMMERCIAL

## 2025-01-14 DIAGNOSIS — M25.552 LEFT HIP PAIN: ICD-10-CM

## 2025-01-14 DIAGNOSIS — M54.42 CHRONIC LEFT-SIDED LOW BACK PAIN WITH LEFT-SIDED SCIATICA: Primary | ICD-10-CM

## 2025-01-14 DIAGNOSIS — G89.29 CHRONIC LEFT-SIDED LOW BACK PAIN WITH LEFT-SIDED SCIATICA: Primary | ICD-10-CM

## 2025-01-14 PROCEDURE — 97110 THERAPEUTIC EXERCISES: CPT

## 2025-01-14 PROCEDURE — 97140 MANUAL THERAPY 1/> REGIONS: CPT

## 2025-01-14 PROCEDURE — 97530 THERAPEUTIC ACTIVITIES: CPT

## 2025-01-14 NOTE — PROGRESS NOTES
"Daily Note     Today's date: 2025  Patient name: Roxana Ventura  : 1960  MRN: [id number]  Referring provider: Vika Baer MD  Dx:   Encounter Diagnosis     ICD-10-CM    1. Chronic left-sided low back pain with left-sided sciatica  M54.42     G89.29       2. Left hip pain  M25.552           Start Time: 1000  Stop Time: 1050  Total time in clinic (min): 50 minutes    Subjective: Patient reports her back is stiff today stating \"the cold outside makes it tight.\"       Objective: See treatment diary below      Assessment: Tolerated treatment well. Patient demonstrated fatigue post treatment, exhibited good technique with therapeutic exercises, and would benefit from continued PT      Plan: Continue per plan of care.  Progress treatment as tolerated.       Precautions: B TKA   Past Medical History:   Diagnosis Date    Arthritis     Diabetes mellitus (HCC)     Hyperlipidemia     Hypertension          Manuals 12/27 1/3 1/7 1/10 1/14  12/18 12/20 12/23   Lumbar  + L ES STM Thoracic + lumbar STM + soft cupping Thoracic and lumbar STM Thoracic and lumbar STM gentle Gr I mobs L4/L5 Thoracic and lumbar STM Thoracic and lumbar STM  Thoracic & lumbar STM Thoracic & lumbar STM Thoracic & lumbar STM + soft cupping   Lumbar distraction pball            Neuro Re-Ed            Bridges 3x15 3x15 3x15 3x15 3x15  3x15 3x15 3x15   clamshells            Sidesteps            Standing hip abd TB            Standing hip ext TB            Mini squats            LAQ            Standing plank with alt arm/leg exts                        Ther Ex            Bike - strength/endurance 5' lvl 1 5' lvl 1 5' lv 1 5' lv 1 5' lvl 1  5' lv 1 5' Lv 1 5' lv 1   Prone press up            Standing lumbar extensions            TA marches            TA leg exts            Thoracic ext stretch over 1/2 foam            Supine pball crushes Stand 2x10x5'' Std 5\"x20 Stnd 20x5\" Stnd  20x5\" Stand 20x5''  Stand 2x10x5\" Stand 2x10x5\" Stand " "2x10x5\"   TA+TB low rows Purple 3x10 Purple 3x10 Prpl 3x10 Prpl 3x10 Purple 3x10  Prpl 2x10 Prpl 2x10 Prpl 2x10   TA + TB mid rows Purple 3x10 Purple 3x10 Prpl 3x10 Prpl 3x10 Purple 3x10  Prpl 2x10 Prpl 2x10 Prpl 2x10   No Moneys Pink 3x10 Pink 3x10     Pink 3x10 Pink 3x10 Pink 3x10   Lumbar FLX/RSB rolls   15x10\" 15x10\" 10x10''       Ther Activity            STS Chair 3x10 Chair 3x10 Chair 3x10 Chair 3x10 Chair 3x10  Hi lo 3x10 Hi lo 3x10 Chair 3x10    Standing marches            Leg press 75# 3x10 85# 3x10 85# 3x10 85#  3x10 85# 3x10  75# 2x10 75# 2x10 75# 2x10   Pt edu            Gait Training                                    Modalities                                                                             "

## 2025-01-17 ENCOUNTER — OFFICE VISIT (OUTPATIENT)
Dept: PHYSICAL THERAPY | Facility: REHABILITATION | Age: 65
End: 2025-01-17
Payer: COMMERCIAL

## 2025-01-17 DIAGNOSIS — G89.29 CHRONIC LEFT-SIDED LOW BACK PAIN WITH LEFT-SIDED SCIATICA: Primary | ICD-10-CM

## 2025-01-17 DIAGNOSIS — M25.552 LEFT HIP PAIN: ICD-10-CM

## 2025-01-17 DIAGNOSIS — M54.42 CHRONIC LEFT-SIDED LOW BACK PAIN WITH LEFT-SIDED SCIATICA: Primary | ICD-10-CM

## 2025-01-17 PROCEDURE — 97110 THERAPEUTIC EXERCISES: CPT

## 2025-01-17 PROCEDURE — 97140 MANUAL THERAPY 1/> REGIONS: CPT

## 2025-01-17 PROCEDURE — 97530 THERAPEUTIC ACTIVITIES: CPT

## 2025-01-17 NOTE — PROGRESS NOTES
"Daily Note     Today's date: 2025  Patient name: Roxana Ventura  : 1960  MRN: [id number]  Referring provider: Vika Baer MD  Dx:   Encounter Diagnosis     ICD-10-CM    1. Chronic left-sided low back pain with left-sided sciatica  M54.42     G89.29       2. Left hip pain  M25.552           Start Time: 1050  Stop Time: 1150  Total time in clinic (min): 60 minutes    Subjective: Patients reports back as \"okay\" at start of session. No complaints noted after previous session.      Objective: See treatment diary below      Assessment: Tolerated treatment well. Patient demonstrated fatigue post treatment, exhibited good technique with therapeutic exercises, and would benefit from continued PT Patient tolerated all TE performed well, LE fatigues quickly, seated rest break required with standing TE. Less tightness/tenderness noted with manuals today compared to previous session.       Plan: Continue per plan of care.  Progress treatment as tolerated.       Precautions: B TKA   Past Medical History:   Diagnosis Date    Arthritis     Diabetes mellitus (HCC)     Hyperlipidemia     Hypertension          Manuals 12/27 1/3 1/7 1/10 1/14 1/17  12/20 12/23   Lumbar  + L ES STM Thoracic + lumbar STM + soft cupping Thoracic and lumbar STM Thoracic and lumbar STM gentle Gr I mobs L4/L5 Thoracic and lumbar STM Thoracic and lumbar STM Thoracic and lumbar STM  Thoracic & lumbar STM Thoracic & lumbar STM + soft cupping   Lumbar distraction pball            Neuro Re-Ed            Bridges 3x15 3x15 3x15 3x15 3x15 3x15  3x15 3x15   clamshells            Sidesteps            Standing hip abd TB            Standing hip ext TB            Mini squats            LAQ            Standing plank with alt arm/leg exts                        Ther Ex            Bike - strength/endurance 5' lvl 1 5' lvl 1 5' lv 1 5' lv 1 5' lvl 1 5' lvl 1  5' Lv 1 5' lv 1   Prone press up            Standing lumbar extensions            TA " "[place]            TA leg exts            Thoracic ext stretch over 1/2 foam            Supine pball crushes Stand 2x10x5'' Std 5\"x20 Stnd 20x5\" Stnd  20x5\" Stand 20x5'' Stand 30x5''  Stand 2x10x5\" Stand 2x10x5\"   TA+TB low rows Purple 3x10 Purple 3x10 Prpl 3x10 Prpl 3x10 Purple 3x10 Purple 3x10 inc nv  Prpl 2x10 Prpl 2x10   TA + TB mid rows Purple 3x10 Purple 3x10 Prpl 3x10 Prpl 3x10 Purple 3x10 Purple 3x10 inc nv  Prpl 2x10 Prpl 2x10   No Moneys Pink 3x10 Pink 3x10    Pink 3x10  Pink 3x10 Pink 3x10   Lumbar FLX/RSB rolls   15x10\" 15x10\" 10x10'' 10x10''      Ther Activity            STS Chair 3x10 Chair 3x10 Chair 3x10 Chair 3x10 Chair 3x10 Chair 3x12  Hi lo 3x10 Chair 3x10    Standing [place]            Leg press 75# 3x10 85# 3x10 85# 3x10 85#  3x10 85# 3x10 85# 3x12  75# 2x10 75# 2x10   Pt edu            Gait Training                                    Modalities                                                                               "

## 2025-01-21 ENCOUNTER — OFFICE VISIT (OUTPATIENT)
Dept: PHYSICAL THERAPY | Facility: REHABILITATION | Age: 65
End: 2025-01-21
Payer: COMMERCIAL

## 2025-01-21 DIAGNOSIS — G89.29 CHRONIC LEFT-SIDED LOW BACK PAIN WITH LEFT-SIDED SCIATICA: Primary | ICD-10-CM

## 2025-01-21 DIAGNOSIS — M54.42 CHRONIC LEFT-SIDED LOW BACK PAIN WITH LEFT-SIDED SCIATICA: Primary | ICD-10-CM

## 2025-01-21 DIAGNOSIS — M25.552 LEFT HIP PAIN: ICD-10-CM

## 2025-01-21 PROCEDURE — 97140 MANUAL THERAPY 1/> REGIONS: CPT | Performed by: PHYSICAL THERAPIST

## 2025-01-21 PROCEDURE — 97112 NEUROMUSCULAR REEDUCATION: CPT | Performed by: PHYSICAL THERAPIST

## 2025-01-21 PROCEDURE — 97110 THERAPEUTIC EXERCISES: CPT | Performed by: PHYSICAL THERAPIST

## 2025-01-21 NOTE — PROGRESS NOTES
Daily Note     Today's date: 2025  Patient name: Roxana Ventura  : 1960  MRN: [id number]  Referring provider: Vika Baer MD  Dx:   Encounter Diagnosis     ICD-10-CM    1. Chronic left-sided low back pain with left-sided sciatica  M54.42     G89.29       2. Left hip pain  M25.552           Start Time: 1015  Stop Time: 1100  Total time in clinic (min): 45 minutes    Subjective: patient states her back always bothers her with the cold      Objective: See treatment diary below      Assessment: Tolerated treatment well. Patient demonstrated fatigue post treatment, exhibited good technique with therapeutic exercises, and would benefit from continued PT Patient with less tension of lumbar paraspinals. Patient still having pain down her L LE with certain activities throughout the day      Plan: Continue per plan of care.      Precautions: B TKA   Past Medical History:   Diagnosis Date    Arthritis     Diabetes mellitus (HCC)     Hyperlipidemia     Hypertension          Manuals 12/27 1/3 1/7 1/10 1/14 1/17 1/21 12/20 12/23   Lumbar  + L ES STM Thoracic + lumbar STM + soft cupping Thoracic and lumbar STM Thoracic and lumbar STM gentle Gr I mobs L4/L5 Thoracic and lumbar STM Thoracic and lumbar STM Thoracic and lumbar STM done Thoracic & lumbar STM Thoracic & lumbar STM + soft cupping   Lumbar distraction pball            Neuro Re-Ed            Bridges 3x15 3x15 3x15 3x15 3x15 3x15 3x15 3x15 3x15   clamshells            Sidesteps            Standing hip abd TB            Standing hip ext TB            Mini squats            LAQ            Standing plank with alt arm/leg exts                        Ther Ex            Bike - strength/endurance 5' lvl 1 5' lvl 1 5' lv 1 5' lv 1 5' lvl 1 5' lvl 1 5' lvl 1 5' Lv 1 5' lv 1   Prone press up            Standing lumbar extensions            TA marches            TA leg exts            Thoracic ext stretch over 1/2 foam            Supine pball crushes  "Stand 2x10x5'' Std 5\"x20 Stnd 20x5\" Stnd  20x5\" Stand 20x5'' Stand 30x5'' Std 5\"x30 Stand 2x10x5\" Stand 2x10x5\"   TA+TB low rows Purple 3x10 Purple 3x10 Prpl 3x10 Prpl 3x10 Purple 3x10 Purple 3x10 inc nv Yellow 3x10 Prpl 2x10 Prpl 2x10   TA + TB mid rows Purple 3x10 Purple 3x10 Prpl 3x10 Prpl 3x10 Purple 3x10 Purple 3x10 inc nv Yellow 3x10 Prpl 2x10 Prpl 2x10   No Moneys Pink 3x10 Pink 3x10    Pink 3x10 Pink 3x10 Pink 3x10 Pink 3x10   Lumbar FLX/RSB rolls   15x10\" 15x10\" 10x10'' 10x10''      Ther Activity            STS Chair 3x10 Chair 3x10 Chair 3x10 Chair 3x10 Chair 3x10 Chair 3x12 Chair 3x12 Hi lo 3x10 Chair 3x10    Standing marches            Leg press 75# 3x10 85# 3x10 85# 3x10 85#  3x10 85# 3x10 85# 3x12 85# 3x12 75# 2x10 75# 2x10   Pt edu            Gait Training                                    Modalities                                                                                 "

## 2025-01-23 ENCOUNTER — OFFICE VISIT (OUTPATIENT)
Dept: PHYSICAL THERAPY | Facility: REHABILITATION | Age: 65
End: 2025-01-23
Payer: COMMERCIAL

## 2025-01-23 DIAGNOSIS — M54.42 CHRONIC LEFT-SIDED LOW BACK PAIN WITH LEFT-SIDED SCIATICA: Primary | ICD-10-CM

## 2025-01-23 DIAGNOSIS — M25.552 LEFT HIP PAIN: ICD-10-CM

## 2025-01-23 DIAGNOSIS — G89.29 CHRONIC LEFT-SIDED LOW BACK PAIN WITH LEFT-SIDED SCIATICA: Primary | ICD-10-CM

## 2025-01-23 PROCEDURE — 97140 MANUAL THERAPY 1/> REGIONS: CPT | Performed by: PHYSICAL THERAPIST

## 2025-01-23 PROCEDURE — 97530 THERAPEUTIC ACTIVITIES: CPT | Performed by: PHYSICAL THERAPIST

## 2025-01-23 PROCEDURE — 97110 THERAPEUTIC EXERCISES: CPT | Performed by: PHYSICAL THERAPIST

## 2025-01-23 NOTE — PROGRESS NOTES
"Daily Note     Today's date: 2025  Patient name: Roxana Ventura  : 1960  MRN: [id number]  Referring provider: Vika Baer MD  Dx:   Encounter Diagnosis     ICD-10-CM    1. Chronic left-sided low back pain with left-sided sciatica  M54.42     G89.29       2. Left hip pain  M25.552           Start Time: 1011  Stop Time: 1055  Total time in clinic (min): 44 minutes    Subjective: Patient reports difficulty walking yesterday because of back pain.      Objective: See treatment diary below      Assessment: Tolerated treatment well. Patient demonstrated fatigue post treatment, exhibited good technique with therapeutic exercises, and would benefit from continued PT.      Plan: Continue per plan of care.      Precautions: B TKA   Past Medical History:   Diagnosis Date    Arthritis     Diabetes mellitus (HCC)     Hyperlipidemia     Hypertension          Manuals 12/27 1/3 1/7 1/10 1/14 1/17 1/21 1/23    Lumbar  + L ES STM Thoracic + lumbar STM + soft cupping Thoracic and lumbar STM Thoracic and lumbar STM gentle Gr I mobs L4/L5 Thoracic and lumbar STM Thoracic and lumbar STM Thoracic and lumbar STM done Done STM + soft cupping    Lumbar distraction pball            Neuro Re-Ed            Bridges 3x15 3x15 3x15 3x15 3x15 3x15 3x15     clamshells            Sidesteps            Standing hip abd TB            Standing hip ext TB            Mini squats            LAQ            Standing plank with alt arm/leg exts                        Ther Ex            Bike - strength/endurance 5' lvl 1 5' lvl 1 5' lv 1 5' lv 1 5' lvl 1 5' lvl 1 5' lvl 1 5' lv 1    Prone press up            Standing lumbar extensions            TA marches            TA leg exts            Thoracic ext stretch over 1/2 foam            Supine pball crushes Stand 2x10x5'' Std 5\"x20 Stnd 20x5\" Stnd  20x5\" Stand 20x5'' Stand 30x5'' Std 5\"x30 Stand 30x5\"    PPT        nv    PPT clam isos        nv                            TA+TB low " "rows Purple 3x10 Purple 3x10 Prpl 3x10 Prpl 3x10 Purple 3x10 Purple 3x10 inc nv Yellow 3x10     TA + TB mid rows Purple 3x10 Purple 3x10 Prpl 3x10 Prpl 3x10 Purple 3x10 Purple 3x10 inc nv Yellow 3x10     No Moneys Pink 3x10 Pink 3x10    Pink 3x10 Pink 3x10     Lumbar FLX/RSB rolls   15x10\" 15x10\" 10x10'' 10x10''  10x10\"    Ther Activity            STS Chair 3x10 Chair 3x10 Chair 3x10 Chair 3x10 Chair 3x10 Chair 3x12 Chair 3x12 Chair 3x12    Standing marches            Leg press 75# 3x10 85# 3x10 85# 3x10 85#  3x10 85# 3x10 85# 3x12 85# 3x12 45# 2x10 SL    Pt edu            Gait Training                                    Modalities                                                                                   "

## 2025-01-28 ENCOUNTER — OFFICE VISIT (OUTPATIENT)
Dept: PHYSICAL THERAPY | Facility: REHABILITATION | Age: 65
End: 2025-01-28
Payer: COMMERCIAL

## 2025-01-28 DIAGNOSIS — G89.29 CHRONIC LEFT-SIDED LOW BACK PAIN WITH LEFT-SIDED SCIATICA: Primary | ICD-10-CM

## 2025-01-28 DIAGNOSIS — M54.42 CHRONIC LEFT-SIDED LOW BACK PAIN WITH LEFT-SIDED SCIATICA: Primary | ICD-10-CM

## 2025-01-28 DIAGNOSIS — M25.552 LEFT HIP PAIN: ICD-10-CM

## 2025-01-28 PROCEDURE — 97140 MANUAL THERAPY 1/> REGIONS: CPT

## 2025-01-28 PROCEDURE — 97530 THERAPEUTIC ACTIVITIES: CPT

## 2025-01-28 PROCEDURE — 97110 THERAPEUTIC EXERCISES: CPT

## 2025-01-28 NOTE — PROGRESS NOTES
Daily Note     Today's date: 2025  Patient name: Roxana Ventura  : 1960  MRN: [id number]  Referring provider: Vika Baer MD  Dx:   Encounter Diagnosis     ICD-10-CM    1. Chronic left-sided low back pain with left-sided sciatica  M54.42     G89.29       2. Left hip pain  M25.552           Start Time: 1050  Stop Time: 1145  Total time in clinic (min): 55 minutes    Subjective: Patient reports her back is feeling better today compared to previous session.      Objective: See treatment diary below      Assessment: Tolerated treatment well. Patient demonstrated fatigue post treatment, exhibited good technique with therapeutic exercises, and would benefit from continued PT Trialed PPT and PPT clamshells iso, with initial cues required for proper form able to self correct. Fatigues quickly with iso.       Plan: Continue per plan of care.  Progress treatment as tolerated.       Precautions: B TKA   Past Medical History:   Diagnosis Date    Arthritis     Diabetes mellitus (HCC)     Hyperlipidemia     Hypertension          Manuals 12/27 1/3 1/7 1/10 1/14 1/17 1/21 1/23 1/28   Lumbar  + L ES STM Thoracic + lumbar STM + soft cupping Thoracic and lumbar STM Thoracic and lumbar STM gentle Gr I mobs L4/L5 Thoracic and lumbar STM Thoracic and lumbar STM Thoracic and lumbar STM done Done STM + soft cupping Done STM thoracic and lumbar STM   Lumbar distraction pball            Neuro Re-Ed            Bridges 3x15 3x15 3x15 3x15 3x15 3x15 3x15  3x10   clamshells            Sidesteps            Standing hip abd TB            Standing hip ext TB            Mini squats            LAQ            Standing plank with alt arm/leg exts                        Ther Ex            Bike - strength/endurance 5' lvl 1 5' lvl 1 5' lv 1 5' lv 1 5' lvl 1 5' lvl 1 5' lvl 1 5' lv 1 5' lvl 1   Prone press up            Standing lumbar extensions            TA marches            TA leg exts            Thoracic ext stretch  "over 1/2 foam            Supine pball crushes Stand 2x10x5'' Std 5\"x20 Stnd 20x5\" Stnd  20x5\" Stand 20x5'' Stand 30x5'' Std 5\"x30 Stand 30x5\" Stand 30x5''   PPT        nv 10x5''   PPT clam isos        nv 20x5''                           TA+TB low rows Purple 3x10 Purple 3x10 Prpl 3x10 Prpl 3x10 Purple 3x10 Purple 3x10 inc nv Yellow 3x10     TA + TB mid rows Purple 3x10 Purple 3x10 Prpl 3x10 Prpl 3x10 Purple 3x10 Purple 3x10 inc nv Yellow 3x10     No Moneys Pink 3x10 Pink 3x10    Pink 3x10 Pink 3x10     Lumbar FLX/RSB rolls   15x10\" 15x10\" 10x10'' 10x10''  10x10\" 10x10''   Ther Activity            STS Chair 3x10 Chair 3x10 Chair 3x10 Chair 3x10 Chair 3x10 Chair 3x12 Chair 3x12 Chair 3x12 Chair 3x12   Standing marches            Leg press 75# 3x10 85# 3x10 85# 3x10 85#  3x10 85# 3x10 85# 3x12 85# 3x12 45# 2x10 SL 45# 2x10 SL   Pt edu            Gait Training                                    Modalities                                                                                     "

## 2025-01-31 ENCOUNTER — OFFICE VISIT (OUTPATIENT)
Dept: PHYSICAL THERAPY | Facility: REHABILITATION | Age: 65
End: 2025-01-31
Payer: COMMERCIAL

## 2025-01-31 DIAGNOSIS — G89.29 CHRONIC LEFT-SIDED LOW BACK PAIN WITH LEFT-SIDED SCIATICA: Primary | ICD-10-CM

## 2025-01-31 DIAGNOSIS — M54.42 CHRONIC LEFT-SIDED LOW BACK PAIN WITH LEFT-SIDED SCIATICA: Primary | ICD-10-CM

## 2025-01-31 DIAGNOSIS — M25.552 LEFT HIP PAIN: ICD-10-CM

## 2025-01-31 PROCEDURE — 97530 THERAPEUTIC ACTIVITIES: CPT

## 2025-01-31 PROCEDURE — 97110 THERAPEUTIC EXERCISES: CPT

## 2025-01-31 PROCEDURE — 97140 MANUAL THERAPY 1/> REGIONS: CPT

## 2025-01-31 NOTE — PROGRESS NOTES
"Daily Note     Today's date: 2025  Patient name: Roxana Ventura  : 1960  MRN: [id number]  Referring provider: Vika Baer MD  Dx:   Encounter Diagnosis     ICD-10-CM    1. Chronic left-sided low back pain with left-sided sciatica  M54.42     G89.29       2. Left hip pain  M25.552           Start Time: 1011  Stop Time: 1110  Total time in clinic (min): 59 minutes    Subjective: Patient reports her hip/back are feeling \"okay\" at start of session today.       Objective: See treatment diary below      Assessment: Tolerated treatment well. Patient demonstrated fatigue post treatment, exhibited good technique with therapeutic exercises, and would benefit from continued PT      Plan: Continue per plan of care.  Progress treatment as tolerated.       Precautions: B TKA   Past Medical History:   Diagnosis Date    Arthritis     Diabetes mellitus (HCC)     Hyperlipidemia     Hypertension          Manuals 1/31  1/7 1/10 1/14 1/17 1/21 1/23 1/28   Lumbar  + L ES STM Done STM thoracic and lumbar STM  Thoracic and lumbar STM gentle Gr I mobs L4/L5 Thoracic and lumbar STM Thoracic and lumbar STM Thoracic and lumbar STM done Done STM + soft cupping Done STM thoracic and lumbar STM   Lumbar distraction pball            Neuro Re-Ed            Bridges 3x15  3x15 3x15 3x15 3x15 3x15  3x10   clamshells            Sidesteps            Standing hip abd TB            Standing hip ext TB            Mini squats            LAQ            Standing plank with alt arm/leg exts                        Ther Ex            Bike - strength/endurance 5' lvl 1  5' lv 1 5' lv 1 5' lvl 1 5' lvl 1 5' lvl 1 5' lv 1 5' lvl 1   Prone press up            Standing lumbar extensions            TA marches            TA leg exts            Thoracic ext stretch over 1/2 foam            Supine pball crushes Stand 30x5''  Stnd 20x5\" Stnd  20x5\" Stand 20x5'' Stand 30x5'' Std 5\"x30 Stand 30x5\" Stand 30x5''   PPT 20x5''       nv 10x5''   PPT " "clam isos 20x5''       nv 20x5''                           TA+TB low rows   Prpl 3x10 Prpl 3x10 Purple 3x10 Purple 3x10 inc nv Yellow 3x10  Yellow 3x10   TA + TB mid rows   Prpl 3x10 Prpl 3x10 Purple 3x10 Purple 3x10 inc nv Yellow 3x10     No Moneys      Pink 3x10 Pink 3x10     Lumbar FLX/RSB rolls 10x10''  15x10\" 15x10\" 10x10'' 10x10''  10x10\" 10x10''   Ther Activity            STS Chair 3x15  Chair 3x10 Chair 3x10 Chair 3x10 Chair 3x12 Chair 3x12 Chair 3x12 Chair 3x12   Standing marches            Leg press 45# 3x10 SL  85# 3x10 85#  3x10 85# 3x10 85# 3x12 85# 3x12 45# 2x10 SL 45# 2x10 SL   Pt edu            Gait Training                                    Modalities                                                                                       "

## 2025-02-04 ENCOUNTER — OFFICE VISIT (OUTPATIENT)
Dept: PHYSICAL THERAPY | Facility: REHABILITATION | Age: 65
End: 2025-02-04
Payer: COMMERCIAL

## 2025-02-04 DIAGNOSIS — M54.42 CHRONIC LEFT-SIDED LOW BACK PAIN WITH LEFT-SIDED SCIATICA: Primary | ICD-10-CM

## 2025-02-04 DIAGNOSIS — G89.29 CHRONIC LEFT-SIDED LOW BACK PAIN WITH LEFT-SIDED SCIATICA: Primary | ICD-10-CM

## 2025-02-04 DIAGNOSIS — M25.552 LEFT HIP PAIN: ICD-10-CM

## 2025-02-04 PROCEDURE — 97140 MANUAL THERAPY 1/> REGIONS: CPT

## 2025-02-04 PROCEDURE — 97110 THERAPEUTIC EXERCISES: CPT

## 2025-02-04 PROCEDURE — 97530 THERAPEUTIC ACTIVITIES: CPT

## 2025-02-04 NOTE — PROGRESS NOTES
"Daily Note     Today's date: 2025  Patient name: Roxana Ventura  : 1960  MRN: [id number]  Referring provider: Vika Baer MD  Dx:   Encounter Diagnosis     ICD-10-CM    1. Chronic left-sided low back pain with left-sided sciatica  M54.42     G89.29       2. Left hip pain  M25.552           Start Time: 1002  Stop Time: 1100  Total time in clinic (min): 58 minutes    Subjective: Patient reports her back as \"okay\" at start of session.       Objective: See treatment diary below      Assessment: Tolerated treatment well. Patient demonstrated fatigue post treatment, exhibited good technique with therapeutic exercises, and would benefit from continued PT      Plan: Continue per plan of care.  Progress treatment as tolerated.       Precautions: B TKA   Past Medical History:   Diagnosis Date    Arthritis     Diabetes mellitus (HCC)     Hyperlipidemia     Hypertension          Manuals 1/31 2/4  1/10 1/14 1/17 1/21 1/23 1/28   Lumbar  + L ES STM Done STM thoracic and lumbar STM Done STM thoracic and lumbar STM  Thoracic and lumbar STM Thoracic and lumbar STM Thoracic and lumbar STM done Done STM + soft cupping Done STM thoracic and lumbar STM   Lumbar distraction pball            Neuro Re-Ed            Bridges 3x15 3x15  3x15 3x15 3x15 3x15  3x10   clamshells            Sidesteps            Standing hip abd TB            Standing hip ext TB            Mini squats            LAQ            Standing plank with alt arm/leg exts                        Ther Ex            Bike - strength/endurance 5' lvl 1 5' lvl 1  5' lv 1 5' lvl 1 5' lvl 1 5' lvl 1 5' lv 1 5' lvl 1   Prone press up            Standing lumbar extensions            TA marches            TA leg exts            Thoracic ext stretch over 1/2 foam            Supine pball crushes Stand 30x5'' Stand 20x5''  Stnd  20x5\" Stand 20x5'' Stand 30x5'' Std 5\"x30 Stand 30x5\" Stand 30x5''               PPT 20x5'' 20x5''      nv 10x5''   PPT clam isos " "20x5'' GTB 3x10      nv 20x5''                           TA+TB low rows    Prpl 3x10 Purple 3x10 Purple 3x10 inc nv Yellow 3x10  Yellow 3x10   TA + TB mid rows    Prpl 3x10 Purple 3x10 Purple 3x10 inc nv Yellow 3x10     No Moneys      Pink 3x10 Pink 3x10     Lumbar FLX/RSB rolls 10x10'' 10x10''  15x10\" 10x10'' 10x10''  10x10\" 10x10''   Ther Activity            STS Chair 3x15 Chair 3x15  Chair 3x10 Chair 3x10 Chair 3x12 Chair 3x12 Chair 3x12 Chair 3x12   Standing marches            Leg press 45# 3x10 SL 45# 3x12 SL  85#  3x10 85# 3x10 85# 3x12 85# 3x12 45# 2x10 SL 45# 2x10 SL   Pt edu            Gait Training                                    Modalities                                                                                         "

## 2025-02-07 ENCOUNTER — OFFICE VISIT (OUTPATIENT)
Dept: PHYSICAL THERAPY | Facility: REHABILITATION | Age: 65
End: 2025-02-07
Payer: COMMERCIAL

## 2025-02-07 DIAGNOSIS — G89.29 CHRONIC LEFT-SIDED LOW BACK PAIN WITH LEFT-SIDED SCIATICA: Primary | ICD-10-CM

## 2025-02-07 DIAGNOSIS — M25.552 LEFT HIP PAIN: ICD-10-CM

## 2025-02-07 DIAGNOSIS — M54.42 CHRONIC LEFT-SIDED LOW BACK PAIN WITH LEFT-SIDED SCIATICA: Primary | ICD-10-CM

## 2025-02-07 PROCEDURE — 97110 THERAPEUTIC EXERCISES: CPT | Performed by: PHYSICAL THERAPIST

## 2025-02-07 PROCEDURE — 97140 MANUAL THERAPY 1/> REGIONS: CPT | Performed by: PHYSICAL THERAPIST

## 2025-02-07 NOTE — PROGRESS NOTES
"Daily Note     Today's date: 2025  Patient name: Roxana Ventura  : 1960  MRN: [id number]  Referring provider: Vika Baer MD  Dx:   Encounter Diagnosis     ICD-10-CM    1. Chronic left-sided low back pain with left-sided sciatica  M54.42     G89.29       2. Left hip pain  M25.552           Start Time: 1016  Stop Time: 1116  Total time in clinic (min): 60 minutes    Subjective: Patient reports the back feels \"ok\" today.      Objective: See treatment diary below      Assessment: Tolerated treatment well. Patient demonstrated fatigue post treatment, exhibited good technique with therapeutic exercises, and would benefit from continued PT.      Plan: Continue per plan of care.      Precautions: B TKA   Past Medical History:   Diagnosis Date    Arthritis     Diabetes mellitus (HCC)     Hyperlipidemia     Hypertension          Manuals    Lumbar  + L ES STM Done STM thoracic and lumbar STM Done STM thoracic and lumbar STM Done STM thoracic and lumbar STM    done Done STM + soft cupping Done STM thoracic and lumbar STM   Lumbar distraction pball            Neuro Re-Ed            Bridges 3x15 3x15 10# 3x10    3x15  3x10   clamshells            Sidesteps            Standing hip abd TB            Standing hip ext TB            Mini squats            LAQ            Standing plank with alt arm/leg exts                        Ther Ex            Bike - strength/endurance 5' lvl 1 5' lvl 1 5' lv 1    5' lvl 1 5' lv 1 5' lvl 1   Prone press up            Standing lumbar extensions            TA marches            TA leg exts            Thoracic ext stretch over 1/2 foam            Supine pball crushes Stand 30x5'' Stand 20x5'' Stand 20x5\"    Std 5\"x30 Stand 30x5\" Stand 30x5''               PPT 20x5'' 20x5'' 20x5\"     nv 10x5''   PPT clam isos 20x5'' GTB 3x10 GTB 3x10 ea     nv 20x5''                           TA+TB low rows       Yellow 3x10  Yellow 3x10   TA + TB mid rows    " "   Yellow 3x10     No Moneys       Pink 3x10     Lumbar FLX/RSB rolls 10x10'' 10x10'' 10x10\"     10x10\" 10x10''   Ther Activity            STS Chair 3x15 Chair 3x15 Chair 3x15    Chair 3x12 Chair 3x12 Chair 3x12   Standing marches            Leg press 45# 3x10 SL 45# 3x12 SL 45# 3x12 unilateral    85# 3x12 45# 2x10 SL 45# 2x10 SL   Pt edu            Gait Training                                    Modalities                                                                                           "

## 2025-02-11 ENCOUNTER — OFFICE VISIT (OUTPATIENT)
Dept: PHYSICAL THERAPY | Facility: REHABILITATION | Age: 65
End: 2025-02-11
Payer: COMMERCIAL

## 2025-02-11 DIAGNOSIS — G89.29 CHRONIC LEFT-SIDED LOW BACK PAIN WITH LEFT-SIDED SCIATICA: Primary | ICD-10-CM

## 2025-02-11 DIAGNOSIS — M54.42 CHRONIC LEFT-SIDED LOW BACK PAIN WITH LEFT-SIDED SCIATICA: Primary | ICD-10-CM

## 2025-02-11 DIAGNOSIS — M25.552 LEFT HIP PAIN: ICD-10-CM

## 2025-02-11 PROCEDURE — 97140 MANUAL THERAPY 1/> REGIONS: CPT

## 2025-02-11 PROCEDURE — 97110 THERAPEUTIC EXERCISES: CPT

## 2025-02-11 NOTE — PROGRESS NOTES
"Daily Note     Today's date: 2025  Patient name: Roxana Ventura  : 1960  MRN: [id number]  Referring provider: Vika Baer MD  Dx:   Encounter Diagnosis     ICD-10-CM    1. Chronic left-sided low back pain with left-sided sciatica  M54.42     G89.29       2. Left hip pain  M25.552           Start Time: 1050  Stop Time: 1135  Total time in clinic (min): 45 minutes    Subjective: Patient reporting some back soreness at start of session today.       Objective: See treatment diary below      Assessment: Tolerated treatment well. Patient demonstrated fatigue post treatment, exhibited good technique with therapeutic exercises, and would benefit from continued PT      Plan: Continue per plan of care.  Progress treatment as tolerated.       Precautions: B TKA   Past Medical History:   Diagnosis Date    Arthritis     Diabetes mellitus (HCC)     Hyperlipidemia     Hypertension          Manuals    Lumbar  + L ES STM Done STM thoracic and lumbar STM Done STM thoracic and lumbar STM Done STM thoracic and lumbar STM Done STM thoracic and lumbar STM   done Done STM + soft cupping Done STM thoracic and lumbar STM   Lumbar distraction pball            Neuro Re-Ed            Bridges 3x15 3x15 10# 3x10 10# 3x10   3x15  3x10   clamshells            Sidesteps            Standing hip abd TB            Standing hip ext TB            Mini squats            LAQ            Standing plank with alt arm/leg exts                        Ther Ex            Bike - strength/endurance 5' lvl 1 5' lvl 1 5' lv 1 5' lvl 1   5' lvl 1 5' lv 1 5' lvl 1   Prone press up            Standing lumbar extensions            TA marches            TA leg exts            Thoracic ext stretch over 1/2 foam            Supine pball crushes Stand 30x5'' Stand 20x5'' Stand 20x5\" Stand 20x5''   Std 5\"x30 Stand 30x5\" Stand 30x5''               PPT 20x5'' 20x5'' 20x5\" 20x5''    nv 10x5''   PPT clam isos 20x5'' " "GTB 3x10 GTB 3x10 ea GTB 3x10 ea    nv 20x5''                           TA+TB low rows       Yellow 3x10  Yellow 3x10   TA + TB mid rows       Yellow 3x10     No Moneys       Pink 3x10     Lumbar FLX/RSB rolls 10x10'' 10x10'' 10x10\"     10x10\" 10x10''   Ther Activity            STS Chair 3x15 Chair 3x15 Chair 3x15 Chair 3x15   Chair 3x12 Chair 3x12 Chair 3x12   Standing marches            Leg press 45# 3x10 SL 45# 3x12 SL 45# 3x12 unilateral 45# 3x15 SL   85# 3x12 45# 2x10 SL 45# 2x10 SL   Pt edu            Gait Training                                    Modalities                                                                                             "

## 2025-02-14 ENCOUNTER — OFFICE VISIT (OUTPATIENT)
Dept: PHYSICAL THERAPY | Facility: REHABILITATION | Age: 65
End: 2025-02-14
Payer: COMMERCIAL

## 2025-02-14 DIAGNOSIS — M54.42 CHRONIC LEFT-SIDED LOW BACK PAIN WITH LEFT-SIDED SCIATICA: Primary | ICD-10-CM

## 2025-02-14 DIAGNOSIS — G89.29 CHRONIC LEFT-SIDED LOW BACK PAIN WITH LEFT-SIDED SCIATICA: Primary | ICD-10-CM

## 2025-02-14 DIAGNOSIS — M25.552 LEFT HIP PAIN: ICD-10-CM

## 2025-02-14 PROCEDURE — 97110 THERAPEUTIC EXERCISES: CPT

## 2025-02-14 PROCEDURE — 97140 MANUAL THERAPY 1/> REGIONS: CPT

## 2025-02-14 NOTE — PROGRESS NOTES
"Daily Note     Today's date: 2025  Patient name: Roxana Ventura  : 1960  MRN: [id number]  Referring provider: Vika Baer MD  Dx:   Encounter Diagnosis     ICD-10-CM    1. Chronic left-sided low back pain with left-sided sciatica  M54.42     G89.29       2. Left hip pain  M25.552           Start Time: 1025  Stop Time: 1112  Total time in clinic (min): 47 minutes    Subjective: Patient reports her back as \"fine\" at start of session, no complaints noted.       Objective: See treatment diary below      Assessment: Tolerated treatment well. Patient demonstrated fatigue post treatment, exhibited good technique with therapeutic exercises, and would benefit from continued PT Trialed standing pball crushes with ruy with good challenge noted.       Plan: Continue per plan of care.  Progress treatment as tolerated.       Precautions: B TKA   Past Medical History:   Diagnosis Date    Arthritis     Diabetes mellitus (HCC)     Hyperlipidemia     Hypertension          Manuals    Lumbar  + L ES STM Done STM thoracic and lumbar STM Done STM thoracic and lumbar STM Done STM thoracic and lumbar STM Done STM thoracic and lumbar STM Done STM thoracic and lumbar STM   done Done STM + soft cupping Done STM thoracic and lumbar STM   Lumbar distraction pball            Neuro Re-Ed            Bridges 3x15 3x15 10# 3x10 10# 3x10 10# 3x12  3x15  3x10   clamshells            Sidesteps            Standing hip abd TB            Standing hip ext TB            Mini squats            LAQ            Standing plank with alt arm/leg exts                        Ther Ex            Bike - strength/endurance 5' lvl 1 5' lvl 1 5' lv 1 5' lvl 1 5' lvl 1  5' lvl 1 5' lv 1 5' lvl 1   Prone press up            Standing lumbar extensions            TA marches            TA leg exts            Thoracic ext stretch over 1/2 foam            Supine pball crushes Stand 30x5'' Stand 20x5'' Stand " "20x5\" Stand 20x5'' Stand 20x20''  Std 5\"x30 Stand 30x5\" Stand 30x5''   Standing pball crushes with marching     10x ea       PPT 20x5'' 20x5'' 20x5\" 20x5'' 20x5''   nv 10x5''   PPT clam isos 20x5'' GTB 3x10 GTB 3x10 ea GTB 3x10 ea GTB 3x12 ea   nv 20x5''                           TA+TB low rows       Yellow 3x10  Yellow 3x10   TA + TB mid rows       Yellow 3x10     No Moneys       Pink 3x10     Lumbar FLX/RSB rolls 10x10'' 10x10'' 10x10\"     10x10\" 10x10''   Ther Activity            STS Chair 3x15 Chair 3x15 Chair 3x15 Chair 3x15 10# chair 3x10  Chair 3x12 Chair 3x12 Chair 3x12   Standing marches            Leg press 45# 3x10 SL 45# 3x12 SL 45# 3x12 unilateral 45# 3x15 SL 45# 3x15 SL inv nv  85# 3x12 45# 2x10 SL 45# 2x10 SL   Pt edu            Gait Training                                    Modalities                                                                                               "

## 2025-02-18 ENCOUNTER — OFFICE VISIT (OUTPATIENT)
Dept: PHYSICAL THERAPY | Facility: REHABILITATION | Age: 65
End: 2025-02-18
Payer: COMMERCIAL

## 2025-02-18 DIAGNOSIS — M54.42 CHRONIC LEFT-SIDED LOW BACK PAIN WITH LEFT-SIDED SCIATICA: Primary | ICD-10-CM

## 2025-02-18 DIAGNOSIS — M25.552 LEFT HIP PAIN: ICD-10-CM

## 2025-02-18 DIAGNOSIS — G89.29 CHRONIC LEFT-SIDED LOW BACK PAIN WITH LEFT-SIDED SCIATICA: Primary | ICD-10-CM

## 2025-02-18 PROCEDURE — 97140 MANUAL THERAPY 1/> REGIONS: CPT

## 2025-02-18 PROCEDURE — 97110 THERAPEUTIC EXERCISES: CPT

## 2025-02-18 NOTE — PROGRESS NOTES
"Daily Note     Today's date: 2025  Patient name: Roxana Ventura  : 1960  MRN: [id number]  Referring provider: Vika Baer MD  Dx:   Encounter Diagnosis     ICD-10-CM    1. Chronic left-sided low back pain with left-sided sciatica  M54.42     G89.29       2. Left hip pain  M25.552           Start Time: 1005  Stop Time: 1100  Total time in clinic (min): 55 minutes    Subjective: Patient reporting back as \"okay\" some soreness noted. No complaints noted after previous session.       Objective: See treatment diary below      Assessment: Tolerated treatment well. Patient demonstrated fatigue post treatment, exhibited good technique with therapeutic exercises, and would benefit from continued PT Good tolerance to all TE performed, no pain noted with any TE, only muscle fatigue.       Plan: Continue per plan of care.  Progress treatment as tolerated.       Precautions: B TKA   Past Medical History:   Diagnosis Date    Arthritis     Diabetes mellitus (HCC)     Hyperlipidemia     Hypertension          Manuals    Lumbar  + L ES STM Done STM thoracic and lumbar STM Done STM thoracic and lumbar STM Done STM thoracic and lumbar STM Done STM thoracic and lumbar STM Done STM thoracic and lumbar STM  Done STM thoracic and lumbar STM  Done STM + soft cupping Done STM thoracic and lumbar STM   Lumbar distraction pball            Neuro Re-Ed            Bridges 3x15 3x15 10# 3x10 10# 3x10 10# 3x12 10# 3x15   3x10   clamshells            Sidesteps            Standing hip abd TB            Standing hip ext TB            Mini squats            LAQ            Standing plank with alt arm/leg exts                        Ther Ex            Bike - strength/endurance 5' lvl 1 5' lvl 1 5' lv 1 5' lvl 1 5' lvl 1 5' lvl 1  5' lv 1 5' lvl 1   Prone press up            Standing lumbar extensions            TA marches            TA leg exts            Thoracic ext stretch over 1/2 " "foam            Supine pball crushes Stand 30x5'' Stand 20x5'' Stand 20x5\" Stand 20x5'' Stand 20x20'' Stand 10x5''  Stand 30x5\" Stand 30x5''   Standing pball crushes with marching     10x ea 20x ea      PPT 20x5'' 20x5'' 20x5\" 20x5'' 20x5'' 20x5''  nv 10x5''   PPT clam isos 20x5'' GTB 3x10 GTB 3x10 ea GTB 3x10 ea GTB 3x12 ea GTB 3x12 ea  nv 20x5''               Paloff press      nv      TA+TB low rows      nv   Yellow 3x10   TA + TB mid rows            No Moneys            Lumbar FLX/RSB rolls 10x10'' 10x10'' 10x10\"     10x10\" 10x10''   Ther Activity            STS Chair 3x15 Chair 3x15 Chair 3x15 Chair 3x15 10# chair 3x10 Chair 10# 3x12  Chair 3x12 Chair 3x12   Standing marches            Leg press 45# 3x10 SL 45# 3x12 SL 45# 3x12 unilateral 45# 3x15 SL 45# 3x15 SL inv nv 55# 3x10 SL   45# 2x10 SL 45# 2x10 SL   Pt edu            Gait Training                                    Modalities                                                                                                 "

## 2025-02-21 ENCOUNTER — OFFICE VISIT (OUTPATIENT)
Dept: PHYSICAL THERAPY | Facility: REHABILITATION | Age: 65
End: 2025-02-21
Payer: COMMERCIAL

## 2025-02-21 DIAGNOSIS — G89.29 CHRONIC LEFT-SIDED LOW BACK PAIN WITH LEFT-SIDED SCIATICA: Primary | ICD-10-CM

## 2025-02-21 DIAGNOSIS — M25.552 LEFT HIP PAIN: ICD-10-CM

## 2025-02-21 DIAGNOSIS — M54.42 CHRONIC LEFT-SIDED LOW BACK PAIN WITH LEFT-SIDED SCIATICA: Primary | ICD-10-CM

## 2025-02-21 PROCEDURE — 97530 THERAPEUTIC ACTIVITIES: CPT | Performed by: PHYSICAL THERAPIST

## 2025-02-21 PROCEDURE — 97110 THERAPEUTIC EXERCISES: CPT | Performed by: PHYSICAL THERAPIST

## 2025-02-21 PROCEDURE — 97140 MANUAL THERAPY 1/> REGIONS: CPT | Performed by: PHYSICAL THERAPIST

## 2025-02-21 NOTE — PROGRESS NOTES
"Daily Note     Today's date: 2025  Patient name: Roxana Ventura  : 1960  MRN: [id number]  Referring provider: Vika Baer MD  Dx:   Encounter Diagnosis     ICD-10-CM    1. Chronic left-sided low back pain with left-sided sciatica  M54.42     G89.29       2. Left hip pain  M25.552           Start Time: 1020  Stop Time: 1108  Total time in clinic (min): 48 minutes    Subjective: Patient reports the back is feeling \"ok\" today.      Objective: See treatment diary below      Assessment: Tolerated treatment well. Patient demonstrated fatigue post treatment, exhibited good technique with therapeutic exercises, and would benefit from continued PT.      Plan: Continue per plan of care.      Precautions: B TKA   Past Medical History:   Diagnosis Date    Arthritis     Diabetes mellitus (HCC)     Hyperlipidemia     Hypertension          Manuals      Lumbar  + L ES STM Done STM thoracic and lumbar STM Done STM thoracic and lumbar STM Done STM thoracic and lumbar STM Done STM thoracic and lumbar STM Done STM thoracic and lumbar STM  Done STM thoracic and lumbar STM Done STM thoracic and lumbar STM     Lumbar distraction pball            Neuro Re-Ed            Bridges 3x15 3x15 10# 3x10 10# 3x10 10# 3x12 10# 3x15 10# 3x15     clamshells            Sidesteps            Standing hip abd TB            Standing hip ext TB            Mini squats            LAQ            Standing plank with alt arm/leg exts                        Ther Ex            Bike - strength/endurance 5' lvl 1 5' lvl 1 5' lv 1 5' lvl 1 5' lvl 1 5' lvl 1 5' lv 1     Prone press up            Standing lumbar extensions            TA marches            TA leg exts            Thoracic ext stretch over 1/2 foam            Supine pball crushes Stand 30x5'' Stand 20x5'' Stand 20x5\" Stand 20x5'' Stand 20x20'' Stand 10x5'' dc     Standing pball crushes with marching     10x ea 20x ea      PPT 20x5'' 20x5'' " "20x5\" 20x5'' 20x5'' 20x5'' 20x5\"     PPT clam isos 20x5'' GTB 3x10 GTB 3x10 ea GTB 3x10 ea GTB 3x12 ea GTB 3x12 ea GTB 3x12 ea                 Paloff press      nv nv     TA+TB low rows      nv Yellow 2x10     TA + TB mid rows            No Moneys            Lumbar FLX/RSB rolls 10x10'' 10x10'' 10x10\"         Ther Activity            STS Chair 3x15 Chair 3x15 Chair 3x15 Chair 3x15 10# chair 3x10 Chair 10# 3x12 Chair 10# 3x10     Standing marches            Leg press 45# 3x10 SL 45# 3x12 SL 45# 3x12 unilateral 45# 3x15 SL 45# 3x15 SL inv nv 55# 3x10 SL  55# 3x10 SL     Pt edu            Gait Training                                    Modalities                                                                                                   "

## 2025-02-25 ENCOUNTER — OFFICE VISIT (OUTPATIENT)
Dept: PHYSICAL THERAPY | Facility: REHABILITATION | Age: 65
End: 2025-02-25
Payer: COMMERCIAL

## 2025-02-25 DIAGNOSIS — M54.42 CHRONIC LEFT-SIDED LOW BACK PAIN WITH LEFT-SIDED SCIATICA: Primary | ICD-10-CM

## 2025-02-25 DIAGNOSIS — G89.29 CHRONIC LEFT-SIDED LOW BACK PAIN WITH LEFT-SIDED SCIATICA: Primary | ICD-10-CM

## 2025-02-25 DIAGNOSIS — M25.552 LEFT HIP PAIN: ICD-10-CM

## 2025-02-25 PROCEDURE — 97110 THERAPEUTIC EXERCISES: CPT

## 2025-02-25 PROCEDURE — 97140 MANUAL THERAPY 1/> REGIONS: CPT

## 2025-02-25 NOTE — PROGRESS NOTES
"Daily Note     Today's date: 2025  Patient name: Roxana Ventura  : 1960  MRN: [id number]  Referring provider: Vika Baer MD  Dx:   Encounter Diagnosis     ICD-10-CM    1. Chronic left-sided low back pain with left-sided sciatica  M54.42     G89.29       2. Left hip pain  M25.552           Start Time: 1032  Stop Time: 1120  Total time in clinic (min): 48 minutes    Subjective: Patient reports feeling \"okay\" at start of session, no complaints noted.       Objective: See treatment diary below      Assessment: Tolerated treatment well. Patient demonstrated fatigue post treatment, exhibited good technique with therapeutic exercises, and would benefit from continued PT Fatigues very quickly with all TE, trialed paloff press today no pain noted, only fatigue. Evident LE fatigue noted after completion of low rows and paloff press back to back, seated rest break noted with slight LOB noted as well.       Plan: Continue per plan of care.  Progress treatment as tolerated.       Precautions: B TKA   Past Medical History:   Diagnosis Date    Arthritis     Diabetes mellitus (HCC)     Hyperlipidemia     Hypertension          Manuals     Lumbar  + L ES STM Done STM thoracic and lumbar STM Done STM thoracic and lumbar STM Done STM thoracic and lumbar STM Done STM thoracic and lumbar STM Done STM thoracic and lumbar STM  Done STM thoracic and lumbar STM Done STM thoracic and lumbar STM Done STM thoracic and lumbar STM    Lumbar distraction pball            Neuro Re-Ed            Bridges 3x15 3x15 10# 3x10 10# 3x10 10# 3x12 10# 3x15 10# 3x15 12# 3x15    clamshells            Sidesteps            Standing hip abd TB            Standing hip ext TB            Mini squats            LAQ            Standing plank with alt arm/leg exts                        Ther Ex            Bike - strength/endurance 5' lvl 1 5' lvl 1 5' lv 1 5' lvl 1 5' lvl 1 5' lvl 1 5' lv 1 5' lvl 1  " "  Prone press up            Standing lumbar extensions            TA marches            TA leg exts            Thoracic ext stretch over 1/2 foam            Supine pball crushes Stand 30x5'' Stand 20x5'' Stand 20x5\" Stand 20x5'' Stand 20x20'' Stand 10x5'' dc     Standing pball crushes with marching     10x ea 20x ea      PPT 20x5'' 20x5'' 20x5\" 20x5'' 20x5'' 20x5'' 20x5\" 20x5''    PPT clam isos 20x5'' GTB 3x10 GTB 3x10 ea GTB 3x10 ea GTB 3x12 ea GTB 3x12 ea GTB 3x12 ea GTB 3x12 ea                Paloff press      nv nv Purple 10x ea    TA+TB low rows      nv Yellow 2x10 Yellow 3x10    TA + TB mid rows            No Moneys            Lumbar FLX/RSB rolls 10x10'' 10x10'' 10x10\"         Ther Activity            STS Chair 3x15 Chair 3x15 Chair 3x15 Chair 3x15 10# chair 3x10 Chair 10# 3x12 Chair 10# 3x10 Chair 12# 3x10    Standing marches            Leg press 45# 3x10 SL 45# 3x12 SL 45# 3x12 unilateral 45# 3x15 SL 45# 3x15 SL inv nv 55# 3x10 SL  55# 3x10 SL 55# 3x12 SL    Pt edu            Gait Training                                    Modalities                                                                                                     "

## 2025-02-28 ENCOUNTER — OFFICE VISIT (OUTPATIENT)
Dept: PHYSICAL THERAPY | Facility: REHABILITATION | Age: 65
End: 2025-02-28
Payer: COMMERCIAL

## 2025-02-28 DIAGNOSIS — M25.552 LEFT HIP PAIN: ICD-10-CM

## 2025-02-28 DIAGNOSIS — M54.42 CHRONIC LEFT-SIDED LOW BACK PAIN WITH LEFT-SIDED SCIATICA: Primary | ICD-10-CM

## 2025-02-28 DIAGNOSIS — G89.29 CHRONIC LEFT-SIDED LOW BACK PAIN WITH LEFT-SIDED SCIATICA: Primary | ICD-10-CM

## 2025-02-28 PROCEDURE — 97140 MANUAL THERAPY 1/> REGIONS: CPT | Performed by: PHYSICAL THERAPIST

## 2025-02-28 PROCEDURE — 97110 THERAPEUTIC EXERCISES: CPT | Performed by: PHYSICAL THERAPIST

## 2025-02-28 PROCEDURE — 97530 THERAPEUTIC ACTIVITIES: CPT | Performed by: PHYSICAL THERAPIST

## 2025-02-28 NOTE — PROGRESS NOTES
"Daily Note     Today's date: 2025  Patient name: Roxana Ventura  : 1960  MRN: [id number]  Referring provider: Vika Baer MD  Dx:   Encounter Diagnosis     ICD-10-CM    1. Chronic left-sided low back pain with left-sided sciatica  M54.42     G89.29       2. Left hip pain  M25.552           Start Time: 1015  Stop Time: 1103  Total time in clinic (min): 48 minutes    Subjective: Patient reports the back feels \"ok.\" She points to most pain being in the left mid back. Denies anything down the leg.      Objective: See treatment diary below      Assessment: Tolerated treatment well. Patient demonstrated fatigue post treatment, exhibited good technique with therapeutic exercises, and would benefit from continued PT.       Plan: Continue per plan of care.      Precautions: B TKA   Past Medical History:   Diagnosis Date    Arthritis     Diabetes mellitus (HCC)     Hyperlipidemia     Hypertension          Manuals    Lumbar  + L ES STM Done STM thoracic and lumbar STM Done STM thoracic and lumbar STM Done STM thoracic and lumbar STM Done STM thoracic and lumbar STM Done STM thoracic and lumbar STM  Done STM thoracic and lumbar STM Done STM thoracic and lumbar STM Done STM thoracic and lumbar STM Done STM thoracic and lumbar STM   Lumbar distraction pball            Neuro Re-Ed            Bridges 3x15 3x15 10# 3x10 10# 3x10 10# 3x12 10# 3x15 10# 3x15 12# 3x15 12# 3x15   clamshells            Sidesteps            Standing hip abd TB            Standing hip ext TB            Mini squats            LAQ            Standing plank with alt arm/leg exts                        Ther Ex            Bike - strength/endurance 5' lvl 1 5' lvl 1 5' lv 1 5' lvl 1 5' lvl 1 5' lvl 1 5' lv 1 5' lvl 1 5' lv 1   Prone press up            Standing lumbar extensions            TA marches            TA leg exts            Thoracic ext stretch over 1/2 foam            Supine pball " "crushes Stand 30x5'' Stand 20x5'' Stand 20x5\" Stand 20x5'' Stand 20x20'' Stand 10x5'' dc     Standing pball crushes with marching     10x ea 20x ea      PPT 20x5'' 20x5'' 20x5\" 20x5'' 20x5'' 20x5'' 20x5\" 20x5'' 20x5\"   PPT clam isos 20x5'' GTB 3x10 GTB 3x10 ea GTB 3x10 ea GTB 3x12 ea GTB 3x12 ea GTB 3x12 ea GTB 3x12 ea GTB 3x12 ea   Paloff press      nv nv Purple 10x ea Purple 10x ea   TA+TB low rows      nv Yellow 2x10 Yellow 3x10 Yellow 3x10   TA + TB mid rows         Yellow 3x10   No Moneys            Lumbar FLX/RSB rolls 10x10'' 10x10'' 10x10\"         Ther Activity            STS Chair 3x15 Chair 3x15 Chair 3x15 Chair 3x15 10# chair 3x10 Chair 10# 3x12 Chair 10# 3x10 Chair 12# 3x10 Chair 12# 3x10   Standing marches            Leg press 45# 3x10 SL 45# 3x12 SL 45# 3x12 unilateral 45# 3x15 SL 45# 3x15 SL inv nv 55# 3x10 SL  55# 3x10 SL 55# 3x12 SL 55# 3x12 SL   Pt edu            Gait Training                                    Modalities                                                                                                       "

## 2025-03-04 ENCOUNTER — OFFICE VISIT (OUTPATIENT)
Dept: PHYSICAL THERAPY | Facility: REHABILITATION | Age: 65
End: 2025-03-04
Payer: COMMERCIAL

## 2025-03-04 DIAGNOSIS — G89.29 CHRONIC LEFT-SIDED LOW BACK PAIN WITH LEFT-SIDED SCIATICA: Primary | ICD-10-CM

## 2025-03-04 DIAGNOSIS — M25.552 LEFT HIP PAIN: ICD-10-CM

## 2025-03-04 DIAGNOSIS — M54.42 CHRONIC LEFT-SIDED LOW BACK PAIN WITH LEFT-SIDED SCIATICA: Primary | ICD-10-CM

## 2025-03-04 PROCEDURE — 97140 MANUAL THERAPY 1/> REGIONS: CPT

## 2025-03-04 PROCEDURE — 97530 THERAPEUTIC ACTIVITIES: CPT

## 2025-03-04 PROCEDURE — 97110 THERAPEUTIC EXERCISES: CPT

## 2025-03-04 NOTE — PROGRESS NOTES
"Daily Note     Today's date: 3/4/2025  Patient name: Roxana Ventura  : 1960  MRN: [id number]  Referring provider: Vika Baer MD  Dx:   Encounter Diagnosis     ICD-10-CM    1. Chronic left-sided low back pain with left-sided sciatica  M54.42     G89.29       2. Left hip pain  M25.552           Start Time: 1030  Stop Time: 1128  Total time in clinic (min): 58 minutes    Subjective: Patient reports her back as \"okay\" at start of session.       Objective: See treatment diary below      Assessment: Tolerated treatment well. Patient demonstrated fatigue post treatment, exhibited good technique with therapeutic exercises, and would benefit from continued PT      Plan: Continue per plan of care.  Progress treatment as tolerated.       Precautions: B TKA   Past Medical History:   Diagnosis Date    Arthritis     Diabetes mellitus (HCC)     Hyperlipidemia     Hypertension          Manuals 3/4 2/4 2/7 2/11 2/14 2/18 2/21 2/25 2/28   Lumbar  + L ES STM Done STM thoracic and lumbar STM Done STM thoracic and lumbar STM Done STM thoracic and lumbar STM Done STM thoracic and lumbar STM Done STM thoracic and lumbar STM  Done STM thoracic and lumbar STM Done STM thoracic and lumbar STM Done STM thoracic and lumbar STM Done STM thoracic and lumbar STM   Lumbar distraction pball            Neuro Re-Ed            Bridges 12# 3x15  3x15 10# 3x10 10# 3x10 10# 3x12 10# 3x15 10# 3x15 12# 3x15 12# 3x15   clamshells            Sidesteps            Standing hip abd TB            Standing hip ext TB            Mini squats            LAQ            Standing plank with alt arm/leg exts                        Ther Ex            Bike - strength/endurance 5' lvl 1 5' lvl 1 5' lv 1 5' lvl 1 5' lvl 1 5' lvl 1 5' lv 1 5' lvl 1 5' lv 1   Prone press up            Standing lumbar extensions            TA marches            TA leg exts            Thoracic ext stretch over 1/2 foam            Supine pball crushes  Stand 20x5'' " "Stand 20x5\" Stand 20x5'' Stand 20x20'' Stand 10x5'' dc     Standing pball crushes with marching     10x ea 20x ea      PPT 20x5'' 20x5'' 20x5\" 20x5'' 20x5'' 20x5'' 20x5\" 20x5'' 20x5\"   PPT clam isos 3x15 ea GTB GTB 3x10 GTB 3x10 ea GTB 3x10 ea GTB 3x12 ea GTB 3x12 ea GTB 3x12 ea GTB 3x12 ea GTB 3x12 ea   Paloff press Yellow 20x ea     nv nv Purple 10x ea Purple 10x ea   TA+TB low rows Yellow 3x10     nv Yellow 2x10 Yellow 3x10 Yellow 3x10   TA + TB mid rows Yellow 3x10        Yellow 3x10   No Moneys            Lumbar FLX/RSB rolls  10x10'' 10x10\"         Ther Activity            STS Chair 12# 3x12 Chair 3x15 Chair 3x15 Chair 3x15 10# chair 3x10 Chair 10# 3x12 Chair 10# 3x10 Chair 12# 3x10 Chair 12# 3x10   Standing marches            Leg press 55# 3x15 SL 45# 3x12 SL 45# 3x12 unilateral 45# 3x15 SL 45# 3x15 SL inv nv 55# 3x10 SL  55# 3x10 SL 55# 3x12 SL 55# 3x12 SL   Pt edu            Gait Training                                    Modalities                                                                                                         "

## 2025-03-07 ENCOUNTER — OFFICE VISIT (OUTPATIENT)
Dept: PHYSICAL THERAPY | Facility: REHABILITATION | Age: 65
End: 2025-03-07
Payer: COMMERCIAL

## 2025-03-07 DIAGNOSIS — M54.42 CHRONIC LEFT-SIDED LOW BACK PAIN WITH LEFT-SIDED SCIATICA: Primary | ICD-10-CM

## 2025-03-07 DIAGNOSIS — G89.29 CHRONIC LEFT-SIDED LOW BACK PAIN WITH LEFT-SIDED SCIATICA: Primary | ICD-10-CM

## 2025-03-07 DIAGNOSIS — M25.552 LEFT HIP PAIN: ICD-10-CM

## 2025-03-07 PROCEDURE — 97110 THERAPEUTIC EXERCISES: CPT | Performed by: PHYSICAL THERAPIST

## 2025-03-07 PROCEDURE — 97140 MANUAL THERAPY 1/> REGIONS: CPT | Performed by: PHYSICAL THERAPIST

## 2025-03-07 PROCEDURE — 97530 THERAPEUTIC ACTIVITIES: CPT | Performed by: PHYSICAL THERAPIST

## 2025-03-07 NOTE — PROGRESS NOTES
"Daily Note     Today's date: 3/7/2025  Patient name: Roxana Ventura  : 1960  MRN: [id number]  Referring provider: Vika Baer MD  Dx:   Encounter Diagnosis     ICD-10-CM    1. Chronic left-sided low back pain with left-sided sciatica  M54.42     G89.29       2. Left hip pain  M25.552           Start Time: 1003  Stop Time: 1052  Total time in clinic (min): 49 minutes    Subjective: Patient reports feeling \"fine\" at start of session.      Objective: See treatment diary below      Assessment: Tolerated treatment well. Patient demonstrated fatigue post treatment, exhibited good technique with therapeutic exercises, and would benefit from continued PT.       Plan: Continue per plan of care.      Precautions: B TKA   Past Medical History:   Diagnosis Date    Arthritis     Diabetes mellitus (HCC)     Hyperlipidemia     Hypertension          Manuals 3/4 3/7  2/11 2/14 2/18 2/21 2/25 2/28   Lumbar  + L ES STM Done STM thoracic and lumbar STM Done STM thoracic and lumbar STM + soft cupping  Done STM thoracic and lumbar STM Done STM thoracic and lumbar STM  Done STM thoracic and lumbar STM Done STM thoracic and lumbar STM Done STM thoracic and lumbar STM Done STM thoracic and lumbar STM   Lumbar distraction pball            Neuro Re-Ed            Bridges 12# 3x15  12# 3x15  10# 3x10 10# 3x12 10# 3x15 10# 3x15 12# 3x15 12# 3x15   clamshells            Sidesteps            Standing hip abd TB            Standing hip ext TB            Mini squats            LAQ            Standing plank with alt arm/leg exts                        Ther Ex            Bike - strength/endurance 5' lvl 1 5' lv 1  5' lvl 1 5' lvl 1 5' lvl 1 5' lv 1 5' lvl 1 5' lv 1   Prone press up            Standing lumbar extensions            TA marches            TA leg exts            Thoracic ext stretch over 1/2 foam            Supine pball crushes    Stand 20x5'' Stand 20x20'' Stand 10x5'' dc     Standing pball crushes with marching   " "  10x ea 20x ea      PPT 20x5'' 20x5\"  20x5'' 20x5'' 20x5'' 20x5\" 20x5'' 20x5\"   PPT clam isos 3x15 ea GTB GTB 3x15 ea  GTB 3x10 ea GTB 3x12 ea GTB 3x12 ea GTB 3x12 ea GTB 3x12 ea GTB 3x12 ea   Paloff press Yellow 20x ea Yellow 20x    nv nv Purple 10x ea Purple 10x ea   TA+TB low rows Yellow 3x10 Yellow 3x10    nv Yellow 2x10 Yellow 3x10 Yellow 3x10   TA + TB mid rows Yellow 3x10 Yellow 3x10       Yellow 3x10   No Moneys            Lumbar FLX/RSB rolls            Ther Activity            STS Chair 12# 3x12 Chair 12# 3x12  Chair 3x15 10# chair 3x10 Chair 10# 3x12 Chair 10# 3x10 Chair 12# 3x10 Chair 12# 3x10   Standing marches            Leg press 55# 3x15 SL 55# 3x15 SL  45# 3x15 SL 45# 3x15 SL inv nv 55# 3x10 SL  55# 3x10 SL 55# 3x12 SL 55# 3x12 SL   Pt edu            Gait Training                                    Modalities                                                                                                           "

## 2025-03-11 ENCOUNTER — OFFICE VISIT (OUTPATIENT)
Dept: PHYSICAL THERAPY | Facility: REHABILITATION | Age: 65
End: 2025-03-11
Payer: COMMERCIAL

## 2025-03-11 DIAGNOSIS — G89.29 CHRONIC LEFT-SIDED LOW BACK PAIN WITH LEFT-SIDED SCIATICA: Primary | ICD-10-CM

## 2025-03-11 DIAGNOSIS — M25.552 LEFT HIP PAIN: ICD-10-CM

## 2025-03-11 DIAGNOSIS — M54.42 CHRONIC LEFT-SIDED LOW BACK PAIN WITH LEFT-SIDED SCIATICA: Primary | ICD-10-CM

## 2025-03-11 PROCEDURE — 97110 THERAPEUTIC EXERCISES: CPT

## 2025-03-11 PROCEDURE — 97140 MANUAL THERAPY 1/> REGIONS: CPT

## 2025-03-11 PROCEDURE — 97530 THERAPEUTIC ACTIVITIES: CPT

## 2025-03-11 NOTE — PROGRESS NOTES
"Daily Note     Today's date: 3/11/2025  Patient name: Roxana Ventura  : 1960  MRN: [id number]  Referring provider: Vika Baer MD  Dx:   Encounter Diagnosis     ICD-10-CM    1. Chronic left-sided low back pain with left-sided sciatica  M54.42     G89.29       2. Left hip pain  M25.552           Start Time: 1002  Stop Time: 1050  Total time in clinic (min): 48 minutes    Subjective: Patient reports her back/hip as \"alright better\" at start of session.      Objective: See treatment diary below      Assessment: Tolerated treatment well. Patient demonstrated fatigue post treatment, exhibited good technique with therapeutic exercises, and would benefit from continued PT      Plan: Continue per plan of care.  Progress treatment as tolerated.       Precautions: B TKA   Past Medical History:   Diagnosis Date    Arthritis     Diabetes mellitus (HCC)     Hyperlipidemia     Hypertension          Manuals 3/4 3/7 3/11  2/14 2/18 2/21 2/25 2/28   Lumbar  + L ES STM Done STM thoracic and lumbar STM Done STM thoracic and lumbar STM + soft cupping Done STM thoracic and lumbar STM  Done STM thoracic and lumbar STM  Done STM thoracic and lumbar STM Done STM thoracic and lumbar STM Done STM thoracic and lumbar STM Done STM thoracic and lumbar STM   Lumbar distraction pball            Neuro Re-Ed            Bridges 12# 3x15  12# 3x15 12# 3x15  10# 3x12 10# 3x15 10# 3x15 12# 3x15 12# 3x15   clamshells            Sidesteps            Standing hip abd TB            Standing hip ext TB            Mini squats            LAQ            Standing plank with alt arm/leg exts                        Ther Ex            Bike - strength/endurance 5' lvl 1 5' lv 1 5' lvl 1  5' lvl 1 5' lvl 1 5' lv 1 5' lvl 1 5' lv 1   Prone press up            Standing lumbar extensions            TA marches            TA leg exts            Thoracic ext stretch over 1/2 foam            Supine pball crushes     Stand 20x20'' Stand 10x5'' dc   " "  Standing pball crushes with marching     10x ea 20x ea      PPT 20x5'' 20x5\" 20x5''  20x5'' 20x5'' 20x5\" 20x5'' 20x5\"   PPT clam isos 3x15 ea GTB GTB 3x15 ea GTB 3x15 ea  GTB 3x12 ea GTB 3x12 ea GTB 3x12 ea GTB 3x12 ea GTB 3x12 ea   Paloff press Yellow 20x ea Yellow 20x Yellow 20x   nv nv Purple 10x ea Purple 10x ea   TA+TB low rows Yellow 3x10 Yellow 3x10 Yellow 3x10   nv Yellow 2x10 Yellow 3x10 Yellow 3x10   TA + TB mid rows Yellow 3x10 Yellow 3x10 Yellow 3x10      Yellow 3x10   No Moneys            Lumbar FLX/RSB rolls            Ther Activity            STS Chair 12# 3x12 Chair 12# 3x12 Chair 12# 3x12  10# chair 3x10 Chair 10# 3x12 Chair 10# 3x10 Chair 12# 3x10 Chair 12# 3x10   Standing marches            Leg press 55# 3x15 SL 55# 3x15 SL 59# 3x15 SL  45# 3x15 SL inv nv 55# 3x10 SL  55# 3x10 SL 55# 3x12 SL 55# 3x12 SL   Pt edu            Gait Training                                    Modalities                                                                                                             "

## 2025-03-12 ENCOUNTER — TELEPHONE (OUTPATIENT)
Dept: FAMILY MEDICINE CLINIC | Facility: CLINIC | Age: 65
End: 2025-03-12

## 2025-03-12 NOTE — TELEPHONE ENCOUNTER
Sent message through the portal for patient to call insurance company and update PCP to Dr. Francois.

## 2025-03-13 ENCOUNTER — TELEPHONE (OUTPATIENT)
Dept: FAMILY MEDICINE CLINIC | Facility: CLINIC | Age: 65
End: 2025-03-13

## 2025-03-14 ENCOUNTER — OFFICE VISIT (OUTPATIENT)
Dept: PHYSICAL THERAPY | Facility: REHABILITATION | Age: 65
End: 2025-03-14
Payer: COMMERCIAL

## 2025-03-14 DIAGNOSIS — M25.552 LEFT HIP PAIN: ICD-10-CM

## 2025-03-14 DIAGNOSIS — G89.29 CHRONIC LEFT-SIDED LOW BACK PAIN WITH LEFT-SIDED SCIATICA: Primary | ICD-10-CM

## 2025-03-14 DIAGNOSIS — M54.42 CHRONIC LEFT-SIDED LOW BACK PAIN WITH LEFT-SIDED SCIATICA: Primary | ICD-10-CM

## 2025-03-14 PROCEDURE — 97112 NEUROMUSCULAR REEDUCATION: CPT | Performed by: PHYSICAL THERAPIST

## 2025-03-14 PROCEDURE — 97110 THERAPEUTIC EXERCISES: CPT | Performed by: PHYSICAL THERAPIST

## 2025-03-14 PROCEDURE — 97140 MANUAL THERAPY 1/> REGIONS: CPT | Performed by: PHYSICAL THERAPIST

## 2025-03-14 NOTE — PROGRESS NOTES
Daily Note     Today's date: 3/14/2025  Patient name: Roxana Ventura  : 1960  MRN: [id number]  Referring provider: Vika Baer MD  Dx:   Encounter Diagnosis     ICD-10-CM    1. Chronic left-sided low back pain with left-sided sciatica  M54.42     G89.29       2. Left hip pain  M25.552           Start Time: 1022  Stop Time: 1110  Total time in clinic (min): 48 minutes    Subjective: Patient reports most pain is in the mid/upper back and feels tight.      Objective: See treatment diary below      Assessment: Tolerated treatment well. Evident soft tissue restrictions to mid/upper thoracic ES L>R with manual therapy. Muscle fatigue noted throughout session. Patient demonstrated fatigue post treatment, exhibited good technique with therapeutic exercises, and would benefit from continued PT.       Plan: Continue per plan of care.      Precautions: B TKA   Past Medical History:   Diagnosis Date    Arthritis     Diabetes mellitus (HCC)     Hyperlipidemia     Hypertension          Manuals 3/4 3/7 3/11 3/14   2/21 2/25 2/28   Lumbar  + L ES STM Done STM thoracic and lumbar STM Done STM thoracic and lumbar STM + soft cupping Done STM thoracic and lumbar STM Done STM TS/LS + soft cupping   Done STM thoracic and lumbar STM Done STM thoracic and lumbar STM Done STM thoracic and lumbar STM   Lumbar distraction pball            Neuro Re-Ed            Bridges 12# 3x15  12# 3x15 12# 3x15 15# 3x15   10# 3x15 12# 3x15 12# 3x15   clamshells    Bw 2x10 b/l        Sidesteps            Standing hip abd TB            Standing hip ext TB            Mini squats            LAQ            Standing plank with alt arm/leg exts                        Ther Ex            Bike - strength/endurance 5' lvl 1 5' lv 1 5' lvl 1 5' lv 1   5' lv 1 5' lvl 1 5' lv 1   Prone press up            Standing lumbar extensions            TA marches            TA leg exts            Thoracic ext stretch over 1/2 foam            Supine pball  "crushes       dc     Standing pball crushes with marching            PPT 20x5'' 20x5\" 20x5'' STANDING NV   20x5\" 20x5'' 20x5\"   PPT clam isos 3x15 ea GTB GTB 3x15 ea GTB 3x15 ea np   GTB 3x12 ea GTB 3x12 ea GTB 3x12 ea   Paloff press Yellow 20x ea Yellow 20x Yellow 20x Yellow 20x5\"   nv Purple 10x ea Purple 10x ea   TA+TB low rows Yellow 3x10 Yellow 3x10 Yellow 3x10 Yellow 20x   Yellow 2x10 Yellow 3x10 Yellow 3x10   TA + TB mid rows Yellow 3x10 Yellow 3x10 Yellow 3x10 Yellow 20x     Yellow 3x10   No Moneys            Lumbar FLX/RSB rolls            Ther Activity            STS Chair 12# 3x12 Chair 12# 3x12 Chair 12# 3x12 Chair 12# 3x12   Chair 10# 3x10 Chair 12# 3x10 Chair 12# 3x10   Standing marches            Leg press 55# 3x15 SL 55# 3x15 SL 59# 3x15 SL 59# 3x15 SL   55# 3x10 SL 55# 3x12 SL 55# 3x12 SL   Pt edu            Gait Training                                    Modalities                                                                                                               "

## 2025-03-17 ENCOUNTER — OFFICE VISIT (OUTPATIENT)
Dept: PHYSICAL THERAPY | Facility: REHABILITATION | Age: 65
End: 2025-03-17
Payer: COMMERCIAL

## 2025-03-17 DIAGNOSIS — G89.29 CHRONIC LEFT-SIDED LOW BACK PAIN WITH LEFT-SIDED SCIATICA: Primary | ICD-10-CM

## 2025-03-17 DIAGNOSIS — I10 ESSENTIAL HYPERTENSION: ICD-10-CM

## 2025-03-17 DIAGNOSIS — M25.552 LEFT HIP PAIN: ICD-10-CM

## 2025-03-17 DIAGNOSIS — M54.42 CHRONIC LEFT-SIDED LOW BACK PAIN WITH LEFT-SIDED SCIATICA: Primary | ICD-10-CM

## 2025-03-17 PROCEDURE — 97112 NEUROMUSCULAR REEDUCATION: CPT | Performed by: PHYSICAL THERAPIST

## 2025-03-17 PROCEDURE — 97110 THERAPEUTIC EXERCISES: CPT | Performed by: PHYSICAL THERAPIST

## 2025-03-17 PROCEDURE — 97140 MANUAL THERAPY 1/> REGIONS: CPT | Performed by: PHYSICAL THERAPIST

## 2025-03-17 NOTE — PROGRESS NOTES
Daily Note     Today's date: 3/17/2025  Patient name: Roxana Ventura  : 1960  MRN: [id number]  Referring provider: Vika Baer MD  Dx:   Encounter Diagnosis     ICD-10-CM    1. Chronic left-sided low back pain with left-sided sciatica  M54.42     G89.29       2. Left hip pain  M25.552           Start Time: 1020  Stop Time: 1103  Total time in clinic (min): 43 minutes    Subjective: Patient reports feeling ok today. She reports some discomfort in the left mid back.      Objective: See treatment diary below      Assessment: Tolerated treatment well. Patient demonstrated fatigue post treatment, exhibited good technique with therapeutic exercises, and would benefit from continued PT. Initiated standing PPT for improved core activation and stretching of posterior spine muscles with good tolerance, encourage patient to complete this at home.      Plan: Continue per plan of care.      Precautions: B TKA   Past Medical History:   Diagnosis Date    Arthritis     Diabetes mellitus (HCC)     Hyperlipidemia     Hypertension          Manuals 3/4 3/7 3/11 3/14 3/17  2/21 2/25 2/28   Lumbar  + L ES STM Done STM thoracic and lumbar STM Done STM thoracic and lumbar STM + soft cupping Done STM thoracic and lumbar STM Done STM TS/LS + soft cupping Done STM TS/LS + soft cupping  Done STM thoracic and lumbar STM Done STM thoracic and lumbar STM Done STM thoracic and lumbar STM   Lumbar distraction pball            Neuro Re-Ed            Bridges 12# 3x15  12# 3x15 12# 3x15 15# 3x15 15# 3x15  10# 3x15 12# 3x15 12# 3x15   clamshells    Bw 2x10 b/l Bw 2x10 b/l       Sidesteps            Standing hip abd TB            Standing hip ext TB            Mini squats            LAQ            Standing plank with alt arm/leg exts                        Ther Ex            Bike - strength/endurance 5' lvl 1 5' lv 1 5' lvl 1 5' lv 1 5' lv 1  5' lv 1 5' lvl 1 5' lv 1   Prone press up            Standing lumbar extensions          "   TA marches            TA leg exts            Thoracic ext stretch over 1/2 foam            Supine pball crushes       dc     Standing pball crushes with marching            PPT 20x5'' 20x5\" 20x5'' STANDING NV 20x5\"  20x5\" 20x5'' 20x5\"   PPT clam isos 3x15 ea GTB GTB 3x15 ea GTB 3x15 ea np   GTB 3x12 ea GTB 3x12 ea GTB 3x12 ea   Paloff press Yellow 20x ea Yellow 20x Yellow 20x Yellow 20x5\" Yellow 20x5\"  nv Purple 10x ea Purple 10x ea   TA+TB low rows Yellow 3x10 Yellow 3x10 Yellow 3x10 Yellow 20x Yellow 30x  Yellow 2x10 Yellow 3x10 Yellow 3x10   TA + TB mid rows Yellow 3x10 Yellow 3x10 Yellow 3x10 Yellow 20x Yellow 30x    Yellow 3x10   No Moneys            Lumbar FLX/RSB rolls            Ther Activity            STS Chair 12# 3x12 Chair 12# 3x12 Chair 12# 3x12 Chair 12# 3x12 Chair 12# 3x12  Chair 10# 3x10 Chair 12# 3x10 Chair 12# 3x10   Standing marches            Leg press 55# 3x15 SL 55# 3x15 SL 59# 3x15 SL 59# 3x15 SL nv  55# 3x10 SL 55# 3x12 SL 55# 3x12 SL   Pt edu            Gait Training                                    Modalities                                                                                                                 " never intubated

## 2025-03-18 RX ORDER — ATENOLOL AND CHLORTHALIDONE TABLET 50; 25 MG/1; MG/1
1 TABLET ORAL DAILY
Qty: 90 TABLET | Refills: 1 | Status: SHIPPED | OUTPATIENT
Start: 2025-03-18

## 2025-03-19 ENCOUNTER — APPOINTMENT (OUTPATIENT)
Dept: PHYSICAL THERAPY | Facility: REHABILITATION | Age: 65
End: 2025-03-19
Payer: COMMERCIAL

## 2025-03-21 ENCOUNTER — OFFICE VISIT (OUTPATIENT)
Dept: PHYSICAL THERAPY | Facility: REHABILITATION | Age: 65
End: 2025-03-21
Payer: COMMERCIAL

## 2025-03-21 DIAGNOSIS — M25.552 LEFT HIP PAIN: ICD-10-CM

## 2025-03-21 DIAGNOSIS — G89.29 CHRONIC LEFT-SIDED LOW BACK PAIN WITH LEFT-SIDED SCIATICA: Primary | ICD-10-CM

## 2025-03-21 DIAGNOSIS — M54.42 CHRONIC LEFT-SIDED LOW BACK PAIN WITH LEFT-SIDED SCIATICA: Primary | ICD-10-CM

## 2025-03-21 PROCEDURE — 97140 MANUAL THERAPY 1/> REGIONS: CPT

## 2025-03-21 PROCEDURE — 97112 NEUROMUSCULAR REEDUCATION: CPT

## 2025-03-21 PROCEDURE — 97110 THERAPEUTIC EXERCISES: CPT

## 2025-03-21 NOTE — PROGRESS NOTES
"Daily Note     Today's date: 3/21/2025  Patient name: Roxana Ventura  : 1960  MRN: [id number]  Referring provider: Vika Baer MD  Dx:   Encounter Diagnosis     ICD-10-CM    1. Chronic left-sided low back pain with left-sided sciatica  M54.42     G89.29       2. Left hip pain  M25.552           Start Time: 1010  Stop Time: 1055  Total time in clinic (min): 45 minutes    Subjective: Patient reports her back as \"little better\" at start of session.      Objective: See treatment diary below      Assessment: Tolerated treatment well. Patient demonstrated fatigue post treatment, exhibited good technique with therapeutic exercises, and would benefit from continued PT Re- eval patient next visit.       Plan: Continue per plan of care.  Progress treatment as tolerated.       Precautions: B TKA   Past Medical History:   Diagnosis Date    Arthritis     Diabetes mellitus (HCC)     Hyperlipidemia     Hypertension          Manuals 3/4 3/7 3/11 3/14 3/17 3/21  2/25 2/28   Lumbar  + L ES STM Done STM thoracic and lumbar STM Done STM thoracic and lumbar STM + soft cupping Done STM thoracic and lumbar STM Done STM TS/LS + soft cupping Done STM TS/LS + soft cupping Done STM TS/LS + soft cupping  Done STM thoracic and lumbar STM Done STM thoracic and lumbar STM   Lumbar distraction pball            Neuro Re-Ed            Bridges 12# 3x15  12# 3x15 12# 3x15 15# 3x15 15# 3x15 15# 3x15  12# 3x15 12# 3x15   clamshells    Bw 2x10 b/l Bw 2x10 b/l 2x10 b/l       Sidesteps            Standing hip abd TB            Standing hip ext TB            Mini squats            LAQ            Standing plank with alt arm/leg exts                        Ther Ex            Bike - strength/endurance 5' lvl 1 5' lv 1 5' lvl 1 5' lv 1 5' lv 1 5' lvl 1  5' lvl 1 5' lv 1   Prone press up            Standing lumbar extensions            TA marches            TA leg exts            Thoracic ext stretch over 1/2 foam            Supine pball " "crushes            Standing pball crushes with marching            PPT 20x5'' 20x5\" 20x5'' STANDING NV 20x5\"   20x5'' 20x5\"   PPT clam isos 3x15 ea GTB GTB 3x15 ea GTB 3x15 ea np    GTB 3x12 ea GTB 3x12 ea   Paloff press Yellow 20x ea Yellow 20x Yellow 20x Yellow 20x5\" Yellow 20x5\" [id number]''  Purple 10x ea Purple 10x ea   TA+TB low rows Yellow 3x10 Yellow 3x10 Yellow 3x10 Yellow 20x Yellow 30x Yellow 30x  Yellow 3x10 Yellow 3x10   TA + TB mid rows Yellow 3x10 Yellow 3x10 Yellow 3x10 Yellow 20x Yellow 30x Yellow 30x   Yellow 3x10   No Moneys            Lumbar FLX/RSB rolls            Ther Activity            STS Chair 12# 3x12 Chair 12# 3x12 Chair 12# 3x12 Chair 12# 3x12 Chair 12# 3x12 Chair 12# 3x15  Chair 12# 3x10 Chair 12# 3x10   Standing marches            Leg press 55# 3x15 SL 55# 3x15 SL 59# 3x15 SL 59# 3x15 SL nv 59# 3x15 SL  55# 3x12 SL 55# 3x12 SL   Pt edu            Gait Training                                    Modalities                                                                                                                   "

## 2025-03-25 ENCOUNTER — EVALUATION (OUTPATIENT)
Dept: PHYSICAL THERAPY | Facility: REHABILITATION | Age: 65
End: 2025-03-25
Payer: COMMERCIAL

## 2025-03-25 DIAGNOSIS — M25.552 LEFT HIP PAIN: ICD-10-CM

## 2025-03-25 DIAGNOSIS — M54.42 CHRONIC LEFT-SIDED LOW BACK PAIN WITH LEFT-SIDED SCIATICA: Primary | ICD-10-CM

## 2025-03-25 DIAGNOSIS — G89.29 CHRONIC LEFT-SIDED LOW BACK PAIN WITH LEFT-SIDED SCIATICA: Primary | ICD-10-CM

## 2025-03-25 PROCEDURE — 97140 MANUAL THERAPY 1/> REGIONS: CPT | Performed by: PHYSICAL THERAPIST

## 2025-03-25 PROCEDURE — 97164 PT RE-EVAL EST PLAN CARE: CPT | Performed by: PHYSICAL THERAPIST

## 2025-03-25 NOTE — PROGRESS NOTES
PT Re-Evaluation     Today's date: 3/25/2025  Patient name: Roxana Ventura  : 1960  MRN: [id number]  Referring provider: Vika Baer MD  Dx:   Encounter Diagnosis     ICD-10-CM    1. Chronic left-sided low back pain with left-sided sciatica  M54.42     G89.29       2. Left hip pain  M25.552               Start Time: 1047  Stop Time: 1137  Total time in clinic (min): 50 minutes    Assessment  Impairments: abnormal coordination, abnormal gait, abnormal muscle firing, abnormal or restricted ROM, abnormal movement, activity intolerance, impaired balance, impaired physical strength, lacks appropriate home exercise program, pain with function and weight-bearing intolerance    Assessment details: RE performed on 25   Roxana has made the following improvements since beginning PT: decreased pain, increased strength, and increased tolerance to activities. Roxana has made objective improvements in lumbar range of motion, BLE strength and decreased pain. However, she continues to have complaints of left sided back pain and continues to demonstrate significant LLE weakness limiting her from normal ambulation, stair negotiation and lifting/carrying. Roxana continues to present with the impairments as listed above. Patient would continue to benefit from skilled PT to address these deficits and to maximize function.       Barriers to therapy: Language barrier  Understanding of Dx/Px/POC: good     Prognosis: good    Goals  STG 4-6 weeks  1. Patient will display decreased pain <7/10 with ADLs. - progressed  2. Patient will display lumbar ROM WNL. - met      LTG 6-12 weeks  1. Patient will be able to stand for 30 minutes without pain upon discharge - progressed  2. Patient will be able to walk for 30 minutes without pain upon discharge - progressed  3. Patient will be pick an object up off the floor without pain upon discharge - progressed  4. Patient will display core strength WNL. - progressed  5. Patient  will demonstrate at least 3+/5 LLE strength. - progressed        Plan  Planned modality interventions: cryotherapy and thermotherapy: hydrocollator packs    Planned therapy interventions: abdominal trunk stabilization, activity modification, ADL training, balance, balance/weight bearing training, body mechanics training, joint mobilization, manual therapy, massage, motor coordination training, neuromuscular re-education, patient education, postural training, self care, strengthening, stretching, therapeutic activities, therapeutic exercise, transfer training, home exercise program, graded activity, graded exercise, functional ROM exercises and flexibility    Frequency: 2x week  Duration in weeks: 8  Plan of Care beginning date: 3/25/2025  Plan of Care expiration date: 5/20/2025  Treatment plan discussed with: patient and family        Subjective Evaluation    History of Present Illness  Mechanism of injury: RE performed on 03/25/25  Roxana has been seen for a total of 24 visits for OP PT for chronic low back pain and left leg pain. Patient reports she feels much better since beginning PT including a decrease in back pain. She reports seeing MD recently and was told she needs a lot of therapy to continue working on strength and walking normally. Patient would like to continue to work on back pain and feels the left low back is continuing to limit her walking. She does not have pain down the left leg but feels numbness and difficulty moving the leg limiting her walking.     IE 11/1/24  Patient is a 63 yo female with complaints of chronic low back pain. Patient received PT for this issue in June 2024 but stopped secondary to traveling out of the country. She is now resuming PT. Patient reports the exercises in PT helped a little bit but has not been able to manage the pain at home and feels it has worsened. She feels pain in the central mid to low back. She reports no pain down the left leg but a lot of weakness in the  leg with difficulty lifting it. She feels after standing for a long time her back starts to bend forward.     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 Resultant mild to moderate spinal canal narrowing most prominent at L3-L4. Multilevel foraminal narrowing most prominent on the left at L4-L5 (moderate to severe) and the right at L5-S1. Details   above.     -Multilevel facet arthropathy most prominent at involving the lower right lumbar spine. Mild edema involving the right L4-L5 facets with adjacent joint effusion which may be degenerative. Clinical correlation advised including ESR and CRP if there is concern for underlying infection. Further post contrast imaging can be obtained if clinically indicated.            Not a recurrent problem   Quality of life: fair    Patient Goals  Patient goals for therapy: decreased pain, increased motion, increased strength, independence with ADLs/IADLs and return to sport/leisure activities  Patient goal: calm the pain in back and strengthen left leg  Pain  Current pain ratin  At best pain ratin  At worst pain ratin  Quality: sharp, radiating and tight  Relieving factors: change in position and medications (lumbra extension in the morning; Tylenol and Diclofenac prn)  Aggravating factors: lifting, sitting, standing and walking (laying down, lifting legs, bending forward or backward)    Social Support  Steps to enter house: yes    Employment status: not working    Diagnostic Tests  MRI studies: abnormal  Treatments  Previous treatment: physical therapy  Current treatment: chiropractic and massage        Objective     Postural Observations  Seated posture: poor  Standing posture: poor      Palpation   Left   Hypertonic in the erector spinae.   Tenderness of the erector spinae.     Right   Hypertonic in the erector spinae.     Tenderness     Left Hip   No tenderness in the PSIS.     Right Hip   No tenderness in the PSIS.     Active  "Range of Motion   Cervical/Thoracic Spine       Thoracic    Extension: 10 degrees        Lumbar   Flexion:  WFL  Extension:  WFL  Left lateral flexion:  WFL  Right lateral flexion:  WFL    Additional Active Range of Motion Details  Flexion 75% - pain and weakness with reversing into extension - walking hands up thighs     Passive Range of Motion   Left Hip   Normal passive range of motion    Right Hip   Normal passive range of motion    Joint Play     Hypomobile: T8, T9, T10, T11 and T12     Pain: L1, L2, L3 and L4     Strength/Myotome Testing     Left Hip   Planes of Motion   Flexion: 2-  Extension: 3  Abduction: 3  External rotation: 3+    Right Hip   Planes of Motion   Flexion: 3+  Extension: 3+  Abduction: 4    Left Knee   Flexion: 5  Extension: 4+    Right Knee   Flexion: 5  Extension: 5    Left Ankle/Foot   Dorsiflexion: 4+    Right Ankle/Foot   Dorsiflexion: 5    Tests     Lumbar     Left   Negative passive SLR.     Right   Negative passive SLR.     Left Hip   90/90 SLR: Positive.     Right Hip   90/90 SLR: Positive.     General Comments:      Lumbar Comments  30\"STS: 7 reps              Precautions: B TKA   Past Medical History:   Diagnosis Date    Arthritis     Diabetes mellitus (HCC)     Hyperlipidemia     Hypertension          Manuals 3/4 3/7 3/11 3/14 3/17 3/25   2/28   Lumbar  + L ES STM Done STM thoracic and lumbar STM Done STM thoracic and lumbar STM + soft cupping Done STM thoracic and lumbar STM Done STM TS/LS + soft cupping Done STM TS/LS + soft cupping Done STM TS/LS + soft cupping   Done STM thoracic and lumbar STM   Lumbar distraction pball            Neuro Re-Ed            Bridges 12# 3x15  12# 3x15 12# 3x15 15# 3x15 15# 3x15    12# 3x15   clamshells    Bw 2x10 b/l Bw 2x10 b/l       Sidesteps            Standing hip abd TB            Standing hip ext TB            Mini squats            LAQ            Standing plank with alt arm/leg exts                        Ther Ex            Bike - " "strength/endurance 5' lvl 1 5' lv 1 5' lvl 1 5' lv 1 5' lv 1    5' lv 1   Heel taps      nv      Standing hip ext L ONLY      nv      Standing hip abd L ONLY      nv      TA marches            TA leg exts            Thoracic ext stretch over 1/2 foam            Supine pball crushes            Standing pball crushes with marching            PPT 20x5'' 20x5\" 20x5'' STANDING NV 20x5\"    20x5\"   PPT clam isos 3x15 ea GTB GTB 3x15 ea GTB 3x15 ea np     GTB 3x12 ea   Paloff press Yellow 20x ea Yellow 20x Yellow 20x Yellow 20x5\" Yellow 20x5\"    Purple 10x ea   TA+TB low rows Yellow 3x10 Yellow 3x10 Yellow 3x10 Yellow 20x Yellow 30x    Yellow 3x10   TA + TB mid rows Yellow 3x10 Yellow 3x10 Yellow 3x10 Yellow 20x Yellow 30x    Yellow 3x10   No Moneys            Lumbar FLX/RSB rolls            Ther Activity            STS Chair 12# 3x12 Chair 12# 3x12 Chair 12# 3x12 Chair 12# 3x12 Chair 12# 3x12    Chair 12# 3x10   Step ups L ONLLY      nv      Standing marches            Leg press 55# 3x15 SL 55# 3x15 SL 59# 3x15 SL 59# 3x15 SL nv    55# 3x12 SL   Pt edu            Gait Training                                    Modalities                                             "

## 2025-04-01 ENCOUNTER — OFFICE VISIT (OUTPATIENT)
Dept: PHYSICAL THERAPY | Facility: REHABILITATION | Age: 65
End: 2025-04-01
Payer: MEDICARE

## 2025-04-01 DIAGNOSIS — M25.552 LEFT HIP PAIN: ICD-10-CM

## 2025-04-01 DIAGNOSIS — M54.42 CHRONIC LEFT-SIDED LOW BACK PAIN WITH LEFT-SIDED SCIATICA: Primary | ICD-10-CM

## 2025-04-01 DIAGNOSIS — G89.29 CHRONIC LEFT-SIDED LOW BACK PAIN WITH LEFT-SIDED SCIATICA: Primary | ICD-10-CM

## 2025-04-01 PROCEDURE — 97112 NEUROMUSCULAR REEDUCATION: CPT

## 2025-04-01 PROCEDURE — 97140 MANUAL THERAPY 1/> REGIONS: CPT

## 2025-04-01 PROCEDURE — 97110 THERAPEUTIC EXERCISES: CPT

## 2025-04-01 NOTE — PROGRESS NOTES
"Daily Note     Today's date: 2025  Patient name: Roxana Ventura  : 1960  MRN: [id number]  Referring provider: Vika Baer MD  Dx:   Encounter Diagnosis     ICD-10-CM    1. Chronic left-sided low back pain with left-sided sciatica  M54.42     G89.29       2. Left hip pain  M25.552                      Subjective: Patient noted no new complaints.       Objective: See treatment diary below      Assessment: Tolerated treatment fair. Patient exhibited good technique with therapeutic exercises. Patient noted pain with heel taps therefore held off performing today. Patient responded well to STM + soft cupping.       Plan: Continue per plan of care.      Precautions: B TKA   Past Medical History:   Diagnosis Date    Arthritis     Diabetes mellitus (HCC)     Hyperlipidemia     Hypertension          Manuals 3/4 3/7 3/11 3/14 3/17 3/25 4/1  2/28   Lumbar  + L ES STM Done STM thoracic and lumbar STM Done STM thoracic and lumbar STM + soft cupping Done STM thoracic and lumbar STM Done STM TS/LS + soft cupping Done STM TS/LS + soft cupping Done STM TS/LS + soft cupping Done STM TS/LS + soft cupping  Done STM thoracic and lumbar STM   Lumbar distraction pball            Neuro Re-Ed            Bridges 12# 3x15  12# 3x15 12# 3x15 15# 3x15 15# 3x15  15# 3 x 15  12# 3x15   clamshells    Bw 2x10 b/l Bw 2x10 b/l  BW 2 x 10 b/l     Sidesteps            Standing hip abd TB            Standing hip ext TB            Mini squats            LAQ            Standing plank with alt arm/leg exts                        Ther Ex            Bike - strength/endurance 5' lvl 1 5' lv 1 5' lvl 1 5' lv 1 5' lv 1  5' lvl 1  5' lv 1   Heel taps      nv Pain      Standing hip ext L ONLY      nv X 10     Standing hip abd L ONLY      nv X 10     TA marches            TA leg exts            Thoracic ext stretch over 1/2 foam            Supine pball crushes            Standing pball crushes with marching            PPT 20x5'' 20x5\" " "20x5'' STANDING NV 20x5\"    20x5\"   PPT clam isos 3x15 ea GTB GTB 3x15 ea GTB 3x15 ea np     GTB 3x12 ea   Paloff press Yellow 20x ea Yellow 20x Yellow 20x Yellow 20x5\" Yellow 20x5\"    Purple 10x ea   TA+TB low rows Yellow 3x10 Yellow 3x10 Yellow 3x10 Yellow 20x Yellow 30x    Yellow 3x10   TA + TB mid rows Yellow 3x10 Yellow 3x10 Yellow 3x10 Yellow 20x Yellow 30x    Yellow 3x10   No Moneys            Lumbar FLX/RSB rolls            Ther Activity            STS Chair 12# 3x12 Chair 12# 3x12 Chair 12# 3x12 Chair 12# 3x12 Chair 12# 3x12    Chair 12# 3x10   Step ups L ONLLY      nv 15x      Standing marches            Leg press 55# 3x15 SL 55# 3x15 SL 59# 3x15 SL 59# 3x15 SL nv    55# 3x12 SL   Pt edu            Gait Training                                    Modalities                                             "

## 2025-04-04 ENCOUNTER — OFFICE VISIT (OUTPATIENT)
Dept: PHYSICAL THERAPY | Facility: REHABILITATION | Age: 65
End: 2025-04-04
Payer: MEDICARE

## 2025-04-04 DIAGNOSIS — G89.29 CHRONIC LEFT-SIDED LOW BACK PAIN WITH LEFT-SIDED SCIATICA: Primary | ICD-10-CM

## 2025-04-04 DIAGNOSIS — M54.42 CHRONIC LEFT-SIDED LOW BACK PAIN WITH LEFT-SIDED SCIATICA: Primary | ICD-10-CM

## 2025-04-04 DIAGNOSIS — M25.552 LEFT HIP PAIN: ICD-10-CM

## 2025-04-04 PROCEDURE — 97110 THERAPEUTIC EXERCISES: CPT

## 2025-04-04 PROCEDURE — 97140 MANUAL THERAPY 1/> REGIONS: CPT

## 2025-04-04 PROCEDURE — 97112 NEUROMUSCULAR REEDUCATION: CPT

## 2025-04-04 NOTE — PROGRESS NOTES
Daily Note     Today's date: 2025  Patient name: Roxana Ventura  : 1960  MRN: [id number]  Referring provider: Vika Baer MD  Dx:   Encounter Diagnosis     ICD-10-CM    1. Chronic left-sided low back pain with left-sided sciatica  M54.42     G89.29       2. Left hip pain  M25.552           Start Time: 1105  Stop Time: 1148  Total time in clinic (min): 43 minutes    Subjective: Presents to therapy noting no changes in symptoms from previous session, as well as no pain today.      Objective: See treatment diary below      Assessment: Patient able to complete program with appropriate rest breaks throughout due to decreased strength/endurance. Left hip circumduction displayed ascending 6'' step due to weakness; slight improvement once cued. Heavy posterior trunk lean performing clamshells, L > R, despite tactile cueing to correct. Patient subjectively reported significant decrease in low back tightness post manual STM. Patient demonstrated fatigue post treatment and would benefit from continued PT      Plan: Continue per plan of care.      Precautions: B TKA   Past Medical History:   Diagnosis Date    Arthritis     Diabetes mellitus (HCC)     Hyperlipidemia     Hypertension          Manuals 3/4 3/7 3/11 3/14 3/17 3/25 4/1 4/4 2/28   Lumbar  + L ES STM Done STM thoracic and lumbar STM Done STM thoracic and lumbar STM + soft cupping Done STM thoracic and lumbar STM Done STM TS/LS + soft cupping Done STM TS/LS + soft cupping Done STM TS/LS + soft cupping Done STM TS/LS + soft cupping STM thoracic and lumbar  JS Done STM thoracic and lumbar STM   Lumbar distraction pball            Neuro Re-Ed            Bridges 12# 3x15  12# 3x15 12# 3x15 15# 3x15 15# 3x15  15# 3 x 15 15# 3x15 12# 3x15   clamshells    Bw 2x10 b/l Bw 2x10 b/l  BW 2 x 10 b/l BW 2x10 b/l    Sidesteps            Standing hip abd TB            Standing hip ext TB            Mini squats            LAQ            Standing plank with  "alt arm/leg exts                        Ther Ex            Bike - strength/endurance 5' lvl 1 5' lv 1 5' lvl 1 5' lv 1 5' lv 1  5' lvl 1 5' L1 5' lv 1   Heel taps      nv Pain      Standing hip ext L ONLY      nv X 10 x10    Standing hip abd L ONLY      nv X 10 x10    TA marches            TA leg exts            Thoracic ext stretch over 1/2 foam            Supine pball crushes            Standing pball crushes with marching            PPT 20x5'' 20x5\" 20x5'' STANDING NV 20x5\"    20x5\"   PPT clam isos 3x15 ea GTB GTB 3x15 ea GTB 3x15 ea np     GTB 3x12 ea   Paloff press Yellow 20x ea Yellow 20x Yellow 20x Yellow 20x5\" Yellow 20x5\"    Purple 10x ea   TA+TB low rows Yellow 3x10 Yellow 3x10 Yellow 3x10 Yellow 20x Yellow 30x    Yellow 3x10   TA + TB mid rows Yellow 3x10 Yellow 3x10 Yellow 3x10 Yellow 20x Yellow 30x    Yellow 3x10   No Moneys            Lumbar FLX/RSB rolls            Ther Activity            STS Chair 12# 3x12 Chair 12# 3x12 Chair 12# 3x12 Chair 12# 3x12 Chair 12# 3x12    Chair 12# 3x10   Step ups L ONLLY      nv 15x  x15    Standing marches            Leg press 55# 3x15 SL 55# 3x15 SL 59# 3x15 SL 59# 3x15 SL nv    55# 3x12 SL   Pt edu            Gait Training                                    Modalities                                               "

## 2025-04-08 ENCOUNTER — OFFICE VISIT (OUTPATIENT)
Dept: PHYSICAL THERAPY | Facility: REHABILITATION | Age: 65
End: 2025-04-08
Payer: MEDICARE

## 2025-04-08 DIAGNOSIS — G89.29 CHRONIC LEFT-SIDED LOW BACK PAIN WITH LEFT-SIDED SCIATICA: Primary | ICD-10-CM

## 2025-04-08 DIAGNOSIS — M54.16 LUMBAR RADICULOPATHY: ICD-10-CM

## 2025-04-08 DIAGNOSIS — M54.42 CHRONIC LEFT-SIDED LOW BACK PAIN WITH LEFT-SIDED SCIATICA: Primary | ICD-10-CM

## 2025-04-08 DIAGNOSIS — I10 ESSENTIAL HYPERTENSION: ICD-10-CM

## 2025-04-08 DIAGNOSIS — M25.552 LEFT HIP PAIN: ICD-10-CM

## 2025-04-08 DIAGNOSIS — E11.9 TYPE 2 DIABETES MELLITUS WITHOUT COMPLICATION, WITHOUT LONG-TERM CURRENT USE OF INSULIN (HCC): ICD-10-CM

## 2025-04-08 DIAGNOSIS — E78.5 DYSLIPIDEMIA: ICD-10-CM

## 2025-04-08 PROCEDURE — 97112 NEUROMUSCULAR REEDUCATION: CPT | Performed by: PHYSICAL THERAPIST

## 2025-04-08 PROCEDURE — 97530 THERAPEUTIC ACTIVITIES: CPT | Performed by: PHYSICAL THERAPIST

## 2025-04-08 PROCEDURE — 97110 THERAPEUTIC EXERCISES: CPT | Performed by: PHYSICAL THERAPIST

## 2025-04-08 RX ORDER — BLOOD SUGAR DIAGNOSTIC
STRIP MISCELLANEOUS
Qty: 100 STRIP | Refills: 1 | Status: SHIPPED | OUTPATIENT
Start: 2025-04-08 | End: 2025-04-15

## 2025-04-08 RX ORDER — ATENOLOL AND CHLORTHALIDONE TABLET 50; 25 MG/1; MG/1
1 TABLET ORAL DAILY
Qty: 30 TABLET | Refills: 0 | Status: SHIPPED | OUTPATIENT
Start: 2025-04-08 | End: 2025-04-15 | Stop reason: SDUPTHER

## 2025-04-08 RX ORDER — PRAVASTATIN SODIUM 40 MG
40 TABLET ORAL
Qty: 30 TABLET | Refills: 0 | Status: SHIPPED | OUTPATIENT
Start: 2025-04-08 | End: 2025-04-15 | Stop reason: SDUPTHER

## 2025-04-08 RX ORDER — LANCETS 30 GAUGE
1 EACH MISCELLANEOUS 2 TIMES DAILY
Qty: 200 EACH | Refills: 1 | Status: SHIPPED | OUTPATIENT
Start: 2025-04-08 | End: 2025-04-15

## 2025-04-08 NOTE — PROGRESS NOTES
Daily Note     Today's date: 2025  Patient name: Roxana Ventura  : 1960  MRN: [id number]  Referring provider: Vika Baer MD  Dx:   Encounter Diagnosis     ICD-10-CM    1. Chronic left-sided low back pain with left-sided sciatica  M54.42     G89.29       2. Left hip pain  M25.552           Start Time: 1103  Stop Time: 1145  Total time in clinic (min): 42 minutes    Subjective: Patient reports the back is feeling a little better. Continues to report weakness in the left leg.      Objective: See treatment diary below      Assessment: Tolerated treatment well. Progressed hip abduction and LLE strengthening with fair tolerance. Significant fatigue post-treatment and some complaints of left sided back soreness. Initiate and progress gentle core stabilization next visit. Patient demonstrated fatigue post treatment, exhibited good technique with therapeutic exercises, and would benefit from continued PT.       Plan: Continue per plan of care.      Precautions: B TKA   Past Medical History:   Diagnosis Date    Arthritis     Diabetes mellitus (HCC)     Hyperlipidemia     Hypertension          Manuals 3/4 3/7 3/11 3/14 3/17 3/25 4/1 4/4 4/8   Lumbar  + L ES STM Done STM thoracic and lumbar STM Done STM thoracic and lumbar STM + soft cupping Done STM thoracic and lumbar STM Done STM TS/LS + soft cupping Done STM TS/LS + soft cupping Done STM TS/LS + soft cupping Done STM TS/LS + soft cupping STM thoracic and lumbar  JS    Lumbar distraction pball            Neuro Re-Ed            Bridges 12# 3x15  12# 3x15 12# 3x15 15# 3x15 15# 3x15  15# 3 x 15 15# 3x15 15# 3x15   clamshells    Bw 2x10 b/l Bw 2x10 b/l  BW 2 x 10 b/l BW 2x10 b/l Bw 2x10 b/l   Sidesteps         3 laps   TA heel slide         nv   TA leg exts         nv   Curl ups half hooklying         nv   Mini squats            LAQ            Standing plank with alt arm/leg exts                        Ther Ex            Bike - strength/endurance 5'  "lvl 1 5' lv 1 5' lvl 1 5' lv 1 5' lv 1  5' lvl 1 5' L1 5'   Heel taps      nv Pain      Standing hip ext L ONLY      nv X 10 x10 2x10 b/l   Standing hip abd L ONLY      nv X 10 x10 2x10 b/l   TA marches            TA leg exts            Thoracic ext stretch over 1/2 foam            Supine pball crushes            Standing pball crushes with marching            PPT 20x5'' 20x5\" 20x5'' STANDING NV 20x5\"       PPT clam isos 3x15 ea GTB GTB 3x15 ea GTB 3x15 ea np        Paloff press Yellow 20x ea Yellow 20x Yellow 20x Yellow 20x5\" Yellow 20x5\"       TA+TB low rows Yellow 3x10 Yellow 3x10 Yellow 3x10 Yellow 20x Yellow 30x       TA + TB mid rows Yellow 3x10 Yellow 3x10 Yellow 3x10 Yellow 20x Yellow 30x       No Moneys            Lumbar FLX/RSB rolls         10x10\" ea   Ther Activity            STS Chair 12# 3x12 Chair 12# 3x12 Chair 12# 3x12 Chair 12# 3x12 Chair 12# 3x12       Step ups L ONLLY      nv 15x  x15 4\" 2x10   Standing marches            Leg press 55# 3x15 SL 55# 3x15 SL 59# 3x15 SL 59# 3x15 SL nv       Pt edu            Gait Training                                    Modalities                                                 "

## 2025-04-11 ENCOUNTER — OFFICE VISIT (OUTPATIENT)
Dept: PHYSICAL THERAPY | Facility: REHABILITATION | Age: 65
End: 2025-04-11
Attending: FAMILY MEDICINE
Payer: MEDICARE

## 2025-04-11 DIAGNOSIS — G89.29 CHRONIC LEFT-SIDED LOW BACK PAIN WITH LEFT-SIDED SCIATICA: Primary | ICD-10-CM

## 2025-04-11 DIAGNOSIS — M54.42 CHRONIC LEFT-SIDED LOW BACK PAIN WITH LEFT-SIDED SCIATICA: Primary | ICD-10-CM

## 2025-04-11 DIAGNOSIS — M25.552 LEFT HIP PAIN: ICD-10-CM

## 2025-04-11 PROCEDURE — 97530 THERAPEUTIC ACTIVITIES: CPT | Performed by: PHYSICAL MEDICINE & REHABILITATION

## 2025-04-11 PROCEDURE — 97112 NEUROMUSCULAR REEDUCATION: CPT | Performed by: PHYSICAL MEDICINE & REHABILITATION

## 2025-04-11 NOTE — PROGRESS NOTES
Daily Note     Today's date: 2025  Patient name: Roxana Ventura  : 1960  MRN: [id number]  Referring provider: Vika Baer MD  Dx:   Encounter Diagnosis     ICD-10-CM    1. Chronic left-sided low back pain with left-sided sciatica  M54.42     G89.29       2. Left hip pain  M25.552                    Subjective: Patient offers no new complaints to begin session. Continued LLE sxs.     Objective: See treatment diary below    Assessment: Tolerated treatment well. Challenged with activity as charted. Patient demonstrated fatigue post treatment, exhibited good technique with therapeutic exercises, and would benefit from continued PT. Resume/progress nv as time and tolerance allow.     Plan: Continue per plan of care.      Precautions: B TKA   Past Medical History:   Diagnosis Date    Arthritis     Diabetes mellitus (HCC)     Hyperlipidemia     Hypertension        Manuals 4/11  3/11 3/14 3/17 3/25 4/1 4/4 4/8   Lumbar  + L ES STM   Done STM thoracic and lumbar STM Done STM TS/LS + soft cupping Done STM TS/LS + soft cupping Done STM TS/LS + soft cupping Done STM TS/LS + soft cupping STM thoracic and lumbar  JS    Lumbar distraction pball            Neuro Re-Ed            Bridges 15# 3x15  12# 3x15 15# 3x15 15# 3x15  15# 3 x 15 15# 3x15 15# 3x15   clamshells 2x10 ea   Bw 2x10 b/l Bw 2x10 b/l  BW 2 x 10 b/l BW 2x10 b/l Bw 2x10 b/l   Sidesteps         3 laps   TA heel slide 15x ea        nv   TA leg exts         nv   Curl ups half hooklying         nv   Mini squats            LAQ            Standing plank with alt arm/leg exts                        Ther Ex            Bike - strength/endurance 5' L1  5' lvl 1 5' lv 1 5' lv 1  5' lvl 1 5' L1 5'   Heel taps      nv Pain      Standing hip ext L ONLY 2x10 ea     nv X 10 x10 2x10 b/l   Standing hip abd L ONLY 2x10 ea     nv X 10 x10 2x10 b/l   TA marches            TA leg exts            Thoracic ext stretch over 1/2 foam            Supine  "pball crushes            Standing pball crushes with marching            PPT   20x5'' STANDING NV 20x5\"       PPT clam isos   GTB 3x15 ea np        Paloff press   Yellow 20x Yellow 20x5\" Yellow 20x5\"       TA+TB low rows   Yellow 3x10 Yellow 20x Yellow 30x       TA + TB mid rows   Yellow 3x10 Yellow 20x Yellow 30x       No Moneys            Lumbar FLX/RSB rolls 10x10\" ea        10x10\" ea   Ther Activity 4/11           STS   Chair 12# 3x12 Chair 12# 3x12 Chair 12# 3x12       Step ups L ONLY      nv 15x  x15 4\" 2x10   Standing marches            Leg press   59# 3x15 SL 59# 3x15 SL nv       Pt edu            Gait Training                                    Modalities                                                 "

## 2025-04-15 ENCOUNTER — OFFICE VISIT (OUTPATIENT)
Dept: FAMILY MEDICINE CLINIC | Facility: CLINIC | Age: 65
End: 2025-04-15
Payer: MEDICARE

## 2025-04-15 ENCOUNTER — OFFICE VISIT (OUTPATIENT)
Dept: PHYSICAL THERAPY | Facility: REHABILITATION | Age: 65
End: 2025-04-15
Attending: FAMILY MEDICINE
Payer: MEDICARE

## 2025-04-15 VITALS
WEIGHT: 170.6 LBS | TEMPERATURE: 96.4 F | OXYGEN SATURATION: 96 % | HEIGHT: 63 IN | SYSTOLIC BLOOD PRESSURE: 100 MMHG | HEART RATE: 76 BPM | DIASTOLIC BLOOD PRESSURE: 60 MMHG | BODY MASS INDEX: 30.23 KG/M2

## 2025-04-15 DIAGNOSIS — E11.9 TYPE 2 DIABETES MELLITUS WITHOUT COMPLICATION, WITHOUT LONG-TERM CURRENT USE OF INSULIN (HCC): ICD-10-CM

## 2025-04-15 DIAGNOSIS — M54.42 CHRONIC LEFT-SIDED LOW BACK PAIN WITH LEFT-SIDED SCIATICA: Primary | ICD-10-CM

## 2025-04-15 DIAGNOSIS — G89.29 CHRONIC LEFT-SIDED LOW BACK PAIN WITH LEFT-SIDED SCIATICA: Primary | ICD-10-CM

## 2025-04-15 DIAGNOSIS — M25.552 LEFT HIP PAIN: ICD-10-CM

## 2025-04-15 DIAGNOSIS — G89.29 CHRONIC LEFT HIP PAIN: ICD-10-CM

## 2025-04-15 DIAGNOSIS — K59.04 CHRONIC IDIOPATHIC CONSTIPATION: ICD-10-CM

## 2025-04-15 DIAGNOSIS — M25.552 CHRONIC LEFT HIP PAIN: ICD-10-CM

## 2025-04-15 DIAGNOSIS — Z00.00 MEDICARE ANNUAL WELLNESS VISIT, SUBSEQUENT: Primary | ICD-10-CM

## 2025-04-15 DIAGNOSIS — E78.5 DYSLIPIDEMIA: ICD-10-CM

## 2025-04-15 DIAGNOSIS — F17.210 SMOKING GREATER THAN 20 PACK YEARS: ICD-10-CM

## 2025-04-15 DIAGNOSIS — Z12.2 ENCOUNTER FOR SCREENING FOR MALIGNANT NEOPLASM OF LUNG IN CURRENT SMOKER WITH 30 PACK YEAR HISTORY OR GREATER: ICD-10-CM

## 2025-04-15 DIAGNOSIS — M54.16 LUMBAR RADICULOPATHY: ICD-10-CM

## 2025-04-15 DIAGNOSIS — I10 ESSENTIAL HYPERTENSION: ICD-10-CM

## 2025-04-15 DIAGNOSIS — F17.200 ENCOUNTER FOR SCREENING FOR MALIGNANT NEOPLASM OF LUNG IN CURRENT SMOKER WITH 30 PACK YEAR HISTORY OR GREATER: ICD-10-CM

## 2025-04-15 LAB — SL AMB POCT HEMOGLOBIN AIC: 8.3 (ref ?–6.5)

## 2025-04-15 PROCEDURE — 99214 OFFICE O/P EST MOD 30 MIN: CPT | Performed by: FAMILY MEDICINE

## 2025-04-15 PROCEDURE — 97140 MANUAL THERAPY 1/> REGIONS: CPT

## 2025-04-15 PROCEDURE — 83036 HEMOGLOBIN GLYCOSYLATED A1C: CPT | Performed by: FAMILY MEDICINE

## 2025-04-15 PROCEDURE — G0402 INITIAL PREVENTIVE EXAM: HCPCS | Performed by: FAMILY MEDICINE

## 2025-04-15 PROCEDURE — G2211 COMPLEX E/M VISIT ADD ON: HCPCS | Performed by: FAMILY MEDICINE

## 2025-04-15 PROCEDURE — 97112 NEUROMUSCULAR REEDUCATION: CPT

## 2025-04-15 PROCEDURE — 97110 THERAPEUTIC EXERCISES: CPT

## 2025-04-15 RX ORDER — GLIMEPIRIDE 4 MG/1
4 TABLET ORAL
Qty: 90 TABLET | Refills: 1 | Status: SHIPPED | OUTPATIENT
Start: 2025-04-15

## 2025-04-15 RX ORDER — BISACODYL 5 MG/1
5 TABLET, DELAYED RELEASE ORAL DAILY PRN
Qty: 30 TABLET | Refills: 0 | Status: SHIPPED | OUTPATIENT
Start: 2025-04-15

## 2025-04-15 RX ORDER — DICLOFENAC POTASSIUM 50 MG/1
50 TABLET, FILM COATED ORAL 2 TIMES DAILY PRN
Qty: 60 TABLET | Refills: 2 | Status: SHIPPED | OUTPATIENT
Start: 2025-04-15

## 2025-04-15 RX ORDER — BLOOD-GLUCOSE METER
KIT MISCELLANEOUS
Qty: 1 KIT | Refills: 0 | Status: SHIPPED | OUTPATIENT
Start: 2025-04-15

## 2025-04-15 RX ORDER — GABAPENTIN 300 MG/1
300 CAPSULE ORAL
Qty: 90 CAPSULE | Refills: 1 | Status: SHIPPED | OUTPATIENT
Start: 2025-04-15

## 2025-04-15 RX ORDER — PRAVASTATIN SODIUM 40 MG
40 TABLET ORAL
Qty: 90 TABLET | Refills: 1 | Status: SHIPPED | OUTPATIENT
Start: 2025-04-15

## 2025-04-15 RX ORDER — ATENOLOL AND CHLORTHALIDONE TABLET 50; 25 MG/1; MG/1
1 TABLET ORAL DAILY
Qty: 90 TABLET | Refills: 1 | Status: SHIPPED | OUTPATIENT
Start: 2025-04-15

## 2025-04-15 RX ORDER — TIZANIDINE 2 MG/1
2 TABLET ORAL
Qty: 30 TABLET | Refills: 0 | Status: SHIPPED | OUTPATIENT
Start: 2025-04-15 | End: 2025-04-15 | Stop reason: SDUPTHER

## 2025-04-15 RX ORDER — LANCETS 33 GAUGE
EACH MISCELLANEOUS
Qty: 200 EACH | Refills: 3 | Status: SHIPPED | OUTPATIENT
Start: 2025-04-15

## 2025-04-15 RX ORDER — BLOOD SUGAR DIAGNOSTIC
STRIP MISCELLANEOUS
Qty: 200 EACH | Refills: 3 | Status: SHIPPED | OUTPATIENT
Start: 2025-04-15

## 2025-04-15 RX ORDER — POLYETHYLENE GLYCOL 3350 17 G/17G
POWDER, FOR SOLUTION ORAL
Qty: 507 G | Refills: 0 | Status: SHIPPED | OUTPATIENT
Start: 2025-04-15

## 2025-04-15 RX ORDER — TIZANIDINE 2 MG/1
2 TABLET ORAL
Qty: 30 TABLET | Refills: 2 | Status: SHIPPED | OUTPATIENT
Start: 2025-04-15

## 2025-04-15 NOTE — PROGRESS NOTES
"Daily Note     Today's date: 4/15/2025  Patient name: Roxana Ventura  : 1960  MRN: [id number]  Referring provider: Vika Baer MD  Dx:   Encounter Diagnosis     ICD-10-CM    1. Chronic left-sided low back pain with left-sided sciatica  M54.42     G89.29       2. Left hip pain  M25.552                    Subjective: Patient reports she is feeling OK today.    Objective: See treatment diary below    Assessment: Tolerated treatment well. Tenderness noted along lumbar and lower thoracic paraspinals.  Patient demonstrated fatigue post treatment, exhibited good technique with therapeutic exercises, and would benefit from continued PT.     Plan: Continue per plan of care.      Precautions: B TKA   Past Medical History:   Diagnosis Date    Arthritis     Diabetes mellitus (HCC)     Hyperlipidemia     Hypertension        Manuals 4/11 4/15 3/11 3/14 3/17 3/25 4/1 4/4 4/8   Lumbar  + L ES STM   Done STM thoracic and lumbar STM Done STM TS/LS + soft cupping Done STM TS/LS + soft cupping Done STM TS/LS + soft cupping Done STM TS/LS + soft cupping STM thoracic and lumbar  JS    Lumbar distraction pball            Neuro Re-Ed 4/11 4/15          Bridges 15# 3x15 15# 3x15 12# 3x15 15# 3x15 15# 3x15  15# 3 x 15 15# 3x15 15# 3x15   clamshells 2x10 ea 2x10 ea  BW  Bw 2x10 b/l BW 2x10 b/l  BW 2 x 10 b/l BW 2x10 b/l Bw 2x10 b/l   Sidesteps  6 laps       3 laps   TA heel slide 15x ea        nv   TA leg exts         nv   Curl ups half hooklying         nv   Mini squats            LAQ            Standing plank with alt arm/leg exts                        Ther Ex 4/11 4/15          Bike - strength/endurance 5' L1 5\" L1 5' lvl 1 5' lv 1 5' lv 1  5' lvl 1 5' L1 5'   Heel taps      nv Pain      Standing hip ext L ONLY 2x10 ea 2x10 ea    nv X 10 x10 2x10 b/l   Standing hip abd L ONLY 2x10 ea 2x10 ea    nv X 10 x10 2x10 b/l   TA marches            TA leg exts            Thoracic ext stretch over 1/2 foam            Supine " "pball crushes            Standing pball crushes with marching            PPT   20x5'' STANDING NV 20x5\"       PPT clam isos   GTB 3x15 ea np        Paloff press   Yellow 20x Yellow 20x5\" Yellow 20x5\"       TA+TB low rows   Yellow 3x10 Yellow 20x Yellow 30x       TA + TB mid rows   Yellow 3x10 Yellow 20x Yellow 30x       No Moneys            Lumbar FLX/RSB rolls 10x10\" ea 10x10\"       10x10\" ea   Ther Activity 4/11 4/15          STS  x10 Chair 12# 3x12 Chair 12# 3x12 Chair 12# 3x12       Step ups L ONLY      nv 15x  x15 4\" 2x10   Standing marches            Leg press   59# 3x15 SL 59# 3x15 SL nv       Pt edu            Gait Training                                    Modalities                                                 "

## 2025-04-15 NOTE — ASSESSMENT & PLAN NOTE
"A1C is uncontrolled, worsened from 7.2 to 8.3, discussed low carb diet, advised to increase metformin to 850 mg twice daily, start glimepiride 4 mg daily with breakfast.  If the blood glucose drops too much can do glimepiride 2 mg that is half a tablet per day.  Hypoglycemia precautions given    I stressed importance of keeping safe, avoiding hypoglycemia, and always checking blood glucose prior to driving and operating equiptment. Cautioned never to skip or delay meal after taking insulin. Always carry a snack and/or glucose tabs.     I reviewed use of glucose tabs/ fast acting carb \"rule of 15\":  -If BG between 50-70 mg/dl, then give 15gms of fast-acting carb(discussed glucose products/other options) and recheck BG in 15 minutes  -if BG still less than 70 then treat again until BG >70.   -If BG<50 take 30gms of fast-acting carb(discussed options) and recheck BG in 15 minutes  -if BG still less than 70 then treat again until BG >70.       Pt verbalized understanding.    Lab Results   Component Value Date    HGBA1C 8.3 (A) 04/15/2025       Orders:    POCT hemoglobin A1c    Blood Glucose Monitoring Suppl (OneTouch Verio Reflect) w/Device KIT; Check blood sugars twice daily. Please substitute with appropriate alternative as covered by patient's insurance. Dx: E11.65    glucose blood (OneTouch Verio) test strip; Check blood sugars twice daily. Please substitute with appropriate alternative as covered by patient's insurance. Dx: E11.65    OneTouch Delica Lancets 33G MISC; Check blood sugars twice daily. Please substitute with appropriate alternative as covered by patient's insurance. Dx: E11.65    Hemoglobin A1C; Future    Comprehensive metabolic panel; Future    Albumin / creatinine urine ratio; Future    CBC and differential; Future    glimepiride (AMARYL) 4 mg tablet; Take 1 tablet (4 mg total) by mouth daily with breakfast    metFORMIN (GLUCOPHAGE) 850 mg tablet; Take 1 tablet (850 mg total) by mouth 2 (two) times a " day with meals

## 2025-04-15 NOTE — ASSESSMENT & PLAN NOTE
Orders:    pravastatin (PRAVACHOL) 40 mg tablet; Take 1 tablet (40 mg total) by mouth daily at bedtime

## 2025-04-15 NOTE — ASSESSMENT & PLAN NOTE
Orders:    atenolol-chlorthalidone (TENORETIC) 50-25 mg per tablet; Take 1 tablet by mouth daily

## 2025-04-15 NOTE — PROGRESS NOTES
"Name: Roxana Ventura      : 1960      MRN: [id number]  Encounter Provider: Berta Francois MD  Encounter Date: 4/15/2025   Encounter department: [facility]  :  Assessment & Plan  Medicare annual wellness visit, subsequent         Type 2 diabetes mellitus without complication, without long-term current use of insulin (HCC)  A1C is uncontrolled, worsened from 7.2 to 8.3, discussed low carb diet, advised to increase metformin to 850 mg twice daily, start glimepiride 4 mg daily with breakfast.  If the blood glucose drops too much can do glimepiride 2 mg that is half a tablet per day.  Hypoglycemia precautions given    I stressed importance of keeping safe, avoiding hypoglycemia, and always checking blood glucose prior to driving and operating equiptment. Cautioned never to skip or delay meal after taking insulin. Always carry a snack and/or glucose tabs.     I reviewed use of glucose tabs/ fast acting carb \"rule of 15\":  -If BG between 50-70 mg/dl, then give 15gms of fast-acting carb(discussed glucose products/other options) and recheck BG in 15 minutes  -if BG still less than 70 then treat again until BG >70.   -If BG<50 take 30gms of fast-acting carb(discussed options) and recheck BG in 15 minutes  -if BG still less than 70 then treat again until BG >70.       Pt verbalized understanding.    Lab Results   Component Value Date    HGBA1C 8.3 (A) 04/15/2025       Orders:    POCT hemoglobin A1c    Blood Glucose Monitoring Suppl (OneTouch Verio Reflect) w/Device KIT; Check blood sugars twice daily. Please substitute with appropriate alternative as covered by patient's insurance. Dx: E11.65    glucose blood (OneTouch Verio) test strip; Check blood sugars twice daily. Please substitute with appropriate alternative as covered by patient's insurance. Dx: E11.65    OneTouch Delica Lancets 33G MISC; Check blood sugars twice daily. Please substitute with appropriate alternative as " covered by patient's insurance. Dx: E11.65    Hemoglobin A1C; Future    Comprehensive metabolic panel; Future    Albumin / creatinine urine ratio; Future    CBC and differential; Future    glimepiride (AMARYL) 4 mg tablet; Take 1 tablet (4 mg total) by mouth daily with breakfast    metFORMIN (GLUCOPHAGE) 850 mg tablet; Take 1 tablet (850 mg total) by mouth 2 (two) times a day with meals    Lumbar radiculopathy    Orders:    tiZANidine (ZANAFLEX) 2 mg tablet; Take 1 tablet (2 mg total) by mouth daily at bedtime as needed for muscle spasms    gabapentin (NEURONTIN) 300 mg capsule; Take 1 capsule (300 mg total) by mouth daily at bedtime    Chronic left hip pain    Orders:    tiZANidine (ZANAFLEX) 2 mg tablet; Take 1 tablet (2 mg total) by mouth daily at bedtime as needed for muscle spasms    diclofenac potassium (CATAFLAM) 50 mg tablet; Take 1 tablet (50 mg total) by mouth 2 (two) times a day as needed (pain)    Essential hypertension    Orders:    atenolol-chlorthalidone (TENORETIC) 50-25 mg per tablet; Take 1 tablet by mouth daily    Dyslipidemia    Orders:    pravastatin (PRAVACHOL) 40 mg tablet; Take 1 tablet (40 mg total) by mouth daily at bedtime    Smoking greater than 20 pack years    Orders:    CT lung screening program; Future    Encounter for screening for malignant neoplasm of lung in current smoker with 30 pack year history or greater  I discussed with her that she is a candidate for lung cancer CT screening.     The following Shared Decision-Making points were covered:  Benefits of screening were discussed, including the rates of reduction in death from lung cancer and other causes.  Harms of screening were reviewed, including false positive tests, radiation exposure levels, risks of invasive procedures, risks of complications of screening, and risk of overdiagnosis.  I counseled on the importance of adherence to annual lung cancer LDCT screening, impact of co-morbidities, and ability or willingness to  undergo diagnosis and treatment.  I counseled on the importance of maintaining abstinence as a former smoker or was counseled on the importance of smoking cessation if a current smoker    Review of Eligibility Criteria: She meets all of the criteria for Lung Cancer Screening.   She is 65 y.o.   She has 20 pack year tobacco history and is a current smoker or has quit within the past 15 years  She presents no signs or symptoms of lung cancer    After discussion, the patient decided to elect lung cancer screening.    Orders:    CT lung screening program; Future    Chronic idiopathic constipation  Prescriptions as below.  Discussed pathophysiology of constipation with patient. Increase fluid intake, primarily water. Increase daily dietary fiber (fruits and vegetables), may use OTC fiber source to supplement diet. Increase daily activity which can help stimulate bowel movements. Set aside designated time following meals to attempt to move bowels and do not ignore the urge to have a bowel movement (bowel retraining). Avoid OTC laxatives and colon cleanses preparations which can worsen constipation.    Orders:    polyethylene glycol (GLYCOLAX) 17 GM/SCOOP powder; Drink 1 scoop with 8 oz of water once daily    bisacodyl (DULCOLAX) 5 mg EC tablet; Take 1 tablet (5 mg total) by mouth daily as needed for constipation       Preventive health issues were discussed with patient, and age appropriate screening tests were ordered as noted in patient's After Visit Summary. Personalized health advice and appropriate referrals for health education or preventive services given if needed, as noted in patient's After Visit Summary.    History of Present Illness     Here for follow up    With type 2 diabetes, hyperlipidemia, bilateral TKA, hypertension  Chronic low back pain- did not start gabapentin,   Does not want immunizations, never had flu, shingrix or tdap recently  Her with daughter, I offered a tele- , she refused, would  like daughter to translate  Daughter states that she has not been complaint with her diet       Patient Care Team:  Berta Francois MD as PCP - General (Family Medicine)  James Timmons MD as PCP - PCP-[facility] ([facility])  James Timmons MD as PCP - PCP-[employer] ([facility])    Review of Systems   Constitutional:  Negative for fatigue and fever.   HENT:  Negative for congestion, facial swelling, mouth sores, rhinorrhea, sore throat and trouble swallowing.    Eyes:  Negative for pain and redness.   Respiratory:  Negative for cough, shortness of breath and wheezing.    Cardiovascular:  Negative for chest pain, palpitations and leg swelling.   Gastrointestinal:  Positive for constipation. Negative for abdominal pain, blood in stool, diarrhea and nausea.   Genitourinary:  Negative for dysuria, hematuria and urgency.   Musculoskeletal:  Negative for arthralgias, back pain and myalgias.   Skin:  Negative for rash and wound.   Neurological:  Negative for seizures, syncope and headaches.   Hematological:  Negative for adenopathy.   Psychiatric/Behavioral:  Negative for agitation and behavioral problems.      Medical History Reviewed by provider this encounter:  Tobacco  Allergies  Meds  Problems  Med Hx  Surg Hx  Fam Hx       Annual Wellness Visit Questionnaire   Roxana is here for her Subsequent Wellness visit. Last Medicare Wellness visit information reviewed, patient interviewed, no change since last AWV.     Health Risk Assessment:   Patient rates overall health as good. Patient feels that their physical health rating is slightly worse. Patient is satisfied with their life. Eyesight was rated as slightly better. Hearing was rated as same. Patient feels that their emotional and mental health rating is same. Patients states they are never, rarely angry. Patient states they are always unusually tired/fatigued. Pain experienced in the last 7 days has been a lot. Patient's pain rating has been 6/10.  Patient states that she has experienced no weight loss or gain in last 6 months.     Depression Screening:   PHQ-2 Score: 0      Fall Risk Screening:   In the past year, patient has experienced: no history of falling in past year      Urinary Incontinence Screening:   Patient has not leaked urine accidently in the last six months.     Home Safety:  Patient has trouble with stairs inside or outside of their home. Patient has working smoke alarms and has working carbon monoxide detector. Home safety hazards include: none.     Nutrition:   Current diet is Regular.     Medications:   Patient is currently taking over-the-counter supplements. OTC medications include: see medication list. Patient is able to manage medications.     Activities of Daily Living (ADLs)/Instrumental Activities of Daily Living (IADLs):   Walk and transfer into and out of bed and chair?: Yes  Dress and groom yourself?: Yes    Bathe or shower yourself?: Yes    Feed yourself? Yes  Do your laundry/housekeeping?: Yes  Manage your money, pay your bills and track your expenses?: Yes  Make your own meals?: Yes    Do your own shopping?: Yes    Previous Hospitalizations:   Any hospitalizations or ED visits within the last 12 months?: Yes    How many hospitalizations have you had in the last year?: 1-2    Advance Care Planning:   Living will: No    Durable POA for healthcare: No    Advanced directive: No    Advanced directive counseling given: No    ACP document given: Yes    Patient declined ACP directive: No    End of Life Decisions reviewed with patient: No      Cognitive Screening:   Provider or family/friend/caregiver concerned regarding cognition?: No    Preventive Screenings      Cardiovascular Screening:    General: Screening Not Indicated and History Lipid Disorder      Diabetes Screening:     General: Screening Not Indicated and History Diabetes      Colorectal Cancer Screening:     General: Screening Current      Breast Cancer Screening:      General: Screening Current      Cervical Cancer Screening:    General: Screening Current      Osteoporosis Screening:    General: Screening Not Indicated      Abdominal Aortic Aneurysm (AAA) Screening:        General: Screening Not Indicated      Lung Cancer Screening:     General: Risks and Benefits Discussed      Hepatitis C Screening:    General: Screening Current    Immunizations:  - Immunizations due: Influenza, Prevnar 20 and Zoster (Shingrix)    Screening, Brief Intervention, and Referral to Treatment (SBIRT)     Screening      AUDIT-C Screenin) How often did you have a drink containing alcohol in the past year? never  2) How many drinks did you have on a typical day when you were drinking in the past year? 0  3) How often did you have 6 or more drinks on one occasion in the past year? never    AUDIT-C Score: 0  Interpretation: Score 0-2 (female): Negative screen for alcohol misuse    Single Item Drug Screening:  How often have you used an illegal drug (including marijuana) or a prescription medication for non-medical reasons in the past year? never    Single Item Drug Screen Score: 0  Interpretation: Negative screen for possible drug use disorder    Other Counseling Topics:   Calcium and vitamin D intake and regular weightbearing exercise.     Social Drivers of Health     Financial Resource Strain: Low Risk  (3/15/2021)    Overall Financial Resource Strain (CARDIA)     Difficulty of Paying Living Expenses: Not hard at all   Food Insecurity: No Food Insecurity (4/15/2025)    Hunger Vital Sign     Worried About Running Out of Food in the Last Year: Never true     Ran Out of Food in the Last Year: Never true   Transportation Needs: No Transportation Needs (4/15/2025)    PRAPARE - Transportation     Lack of Transportation (Medical): No     Lack of Transportation (Non-Medical): No   Housing Stability: Low Risk  (4/15/2025)    Housing Stability Vital Sign     Unable to Pay for Housing in the Last Year: No      "Number of Times Moved in the Last Year: 1     Homeless in the Last Year: No   Utilities: Not At Risk (4/15/2025)    [facility] Utilities     Threatened with loss of utilities: No     No results found.    Objective   /60 (BP Location: Left arm, Patient Position: Sitting, Cuff Size: Adult)   Pulse 76   Temp (!) 96.4 °F (35.8 °C) (Tympanic)   Ht 5' 3\" (1.6 m)   Wt 77.4 kg (170 lb 9.6 oz)   SpO2 96%   BMI 30.22 kg/m²     Physical Exam  Vitals and nursing note reviewed.   Constitutional:       Appearance: Normal appearance. She is well-developed. She is obese.   HENT:      Head: Normocephalic and atraumatic.      Right Ear: External ear normal.      Left Ear: External ear normal.      Nose: Nose normal.      Mouth/Throat:      Pharynx: No oropharyngeal exudate.   Eyes:      General: No scleral icterus.        Right eye: No discharge.         Left eye: No discharge.      Conjunctiva/sclera: Conjunctivae normal.      Pupils: Pupils are equal, round, and reactive to light.   Neck:      Thyroid: No thyromegaly.   Cardiovascular:      Rate and Rhythm: Normal rate and regular rhythm.      Heart sounds: No murmur heard.     No gallop.   Pulmonary:      Effort: Pulmonary effort is normal. No respiratory distress.      Breath sounds: Normal breath sounds. No wheezing or rales.   Abdominal:      Palpations: Abdomen is soft.      Tenderness: There is no abdominal tenderness.   Musculoskeletal:         General: No tenderness or deformity.      Cervical back: Normal range of motion.      Right lower leg: No edema.      Left lower leg: No edema.   Lymphadenopathy:      Cervical: No cervical adenopathy.   Skin:     General: Skin is warm.      Capillary Refill: Capillary refill takes less than 2 seconds.      Findings: No erythema or rash.   Neurological:      Mental Status: She is alert and oriented to person, place, and time.      Deep Tendon Reflexes: Reflexes normal.   Psychiatric:         Behavior: Behavior normal.         " Thought Content: Thought content normal.         Judgment: Judgment normal.

## 2025-04-15 NOTE — PATIENT INSTRUCTIONS
"Start glimepiride 4 mg daily with breakfast, reduce to 2 mg (half tablet) if your BG drops.  Continue metformin, increased to 850 mg bid with meals  I stressed importance of keeping safe, avoiding hypoglycemia, and always checking blood glucose prior to driving and operating equiptment. Cautioned never to skip or delay meal after taking insulin. Always carry a snack and/or glucose tabs.     I reviewed use of glucose tabs/ fast acting carb \"rule of 15\":  -If BG between 50-70 mg/dl, then give 15gms of fast-acting carb(discussed glucose products/other options) and recheck BG in 15 minutes  -if BG still less than 70 then treat again until BG >70.   -If BG<50 take 30gms of fast-acting carb(discussed options) and recheck BG in 15 minutes  -if BG still less than 70 then treat again until BG >70.       Pt verbalized understanding.      Medicare Preventive Visit Patient Instructions  Thank you for completing your Welcome to Medicare Visit or Medicare Annual Wellness Visit today. Your next wellness visit will be due in one year (4/16/2026).  The screening/preventive services that you may require over the next 5-10 years are detailed below. Some tests may not apply to you based off risk factors and/or age. Screening tests ordered at today's visit but not completed yet may show as past due. Also, please note that scanned in results may not display below.  Preventive Screenings:  Service Recommendations Previous Testing/Comments   Colorectal Cancer Screening  * Colonoscopy    * Fecal Occult Blood Test (FOBT)/Fecal Immunochemical Test (FIT)  * Fecal DNA/Cologuard Test  * Flexible Sigmoidoscopy Age: 45-75 years old   Colonoscopy: every 10 years (may be performed more frequently if at higher risk)  OR  FOBT/FIT: every 1 year  OR  Cologuard: every 3 years  OR  Sigmoidoscopy: every 5 years  Screening may be recommended earlier than age 45 if at higher risk for colorectal cancer. Also, an individualized decision between you and your " healthcare provider will decide whether screening between the ages of 76-85 would be appropriate. Colonoscopy: 05/29/2018  FOBT/FIT: Not on file  Cologuard: 09/25/2024  Sigmoidoscopy: Not on file    Screening Current     Breast Cancer Screening Age: 40+ years old  Frequency: every 1-2 years  Not required if history of left and right mastectomy Mammogram: 08/29/2024    Screening Current   Cervical Cancer Screening Between the ages of 21-29, pap smear recommended once every 3 years.   Between the ages of 30-65, can perform pap smear with HPV co-testing every 5 years.   Recommendations may differ for women with a history of total hysterectomy, cervical cancer, or abnormal pap smears in past. Pap Smear: 06/23/2023    Screening Current   Hepatitis C Screening Once for adults born between 1945 and 1965  More frequently in patients at high risk for Hepatitis C Hep C Antibody: 03/30/2021    Screening Current   Diabetes Screening 1-2 times per year if you're at risk for diabetes or have pre-diabetes Fasting glucose: 144 mg/dL (10/11/2024)  A1C: 7.2 % (10/11/2024)  Screening Not Indicated  History Diabetes   Cholesterol Screening Once every 5 years if you don't have a lipid disorder. May order more often based on risk factors. Lipid panel: 10/11/2024    Screening Not Indicated  History Lipid Disorder     Other Preventive Screenings Covered by Medicare:  Abdominal Aortic Aneurysm (AAA) Screening: covered once if your at risk. You're considered to be at risk if you have a family history of AAA.  Lung Cancer Screening: covers low dose CT scan once per year if you meet all of the following conditions: (1) Age 55-77; (2) No signs or symptoms of lung cancer; (3) Current smoker or have quit smoking within the last 15 years; (4) You have a tobacco smoking history of at least 20 pack years (packs per day multiplied by number of years you smoked); (5) You get a written order from a healthcare provider.  Glaucoma Screening: covered  annually if you're considered high risk: (1) You have diabetes OR (2) Family history of glaucoma OR (3)  aged 50 and older OR (4)  American aged 65 and older  Osteoporosis Screening: covered every 2 years if you meet one of the following conditions: (1) You're estrogen deficient and at risk for osteoporosis based off medical history and other findings; (2) Have a vertebral abnormality; (3) On glucocorticoid therapy for more than 3 months; (4) Have primary hyperparathyroidism; (5) On osteoporosis medications and need to assess response to drug therapy.   Last bone density test (DXA Scan): Not on file.  HIV Screening: covered annually if you're between the age of 15-65. Also covered annually if you are younger than 15 and older than 65 with risk factors for HIV infection. For pregnant patients, it is covered up to 3 times per pregnancy.    Immunizations:  Immunization Recommendations   Influenza Vaccine Annual influenza vaccination during flu season is recommended for all persons aged >= 6 months who do not have contraindications   Pneumococcal Vaccine   * Pneumococcal conjugate vaccine = PCV13 (Prevnar 13), PCV15 (Vaxneuvance), PCV20 (Prevnar 20)  * Pneumococcal polysaccharide vaccine = PPSV23 (Pneumovax) Adults 19-65 yo with certain risk factors or if 65+ yo  If never received any pneumonia vaccine: recommend Prevnar 20 (PCV20)  Give PCV20 if previously received 1 dose of PCV13 or PPSV23   Hepatitis B Vaccine 3 dose series if at intermediate or high risk (ex: diabetes, end stage renal disease, liver disease)   Respiratory syncytial virus (RSV) Vaccine - COVERED BY MEDICARE PART D  * RSVPreF3 (Arexvy) CDC recommends that adults 60 years of age and older may receive a single dose of RSV vaccine using shared clinical decision-making (SCDM)   Tetanus (Td) Vaccine - COST NOT COVERED BY MEDICARE PART B Following completion of primary series, a booster dose should be given every 10 years to maintain  immunity against tetanus. Td may also be given as tetanus wound prophylaxis.   Tdap Vaccine - COST NOT COVERED BY MEDICARE PART B Recommended at least once for all adults. For pregnant patients, recommended with each pregnancy.   Shingles Vaccine (Shingrix) - COST NOT COVERED BY MEDICARE PART B  2 shot series recommended in those 19 years and older who have or will have weakened immune systems or those 50 years and older     Health Maintenance Due:      Topic Date Due    Breast Cancer Screening: Mammogram  08/29/2025    Colorectal Cancer Screening  09/25/2027    Cervical Cancer Screening  06/23/2028    HIV Screening  Completed    Hepatitis C Screening  Completed     Immunizations Due:      Topic Date Due    Pneumococcal Vaccine: Pediatrics (0 to 5 Years) and At-Risk Patients (6 to 64 Years) (1 of 2 - PCV) Never done    Influenza Vaccine (1) Never done    COVID-19 Vaccine (1 - 2024-25 season) Never done     Advance Directives   What are advance directives?  Advance directives are legal documents that state your wishes and plans for medical care. These plans are made ahead of time in case you lose your ability to make decisions for yourself. Advance directives can apply to any medical decision, such as the treatments you want, and if you want to donate organs.   What are the types of advance directives?  There are many types of advance directives, and each state has rules about how to use them. You may choose a combination of any of the following:  Living will:  This is a written record of the treatment you want. You can also choose which treatments you do not want, which to limit, and which to stop at a certain time. This includes surgery, medicine, IV fluid, and tube feedings.   Durable power of  for healthcare (DPAHC):  This is a written record that states who you want to make healthcare choices for you when you are unable to make them for yourself. This person, called a proxy, is usually a family member or  a friend. You may choose more than 1 proxy.  Do not resuscitate (DNR) order:  A DNR order is used in case your heart stops beating or you stop breathing. It is a request not to have certain forms of treatment, such as CPR. A DNR order may be included in other types of advance directives.  Medical directive:  This covers the care that you want if you are in a coma, near death, or unable to make decisions for yourself. You can list the treatments you want for each condition. Treatment may include pain medicine, surgery, blood transfusions, dialysis, IV or tube feedings, and a ventilator (breathing machine).  Values history:  This document has questions about your views, beliefs, and how you feel and think about life. This information can help others choose the care that you would choose.  Why are advance directives important?  An advance directive helps you control your care. Although spoken wishes may be used, it is better to have your wishes written down. Spoken wishes can be misunderstood, or not followed. Treatments may be given even if you do not want them. An advance directive may make it easier for your family to make difficult choices about your care.   Cigarette Smoking and Your Health   Risks to your health if you smoke:  Nicotine and other chemicals found in tobacco damage every cell in your body. Even if you are a light smoker, you have an increased risk for cancer, heart disease, and lung disease. If you are pregnant or have diabetes, smoking increases your risk for complications.   Benefits to your health if you stop smoking:   You decrease respiratory symptoms such as coughing, wheezing, and shortness of breath.   You reduce your risk for cancers of the lung, mouth, throat, kidney, bladder, pancreas, stomach, and cervix. If you already have cancer, you increase the benefits of chemotherapy. You also reduce your risk for cancer returning or a second cancer from developing.   You reduce your risk for  heart disease, blood clots, heart attack, and stroke.   You reduce your risk for lung infections, and diseases such as pneumonia, asthma, chronic bronchitis, and emphysema.  Your circulation improves. More oxygen can be delivered to your body. If you have diabetes, you lower your risk for complications, such as kidney, artery, and eye diseases. You also lower your risk for nerve damage. Nerve damage can lead to amputations, poor vision, and blindness.  You improve your body's ability to heal and to fight infections.  For more information and support to stop smoking:   [employer]  Phone: 7- 009 - [phone]  Web Address: www.[employer]  Weight Management   Why it is important to manage your weight:  Being overweight increases your risk of health conditions such as heart disease, high blood pressure, type 2 diabetes, and certain types of cancer. It can also increase your risk for osteoarthritis, sleep apnea, and other respiratory problems. Aim for a slow, steady weight loss. Even a small amount of weight loss can lower your risk of health problems.  How to lose weight safely:  A safe and healthy way to lose weight is to eat fewer calories and get regular exercise. You can lose up about 1 pound a week by decreasing the number of calories you eat by 500 calories each day.   Healthy meal plan for weight management:  A healthy meal plan includes a variety of foods, contains fewer calories, and helps you stay healthy. A healthy meal plan includes the following:  Eat whole-grain foods more often.  A healthy meal plan should contain fiber. Fiber is the part of grains, fruits, and vegetables that is not broken down by your body. Whole-grain foods are healthy and provide extra fiber in your diet. Some examples of whole-grain foods are whole-wheat breads and pastas, oatmeal, brown rice, and bulgur.  Eat a variety of vegetables every day.  Include dark, leafy greens such as spinach, kale, eliot greens, and mustard greens.  Eat yellow and orange vegetables such as carrots, sweet potatoes, and winter squash.   Eat a variety of fruits every day.  Choose fresh or canned fruit (canned in its own juice or light syrup) instead of juice. Fruit juice has very little or no fiber.  Eat low-fat dairy foods.  Drink fat-free (skim) milk or 1% milk. Eat fat-free yogurt and low-fat cottage cheese. Try low-fat cheeses such as mozzarella and other reduced-fat cheeses.  Choose meat and other protein foods that are low in fat.  Choose beans or other legumes such as split peas or lentils. Choose fish, skinless poultry (chicken or turkey), or lean cuts of red meat (beef or pork). Before you cook meat or poultry, cut off any visible fat.   Use less fat and oil.  Try baking foods instead of frying them. Add less fat, such as margarine, sour cream, regular salad dressing and mayonnaise to foods. Eat fewer high-fat foods. Some examples of high-fat foods include french fries, doughnuts, ice cream, and cakes.  Eat fewer sweets.  Limit foods and drinks that are high in sugar. This includes candy, cookies, regular soda, and sweetened drinks.  Exercise:  Exercise at least 30 minutes per day on most days of the week. Some examples of exercise include walking, biking, dancing, and swimming. You can also fit in more physical activity by taking the stairs instead of the elevator or parking farther away from stores. Ask your healthcare provider about the best exercise plan for you.      © Copyright [employer] 2018 Information is for End User's use only and may not be sold, redistributed or otherwise used for commercial purposes. All illustrations and images included in CareNotes® are the copyrighted property of [employer] or [employer]

## 2025-04-18 ENCOUNTER — OFFICE VISIT (OUTPATIENT)
Dept: PHYSICAL THERAPY | Facility: REHABILITATION | Age: 65
End: 2025-04-18
Attending: FAMILY MEDICINE
Payer: MEDICARE

## 2025-04-18 DIAGNOSIS — M25.552 LEFT HIP PAIN: ICD-10-CM

## 2025-04-18 DIAGNOSIS — M54.42 CHRONIC LEFT-SIDED LOW BACK PAIN WITH LEFT-SIDED SCIATICA: Primary | ICD-10-CM

## 2025-04-18 DIAGNOSIS — G89.29 CHRONIC LEFT-SIDED LOW BACK PAIN WITH LEFT-SIDED SCIATICA: Primary | ICD-10-CM

## 2025-04-18 PROCEDURE — 97112 NEUROMUSCULAR REEDUCATION: CPT

## 2025-04-18 PROCEDURE — 97110 THERAPEUTIC EXERCISES: CPT

## 2025-04-18 PROCEDURE — 97530 THERAPEUTIC ACTIVITIES: CPT

## 2025-04-18 NOTE — PROGRESS NOTES
"Daily Note     Today's date: 2025  Patient name: Roxana Ventura  : 1960  MRN: [id number]  Referring provider: Vika Baer MD  Dx:   Encounter Diagnosis     ICD-10-CM    1. Chronic left-sided low back pain with left-sided sciatica  M54.42     G89.29       2. Left hip pain  M25.552           Start Time: 1000  Stop Time: 1040  Total time in clinic (min): 40 minutes    Subjective: Patient reports her back/hip are \"okay\" at start of session today.       Objective: See treatment diary below      Assessment: Tolerated treatment well. Patient demonstrated fatigue post treatment, exhibited good technique with therapeutic exercises, and would benefit from continued PT Pain noted with TA heel slides today, did not complete additional reps. Good tolerance to all other TE performed, fatigues quickly.       Plan: Continue per plan of care.  Progress treatment as tolerated.       Precautions: B TKA   Past Medical History:   Diagnosis Date    Arthritis     Diabetes mellitus (HCC)     Hyperlipidemia     Hypertension        Manuals 4/11 4/15 4/18  3/17 3/25 4/1 4/4 4/8   Lumbar  + L ES STM     Done STM TS/LS + soft cupping Done STM TS/LS + soft cupping Done STM TS/LS + soft cupping STM thoracic and lumbar  JS    Lumbar distraction pball            Neuro Re-Ed 4/11 4/15          Bridges 15# 3x15 15# 3x15 15# 3x15  15# 3x15  15# 3 x 15 15# 3x15 15# 3x15   clamshells 2x10 ea 2x10 ea  BW 3x10 ea bw  BW 2x10 b/l  BW 2 x 10 b/l BW 2x10 b/l Bw 2x10 b/l   Sidesteps  6 laps 6 laps       3 laps   TA heel slide 15x ea  pain      nv   TA leg exts         nv   Curl ups half hooklying         nv   Mini squats            LAQ            Standing plank with alt arm/leg exts                        Ther Ex 4/11 4/15          Bike - strength/endurance 5' L1 5\" L1 5' lvl 1  5' lv 1  5' lvl 1 5' L1 5'   Heel taps      nv Pain      Standing hip ext L ONLY 2x10 ea 2x10 ea 3x10 ea   nv X 10 x10 2x10 b/l   Standing hip abd L ONLY " "2x10 ea 2x10 ea 3x10 ea   nv X 10 x10 2x10 b/l   TA marches            TA leg exts            Thoracic ext stretch over 1/2 foam            Supine pball crushes            Standing pball crushes with marching            PPT     20x5\"       PPT clam isos            Paloff press     Yellow 20x5\"       TA+TB low rows     Yellow 30x       TA + TB mid rows     Yellow 30x       No Moneys            Lumbar FLX/RSB rolls 10x10\" ea 10x10\" 10x10''      10x10\" ea   Ther Activity 4/11 4/15          STS  x10 Chair 2x10 12#  Chair 12# 3x12       Step ups L ONLY   4'' 2x10   nv 15x  x15 4\" 2x10   Standing marches            Leg press     nv       Pt edu            Gait Training                                    Modalities                                                   "

## 2025-04-22 ENCOUNTER — OFFICE VISIT (OUTPATIENT)
Dept: PHYSICAL THERAPY | Facility: REHABILITATION | Age: 65
End: 2025-04-22
Payer: MEDICARE

## 2025-04-22 DIAGNOSIS — G89.29 CHRONIC LEFT-SIDED LOW BACK PAIN WITH LEFT-SIDED SCIATICA: Primary | ICD-10-CM

## 2025-04-22 DIAGNOSIS — M54.42 CHRONIC LEFT-SIDED LOW BACK PAIN WITH LEFT-SIDED SCIATICA: Primary | ICD-10-CM

## 2025-04-22 DIAGNOSIS — M25.552 LEFT HIP PAIN: ICD-10-CM

## 2025-04-22 PROCEDURE — 97110 THERAPEUTIC EXERCISES: CPT

## 2025-04-22 PROCEDURE — 97112 NEUROMUSCULAR REEDUCATION: CPT

## 2025-04-22 PROCEDURE — 97530 THERAPEUTIC ACTIVITIES: CPT

## 2025-04-22 NOTE — PROGRESS NOTES
"Daily Note     Today's date: 2025  Patient name: Roxana Ventura  : 1960  MRN: [id number]  Referring provider: Vika Baer MD  Dx:   Encounter Diagnosis     ICD-10-CM    1. Chronic left-sided low back pain with left-sided sciatica  M54.42     G89.29       2. Left hip pain  M25.552           Start Time: 916  Stop Time: 958  Total time in clinic (min): 42 minutes    Subjective: Patient reports her back is feeling \"so so\" at start of session.       Objective: See treatment diary below      Assessment: Tolerated treatment well. Patient demonstrated fatigue post treatment, exhibited good technique with therapeutic exercises, and would benefit from continued PT      Plan: Continue per plan of care.  Progress treatment as tolerated.       Precautions: B TKA   Past Medical History:   Diagnosis Date    Arthritis     Diabetes mellitus (HCC)     Hyperlipidemia     Hypertension        Manuals 4/11 4/15 4/18 4/22  3/25 4/1 4/4 4/8   Lumbar  + L ES STM      Done STM TS/LS + soft cupping Done STM TS/LS + soft cupping STM thoracic and lumbar  JS    Lumbar distraction pball            Neuro Re-Ed 4/11 4/15          Bridges 15# 3x15 15# 3x15 15# 3x15 15# 3x15   15# 3 x 15 15# 3x15 15# 3x15   clamshells 2x10 ea 2x10 ea  BW 3x10 ea bw 3x10 ea bw   BW 2 x 10 b/l BW 2x10 b/l Bw 2x10 b/l   Sidesteps  6 laps 6 laps  6 laps      3 laps   TA heel slide 15x ea  pain      nv   TA leg exts         nv   Curl ups half hooklying         nv   Mini squats            LAQ            Standing plank with alt arm/leg exts                        Ther Ex 4/11 4/15          Bike - strength/endurance 5' L1 5\" L1 5' lvl 1 5' lvl 1   5' lvl 1 5' L1 5'   Heel taps      nv Pain      Standing hip ext L ONLY 2x10 ea 2x10 ea 3x10 ea 3x10 ea  nv X 10 x10 2x10 b/l   Standing hip abd L ONLY 2x10 ea 2x10 ea 3x10 ea 3x10 ea  nv X 10 x10 2x10 b/l   TA marches            TA leg exts            Thoracic ext stretch over 1/2 foam          " "  Supine pball crushes            Standing pball crushes with marching            PPT            PPT clam isos            Paloff press            TA+TB low rows            TA + TB mid rows            No Moneys            Lumbar FLX/RSB rolls 10x10\" ea 10x10\" 10x10'' 10x10''     10x10\" ea   Ther Activity 4/11 4/15          STS  x10 Chair 2x10 12# Chair 3x10 12#        Step ups L ONLY   4'' 2x10 4'' 3x10  nv 15x  x15 4\" 2x10   Standing marches            Leg press            Lateral step ups L only    4'' 2x10        Pt edu            Gait Training                                    Modalities                                                     "

## 2025-04-25 ENCOUNTER — OFFICE VISIT (OUTPATIENT)
Dept: PHYSICAL THERAPY | Facility: REHABILITATION | Age: 65
End: 2025-04-25
Payer: MEDICARE

## 2025-04-25 DIAGNOSIS — M54.42 CHRONIC LEFT-SIDED LOW BACK PAIN WITH LEFT-SIDED SCIATICA: Primary | ICD-10-CM

## 2025-04-25 DIAGNOSIS — M25.552 LEFT HIP PAIN: ICD-10-CM

## 2025-04-25 DIAGNOSIS — G89.29 CHRONIC LEFT-SIDED LOW BACK PAIN WITH LEFT-SIDED SCIATICA: Primary | ICD-10-CM

## 2025-04-25 PROCEDURE — 97110 THERAPEUTIC EXERCISES: CPT

## 2025-04-25 PROCEDURE — 97112 NEUROMUSCULAR REEDUCATION: CPT

## 2025-04-25 NOTE — PROGRESS NOTES
"Daily Note     Today's date: 2025  Patient name: Roxana Ventura  : 1960  MRN: [id number]  Referring provider: Vika Baer MD  Dx:   Encounter Diagnosis     ICD-10-CM    1. Chronic left-sided low back pain with left-sided sciatica  M54.42     G89.29       2. Left hip pain  M25.552           Start Time: 1110  Stop Time: 1150  Total time in clinic (min): 40 minutes    Subjective: Patient reports her left knee is painful today at start of session.       Objective: See treatment diary below      Assessment: Tolerated treatment well. Patient demonstrated fatigue post treatment, exhibited good technique with therapeutic exercises, and would benefit from continued PT Patient had reports of left knee discomfort throughout session, did not complete certain TE. Trialed additional core stabilization TE with good tolerance, able to perform, fatigues very quickly.       Plan: Continue per plan of care.  Progress treatment as tolerated.       Precautions: B TKA   Past Medical History:   Diagnosis Date    Arthritis     Diabetes mellitus (HCC)     Hyperlipidemia     Hypertension        Manuals 4/11 4/15 4/18 4/22 4/25  4/1 4/4 4/8   Lumbar  + L ES STM       Done STM TS/LS + soft cupping STM thoracic and lumbar  JS    Lumbar distraction pball            Neuro Re-Ed 4/11 4/15          Bridges 15# 3x15 15# 3x15 15# 3x15 15# 3x15 15# 3x15  15# 3 x 15 15# 3x15 15# 3x15   clamshells 2x10 ea 2x10 ea  BW 3x10 ea bw 3x10 ea bw 3x10 ea bw  BW 2 x 10 b/l BW 2x10 b/l Bw 2x10 b/l   Sidesteps  6 laps 6 laps  6 laps  6 laps    3 laps   TA heel slide 15x ea  pain      nv   TA leg exts         nv   Curl ups half hooklying     2x10x3''    nv   Mini squats            LAQ            Standing plank with alt arm/leg exts                        Ther Ex 4/11 4/15          Bike - strength/endurance 5' L1 5\" L1 5' lvl 1 5' lvl 1 5' lvl 1  5' lvl 1 5' L1 5'   Heel taps       Pain      Standing hip ext L ONLY 2x10 ea 2x10 ea 3x10 " "ea 3x10 ea 3x10 ea  X 10 x10 2x10 b/l   Standing hip abd L ONLY 2x10 ea 2x10 ea 3x10 ea 3x10 ea 3x10 ea  X 10 x10 2x10 b/l   TA marches            TA leg exts            Thoracic ext stretch over 1/2 foam            Supine pball crushes            Standing pball crushes with marching            PPT     Standing 2x10x5''       PPT clam isos            Paloff press            TA+TB low rows            TA + TB mid rows            No Moneys            CC lat stretch     10''x5       Lumbar FLX/RSB rolls 10x10\" ea 10x10\" 10x10'' 10x10'' 10x10''    10x10\" ea   Seated hamstring stretch     20''x5 L       Ther Activity 4/11 4/15          STS  x10 Chair 2x10 12# Chair 3x10 12#        Step ups L ONLY   4'' 2x10 4'' 3x10   15x  x15 4\" 2x10   Standing marches            Leg press            Lateral step ups L only    4'' 2x10        Pt edu            Gait Training                                    Modalities                                                       "

## 2025-04-29 ENCOUNTER — HOSPITAL ENCOUNTER (OUTPATIENT)
Dept: CT IMAGING | Facility: HOSPITAL | Age: 65
Discharge: HOME | End: 2025-04-29
Attending: FAMILY MEDICINE

## 2025-04-29 ENCOUNTER — OFFICE VISIT (OUTPATIENT)
Dept: PHYSICAL THERAPY | Facility: REHABILITATION | Age: 65
End: 2025-04-29
Attending: FAMILY MEDICINE
Payer: MEDICARE

## 2025-04-29 DIAGNOSIS — M54.42 CHRONIC LEFT-SIDED LOW BACK PAIN WITH LEFT-SIDED SCIATICA: ICD-10-CM

## 2025-04-29 DIAGNOSIS — Z12.2 ENCOUNTER FOR SCREENING FOR MALIGNANT NEOPLASM OF LUNG IN CURRENT SMOKER WITH 30 PACK YEAR HISTORY OR GREATER: ICD-10-CM

## 2025-04-29 DIAGNOSIS — M25.552 LEFT HIP PAIN: Primary | ICD-10-CM

## 2025-04-29 DIAGNOSIS — F17.210 SMOKING GREATER THAN 20 PACK YEARS: ICD-10-CM

## 2025-04-29 DIAGNOSIS — G89.29 CHRONIC LEFT-SIDED LOW BACK PAIN WITH LEFT-SIDED SCIATICA: ICD-10-CM

## 2025-04-29 DIAGNOSIS — F17.200 ENCOUNTER FOR SCREENING FOR MALIGNANT NEOPLASM OF LUNG IN CURRENT SMOKER WITH 30 PACK YEAR HISTORY OR GREATER: ICD-10-CM

## 2025-04-29 PROCEDURE — 97112 NEUROMUSCULAR REEDUCATION: CPT | Performed by: PHYSICAL THERAPIST

## 2025-04-29 PROCEDURE — 97110 THERAPEUTIC EXERCISES: CPT | Performed by: PHYSICAL THERAPIST

## 2025-04-29 PROCEDURE — 97530 THERAPEUTIC ACTIVITIES: CPT | Performed by: PHYSICAL THERAPIST

## 2025-04-29 NOTE — PROGRESS NOTES
"Daily Note     Today's date: 2025  Patient name: Roxana Ventura  : 1960  MRN: [id number]  Referring provider: Vika Baer MD  Dx:   Encounter Diagnosis     ICD-10-CM    1. Left hip pain  M25.552       2. Chronic left-sided low back pain with left-sided sciatica  M54.42     G89.29           Start Time: 1005  Stop Time: 1045  Total time in clinic (min): 40 minutes    Subjective: Roxana reports that her knee is less sore today and is able to do more. Shortened session due to late arrival.       Objective: See treatment diary below      Assessment: Tolerated treatment well. Patient demonstrated fatigue post treatment, exhibited good technique with therapeutic exercises, and would benefit from continued PT      Plan: Continue per plan of care.         Precautions: B TKA   Past Medical History:   Diagnosis Date    Arthritis     Diabetes mellitus (HCC)     Hyperlipidemia     Hypertension        Manuals 4/11 4/15 4/18 4/22 4/25 4/29  4/4 4/8   Lumbar  + L ES STM        STM thoracic and lumbar  JS    Lumbar distraction pball            Neuro Re-Ed 4/11 4/15          Bridges 15# 3x15 15# 3x15 15# 3x15 15# 3x15 15# 3x15 20# 3x10  15# 3x15 15# 3x15   clamshells 2x10 ea 2x10 ea  BW 3x10 ea bw 3x10 ea bw 3x10 ea bw 3x10 ea bw  BW 2x10 b/l Bw 2x10 b/l   Sidesteps  6 laps 6 laps  6 laps  6 laps    3 laps   TA heel slide 15x ea  pain      nv   TA leg exts         nv   Curl ups half hooklying     2x10x3'' [id number]   nv   Mini squats            LAQ            Standing plank with alt arm/leg exts                        Ther Ex 4/11 4/15          Bike - strength/endurance 5' L1 5\" L1 5' lvl 1 5' lvl 1 5' lvl 1 5' lvl 1  5' L1 5'   Heel taps            Standing hip ext L ONLY 2x10 ea 2x10 ea 3x10 ea 3x10 ea 3x10 ea 3x10 ea  x10 2x10 b/l   Standing hip abd L ONLY 2x10 ea 2x10 ea 3x10 ea 3x10 ea 3x10 ea 3x10 ea  x10 2x10 b/l   TA marches            TA leg exts            Thoracic ext stretch over 1/2 foam       " "     Supine pball crushes            Standing pball crushes with marching            PPT     Standing 2x10x5'' np      PPT clam isos            Paloff press            TA+TB low rows            TA + TB mid rows            No Moneys            CC lat stretch     10''x5 np      Lumbar FLX/RSB rolls 10x10\" ea 10x10\" 10x10'' 10x10'' 10x10'' 10x10s   10x10\" ea   Seated hamstring stretch     20''x5 L 5x20s L      Ther Activity 4/11 4/15          STS  x10 Chair 2x10 12# Chair 3x10 12#  np      Step ups L ONLY   4'' 2x10 4'' 3x10  4\" 3x10  x15 4\" 2x10   Standing marches            Leg press            Lateral step ups L only    4'' 2x10  4\" 2x10      Pt edu            Gait Training                                    Modalities                                                          "

## 2025-05-02 ENCOUNTER — OFFICE VISIT (OUTPATIENT)
Dept: PHYSICAL THERAPY | Facility: REHABILITATION | Age: 65
End: 2025-05-02
Payer: MEDICARE

## 2025-05-02 DIAGNOSIS — M54.42 CHRONIC LEFT-SIDED LOW BACK PAIN WITH LEFT-SIDED SCIATICA: ICD-10-CM

## 2025-05-02 DIAGNOSIS — G89.29 CHRONIC LEFT-SIDED LOW BACK PAIN WITH LEFT-SIDED SCIATICA: ICD-10-CM

## 2025-05-02 DIAGNOSIS — M25.552 LEFT HIP PAIN: Primary | ICD-10-CM

## 2025-05-02 PROCEDURE — 97530 THERAPEUTIC ACTIVITIES: CPT | Performed by: PHYSICAL THERAPIST

## 2025-05-02 PROCEDURE — 97112 NEUROMUSCULAR REEDUCATION: CPT | Performed by: PHYSICAL THERAPIST

## 2025-05-02 PROCEDURE — 97110 THERAPEUTIC EXERCISES: CPT | Performed by: PHYSICAL THERAPIST

## 2025-05-02 NOTE — PROGRESS NOTES
"Daily Note     Today's date: 2025  Patient name: Roxana Ventura  : 1960  MRN: [id number]  Referring provider: Vika Baer MD  Dx:   Encounter Diagnosis     ICD-10-CM    1. Left hip pain  M25.552       2. Chronic left-sided low back pain with left-sided sciatica  M54.42     G89.29           Start Time: 1103  Stop Time: 1148  Total time in clinic (min): 45 minutes    Subjective: Patient reports the back is feeling better.      Objective: See treatment diary below      Assessment: Tolerated treatment well. Progressed strengthening in terms of upper back and low trap/lat strengthening to address postural strength deficits with overall good tolerance but fatigue noted. Patient demonstrated fatigue post treatment, exhibited good technique with therapeutic exercises, and would benefit from continued PT.      Plan: Continue per plan of care.      Precautions: B TKA   Past Medical History:   Diagnosis Date    Arthritis     Diabetes mellitus (HCC)     Hyperlipidemia     Hypertension        Manuals 4/11 4/15 4/18 4/22 4/25 4/29 5/2     Lumbar  + L ES STM            Lumbar distraction pball            Neuro Re-Ed 4/11 4/15          Bridges 15# 3x15 15# 3x15 15# 3x15 15# 3x15 15# 3x15 20# 3x10 20# 3x10     clamshells 2x10 ea 2x10 ea  BW 3x10 ea bw 3x10 ea bw 3x10 ea bw 3x10 ea bw 3x10 ea bw     Sidesteps  6 laps 6 laps  6 laps  6 laps       TA heel slide 15x ea  pain         TA leg exts            Curl ups half hooklying     2x10x3'' [id number] 2x10     Mini squats            LAQ            Standing plank with alt arm/leg exts                        Ther Ex 4/11 4/15          Bike - strength/endurance 5' L1 5\" L1 5' lvl 1 5' lvl 1 5' lvl 1 5' lvl 1 5' lv 1     Heel taps            Standing hip ext L ONLY 2x10 ea 2x10 ea 3x10 ea 3x10 ea 3x10 ea 3x10 ea      Standing hip abd L ONLY 2x10 ea 2x10 ea 3x10 ea 3x10 ea 3x10 ea 3x10 ea      TA marches            TA leg exts            Thoracic ext stretch over " "1/2 foam            Supine pball crushes            Standing pball crushes with marching            PPT     Standing 2x10x5'' np      PPT clam isos            Paloff press            TA+TB low rows       Prpl 2x10     TB horizontal abd       Prpl 2x10     TB lat pull downs       Yellow 2x10     TA + TB mid rows            No Moneys            CC lat stretch     10''x5 np      Lumbar FLX/RSB rolls 10x10\" ea 10x10\" 10x10'' 10x10'' 10x10'' 10x10s 10x20s     Seated hamstring stretch     20''x5 L 5x20s L      Ther Activity 4/11 4/15          STS  x10 Chair 2x10 12# Chair 3x10 12#  np Chair 3x10 12#     Step ups L ONLY   4'' 2x10 4'' 3x10  4\" 3x10 4\" 2x10     Standing marches            Leg press            Lateral step ups L only    4'' 2x10  4\" 2x10 4\" 2x10     Pt edu            Gait Training                                    Modalities                                                           "

## 2025-05-05 ENCOUNTER — RESULTS FOLLOW-UP (OUTPATIENT)
Dept: FAMILY MEDICINE CLINIC | Facility: CLINIC | Age: 65
End: 2025-05-05

## 2025-05-06 ENCOUNTER — OFFICE VISIT (OUTPATIENT)
Dept: PHYSICAL THERAPY | Facility: REHABILITATION | Age: 65
End: 2025-05-06
Attending: FAMILY MEDICINE
Payer: MEDICARE

## 2025-05-06 DIAGNOSIS — M25.552 LEFT HIP PAIN: Primary | ICD-10-CM

## 2025-05-06 DIAGNOSIS — M54.42 CHRONIC LEFT-SIDED LOW BACK PAIN WITH LEFT-SIDED SCIATICA: ICD-10-CM

## 2025-05-06 DIAGNOSIS — G89.29 CHRONIC LEFT-SIDED LOW BACK PAIN WITH LEFT-SIDED SCIATICA: ICD-10-CM

## 2025-05-06 PROCEDURE — 97112 NEUROMUSCULAR REEDUCATION: CPT

## 2025-05-06 PROCEDURE — 97530 THERAPEUTIC ACTIVITIES: CPT

## 2025-05-06 PROCEDURE — 97110 THERAPEUTIC EXERCISES: CPT

## 2025-05-06 NOTE — PROGRESS NOTES
"Daily Note     Today's date: 2025  Patient name: Roxana Ventura  : 1960  MRN: [id number]  Referring provider: Vika Baer MD  Dx:   Encounter Diagnosis     ICD-10-CM    1. Left hip pain  M25.552       2. Chronic left-sided low back pain with left-sided sciatica  M54.42     G89.29           Subjective: Patient mostly complaining of mid and low back pain upon arrival. Rates as 5/10.      Objective: See treatment diary below      Assessment: Tolerated treatment well. Does require cueing to avoid excessive hip rotation with clamshells. Overall impression of decreased endurance with patient demo SOB at times. Noted challenge and fatigue with strengthening. Patient demonstrated fatigue post treatment, exhibited good technique with therapeutic exercises, and would benefit from continued PT.      Plan: Continue per plan of care.      Precautions: B TKA   Past Medical History:   Diagnosis Date    Arthritis     Diabetes mellitus (HCC)     Hyperlipidemia     Hypertension        Manuals 4/11 4/15 4/18 4/22 4/25 4/29 5/2 5/6    Lumbar  + L ES STM            Lumbar distraction pball            Neuro Re-Ed 4/11 4/15          Bridges 15# 3x15 15# 3x15 15# 3x15 15# 3x15 15# 3x15 20# 3x10 20# 3x10 20#  3x10    clamshells 2x10 ea 2x10 ea  BW 3x10 ea bw 3x10 ea bw 3x10 ea bw 3x10 ea bw 3x10 ea bw 3x10 ea bw    Sidesteps  6 laps 6 laps  6 laps  6 laps       TA heel slide 15x ea  pain         TA leg exts            Curl ups half hooklying     2x10x3'' [id number] 2x10     Mini squats            LAQ            Standing plank with alt arm/leg exts                        Ther Ex 4/11 4/15          Bike - strength/endurance 5' L1 5\" L1 5' lvl 1 5' lvl 1 5' lvl 1 5' lvl 1 5' lv 1 5' lv 1    Heel taps            Standing hip ext L ONLY 2x10 ea 2x10 ea 3x10 ea 3x10 ea 3x10 ea 3x10 ea      Standing hip abd L ONLY 2x10 ea 2x10 ea 3x10 ea 3x10 ea 3x10 ea 3x10 ea      TA marches            TA leg exts            Thoracic " "ext stretch over 1/2 foam            Supine pball crushes            Standing pball crushes with marching            PPT     Standing 2x10x5'' np      PPT clam isos            Paloff press            TA+TB low rows       Prpl 2x10 Prpl 2x10    TB horizontal abd       Prpl 2x10 Prpl 2x10    TB lat pull downs       Yellow 2x10 Yellow 2x10    TA + TB mid rows            No Moneys        GTB  2x10    CC lat stretch     10''x5 np      Lumbar FLX/RSB rolls 10x10\" ea 10x10\" 10x10'' 10x10'' 10x10'' 10x10s 10x20s 5'', 10x ea  3 way    Seated hamstring stretch     20''x5 L 5x20s L      Ther Activity 4/11 4/15          STS  x10 Chair 2x10 12# Chair 3x10 12#  np Chair 3x10 12# Chair 3x10 12#    Step ups L ONLY   4'' 2x10 4'' 3x10  4\" 3x10 4\" 2x10 4\" 2x10    Standing marches            Leg press            Lateral step ups L only    4'' 2x10  4\" 2x10 4\" 2x10 4\" 2x10    Pt edu            Gait Training                                    Modalities                                                           "

## 2025-05-08 ENCOUNTER — EVALUATION (OUTPATIENT)
Dept: PHYSICAL THERAPY | Facility: REHABILITATION | Age: 65
End: 2025-05-08
Attending: FAMILY MEDICINE
Payer: MEDICARE

## 2025-05-08 DIAGNOSIS — M54.42 CHRONIC LEFT-SIDED LOW BACK PAIN WITH LEFT-SIDED SCIATICA: ICD-10-CM

## 2025-05-08 DIAGNOSIS — G89.29 CHRONIC LEFT-SIDED LOW BACK PAIN WITH LEFT-SIDED SCIATICA: ICD-10-CM

## 2025-05-08 DIAGNOSIS — M25.552 LEFT HIP PAIN: Primary | ICD-10-CM

## 2025-05-08 PROCEDURE — 97112 NEUROMUSCULAR REEDUCATION: CPT | Performed by: PHYSICAL THERAPIST

## 2025-05-08 PROCEDURE — 97110 THERAPEUTIC EXERCISES: CPT | Performed by: PHYSICAL THERAPIST

## 2025-05-08 PROCEDURE — 97530 THERAPEUTIC ACTIVITIES: CPT | Performed by: PHYSICAL THERAPIST

## 2025-05-08 NOTE — PROGRESS NOTES
PT Re-Evaluation     Today's date: 2025  Patient name: Roxana Ventura  : 1960  MRN: [id number]  Referring provider: Vika Baer MD  Dx:   Encounter Diagnosis     ICD-10-CM    1. Left hip pain  M25.552       2. Chronic left-sided low back pain with left-sided sciatica  M54.42     G89.29                 Start Time: 1630  Stop Time: 1711  Total time in clinic (min): 41 minutes    Assessment  Impairments: abnormal coordination, abnormal gait, abnormal muscle firing, abnormal or restricted ROM, abnormal movement, activity intolerance, impaired balance, impaired physical strength, lacks appropriate home exercise program, pain with function and weight-bearing intolerance    Assessment details: RE performed on 25  Roxana has made the following improvements since beginning PT: decreased pain, increased strength, and increased tolerance to activities. Roxana has made objective improvements in lumbar range of motion, BLE strength and decreased pain. However, she continues to demonstrate significant LLE weakness limiting her from normal ambulation, stair negotiation and lifting/carrying. She has made objective improvements in LLE strength but secondary to pathological process, will require skilled maintenance for continued gains and maintenance of current gains. Roxana continues to present with the impairments as listed above. Patient would continue to benefit from skilled PT to address these deficits and to maximize function.       Barriers to therapy: Language barrier  Understanding of Dx/Px/POC: good     Prognosis: good    Goals  STG 4-6 weeks  1. Patient will display decreased pain <7/10 with ADLs. - progressed  2. Patient will display lumbar ROM WNL. - met      LTG 6-12 weeks  1. Patient will be able to stand for 30 minutes without pain upon discharge - progressed  2. Patient will be able to walk for 30 minutes without pain upon discharge - progressed  3. Patient will be pick an object up off  the floor without pain upon discharge - progressed  4. Patient will display core strength WNL. - progressed  5. Patient will demonstrate at least 3+/5 LLE strength. - progressed        Plan  Planned modality interventions: cryotherapy and thermotherapy: hydrocollator packs    Planned therapy interventions: abdominal trunk stabilization, activity modification, ADL training, balance, balance/weight bearing training, body mechanics training, joint mobilization, manual therapy, massage, motor coordination training, neuromuscular re-education, patient education, postural training, self care, strengthening, stretching, therapeutic activities, therapeutic exercise, transfer training, home exercise program, graded activity, graded exercise, functional ROM exercises and flexibility    Frequency: 2x week  Duration in weeks: 8  Plan of Care beginning date: 5/8/2025  Plan of Care expiration date: 7/3/2025  Treatment plan discussed with: patient and family        Subjective Evaluation    History of Present Illness  Mechanism of injury: RE performed on 05/08/25  Roxana has been seen for a total of 36 visits for OP PT for chronic low back pain with left sided sciatica. Patient reports improvements in low back and mid back pain. Her main complaint at this time is weakness in the left leg, however she notes this has had gradual improvements since beginning PT as she can lift her foot better. Patient's daughter reports patient is fairly sedentary at home and struggles with chronic constipation which may affect her energy levels.      RE performed on 03/25/25  Roxana has been seen for a total of 24 visits for OP PT for chronic low back pain and left leg pain. Patient reports she feels much better since beginning PT including a decrease in back pain. She reports seeing MD recently and was told she needs a lot of therapy to continue working on strength and walking normally. Patient would like to continue to work on back pain and feels  the left low back is continuing to limit her walking. She does not have pain down the left leg but feels numbness and difficulty moving the leg limiting her walking.     IE 24  Patient is a 65 yo female with complaints of chronic low back pain. Patient received PT for this issue in 2024 but stopped secondary to traveling out of the country. She is now resuming PT. Patient reports the exercises in PT helped a little bit but has not been able to manage the pain at home and feels it has worsened. She feels pain in the central mid to low back. She reports no pain down the left leg but a lot of weakness in the leg with difficulty lifting it. She feels after standing for a long time her back starts to bend forward.     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 Resultant mild to moderate spinal canal narrowing most prominent at L3-L4. Multilevel foraminal narrowing most prominent on the left at L4-L5 (moderate to severe) and the right at L5-S1. Details   above.     -Multilevel facet arthropathy most prominent at involving the lower right lumbar spine. Mild edema involving the right L4-L5 facets with adjacent joint effusion which may be degenerative. Clinical correlation advised including ESR and CRP if there is concern for underlying infection. Further post contrast imaging can be obtained if clinically indicated.            Not a recurrent problem   Quality of life: fair    Patient Goals  Patient goals for therapy: decreased pain, increased motion, increased strength, independence with ADLs/IADLs and return to sport/leisure activities  Patient goal: calm the pain in back and strengthen left leg  Pain  Current pain ratin  At best pain ratin  At worst pain ratin  Quality: sharp, radiating and tight  Relieving factors: change in position and medications (lumbra extension in the morning; Tylenol and Diclofenac prn)  Aggravating factors: lifting, sitting, standing and  "walking (laying down, lifting legs, bending forward or backward)    Social Support  Steps to enter house: yes    Employment status: not working    Diagnostic Tests  MRI studies: abnormal  Treatments  Previous treatment: physical therapy  Current treatment: chiropractic and massage        Objective     Postural Observations  Seated posture: poor  Standing posture: poor      Palpation   Left   Hypertonic in the erector spinae.   Tenderness of the erector spinae.     Right   Hypertonic in the erector spinae.     Tenderness     Left Hip   No tenderness in the PSIS.     Right Hip   No tenderness in the PSIS.     Active Range of Motion   Cervical/Thoracic Spine       Thoracic    Extension: 10 degrees        Lumbar   Flexion:  WFL  Extension:  WFL  Left lateral flexion:  WFL  Right lateral flexion:  WFL    Additional Active Range of Motion Details  Flexion 75% - pain and weakness with reversing into extension - walking hands up thighs     Passive Range of Motion   Left Hip   Normal passive range of motion    Right Hip   Normal passive range of motion    Joint Play     Hypomobile: T8, T9, T10, T11 and T12     Pain: L1, L2, L3 and L4     Strength/Myotome Testing     Left Hip   Planes of Motion   Flexion: 2+  Extension: 3  Abduction: 3  External rotation: 3+    Right Hip   Planes of Motion   Flexion: 4-  Extension: 3+  Abduction: 4    Left Knee   Flexion: 5  Extension: 4+    Right Knee   Flexion: 5  Extension: 5    Left Ankle/Foot   Dorsiflexion: 4+    Right Ankle/Foot   Dorsiflexion: 5    Tests     Lumbar     Left   Negative passive SLR.     Right   Negative passive SLR.     Left Hip   90/90 SLR: Positive.     Right Hip   90/90 SLR: Positive.     General Comments:      Lumbar Comments  30\"STS: 7 reps             Precautions: B TKA   Past Medical History:   Diagnosis Date    Arthritis     Diabetes mellitus (HCC)     Hyperlipidemia     Hypertension          Manuals 4/11 4/15 4/18 4/22 4/25 4/29 5/2 5/6 5/8   Lumbar  + L ES " "STM            Lumbar distraction pball            Neuro Re-Ed 4/11 4/15          Bridges 15# 3x15 15# 3x15 15# 3x15 15# 3x15 15# 3x15 20# 3x10 20# 3x10 20#  3x10 20# 3x10   clamshells 2x10 ea 2x10 ea  BW 3x10 ea bw 3x10 ea bw 3x10 ea bw 3x10 ea bw 3x10 ea bw 3x10 ea bw 3x10 ea bw   Sidesteps  6 laps 6 laps  6 laps  6 laps    1.5# 5 laps table   TA heel slide 15x ea  pain         TA leg exts            Curl ups half hooklying     2x10x3'' [id number] 2x10     Mini squats            LAQ            Standing plank with alt arm/leg exts                        Ther Ex 4/11 4/15          Bike - strength/endurance 5' L1 5\" L1 5' lvl 1 5' lvl 1 5' lvl 1 5' lvl 1 5' lv 1 5' lv 1 5'lv 1   Heel taps            Standing hip ext L ONLY 2x10 ea 2x10 ea 3x10 ea 3x10 ea 3x10 ea 3x10 ea   1.5# 2x10   Standing hip abd L ONLY 2x10 ea 2x10 ea 3x10 ea 3x10 ea 3x10 ea 3x10 ea   1.5# 2x10   TA marches            TA leg exts            Thoracic ext stretch over 1/2 foam            Supine pball crushes            Standing pball crushes with marching            PPT     Standing 2x10x5'' np      PPT clam isos            Paloff press            TA+TB low rows       Prpl 2x10 Prpl 2x10    TB horizontal abd       Prpl 2x10 Prpl 2x10    TB lat pull downs       Yellow 2x10 Yellow 2x10    TA + TB mid rows            No Moneys        GTB  2x10    CC lat stretch     10''x5 np      Lumbar FLX/RSB rolls 10x10\" ea 10x10\" 10x10'' 10x10'' 10x10'' 10x10s 10x20s 5'', 10x ea  3 way 10x5\" ea 3 way   Seated hamstring stretch     20''x5 L 5x20s L      Ther Activity 4/11 4/15          STS  x10 Chair 2x10 12# Chair 3x10 12#  np Chair 3x10 12# Chair 3x10 12# Chair 3x10 12#   Step ups L ONLY   4'' 2x10 4'' 3x10  4\" 3x10 4\" 2x10 4\" 2x10 4\" 2x10   Standing marches            Leg press            Lateral step ups L only    4'' 2x10  4\" 2x10 4\" 2x10 4\" 2x10    Pt edu            Gait Training                                    Modalities                                  " Emergent/ED

## 2025-05-13 ENCOUNTER — OFFICE VISIT (OUTPATIENT)
Dept: PHYSICAL THERAPY | Facility: REHABILITATION | Age: 65
End: 2025-05-13
Attending: FAMILY MEDICINE
Payer: MEDICARE

## 2025-05-13 DIAGNOSIS — M54.42 CHRONIC LEFT-SIDED LOW BACK PAIN WITH LEFT-SIDED SCIATICA: ICD-10-CM

## 2025-05-13 DIAGNOSIS — G89.29 CHRONIC LEFT-SIDED LOW BACK PAIN WITH LEFT-SIDED SCIATICA: ICD-10-CM

## 2025-05-13 DIAGNOSIS — M25.552 LEFT HIP PAIN: Primary | ICD-10-CM

## 2025-05-13 PROCEDURE — 97110 THERAPEUTIC EXERCISES: CPT

## 2025-05-13 PROCEDURE — 97112 NEUROMUSCULAR REEDUCATION: CPT

## 2025-05-13 PROCEDURE — 97530 THERAPEUTIC ACTIVITIES: CPT

## 2025-05-13 NOTE — PROGRESS NOTES
"Daily Note     Today's date: 2025  Patient name: Roxana Ventura  : 1960  MRN: [id number]  Referring provider: Vika Baer MD  Dx:   Encounter Diagnosis     ICD-10-CM    1. Left hip pain  M25.552       2. Chronic left-sided low back pain with left-sided sciatica  M54.42     G89.29           Subjective: Patient offers no new complaints, denies any pain upon arrival.       Objective: See treatment diary below      Assessment: Pt able to complete hip kicks B today without complaints. Offers no complaints with prescribed interventions. Appropriately challenged and fatigued throughout session. Would continue to benefit from PT.       Plan: Continue per plan of care.  Progress treatment as tolerated.       Precautions: B TKA   Past Medical History:   Diagnosis Date    Arthritis     Diabetes mellitus (HCC)     Hyperlipidemia     Hypertension          Manuals 5/13 4/15 4/18 4/22 4/25 4/29 5/2 5/6 5/8   Lumbar  + L ES STM            Lumbar distraction pball            Neuro Re-Ed  4/15          Bridges 20# 3x10 15# 3x15 15# 3x15 15# 3x15 15# 3x15 20# 3x10 20# 3x10 20#  3x10 20# 3x10   clamshells Pink TB  3x10 B 2x10 ea  BW 3x10 ea bw 3x10 ea bw 3x10 ea bw 3x10 ea bw 3x10 ea bw 3x10 ea bw 3x10 ea bw   Sidesteps 1.5# 5 laps table 6 laps 6 laps  6 laps  6 laps    1.5# 5 laps table   TA heel slide   pain         TA leg exts            Curl ups half hooklying     2x10x3'' [id number] 2x10     Mini squats            LAQ            Standing plank with alt arm/leg exts                        Ther Ex  4/15          Bike - strength/endurance 5'lv 1 5\" L1 5' lvl 1 5' lvl 1 5' lvl 1 5' lvl 1 5' lv 1 5' lv 1 5'lv 1   Heel taps            Standing hip ext L ONLY 1.5# 3x10 B 2x10 ea 3x10 ea 3x10 ea 3x10 ea 3x10 ea   1.5# 2x10   Standing hip abd L ONLY 1.5# 3x10 B 2x10 ea 3x10 ea 3x10 ea 3x10 ea 3x10 ea   1.5# 2x10   TA marches            TA leg exts            Thoracic ext stretch over 1/2 foam            Supine " "pball crushes            Standing pball crushes with marching            PPT     Standing 2x10x5'' np      PPT clam isos            Paloff press            TA+TB low rows       Prpl 2x10 Prpl 2x10    TB horizontal abd       Prpl 2x10 Prpl 2x10    TB lat pull downs       Yellow 2x10 Yellow 2x10    TA + TB mid rows            No Moneys GTB  3x10       GTB  2x10    CC lat stretch     10''x5 np      Lumbar FLX/RSB rolls 10x5\" ea 3 way 10x10\" 10x10'' 10x10'' 10x10'' 10x10s 10x20s 5'', 10x ea  3 way 10x5\" ea 3 way   Seated hamstring stretch     20''x5 L 5x20s L      Ther Activity  4/15          STS Chair 3x10 12# x10 Chair 2x10 12# Chair 3x10 12#  np Chair 3x10 12# Chair 3x10 12# Chair 3x10 12#   Step ups L ONLY 4\" 2x10  4'' 2x10 4'' 3x10  4\" 3x10 4\" 2x10 4\" 2x10 4\" 2x10   Standing marches            Leg press            Lateral step ups L only 4\" 2x10   4'' 2x10  4\" 2x10 4\" 2x10 4\" 2x10    Pt edu            Gait Training                                    Modalities                                               "

## 2025-05-16 ENCOUNTER — OFFICE VISIT (OUTPATIENT)
Dept: PHYSICAL THERAPY | Facility: REHABILITATION | Age: 65
End: 2025-05-16
Attending: FAMILY MEDICINE
Payer: MEDICARE

## 2025-05-16 DIAGNOSIS — M25.552 LEFT HIP PAIN: Primary | ICD-10-CM

## 2025-05-16 DIAGNOSIS — G89.29 CHRONIC LEFT-SIDED LOW BACK PAIN WITH LEFT-SIDED SCIATICA: ICD-10-CM

## 2025-05-16 DIAGNOSIS — M54.42 CHRONIC LEFT-SIDED LOW BACK PAIN WITH LEFT-SIDED SCIATICA: ICD-10-CM

## 2025-05-16 PROCEDURE — 97110 THERAPEUTIC EXERCISES: CPT

## 2025-05-16 PROCEDURE — 97530 THERAPEUTIC ACTIVITIES: CPT

## 2025-05-16 PROCEDURE — 97112 NEUROMUSCULAR REEDUCATION: CPT

## 2025-05-16 NOTE — PROGRESS NOTES
Daily Note     Today's date: 2025  Patient name: Roxana Ventura  : 1960  MRN: [id number]  Referring provider: Vika Baer MD  Dx:   Encounter Diagnosis     ICD-10-CM    1. Left hip pain  M25.552       2. Chronic left-sided low back pain with left-sided sciatica  M54.42     G89.29           Subjective: No complaints offered.       Objective: See treatment diary below      Assessment: Continues with significant B/L LE fatigue however performs all requested tasks. Recovers well with brief rest breaks.  Would continue to benefit from PT.       Plan: Continue per plan of care.  Progress treatment as tolerated.       Precautions: B TKA   Past Medical History:   Diagnosis Date    Arthritis     Diabetes mellitus (HCC)     Hyperlipidemia     Hypertension          Manuals    Lumbar  + L ES STM            Lumbar distraction pball            Neuro Re-Ed            Bridges 20# 3x10 20#  3x10  15# 3x15 15# 3x15 20# 3x10 20# 3x10 20#  3x10 20# 3x10   clamshells Pink TB  3x10 B Pink TB  3x10 B  3x10 ea bw 3x10 ea bw 3x10 ea bw 3x10 ea bw 3x10 ea bw 3x10 ea bw   Sidesteps 1.5# 5 laps table 1.5# 5 laps  mirror  6 laps  6 laps    1.5# 5 laps table   TA heel slide            TA leg exts            Curl ups half hooklying     2x10x3'' [id number] 2x10     Mini squats            LAQ            Standing plank with alt arm/leg exts                        Ther Ex            Bike - strength/endurance 5'lv 1 5' L1  5' lvl 1 5' lvl 1 5' lvl 1 5' lv 1 5' lv 1 5'lv 1   Heel taps            Standing hip ext L ONLY 1.5# 3x10 B 1.5#  3x10 B  3x10 ea 3x10 ea 3x10 ea   1.5# 2x10   Standing hip abd L ONLY 1.5# 3x10 B 1.5#  3x10 B  3x10 ea 3x10 ea 3x10 ea   1.5# 2x10   TA marches            TA leg exts            Thoracic ext stretch over 1/2 foam            Supine pball crushes            Standing pball crushes with marching            PPT     Standing 2x10x5'' np      PPT clam  "isos            Paloff press            TA+TB low rows       Prpl 2x10 Prpl 2x10    TB horizontal abd       Prpl 2x10 Prpl 2x10    TB lat pull downs       Yellow 2x10 Yellow 2x10    TA + TB mid rows            No Moneys GTB  3x10 GTB  3x10      GTB  2x10    CC lat stretch     10''x5 np      Lumbar FLX/RSB rolls 10x5\" ea 3 way 10x5\" ea  3-way  10x10'' 10x10'' 10x10s 10x20s 5'', 10x ea  3 way 10x5\" ea 3 way   Seated hamstring stretch     20''x5 L 5x20s L      Ther Activity  5/16          STS Chair 3x10 12# Chair  3x10  12#  Chair 3x10 12#  np Chair 3x10 12# Chair 3x10 12# Chair 3x10 12#   Step ups L ONLY 4\" 2x10 4\" 2x10  4'' 3x10  4\" 3x10 4\" 2x10 4\" 2x10 4\" 2x10   Standing marches            Leg press            Lateral step ups L only 4\" 2x10 4\" 2x10  4'' 2x10  4\" 2x10 4\" 2x10 4\" 2x10    Pt edu            Gait Training                                    Modalities                                               "

## 2025-05-20 ENCOUNTER — OFFICE VISIT (OUTPATIENT)
Dept: PHYSICAL THERAPY | Facility: REHABILITATION | Age: 65
End: 2025-05-20
Attending: FAMILY MEDICINE
Payer: MEDICARE

## 2025-05-20 DIAGNOSIS — G89.29 CHRONIC LEFT-SIDED LOW BACK PAIN WITH LEFT-SIDED SCIATICA: ICD-10-CM

## 2025-05-20 DIAGNOSIS — M25.552 LEFT HIP PAIN: Primary | ICD-10-CM

## 2025-05-20 DIAGNOSIS — M54.42 CHRONIC LEFT-SIDED LOW BACK PAIN WITH LEFT-SIDED SCIATICA: ICD-10-CM

## 2025-05-20 PROCEDURE — 97112 NEUROMUSCULAR REEDUCATION: CPT | Performed by: PHYSICAL THERAPIST

## 2025-05-20 PROCEDURE — 97110 THERAPEUTIC EXERCISES: CPT | Performed by: PHYSICAL THERAPIST

## 2025-05-20 PROCEDURE — 97530 THERAPEUTIC ACTIVITIES: CPT | Performed by: PHYSICAL THERAPIST

## 2025-05-20 NOTE — PROGRESS NOTES
Daily Note     Today's date: 2025  Patient name: Roxana Ventura  : 1960  MRN: [id number]  Referring provider: Vika Baer MD  Dx:   Encounter Diagnosis     ICD-10-CM    1. Left hip pain  M25.552       2. Chronic left-sided low back pain with left-sided sciatica  M54.42     G89.29           Start Time: 1052  Stop Time: 1130  Total time in clinic (min): 38 minutes    Subjective: Patient has no new complaints.      Objective: See treatment diary below      Assessment: Tolerated treatment well. Patient demonstrated fatigue post treatment, exhibited good technique with therapeutic exercises, and would benefit from continued PT.      Plan: Continue per plan of care.      Precautions: B TKA   Past Medical History:   Diagnosis Date    Arthritis     Diabetes mellitus (HCC)     Hyperlipidemia     Hypertension          Manuals    Lumbar  + L ES STM            Lumbar distraction pball            Neuro Re-Ed            Bridges 20# 3x10 20#  3x10 20# 3x12   20# 3x10 20# 3x10 20#  3x10 20# 3x10   clamshells Pink TB  3x10 B Pink TB  3x10 B Pink 3x10 B   3x10 ea bw 3x10 ea bw 3x10 ea bw 3x10 ea bw   Sidesteps 1.5# 5 laps table 1.5# 5 laps  mirror 2# 5 laps table      1.5# 5 laps table   TA heel slide            TA leg exts            Curl ups half hooklying      [id number] 2x10     Mini squats            LAQ            Standing plank with alt arm/leg exts                        Ther Ex            Bike - strength/endurance 5'lv 1 5' L1 5'    5' lvl 1 5' lv 1 5' lv 1 5'lv 1   Heel taps            Standing hip ext L ONLY 1.5# 3x10 B 1.5#  3x10 B 2# 3x10 B   3x10 ea   1.5# 2x10   Standing hip abd L ONLY 1.5# 3x10 B 1.5#  3x10 B 2# 3x10 B    3x10 ea   1.5# 2x10   TA marches            TA leg exts            Thoracic ext stretch over 1/2 foam            Supine pball crushes            Standing pball crushes with marching            PPT      np      PPT clam isos           "  Paloff press            TA+TB low rows       Prpl 2x10 Prpl 2x10    TB horizontal abd       Prpl 2x10 Prpl 2x10    TB lat pull downs       Yellow 2x10 Yellow 2x10    TA + TB mid rows            No Moneys GTB  3x10 GTB  3x10      GTB  2x10    CC lat stretch      np      Lumbar FLX/RSB rolls 10x5\" ea 3 way 10x5\" ea  3-way 10x\" ea 3 way   10x10s 10x20s 5'', 10x ea  3 way 10x5\" ea 3 way   Seated hamstring stretch      5x20s L      Ther Activity  5/16          STS Chair 3x10 12# Chair  3x10  12# Chair 3x12 12#   np Chair 3x10 12# Chair 3x10 12# Chair 3x10 12#   Step ups L ONLY 4\" 2x10 4\" 2x10 4\" 3x10   4\" 3x10 4\" 2x10 4\" 2x10 4\" 2x10   Standing marches            Leg press            Lateral step ups L only 4\" 2x10 4\" 2x10 4\" 3x10   4\" 2x10 4\" 2x10 4\" 2x10    Pt edu            Gait Training                                    Modalities                                                 "

## 2025-05-27 ENCOUNTER — OFFICE VISIT (OUTPATIENT)
Dept: PHYSICAL THERAPY | Facility: REHABILITATION | Age: 65
End: 2025-05-27
Attending: FAMILY MEDICINE
Payer: MEDICARE

## 2025-05-27 DIAGNOSIS — M25.552 LEFT HIP PAIN: Primary | ICD-10-CM

## 2025-05-27 DIAGNOSIS — M54.42 CHRONIC LEFT-SIDED LOW BACK PAIN WITH LEFT-SIDED SCIATICA: ICD-10-CM

## 2025-05-27 DIAGNOSIS — G89.29 CHRONIC LEFT-SIDED LOW BACK PAIN WITH LEFT-SIDED SCIATICA: ICD-10-CM

## 2025-05-27 PROCEDURE — 97112 NEUROMUSCULAR REEDUCATION: CPT

## 2025-05-27 PROCEDURE — 97110 THERAPEUTIC EXERCISES: CPT

## 2025-05-27 PROCEDURE — 97530 THERAPEUTIC ACTIVITIES: CPT

## 2025-05-27 NOTE — PROGRESS NOTES
Daily Note     Today's date: 2025  Patient name: Roxana Ventura  : 1960  MRN: [id number]  Referring provider: Vika Baer MD  Dx:   Encounter Diagnosis     ICD-10-CM    1. Left hip pain  M25.552       2. Chronic left-sided low back pain with left-sided sciatica  M54.42     G89.29           Subjective: Patient has no new complaints.      Objective: See treatment diary below      Assessment: Tolerated treatment well. Good effort and tolerance to treatment session. Able to increase to 6'' step today with step ups. Appropriately challenged and fatigued throughout session. Patient demonstrated fatigue post treatment, exhibited good technique with therapeutic exercises, and would benefit from continued PT.      Plan: Continue per plan of care.      Precautions: B TKA   Past Medical History:   Diagnosis Date    Arthritis     Diabetes mellitus (HCC)     Hyperlipidemia     Hypertension          Manuals    Lumbar  + L ES STM            Lumbar distraction pball            Neuro Re-Ed            Bridges 20# 3x10 20#  3x10 20# 3x12 20# 3x10  20# 3x10 20# 3x10 20#  3x10 20# 3x10   clamshells Pink TB  3x10 B Pink TB  3x10 B Pink 3x10 B Pink 3x10 B  3x10 ea bw 3x10 ea bw 3x10 ea bw 3x10 ea bw   Sidesteps 1.5# 5 laps table 1.5# 5 laps  mirror 2# 5 laps table 2# 5 laps table     1.5# 5 laps table   TA heel slide            TA leg exts            Curl ups half hooklying      [id number] 2x10     Mini squats            LAQ            Standing plank with alt arm/leg exts                        Ther Ex            Bike - strength/endurance 5'lv 1 5' L1 5'  5'  5' lvl 1 5' lv 1 5' lv 1 5'lv 1   Heel taps            Standing hip ext L ONLY 1.5# 3x10 B 1.5#  3x10 B 2# 3x10 B 2# 3x10 B   3x10 ea   1.5# 2x10   Standing hip abd L ONLY 1.5# 3x10 B 1.5#  3x10 B 2# 3x10 B  2# 3x10 B   3x10 ea   1.5# 2x10   TA marches            TA leg exts            Thoracic ext stretch over  "1/2 foam            Supine pball crushes            Standing pball crushes with marching            PPT      np      PPT clam isos            Paloff press            TA+TB low rows       Prpl 2x10 Prpl 2x10    TB horizontal abd       Prpl 2x10 Prpl 2x10    TB lat pull downs       Yellow 2x10 Yellow 2x10    TA + TB mid rows            No Moneys GTB  3x10 GTB  3x10      GTB  2x10    CC lat stretch      np      Lumbar FLX/RSB rolls 10x5\" ea 3 way 10x5\" ea  3-way 10x\" ea 3 way 10x\" ea 3 way  10x10s 10x20s 5'', 10x ea  3 way 10x5\" ea 3 way   Seated hamstring stretch      5x20s L      Ther Activity  5/16          STS Chair 3x10 12# Chair  3x10  12# Chair 3x12 12# Chair 3x12 12#  np Chair 3x10 12# Chair 3x10 12# Chair 3x10 12#   Step ups L ONLY 4\" 2x10 4\" 2x10 4\" 3x10 6''  3x10  4\" 3x10 4\" 2x10 4\" 2x10 4\" 2x10   Standing marches            Leg press            Lateral step ups L only 4\" 2x10 4\" 2x10 4\" 3x10 6''   3x10  4\" 2x10 4\" 2x10 4\" 2x10    Pt edu            Gait Training                                    Modalities                                                 "

## 2025-05-30 ENCOUNTER — OFFICE VISIT (OUTPATIENT)
Dept: PHYSICAL THERAPY | Facility: REHABILITATION | Age: 65
End: 2025-05-30
Attending: FAMILY MEDICINE
Payer: MEDICARE

## 2025-05-30 DIAGNOSIS — M54.42 CHRONIC LEFT-SIDED LOW BACK PAIN WITH LEFT-SIDED SCIATICA: ICD-10-CM

## 2025-05-30 DIAGNOSIS — G89.29 CHRONIC LEFT-SIDED LOW BACK PAIN WITH LEFT-SIDED SCIATICA: ICD-10-CM

## 2025-05-30 DIAGNOSIS — M25.552 LEFT HIP PAIN: Primary | ICD-10-CM

## 2025-05-30 PROCEDURE — 97530 THERAPEUTIC ACTIVITIES: CPT | Performed by: PHYSICAL THERAPIST

## 2025-05-30 PROCEDURE — 97112 NEUROMUSCULAR REEDUCATION: CPT | Performed by: PHYSICAL THERAPIST

## 2025-05-30 PROCEDURE — 97110 THERAPEUTIC EXERCISES: CPT | Performed by: PHYSICAL THERAPIST

## 2025-05-30 NOTE — PROGRESS NOTES
Daily Note     Today's date: 2025  Patient name: Roxana Ventura  : 1960  MRN: [id number]  Referring provider: Vika Baer MD  Dx:   Encounter Diagnosis     ICD-10-CM    1. Left hip pain  M25.552       2. Chronic left-sided low back pain with left-sided sciatica  M54.42     G89.29           Start Time: 1137  Stop Time: 1218  Total time in clinic (min): 41 minutes    Subjective: Roxana offers no new complaints since previous session.       Objective: See treatment diary below      Assessment: Tolerated treatment well. Patient demonstrated appropriate level of challenge and muscular fatigue throughout session and noted good status at end of visit. Patient demonstrated fatigue post treatment, exhibited good technique with therapeutic exercises, and would benefit from continued PT.      Plan: Continue per plan of care.  Progress treatment as tolerated.       Precautions: B TKA   Past Medical History:   Diagnosis Date    Arthritis     Diabetes mellitus (HCC)     Hyperlipidemia     Hypertension          Manuals    Lumbar  + L ES STM            Lumbar distraction pball            Neuro Re-Ed            Bridges 20# 3x10 20#  3x10 20# 3x12 20# 3x10 20# 3x10  20# 3x10 20#  3x10 20# 3x10   clamshells Pink TB  3x10 B Pink TB  3x10 B Pink 3x10 B Pink 3x10 B Pink 3x10 B  3x10 ea bw 3x10 ea bw 3x10 ea bw   Sidesteps 1.5# 5 laps table 1.5# 5 laps  mirror 2# 5 laps table 2# 5 laps table 2# 5 laps table    1.5# 5 laps table   TA heel slide            TA leg exts            Curl ups half hooklying       2x10     Mini squats            LAQ            Standing plank with alt arm/leg exts                        Ther Ex            Bike - strength/endurance 5'lv 1 5' L1 5'  5' 5' lvl 1  5' lv 1 5' lv 1 5'lv 1   Heel taps            Standing hip ext L ONLY 1.5# 3x10 B 1.5#  3x10 B 2# 3x10 B 2# 3x10 B  2# 3x10 B     1.5# 2x10   Standing hip abd L ONLY 1.5# 3x10 B  "1.5#  3x10 B 2# 3x10 B  2# 3x10 B  2# 3x10 B     1.5# 2x10   TA marches            TA leg exts            Thoracic ext stretch over 1/2 foam            Supine pball crushes            Standing pball crushes with marching            PPT            PPT clam isos            Paloff press            TA+TB low rows       Prpl 2x10 Prpl 2x10    TB horizontal abd       Prpl 2x10 Prpl 2x10    TB lat pull downs       Yellow 2x10 Yellow 2x10    TA + TB mid rows            No Moneys GTB  3x10 GTB  3x10      GTB  2x10    CC lat stretch            Lumbar FLX/RSB rolls 10x5\" ea 3 way 10x5\" ea  3-way 10x\" ea 3 way 10x\" ea 3 way 10x5\" 3 way  10x20s 5'', 10x ea  3 way 10x5\" ea 3 way   Seated hamstring stretch            Ther Activity  5/16          STS Chair 3x10 12# Chair  3x10  12# Chair 3x12 12# Chair 3x12 12# Chair 3x12 12#  Chair 3x10 12# Chair 3x10 12# Chair 3x10 12#   Step ups L ONLY 4\" 2x10 4\" 2x10 4\" 3x10 6''  3x10 6''  3x10  4\" 2x10 4\" 2x10 4\" 2x10   Standing marches            Leg press            Lateral step ups L only 4\" 2x10 4\" 2x10 4\" 3x10 6''   3x10 6''  3x10  4\" 2x10 4\" 2x10    Pt edu            Gait Training                                    Modalities                                                   "

## 2025-06-03 ENCOUNTER — OFFICE VISIT (OUTPATIENT)
Dept: PHYSICAL THERAPY | Facility: REHABILITATION | Age: 65
End: 2025-06-03
Attending: FAMILY MEDICINE
Payer: MEDICARE

## 2025-06-03 DIAGNOSIS — M25.552 LEFT HIP PAIN: Primary | ICD-10-CM

## 2025-06-03 DIAGNOSIS — G89.29 CHRONIC LEFT-SIDED LOW BACK PAIN WITH LEFT-SIDED SCIATICA: ICD-10-CM

## 2025-06-03 DIAGNOSIS — M54.42 CHRONIC LEFT-SIDED LOW BACK PAIN WITH LEFT-SIDED SCIATICA: ICD-10-CM

## 2025-06-03 PROCEDURE — 97112 NEUROMUSCULAR REEDUCATION: CPT

## 2025-06-03 PROCEDURE — 97110 THERAPEUTIC EXERCISES: CPT

## 2025-06-03 PROCEDURE — 97530 THERAPEUTIC ACTIVITIES: CPT

## 2025-06-03 NOTE — PROGRESS NOTES
Daily Note     Today's date: 6/3/2025  Patient name: Roxana Ventura  : 1960  MRN: [id number]  Referring provider: Vika Baer MD  Dx:   Encounter Diagnosis     ICD-10-CM    1. Left hip pain  M25.552       2. Chronic left-sided low back pain with left-sided sciatica  M54.42     G89.29                      Subjective: patient noted no new complaints.       Objective: See treatment diary below      Assessment: Tolerated treatment fair. Patient exhibited good technique with therapeutic exercises and would benefit from continued PT. Patient performed exercises with appropriate level of challenge.       Plan: Continue per plan of care.      Precautions: B TKA   Past Medical History:   Diagnosis Date    Arthritis     Diabetes mellitus (HCC)     Hyperlipidemia     Hypertension          Manuals 5/13 5/16 5/20 5/27 5/30 6/3  5/6 5/8   Lumbar  + L ES STM            Lumbar distraction pball            Neuro Re-Ed            Bridges 20# 3x10 20#  3x10 20# 3x12 20# 3x10 20# 3x10 20# 3 x 10  20#  3x10 20# 3x10   clamshells Pink TB  3x10 B Pink TB  3x10 B Pink 3x10 B Pink 3x10 B Pink 3x10 B Pink 3 x 10 B   3x10 ea bw 3x10 ea bw   Sidesteps 1.5# 5 laps table 1.5# 5 laps  mirror 2# 5 laps table 2# 5 laps table 2# 5 laps table 2# 5 laps handrail   1.5# 5 laps table   TA heel slide            TA leg exts            Curl ups half hooklying            Mini squats            LAQ            Standing plank with alt arm/leg exts                        Ther Ex            Bike - strength/endurance 5'lv 1 5' L1 5'  5' 5' lvl 1 5' lvl 1  5' lv 1 5'lv 1   Heel taps            Standing hip ext L ONLY 1.5# 3x10 B 1.5#  3x10 B 2# 3x10 B 2# 3x10 B  2# 3x10 B  2# 3 x 10 B    1.5# 2x10   Standing hip abd L ONLY 1.5# 3x10 B 1.5#  3x10 B 2# 3x10 B  2# 3x10 B  2# 3x10 B  2# 3 x 10 B   1.5# 2x10   TA marches            TA leg exts            Thoracic ext stretch over 1/2 foam            Supine pball crushes           "  Standing pball crushes with marching            PPT            PPT clam isos            Paloff press            TA+TB low rows        Prpl 2x10    TB horizontal abd        Prpl 2x10    TB lat pull downs        Yellow 2x10    TA + TB mid rows            No Moneys GTB  3x10 GTB  3x10      GTB  2x10    CC lat stretch            Lumbar FLX/RSB rolls 10x5\" ea 3 way 10x5\" ea  3-way 10x\" ea 3 way 10x\" ea 3 way 10x5\" 3 way 10 x 5'' 3 way   5'', 10x ea  3 way 10x5\" ea 3 way   Seated hamstring stretch            Ther Activity  5/16          STS Chair 3x10 12# Chair  3x10  12# Chair 3x12 12# Chair 3x12 12# Chair 3x12 12# Chair 3 x 12 12#  Chair 3x10 12# Chair 3x10 12#   Step ups L ONLY 4\" 2x10 4\" 2x10 4\" 3x10 6''  3x10 6''  3x10 6\" 3 x 10  4\" 2x10 4\" 2x10   Standing marches            Leg press            Lateral step ups L only 4\" 2x10 4\" 2x10 4\" 3x10 6''   3x10 6''  3x10 6\"  3 x 10   4\" 2x10    Pt edu            Gait Training                                    Modalities                                                     "

## 2025-06-06 ENCOUNTER — OFFICE VISIT (OUTPATIENT)
Dept: PHYSICAL THERAPY | Facility: REHABILITATION | Age: 65
End: 2025-06-06
Attending: FAMILY MEDICINE
Payer: MEDICARE

## 2025-06-06 DIAGNOSIS — M25.552 LEFT HIP PAIN: Primary | ICD-10-CM

## 2025-06-06 DIAGNOSIS — M54.42 CHRONIC LEFT-SIDED LOW BACK PAIN WITH LEFT-SIDED SCIATICA: ICD-10-CM

## 2025-06-06 DIAGNOSIS — G89.29 CHRONIC LEFT-SIDED LOW BACK PAIN WITH LEFT-SIDED SCIATICA: ICD-10-CM

## 2025-06-06 PROCEDURE — 97110 THERAPEUTIC EXERCISES: CPT | Performed by: PHYSICAL THERAPIST

## 2025-06-06 PROCEDURE — 97530 THERAPEUTIC ACTIVITIES: CPT | Performed by: PHYSICAL THERAPIST

## 2025-06-06 NOTE — PROGRESS NOTES
Daily Note     Today's date: 2025  Patient name: Roxana Ventura  : 1960  MRN: [id number]  Referring provider: Vika Baer MD  Dx:   Encounter Diagnosis     ICD-10-CM    1. Left hip pain  M25.552       2. Chronic left-sided low back pain with left-sided sciatica  M54.42     G89.29           Start Time: 1118  Stop Time: 1201  Total time in clinic (min): 43 minutes    Subjective: Patient reports the left leg is feeling a little better.      Objective: See treatment diary below      Assessment: Tolerated treatment well. Patient demonstrated fatigue post treatment, exhibited good technique with therapeutic exercises, and would benefit from continued PT. Progressed BLE strengthening this visit with increased resistance and repetitions as well as additional challenge to closed chain single leg strengthening. Patient does demonstrate improved tolerance to exercise compared to previous sessions.      Plan: Continue per plan of care.      Precautions: B TKA   Past Medical History:   Diagnosis Date    Arthritis     Diabetes mellitus (HCC)     Hyperlipidemia     Hypertension          Manuals 5/13 5/16 5/20 5/27 5/30 6/3 6/6     Lumbar  + L ES STM            Lumbar distraction pball            Neuro Re-Ed            Bridges 20# 3x10 20#  3x10 20# 3x12 20# 3x10 20# 3x10 20# 3 x 10      clamshells Pink TB  3x10 B Pink TB  3x10 B Pink 3x10 B Pink 3x10 B Pink 3x10 B Pink 3 x 10 B       Sidesteps 1.5# 5 laps table 1.5# 5 laps  mirror 2# 5 laps table 2# 5 laps table 2# 5 laps table 2# 5 laps handrail 2.5# 6 laps handrail     TA heel slide            TA leg exts            Curl ups half hooklying            Mini squats            LAQ            Standing plank with alt arm/leg exts                        Ther Ex            Bike - strength/endurance 5'lv 1 5' L1 5'  5' 5' lvl 1 5' lvl 1 5' lv 1     Heel taps            Standing hip ext L ONLY 1.5# 3x10 B 1.5#  3x10 B 2# 3x10 B 2# 3x10 B  2# 3x10 B  2#  "3 x 10 B  2.5# 3x12 B     Standing hip abd L ONLY 1.5# 3x10 B 1.5#  3x10 B 2# 3x10 B  2# 3x10 B  2# 3x10 B  2# 3 x 10 B 2.5# 3x12 B     Paloff press            TA+TB low rows            TB horizontal abd            TB lat pull downs            TA + TB mid rows            No Moneys GTB  3x10 GTB  3x10          CC lat stretch            Lumbar FLX/RSB rolls 10x5\" ea 3 way 10x5\" ea  3-way 10x\" ea 3 way 10x\" ea 3 way 10x5\" 3 way 10 x 5'' 3 way  10x5\" 3 way     Seated hamstring stretch            Ther Activity  5/16          Leg press SL       55# 3x10 ea     STS Chair 3x10 12# Chair  3x10  12# Chair 3x12 12# Chair 3x12 12# Chair 3x12 12# Chair 3 x 12 12# 12# pick ups 3x10     SL STS from elevated table       10x ea     Step ups L ONLY 4\" 2x10 4\" 2x10 4\" 3x10 6''  3x10 6''  3x10 6\" 3 x 10 6\" 3x10     Lateral step ups L only 4\" 2x10 4\" 2x10 4\" 3x10 6''   3x10 6''  3x10 6\"  3 x 10  6\" 3x10     Pt edu            Gait Training                                    Modalities                                                       "

## 2025-06-10 ENCOUNTER — OFFICE VISIT (OUTPATIENT)
Dept: PHYSICAL THERAPY | Facility: REHABILITATION | Age: 65
End: 2025-06-10
Attending: FAMILY MEDICINE
Payer: MEDICARE

## 2025-06-10 DIAGNOSIS — M54.42 CHRONIC LEFT-SIDED LOW BACK PAIN WITH LEFT-SIDED SCIATICA: ICD-10-CM

## 2025-06-10 DIAGNOSIS — M25.552 LEFT HIP PAIN: Primary | ICD-10-CM

## 2025-06-10 DIAGNOSIS — G89.29 CHRONIC LEFT-SIDED LOW BACK PAIN WITH LEFT-SIDED SCIATICA: ICD-10-CM

## 2025-06-10 PROCEDURE — 97110 THERAPEUTIC EXERCISES: CPT

## 2025-06-10 PROCEDURE — 97530 THERAPEUTIC ACTIVITIES: CPT

## 2025-06-10 NOTE — PROGRESS NOTES
Daily Note     Today's date: 6/10/2025  Patient name: Roxana Ventura  : 1960  MRN: [id number]  Referring provider: Vika Baer MD  Dx:   Encounter Diagnosis     ICD-10-CM    1. Left hip pain  M25.552       2. Chronic left-sided low back pain with left-sided sciatica  M54.42     G89.29                      Subjective: Patient reports doing well at this time, no new complaints.       Objective: See treatment diary below      Assessment: Pt fatigued at end of tx, progress as pt is able to tolerate.       Plan: Continue per plan of care.      Precautions: B TKA   Past Medical History:   Diagnosis Date    Arthritis     Diabetes mellitus (HCC)     Hyperlipidemia     Hypertension          Manuals 5/13 5/16 5/20 5/27 5/30 6/3 6/6 6/10    Lumbar  + L ES STM            Lumbar distraction pball            Neuro Re-Ed            Bridges 20# 3x10 20#  3x10 20# 3x12 20# 3x10 20# 3x10 20# 3 x 10      clamshells Pink TB  3x10 B Pink TB  3x10 B Pink 3x10 B Pink 3x10 B Pink 3x10 B Pink 3 x 10 B       Sidesteps 1.5# 5 laps table 1.5# 5 laps  mirror 2# 5 laps table 2# 5 laps table 2# 5 laps table 2# 5 laps handrail 2.5# 6 laps handrail 2.5# 6 laps handrail    TA heel slide            TA leg exts            Curl ups half hooklying            Mini squats            LAQ            Standing plank with alt arm/leg exts                        Ther Ex            Bike - strength/endurance 5'lv 1 5' L1 5'  5' 5' lvl 1 5' lvl 1 5' lv 1 5' Lv 1    Heel taps            Standing hip ext L ONLY 1.5# 3x10 B 1.5#  3x10 B 2# 3x10 B 2# 3x10 B  2# 3x10 B  2# 3 x 10 B  2.5# 3x12 B 2.5# 3x12 B    Standing hip abd L ONLY 1.5# 3x10 B 1.5#  3x10 B 2# 3x10 B  2# 3x10 B  2# 3x10 B  2# 3 x 10 B 2.5# 3x12 B 2.5# 3x12 B    Paloff press            TA+TB low rows            TB horizontal abd            TB lat pull downs            TA + TB mid rows            No Moneys GTB  3x10 GTB  3x10          CC lat stretch            Lumbar  "FLX/RSB rolls 10x5\" ea 3 way 10x5\" ea  3-way 10x\" ea 3 way 10x\" ea 3 way 10x5\" 3 way 10 x 5'' 3 way  10x5\" 3 way 10x5\" 3 way    Seated hamstring stretch            Ther Activity  5/16          Leg press SL       55# 3x10 ea 55# 3x10 ea    STS Chair 3x10 12# Chair  3x10  12# Chair 3x12 12# Chair 3x12 12# Chair 3x12 12# Chair 3 x 12 12# 12# pick ups 3x10 12# 3x10    SL STS from elevated table       10x ea 10x ea    Step ups L ONLY 4\" 2x10 4\" 2x10 4\" 3x10 6''  3x10 6''  3x10 6\" 3 x 10 6\" 3x10 6\" 3x10    Lateral step ups L only 4\" 2x10 4\" 2x10 4\" 3x10 6''   3x10 6''  3x10 6\"  3 x 10  6\" 3x10 6\" 3x10    Pt edu            Gait Training                                    Modalities                                                         "

## 2025-06-13 ENCOUNTER — EVALUATION (OUTPATIENT)
Dept: PHYSICAL THERAPY | Facility: REHABILITATION | Age: 65
End: 2025-06-13
Attending: FAMILY MEDICINE
Payer: MEDICARE

## 2025-06-13 DIAGNOSIS — M54.42 CHRONIC LEFT-SIDED LOW BACK PAIN WITH LEFT-SIDED SCIATICA: ICD-10-CM

## 2025-06-13 DIAGNOSIS — G89.29 CHRONIC LEFT-SIDED LOW BACK PAIN WITH LEFT-SIDED SCIATICA: ICD-10-CM

## 2025-06-13 DIAGNOSIS — M25.552 LEFT HIP PAIN: Primary | ICD-10-CM

## 2025-06-13 PROCEDURE — 97110 THERAPEUTIC EXERCISES: CPT | Performed by: PHYSICAL THERAPIST

## 2025-06-13 PROCEDURE — 97530 THERAPEUTIC ACTIVITIES: CPT | Performed by: PHYSICAL THERAPIST

## 2025-06-13 NOTE — PROGRESS NOTES
PT Re-Evaluation     Today's date: 2025  Patient name: Roxana Ventura  : 1960  MRN: [id number]  Referring provider: Vika Baer MD  Dx:   Encounter Diagnosis     ICD-10-CM    1. Left hip pain  M25.552       2. Chronic left-sided low back pain with left-sided sciatica  M54.42     G89.29                   Start Time: 1056  Stop Time: 1140  Total time in clinic (min): 44 minutes    Assessment  Impairments: abnormal coordination, abnormal gait, abnormal muscle firing, abnormal or restricted ROM, abnormal movement, activity intolerance, impaired balance, impaired physical strength, lacks appropriate home exercise program, pain with function and weight-bearing intolerance    Assessment details: RE performed on 25  Roxana has made the following improvements since beginning PT: decreased pain, increased strength, and increased tolerance to activities. Roxana has made objective improvements in lumbar range of motion, BLE strength and decreased pain. Since last re-evaluation she demonstrates very mild improvements in LLE strength but continues to demonstrate weakness limiting her from prolonged ambulation, stair negotiation and lifting/carrying. Secondary to MRI findings as listed below and true radiculopathy, will require skilled maintenance for continued gains in terms of neuroplasticity and maintenance of current gains. Roxana continues to present with the impairments as listed above. Patient would continue to benefit from skilled PT to address these deficits and to maximize function.       Barriers to therapy: Language barrier  Understanding of Dx/Px/POC: good     Prognosis: good    Goals  STG 4-6 weeks  1. Patient will display decreased pain <7/10 with ADLs. - met  2. Patient will display lumbar ROM WNL. - met      LTG 6-12 weeks  1. Patient will be able to stand for 30 minutes without pain upon discharge - progressed  2. Patient will be able to walk for 30 minutes without pain upon  discharge - progressed  3. Patient will be pick an object up off the floor without pain upon discharge - met  4. Patient will display core strength WNL. - progressed  5. Patient will demonstrate at least 3+/5 LLE strength. - progressed        Plan  Planned modality interventions: cryotherapy and thermotherapy: hydrocollator packs    Planned therapy interventions: abdominal trunk stabilization, activity modification, ADL training, balance, balance/weight bearing training, body mechanics training, joint mobilization, manual therapy, massage, motor coordination training, neuromuscular re-education, patient education, postural training, self care, strengthening, stretching, therapeutic activities, therapeutic exercise, transfer training, home exercise program, graded activity, graded exercise, functional ROM exercises and flexibility    Frequency: 2x week  Duration in weeks: 8  Plan of Care beginning date: 6/13/2025  Plan of Care expiration date: 8/8/2025  Treatment plan discussed with: patient and family        Subjective Evaluation    History of Present Illness  Mechanism of injury: RE performed on 06/13/25  Roxana has been seen for a total of 45 visits for OP PT for chronic low back pain with left sided sciatica. Patient reports improvements in low back and mid back pain, however can have good days and bad days. Her main complaint at this time is weakness in the left leg, however she notes this has had gradual improvements since beginning PT as she can lift her foot better. Patient's daughter reports patient is fairly sedentary at home and struggles with chronic constipation which may affect her energy levels.      RE performed on 03/25/25  Roxana has been seen for a total of 24 visits for OP PT for chronic low back pain and left leg pain. Patient reports she feels much better since beginning PT including a decrease in back pain. She reports seeing MD recently and was told she needs a lot of therapy to continue  working on strength and walking normally. Patient would like to continue to work on back pain and feels the left low back is continuing to limit her walking. She does not have pain down the left leg but feels numbness and difficulty moving the leg limiting her walking.      24  Patient is a 63 yo female with complaints of chronic low back pain. Patient received PT for this issue in 2024 but stopped secondary to traveling out of the country. She is now resuming PT. Patient reports the exercises in PT helped a little bit but has not been able to manage the pain at home and feels it has worsened. She feels pain in the central mid to low back. She reports no pain down the left leg but a lot of weakness in the leg with difficulty lifting it. She feels after standing for a long time her back starts to bend forward.     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 Resultant mild to moderate spinal canal narrowing most prominent at L3-L4. Multilevel foraminal narrowing most prominent on the left at L4-L5 (moderate to severe) and the right at L5-S1. Details   above.     -Multilevel facet arthropathy most prominent at involving the lower right lumbar spine. Mild edema involving the right L4-L5 facets with adjacent joint effusion which may be degenerative. Clinical correlation advised including ESR and CRP if there is concern for underlying infection. Further post contrast imaging can be obtained if clinically indicated.            Not a recurrent problem   Quality of life: fair    Patient Goals  Patient goals for therapy: decreased pain, increased motion, increased strength, independence with ADLs/IADLs and return to sport/leisure activities  Patient goal: calm the pain in back and strengthen left leg  Pain  Current pain ratin  At best pain ratin  At worst pain ratin  Quality: sharp, radiating and tight  Relieving factors: change in position and medications (lumbra  "extension in the morning; Tylenol and Diclofenac prn)  Aggravating factors: lifting, sitting, standing and walking (laying down, lifting legs, bending forward or backward)    Social Support  Steps to enter house: yes    Employment status: not working    Diagnostic Tests  MRI studies: abnormal  Treatments  Previous treatment: physical therapy  Current treatment: chiropractic and massage        Objective     Postural Observations  Seated posture: poor  Standing posture: poor      Palpation   Left   Hypertonic in the erector spinae.   Tenderness of the erector spinae.     Right   Hypertonic in the erector spinae.     Tenderness     Left Hip   No tenderness in the PSIS.     Right Hip   No tenderness in the PSIS.     Active Range of Motion   Cervical/Thoracic Spine       Thoracic    Extension: 10 degrees        Lumbar   Flexion:  WFL  Extension:  WFL  Left lateral flexion:  WFL  Right lateral flexion:  WFL    Additional Active Range of Motion Details  Flexion 75% - pain and weakness with reversing into extension - walking hands up thighs     Passive Range of Motion   Left Hip   Normal passive range of motion    Right Hip   Normal passive range of motion    Joint Play     Hypomobile: T8, T9, T10, T11 and T12     Pain: L1, L2, L3 and L4     Strength/Myotome Testing     Left Hip   Planes of Motion   Flexion: 2+  Extension: 3  Abduction: 3+  External rotation: 3+    Right Hip   Planes of Motion   Flexion: 4  Extension: 3+  Abduction: 4    Left Knee   Flexion: 4+  Extension: 4+    Right Knee   Flexion: 5  Extension: 5    Left Ankle/Foot   Dorsiflexion: 4+    Right Ankle/Foot   Dorsiflexion: 5    Tests     Lumbar     Left   Negative passive SLR.     Right   Negative passive SLR.     Left Hip   90/90 SLR: Positive.     Right Hip   90/90 SLR: Positive.     General Comments:      Lumbar Comments  30\"STS: 7 reps      Flowsheet Rows      Flowsheet Row Most Recent Value   PT/OT G-Codes    Current Score 36   Projected Score 49      " "         Precautions: B TKA   Past Medical History:   Diagnosis Date    Arthritis     Diabetes mellitus (HCC)     Hyperlipidemia     Hypertension          Manuals 5/13 5/16 5/20 5/27 5/30 6/3 6/6 6/10 6/13   Lumbar  + L ES STM            Lumbar distraction pball            Neuro Re-Ed  5/16          Bridges 20# 3x10 20#  3x10 20# 3x12 20# 3x10 20# 3x10 20# 3 x 10      clamshells Pink TB  3x10 B Pink TB  3x10 B Pink 3x10 B Pink 3x10 B Pink 3x10 B Pink 3 x 10 B       Sidesteps 1.5# 5 laps table 1.5# 5 laps  mirror 2# 5 laps table 2# 5 laps table 2# 5 laps table 2# 5 laps handrail 2.5# 6 laps handrail 2.5# 6 laps handrail 3# 3 laps handrail   TA heel slide            TA leg exts            Curl ups half hooklying            Mini squats            LAQ            Standing plank with alt arm/leg exts                        Ther Ex  5/16          Bike - strength/endurance 5'lv 1 5' L1 5'  5' 5' lvl 1 5' lvl 1 5' lv 1 5' Lv 1 5'   Heel taps            Standing hip ext L ONLY 1.5# 3x10 B 1.5#  3x10 B 2# 3x10 B 2# 3x10 B  2# 3x10 B  2# 3 x 10 B  2.5# 3x12 B 2.5# 3x12 B 3# 3x10 B   Standing hip abd L ONLY 1.5# 3x10 B 1.5#  3x10 B 2# 3x10 B  2# 3x10 B  2# 3x10 B  2# 3 x 10 B 2.5# 3x12 B 2.5# 3x12 B 3# 3x10 B   Standing alt marches         3# 2x10   Paloff press            TA+TB low rows            TB horizontal abd            TB lat pull downs            TA + TB mid rows            No Moneys GTB  3x10 GTB  3x10          CC lat stretch            Lumbar FLX/RSB rolls 10x5\" ea 3 way 10x5\" ea  3-way 10x\" ea 3 way 10x\" ea 3 way 10x5\" 3 way 10 x 5'' 3 way  10x5\" 3 way 10x5\" 3 way 10x5\" 3 way   Seated hamstring stretch            Ther Activity  5/16          Leg press SL       55# 3x10 ea 55# 3x10 ea 55# 3x10 ea   STS Chair 3x10 12# Chair  3x10  12# Chair 3x12 12# Chair 3x12 12# Chair 3x12 12# Chair 3 x 12 12# 12# pick ups 3x10 12# 3x10    SL STS from elevated table       10x ea 10x ea 2x10 ea   Step ups L ONLY 4\" 2x10 4\" 2x10 4\" " "3x10 6''  3x10 6''  3x10 6\" 3 x 10 6\" 3x10 6\" 3x10 6\" 3x12   Lateral step ups L only 4\" 2x10 4\" 2x10 4\" 3x10 6''   3x10 6''  3x10 6\"  3 x 10  6\" 3x10 6\" 3x10 6\" 3x12   Pt edu            Gait Training                                    Modalities                                                 "

## 2025-06-17 ENCOUNTER — APPOINTMENT (OUTPATIENT)
Dept: PHYSICAL THERAPY | Facility: REHABILITATION | Age: 65
End: 2025-06-17
Attending: FAMILY MEDICINE
Payer: MEDICARE

## 2025-06-19 ENCOUNTER — APPOINTMENT (OUTPATIENT)
Dept: PHYSICAL THERAPY | Facility: REHABILITATION | Age: 65
End: 2025-06-19
Attending: FAMILY MEDICINE
Payer: MEDICARE

## 2025-06-20 ENCOUNTER — OFFICE VISIT (OUTPATIENT)
Dept: PHYSICAL THERAPY | Facility: REHABILITATION | Age: 65
End: 2025-06-20
Attending: FAMILY MEDICINE
Payer: MEDICARE

## 2025-06-20 DIAGNOSIS — G89.29 CHRONIC LEFT-SIDED LOW BACK PAIN WITH LEFT-SIDED SCIATICA: Primary | ICD-10-CM

## 2025-06-20 DIAGNOSIS — M54.42 CHRONIC LEFT-SIDED LOW BACK PAIN WITH LEFT-SIDED SCIATICA: Primary | ICD-10-CM

## 2025-06-20 DIAGNOSIS — M25.552 LEFT HIP PAIN: ICD-10-CM

## 2025-06-20 PROCEDURE — 97110 THERAPEUTIC EXERCISES: CPT

## 2025-06-20 PROCEDURE — 97530 THERAPEUTIC ACTIVITIES: CPT

## 2025-06-20 NOTE — PROGRESS NOTES
Daily Note     Today's date: 2025  Patient name: Roxana Ventura  : 1960  MRN: [id number]  Referring provider: Vika Baer MD  Dx:   Encounter Diagnosis     ICD-10-CM    1. Chronic left-sided low back pain with left-sided sciatica  M54.42     G89.29       2. Left hip pain  M25.552                      Subjective: Patient reports stiffness in her hip.       Objective: See treatment diary below      Assessment: Patient doing well with charted program. She is appropriately challenged with current resistance and reps. One LOB she self recovered when attempting to rise from chair and amb without cane. Patient demonstrated fatigue post treatment and would benefit from continued PT      Plan: Progress treatment as tolerated.       Precautions: B TKA   Past Medical History:   Diagnosis Date    Arthritis     Diabetes mellitus (HCC)     Hyperlipidemia     Hypertension          Manuals 6/20   5/27 5/30 6/3 6/6 6/10 6/13   Lumbar  + L ES STM            Lumbar distraction pball            Neuro Re-Ed            Bridges    20# 3x10 20# 3x10 20# 3 x 10      clamshells    Pink 3x10 B Pink 3x10 B Pink 3 x 10 B       Sidesteps 3# 3 laps handrail    2# 5 laps table 2# 5 laps table 2# 5 laps handrail 2.5# 6 laps handrail 2.5# 6 laps handrail 3# 3 laps handrail   TA heel slide            TA leg exts            Curl ups half hooklying            Mini squats            LAQ            Standing plank with alt arm/leg exts                        Ther Ex            Bike - strength/endurance 5'    5' 5' lvl 1 5' lvl 1 5' lv 1 5' Lv 1 5'   Heel taps            Standing hip ext  3# 3x10 B   2# 3x10 B  2# 3x10 B  2# 3 x 10 B  2.5# 3x12 B 2.5# 3x12 B 3# 3x10 B   Standing hip abd  3# 3x10 B   2# 3x10 B  2# 3x10 B  2# 3 x 10 B 2.5# 3x12 B 2.5# 3x12 B 3# 3x10 B   Standing alt marches 3# 3x10 B        3# 2x10   Paloff press            TA+TB low rows            TB horizontal abd            TB lat pull downs            TA +  "TB mid rows            No Moneys            CC lat stretch            Lumbar FLX/RSB rolls    10x\" ea 3 way 10x5\" 3 way 10 x 5'' 3 way  10x5\" 3 way 10x5\" 3 way 10x5\" 3 way   Seated hamstring stretch            Ther Activity            Leg press SL 55# 3x10 ea       55# 3x10 ea 55# 3x10 ea 55# 3x10 ea   STS 12# 2x10  Pick ups  2x10    Chair 3x12 12# Chair 3x12 12# Chair 3 x 12 12# 12# pick ups 3x10 12# 3x10    SL STS from elevated table 2x10 ea  Kickstand c/ cane      10x ea 10x ea 2x10 ea   Step ups L ONLY 6\" 3x12   6''  3x10 6''  3x10 6\" 3 x 10 6\" 3x10 6\" 3x10 6\" 3x12   Lateral step ups L only 6\" 3x12    6''   3x10 6''  3x10 6\"  3 x 10  6\" 3x10 6\" 3x10 6\" 3x12   Pt edu            Gait Training                                    Modalities                                                 "

## 2025-06-24 ENCOUNTER — OFFICE VISIT (OUTPATIENT)
Dept: PHYSICAL THERAPY | Facility: REHABILITATION | Age: 65
End: 2025-06-24
Attending: FAMILY MEDICINE
Payer: MEDICARE

## 2025-06-24 DIAGNOSIS — M25.552 LEFT HIP PAIN: ICD-10-CM

## 2025-06-24 DIAGNOSIS — M54.42 CHRONIC LEFT-SIDED LOW BACK PAIN WITH LEFT-SIDED SCIATICA: Primary | ICD-10-CM

## 2025-06-24 DIAGNOSIS — G89.29 CHRONIC LEFT-SIDED LOW BACK PAIN WITH LEFT-SIDED SCIATICA: Primary | ICD-10-CM

## 2025-06-24 PROCEDURE — 97110 THERAPEUTIC EXERCISES: CPT

## 2025-06-24 PROCEDURE — 97530 THERAPEUTIC ACTIVITIES: CPT | Performed by: PHYSICAL THERAPIST

## 2025-06-24 NOTE — PROGRESS NOTES
Daily Note     Today's date: 2025  Patient name: Roxana Ventura  : 1960  MRN: [id number]  Referring provider: Vika Baer MD  Dx:   Encounter Diagnosis     ICD-10-CM    1. Chronic left-sided low back pain with left-sided sciatica  M54.42     G89.29       2. Left hip pain  M25.552                      Subjective: Patient reports feeling more tired today.       Objective: See treatment diary below      Assessment: Tolerated treatment well. Patient demonstrates greater and quicker fatigue throughout today's session and slower speed with completion of exercises. Patient demonstrated fatigue post treatment, exhibited good technique with therapeutic exercises, and would benefit from continued PT.       Plan: Continue per plan of care.      Precautions: B TKA   Past Medical History:   Diagnosis Date    Arthritis     Diabetes mellitus (HCC)     Hyperlipidemia     Hypertension          Manuals 6/20 6/24  5/27 5/30 6/3 6/6 6/10 6/13   Lumbar  + L ES STM            Lumbar distraction pball            Neuro Re-Ed            Bridges    20# 3x10 20# 3x10 20# 3 x 10      clamshells    Pink 3x10 B Pink 3x10 B Pink 3 x 10 B       Sidesteps 3# 3 laps handrail  3# 3 laps hand rail  2# 5 laps table 2# 5 laps table 2# 5 laps handrail 2.5# 6 laps handrail 2.5# 6 laps handrail 3# 3 laps handrail   TA heel slide            TA leg exts            Curl ups half hooklying            Mini squats            LAQ            Standing plank with alt arm/leg exts                        Ther Ex            Bike - strength/endurance 5'  5'  5' 5' lvl 1 5' lvl 1 5' lv 1 5' Lv 1 5'   Heel taps            Standing hip ext  3# 3x10 B 3# 3 x 10 B  2# 3x10 B  2# 3x10 B  2# 3 x 10 B  2.5# 3x12 B 2.5# 3x12 B 3# 3x10 B   Standing hip abd  3# 3x10 B 3# 3 x 10 B   2# 3x10 B  2# 3x10 B  2# 3 x 10 B 2.5# 3x12 B 2.5# 3x12 B 3# 3x10 B   Standing alt marches 3# 3x10 B 3# 3 x 10 B        3# 2x10   Paloff press            TA+TB low rows      "       TB horizontal abd            TB lat pull downs            TA + TB mid rows            No Moneys            CC lat stretch            Lumbar FLX/RSB rolls    10x\" ea 3 way 10x5\" 3 way 10 x 5'' 3 way  10x5\" 3 way 10x5\" 3 way 10x5\" 3 way   Seated hamstring stretch            Ther Activity            Leg press SL 55# 3x10 ea  55# 3x10 ea     55# 3x10 ea 55# 3x10 ea 55# 3x10 ea   STS 12# 2x10  Pick ups  2x10  12# pick ups 3x10  Chair 3x12 12# Chair 3x12 12# Chair 3 x 12 12# 12# pick ups 3x10 12# 3x10    SL STS from elevated table 2x10 ea  Kickstand c/ cane 10x ea     10x ea 10x ea 2x10 ea   Step ups L ONLY 6\" 3x12 6\" 3x12   6''  3x10 6''  3x10 6\" 3 x 10 6\" 3x10 6\" 3x10 6\" 3x12   Lateral step ups L only 6\" 3x12  6\" 3x12  6''   3x10 6''  3x10 6\"  3 x 10  6\" 3x10 6\" 3x10 6\" 3x12   Pt edu            Gait Training                                    Modalities                                                   "

## 2025-06-27 ENCOUNTER — OFFICE VISIT (OUTPATIENT)
Dept: PHYSICAL THERAPY | Facility: REHABILITATION | Age: 65
End: 2025-06-27
Attending: FAMILY MEDICINE
Payer: MEDICARE

## 2025-06-27 DIAGNOSIS — M54.42 CHRONIC LEFT-SIDED LOW BACK PAIN WITH LEFT-SIDED SCIATICA: Primary | ICD-10-CM

## 2025-06-27 DIAGNOSIS — M25.552 LEFT HIP PAIN: ICD-10-CM

## 2025-06-27 DIAGNOSIS — G89.29 CHRONIC LEFT-SIDED LOW BACK PAIN WITH LEFT-SIDED SCIATICA: Primary | ICD-10-CM

## 2025-06-27 PROCEDURE — 97140 MANUAL THERAPY 1/> REGIONS: CPT | Performed by: PHYSICAL THERAPIST

## 2025-06-27 PROCEDURE — 97110 THERAPEUTIC EXERCISES: CPT | Performed by: PHYSICAL THERAPIST

## 2025-06-27 NOTE — PROGRESS NOTES
Daily Note     Today's date: 2025  Patient name: Roxana Ventura  : 1960  MRN: [id number]  Referring provider: Vika Baer MD  Dx:   Encounter Diagnosis     ICD-10-CM    1. Chronic left-sided low back pain with left-sided sciatica  M54.42     G89.29       2. Left hip pain  M25.552           Start Time: 1113  Stop Time: 1202  Total time in clinic (min): 49 minutes    Subjective: Patient reports her left leg has been feeling better but notes more stiffness in her mid and low back.      Objective: See treatment diary below      Assessment: Tolerated treatment well. Re-initiated TE with a focus on postural and trunk extensor strengthening. Evident fatigue noted throughout session indicating appropriate dosage of TE. Positive response to manual therapy with patient reporting relief post-treatment. Patient demonstrated fatigue post treatment, exhibited good technique with therapeutic exercises, and would benefit from continued PT.      Plan: Continue per plan of care.      Precautions: B TKA   Past Medical History:   Diagnosis Date    Arthritis     Diabetes mellitus (HCC)     Hyperlipidemia     Hypertension          Manuals 6/20 6/24 6/27   6/3 6/6 6/10 6/13   Lumbar  + L ES STM            Lumbar distraction pball            Thoracic/Lumbar ES STM + soft cupping   Done         Neuro Re-Ed            Bridges      20# 3 x 10      clamshells      Pink 3 x 10 B       Sidesteps 3# 3 laps handrail  3# 3 laps hand rail    2# 5 laps handrail 2.5# 6 laps handrail 2.5# 6 laps handrail 3# 3 laps handrail   TA heel slide            TA leg exts            Curl ups half hooklying            Mini squats            LAQ            Standing plank with alt arm/leg exts   10x                     Ther Ex            Bike - strength/endurance 5'  5' 5'   5' lvl 1 5' lv 1 5' Lv 1 5'   Heel taps            Standing hip ext  3# 3x10 B 3# 3 x 10 B    2# 3 x 10 B  2.5# 3x12 B 2.5# 3x12 B 3# 3x10 B   Standing hip abd  3#  "3x10 B 3# 3 x 10 B     2# 3 x 10 B 2.5# 3x12 B 2.5# 3x12 B 3# 3x10 B   Standing alt marches 3# 3x10 B 3# 3 x 10 B        3# 2x10   Paloff press            TA+TB low rows   Prpl 3x10         TB horizontal abd            TB lat pull downs   Prpl 2x10         TA + TB mid rows   Yellow 3x10         No Moneys            CC lat stretch            Lumbar FLX/RSB rolls   10x5\" 3 way   10 x 5'' 3 way  10x5\" 3 way 10x5\" 3 way 10x5\" 3 way   Seated hamstring stretch            Ther Activity            Leg press SL 55# 3x10 ea  55# 3x10 ea     55# 3x10 ea 55# 3x10 ea 55# 3x10 ea   STS 12# 2x10  Pick ups  2x10  12# pick ups 3x10 12# pick ups 3x10   Chair 3 x 12 12# 12# pick ups 3x10 12# 3x10    SL STS from elevated table 2x10 ea  Kickstand c/ cane 10x ea 10x ea    10x ea 10x ea 2x10 ea   Step ups L ONLY 6\" 3x12 6\" 3x12     6\" 3 x 10 6\" 3x10 6\" 3x10 6\" 3x12   Lateral step ups L only 6\" 3x12  6\" 3x12    6\"  3 x 10  6\" 3x10 6\" 3x10 6\" 3x12   Pt edu            Gait Training                                    Modalities                                                     "

## 2025-06-30 ENCOUNTER — OFFICE VISIT (OUTPATIENT)
Dept: PHYSICAL THERAPY | Facility: REHABILITATION | Age: 65
End: 2025-06-30
Attending: FAMILY MEDICINE
Payer: MEDICARE

## 2025-06-30 ENCOUNTER — TELEPHONE (OUTPATIENT)
Age: 65
End: 2025-06-30

## 2025-06-30 DIAGNOSIS — M54.42 CHRONIC LEFT-SIDED LOW BACK PAIN WITH LEFT-SIDED SCIATICA: Primary | ICD-10-CM

## 2025-06-30 DIAGNOSIS — M25.552 LEFT HIP PAIN: ICD-10-CM

## 2025-06-30 DIAGNOSIS — G89.29 CHRONIC LEFT-SIDED LOW BACK PAIN WITH LEFT-SIDED SCIATICA: Primary | ICD-10-CM

## 2025-06-30 PROCEDURE — 97530 THERAPEUTIC ACTIVITIES: CPT

## 2025-06-30 PROCEDURE — 97140 MANUAL THERAPY 1/> REGIONS: CPT

## 2025-06-30 PROCEDURE — 97110 THERAPEUTIC EXERCISES: CPT

## 2025-06-30 NOTE — PROGRESS NOTES
Daily Note     Today's date: 2025  Patient name: Roxana Ventura  : 1960  MRN: [id number]  Referring provider: Vika Baer MD  Dx:   Encounter Diagnosis     ICD-10-CM    1. Chronic left-sided low back pain with left-sided sciatica  M54.42     G89.29       2. Left hip pain  M25.552                      Subjective: Patient noted no new complaints.       Objective: See treatment diary below      Assessment: Tolerated treatment fair. Patient exhibited good technique with therapeutic exercises and would benefit from continued PT. STM helped to decrease fascia restrictions in thoracic lumbar ES. Patient performed exercises with appropriate level of challenge.       Plan: Continue per plan of care.      Precautions: B TKA   Past Medical History:   Diagnosis Date    Arthritis     Diabetes mellitus (HCC)     Hyperlipidemia     Hypertension          Manuals 6/20 6/24 6/27 6/30  6/3 6/6 6/10 6/13   Lumbar  + L ES STM            Lumbar distraction pball            Thoracic/Lumbar ES STM + soft cupping   Done Done         Neuro Re-Ed            Bridges      20# 3 x 10      clamshells      Pink 3 x 10 B       Sidesteps 3# 3 laps handrail  3# 3 laps hand rail    2# 5 laps handrail 2.5# 6 laps handrail 2.5# 6 laps handrail 3# 3 laps handrail   TA heel slide            TA leg exts            Curl ups half hooklying            Mini squats            LAQ            Standing plank with alt arm/leg exts   10x 15x ea                    Ther Ex            Bike - strength/endurance 5'  5' 5' 5'  5' lvl 1 5' lv 1 5' Lv 1 5'   Heel taps            Standing hip ext  3# 3x10 B 3# 3 x 10 B    2# 3 x 10 B  2.5# 3x12 B 2.5# 3x12 B 3# 3x10 B   Standing hip abd  3# 3x10 B 3# 3 x 10 B     2# 3 x 10 B 2.5# 3x12 B 2.5# 3x12 B 3# 3x10 B   Standing alt marches 3# 3x10 B 3# 3 x 10 B        3# 2x10   Paloff press            TA+TB low rows   Prpl 3x10 Yellow 2 x 10        TB horizontal abd            TB lat pull downs   Prpl  "2x10         TA + TB mid rows   Yellow 3x10 Yellow 3 x 10        No Moneys            CC lat stretch            Lumbar FLX/RSB rolls   10x5\" 3 way 10 x 5\" 3 way  10 x 5'' 3 way  10x5\" 3 way 10x5\" 3 way 10x5\" 3 way   Seated hamstring stretch            Ther Activity            Leg press SL 55# 3x10 ea  55# 3x10 ea     55# 3x10 ea 55# 3x10 ea 55# 3x10 ea   STS 12# 2x10  Pick ups  2x10  12# pick ups 3x10 12# pick ups 3x10 12# pick ups 3 x 10   Chair 3 x 12 12# 12# pick ups 3x10 12# 3x10    SL STS from elevated table 2x10 ea  Kickstand c/ cane 10x ea 10x ea 10 x ea   10x ea 10x ea 2x10 ea   Step ups L ONLY 6\" 3x12 6\" 3x12     6\" 3 x 10 6\" 3x10 6\" 3x10 6\" 3x12   Lateral step ups L only 6\" 3x12  6\" 3x12    6\"  3 x 10  6\" 3x10 6\" 3x10 6\" 3x12   Pt edu            Gait Training                                    Modalities                                                       "

## 2025-06-30 NOTE — TELEPHONE ENCOUNTER
Patient daughter called in stated pharmacy is waiting on prior authorization for glucose blood (OneTouch Verio) test strip.

## 2025-07-01 LAB
DME PARACHUTE DELIVERY DATE REQUESTED: NORMAL
DME PARACHUTE ITEM DESCRIPTION: NORMAL
DME PARACHUTE ITEM DESCRIPTION: NORMAL
DME PARACHUTE ORDER STATUS: NORMAL
DME PARACHUTE SUPPLIER NAME: NORMAL
DME PARACHUTE SUPPLIER PHONE: NORMAL

## 2025-07-01 NOTE — TELEPHONE ENCOUNTER
Called and spoke to [employer] because could not process this through [facility]. These are excluded from the patient's Medicare Part D coverage with [employer]. The pharmacy must process this through the patient's Medicare Part B coverage. Call reference # [id number].  Thank you.

## 2025-07-08 ENCOUNTER — RA CDI HCC (OUTPATIENT)
Dept: OTHER | Facility: HOSPITAL | Age: 65
End: 2025-07-08

## 2025-07-15 ENCOUNTER — OFFICE VISIT (OUTPATIENT)
Dept: PHYSICAL THERAPY | Facility: REHABILITATION | Age: 65
End: 2025-07-15
Attending: FAMILY MEDICINE
Payer: MEDICARE

## 2025-07-15 DIAGNOSIS — M25.552 LEFT HIP PAIN: ICD-10-CM

## 2025-07-15 DIAGNOSIS — M54.42 CHRONIC LEFT-SIDED LOW BACK PAIN WITH LEFT-SIDED SCIATICA: Primary | ICD-10-CM

## 2025-07-15 DIAGNOSIS — G89.29 CHRONIC LEFT-SIDED LOW BACK PAIN WITH LEFT-SIDED SCIATICA: Primary | ICD-10-CM

## 2025-07-15 PROCEDURE — 97140 MANUAL THERAPY 1/> REGIONS: CPT

## 2025-07-15 PROCEDURE — 97110 THERAPEUTIC EXERCISES: CPT

## 2025-07-15 PROCEDURE — 97530 THERAPEUTIC ACTIVITIES: CPT

## 2025-07-15 NOTE — PROGRESS NOTES
Daily Note     Today's date: 7/15/2025  Patient name: Roxana Ventura  : 1960  MRN: [id number]  Referring provider: Vika Baer MD  Dx:   Encounter Diagnosis     ICD-10-CM    1. Chronic left-sided low back pain with left-sided sciatica  M54.42     G89.29       2. Left hip pain  M25.552                      Subjective: Patient noted a little hip and back pain today.       Objective: See treatment diary below      Assessment: Tolerated treatment fair. Patient demonstrated fatigue post treatment and exhibited good technique with therapeutic exercises      Plan: Continue per plan of care.      Precautions: B TKA   Past Medical History:   Diagnosis Date    Arthritis     Diabetes mellitus (HCC)     Hyperlipidemia     Hypertension          Manuals 6/20 6/24 6/27 6/30 7/15  6/6 6/10 6/13   Lumbar  + L ES STM            Lumbar distraction pball            Thoracic/Lumbar ES STM + soft cupping   Done Done  Done STM       Neuro Re-Ed            Bridges            clamshells            Sidesteps 3# 3 laps handrail  3# 3 laps hand rail     2.5# 6 laps handrail 2.5# 6 laps handrail 3# 3 laps handrail   TA heel slide            TA leg exts            Curl ups half hooklying            Mini squats            LAQ            Standing plank with alt arm/leg exts   10x 15x ea 15 x ea                    Ther Ex            Bike - strength/endurance 5'  5' 5' 5' 5'  5' lv 1 5' Lv 1 5'   Heel taps            Standing hip ext  3# 3x10 B 3# 3 x 10 B   3 #  3 x 10 B  2.5# 3x12 B 2.5# 3x12 B 3# 3x10 B   Standing hip abd  3# 3x10 B 3# 3 x 10 B    3# 3 x 10 B  2.5# 3x12 B 2.5# 3x12 B 3# 3x10 B   Standing alt marches 3# 3x10 B 3# 3 x 10 B    3# 3 x 10 B    3# 2x10   Paloff press            TA+TB low rows   Prpl 3x10 Yellow 2 x 10 Yellow 2 x 10       TB horizontal abd            TB lat pull downs   Prpl 2x10         TA + TB mid rows   Yellow 3x10 Yellow 3 x 10 Yellow 3 x 10       No Moneys            CC lat stretch           "  Lumbar FLX/RSB rolls   10x5\" 3 way 10 x 5\" 3 way 10 x 5\" 3 way   10x5\" 3 way 10x5\" 3 way 10x5\" 3 way   Seated hamstring stretch            Ther Activity            Leg press SL 55# 3x10 ea  55# 3x10 ea   55# 3 x 10 ea  55# 3x10 ea 55# 3x10 ea 55# 3x10 ea   STS 12# 2x10  Pick ups  2x10  12# pick ups 3x10 12# pick ups 3x10 12# pick ups 3 x 10    12# pick ups 3x10 12# 3x10    SL STS from elevated table 2x10 ea  Kickstand c/ cane 10x ea 10x ea 10 x ea   10x ea 10x ea 2x10 ea   Step ups L ONLY 6\" 3x12 6\" 3x12      6\" 3x10 6\" 3x10 6\" 3x12   Lateral step ups L only 6\" 3x12  6\" 3x12     6\" 3x10 6\" 3x10 6\" 3x12   Pt edu            Gait Training                                    Modalities                                                         "

## 2025-07-18 ENCOUNTER — APPOINTMENT (OUTPATIENT)
Dept: LAB | Facility: HOSPITAL | Age: 65
End: 2025-07-18
Attending: FAMILY MEDICINE
Payer: MEDICARE

## 2025-07-18 ENCOUNTER — OFFICE VISIT (OUTPATIENT)
Dept: PHYSICAL THERAPY | Facility: REHABILITATION | Age: 65
End: 2025-07-18
Attending: FAMILY MEDICINE
Payer: MEDICARE

## 2025-07-18 DIAGNOSIS — M54.42 CHRONIC LEFT-SIDED LOW BACK PAIN WITH LEFT-SIDED SCIATICA: Primary | ICD-10-CM

## 2025-07-18 DIAGNOSIS — E11.9 TYPE 2 DIABETES MELLITUS WITHOUT COMPLICATION, WITHOUT LONG-TERM CURRENT USE OF INSULIN (HCC): ICD-10-CM

## 2025-07-18 DIAGNOSIS — M25.552 LEFT HIP PAIN: ICD-10-CM

## 2025-07-18 DIAGNOSIS — G89.29 CHRONIC LEFT-SIDED LOW BACK PAIN WITH LEFT-SIDED SCIATICA: Primary | ICD-10-CM

## 2025-07-18 LAB
ALBUMIN SERPL BCG-MCNC: 4.9 G/DL (ref 3.5–5)
ALP SERPL-CCNC: 53 U/L (ref 34–104)
ALT SERPL W P-5'-P-CCNC: 25 U/L (ref 7–52)
ANION GAP SERPL CALCULATED.3IONS-SCNC: 9 MMOL/L (ref 4–13)
AST SERPL W P-5'-P-CCNC: 20 U/L (ref 13–39)
BASOPHILS # BLD AUTO: 0.02 THOUSANDS/ÂΜL (ref 0–0.1)
BASOPHILS NFR BLD AUTO: 0 % (ref 0–1)
BILIRUB SERPL-MCNC: 0.42 MG/DL (ref 0.2–1)
BUN SERPL-MCNC: 20 MG/DL (ref 5–25)
CALCIUM SERPL-MCNC: 10.5 MG/DL (ref 8.4–10.2)
CHLORIDE SERPL-SCNC: 100 MMOL/L (ref 96–108)
CO2 SERPL-SCNC: 31 MMOL/L (ref 21–32)
CREAT SERPL-MCNC: 0.83 MG/DL (ref 0.6–1.3)
CREAT UR-MCNC: 138.5 MG/DL
EOSINOPHIL # BLD AUTO: 0.07 THOUSAND/ÂΜL (ref 0–0.61)
EOSINOPHIL NFR BLD AUTO: 1 % (ref 0–6)
ERYTHROCYTE [DISTWIDTH] IN BLOOD BY AUTOMATED COUNT: 12.7 % (ref 11.6–15.1)
GFR SERPL CREATININE-BSD FRML MDRD: 74 ML/MIN/1.73SQ M
GLUCOSE SERPL-MCNC: 95 MG/DL (ref 65–140)
HCT VFR BLD AUTO: 43.7 % (ref 34.8–46.1)
HGB BLD-MCNC: 14 G/DL (ref 11.5–15.4)
IMM GRANULOCYTES # BLD AUTO: 0.02 THOUSAND/UL (ref 0–0.2)
IMM GRANULOCYTES NFR BLD AUTO: 0 % (ref 0–2)
LYMPHOCYTES # BLD AUTO: 3.06 THOUSANDS/ÂΜL (ref 0.6–4.47)
LYMPHOCYTES NFR BLD AUTO: 50 % (ref 14–44)
MCH RBC QN AUTO: 30.8 PG (ref 26.8–34.3)
MCHC RBC AUTO-ENTMCNC: 32 G/DL (ref 31.4–37.4)
MCV RBC AUTO: 96 FL (ref 82–98)
MICROALBUMIN UR-MCNC: <7 MG/L
MONOCYTES # BLD AUTO: 0.43 THOUSAND/ÂΜL (ref 0.17–1.22)
MONOCYTES NFR BLD AUTO: 7 % (ref 4–12)
NEUTROPHILS # BLD AUTO: 2.64 THOUSANDS/ÂΜL (ref 1.85–7.62)
NEUTS SEG NFR BLD AUTO: 42 % (ref 43–75)
NRBC BLD AUTO-RTO: 0 /100 WBCS
PLATELET # BLD AUTO: 296 THOUSANDS/UL (ref 149–390)
PMV BLD AUTO: 9.2 FL (ref 8.9–12.7)
POTASSIUM SERPL-SCNC: 4.4 MMOL/L (ref 3.5–5.3)
PROT SERPL-MCNC: 7.3 G/DL (ref 6.4–8.4)
RBC # BLD AUTO: 4.55 MILLION/UL (ref 3.81–5.12)
SODIUM SERPL-SCNC: 140 MMOL/L (ref 135–147)
WBC # BLD AUTO: 6.24 THOUSAND/UL (ref 4.31–10.16)

## 2025-07-18 PROCEDURE — 97530 THERAPEUTIC ACTIVITIES: CPT

## 2025-07-18 PROCEDURE — 36415 COLL VENOUS BLD VENIPUNCTURE: CPT

## 2025-07-18 PROCEDURE — 82043 UR ALBUMIN QUANTITATIVE: CPT

## 2025-07-18 PROCEDURE — 82570 ASSAY OF URINE CREATININE: CPT

## 2025-07-18 PROCEDURE — 83036 HEMOGLOBIN GLYCOSYLATED A1C: CPT

## 2025-07-18 PROCEDURE — 97110 THERAPEUTIC EXERCISES: CPT

## 2025-07-18 PROCEDURE — 80053 COMPREHEN METABOLIC PANEL: CPT

## 2025-07-18 PROCEDURE — 85025 COMPLETE CBC W/AUTO DIFF WBC: CPT

## 2025-07-18 NOTE — PROGRESS NOTES
Daily Note     Today's date: 2025  Patient name: Roxana Ventura  : 1960  MRN: [id number]  Referring provider: Vika Baer MD  Dx:   Encounter Diagnosis     ICD-10-CM    1. Chronic left-sided low back pain with left-sided sciatica  M54.42     G89.29       2. Left hip pain  M25.552                      Subjective: Patient states when she was away her back was not any better or worse.       Objective: See treatment diary below      Assessment: Tolerated treatment well. Focus on LLE strengthening. L>R strength deficits remain. Resuming full program as able. Instructed her for rows/ext to perform with HEP for core strengthening. Patient demonstrated fatigue post treatment and would benefit from continued PT      Plan: Progress treatment as tolerated.       Precautions: B TKA   Past Medical History:   Diagnosis Date    Arthritis     Diabetes mellitus (HCC)     Hyperlipidemia     Hypertension          Manuals 6/20 6/24 6/27 6/30 7/15 7/18  6/10 6/13   Lumbar  + L ES STM            Lumbar distraction pball            Thoracic/Lumbar ES STM + soft cupping   Done Done  Done STM       Neuro Re-Ed            Bridges            clamshells            Sidesteps 3# 3 laps handrail  3# 3 laps hand rail      2.5# 6 laps handrail 3# 3 laps handrail   TA heel slide            TA leg exts            Curl ups half hooklying            Mini squats            LAQ            Standing plank with alt arm/leg exts   10x 15x ea 15 x ea  NV                  Ther Ex            Bike - strength/endurance 5'  5' 5' 5' 5' 5'  5' Lv 1 5'   Heel taps            Standing hip ext  3# 3x10 B 3# 3 x 10 B   3 #  3 x 10 B 3 #  3 x 10 B  2.5# 3x12 B 3# 3x10 B   Standing hip abd  3# 3x10 B 3# 3 x 10 B    3# 3 x 10 B 3 #  3 x 10 B  2.5# 3x12 B 3# 3x10 B   Standing alt marches 3# 3x10 B 3# 3 x 10 B    3# 3 x 10 B 3 #  3 x 10 B   3# 2x10   Paloff press            TA+TB low rows   Prpl 3x10 Yellow 2 x 10 Yellow 2 x 10 Yellow 3 x 10     "  TB horizontal abd            TB lat pull downs   Prpl 2x10         TA + TB mid rows   Yellow 3x10 Yellow 3 x 10 Yellow 3 x 10 Yellow 3 x 10      No Moneys            CC lat stretch            Lumbar FLX/RSB rolls   10x5\" 3 way 10 x 5\" 3 way 10 x 5\" 3 way  10x5\" 3 way   10x5\" 3 way 10x5\" 3 way   Seated hamstring stretch            Ther Activity            Leg press SL 55# 3x10 ea  55# 3x10 ea   55# 3 x 10 ea 55# 3x10 ea   55# 3x10 ea 55# 3x10 ea   STS 12# 2x10  Pick ups  2x10  12# pick ups 3x10 12# pick ups 3x10 12# pick ups 3 x 10   3x10 STS only   12# 3x10    SL STS from elevated table 2x10 ea  Kickstand c/ cane 10x ea 10x ea 10 x ea    10x ea 2x10 ea   Step ups L ONLY 6\" 3x12 6\" 3x12       6\" 3x10 6\" 3x12   Lateral step ups L only 6\" 3x12  6\" 3x12      6\" 3x10 6\" 3x12   Pt edu      MARIAN HEP demo rows/ext      Gait Training                                    Modalities                                                           "

## 2025-07-19 LAB
EST. AVERAGE GLUCOSE BLD GHB EST-MCNC: 143 MG/DL
HBA1C MFR BLD: 6.6 %

## 2025-07-21 ENCOUNTER — OFFICE VISIT (OUTPATIENT)
Dept: FAMILY MEDICINE CLINIC | Facility: CLINIC | Age: 65
End: 2025-07-21
Payer: MEDICARE

## 2025-07-21 VITALS
RESPIRATION RATE: 16 BRPM | HEART RATE: 82 BPM | WEIGHT: 172.6 LBS | DIASTOLIC BLOOD PRESSURE: 60 MMHG | OXYGEN SATURATION: 97 % | SYSTOLIC BLOOD PRESSURE: 110 MMHG | HEIGHT: 63 IN | BODY MASS INDEX: 30.58 KG/M2

## 2025-07-21 DIAGNOSIS — E83.52 HYPERCALCEMIA: ICD-10-CM

## 2025-07-21 DIAGNOSIS — E78.5 DYSLIPIDEMIA: ICD-10-CM

## 2025-07-21 DIAGNOSIS — G89.29 CHRONIC RIGHT SHOULDER PAIN: ICD-10-CM

## 2025-07-21 DIAGNOSIS — K59.04 CHRONIC IDIOPATHIC CONSTIPATION: ICD-10-CM

## 2025-07-21 DIAGNOSIS — M54.16 LUMBAR RADICULOPATHY: ICD-10-CM

## 2025-07-21 DIAGNOSIS — M25.511 CHRONIC RIGHT SHOULDER PAIN: ICD-10-CM

## 2025-07-21 DIAGNOSIS — Z12.31 ENCOUNTER FOR SCREENING MAMMOGRAM FOR BREAST CANCER: ICD-10-CM

## 2025-07-21 DIAGNOSIS — E11.9 TYPE 2 DIABETES MELLITUS WITHOUT COMPLICATION, WITHOUT LONG-TERM CURRENT USE OF INSULIN (HCC): Primary | ICD-10-CM

## 2025-07-21 DIAGNOSIS — M25.552 CHRONIC LEFT HIP PAIN: ICD-10-CM

## 2025-07-21 DIAGNOSIS — I10 ESSENTIAL HYPERTENSION: ICD-10-CM

## 2025-07-21 DIAGNOSIS — G89.29 CHRONIC LEFT HIP PAIN: ICD-10-CM

## 2025-07-21 PROCEDURE — G2211 COMPLEX E/M VISIT ADD ON: HCPCS | Performed by: FAMILY MEDICINE

## 2025-07-21 PROCEDURE — 99214 OFFICE O/P EST MOD 30 MIN: CPT | Performed by: FAMILY MEDICINE

## 2025-07-21 RX ORDER — BISACODYL 5 MG/1
5 TABLET, DELAYED RELEASE ORAL DAILY PRN
Qty: 30 TABLET | Refills: 0 | Status: SHIPPED | OUTPATIENT
Start: 2025-07-21

## 2025-07-21 RX ORDER — ATENOLOL AND CHLORTHALIDONE TABLET 50; 25 MG/1; MG/1
1 TABLET ORAL DAILY
Qty: 90 TABLET | Refills: 1 | Status: SHIPPED | OUTPATIENT
Start: 2025-07-21

## 2025-07-21 RX ORDER — DICLOFENAC POTASSIUM 50 MG/1
50 TABLET, FILM COATED ORAL 2 TIMES DAILY PRN
Qty: 60 TABLET | Refills: 2 | Status: SHIPPED | OUTPATIENT
Start: 2025-07-21

## 2025-07-21 RX ORDER — PRAVASTATIN SODIUM 40 MG
40 TABLET ORAL
Qty: 90 TABLET | Refills: 1 | Status: SHIPPED | OUTPATIENT
Start: 2025-07-21

## 2025-07-21 RX ORDER — GLIMEPIRIDE 2 MG/1
2 TABLET ORAL
Qty: 90 TABLET | Refills: 1 | Status: SHIPPED | OUTPATIENT
Start: 2025-07-21

## 2025-07-21 RX ORDER — GABAPENTIN 300 MG/1
300 CAPSULE ORAL
Qty: 90 CAPSULE | Refills: 1 | Status: SHIPPED | OUTPATIENT
Start: 2025-07-21

## 2025-07-21 NOTE — ASSESSMENT & PLAN NOTE
A1c is well-controlled, reduce glimepiride to 2 mg due to hypoglycemia, so continue metformin 850 mg twice a day.  Low-carb diet, regular diabetic foot exam and eye exams recommended.  Lab Results   Component Value Date    HGBA1C 6.6 (H) 07/18/2025       Orders:    glimepiride (AMARYL) 2 mg tablet; Take 1 tablet (2 mg total) by mouth daily with breakfast    metFORMIN (GLUCOPHAGE) 850 mg tablet; Take 1 tablet (850 mg total) by mouth 2 (two) times a day with meals

## 2025-07-21 NOTE — PROGRESS NOTES
Name: Roxana Ventura      : 1960     MRN: [id number]  Encounter Provider: Berta Francois MD  Encounter Date: 2025  Encounter department: [facility] MEDICINE    Assessment & Plan  Hypercalcemia  Likely due to chlorthalidone, RECOMMEND PTH vitamin D and calcium level   Orders:    PTH, intact; Future    Vitamin D 25 hydroxy; Future    Calcium, ionized; Future    Type 2 diabetes mellitus without complication, without long-term current use of insulin (HCC)  A1c is well-controlled, reduce glimepiride to 2 mg due to hypoglycemia, so continue metformin 850 mg twice a day.  Low-carb diet, regular diabetic foot exam and eye exams recommended.  Lab Results   Component Value Date    HGBA1C 6.6 (H) 2025       Orders:    glimepiride (AMARYL) 2 mg tablet; Take 1 tablet (2 mg total) by mouth daily with breakfast    metFORMIN (GLUCOPHAGE) 850 mg tablet; Take 1 tablet (850 mg total) by mouth 2 (two) times a day with meals    Encounter for screening mammogram for breast cancer    Orders:    Mammo screening bilateral w 3d and cad; Future    Essential hypertension    Orders:    atenolol-chlorthalidone (TENORETIC) 50-25 mg per tablet; Take 1 tablet by mouth daily    Chronic idiopathic constipation    Orders:    bisacodyl (DULCOLAX) 5 mg EC tablet; Take 1 tablet (5 mg total) by mouth daily as needed for constipation    Chronic left hip pain    Orders:    diclofenac potassium (CATAFLAM) 50 mg tablet; Take 1 tablet (50 mg total) by mouth 2 (two) times a day as needed (pain)    Lumbar radiculopathy    Orders:    gabapentin (NEURONTIN) 300 mg capsule; Take 1 capsule (300 mg total) by mouth daily at bedtime    Dyslipidemia    Orders:    pravastatin (PRAVACHOL) 40 mg tablet; Take 1 tablet (40 mg total) by mouth daily at bedtime    Chronic right shoulder pain  Continue diclofenac as needed  Orders:    XR shoulder 2+ vw right; Future                   Read package inserts for all medications  before starting a new medications, call me if you have any questions.    Patient was given opportunity to ask questions and all questions were answered.    Subjective:     Roxana Ventura is a 65 y.o. female.    Here for follow up    With type 2 diabetes, hyperlipidemia, bilateral TKA, hypertension  Chronic low back pain- doing well on gabapentin and diclofenac  Does not want immunizations, never had flu, shingrix or tdap recently  Her with daughter, I offered a tele- , she refused, would like daughter to translate  Daughter states that she has not been complaint with her diet  Compliant with diabetic diet      Complains of right shoulder pain which has been chronic but resolves with anti-inflammatory        Past Medical History[1]    Family History[2]    Past Surgical History[3]     reports that she has been smoking cigarettes. She started smoking about 50 years ago. She has a 25.1 pack-year smoking history. She has never used smokeless tobacco. She reports that she does not currently use alcohol. She reports that she does not use drugs.    Current Medications[4]    The following portions of the patient's history were reviewed and updated as appropriate: allergies, current medications, past family history, past medical history, past social history, past surgical history and problem list.    Review of Systems   Constitutional:  Negative for fatigue and fever.   HENT:  Negative for congestion, facial swelling, mouth sores, rhinorrhea, sore throat and trouble swallowing.    Eyes:  Negative for pain and redness.   Respiratory:  Negative for cough, shortness of breath and wheezing.    Cardiovascular:  Negative for chest pain, palpitations and leg swelling.   Gastrointestinal:  Negative for abdominal pain, blood in stool, constipation, diarrhea and nausea.   Genitourinary:  Negative for dysuria, hematuria and urgency.   Musculoskeletal:  Positive for back pain. Negative for arthralgias and myalgias.  "  Skin:  Negative for rash and wound.   Neurological:  Negative for seizures, syncope and headaches.   Hematological:  Negative for adenopathy.   Psychiatric/Behavioral:  Negative for agitation and behavioral problems.          PHQ-2/9 Depression Screening    Little interest or pleasure in doing things: 0 - not at all  Feeling down, depressed, or hopeless: 0 - not at all  PHQ-2 Score: 0  PHQ-2 Interpretation: Negative depression screen             Objective:    /60 (BP Location: Left arm, Patient Position: Sitting, Cuff Size: Adult)   Pulse 82   Resp 16   Ht 5' 3\" (1.6 m)   Wt 78.3 kg (172 lb 9.6 oz)   SpO2 97%   BMI 30.57 kg/m²      Physical Exam  Vitals and nursing note reviewed.   Constitutional:       Appearance: Normal appearance. She is well-developed.   HENT:      Head: Normocephalic and atraumatic.      Right Ear: External ear normal.      Left Ear: External ear normal.      Nose: Nose normal.     Eyes:      General: No scleral icterus.        Right eye: No discharge.         Left eye: No discharge.      Conjunctiva/sclera: Conjunctivae normal.      Pupils: Pupils are equal, round, and reactive to light.     Neck:      Thyroid: No thyromegaly.     Cardiovascular:      Rate and Rhythm: Normal rate and regular rhythm.      Heart sounds: Normal heart sounds. No murmur heard.     No gallop.   Pulmonary:      Effort: Pulmonary effort is normal.      Breath sounds: Normal breath sounds. No wheezing or rales.   Abdominal:      General: There is no distension.      Palpations: Abdomen is soft.      Tenderness: There is no abdominal tenderness.     Musculoskeletal:         General: No tenderness or deformity.      Cervical back: Normal range of motion and neck supple.   Lymphadenopathy:      Cervical: No cervical adenopathy.     Skin:     Findings: No erythema or rash.     Neurological:      General: No focal deficit present.      Mental Status: She is alert. Mental status is at baseline.     Psychiatric: "         Mood and Affect: Mood normal.         Behavior: Behavior normal.           Recent Results (from the past 52 weeks)   COLOGUARD    Collection Time: 09/25/24  1:00 AM   Result Value Ref Range    Cologuard Result Negative Negative   Cologuard    Collection Time: 09/25/24  5:00 AM   Result Value Ref Range    Cologuard Result Negative Negative   Fingerstick Glucose (POCT)    Collection Time: 09/26/24  7:03 PM   Result Value Ref Range    POC Glucose 184 (H) 65 - 140 mg/dl   ECG 12 lead    Collection Time: 09/26/24  7:09 PM   Result Value Ref Range    Ventricular Rate 66 BPM    Atrial Rate 66 BPM    AR Interval 188 ms    QRSD Interval 68 ms    QT Interval 388 ms    QTC Interval 406 ms    P Axis 31 degrees    QRS Axis 61 degrees    T Wave Axis 24 degrees   CBC and differential    Collection Time: 09/26/24  7:15 PM   Result Value Ref Range    WBC 6.16 4.31 - 10.16 Thousand/uL    RBC 4.45 3.81 - 5.12 Million/uL    Hemoglobin 14.1 11.5 - 15.4 g/dL    Hematocrit 42.4 34.8 - 46.1 %    MCV 95 82 - 98 fL    MCH 31.7 26.8 - 34.3 pg    MCHC 33.3 31.4 - 37.4 g/dL    RDW 12.2 11.6 - 15.1 %    MPV 9.9 8.9 - 12.7 fL    Platelets 262 149 - 390 Thousands/uL    nRBC 0 /100 WBCs    Segmented % 29 (L) 43 - 75 %    Immature Grans % 0 0 - 2 %    Lymphocytes % 63 (H) 14 - 44 %    Monocytes % 6 4 - 12 %    Eosinophils Relative 1 0 - 6 %    Basophils Relative 1 0 - 1 %    Absolute Neutrophils 1.77 (L) 1.85 - 7.62 Thousands/µL    Absolute Immature Grans 0.01 0.00 - 0.20 Thousand/uL    Absolute Lymphocytes 3.91 0.60 - 4.47 Thousands/µL    Absolute Monocytes 0.37 0.17 - 1.22 Thousand/µL    Eosinophils Absolute 0.07 0.00 - 0.61 Thousand/µL    Basophils Absolute 0.03 0.00 - 0.10 Thousands/µL   Comprehensive metabolic panel    Collection Time: 09/26/24  7:15 PM   Result Value Ref Range    Sodium 137 135 - 147 mmol/L    Potassium 3.3 (L) 3.5 - 5.3 mmol/L    Chloride 102 96 - 108 mmol/L    CO2 24 21 - 32 mmol/L    ANION GAP 11 4 - 13 mmol/L     "BUN 20 5 - 25 mg/dL    Creatinine 0.87 0.60 - 1.30 mg/dL    Glucose 197 (H) 65 - 140 mg/dL    Calcium 9.7 8.4 - 10.2 mg/dL    AST 21 13 - 39 U/L    ALT 20 7 - 52 U/L    Alkaline Phosphatase 52 34 - 104 U/L    Total Protein 6.8 6.4 - 8.4 g/dL    Albumin 4.3 3.5 - 5.0 g/dL    Total Bilirubin 0.30 0.20 - 1.00 mg/dL    eGFR 71 ml/min/1.73sq m   Magnesium    Collection Time: 09/26/24  7:15 PM   Result Value Ref Range    Magnesium 2.0 1.9 - 2.7 mg/dL   HS Troponin 0hr (reflex protocol)    Collection Time: 09/26/24  7:15 PM   Result Value Ref Range    hs TnI 0hr 3 \"Refer to ACS Flowchart\"- see link ng/L   B-Type Natriuretic Peptide(BNP)    Collection Time: 09/26/24  7:15 PM   Result Value Ref Range    BNP 16 0 - 100 pg/mL   APTT    Collection Time: 09/26/24  7:15 PM   Result Value Ref Range    PTT 27 23 - 34 seconds   Protime-INR    Collection Time: 09/26/24  7:15 PM   Result Value Ref Range    Protime 13.3 12.3 - 15.0 seconds    INR 0.97 0.85 - 1.19   COVID19, Influenza A/B, RSV PCR, [facility]    Collection Time: 09/26/24  7:15 PM    Specimen: Nose; Nares   Result Value Ref Range    SARS-CoV-2 Negative Negative    INFLUENZA A PCR Negative Negative    INFLUENZA B PCR Negative Negative    RSV PCR Negative Negative   HS Troponin I 2hr    Collection Time: 09/26/24  9:44 PM   Result Value Ref Range    hs TnI 2hr 3 \"Refer to ACS Flowchart\"- see link ng/L    Delta 2hr hsTnI 0 <20 ng/L   CBC and differential    Collection Time: 10/11/24  9:42 AM   Result Value Ref Range    WBC 4.90 4.31 - 10.16 Thousand/uL    RBC 4.71 3.81 - 5.12 Million/uL    Hemoglobin 14.7 11.5 - 15.4 g/dL    Hematocrit 45.7 34.8 - 46.1 %    MCV 97 82 - 98 fL    MCH 31.2 26.8 - 34.3 pg    MCHC 32.2 31.4 - 37.4 g/dL    RDW 12.2 11.6 - 15.1 %    MPV 10.2 8.9 - 12.7 fL    Platelets 266 149 - 390 Thousands/uL    nRBC 0 /100 WBCs    Segmented % 33 (L) 43 - 75 %    Immature Grans % 0 0 - 2 %    Lymphocytes % 58 (H) 14 - 44 %    Monocytes % 7 4 - 12 %    Eosinophils " Relative 1 0 - 6 %    Basophils Relative 1 0 - 1 %    Absolute Neutrophils 1.60 (L) 1.85 - 7.62 Thousands/µL    Absolute Immature Grans 0.01 0.00 - 0.20 Thousand/uL    Absolute Lymphocytes 2.87 0.60 - 4.47 Thousands/µL    Absolute Monocytes 0.34 0.17 - 1.22 Thousand/µL    Eosinophils Absolute 0.05 0.00 - 0.61 Thousand/µL    Basophils Absolute 0.03 0.00 - 0.10 Thousands/µL   Comprehensive metabolic panel    Collection Time: 10/11/24  9:42 AM   Result Value Ref Range    Sodium 139 135 - 147 mmol/L    Potassium 4.5 3.5 - 5.3 mmol/L    Chloride 100 96 - 108 mmol/L    CO2 33 (H) 21 - 32 mmol/L    ANION GAP 6 4 - 13 mmol/L    BUN 20 5 - 25 mg/dL    Creatinine 0.85 0.60 - 1.30 mg/dL    Glucose, Fasting 144 (H) 65 - 99 mg/dL    Calcium 10.7 (H) 8.4 - 10.2 mg/dL    AST 19 13 - 39 U/L    ALT 23 7 - 52 U/L    Alkaline Phosphatase 53 34 - 104 U/L    Total Protein 7.2 6.4 - 8.4 g/dL    Albumin 4.8 3.5 - 5.0 g/dL    Total Bilirubin 0.52 0.20 - 1.00 mg/dL    eGFR 73 ml/min/1.73sq m   Lipid panel    Collection Time: 10/11/24  9:42 AM   Result Value Ref Range    Cholesterol 143 See Comment mg/dL    Triglycerides 151 (H) See Comment mg/dL    HDL, Direct 39 (L) >=50 mg/dL    LDL Calculated 74 0 - 100 mg/dL    Non-HDL-Chol (CHOL-HDL) 104 mg/dl   Hemoglobin A1C    Collection Time: 10/11/24  9:42 AM   Result Value Ref Range    Hemoglobin A1C 7.2 (H) Normal 4.0-5.6%; PreDiabetic 5.7-6.4%; Diabetic >=6.5%; Glycemic control for adults with diabetes <7.0% %     mg/dl   TSH, 3rd generation with Free T4 reflex    Collection Time: 10/11/24  9:42 AM   Result Value Ref Range    TSH 3RD GENERATION 0.858 0.450 - 4.500 uIU/mL   Albumin / creatinine urine ratio    Collection Time: 10/11/24  4:26 PM   Result Value Ref Range    Creatinine, Ur 83.2 Reference range not established. mg/dL    Albumin,U,Random <7.0 <20.0 mg/L    Albumin Creat Ratio     Echo complete w/ contrast if indicated    Collection Time: 11/22/24  3:41 PM   Result Value Ref  Range    RAA A4C 12.4 cm2    LA Volume Index (BP) 22.8 mL/m2    MV Peak A Cody 0.8 m/s    MV stenosis pressure 1/2 time 62 ms    MV Peak E Cody 65 cm/s    E wave deceleration time 213 ms    E/A ratio 0.81     MV valve area p 1/2 method 3.55     RVID d 2.7 cm    A4C EF 65 %    Left ventricular stroke volume (2D) 37.00 mL    IVSd 1.10 cm    Tricuspid annular plane systolic excursion 1.90 cm    Ao root 2.60 cm    LVPWd 1.10 cm    LA size 3.5 cm    Asc Ao 3.3 cm    LA volume (BP) 41 mL    FS 32 28 - 44    LVIDS 2.60 cm    IVS 1.1 cm    LVIDd 3.80 cm    LA length (A2C) 4.90 cm    LEFT VENTRICLE SYSTOLIC VOLUME (MOD BIPLANE) 2D 24 mL    LV DIASTOLIC VOLUME (MOD BIPLANE) 2D 60 mL    Left Atrium Area-systolic Four Chamber 13.7 cm2    Left Atrium Area-systolic Apical Two Chamber 17.7 cm2    MV E' Tissue Velocity Septal 6 cm/s    LVSV, 2D 37 mL    BSA 1.8 m2    LV EF 65    POCT hemoglobin A1c    Collection Time: 04/15/25  5:04 PM   Result Value Ref Range    Hemoglobin A1C 8.3 (A) <=6.5   Blood Glucose Meter (Diabetes) + 1 more item    Collection Time: 07/01/25  2:35 PM   Result Value Ref Range    Supplier Name [employer] ([employer] CGM Supplier)     Supplier Phone Number [phone]     Order Status Supplier Submitted     Delivery Note      Delivery Request Date 07/01/2025     Item Description Blood Glucose Meter, OneTouch Verio Reflect     Item Description       Blood Glucose Test Strips, One Touch Verio, Box of 50   Hemoglobin A1C    Collection Time: 07/18/25 12:16 PM   Result Value Ref Range    Hemoglobin A1C 6.6 (H) Normal 4.0-5.6%; PreDiabetic 5.7-6.4%; Diabetic >=6.5%; Glycemic control for adults with diabetes <7.0% %     mg/dl   Albumin / creatinine urine ratio    Collection Time: 07/18/25 12:16 PM   Result Value Ref Range    Creatinine, Ur 138.5 Reference range not established. mg/dL    Albumin,U,Random <7.0 <20.0 mg/L    Albumin Creat Ratio     CBC and differential    Collection Time: 07/18/25 12:16 PM    Result Value Ref Range    WBC 6.24 4.31 - 10.16 Thousand/uL    RBC 4.55 3.81 - 5.12 Million/uL    Hemoglobin 14.0 11.5 - 15.4 g/dL    Hematocrit 43.7 34.8 - 46.1 %    MCV 96 82 - 98 fL    MCH 30.8 26.8 - 34.3 pg    MCHC 32.0 31.4 - 37.4 g/dL    RDW 12.7 11.6 - 15.1 %    MPV 9.2 8.9 - 12.7 fL    Platelets 296 149 - 390 Thousands/uL    nRBC 0 /100 WBCs    Segmented % 42 (L) 43 - 75 %    Immature Grans % 0 0 - 2 %    Lymphocytes % 50 (H) 14 - 44 %    Monocytes % 7 4 - 12 %    Eosinophils Relative 1 0 - 6 %    Basophils Relative 0 0 - 1 %    Absolute Neutrophils 2.64 1.85 - 7.62 Thousands/µL    Absolute Immature Grans 0.02 0.00 - 0.20 Thousand/uL    Absolute Lymphocytes 3.06 0.60 - 4.47 Thousands/µL    Absolute Monocytes 0.43 0.17 - 1.22 Thousand/µL    Eosinophils Absolute 0.07 0.00 - 0.61 Thousand/µL    Basophils Absolute 0.02 0.00 - 0.10 Thousands/µL   Comprehensive metabolic panel    Collection Time: 07/18/25 12:16 PM   Result Value Ref Range    Sodium 140 135 - 147 mmol/L    Potassium 4.4 3.5 - 5.3 mmol/L    Chloride 100 96 - 108 mmol/L    CO2 31 21 - 32 mmol/L    ANION GAP 9 4 - 13 mmol/L    BUN 20 5 - 25 mg/dL    Creatinine 0.83 0.60 - 1.30 mg/dL    Glucose 95 65 - 140 mg/dL    Calcium 10.5 (H) 8.4 - 10.2 mg/dL    AST 20 13 - 39 U/L    ALT 25 7 - 52 U/L    Alkaline Phosphatase 53 34 - 104 U/L    Total Protein 7.3 6.4 - 8.4 g/dL    Albumin 4.9 3.5 - 5.0 g/dL    Total Bilirubin 0.42 0.20 - 1.00 mg/dL    eGFR 74 ml/min/1.73sq m       Laboratory Results: I have personally reviewed the pertinent laboratory results/reports     Radiology/Other Diagnostic Testing Results: I have reviewed the following imaging and agree with the interpretation below.    CT lung screening program  Result Date: 5/4/2025  CT CHEST LUNG CANCER SCREENING WITHOUT IV CONTRAST INDICATION: F17.210: Nicotine dependence, cigarettes, uncomplicated Z12.2: Encounter for screening for malignant neoplasm of respiratory organs F17.200: Nicotine  "dependence, unspecified, uncomplicated. COMPARISON: Chest x-ray from 9/26/2024 and CT scan of the abdomen/pelvis from 3/14/2018. TECHNIQUE: Unenhanced CT examination of the chest was performed utilizing a low dose protocol. Multiplanar 2D reformatted images were created from the source data. Radiation dose length product (DLP) for this visit:  113.84 mGy-cm. . This examination, like all CT scans performed in the [facility], was performed utilizing techniques to minimize radiation dose exposure, including the use of iterative  reconstruction and automated exposure control. FINDINGS: LUNGS: Linear scarring in the lingula and right middle lobe. Lungs are otherwise clear. There is no tracheal or endobronchial lesion. PLEURA: Unremarkable. HEART/GREAT VESSELS: Heart is unremarkable for patient's age. No thoracic aortic aneurysm. Coronary artery calcifications indicating atherosclerotic heart disease. MEDIASTINUM AND KOSTAS: Unremarkable. CHEST WALL AND LOWER NECK: Unremarkable. VISUALIZED STRUCTURES IN THE UPPER ABDOMEN: Stable bilateral benign adrenal gland adenomata measuring 1.2 cm on the right and 2.4 cm on the left. Upper abdomen otherwise unremarkable. OSSEOUS STRUCTURES: No acute fracture or destructive osseous lesion.     No nodules and/or definitely benign nodules.  Lung-RADS1, negative. Continued annual screening with LDCT in 12 months. Workstation performed: [id number]        Disclaimer: Portions of the record may have been created with voice recognition software. Occasional wrong word or \"sound a like\" substitutions may have occurred due to the inherent limitations of voice recognition software. Read the chart carefully and recognize, using context, where substitutions have occurred. I have used the Epic copy/forward function to compose this note. I have reviewed my current note to ensure it reflects the current patient status, exam, assessment and plan.       [1]   Past Medical " History:  Diagnosis Date    Arthritis     Diabetes mellitus (HCC)     Hyperlipidemia     Hypertension    [2]   Family History  Problem Relation Name Age of Onset    Arthritis Mother      Uterine cancer Mother      Arthritis Father      Bone cancer Father      Breast cancer Sister  45    No Known Problems Sister      No Known Problems Sister      No Known Problems Sister      No Known Problems Sister      No Known Problems Sister      No Known Problems Daughter      No Known Problems Daughter      No Known Problems Daughter      Prostate cancer Maternal Grandfather      No Known Problems Maternal Aunt      No Known Problems Maternal Aunt      No Known Problems Maternal Aunt      No Known Problems Maternal Aunt      No Known Problems Maternal Aunt      No Known Problems Paternal Aunt      No Known Problems Paternal Aunt      No Known Problems Paternal Aunt      No Known Problems Paternal Aunt      Breast cancer Cousin maternal     Breast cancer Cousin maternal    [3]   Past Surgical History:  Procedure Laterality Date    COLONOSCOPY      NH ARTHRP KNE CONDYLE&PLATU MEDIAL&LAT COMPARTMENTS Right 03/30/2021    Procedure: KNEE TOTAL ARTHROPLASTY;  Surgeon: Ryan Bush DO;  [facility];  Service: Orthopedics    NH ARTHRP KNE CONDYLE&PLATU MEDIAL&LAT COMPARTMENTS Left 1/10/2023    Procedure: ARTHROPLASTY KNEE TOTAL;  Surgeon: Ryan Bush DO;  [facility];  Service: Orthopedics    NH MANIPULATION KNEE JOINT UNDER GENERAL ANESTHESIA Right 06/17/2021    Procedure: MANIPULATION JOINT KNEE;  Surgeon: Ryan Bush DO;  [facility];  Service: Orthopedics    TUBAL LIGATION     [4]   Current Outpatient Medications:     atenolol-chlorthalidone (TENORETIC) 50-25 mg per tablet, Take 1 tablet by mouth daily, Disp: 90 tablet, Rfl: 1    bisacodyl (DULCOLAX) 5 mg EC tablet, Take 1 tablet (5 mg total) by mouth daily as needed for constipation, Disp: 30 tablet, Rfl: 0    Blood Glucose Monitoring Suppl (OneTouch  Verio Reflect) w/Device KIT, Check blood sugars twice daily. Please substitute with appropriate alternative as covered by patient's insurance. Dx: E11.65, Disp: 1 kit, Rfl: 0    diclofenac potassium (CATAFLAM) 50 mg tablet, Take 1 tablet (50 mg total) by mouth 2 (two) times a day as needed (pain), Disp: 60 tablet, Rfl: 2    gabapentin (NEURONTIN) 300 mg capsule, Take 1 capsule (300 mg total) by mouth daily at bedtime, Disp: 90 capsule, Rfl: 1    glimepiride (AMARYL) 4 mg tablet, Take 1 tablet (4 mg total) by mouth daily with breakfast, Disp: 90 tablet, Rfl: 1    glucose blood (OneTouch Verio) test strip, Check blood sugars twice daily. Please substitute with appropriate alternative as covered by patient's insurance. Dx: E11.65, Disp: 200 each, Rfl: 3    metFORMIN (GLUCOPHAGE) 850 mg tablet, Take 1 tablet (850 mg total) by mouth 2 (two) times a day with meals, Disp: 180 tablet, Rfl: 1    OneTouch Delica Lancets 33G MISC, Check blood sugars twice daily. Please substitute with appropriate alternative as covered by patient's insurance. Dx: E11.65, Disp: 200 each, Rfl: 3    polyethylene glycol (GLYCOLAX) 17 GM/SCOOP powder, Drink 1 scoop with 8 oz of water once daily, Disp: 507 g, Rfl: 0    pravastatin (PRAVACHOL) 40 mg tablet, Take 1 tablet (40 mg total) by mouth daily at bedtime, Disp: 90 tablet, Rfl: 1    tiZANidine (ZANAFLEX) 2 mg tablet, Take 1 tablet (2 mg total) by mouth daily at bedtime as needed for muscle spasms, Disp: 30 tablet, Rfl: 2

## 2025-07-21 NOTE — ASSESSMENT & PLAN NOTE
Likely due to chlorthalidone, RECOMMEND PTH vitamin D and calcium level   Orders:    PTH, intact; Future    Vitamin D 25 hydroxy; Future    Calcium, ionized; Future

## 2025-07-22 ENCOUNTER — OFFICE VISIT (OUTPATIENT)
Dept: PHYSICAL THERAPY | Facility: REHABILITATION | Age: 65
End: 2025-07-22
Attending: FAMILY MEDICINE
Payer: MEDICARE

## 2025-07-22 DIAGNOSIS — M25.552 LEFT HIP PAIN: ICD-10-CM

## 2025-07-22 DIAGNOSIS — G89.29 CHRONIC LEFT-SIDED LOW BACK PAIN WITH LEFT-SIDED SCIATICA: Primary | ICD-10-CM

## 2025-07-22 DIAGNOSIS — M54.42 CHRONIC LEFT-SIDED LOW BACK PAIN WITH LEFT-SIDED SCIATICA: Primary | ICD-10-CM

## 2025-07-22 PROCEDURE — 97530 THERAPEUTIC ACTIVITIES: CPT

## 2025-07-22 PROCEDURE — 97110 THERAPEUTIC EXERCISES: CPT

## 2025-07-22 NOTE — PROGRESS NOTES
Daily Note     Today's date: 2025  Patient name: Roxana Ventura  : 1960  MRN: [id number]  Referring provider: Vika Baer MD  Dx:   Encounter Diagnosis     ICD-10-CM    1. Chronic left-sided low back pain with left-sided sciatica  M54.42     G89.29       2. Left hip pain  M25.552           Start Time: 1038  Stop Time: 1118  Total time in clinic (min): 40 minutes    Subjective: Pt reports steady progress, no increase in pain today.       Objective: See treatment diary below      Assessment: Tolerated treatment well. Pt tolerated POC with no adverse effects, provided intermittent rest breaks to prevent fatigue with improved tolerance to session. Patient demonstrated fatigue post treatment, exhibited good technique with therapeutic exercises, and would benefit from continued PT      Plan: Continue per plan of care.      Precautions: B TKA   Past Medical History:   Diagnosis Date    Arthritis     Diabetes mellitus (HCC)     Hyperlipidemia     Hypertension          Manuals 6/20 6/24 6/27 6/30 7/15 7/18 7/22     Lumbar  + L ES STM            Lumbar distraction pball            Thoracic/Lumbar ES STM + soft cupping   Done Done  Done STM       Neuro Re-Ed            Bridges            clamshells            Sidesteps 3# 3 laps handrail  3# 3 laps hand rail          TA heel slide            TA leg exts            Curl ups half hooklying            Mini squats            LAQ            Standing plank with alt arm/leg exts   10x 15x ea 15 x ea  NV                  Ther Ex            Bike - strength/endurance 5'  5' 5' 5' 5' 5' 5'     Heel taps            Standing hip ext  3# 3x10 B 3# 3 x 10 B   3 #  3 x 10 B 3 #  3 x 10 B 3 #  3 x 10 B     Standing hip abd  3# 3x10 B 3# 3 x 10 B    3# 3 x 10 B 3 #  3 x 10 B 3 #  3 x 10 B     Standing alt marches 3# 3x10 B 3# 3 x 10 B    3# 3 x 10 B 3 #  3 x 10 B 3 #  3 x 10 B     Paloff press            TA+TB low rows   Prpl 3x10 Yellow 2 x 10 Yellow 2 x 10 Yellow  "3 x 10 Yellow 3 x 10     TB horizontal abd            TB lat pull downs   Prpl 2x10         TA + TB mid rows   Yellow 3x10 Yellow 3 x 10 Yellow 3 x 10 Yellow 3 x 10 Yellow 3 x 10     No Moneys            CC lat stretch            Lumbar FLX/RSB rolls   10x5\" 3 way 10 x 5\" 3 way 10 x 5\" 3 way  10x5\" 3 way  10x5\" 3 way      Seated hamstring stretch            Ther Activity            Leg press SL 55# 3x10 ea  55# 3x10 ea   55# 3 x 10 ea 55# 3x10 ea  55# 3x10 ea     STS 12# 2x10  Pick ups  2x10  12# pick ups 3x10 12# pick ups 3x10 12# pick ups 3 x 10   3x10 STS only  3x10 STS only      SL STS from elevated table 2x10 ea  Kickstand c/ cane 10x ea 10x ea 10 x ea        Step ups L ONLY 6\" 3x12 6\" 3x12           Lateral step ups L only 6\" 3x12  6\" 3x12          Pt edu      [facility] demo rows/ext      Gait Training                                    Modalities                                                             "

## 2025-07-25 ENCOUNTER — OFFICE VISIT (OUTPATIENT)
Dept: PHYSICAL THERAPY | Facility: REHABILITATION | Age: 65
End: 2025-07-25
Attending: FAMILY MEDICINE
Payer: MEDICARE

## 2025-07-25 DIAGNOSIS — G89.29 CHRONIC LEFT-SIDED LOW BACK PAIN WITH LEFT-SIDED SCIATICA: Primary | ICD-10-CM

## 2025-07-25 DIAGNOSIS — M25.552 LEFT HIP PAIN: ICD-10-CM

## 2025-07-25 DIAGNOSIS — M54.42 CHRONIC LEFT-SIDED LOW BACK PAIN WITH LEFT-SIDED SCIATICA: Primary | ICD-10-CM

## 2025-07-25 PROCEDURE — 97530 THERAPEUTIC ACTIVITIES: CPT

## 2025-07-25 PROCEDURE — 97110 THERAPEUTIC EXERCISES: CPT

## 2025-07-25 NOTE — PROGRESS NOTES
Daily Note     Today's date: 2025  Patient name: Roxana Ventura  : 1960  MRN: [id number]  Referring provider: Vika Baer MD  Dx:   Encounter Diagnosis     ICD-10-CM    1. Chronic left-sided low back pain with left-sided sciatica  M54.42     G89.29       2. Left hip pain  M25.552                      Subjective: Patient states her leg is getting stronger.       Objective: See treatment diary below      Assessment: Tolerated treatment well. Cues to avoid trunk flexion with L sided march, less flexion with smaller ROM. Progressed Patient demonstrated fatigue post treatment and would benefit from continued PT      Plan: Progress treatment as tolerated.       Precautions: B TKA   Past Medical History:   Diagnosis Date    Arthritis     Diabetes mellitus (HCC)     Hyperlipidemia     Hypertension          Manuals 6/20 6/24 6/27 6/30 7/15 7/18 7/22 7/25    Lumbar  + L ES STM            Lumbar distraction pball            Thoracic/Lumbar ES STM + soft cupping   Done Done  Done STM       Neuro Re-Ed            Bridges            clamshells            Sidesteps 3# 3 laps handrail  3# 3 laps hand rail          TA heel slide            TA leg exts            Curl ups half hooklying            Mini squats            LAQ            Standing plank with alt arm/leg exts   10x 15x ea 15 x ea  NV                  Ther Ex            Bike - strength/endurance 5'  5' 5' 5' 5' 5' 5' 5'    Heel taps            Standing hip ext  3# 3x10 B 3# 3 x 10 B   3 #  3 x 10 B 3 #  3 x 10 B 3 #  3 x 10 B 3# 3x10 B    Standing hip abd  3# 3x10 B 3# 3 x 10 B    3# 3 x 10 B 3 #  3 x 10 B 3 #  3 x 10 B 3# 3x10 B    Standing alt marches 3# 3x10 B 3# 3 x 10 B    3# 3 x 10 B 3 #  3 x 10 B 3 #  3 x 10 B 3# 3x10 B    Paloff press            TA+TB low rows   Prpl 3x10 Yellow 2 x 10 Yellow 2 x 10 Yellow 3 x 10 Yellow 3 x 10 Yellow 3x10     TB horizontal abd            TB lat pull downs   Prpl 2x10         TA + TB mid rows   Yellow  "3x10 Yellow 3 x 10 Yellow 3 x 10 Yellow 3 x 10 Yellow 3 x 10 Red 3x10     No Moneys            CC lat stretch            Lumbar FLX/RSB rolls   10x5\" 3 way 10 x 5\" 3 way 10 x 5\" 3 way  10x5\" 3 way  10x5\" 3 way  10x5\" 3 way     Seated hamstring stretch            Ther Activity            Leg press SL 55# 3x10 ea  55# 3x10 ea   55# 3 x 10 ea 55# 3x10 ea  55# 3x10 ea 55# 3x10 ea    STS 12# 2x10  Pick ups  2x10  12# pick ups 3x10 12# pick ups 3x10 12# pick ups 3 x 10   3x10 STS only  3x10 STS only  3x10 STS only     SL STS from elevated table 2x10 ea  Kickstand c/ cane 10x ea 10x ea 10 x ea        Step ups L ONLY 6\" 3x12 6\" 3x12           Lateral step ups L only 6\" 3x12  6\" 3x12          Pt edu      [facility] demo rows/ext      Gait Training                                    Modalities                                                               "

## 2025-07-29 ENCOUNTER — OFFICE VISIT (OUTPATIENT)
Dept: PHYSICAL THERAPY | Facility: REHABILITATION | Age: 65
End: 2025-07-29
Attending: FAMILY MEDICINE
Payer: MEDICARE

## 2025-07-29 DIAGNOSIS — M25.552 LEFT HIP PAIN: ICD-10-CM

## 2025-07-29 DIAGNOSIS — M54.42 CHRONIC LEFT-SIDED LOW BACK PAIN WITH LEFT-SIDED SCIATICA: Primary | ICD-10-CM

## 2025-07-29 DIAGNOSIS — G89.29 CHRONIC LEFT-SIDED LOW BACK PAIN WITH LEFT-SIDED SCIATICA: Primary | ICD-10-CM

## 2025-07-29 PROCEDURE — 97530 THERAPEUTIC ACTIVITIES: CPT

## 2025-07-29 PROCEDURE — 97110 THERAPEUTIC EXERCISES: CPT

## 2025-08-01 ENCOUNTER — OFFICE VISIT (OUTPATIENT)
Dept: PHYSICAL THERAPY | Facility: REHABILITATION | Age: 65
End: 2025-08-01
Attending: FAMILY MEDICINE
Payer: MEDICARE

## 2025-08-01 DIAGNOSIS — G89.29 CHRONIC LEFT-SIDED LOW BACK PAIN WITH LEFT-SIDED SCIATICA: Primary | ICD-10-CM

## 2025-08-01 DIAGNOSIS — M54.42 CHRONIC LEFT-SIDED LOW BACK PAIN WITH LEFT-SIDED SCIATICA: Primary | ICD-10-CM

## 2025-08-01 DIAGNOSIS — M25.552 LEFT HIP PAIN: ICD-10-CM

## 2025-08-01 PROCEDURE — 97530 THERAPEUTIC ACTIVITIES: CPT

## 2025-08-01 PROCEDURE — 97110 THERAPEUTIC EXERCISES: CPT

## 2025-08-06 ENCOUNTER — OFFICE VISIT (OUTPATIENT)
Dept: PHYSICAL THERAPY | Facility: REHABILITATION | Age: 65
End: 2025-08-06
Attending: FAMILY MEDICINE
Payer: MEDICARE

## 2025-08-06 DIAGNOSIS — G89.29 CHRONIC LEFT-SIDED LOW BACK PAIN WITH LEFT-SIDED SCIATICA: Primary | ICD-10-CM

## 2025-08-06 DIAGNOSIS — M25.552 LEFT HIP PAIN: ICD-10-CM

## 2025-08-06 DIAGNOSIS — M54.42 CHRONIC LEFT-SIDED LOW BACK PAIN WITH LEFT-SIDED SCIATICA: Primary | ICD-10-CM

## 2025-08-06 PROCEDURE — 97110 THERAPEUTIC EXERCISES: CPT

## 2025-08-06 PROCEDURE — 97530 THERAPEUTIC ACTIVITIES: CPT

## 2025-08-13 ENCOUNTER — OFFICE VISIT (OUTPATIENT)
Dept: PHYSICAL THERAPY | Facility: REHABILITATION | Age: 65
End: 2025-08-13
Attending: FAMILY MEDICINE
Payer: MEDICARE

## 2025-08-20 ENCOUNTER — EVALUATION (OUTPATIENT)
Dept: PHYSICAL THERAPY | Facility: REHABILITATION | Age: 65
End: 2025-08-20
Attending: FAMILY MEDICINE
Payer: MEDICARE

## 2025-08-20 DIAGNOSIS — G89.29 CHRONIC LEFT-SIDED LOW BACK PAIN WITH LEFT-SIDED SCIATICA: Primary | ICD-10-CM

## 2025-08-20 DIAGNOSIS — M54.42 CHRONIC LEFT-SIDED LOW BACK PAIN WITH LEFT-SIDED SCIATICA: Primary | ICD-10-CM

## 2025-08-20 DIAGNOSIS — M25.552 LEFT HIP PAIN: ICD-10-CM

## 2025-08-20 PROCEDURE — 97110 THERAPEUTIC EXERCISES: CPT | Performed by: PHYSICAL THERAPIST

## 2025-08-20 PROCEDURE — 97530 THERAPEUTIC ACTIVITIES: CPT | Performed by: PHYSICAL THERAPIST

## (undated) DEVICE — SYRINGE 20ML LL

## (undated) DEVICE — PENCIL ELECTROSURG E-Z CLEAN -0035H

## (undated) DEVICE — ACE WRAP 6 IN UNSTERILE

## (undated) DEVICE — SUT STRATAFIX SPIRAL MONOCRYL PLUS 3-0 PS-2 45CM SXMP1B107

## (undated) DEVICE — CEMENT MIXING TOWER

## (undated) DEVICE — SUT STRATAFIX SPIRAL PDS PLUS 1 CTX 18 IN SXPP1A400

## (undated) DEVICE — TRAY FOLEY 16FR URIMETER SURESTEP

## (undated) DEVICE — ABDOMINAL PAD: Brand: DERMACEA

## (undated) DEVICE — ADHESIVE SKIN HIGH VISCOSITY EXOFIN 1ML

## (undated) DEVICE — ANTI-EMBOLISM STOCKINGS,THIGH LENGTH,LARGE,REGULAR,SIZE H: Brand: T.E.D.

## (undated) DEVICE — LIGHT HANDLE COVER SLEEVE DISP BLUE STELLAR

## (undated) DEVICE — IMPERVIOUS STOCKINETTE: Brand: DEROYAL

## (undated) DEVICE — COOL TEMP PAD

## (undated) DEVICE — DUAL CUT SAGITTAL BLADE

## (undated) DEVICE — THE SIMPULSE SOLO SYSTEM WITH ULTREX RETRACTABLE SPLASH SHIELD TIP: Brand: SIMPULSE SOLO

## (undated) DEVICE — COBAN 4 IN STERILE

## (undated) DEVICE — NEEDLE 18 G X 1 1/2 SAFETY

## (undated) DEVICE — GLOVE SRG BIOGEL 8

## (undated) DEVICE — SUT VICRYL 0 CT-1 27 IN J260H

## (undated) DEVICE — GLOVE INDICATOR PI UNDERGLOVE SZ 8 BLUE

## (undated) DEVICE — PADDING CAST 4 IN  COTTON STRL

## (undated) DEVICE — 3M™ IOBAN™ 2 ANTIMICROBIAL INCISE DRAPE 6650EZ: Brand: IOBAN™ 2

## (undated) DEVICE — CAPIT KNEE ATTUNE RP CEMENT - DEPUY

## (undated) DEVICE — HOOD WITH PEEL AWAY FACE SHIELD: Brand: T7PLUS

## (undated) DEVICE — SUT VICRYL 2-0 CT-1 27 IN J259H

## (undated) DEVICE — PAD CAST 4 IN COTTON NON STERILE

## (undated) DEVICE — BETHLEHEM UNIV TOTAL KNEE, KIT: Brand: CARDINAL HEALTH

## (undated) DEVICE — SPONGE PVP SCRUB WING STERILE

## (undated) DEVICE — HOOD: Brand: T7PLUS

## (undated) DEVICE — RECIPROCATING BLADE, DOUBLE SIDED, OFFSET  (70.0 X 0.64 X 12.6MM)

## (undated) DEVICE — HOOD: Brand: FLYTE, SURGICOOL

## (undated) DEVICE — ADHESIVE SKIN CLOSR DERMABOND PRINEO

## (undated) DEVICE — TEDS THIGH LG REG